# Patient Record
Sex: FEMALE | Race: WHITE | NOT HISPANIC OR LATINO | Employment: OTHER | URBAN - METROPOLITAN AREA
[De-identification: names, ages, dates, MRNs, and addresses within clinical notes are randomized per-mention and may not be internally consistent; named-entity substitution may affect disease eponyms.]

---

## 2017-11-20 ENCOUNTER — TRANSFERRED RECORDS (OUTPATIENT)
Dept: HEALTH INFORMATION MANAGEMENT | Facility: CLINIC | Age: 67
End: 2017-11-20

## 2017-11-21 ENCOUNTER — TRANSFERRED RECORDS (OUTPATIENT)
Dept: HEALTH INFORMATION MANAGEMENT | Facility: CLINIC | Age: 67
End: 2017-11-21

## 2017-11-22 ENCOUNTER — TRANSFERRED RECORDS (OUTPATIENT)
Dept: HEALTH INFORMATION MANAGEMENT | Facility: CLINIC | Age: 67
End: 2017-11-22

## 2017-11-25 ENCOUNTER — TRANSFERRED RECORDS (OUTPATIENT)
Dept: HEALTH INFORMATION MANAGEMENT | Facility: CLINIC | Age: 67
End: 2017-11-25

## 2017-11-27 ENCOUNTER — TRANSFERRED RECORDS (OUTPATIENT)
Dept: HEALTH INFORMATION MANAGEMENT | Facility: CLINIC | Age: 67
End: 2017-11-27

## 2017-11-28 ENCOUNTER — TRANSFERRED RECORDS (OUTPATIENT)
Dept: HEALTH INFORMATION MANAGEMENT | Facility: CLINIC | Age: 67
End: 2017-11-28

## 2017-12-02 ENCOUNTER — TRANSFERRED RECORDS (OUTPATIENT)
Dept: HEALTH INFORMATION MANAGEMENT | Facility: CLINIC | Age: 67
End: 2017-12-02

## 2017-12-03 ENCOUNTER — TRANSFERRED RECORDS (OUTPATIENT)
Dept: HEALTH INFORMATION MANAGEMENT | Facility: CLINIC | Age: 67
End: 2017-12-03

## 2017-12-04 ENCOUNTER — TRANSFERRED RECORDS (OUTPATIENT)
Dept: HEALTH INFORMATION MANAGEMENT | Facility: CLINIC | Age: 67
End: 2017-12-04

## 2017-12-05 ENCOUNTER — TRANSFERRED RECORDS (OUTPATIENT)
Dept: HEALTH INFORMATION MANAGEMENT | Facility: CLINIC | Age: 67
End: 2017-12-05

## 2017-12-06 ENCOUNTER — TRANSFERRED RECORDS (OUTPATIENT)
Dept: HEALTH INFORMATION MANAGEMENT | Facility: CLINIC | Age: 67
End: 2017-12-06

## 2017-12-07 ENCOUNTER — TRANSFERRED RECORDS (OUTPATIENT)
Dept: HEALTH INFORMATION MANAGEMENT | Facility: CLINIC | Age: 67
End: 2017-12-07

## 2017-12-12 ENCOUNTER — TRANSFERRED RECORDS (OUTPATIENT)
Dept: HEALTH INFORMATION MANAGEMENT | Facility: CLINIC | Age: 67
End: 2017-12-12

## 2017-12-13 ENCOUNTER — TRANSFERRED RECORDS (OUTPATIENT)
Dept: HEALTH INFORMATION MANAGEMENT | Facility: CLINIC | Age: 67
End: 2017-12-13

## 2017-12-15 ENCOUNTER — TRANSFERRED RECORDS (OUTPATIENT)
Dept: HEALTH INFORMATION MANAGEMENT | Facility: CLINIC | Age: 67
End: 2017-12-15

## 2017-12-18 ENCOUNTER — TRANSFERRED RECORDS (OUTPATIENT)
Dept: HEALTH INFORMATION MANAGEMENT | Facility: CLINIC | Age: 67
End: 2017-12-18

## 2017-12-31 ENCOUNTER — TRANSFERRED RECORDS (OUTPATIENT)
Dept: HEALTH INFORMATION MANAGEMENT | Facility: CLINIC | Age: 67
End: 2017-12-31

## 2018-09-14 ENCOUNTER — TRANSFERRED RECORDS (OUTPATIENT)
Dept: HEALTH INFORMATION MANAGEMENT | Facility: CLINIC | Age: 68
End: 2018-09-14

## 2018-09-15 ENCOUNTER — TRANSFERRED RECORDS (OUTPATIENT)
Dept: HEALTH INFORMATION MANAGEMENT | Facility: CLINIC | Age: 68
End: 2018-09-15

## 2018-09-20 ENCOUNTER — TRANSFERRED RECORDS (OUTPATIENT)
Dept: HEALTH INFORMATION MANAGEMENT | Facility: CLINIC | Age: 68
End: 2018-09-20

## 2018-10-04 ENCOUNTER — TRANSFERRED RECORDS (OUTPATIENT)
Dept: HEALTH INFORMATION MANAGEMENT | Facility: CLINIC | Age: 68
End: 2018-10-04

## 2018-10-05 ENCOUNTER — TRANSFERRED RECORDS (OUTPATIENT)
Dept: HEALTH INFORMATION MANAGEMENT | Facility: CLINIC | Age: 68
End: 2018-10-05

## 2018-10-08 ENCOUNTER — TRANSFERRED RECORDS (OUTPATIENT)
Dept: HEALTH INFORMATION MANAGEMENT | Facility: CLINIC | Age: 68
End: 2018-10-08

## 2018-10-09 ENCOUNTER — TRANSFERRED RECORDS (OUTPATIENT)
Dept: HEALTH INFORMATION MANAGEMENT | Facility: CLINIC | Age: 68
End: 2018-10-09

## 2018-10-10 ENCOUNTER — TRANSFERRED RECORDS (OUTPATIENT)
Dept: HEALTH INFORMATION MANAGEMENT | Facility: CLINIC | Age: 68
End: 2018-10-10

## 2018-10-11 ENCOUNTER — TRANSFERRED RECORDS (OUTPATIENT)
Dept: HEALTH INFORMATION MANAGEMENT | Facility: CLINIC | Age: 68
End: 2018-10-11

## 2018-10-23 ENCOUNTER — TRANSFERRED RECORDS (OUTPATIENT)
Dept: HEALTH INFORMATION MANAGEMENT | Facility: CLINIC | Age: 68
End: 2018-10-23

## 2018-10-25 ENCOUNTER — TRANSFERRED RECORDS (OUTPATIENT)
Dept: HEALTH INFORMATION MANAGEMENT | Facility: CLINIC | Age: 68
End: 2018-10-25

## 2018-10-27 ENCOUNTER — TRANSFERRED RECORDS (OUTPATIENT)
Dept: HEALTH INFORMATION MANAGEMENT | Facility: CLINIC | Age: 68
End: 2018-10-27

## 2018-11-21 ENCOUNTER — TRANSFERRED RECORDS (OUTPATIENT)
Dept: HEALTH INFORMATION MANAGEMENT | Facility: CLINIC | Age: 68
End: 2018-11-21

## 2019-02-19 ENCOUNTER — MEDICAL CORRESPONDENCE (OUTPATIENT)
Dept: HEALTH INFORMATION MANAGEMENT | Facility: CLINIC | Age: 69
End: 2019-02-19

## 2019-03-08 ENCOUNTER — DOCUMENTATION ONLY (OUTPATIENT)
Dept: CARE COORDINATION | Facility: CLINIC | Age: 69
End: 2019-03-08

## 2019-03-26 DIAGNOSIS — M54.6 THORACOLUMBAR BACK PAIN: Primary | ICD-10-CM

## 2019-03-26 DIAGNOSIS — M54.50 THORACOLUMBAR BACK PAIN: Primary | ICD-10-CM

## 2019-03-27 ASSESSMENT — ENCOUNTER SYMPTOMS
BLOOD IN STOOL: 0
DECREASED LIBIDO: 0
FEVER: 0
SPEECH CHANGE: 0
TINGLING: 1
HYPERTENSION: 0
ALTERED TEMPERATURE REGULATION: 1
DECREASED APPETITE: 1
JAUNDICE: 0
TREMORS: 1
SYNCOPE: 0
POOR WOUND HEALING: 1
SLEEP DISTURBANCES DUE TO BREATHING: 0
PARALYSIS: 1
ORTHOPNEA: 0
CHILLS: 1
HEMATURIA: 0
WEAKNESS: 1
DIZZINESS: 1
POLYDIPSIA: 0
LOSS OF CONSCIOUSNESS: 0
INSOMNIA: 1
DECREASED CONCENTRATION: 0
POLYPHAGIA: 0
BOWEL INCONTINENCE: 1
WEIGHT GAIN: 0
LIGHT-HEADEDNESS: 1
SKIN CHANGES: 0
NUMBNESS: 1
FATIGUE: 1
SEIZURES: 0
NAUSEA: 1
NERVOUS/ANXIOUS: 1
PANIC: 0
EXERCISE INTOLERANCE: 1
INCREASED ENERGY: 1
FLANK PAIN: 0
RECTAL PAIN: 1
DIFFICULTY URINATING: 1
HEADACHES: 0
ABDOMINAL PAIN: 0
NAIL CHANGES: 0
CONSTIPATION: 1
HEARTBURN: 0
VOMITING: 0
NIGHT SWEATS: 1
MEMORY LOSS: 0
DIARRHEA: 1
HOT FLASHES: 0
DYSURIA: 1
HYPOTENSION: 0
DEPRESSION: 1
BLOATING: 1
HALLUCINATIONS: 0
WEIGHT LOSS: 0
LEG PAIN: 1
PALPITATIONS: 0

## 2019-03-28 ENCOUNTER — PATIENT OUTREACH (OUTPATIENT)
Dept: NEUROSURGERY | Facility: CLINIC | Age: 69
End: 2019-03-28

## 2019-03-28 ENCOUNTER — OFFICE VISIT (OUTPATIENT)
Dept: NEUROSURGERY | Facility: CLINIC | Age: 69
End: 2019-03-28
Payer: MEDICARE

## 2019-03-28 ENCOUNTER — ANCILLARY PROCEDURE (OUTPATIENT)
Dept: GENERAL RADIOLOGY | Facility: CLINIC | Age: 69
End: 2019-03-28
Attending: NEUROLOGICAL SURGERY
Payer: MEDICARE

## 2019-03-28 VITALS
HEART RATE: 58 BPM | OXYGEN SATURATION: 100 % | SYSTOLIC BLOOD PRESSURE: 122 MMHG | DIASTOLIC BLOOD PRESSURE: 58 MMHG | RESPIRATION RATE: 18 BRPM | TEMPERATURE: 98 F

## 2019-03-28 DIAGNOSIS — G82.22: ICD-10-CM

## 2019-03-28 DIAGNOSIS — M54.6 THORACOLUMBAR BACK PAIN: ICD-10-CM

## 2019-03-28 DIAGNOSIS — M54.50 THORACOLUMBAR BACK PAIN: ICD-10-CM

## 2019-03-28 DIAGNOSIS — Q28.8 SPINAL VASCULAR MALFORMATION: Primary | ICD-10-CM

## 2019-03-28 PROBLEM — R29.898 LEG WEAKNESS, BILATERAL: Status: ACTIVE | Noted: 2018-09-25

## 2019-03-28 PROBLEM — R00.0 TACHYCARDIA: Status: ACTIVE | Noted: 2017-12-03

## 2019-03-28 PROBLEM — Z87.898 HISTORY OF URINARY RETENTION: Status: ACTIVE | Noted: 2018-03-15

## 2019-03-28 PROBLEM — S24.103A: Status: ACTIVE | Noted: 2018-10-11

## 2019-03-28 PROBLEM — Z87.440 HISTORY OF RECURRENT UTIS: Status: ACTIVE | Noted: 2017-12-03

## 2019-03-28 PROBLEM — N39.44 NOCTURNAL ENURESIS: Status: ACTIVE | Noted: 2018-06-22

## 2019-03-28 PROBLEM — L89.153 PRESSURE ULCER OF COCCYGEAL REGION, STAGE 3 (H): Status: ACTIVE | Noted: 2019-03-08

## 2019-03-28 PROBLEM — R00.2 PALPITATIONS: Status: ACTIVE | Noted: 2017-10-18

## 2019-03-28 PROBLEM — E78.00 PURE HYPERCHOLESTEROLEMIA: Status: ACTIVE | Noted: 2018-06-06

## 2019-03-28 PROBLEM — I10 HYPERTENSION: Status: ACTIVE | Noted: 2017-12-03

## 2019-03-28 PROBLEM — S24.109A: Status: ACTIVE | Noted: 2017-11-22

## 2019-03-28 PROBLEM — R31.9 HEMATURIA: Status: ACTIVE | Noted: 2017-12-03

## 2019-03-28 PROBLEM — Z98.890 HISTORY OF SPINAL SURGERY: Status: ACTIVE | Noted: 2017-12-03

## 2019-03-28 PROBLEM — L25.9 CONTACT DERMATITIS: Status: ACTIVE | Noted: 2017-12-03

## 2019-03-28 PROBLEM — R60.0 BILATERAL LOWER EXTREMITY EDEMA: Status: ACTIVE | Noted: 2019-03-20

## 2019-03-28 PROBLEM — I34.1 MVP (MITRAL VALVE PROLAPSE): Status: ACTIVE | Noted: 2017-10-18

## 2019-03-28 PROBLEM — G56.22 ULNAR NEUROPATHY AT ELBOW, LEFT: Status: ACTIVE | Noted: 2018-12-15

## 2019-03-28 PROBLEM — R33.9 INCOMPLETE EMPTYING OF BLADDER: Status: ACTIVE | Noted: 2018-03-15

## 2019-03-28 PROBLEM — M62.838 MUSCLE SPASTICITY: Status: ACTIVE | Noted: 2017-12-03

## 2019-03-28 PROBLEM — N39.41 URGE INCONTINENCE OF URINE: Status: ACTIVE | Noted: 2018-03-15

## 2019-03-28 ASSESSMENT — PAIN SCALES - GENERAL: PAINLEVEL: MILD PAIN (3)

## 2019-03-28 ASSESSMENT — PATIENT HEALTH QUESTIONNAIRE - PHQ9: SUM OF ALL RESPONSES TO PHQ QUESTIONS 1-9: 1

## 2019-03-28 NOTE — LETTER
Date:April 2, 2019      Patient was self referred, no letter generated. Do not send.        [facility] Health Information

## 2019-03-28 NOTE — NURSING NOTE
Chief Complaint   Patient presents with     New Patient     [facility] new patient visit for throracicolumbar issues      Unable to obtain height and weight during rooming - intake area was occupied. Patient went to first floor to have her scan done. Can obtain height and weight once patient comes back to clinic.     Helena Badillo MA

## 2019-03-28 NOTE — LETTER
3/28/2019       RE: Anayeli Gagnon  [address] [place] [address]     Dear Colleague,    Thank you for referring your patient, Anayeli Gagnon, to the [facility] NEUROSURGERY at [facility]. Please see a copy of my visit note below.        Neurosurgery Clinic Note    Chief Complaint: leg weakness and pain in ankles and feet    History of Present Illness:  Anayeli Gagnon is a self-referred 68 year old female presenting following a fairly lengthy Neurosurgical history with all prior neurosurgical treatment at [facility].      1,068 pages of outside hospital medical records were reviewed for this visit due to complex history and are summarized as follows. Patient is relatively poor historian and medical records were used to corroborate details.      Initially started in November 2017, she experienced acute onset pain in her midline between her shoulder blades. The pain did not radiate. She denies weakness or sensory changes at that time. She was evaluated at [place] for a possible cardiac etiology, which was negative. Thoracic spine imaging demonstrated spinal cord compression from epidural vs subdural mass. She underwent decompression with placement of a lumbar drain 11/21/17 with Dr. Murcia. Intraoperatively, findings were consistent with an intradural hematoma. She underwent a spinal angiograms 11/21 and 11/25/17, which was negative. Notes indicate worsening lower extremity symptoms postoperatively, imaging repeated which demonstrated a lumbar hematoma as well. She underwent an L2-5 decompression and hematoma evacuation 12/3/17.     There was no mention in operative report of abnormal intradural flow void in upper lumbar spine but this was visualized by us on MRI upon reviewing this.    She was discharged to rehab and after approximately 1 month of rehab, she was able to stand and walk with an assistance device. She went home. While at home, she  began to slowly deteriorate, so she represented to Dr. Murcia in 2018. Imaging demonstrated adhesions throughout the thoracic spine. She underwent a 2 stage T3-12 lysis of adhesions on 10/4/18 and 10/5/18. Postoperatively, the patient noted bowel incontinence, perianal pain, and pain in her feet bilaterally, all of these were new and worse symptoms. The pain involves her ankles circumferentially and feet in all distributions. Standing makes the pain worse. Again after a long rehab course, she was able to stand and walk with assistance and was discharged home. She had ongoing bowel incontinence and pain in her ankles/feet. While being at home, she has again slowly deteriorated and is presently confined to a wheelchair. The patient has not had updated imaging since the surgery and since she began to deteriorate this last time. She is presenting for a second opinion. She denies any personal or family history of bleeding/clotting disorders or soft tissue disorders.    She saw her prior neurosurgeon who indicated nothing further could be done for her. She self-referred for another opinion.      Review of Systems  Notably Positive for back pain, leg tingling/numbness/weakness, bowel and bladder incontinence,      Answers for HPI/ROS submitted by the patient on 3/27/2019   General Symptoms: Yes  Skin Symptoms: Yes  HENT Symptoms: No  EYE SYMPTOMS: No  HEART SYMPTOMS: Yes  LUNG SYMPTOMS: No  INTESTINAL SYMPTOMS: Yes  URINARY SYMPTOMS: Yes  GYNECOLOGIC SYMPTOMS: Yes  BREAST SYMPTOMS: No  SKELETAL SYMPTOMS: No  BLOOD SYMPTOMS: No  NERVOUS SYSTEM SYMPTOMS: Yes  MENTAL HEALTH SYMPTOMS: Yes  Fever: No  Loss of appetite: Yes  Weight loss: No  Weight gain: No  Fatigue: Yes  Night sweats: Yes  Chills: Yes  Increased stress: Yes  Excessive hunger: No  Excessive thirst: No  Feeling hot or cold when others believe the temperature is normal: Yes  Loss of height: No  Post-operative complications: Yes  Surgical site pain:  Yes  Hallucinations: No  Change in or Loss of Energy: Yes  Hyperactivity: No  Confusion: No  Changes in hair: No  Changes in moles/birth marks: No  Itching: No  Rashes: No  Changes in nails: No  Acne: No  Hair in places you don't want it: No  Change in facial hair: No  Warts: No  Non-healing sores: Yes  Scarring: No  Flaking of skin: No  Color changes of hands/feet in cold : Yes  Sun sensitivity: No  Skin thickening: No  Chest pain or pressure: No  Fast or irregular heartbeat: No  Pain in legs with walking: Yes  Trouble breathing while lying down: No  Fingers or toes appear blue: Yes  High blood pressure: No  Low blood pressure: No  Fainting: No  Murmurs: No  Pacemaker: No  Varicose veins: Yes  Edema or swelling: Yes  Wake up at night with shortness of breath: No  Light-headedness: Yes  Exercise intolerance: Yes  Heart burn or indigestion: No  Nausea: Yes  Vomiting: No  Abdominal pain: No  Bloating: Yes  Constipation: Yes  Diarrhea: Yes  Blood in stool: No  Black stools: No  Rectal or Anal pain: Yes  Fecal incontinence: Yes  Yellowing of skin or eyes: No  Vomit with blood: No  Change in stools: Yes  Trouble holding urine or incontinence: Yes  Pain or burning: Yes  Trouble starting or stopping: Yes  Increased frequency of urination: No  Blood in urine: No  Decreased frequency of urination: Yes  Frequent nighttime urination: No  Flank pain: No  Difficulty emptying bladder: Yes  Dizziness or trouble with balance: Yes  Fainting or black-out spells: No  Memory loss: No  Headache: No  Seizures: No  Speech problems: No  Tingling: Yes  Tremor: Yes  Weakness: Yes  Paralysis: Yes  Numbness: Yes  Bleeding or spotting between periods: No  Heavy or painful periods: No  Irregular periods: No  Vaginal discharge: No  Hot flashes: No  Vaginal dryness: No  Genital ulcers: No  Reduced libido: No  Painful intercourse: No  Difficulty with sexual arousal: No  Post-menopausal bleeding: No  Nervous or Anxious: Yes  Depression: Yes  Trouble  sleeping: Yes  Trouble thinking or concentrating: No  Mood changes: Yes  Panic attacks: No        Past Medical History:   Diagnosis Date     CARDIAC DYSRHYTHMIAS NEC 10/3/2007    Taking atenelol for years for this     History of skin cancer        Past Surgical History:   Procedure Laterality Date     LAPAROSCOPIC TUBAL LIGATION     multiple thoracic and lumbar spine intradural surgeries as detailed in HPI    Social History     Socioeconomic History     Marital status:      Spouse name: None     Number of children: None     Years of education: None     Highest education level: None   Occupational History     Occupation: NA     Employer: [employer]   Social Needs     Financial resource strain: None     Food insecurity:     Worry: None     Inability: None     Transportation needs:     Medical: None     Non-medical: None   Tobacco Use     Smoking status: Never Smoker     Smokeless tobacco: Never Used   Substance and Sexual Activity     Alcohol use: No     Drug use: No     Sexual activity: Yes     Partners: Male   Lifestyle     Physical activity:     Days per week: None     Minutes per session: None     Stress: None   Relationships     Social connections:     Talks on phone: None     Gets together: None     Attends Yazidism service: None     Active member of club or organization: None     Attends meetings of clubs or organizations: None     Relationship status: None     Intimate partner violence:     Fear of current or ex partner: None     Emotionally abused: None     Physically abused: None     Forced sexual activity: None   Other Topics Concern     Parent/sibling w/ CABG, MI or angioplasty before 65F 55M? Not Asked   Social History Narrative    Lives with spouse - works in [place].       family history includes C.A.D. in her father.    Resulted Imaging/Labs:  Result Date: 3/28/2019  Exam: XR SPINE COMPLETE SCOLIOSIS 2 VW, 3/28/2019 9:09 AM Indication: sagittal and coronal balance; Thoracolumbar back pain;  Thoracolumbar back pain Comparison: 9/20/2018 CT Techniques: AP and lateral EOS composite images of the full spine with the patient in the seated position were submitted for interpretation. Findings: 12 rib bearing vertebral bodies and 5 lumbar type vertebral bodies are identified. Mild degenerative endplate change in the cervical spine, greatest at C5-6. Mild degenerative endplate spurring and sclerosis in the thoracic and lumbar spine, greatest at T12-L1 and L5-S1 with associated intervertebral disc space narrowing. Decompressive laminectomy changes in the lumbar spine. Lower lumbar predominant facet arthropathy. No vertebral body height loss. Coronal Deformity: Mild degenerative convex right coronal curvature deformity in the lumbar spine with apex at L5. Negative global coronal imbalance. Sagittal Vertical Axis (A vertical line drawn from the center of C7 (juni line) to the posterosuperior aspect of the S1 on sagittal plane):  Negative. Additional Findings: Clear lungs. Nonobstructive bowel gas pattern.     Impression: 1. Mild degenerative convex right coronal curvature deformity in the lumbar spine with apex at L5. 2. With the patient in the seated position, negative global coronal and sagittal imbalance. 3. Mild degenerative change throughout the spine, as described. SHRUTI PINTO DO    MRI thoracic and lumbar spine and CT myelograms reviewed from Oct 2017-Oct 2018; no recent imaging since last surgery, prior to last surgery multiple intradural abnormalities with spinal cord deviation and compression potentially from arachnoid scarring    Angiograms were performed in November 2017 and January 2018 at [facility] and reviewed; records scanned into Epic      Vitamin D:  Vitamin D Deficiency Screening Results:  No results found for: VITDT  25 OH Vit D2   Date Value Ref Range Status   11/17/2011 <5 ug/L Final     25 OH Vit D3   Date Value Ref Range Status   11/17/2011 38 ug/L Final     25 OH Vit D total   Date  "Value Ref Range Status   11/17/2011  30 - 75 ug/L Final    <43  Season, race, dietary intake, and treatment affect the concentration of   25-hydroxy-Vitamin D. Values may decrease during winter months and increase   during summer months. Values less than 30 ug/L may indicate Vitamin D   deficiency.         Nutritional Status:  Estimated body mass index is 24.04 kg/m  as calculated from the following:    Height as of 11/17/11: 1.639 m (5' 4.53\").    Weight as of 12/6/11: 64.6 kg (142 lb 6.4 oz).    No results found for: ALBUMIN    Diabetes Screening:  Lab Results   Component Value Date    A1C 5.3 11/17/2011        Physical Exam   /58   Pulse 58   Temp 98  F (36.7  C) (Oral)   Resp 18   SpO2 100%   Breastfeeding? No   Constitutional: Oriented to person, place, and time. Appears well-developed and well-nourished. Cooperative. No distress.   HENT:   Head: Normocephalic and atraumatic.   Eyes: Conjunctivae are normal.  Neck: Normal range of motion. Neck supple. No spinous process tenderness and no muscular tenderness present. No tracheal deviation present.  Pulmonary/Chest: Effort normal and breath sounds normal.  Abdominal: Soft. Bowel sounds are normal. Exhibits no distension. There is no tenderness.   Musculoskeletal: seated in wheelchair, normal upper extremity tone and function    Neurological: alert and oriented to person, place, and time.   No cranial nerve deficit   sensory deficit: decreased sensation below the level of the umbilicus to pin prick, decreases distally  Gait: Unable to stand  Babinski: Upgoing bilaterally    Reflex Scores:       Tricep reflexes are 2+ on the right side and 2+ on the left side.       Bicep reflexes are 2+ on the right side and 2+ on the left side.       Brachioradialis reflexes are 2+ on the right side and 2+ on the left side.       Patellar reflexes are 3+ on the right side and 3+ on the left side.       Achilles reflexes are 3+ on the right side and 3+ on the left " side.    STRENGTH LEFT RIGHT   Deltoid 5 5   Bicep 5 5   Wrist Extensor 5 5   Tricep 5 5   Finger flexion 5 5   Finger abduction 5 5    5 5       Hip Flexion     0     0   Knee Extension 4- 4   Ankle Dorsiflexion 4- 4   Extensor Hallucis Longus 4- 4   Plantar Flexion 4- 4   Foot eversion 4- 4   Foot inversion 4- 4     No ankle clonus  Able to tandem walk      Skin: Skin is warm, dry and intact.   Psychiatric: Normal mood and affect. Speech is normal and behavior is normal.      ASSESSMENT:  Anayeli Gagnon is a 68 year old female presenting with a history of suspected spinal vascular abnormality following multiple intradural hematoma evacuations at outside hospital. Had developed intradural adhesions and underwent one lysis of adhesions with worsening of symptoms postoperatively. Spinal angiograms negative, however, were temporally close to the time of the hemorrhages.       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    - Thoracic/Lumbar MRI/MRA to evaluate for any visible vascular abnormality, Dr. Hermosillo will review with Dr. Rios and potentially may need to proceed to repeat spinal catheter-based angiogram. Dr. Hermosillo discussed that repeat spinal cord lysis of adhesions without treating underlying etiology is unlikely to result in long-term improvement.    - The patient will return to clinic to discuss next steps after the imaging is complete    Adam Hernández MD PGY-3    I saw, evaluated, and examined the patient with the resident and personally reviewed and interpreted all imaging and labs and formulated the plan.    Ivette Hermosillo MD    [facility] Department of Neurosurgery  Complex Spinal Deformity, Scoliosis, and Minimally Invasive Spine Surgery Specialist  Office: [phone]    3/28/2019    I performed independent visualization of radiographic imaging and  entered my own interpretation, reviewed and/or ordered clinical laboratory tests, reviewed and/or ordered tests in radiology, reviewed and/or ordered medical tests, made the decision to obtain old records and/or history from someone other than the patient and Reviewed and summarized old records and/or discussed this case with another health care provider          Again, thank you for allowing me to participate in the care of your patient.      Sincerely,    Ivette Hermosillo MD

## 2019-03-28 NOTE — PATIENT INSTRUCTIONS
A order has been put in for MRI and MRA.  Further recommendation for care is pending the results of these scans.

## 2019-03-28 NOTE — PROGRESS NOTES
Neurosurgery Clinic Note    Chief Complaint: leg weakness and pain in ankles and feet    History of Present Illness:  Anayeli Gagnon is a self-referred 68 year old female presenting following a fairly lengthy Neurosurgical history with all prior neurosurgical treatment at [facility].      1,068 pages of outside hospital medical records were reviewed for this visit due to complex history and are summarized as follows. Patient is relatively poor historian and medical records were used to corroborate details.      Initially started in November 2017, she experienced acute onset pain in her midline between her shoulder blades. The pain did not radiate. She denies weakness or sensory changes at that time. She was evaluated at [place] for a possible cardiac etiology, which was negative. Thoracic spine imaging demonstrated spinal cord compression from epidural vs subdural mass. She underwent decompression with placement of a lumbar drain 11/21/17 with Dr. Murcia. Intraoperatively, findings were consistent with an intradural hematoma. She underwent a spinal angiograms 11/21 and 11/25/17, which was negative. Notes indicate worsening lower extremity symptoms postoperatively, imaging repeated which demonstrated a lumbar hematoma as well. She underwent an L2-5 decompression and hematoma evacuation 12/3/17.     There was no mention in operative report of abnormal intradural flow void in upper lumbar spine but this was visualized by us on MRI upon reviewing this.    She was discharged to rehab and after approximately 1 month of rehab, she was able to stand and walk with an assistance device. She went home. While at home, she began to slowly deteriorate, so she represented to Dr. Murcia in 2018. Imaging demonstrated adhesions throughout the thoracic spine. She underwent a 2 stage T3-12 lysis of adhesions on 10/4/18 and 10/5/18. Postoperatively, the patient noted bowel incontinence, perianal pain, and pain in her  feet bilaterally, all of these were new and worse symptoms. The pain involves her ankles circumferentially and feet in all distributions. Standing makes the pain worse. Again after a long rehab course, she was able to stand and walk with assistance and was discharged home. She had ongoing bowel incontinence and pain in her ankles/feet. While being at home, she has again slowly deteriorated and is presently confined to a wheelchair. The patient has not had updated imaging since the surgery and since she began to deteriorate this last time. She is presenting for a second opinion. She denies any personal or family history of bleeding/clotting disorders or soft tissue disorders.    She saw her prior neurosurgeon who indicated nothing further could be done for her. She self-referred for another opinion.      Review of Systems  Notably Positive for back pain, leg tingling/numbness/weakness, bowel and bladder incontinence,      Answers for HPI/ROS submitted by the patient on 3/27/2019   General Symptoms: Yes  Skin Symptoms: Yes  HENT Symptoms: No  EYE SYMPTOMS: No  HEART SYMPTOMS: Yes  LUNG SYMPTOMS: No  INTESTINAL SYMPTOMS: Yes  URINARY SYMPTOMS: Yes  GYNECOLOGIC SYMPTOMS: Yes  BREAST SYMPTOMS: No  SKELETAL SYMPTOMS: No  BLOOD SYMPTOMS: No  NERVOUS SYSTEM SYMPTOMS: Yes  MENTAL HEALTH SYMPTOMS: Yes  Fever: No  Loss of appetite: Yes  Weight loss: No  Weight gain: No  Fatigue: Yes  Night sweats: Yes  Chills: Yes  Increased stress: Yes  Excessive hunger: No  Excessive thirst: No  Feeling hot or cold when others believe the temperature is normal: Yes  Loss of height: No  Post-operative complications: Yes  Surgical site pain: Yes  Hallucinations: No  Change in or Loss of Energy: Yes  Hyperactivity: No  Confusion: No  Changes in hair: No  Changes in moles/birth marks: No  Itching: No  Rashes: No  Changes in nails: No  Acne: No  Hair in places you don't want it: No  Change in facial hair: No  Warts: No  Non-healing sores:  Yes  Scarring: No  Flaking of skin: No  Color changes of hands/feet in cold : Yes  Sun sensitivity: No  Skin thickening: No  Chest pain or pressure: No  Fast or irregular heartbeat: No  Pain in legs with walking: Yes  Trouble breathing while lying down: No  Fingers or toes appear blue: Yes  High blood pressure: No  Low blood pressure: No  Fainting: No  Murmurs: No  Pacemaker: No  Varicose veins: Yes  Edema or swelling: Yes  Wake up at night with shortness of breath: No  Light-headedness: Yes  Exercise intolerance: Yes  Heart burn or indigestion: No  Nausea: Yes  Vomiting: No  Abdominal pain: No  Bloating: Yes  Constipation: Yes  Diarrhea: Yes  Blood in stool: No  Black stools: No  Rectal or Anal pain: Yes  Fecal incontinence: Yes  Yellowing of skin or eyes: No  Vomit with blood: No  Change in stools: Yes  Trouble holding urine or incontinence: Yes  Pain or burning: Yes  Trouble starting or stopping: Yes  Increased frequency of urination: No  Blood in urine: No  Decreased frequency of urination: Yes  Frequent nighttime urination: No  Flank pain: No  Difficulty emptying bladder: Yes  Dizziness or trouble with balance: Yes  Fainting or black-out spells: No  Memory loss: No  Headache: No  Seizures: No  Speech problems: No  Tingling: Yes  Tremor: Yes  Weakness: Yes  Paralysis: Yes  Numbness: Yes  Bleeding or spotting between periods: No  Heavy or painful periods: No  Irregular periods: No  Vaginal discharge: No  Hot flashes: No  Vaginal dryness: No  Genital ulcers: No  Reduced libido: No  Painful intercourse: No  Difficulty with sexual arousal: No  Post-menopausal bleeding: No  Nervous or Anxious: Yes  Depression: Yes  Trouble sleeping: Yes  Trouble thinking or concentrating: No  Mood changes: Yes  Panic attacks: No        Past Medical History:   Diagnosis Date     CARDIAC DYSRHYTHMIAS NEC 10/3/2007    Taking atenelol for years for this     History of skin cancer        Past Surgical History:   Procedure Laterality Date      LAPAROSCOPIC TUBAL LIGATION     multiple thoracic and lumbar spine intradural surgeries as detailed in HPI    Social History     Socioeconomic History     Marital status:      Spouse name: None     Number of children: None     Years of education: None     Highest education level: None   Occupational History     Occupation: NA     Employer: [employer]   Social Needs     Financial resource strain: None     Food insecurity:     Worry: None     Inability: None     Transportation needs:     Medical: None     Non-medical: None   Tobacco Use     Smoking status: Never Smoker     Smokeless tobacco: Never Used   Substance and Sexual Activity     Alcohol use: No     Drug use: No     Sexual activity: Yes     Partners: Male   Lifestyle     Physical activity:     Days per week: None     Minutes per session: None     Stress: None   Relationships     Social connections:     Talks on phone: None     Gets together: None     Attends Anglican service: None     Active member of club or organization: None     Attends meetings of clubs or organizations: None     Relationship status: None     Intimate partner violence:     Fear of current or ex partner: None     Emotionally abused: None     Physically abused: None     Forced sexual activity: None   Other Topics Concern     Parent/sibling w/ CABG, MI or angioplasty before 65F 55M? Not Asked   Social History Narrative    Lives with spouse - works in [employer].       family history includes C.A.D. in her father.    Resulted Imaging/Labs:  Result Date: 3/28/2019  Exam: XR SPINE COMPLETE SCOLIOSIS 2 VW, 3/28/2019 9:09 AM Indication: sagittal and coronal balance; Thoracolumbar back pain; Thoracolumbar back pain Comparison: 9/20/2018 CT Techniques: AP and lateral EOS composite images of the full spine with the patient in the seated position were submitted for interpretation. Findings: 12 rib bearing vertebral bodies and 5 lumbar type vertebral bodies are identified. Mild degenerative  endplate change in the cervical spine, greatest at C5-6. Mild degenerative endplate spurring and sclerosis in the thoracic and lumbar spine, greatest at T12-L1 and L5-S1 with associated intervertebral disc space narrowing. Decompressive laminectomy changes in the lumbar spine. Lower lumbar predominant facet arthropathy. No vertebral body height loss. Coronal Deformity: Mild degenerative convex right coronal curvature deformity in the lumbar spine with apex at L5. Negative global coronal imbalance. Sagittal Vertical Axis (A vertical line drawn from the center of C7 (juni line) to the posterosuperior aspect of the S1 on sagittal plane):  Negative. Additional Findings: Clear lungs. Nonobstructive bowel gas pattern.     Impression: 1. Mild degenerative convex right coronal curvature deformity in the lumbar spine with apex at L5. 2. With the patient in the seated position, negative global coronal and sagittal imbalance. 3. Mild degenerative change throughout the spine, as described. SHRUTI PINTO,     MRI thoracic and lumbar spine and CT myelograms reviewed from Oct 2017-Oct 2018; no recent imaging since last surgery, prior to last surgery multiple intradural abnormalities with spinal cord deviation and compression potentially from arachnoid scarring    Angiograms were performed in November 2017 and January 2018 at [facility] and reviewed; records scanned into Epic      Vitamin D:  Vitamin D Deficiency Screening Results:  No results found for: VITDT  25 OH Vit D2   Date Value Ref Range Status   11/17/2011 <5 ug/L Final     25 OH Vit D3   Date Value Ref Range Status   11/17/2011 38 ug/L Final     25 OH Vit D total   Date Value Ref Range Status   11/17/2011  30 - 75 ug/L Final    <43  Season, race, dietary intake, and treatment affect the concentration of   25-hydroxy-Vitamin D. Values may decrease during winter months and increase   during summer months. Values less than 30 ug/L may indicate Vitamin D   deficiency.  "        Nutritional Status:  Estimated body mass index is 24.04 kg/m  as calculated from the following:    Height as of 11/17/11: 1.639 m (5' 4.53\").    Weight as of 12/6/11: 64.6 kg (142 lb 6.4 oz).    No results found for: ALBUMIN    Diabetes Screening:  Lab Results   Component Value Date    A1C 5.3 11/17/2011        Physical Exam   /58   Pulse 58   Temp 98  F (36.7  C) (Oral)   Resp 18   SpO2 100%   Breastfeeding? No   Constitutional: Oriented to person, place, and time. Appears well-developed and well-nourished. Cooperative. No distress.   HENT:   Head: Normocephalic and atraumatic.   Eyes: Conjunctivae are normal.  Neck: Normal range of motion. Neck supple. No spinous process tenderness and no muscular tenderness present. No tracheal deviation present.  Pulmonary/Chest: Effort normal and breath sounds normal.  Abdominal: Soft. Bowel sounds are normal. Exhibits no distension. There is no tenderness.   Musculoskeletal: seated in wheelchair, normal upper extremity tone and function    Neurological: alert and oriented to person, place, and time.   No cranial nerve deficit   sensory deficit: decreased sensation below the level of the umbilicus to pin prick, decreases distally  Gait: Unable to stand  Babinski: Upgoing bilaterally    Reflex Scores:       Tricep reflexes are 2+ on the right side and 2+ on the left side.       Bicep reflexes are 2+ on the right side and 2+ on the left side.       Brachioradialis reflexes are 2+ on the right side and 2+ on the left side.       Patellar reflexes are 3+ on the right side and 3+ on the left side.       Achilles reflexes are 3+ on the right side and 3+ on the left side.    STRENGTH LEFT RIGHT   Deltoid 5 5   Bicep 5 5   Wrist Extensor 5 5   Tricep 5 5   Finger flexion 5 5   Finger abduction 5 5    5 5       Hip Flexion     0     0   Knee Extension 4- 4   Ankle Dorsiflexion 4- 4   Extensor Hallucis Longus 4- 4   Plantar Flexion 4- 4   Foot eversion 4- 4   Foot " inversion 4- 4     No ankle clonus  Able to tandem walk      Skin: Skin is warm, dry and intact.   Psychiatric: Normal mood and affect. Speech is normal and behavior is normal.      ASSESSMENT:  Anayeli Gagnon is a 68 year old female presenting with a history of suspected spinal vascular abnormality following multiple intradural hematoma evacuations at outside hospital. Had developed intradural adhesions and underwent one lysis of adhesions with worsening of symptoms postoperatively. Spinal angiograms negative, however, were temporally close to the time of the hemorrhages.       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    - Thoracic/Lumbar MRI/MRA to evaluate for any visible vascular abnormality, Dr. Hermosillo will review with Dr. Rios and potentially may need to proceed to repeat spinal catheter-based angiogram. Dr. Hermosillo discussed that repeat spinal cord lysis of adhesions without treating underlying etiology is unlikely to result in long-term improvement.    - The patient will return to clinic to discuss next steps after the imaging is complete    Adam Hernández MD PGY-3    I saw, evaluated, and examined the patient with the resident and personally reviewed and interpreted all imaging and labs and formulated the plan.    Ivette Hermosillo MD    [facility] Department of Neurosurgery  Complex Spinal Deformity, Scoliosis, and Minimally Invasive Spine Surgery Specialist  Office: [phone]    3/28/2019    I performed independent visualization of radiographic imaging and entered my own interpretation, reviewed and/or ordered clinical laboratory tests, reviewed and/or ordered tests in radiology, reviewed and/or ordered medical tests, made the decision to obtain old records and/or history from someone other than the patient and Reviewed and summarized old records and/or  discussed this case with another health care provider

## 2019-03-29 ENCOUNTER — TELEPHONE (OUTPATIENT)
Dept: NEUROSURGERY | Facility: CLINIC | Age: 69
End: 2019-03-29

## 2019-04-01 ENCOUNTER — HOSPITAL ENCOUNTER (OUTPATIENT)
Dept: MRI IMAGING | Facility: CLINIC | Age: 69
End: 2019-04-01
Attending: NEUROLOGICAL SURGERY
Payer: MEDICARE

## 2019-04-01 ENCOUNTER — HOSPITAL ENCOUNTER (OUTPATIENT)
Dept: MRI IMAGING | Facility: CLINIC | Age: 69
Discharge: HOME | End: 2019-04-01
Attending: NEUROLOGICAL SURGERY | Admitting: NEUROLOGICAL SURGERY
Payer: MEDICARE

## 2019-04-01 DIAGNOSIS — Q28.8 SPINAL VASCULAR MALFORMATION: ICD-10-CM

## 2019-04-01 LAB
CREAT BLD-MCNC: 0.6 MG/DL (ref 0.52–1.04)
GFR SERPL CREATININE-BSD FRML MDRD: >90 ML/MIN/{1.73_M2}

## 2019-04-01 PROCEDURE — 72157 MRI CHEST SPINE W/O & W/DYE: CPT

## 2019-04-01 PROCEDURE — 82565 ASSAY OF CREATININE: CPT

## 2019-04-01 PROCEDURE — 72159 MR ANGIO SPINE W/O&W/DYE: CPT

## 2019-04-01 PROCEDURE — A9577 INJ MULTIHANCE: HCPCS | Performed by: STUDENT IN AN ORGANIZED HEALTH CARE EDUCATION/TRAINING PROGRAM

## 2019-04-01 PROCEDURE — 25500064 ZZH RX 255 OP 636: Performed by: STUDENT IN AN ORGANIZED HEALTH CARE EDUCATION/TRAINING PROGRAM

## 2019-04-01 PROCEDURE — 72158 MRI LUMBAR SPINE W/O & W/DYE: CPT | Mod: XS

## 2019-04-01 RX ADMIN — GADOBENATE DIMEGLUMINE 15 ML: 529 INJECTION, SOLUTION INTRAVENOUS at 08:53

## 2019-04-04 ENCOUNTER — MEDICAL CORRESPONDENCE (OUTPATIENT)
Dept: HEALTH INFORMATION MANAGEMENT | Facility: CLINIC | Age: 69
End: 2019-04-04

## 2019-04-08 ENCOUNTER — PATIENT OUTREACH (OUTPATIENT)
Dept: NEUROSURGERY | Facility: CLINIC | Age: 69
End: 2019-04-08

## 2019-04-08 NOTE — PROGRESS NOTES
Returned call.    Pt completed the imaging the MD required.  Writer will send the  a note so the pt can get a clinical appt.  Pt expressed understanding of the information given.

## 2019-04-18 ENCOUNTER — OFFICE VISIT (OUTPATIENT)
Dept: NEUROSURGERY | Facility: CLINIC | Age: 69
End: 2019-04-18
Payer: MEDICARE

## 2019-04-18 VITALS
WEIGHT: 127.6 LBS | OXYGEN SATURATION: 94 % | BODY MASS INDEX: 21.55 KG/M2 | HEART RATE: 65 BPM | DIASTOLIC BLOOD PRESSURE: 67 MMHG | SYSTOLIC BLOOD PRESSURE: 127 MMHG

## 2019-04-18 DIAGNOSIS — G95.19 HEMORRHAGE INTO SUBARACHNOID SPACE OF SPINE (H): Primary | ICD-10-CM

## 2019-04-18 DIAGNOSIS — G45.8 OTHER TRANSIENT CEREBRAL ISCHEMIC ATTACKS AND RELATED SYNDROMES: ICD-10-CM

## 2019-04-18 ASSESSMENT — PAIN SCALES - GENERAL: PAINLEVEL: MILD PAIN (2)

## 2019-04-18 NOTE — LETTER
4/18/2019       RE: Anayeli Gagnon  [id number] [place] [address]     Dear Colleague,    Thank you for referring your patient, Anayeli Gagnon, to the [facility] NEUROSURGERY at [facility]. Please see a copy of my visit note below.    Neurosurgery Clinic Note     Chief Complaint: leg weakness and pain in ankles and feet     History of Present Illness:  Anayeli Gangon is a self-referred 68 year old female presenting following a fairly lengthy Neurosurgical history with all prior neurosurgical treatment at [facility].       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     1,068 pages of outside hospital medical records were previously reviewed for due to complex history and are summarized as follows. Patient is relatively poor historian and medical records were used to corroborate details.        Relevant historystarted in November 2017, she experienced acute onset pain in her midline between her shoulder blades. The pain did not radiate. She denies weakness or sensory changes at that time. She was evaluated at [facility] for a possible cardiac etiology, which was negative. Thoracic spine imaging demonstrated spinal cord compression from epidural vs subdural mass. She underwent decompression with placement of a lumbar drain 11/21/17 with Dr. Murcia. Intraoperatively, findings were consistent with an intradural hematoma. She underwent a spinal angiograms 11/21 and 11/25/17, which was negative.    Then, notes indicate worsening lower extremity symptoms postoperatively, imaging repeated which demonstrated a lumbar hematoma as well. She underwent an L2-5 decompression and hematoma evacuation 12/3/17.      There was no mention in operative report of abnormal  intradural flow void in upper lumbar spine but this was visualized by us on MRI upon reviewing this.     She was discharged to rehab and after approximately 1 month of rehab, she was able to stand and walk with an assistance device. She went home. While at home, she began to slowly deteriorate, so she represented to Dr. Murcia in 2018. Imaging demonstrated adhesions throughout the thoracic spine. She underwent a 2 stage T3-12 lysis of adhesions on 10/4/18 and 10/5/18. Postoperatively, the patient noted bowel incontinence, perianal pain, and pain in her feet bilaterally, all of these were new and worse symptoms. The pain involves her ankles circumferentially and feet in all distributions. Standing makes the pain worse. Again after a long rehab course, she was able to stand and walk with assistance and was discharged home. She had ongoing bowel incontinence and pain in her ankles/feet. While being at home, she has again slowly deteriorated and is presently confined to a wheelchair. The patient has not had updated imaging since the surgery and since she began to deteriorate this last time. She is presenting for a second opinion. She denies any personal or family history of bleeding/clotting disorders or soft tissue disorders.     She saw her prior neurosurgeon who indicated nothing further could be done for her. She self-referred for another opinion.        Review of Systems  Notably Positive for back pain, leg tingling/numbness/weakness, bowel and bladder incontinence,        Answers for HPI/ROS submitted by the patient   General Symptoms: Yes  Skin Symptoms: Yes  HENT Symptoms: No  EYE SYMPTOMS: No  HEART SYMPTOMS: Yes  LUNG SYMPTOMS: No  INTESTINAL SYMPTOMS: Yes  URINARY SYMPTOMS: Yes  GYNECOLOGIC SYMPTOMS: Yes  BREAST SYMPTOMS: No  SKELETAL SYMPTOMS: No  BLOOD SYMPTOMS: No  NERVOUS SYSTEM SYMPTOMS: Yes  MENTAL HEALTH SYMPTOMS: Yes  Fever: No  Loss of appetite: Yes  Weight loss: No  Weight gain: No  Fatigue:  Yes  Night sweats: Yes  Chills: Yes  Increased stress: Yes  Excessive hunger: No  Excessive thirst: No  Feeling hot or cold when others believe the temperature is normal: Yes  Loss of height: No  Post-operative complications: Yes  Surgical site pain: Yes  Hallucinations: No  Change in or Loss of Energy: Yes  Hyperactivity: No  Confusion: No  Changes in hair: No  Changes in moles/birth marks: No  Itching: No  Rashes: No  Changes in nails: No  Acne: No  Hair in places you don't want it: No  Change in facial hair: No  Warts: No  Non-healing sores: Yes  Scarring: No  Flaking of skin: No  Color changes of hands/feet in cold : Yes  Sun sensitivity: No  Skin thickening: No  Chest pain or pressure: No  Fast or irregular heartbeat: No  Pain in legs with walking: Yes  Trouble breathing while lying down: No  Fingers or toes appear blue: Yes  High blood pressure: No  Low blood pressure: No  Fainting: No  Murmurs: No  Pacemaker: No  Varicose veins: Yes  Edema or swelling: Yes  Wake up at night with shortness of breath: No  Light-headedness: Yes  Exercise intolerance: Yes  Heart burn or indigestion: No  Nausea: Yes  Vomiting: No  Abdominal pain: No  Bloating: Yes  Constipation: Yes  Diarrhea: Yes  Blood in stool: No  Black stools: No  Rectal or Anal pain: Yes  Fecal incontinence: Yes  Yellowing of skin or eyes: No  Vomit with blood: No  Change in stools: Yes  Trouble holding urine or incontinence: Yes  Pain or burning: Yes  Trouble starting or stopping: Yes  Increased frequency of urination: No  Blood in urine: No  Decreased frequency of urination: Yes  Frequent nighttime urination: No  Flank pain: No  Difficulty emptying bladder: Yes  Dizziness or trouble with balance: Yes  Fainting or black-out spells: No  Memory loss: No  Headache: No  Seizures: No  Speech problems: No  Tingling: Yes  Tremor: Yes  Weakness: Yes  Paralysis: Yes  Numbness: Yes  Bleeding or spotting between periods: No  Heavy or painful periods: No  Irregular  periods: No  Vaginal discharge: No  Hot flashes: No  Vaginal dryness: No  Genital ulcers: No  Reduced libido: No  Painful intercourse: No  Difficulty with sexual arousal: No  Post-menopausal bleeding: No  Nervous or Anxious: Yes  Depression: Yes  Trouble sleeping: Yes  Trouble thinking or concentrating: No  Mood changes: Yes  Panic attacks: No           Past Medical History        Past Medical History:   Diagnosis Date     CARDIAC DYSRHYTHMIAS NEC 10/3/2007     Taking atenelol for years for this     History of skin cancer              Past Surgical History         Past Surgical History:   Procedure Laterality Date     LAPAROSCOPIC TUBAL LIGATION          multiple thoracic and lumbar spine intradural surgeries as detailed in HPI     Social History            Socioeconomic History     Marital status:        Spouse name: None     Number of children: None     Years of education: None     Highest education level: None   Occupational History     Occupation: RICKY       Employer: [employer]   Social Needs     Financial resource strain: None     Food insecurity:       Worry: None       Inability: None     Transportation needs:       Medical: None       Non-medical: None   Tobacco Use     Smoking status: Never Smoker     Smokeless tobacco: Never Used   Substance and Sexual Activity     Alcohol use: No     Drug use: No     Sexual activity: Yes       Partners: Male   Lifestyle     Physical activity:       Days per week: None       Minutes per session: None     Stress: None   Relationships     Social connections:       Talks on phone: None       Gets together: None       Attends Bahai service: None       Active member of club or organization: None       Attends meetings of clubs or organizations: None       Relationship status: None     Intimate partner violence:       Fear of current or ex partner: None       Emotionally abused: None       Physically abused: None       Forced sexual activity: None   Other Topics  Concern     Parent/sibling w/ CABG, MI or angioplasty before 65F 55M? Not Asked   Social History Narrative     Lives with spouse - works in [place].         family history includes C.A.D. in her father.     Resulted Imaging/Labs:      Thoracic/lumbar spine MRI/MRA 4/1/19:  Findings:       Thoracic: The thoracic vertebrae are in normal alignment. Benign  incidental hemangioma in the T5 vertebral body. Postsurgical changes  of laminectomies from T3-T12. Thin postsurgical fluid collection  tracking along the laminectomy defects measuring up to 3 mm in AP  diameter and approximately 20 cm in length.      Evidence of multiple adhesions between the dura and the spinal cord,  not significantly changed compared to the prior CT myelography. The  cord is consequently severely deformed, particularly in the mid and  lower thoracic spine. Multifocal myelopathic signal within the cord,  most pronounced from T2-T4 and from lower T11 to the conus. No spinal  canal or neural foraminal stenosis.      Lumbar: Laminectomy defects from L2-L5. There are 5 lumbar-type  vertebrae assumed for the purposes of this dictation. The tip of the  conus medullaris is at L1-2. Straightening of lumbar lordosis. Normal  lumbar alignment. Mild clumping of the nerve roots of the cauda  equina, not significantly changed. Normal marrow signal. There is mild  disc height narrowing and desiccation throughout the lumbar spine.       On a level by level basis:     L1-2: Disc bulge resulting in mild spinal canal stenosis. No neural  foraminal stenosis.     L2-3: Disc bulge and bilateral facet hypertrophy. Mild spinal canal  narrowing. Mild right neural foraminal stenosis.     L3-4: Right subarticular disc protrusion and bilateral facet  hypertrophy. Mild spinal canal stenosis. Mild bilateral neural  foraminal narrowing.     L4-5: Posterior disc bulge. Bilateral facet hypertrophy. Moderate  spinal canal stenosis. Mild bilateral neural foraminal  stenosis.     L5-S1: Disc bulge, bilateral facet arthropathy, and ligamentum flavum  thickening. No significant spinal canal stenosis. Moderate bilateral  neural foraminal stenosis.     No abnormal enhancement within the thecal sac.     Spine MRA: No evidence of thoracic spinal vascular malformation.  Prominent hemiazygous vein.                                                                      Impression:   1. No evidence of thoracic vascular malformation.  2. Diffuse thoracic postsurgical changes with multiple adhesions  causing deformity and extensive myelopathy of the thoracic cord.  Findings are overall similar to prior CT myelography.  3. Arachnoiditis of the cauda equina.  4. Lumbar spondylosis with moderate spinal canal stenosis, most  pronounced at L4-5 and L5-S1.     I have personally reviewed the examination and initial interpretation  and I agree with the findings.     KIT REED MD    Result Date: 3/28/2019  Exam: XR SPINE COMPLETE SCOLIOSIS 2 VW, 3/28/2019 9:09 AM Indication: sagittal and coronal balance; Thoracolumbar back pain; Thoracolumbar back pain Comparison: 9/20/2018 CT Techniques: AP and lateral EOS composite images of the full spine with the patient in the seated position were submitted for interpretation. Findings: 12 rib bearing vertebral bodies and 5 lumbar type vertebral bodies are identified. Mild degenerative endplate change in the cervical spine, greatest at C5-6. Mild degenerative endplate spurring and sclerosis in the thoracic and lumbar spine, greatest at T12-L1 and L5-S1 with associated intervertebral disc space narrowing. Decompressive laminectomy changes in the lumbar spine. Lower lumbar predominant facet arthropathy. No vertebral body height loss. Coronal Deformity: Mild degenerative convex right coronal curvature deformity in the lumbar spine with apex at L5. Negative global coronal imbalance. Sagittal Vertical Axis (A vertical line drawn from the center of C7 (juni  "line) to the posterosuperior aspect of the S1 on sagittal plane):  Negative. Additional Findings: Clear lungs. Nonobstructive bowel gas pattern.      Impression: 1. Mild degenerative convex right coronal curvature deformity in the lumbar spine with apex at L5. 2. With the patient in the seated position, negative global coronal and sagittal imbalance. 3. Mild degenerative change throughout the spine, as described. SHRUTI PINTO,      MRI thoracic and lumbar spine and CT myelograms reviewed from Oct 2017-Oct 2018; no recent imaging since last surgery, prior to last surgery multiple intradural abnormalities with spinal cord deviation and compression potentially from arachnoid scarring     Angiograms were performed in November 2017 and January 2018 at [facility] and reviewed; records scanned into Epic        Vitamin D:  Vitamin D Deficiency Screening Results:  No results found for: VITDT        25 OH Vit D2   Date Value Ref Range Status   11/17/2011 <5 ug/L Final            25 OH Vit D3   Date Value Ref Range Status   11/17/2011 38 ug/L Final             25 OH Vit D total   Date Value Ref Range Status   11/17/2011   30 - 75 ug/L Final     <43  Season, race, dietary intake, and treatment affect the concentration of   25-hydroxy-Vitamin D. Values may decrease during winter months and increase   during summer months. Values less than 30 ug/L may indicate Vitamin D   deficiency.            Nutritional Status:  Estimated body mass index is 24.04 kg/m  as calculated from the following:    Height as of 11/17/11: 1.639 m (5' 4.53\").    Weight as of 12/6/11: 64.6 kg (142 lb 6.4 oz).     No results found for: ALBUMIN     Diabetes Screening:        Lab Results   Component Value Date     A1C 5.3 11/17/2011          Physical Exam   /58   Pulse 58   Temp 98  F (36.7  C) (Oral)   Resp 18   SpO2 100%   Breastfeeding? No   Constitutional: Oriented to person, place, and time. Appears well-developed and well-nourished. " Cooperative. No distress.   HENT:   Head: Normocephalic and atraumatic.   Eyes: Conjunctivae are normal.  Neck: Normal range of motion. Neck supple. No spinous process tenderness and no muscular tenderness present. No tracheal deviation present.  Pulmonary/Chest: Effort normal and breath sounds normal.  Abdominal: Soft. Bowel sounds are normal. Exhibits no distension. There is no tenderness.   Musculoskeletal: seated in wheelchair, normal upper extremity tone and function    Neurological: alert and oriented to person, place, and time.   No cranial nerve deficit   sensory deficit: decreased sensation below the level of the umbilicus to pin prick, decreases distally  Gait: Unable to stand  Babinski: Upgoing bilaterally     Reflex Scores:       Tricep reflexes are 2+ on the right side and 2+ on the left side.       Bicep reflexes are 2+ on the right side and 2+ on the left side.       Brachioradialis reflexes are 2+ on the right side and 2+ on the left side.       Patellar reflexes are 3+ on the right side and 3+ on the left side.       Achilles reflexes are 3+ on the right side and 3+ on the left side.     STRENGTH LEFT RIGHT   Deltoid 5 5   Bicep 5 5   Wrist Extensor 5 5   Tricep 5 5   Finger flexion 5 5   Finger abduction 5 5    5 5         Hip Flexion       0       0   Knee Extension 4- 4   Ankle Dorsiflexion 4- 4   Extensor Hallucis Longus 4- 4   Plantar Flexion 4- 4   Foot eversion 4- 4   Foot inversion 4- 4     No ankle clonus  Able to tandem walk       Skin: Skin is warm, dry and intact.   Psychiatric: Normal mood and affect. Speech is normal and behavior is normal.        ASSESSMENT:  Anayeli Gagnon is a 68 year old female presenting with a history of suspected spinal vascular abnormality following thoracic intradural hematoma and then months later a lumbar intradural hematoma evacuation at outside hospital. Had developed intradural adhesions and underwent one lysis of adhesions with worsening of  symptoms postoperatively, all at outside hospital. Spinal angiograms negative previously when performed within 6 weeks of prior intradural hemorrhage      PLAN:  The likely etiology for intradural hemorrhages is spinal vascular anomaly such as dural AV fistula or spinal AVM despite prior negative angiography.     Thoracic/Lumbar MRI/MRA to evaluate for any visible vascular abnormality, was negative, we will order a repeat spinal angiogram for patient   I discussed again that repeat spinal cord lysis of adhesions without treating underlying etiology is unlikely to result in long-term improvement, she had this attempted once and it has not been helpful.    We will make sure there is not a cord vascular steal phenomenon from vascular malformation with a spinal angiogram, asked her to call us when this is completed so that I can review results           Ivette Hermosillo MD    [facility] Department of Neurosurgery  Office: [phone]    4/18/2019              Again, thank you for allowing me to participate in the care of your patient.      Sincerely,    Ivette Hermosillo MD

## 2019-04-18 NOTE — NURSING NOTE
Chief Complaint   Patient presents with     RECHECK     [facility] RETURN DISCUSS SURGERY AND IMAGES       Paula Lucas, EMT

## 2019-04-18 NOTE — PATIENT INSTRUCTIONS
Obtain spinal angiogram, call [place] when completed so that Dr. Hermosillo can review results and recommend any further steps for treatment.

## 2019-04-18 NOTE — LETTER
Date:April 23, 2019      Patient was self referred, no letter generated. Do not send.        [facility] Health Information

## 2019-04-18 NOTE — PROGRESS NOTES
Neurosurgery Clinic Note     Chief Complaint: leg weakness and pain in ankles and feet     History of Present Illness:  Anayeli Gagnon is a self-referred 68 year old female presenting following a fairly lengthy Neurosurgical history with all prior neurosurgical treatment at [facility].       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     1,068 pages of outside hospital medical records were previously reviewed for due to complex history and are summarized as follows. Patient is relatively poor historian and medical records were used to corroborate details.        Relevant historystarted in November 2017, she experienced acute onset pain in her midline between her shoulder blades. The pain did not radiate. She denies weakness or sensory changes at that time. She was evaluated at [facility] for a possible cardiac etiology, which was negative. Thoracic spine imaging demonstrated spinal cord compression from epidural vs subdural mass. She underwent decompression with placement of a lumbar drain 11/21/17 with Dr. Murcia. Intraoperatively, findings were consistent with an intradural hematoma. She underwent a spinal angiograms 11/21 and 11/25/17, which was negative.    Then, notes indicate worsening lower extremity symptoms postoperatively, imaging repeated which demonstrated a lumbar hematoma as well. She underwent an L2-5 decompression and hematoma evacuation 12/3/17.      There was no mention in operative report of abnormal intradural flow void in upper lumbar spine but this was visualized by us on MRI upon reviewing this.     She was discharged to rehab and after approximately 1 month of rehab, she was able to stand and walk with an assistance device. She went home. While at home, she began to slowly deteriorate, so  she represented to Dr. Murcia in 2018. Imaging demonstrated adhesions throughout the thoracic spine. She underwent a 2 stage T3-12 lysis of adhesions on 10/4/18 and 10/5/18. Postoperatively, the patient noted bowel incontinence, perianal pain, and pain in her feet bilaterally, all of these were new and worse symptoms. The pain involves her ankles circumferentially and feet in all distributions. Standing makes the pain worse. Again after a long rehab course, she was able to stand and walk with assistance and was discharged home. She had ongoing bowel incontinence and pain in her ankles/feet. While being at home, she has again slowly deteriorated and is presently confined to a wheelchair. The patient has not had updated imaging since the surgery and since she began to deteriorate this last time. She is presenting for a second opinion. She denies any personal or family history of bleeding/clotting disorders or soft tissue disorders.     She saw her prior neurosurgeon who indicated nothing further could be done for her. She self-referred for another opinion.        Review of Systems  Notably Positive for back pain, leg tingling/numbness/weakness, bowel and bladder incontinence,        Answers for HPI/ROS submitted by the patient   General Symptoms: Yes  Skin Symptoms: Yes  HENT Symptoms: No  EYE SYMPTOMS: No  HEART SYMPTOMS: Yes  LUNG SYMPTOMS: No  INTESTINAL SYMPTOMS: Yes  URINARY SYMPTOMS: Yes  GYNECOLOGIC SYMPTOMS: Yes  BREAST SYMPTOMS: No  SKELETAL SYMPTOMS: No  BLOOD SYMPTOMS: No  NERVOUS SYSTEM SYMPTOMS: Yes  MENTAL HEALTH SYMPTOMS: Yes  Fever: No  Loss of appetite: Yes  Weight loss: No  Weight gain: No  Fatigue: Yes  Night sweats: Yes  Chills: Yes  Increased stress: Yes  Excessive hunger: No  Excessive thirst: No  Feeling hot or cold when others believe the temperature is normal: Yes  Loss of height: No  Post-operative complications: Yes  Surgical site pain: Yes  Hallucinations: No  Change in or Loss of  Energy: Yes  Hyperactivity: No  Confusion: No  Changes in hair: No  Changes in moles/birth marks: No  Itching: No  Rashes: No  Changes in nails: No  Acne: No  Hair in places you don't want it: No  Change in facial hair: No  Warts: No  Non-healing sores: Yes  Scarring: No  Flaking of skin: No  Color changes of hands/feet in cold : Yes  Sun sensitivity: No  Skin thickening: No  Chest pain or pressure: No  Fast or irregular heartbeat: No  Pain in legs with walking: Yes  Trouble breathing while lying down: No  Fingers or toes appear blue: Yes  High blood pressure: No  Low blood pressure: No  Fainting: No  Murmurs: No  Pacemaker: No  Varicose veins: Yes  Edema or swelling: Yes  Wake up at night with shortness of breath: No  Light-headedness: Yes  Exercise intolerance: Yes  Heart burn or indigestion: No  Nausea: Yes  Vomiting: No  Abdominal pain: No  Bloating: Yes  Constipation: Yes  Diarrhea: Yes  Blood in stool: No  Black stools: No  Rectal or Anal pain: Yes  Fecal incontinence: Yes  Yellowing of skin or eyes: No  Vomit with blood: No  Change in stools: Yes  Trouble holding urine or incontinence: Yes  Pain or burning: Yes  Trouble starting or stopping: Yes  Increased frequency of urination: No  Blood in urine: No  Decreased frequency of urination: Yes  Frequent nighttime urination: No  Flank pain: No  Difficulty emptying bladder: Yes  Dizziness or trouble with balance: Yes  Fainting or black-out spells: No  Memory loss: No  Headache: No  Seizures: No  Speech problems: No  Tingling: Yes  Tremor: Yes  Weakness: Yes  Paralysis: Yes  Numbness: Yes  Bleeding or spotting between periods: No  Heavy or painful periods: No  Irregular periods: No  Vaginal discharge: No  Hot flashes: No  Vaginal dryness: No  Genital ulcers: No  Reduced libido: No  Painful intercourse: No  Difficulty with sexual arousal: No  Post-menopausal bleeding: No  Nervous or Anxious: Yes  Depression: Yes  Trouble sleeping: Yes  Trouble thinking or  concentrating: No  Mood changes: Yes  Panic attacks: No           Past Medical History        Past Medical History:   Diagnosis Date     CARDIAC DYSRHYTHMIAS NEC 10/3/2007     Taking atenelol for years for this     History of skin cancer              Past Surgical History         Past Surgical History:   Procedure Laterality Date     LAPAROSCOPIC TUBAL LIGATION          multiple thoracic and lumbar spine intradural surgeries as detailed in HPI     Social History            Socioeconomic History     Marital status:        Spouse name: None     Number of children: None     Years of education: None     Highest education level: None   Occupational History     Occupation: NA       Employer: [employer]   Social Needs     Financial resource strain: None     Food insecurity:       Worry: None       Inability: None     Transportation needs:       Medical: None       Non-medical: None   Tobacco Use     Smoking status: Never Smoker     Smokeless tobacco: Never Used   Substance and Sexual Activity     Alcohol use: No     Drug use: No     Sexual activity: Yes       Partners: Male   Lifestyle     Physical activity:       Days per week: None       Minutes per session: None     Stress: None   Relationships     Social connections:       Talks on phone: None       Gets together: None       Attends Scientology service: None       Active member of club or organization: None       Attends meetings of clubs or organizations: None       Relationship status: None     Intimate partner violence:       Fear of current or ex partner: None       Emotionally abused: None       Physically abused: None       Forced sexual activity: None   Other Topics Concern     Parent/sibling w/ CABG, MI or angioplasty before 65F 55M? Not Asked   Social History Narrative     Lives with spouse - works in [employer].         family history includes C.A.D. in her father.     Resulted Imaging/Labs:      Thoracic/lumbar spine MRI/MRA 4/1/19:  Findings:        Thoracic: The thoracic vertebrae are in normal alignment. Benign  incidental hemangioma in the T5 vertebral body. Postsurgical changes  of laminectomies from T3-T12. Thin postsurgical fluid collection  tracking along the laminectomy defects measuring up to 3 mm in AP  diameter and approximately 20 cm in length.      Evidence of multiple adhesions between the dura and the spinal cord,  not significantly changed compared to the prior CT myelography. The  cord is consequently severely deformed, particularly in the mid and  lower thoracic spine. Multifocal myelopathic signal within the cord,  most pronounced from T2-T4 and from lower T11 to the conus. No spinal  canal or neural foraminal stenosis.      Lumbar: Laminectomy defects from L2-L5. There are 5 lumbar-type  vertebrae assumed for the purposes of this dictation. The tip of the  conus medullaris is at L1-2. Straightening of lumbar lordosis. Normal  lumbar alignment. Mild clumping of the nerve roots of the cauda  equina, not significantly changed. Normal marrow signal. There is mild  disc height narrowing and desiccation throughout the lumbar spine.       On a level by level basis:     L1-2: Disc bulge resulting in mild spinal canal stenosis. No neural  foraminal stenosis.     L2-3: Disc bulge and bilateral facet hypertrophy. Mild spinal canal  narrowing. Mild right neural foraminal stenosis.     L3-4: Right subarticular disc protrusion and bilateral facet  hypertrophy. Mild spinal canal stenosis. Mild bilateral neural  foraminal narrowing.     L4-5: Posterior disc bulge. Bilateral facet hypertrophy. Moderate  spinal canal stenosis. Mild bilateral neural foraminal stenosis.     L5-S1: Disc bulge, bilateral facet arthropathy, and ligamentum flavum  thickening. No significant spinal canal stenosis. Moderate bilateral  neural foraminal stenosis.     No abnormal enhancement within the thecal sac.     Spine MRA: No evidence of thoracic spinal vascular  malformation.  Prominent hemiazygous vein.                                                                      Impression:   1. No evidence of thoracic vascular malformation.  2. Diffuse thoracic postsurgical changes with multiple adhesions  causing deformity and extensive myelopathy of the thoracic cord.  Findings are overall similar to prior CT myelography.  3. Arachnoiditis of the cauda equina.  4. Lumbar spondylosis with moderate spinal canal stenosis, most  pronounced at L4-5 and L5-S1.     I have personally reviewed the examination and initial interpretation  and I agree with the findings.     KIT REED MD    Result Date: 3/28/2019  Exam: XR SPINE COMPLETE SCOLIOSIS 2 VW, 3/28/2019 9:09 AM Indication: sagittal and coronal balance; Thoracolumbar back pain; Thoracolumbar back pain Comparison: 9/20/2018 CT Techniques: AP and lateral EOS composite images of the full spine with the patient in the seated position were submitted for interpretation. Findings: 12 rib bearing vertebral bodies and 5 lumbar type vertebral bodies are identified. Mild degenerative endplate change in the cervical spine, greatest at C5-6. Mild degenerative endplate spurring and sclerosis in the thoracic and lumbar spine, greatest at T12-L1 and L5-S1 with associated intervertebral disc space narrowing. Decompressive laminectomy changes in the lumbar spine. Lower lumbar predominant facet arthropathy. No vertebral body height loss. Coronal Deformity: Mild degenerative convex right coronal curvature deformity in the lumbar spine with apex at L5. Negative global coronal imbalance. Sagittal Vertical Axis (A vertical line drawn from the center of C7 (juni line) to the posterosuperior aspect of the S1 on sagittal plane):  Negative. Additional Findings: Clear lungs. Nonobstructive bowel gas pattern.      Impression: 1. Mild degenerative convex right coronal curvature deformity in the lumbar spine with apex at L5. 2. With the patient in the  "seated position, negative global coronal and sagittal imbalance. 3. Mild degenerative change throughout the spine, as described. SHRUTI PINTO DO     MRI thoracic and lumbar spine and CT myelograms reviewed from Oct 2017-Oct 2018; no recent imaging since last surgery, prior to last surgery multiple intradural abnormalities with spinal cord deviation and compression potentially from arachnoid scarring     Angiograms were performed in November 2017 and January 2018 at [facility] and reviewed; records scanned into Epic        Vitamin D:  Vitamin D Deficiency Screening Results:  No results found for: VITDT        25 OH Vit D2   Date Value Ref Range Status   11/17/2011 <5 ug/L Final            25 OH Vit D3   Date Value Ref Range Status   11/17/2011 38 ug/L Final             25 OH Vit D total   Date Value Ref Range Status   11/17/2011   30 - 75 ug/L Final     <43  Season, race, dietary intake, and treatment affect the concentration of   25-hydroxy-Vitamin D. Values may decrease during winter months and increase   during summer months. Values less than 30 ug/L may indicate Vitamin D   deficiency.            Nutritional Status:  Estimated body mass index is 24.04 kg/m  as calculated from the following:    Height as of 11/17/11: 1.639 m (5' 4.53\").    Weight as of 12/6/11: 64.6 kg (142 lb 6.4 oz).     No results found for: ALBUMIN     Diabetes Screening:        Lab Results   Component Value Date     A1C 5.3 11/17/2011          Physical Exam   /58   Pulse 58   Temp 98  F (36.7  C) (Oral)   Resp 18   SpO2 100%   Breastfeeding? No   Constitutional: Oriented to person, place, and time. Appears well-developed and well-nourished. Cooperative. No distress.   HENT:   Head: Normocephalic and atraumatic.   Eyes: Conjunctivae are normal.  Neck: Normal range of motion. Neck supple. No spinous process tenderness and no muscular tenderness present. No tracheal deviation present.  Pulmonary/Chest: Effort normal and breath " sounds normal.  Abdominal: Soft. Bowel sounds are normal. Exhibits no distension. There is no tenderness.   Musculoskeletal: seated in wheelchair, normal upper extremity tone and function    Neurological: alert and oriented to person, place, and time.   No cranial nerve deficit   sensory deficit: decreased sensation below the level of the umbilicus to pin prick, decreases distally  Gait: Unable to stand  Babinski: Upgoing bilaterally     Reflex Scores:       Tricep reflexes are 2+ on the right side and 2+ on the left side.       Bicep reflexes are 2+ on the right side and 2+ on the left side.       Brachioradialis reflexes are 2+ on the right side and 2+ on the left side.       Patellar reflexes are 3+ on the right side and 3+ on the left side.       Achilles reflexes are 3+ on the right side and 3+ on the left side.     STRENGTH LEFT RIGHT   Deltoid 5 5   Bicep 5 5   Wrist Extensor 5 5   Tricep 5 5   Finger flexion 5 5   Finger abduction 5 5    5 5         Hip Flexion       0       0   Knee Extension 4- 4   Ankle Dorsiflexion 4- 4   Extensor Hallucis Longus 4- 4   Plantar Flexion 4- 4   Foot eversion 4- 4   Foot inversion 4- 4     No ankle clonus  Able to tandem walk       Skin: Skin is warm, dry and intact.   Psychiatric: Normal mood and affect. Speech is normal and behavior is normal.        ASSESSMENT:  Anayeli Gagnon is a 68 year old female presenting with a history of suspected spinal vascular abnormality following thoracic intradural hematoma and then months later a lumbar intradural hematoma evacuation at outside hospital. Had developed intradural adhesions and underwent one lysis of adhesions with worsening of symptoms postoperatively, all at outside hospital. Spinal angiograms negative previously when performed within 6 weeks of prior intradural hemorrhage      PLAN:  The likely etiology for intradural hemorrhages is spinal vascular anomaly such as dural AV fistula or spinal AVM despite prior  negative angiography.     Thoracic/Lumbar MRI/MRA to evaluate for any visible vascular abnormality, was negative, we will order a repeat spinal angiogram for patient   I discussed again that repeat spinal cord lysis of adhesions without treating underlying etiology is unlikely to result in long-term improvement, she had this attempted once and it has not been helpful.    We will make sure there is not a cord vascular steal phenomenon from vascular malformation with a spinal angiogram, asked her to call us when this is completed so that I can review results           Ivette Hermosillo MD    [facility] Department of Neurosurgery  Office: [phone]    4/18/2019

## 2019-04-19 ENCOUNTER — HEALTH MAINTENANCE LETTER (OUTPATIENT)
Age: 69
End: 2019-04-19

## 2019-04-20 ENCOUNTER — TELEPHONE (OUTPATIENT)
Dept: NEUROSURGERY | Facility: CLINIC | Age: 69
End: 2019-04-20

## 2019-04-20 NOTE — TELEPHONE ENCOUNTER
Writer faxed to [facility] Fax [phone] Therapy Status report signed by provider  phone  Label and scanned to pt chart

## 2019-04-22 ENCOUNTER — TELEPHONE (OUTPATIENT)
Dept: NEUROLOGY | Facility: CLINIC | Age: 69
End: 2019-04-22

## 2019-04-22 DIAGNOSIS — G95.19 HEMORRHAGE INTO SUBARACHNOID SPACE OF SPINE (H): Primary | ICD-10-CM

## 2019-04-22 NOTE — TELEPHONE ENCOUNTER
----- Message from Austin Klein RN sent at 4/18/2019  3:38 PM CDT -----  Regarding: Spinal Angiogram  José MiguelAnnamarie needs to have a spinal angiogram with Dr. Rios.  The order is in.    Austin Gamino

## 2019-04-24 ENCOUNTER — HOSPITAL ENCOUNTER (OUTPATIENT)
Facility: CLINIC | Age: 69
End: 2019-04-24
Payer: MEDICARE

## 2019-04-24 DIAGNOSIS — G95.19 HEMORRHAGE INTO SUBARACHNOID SPACE OF SPINE (H): Primary | ICD-10-CM

## 2019-04-24 NOTE — PROGRESS NOTES
Per Dr. Rios patient should have MRA Spine prior to angiogram on 5/16/19.     Patient informed. MRA scheduled for 5/16/19. Patient aware.

## 2019-05-07 ENCOUNTER — PATIENT OUTREACH (OUTPATIENT)
Dept: NEUROLOGY | Facility: CLINIC | Age: 69
End: 2019-05-07

## 2019-05-07 NOTE — PATIENT INSTRUCTIONS
You are scheduled for an MRA Spine on 6/7/19 at 9:00 AM. Check into the Gold waiting room at the [facility] at 9:00 AM. The address is [address] [place] [address]. The phone number is [phone]. A map is enclosed.    You are scheduled for a Diagnostic Spinal Angiogram, under general anesthesia, on 5/16/19 at 11:30 AM.     Please follow these instructions:    * You will need a pre-op physical within 30 days prior to your procedure. You may do this with your primary care provider or with the Pre-Operative Assessment Center (PAC), located at [facility] at [address] [place] [address]. The PAC phone number is [phone].    * You should arrive at the Surgery Admission Unit (3C), on the third floor, at the [facility] at 9:30 AM. The address is [address] [place] [address]. The phone number is [phone]. A map is enclosed.    * Do not eat after 3:30 AM; you may drink clear liquids (includes water, Jell-O, clear broth, apple juice or any non-carbonated beverage that you can see through) until 9:30 AM.     * You may take your medications with a sip of water the morning of the procedure.     * You will be discharged the same day. You must have a  home and someone that can stay with you through the night.     If you have questions regarding your procedure, please contact me at [phone], option 3.    If you need to cancel, reschedule or have procedure scheduling related questions, please call [phone].    Thank you,  José Miguel Taylor, RN, CNRN  Stroke & Endovascular Care Coordinator

## 2019-05-09 ENCOUNTER — PATIENT OUTREACH (OUTPATIENT)
Dept: NEUROSURGERY | Facility: CLINIC | Age: 69
End: 2019-05-09

## 2019-05-09 NOTE — PROGRESS NOTES
The pt's  called and said that the pt is in the hospital with an infected bed sore and will go to a nursing home after the hospital.  He will contact us when pt is healthy to reschedule.

## 2019-05-24 ENCOUNTER — HOSPITAL ENCOUNTER (OUTPATIENT)
Facility: CLINIC | Age: 69
End: 2019-05-24
Payer: MEDICARE

## 2019-05-28 NOTE — PROGRESS NOTES
Rescheduled spinal angio/MRA from 5/16/19 to 6/7/19. Patient informed. Note and patient instructions addended to reflect change. Updated patient instructions mailed to patient.      Patient scheduled for pre-op on 5/31/19.

## 2019-06-03 ENCOUNTER — PATIENT OUTREACH (OUTPATIENT)
Dept: NEUROSURGERY | Facility: CLINIC | Age: 69
End: 2019-06-03

## 2019-06-03 NOTE — PROGRESS NOTES
Pt called.  She has a wound vac on a pressure sore which is just below her tail bone.  Pt has a spinal angiogram scheduled on 6/7.  Pt wants to know if she still go through with the procedure.  Writer will check with MD and get back to the pt.

## 2019-06-05 ENCOUNTER — PATIENT OUTREACH (OUTPATIENT)
Dept: NEUROSURGERY | Facility: CLINIC | Age: 69
End: 2019-06-05

## 2019-06-05 NOTE — PROGRESS NOTES
Writer spoke with pt.  The wound vac will not create any problems for the MRI and spinal angiogram.  Message relayed to pt.  Pt expressed understanding of the information given.

## 2019-06-06 ENCOUNTER — TELEPHONE (OUTPATIENT)
Dept: NEUROLOGY | Facility: CLINIC | Age: 69
End: 2019-06-06

## 2019-06-06 ENCOUNTER — ANESTHESIA EVENT (OUTPATIENT)
Dept: SURGERY | Facility: CLINIC | Age: 69
End: 2019-06-06

## 2019-06-06 RX ORDER — OXYCODONE AND ACETAMINOPHEN 5; 325 MG/1; MG/1
1 TABLET ORAL EVERY 6 HOURS PRN
COMMUNITY
End: 2019-06-07 | Stop reason: HOSPADM

## 2019-06-06 RX ORDER — POLYETHYLENE GLYCOL 3350 17 G/17G
1 POWDER, FOR SOLUTION ORAL DAILY PRN
COMMUNITY
End: 2019-06-07 | Stop reason: HOSPADM

## 2019-06-06 RX ORDER — LORAZEPAM 0.5 MG/1
0.5 TABLET ORAL 3 TIMES DAILY PRN
COMMUNITY
End: 2019-06-07 | Stop reason: HOSPADM

## 2019-06-06 RX ORDER — BACLOFEN 10 MG/1
10 TABLET ORAL 3 TIMES DAILY PRN
COMMUNITY
End: 2019-06-07 | Stop reason: HOSPADM

## 2019-06-06 RX ORDER — CALCIUM POLYCARBOPHIL 625 MG 625 MG/1
2 TABLET ORAL 2 TIMES DAILY
COMMUNITY
End: 2019-06-07 | Stop reason: HOSPADM

## 2019-06-06 NOTE — TELEPHONE ENCOUNTER
----- Message from Flakita Giordano RN sent at 6/6/2019 10:34 AM CDT -----  Regarding: Pt had a fever last night.   Glenn Avila and Ena,    I did pt's pre-op call with Deanna, the nurse at [facility] [phone]. She said pt had a fever of 101.5 last night and was not feeling well. Temp was 99 this am. Deanna  said one of the doctors was in today, ordered blood work (which will be drawn at 1130) and said it was ok for pt to have the procedure tomorrow if her WBC is ok. Also, Deanna said the pt said she usually takes Valium before her MRIs. They have no order for Valium at the Rehab Facility. Pt does have an order for Lorazepam prn and they will give her a dose. I wonder if that will be adequate for pt to get through the MRI. Please follow up with Deanna at [facility].    Thanks.    Flakita Giordano, RN,BSN  [facility] Preadmission Nursing  [phone]

## 2019-06-06 NOTE — TELEPHONE ENCOUNTER
Per Dr. Rios prescribe diazepam 5 mg 40 minutes prior to scan and 5 mg 10 minutes prior, if needed.     LM for Deanna to return call. Will also try to call her back at [phone] around 1:30.     Spoke with Deanna. Informed of above. Patients  is driving patient to appointment. Prescription to be called to [facility] pharmacy in [place]. Ena Taylor RN 6/6/2019 1:42 PM     Per Preadmin RN patients WBC came back elevated at 13 and CRP elevated at 155. Procedure will be canceled as pre op clearance was dependant on labs. Ena Taylor RN 6/6/2019 2:03 PM

## 2019-06-06 NOTE — OR NURSING
Pt had a fever last night.   Received: Today   Message Contents   Flakita Giordano RN James, Morgan, RN; Ena Taylor RN Hi Morgan and Kristine,     I did pt's pre-op call with Deanna, the nurse at [facility] [phone]. She said pt had a fever of 101.5 last night and was not feeling well. Temp was 99 this am. Deanna  said one of the doctors was in today, ordered blood work (which will be drawn at 1130) and said it was ok for pt to have the procedure tomorrow if her WBC is ok. Also, Deanna said the pt said she usually takes Valium before her MRIs. They have no order for Valium at the Rehab Facility. Pt does have an order for Lorazepam prn and they will give her a dose. I wonder if that will be adequate for pt to get through the MRI. Please follow up with Deanna at [facility].     Thanks.     Flakita Giordano RN,BSN   [facility] Preadmission Nursing   [phone]

## 2019-06-06 NOTE — OR NURSING
RE: Elevated WBC and CRP   Received: Today   Message Contents   Ena Taylor, Flakita Hunter RN             I literally just hung up with her nurse, Deanna. I guess we will be rescheduling then.    Previous Messages      ----- Message -----   From: Flakita Giordano RN   Sent: 6/6/2019   1:23 PM   To: Ena Taylor RN   Subject: Elevated WBC and CRP                             Hi Jose Sutherland I forgot to include pt's ID on the previous email.     Pt's pre-op clearance depended on her WBC being ok. I just checked her labs. Her WBC is elevated at 13 and her CRP is elevated at 155. Her labs are available in Care Everywhere.     Thanks.     Flakita Giordano, RN, BSN   [facility] Preadmission Nursing   [phone]

## 2019-06-06 NOTE — OR NURSING
Sage, working with the nursing home provider who saw pt today,  called to let us know pt's WBC and CRP are elevated. I let him know that I had seen the labs, reported them to the Neurology Service and that the case would need to be rescheduled. He said he would let the nursing home know.

## 2019-06-07 ENCOUNTER — ANESTHESIA (OUTPATIENT)
Dept: SURGERY | Facility: CLINIC | Age: 69
End: 2019-06-07

## 2019-08-15 ENCOUNTER — TELEPHONE (OUTPATIENT)
Dept: NEUROLOGY | Facility: CLINIC | Age: 69
End: 2019-08-15

## 2019-08-15 DIAGNOSIS — G95.19 HEMORRHAGE INTO SUBARACHNOID SPACE OF SPINE (H): Primary | ICD-10-CM

## 2019-08-15 DIAGNOSIS — I67.89 OTHER CEREBROVASCULAR DISEASE: ICD-10-CM

## 2019-08-15 NOTE — TELEPHONE ENCOUNTER
"Informed patient: Scheduling will contact you to make arrangements for your MRA spine and spinal angiogram (under general anesthesia). You will need a pre-op physical within 30 days of your procedure. You will need a  home and someone that can stay with you through the night. Do not eat for 8 hours prior to your procedure. You may drink clear liquids (includes water, Jell-O, clear broth, apple juice or any non-carbonated beverage that you can see through) until 2 hours prior to your procedure. You may take your medications with a sip of water. You will check in at 3C, Surgery Check-In, on the third floor of the [facility] 2 hours prior to your procedure. You will receive written instructions and a map to the hospital after your procedure has been scheduled.     Will confirm with Dr. Rios that patient needs only MRA spine, then send to scheduling. Patient verbalized understanding.     Per Dr. Rios patient will need MRA head, neck and spine with contrast. \"The reason for the MRA spine is to try to narrow down where to target the spinal angiogram. This lady has had multiple spinal angios and we'd like to know where to concentrate on when we put her through yet another one.\"       "

## 2019-08-15 NOTE — TELEPHONE ENCOUNTER
----- Message from Austin Klein RN sent at 8/12/2019  3:01 PM CDT -----  Regarding: spinal angiogram  Anayeli Valdez called today and she is ready to set up her spinal angiogram.    ThanksAustin

## 2019-08-26 ENCOUNTER — PATIENT OUTREACH (OUTPATIENT)
Dept: NEUROSURGERY | Facility: CLINIC | Age: 69
End: 2019-08-26

## 2019-08-27 ENCOUNTER — HOSPITAL ENCOUNTER (OUTPATIENT)
Facility: CLINIC | Age: 69
End: 2019-08-27
Payer: MEDICARE

## 2019-08-27 NOTE — TELEPHONE ENCOUNTER
Patient called stating she hasn't received a call from scheduling to arrange angio. Scheduling phone number provided. Patient will call.

## 2019-08-28 ENCOUNTER — TELEPHONE (OUTPATIENT)
Dept: NEUROSURGERY | Facility: CLINIC | Age: 69
End: 2019-08-28

## 2019-08-28 ENCOUNTER — TELEPHONE (OUTPATIENT)
Dept: NEUROLOGY | Facility: CLINIC | Age: 69
End: 2019-08-28

## 2019-08-28 NOTE — TELEPHONE ENCOUNTER
Health Call Center    Phone Message    May a detailed message be left on voicemail: yes    Reason for Call: Order(s): MRA of the Head  Reason for requested: The order is with contrast, CDI's protocol is without contrast  Date needed: ASAP  Provider name: Dr. Paiz    Will require a new order to be faxed #[phone]      Action Taken: Message routed to:  [facility] ([facility] neurology

## 2019-08-28 NOTE — TELEPHONE ENCOUNTER
Informed Savage lyon, that provider ordered MRA with contrast. Savage will talk with radiologist and call back if there is an issue.

## 2019-08-28 NOTE — TELEPHONE ENCOUNTER
Unable to do MRA head, neck and spinal canal all in one day due to contrast. Patient will do MRA head/neck at [facility] in [place]. She will have MRA spinal canal on 10/10/19 at 9:00 am and spinal angio on 10/10/19 at 12:00. Discussed instructions. Patient verbalized understanding. Patient will schedule pre-op.     Patient instructions and map to hospital mailed to patient.

## 2019-08-28 NOTE — PATIENT INSTRUCTIONS
You are scheduled for an MRA Spine on 10/10/19 at 9:00 AM. Check into the Gold waiting room at the [facility] at 8:30 AM. The address is [address] [place] [address]. The phone number is [phone]. A map is enclosed.     You are scheduled for a Diagnostic Spinal Angiogram, under general anesthesia, on10/10/19 at 12:00 PM.      Please follow these instructions:     * You will need a pre-op physical within 30 days prior to your procedure. You may do this with your primary care provider or with the Pre-Operative Assessment Center (PAC), located at [facility] at [address] [place] [address]. The PAC phone number is [phone].     * You should arrive at the Surgery Admission Unit (3C), on the third floor, at the [facility] at 10:00 AM. The address is [address] [place] [address]. The phone number is [phone]. A map is enclosed.     * Do not eat after 2:00 AM; you may drink clear liquids (includes water, Jell-O, clear broth, apple juice or any non-carbonated beverage that you can see through) until 10:00 AM.      * You may take your medications with a sip of water the morning of the procedure.      * You will be discharged the same day. You must have a  home and someone that can stay with you through the night.      If you have questions regarding your procedure, please contact me at [phone], option 3.     If you need to cancel, reschedule or have procedure scheduling related questions, please call [phone].     Thank you,  José Miguel Taylor, RN, CNRN  Stroke & Endovascular Care Coordinator

## 2019-08-28 NOTE — TELEPHONE ENCOUNTER
----- Message from Ena Taylor RN sent at 8/28/2019 10:54 AM CDT -----  Please fax orders and face sheet for MRA head/neck to [facility] in [place], [place]. Please add to cover sheet to contact patient to schedule.    Thanks,  José Miguel

## 2019-09-03 ENCOUNTER — TRANSFERRED RECORDS (OUTPATIENT)
Dept: HEALTH INFORMATION MANAGEMENT | Facility: CLINIC | Age: 69
End: 2019-09-03

## 2019-09-04 ENCOUNTER — TELEPHONE (OUTPATIENT)
Dept: NEUROLOGY | Facility: CLINIC | Age: 69
End: 2019-09-04

## 2019-09-04 NOTE — TELEPHONE ENCOUNTER
Received imaging reports from [facility]  Records Date: 9/4/19  Copy has been sent to scanning and encounter routed to specialty nurse for review.

## 2019-09-12 ENCOUNTER — TELEPHONE (OUTPATIENT)
Dept: NEUROSURGERY | Facility: CLINIC | Age: 69
End: 2019-09-12

## 2019-09-12 NOTE — TELEPHONE ENCOUNTER
Previous MRA Imaging reports received for patient.  Results were Normal/ Abnormal   Records Date: 09/12/19  Copy has been sent to scanning and encounter routed to specialty nurse for review.

## 2019-09-28 ENCOUNTER — HEALTH MAINTENANCE LETTER (OUTPATIENT)
Age: 69
End: 2019-09-28

## 2019-10-08 RX ORDER — LORAZEPAM 0.5 MG/1
0.5 TABLET ORAL 2 TIMES DAILY PRN
COMMUNITY
End: 2019-10-10 | Stop reason: HOSPADM

## 2019-10-08 RX ORDER — BACLOFEN 10 MG/1
10 TABLET ORAL 3 TIMES DAILY
COMMUNITY
End: 2019-10-10 | Stop reason: HOSPADM

## 2019-10-08 RX ORDER — POLYETHYLENE GLYCOL 3350 17 G/17G
17 POWDER, FOR SOLUTION ORAL DAILY
COMMUNITY
End: 2019-10-10 | Stop reason: HOSPADM

## 2019-10-08 RX ORDER — OXYCODONE HCL 10 MG/1
10 TABLET, FILM COATED, EXTENDED RELEASE ORAL EVERY 12 HOURS
COMMUNITY
End: 2019-10-10 | Stop reason: HOSPADM

## 2019-10-08 RX ORDER — L. ACIDOPHILUS/L.BULGARICUS 100MM CELL
1 GRANULES IN PACKET (EA) ORAL DAILY
COMMUNITY
End: 2019-10-10 | Stop reason: HOSPADM

## 2019-10-08 RX ORDER — ESTRADIOL 0.1 MG/G
2 CREAM VAGINAL
COMMUNITY
End: 2019-10-10 | Stop reason: HOSPADM

## 2019-10-08 RX ORDER — PHENOL 1.4 %
1 AEROSOL, SPRAY (ML) MUCOUS MEMBRANE DAILY
COMMUNITY
End: 2019-10-10 | Stop reason: HOSPADM

## 2019-10-08 RX ORDER — OXYCODONE AND ACETAMINOPHEN 5; 325 MG/1; MG/1
1-2 TABLET ORAL PRN
COMMUNITY
End: 2019-10-10 | Stop reason: HOSPADM

## 2019-10-10 ENCOUNTER — ANESTHESIA EVENT (OUTPATIENT)
Dept: SURGERY | Facility: CLINIC | Age: 69
End: 2019-10-10

## 2019-10-10 ENCOUNTER — ANESTHESIA (OUTPATIENT)
Dept: SURGERY | Facility: CLINIC | Age: 69
End: 2019-10-10

## 2019-10-10 NOTE — ANESTHESIA PREPROCEDURE EVALUATION
Anesthesia Pre-Procedure Evaluation    Patient: Anayeli Gagnon   MRN:     [id number] Gender:   female   Age:    68 year old :      1950        Preoperative Diagnosis: Hemorrhage Into Subarchnoid Of Spine   Procedure(s):  Out Of O.R. Spinal Angiogram @1200     Past Medical History:   Diagnosis Date     CARDIAC DYSRHYTHMIAS NEC 10/3/2007    Taking atenelol for years for this     History of skin cancer      Mitral valve prolapse      Neurogenic bladder      Sacral decubitus ulcer, stage IV (H)      Thoracic spinal cord injury (H)       Past Surgical History:   Procedure Laterality Date     LAPAROSCOPIC TUBAL LIGATION            Anesthesia Evaluation     .             ROS/MED HX    ENT/Pulmonary:       Neurologic:       Cardiovascular:     (+) ----. : . . . :. Irregular Heartbeat/Palpitations, valvular problems/murmurs type: MVP . Previous cardiac testing Echodate:results:Echocardiogram 2017:  1. Technically difficult study with poor acoustic windows.   2. The left ventricle is normal size with hyperdynamic systolic function. Ejection fraction is 70-75%.   3. The right ventricle is poorly visualized but appears to be normal size with normal systolic function.   4. Mild tricuspid regurgitation.   5. No significant pericardial effusion.date: results:ECG reviewed date:19 results:Normal sinus rhythm 90  Nonspecific T wave abnormality date: results:         (-) syncope   METS/Exercise Tolerance:     Hematologic:         Musculoskeletal:         GI/Hepatic:         Renal/Genitourinary:         Endo:         Psychiatric:         Infectious Disease:         Malignancy:         Other:                     JZG FV AN PHYSICAL EXAM    LABS:  CBC:   Lab Results   Component Value Date    WBC 10.9 2011    WBC 8.5 2010    HGB 15.1 2011    HGB 14.8 2010    HCT 45.4 2011    HCT 42.8 2010     2011     2010     BMP:   Lab Results   Component Value Date    NA  "140 01/06/2011     08/07/2010    POTASSIUM 3.9 01/06/2011    POTASSIUM 3.8 08/07/2010    CHLORIDE 105 01/06/2011    CHLORIDE 102 08/07/2010    CO2 26 01/06/2011    CO2 29 08/07/2010    BUN 19 01/06/2011    BUN 11 08/07/2010    CR 0.67 01/06/2011    CR 0.69 08/07/2010    GLC neg 05/05/2013    GLC neg 02/25/2011     COAGS: No results found for: PTT, INR, FIBR  POC: No results found for: BGM, HCG, HCGS  OTHER:   Lab Results   Component Value Date    PH 6.0 05/05/2013    A1C 5.3 11/17/2011    ADAN 8.6 01/06/2011    TSH 2.33 08/07/2010    SED 4 11/17/2011        Preop Vitals    BP Readings from Last 3 Encounters:   04/18/19 127/67   03/28/19 122/58   05/05/13 103/61    Pulse Readings from Last 3 Encounters:   04/18/19 65   03/28/19 58   05/05/13 72      Resp Readings from Last 3 Encounters:   03/28/19 18   12/06/11 14   11/17/11 16    SpO2 Readings from Last 3 Encounters:   04/18/19 94%   03/28/19 100%   02/01/13 95%      Temp Readings from Last 1 Encounters:   03/28/19 36.7  C (98  F) (Oral)    Ht Readings from Last 1 Encounters:   11/17/11 1.639 m (5' 4.53\")      Wt Readings from Last 1 Encounters:   04/18/19 57.9 kg (127 lb 9.6 oz)    Estimated body mass index is 21.55 kg/m  as calculated from the following:    Height as of 11/17/11: 1.639 m (5' 4.53\").    Weight as of 4/18/19: 57.9 kg (127 lb 9.6 oz).     LDA:        Assessment:              PONV Management: Adult Risk Factors: Female       Comments for Plan/Consent:  06/18/19; 1329; ETT; 7.0; Cuffed; Oral, Grade 1, IV Induction, Preoxygenation, Stylet, BEBS, Glottis clear, ETCO2, Dentition Unchanged, Atraumatic; Easy; Disposable Videoscope; 3; 1; 06/18/19; 0549                  Jeanette Gavin MD  "

## 2019-10-11 ENCOUNTER — TELEPHONE (OUTPATIENT)
Dept: NEUROLOGY | Facility: CLINIC | Age: 69
End: 2019-10-11

## 2019-10-11 NOTE — PATIENT INSTRUCTIONS
You are scheduled for a Diagnostic Spinal Angiogram, under general anesthesia, on 10/16/19 at 12:00 PM.     Please follow these instructions:    * You will need a pre-op physical within 30 days prior to your procedure. You may do this with your primary care provider or with the Pre-Operative Assessment Center (PAC), located at [facility] at [address] [place] [address]. The PAC phone number is [phone].    * You should arrive at the Surgery Admission Unit (3C), on the third floor, at the [facility] at 10:00 AM. The address is [address] [place] [address]. The phone number is [phone]. A map is enclosed.    * Do not eat after 4:00 AM; you may drink clear liquids (includes water, Jell-O, clear broth, apple juice or any non-carbonated beverage that you can see through) until 10:00 AM.     * You may take your medications with a sip of water the morning of the procedure.     * You will be discharged the same day. You must have a  home and someone that can stay with you through the night.     If you have questions regarding your procedure, please contact me at [phone], option [phone].    If you need to cancel, reschedule or have procedure scheduling related questions, please call Candice Acosta at [phone].    Thank you,  José Miguel Taylor, RN, CNRN  Stroke & Endovascular Care Coordinator

## 2019-10-11 NOTE — TELEPHONE ENCOUNTER
Informed patient of procedure instructions. Confirmed date and time. Patient verbalized understanding. Patient has no questions at this time. Patient has my contact information and was encouraged to call with questions/concerns.

## 2019-10-15 NOTE — OR NURSING
Notified José Miguel Taylor in IR that pt's H&P done on 9/13 is outdated. Fellow will update H&P on DOS per José Miguel.

## 2019-10-16 ENCOUNTER — APPOINTMENT (OUTPATIENT)
Dept: INTERVENTIONAL RADIOLOGY/VASCULAR | Facility: CLINIC | Age: 69
End: 2019-10-16
Attending: NEUROLOGICAL SURGERY
Payer: MEDICARE

## 2019-10-16 ENCOUNTER — ANESTHESIA (OUTPATIENT)
Dept: SURGERY | Facility: CLINIC | Age: 69
End: 2019-10-16
Payer: MEDICARE

## 2019-10-16 ENCOUNTER — HOSPITAL ENCOUNTER (OUTPATIENT)
Facility: CLINIC | Age: 69
Discharge: HOME | End: 2019-10-16
Attending: ANESTHESIOLOGY | Admitting: NEUROLOGICAL SURGERY
Payer: MEDICARE

## 2019-10-16 ENCOUNTER — ANESTHESIA EVENT (OUTPATIENT)
Dept: SURGERY | Facility: CLINIC | Age: 69
End: 2019-10-16
Payer: MEDICARE

## 2019-10-16 VITALS
HEIGHT: 64 IN | DIASTOLIC BLOOD PRESSURE: 71 MMHG | BODY MASS INDEX: 21.08 KG/M2 | SYSTOLIC BLOOD PRESSURE: 131 MMHG | OXYGEN SATURATION: 94 % | RESPIRATION RATE: 16 BRPM | HEART RATE: 98 BPM | TEMPERATURE: 97.9 F | WEIGHT: 123.5 LBS

## 2019-10-16 DIAGNOSIS — G95.19 HEMORRHAGE INTO SUBARACHNOID SPACE OF SPINE (H): ICD-10-CM

## 2019-10-16 DIAGNOSIS — G45.8 OTHER TRANSIENT CEREBRAL ISCHEMIC ATTACKS AND RELATED SYNDROMES: ICD-10-CM

## 2019-10-16 LAB
ABO + RH BLD: NORMAL
ABO + RH BLD: NORMAL
APTT PPP: 30 SEC (ref 22–37)
BLD GP AB SCN SERPL QL: NORMAL
BLOOD BANK CMNT PATIENT-IMP: NORMAL
CREAT SERPL-MCNC: 0.67 MG/DL (ref 0.52–1.04)
ERYTHROCYTE [DISTWIDTH] IN BLOOD BY AUTOMATED COUNT: 13.9 % (ref 10–15)
GFR SERPL CREATININE-BSD FRML MDRD: 88 ML/MIN/{1.73_M2}
GLUCOSE BLDC GLUCOMTR-MCNC: 101 MG/DL (ref 70–99)
HCT VFR BLD AUTO: 40.5 % (ref 35–47)
HGB BLD-MCNC: 13 G/DL (ref 11.7–15.7)
INR PPP: 1.06 (ref 0.86–1.14)
MCH RBC QN AUTO: 29.5 PG (ref 26.5–33)
MCHC RBC AUTO-ENTMCNC: 32.1 G/DL (ref 31.5–36.5)
MCV RBC AUTO: 92 FL (ref 78–100)
PLATELET # BLD AUTO: 163 10E9/L (ref 150–450)
RBC # BLD AUTO: 4.4 10E12/L (ref 3.8–5.2)
SPECIMEN EXP DATE BLD: NORMAL
WBC # BLD AUTO: 6.4 10E9/L (ref 4–11)

## 2019-10-16 PROCEDURE — C1760 CLOSURE DEV, VASC: HCPCS

## 2019-10-16 PROCEDURE — 71000014 ZZH RECOVERY PHASE 1 LEVEL 2 FIRST HR

## 2019-10-16 PROCEDURE — 40000171 ZZH STATISTIC PRE-PROCEDURE ASSESSMENT III

## 2019-10-16 PROCEDURE — 25500064 ZZH RX 255 OP 636: Performed by: NEUROLOGICAL SURGERY

## 2019-10-16 PROCEDURE — 27210886 ZZH ACCESSORY CR5

## 2019-10-16 PROCEDURE — 93010 ELECTROCARDIOGRAM REPORT: CPT | Mod: 59 | Performed by: INTERNAL MEDICINE

## 2019-10-16 PROCEDURE — 82962 GLUCOSE BLOOD TEST: CPT

## 2019-10-16 PROCEDURE — 37000008 ZZH ANESTHESIA TECHNICAL FEE, 1ST 30 MIN

## 2019-10-16 PROCEDURE — 75705 ARTERY X-RAYS SPINE: CPT | Mod: XS

## 2019-10-16 PROCEDURE — 82565 ASSAY OF CREATININE: CPT | Performed by: STUDENT IN AN ORGANIZED HEALTH CARE EDUCATION/TRAINING PROGRAM

## 2019-10-16 PROCEDURE — C1769 GUIDE WIRE: HCPCS

## 2019-10-16 PROCEDURE — C1887 CATHETER, GUIDING: HCPCS

## 2019-10-16 PROCEDURE — 25800030 ZZH RX IP 258 OP 636: Performed by: NURSE ANESTHETIST, CERTIFIED REGISTERED

## 2019-10-16 PROCEDURE — 36223 PLACE CATH CAROTID/INOM ART: CPT | Mod: 50

## 2019-10-16 PROCEDURE — 25000566 ZZH SEVOFLURANE, EA 15 MIN

## 2019-10-16 PROCEDURE — 36226 PLACE CATH VERTEBRAL ART: CPT

## 2019-10-16 PROCEDURE — 40000065 ZZH STATISTIC EKG NON-CHARGEABLE

## 2019-10-16 PROCEDURE — 71000027 ZZH RECOVERY PHASE 2 EACH 15 MINS

## 2019-10-16 PROCEDURE — 27210908 ZZH NEEDLE CR4

## 2019-10-16 PROCEDURE — 25000128 H RX IP 250 OP 636: Performed by: NURSE ANESTHETIST, CERTIFIED REGISTERED

## 2019-10-16 PROCEDURE — 36225 PLACE CATH SUBCLAVIAN ART: CPT | Mod: XS

## 2019-10-16 PROCEDURE — 86900 BLOOD TYPING SEROLOGIC ABO: CPT | Performed by: ANESTHESIOLOGY

## 2019-10-16 PROCEDURE — 36245 INS CATH ABD/L-EXT ART 1ST: CPT

## 2019-10-16 PROCEDURE — 27210804 ZZH SHEATH CR3

## 2019-10-16 PROCEDURE — 86850 RBC ANTIBODY SCREEN: CPT | Performed by: ANESTHESIOLOGY

## 2019-10-16 PROCEDURE — 25000132 ZZH RX MED GY IP 250 OP 250 PS 637: Mod: GY | Performed by: ANESTHESIOLOGY

## 2019-10-16 PROCEDURE — 27210732 ZZH ACCESSORY CR1

## 2019-10-16 PROCEDURE — 85610 PROTHROMBIN TIME: CPT | Performed by: STUDENT IN AN ORGANIZED HEALTH CARE EDUCATION/TRAINING PROGRAM

## 2019-10-16 PROCEDURE — 36215 PLACE CATHETER IN ARTERY: CPT | Mod: XS,XU

## 2019-10-16 PROCEDURE — 85027 COMPLETE CBC AUTOMATED: CPT | Performed by: STUDENT IN AN ORGANIZED HEALTH CARE EDUCATION/TRAINING PROGRAM

## 2019-10-16 PROCEDURE — 37000009 ZZH ANESTHESIA TECHNICAL FEE, EACH ADDTL 15 MIN

## 2019-10-16 PROCEDURE — 93005 ELECTROCARDIOGRAM TRACING: CPT

## 2019-10-16 PROCEDURE — 27210889 ZZH ACCESSORY CR8

## 2019-10-16 PROCEDURE — 85730 THROMBOPLASTIN TIME PARTIAL: CPT | Performed by: STUDENT IN AN ORGANIZED HEALTH CARE EDUCATION/TRAINING PROGRAM

## 2019-10-16 PROCEDURE — 75726 ARTERY X-RAYS ABDOMEN: CPT

## 2019-10-16 PROCEDURE — 36415 COLL VENOUS BLD VENIPUNCTURE: CPT | Performed by: ANESTHESIOLOGY

## 2019-10-16 PROCEDURE — 27210888 ZZH ACCESSORY CR7

## 2019-10-16 PROCEDURE — 27210738 ZZH ACCESSORY CR2

## 2019-10-16 PROCEDURE — 86901 BLOOD TYPING SEROLOGIC RH(D): CPT | Performed by: ANESTHESIOLOGY

## 2019-10-16 PROCEDURE — 75705 ARTERY X-RAYS SPINE: CPT

## 2019-10-16 PROCEDURE — 25000125 ZZHC RX 250: Performed by: NURSE ANESTHETIST, CERTIFIED REGISTERED

## 2019-10-16 RX ORDER — ONDANSETRON 4 MG/1
4 TABLET, ORALLY DISINTEGRATING ORAL EVERY 6 HOURS PRN
Status: DISCONTINUED | OUTPATIENT
Start: 2019-10-16 | End: 2019-10-16 | Stop reason: HOSPADM

## 2019-10-16 RX ORDER — LIDOCAINE 40 MG/G
CREAM TOPICAL
Status: DISCONTINUED | OUTPATIENT
Start: 2019-10-16 | End: 2019-10-16 | Stop reason: HOSPADM

## 2019-10-16 RX ORDER — SODIUM CHLORIDE 9 MG/ML
INJECTION, SOLUTION INTRAVENOUS CONTINUOUS
Status: DISCONTINUED | OUTPATIENT
Start: 2019-10-16 | End: 2019-10-16 | Stop reason: HOSPADM

## 2019-10-16 RX ORDER — DEXTROSE MONOHYDRATE 25 G/50ML
25-50 INJECTION, SOLUTION INTRAVENOUS
Status: DISCONTINUED | OUTPATIENT
Start: 2019-10-16 | End: 2019-10-16 | Stop reason: HOSPADM

## 2019-10-16 RX ORDER — ONDANSETRON 2 MG/ML
4 INJECTION INTRAMUSCULAR; INTRAVENOUS EVERY 30 MIN PRN
Status: DISCONTINUED | OUTPATIENT
Start: 2019-10-16 | End: 2019-10-16 | Stop reason: HOSPADM

## 2019-10-16 RX ORDER — LIDOCAINE HYDROCHLORIDE 20 MG/ML
INJECTION, SOLUTION INFILTRATION; PERINEURAL PRN
Status: DISCONTINUED | OUTPATIENT
Start: 2019-10-16 | End: 2019-10-16

## 2019-10-16 RX ORDER — PROCHLORPERAZINE 25 MG
12.5 SUPPOSITORY, RECTAL RECTAL EVERY 12 HOURS PRN
Status: DISCONTINUED | OUTPATIENT
Start: 2019-10-16 | End: 2019-10-16 | Stop reason: HOSPADM

## 2019-10-16 RX ORDER — EPHEDRINE SULFATE 50 MG/ML
INJECTION, SOLUTION INTRAMUSCULAR; INTRAVENOUS; SUBCUTANEOUS PRN
Status: DISCONTINUED | OUTPATIENT
Start: 2019-10-16 | End: 2019-10-16

## 2019-10-16 RX ORDER — ONDANSETRON 4 MG/1
4 TABLET, ORALLY DISINTEGRATING ORAL EVERY 30 MIN PRN
Status: DISCONTINUED | OUTPATIENT
Start: 2019-10-16 | End: 2019-10-16 | Stop reason: HOSPADM

## 2019-10-16 RX ORDER — FENTANYL CITRATE 50 UG/ML
INJECTION, SOLUTION INTRAMUSCULAR; INTRAVENOUS PRN
Status: DISCONTINUED | OUTPATIENT
Start: 2019-10-16 | End: 2019-10-16

## 2019-10-16 RX ORDER — NICOTINE POLACRILEX 4 MG
15-30 LOZENGE BUCCAL
Status: DISCONTINUED | OUTPATIENT
Start: 2019-10-16 | End: 2019-10-16 | Stop reason: HOSPADM

## 2019-10-16 RX ORDER — ONDANSETRON 2 MG/ML
4 INJECTION INTRAMUSCULAR; INTRAVENOUS EVERY 6 HOURS PRN
Status: DISCONTINUED | OUTPATIENT
Start: 2019-10-16 | End: 2019-10-16 | Stop reason: HOSPADM

## 2019-10-16 RX ORDER — DEXAMETHASONE SODIUM PHOSPHATE 4 MG/ML
INJECTION, SOLUTION INTRA-ARTICULAR; INTRALESIONAL; INTRAMUSCULAR; INTRAVENOUS; SOFT TISSUE PRN
Status: DISCONTINUED | OUTPATIENT
Start: 2019-10-16 | End: 2019-10-16

## 2019-10-16 RX ORDER — PROPOFOL 10 MG/ML
INJECTION, EMULSION INTRAVENOUS PRN
Status: DISCONTINUED | OUTPATIENT
Start: 2019-10-16 | End: 2019-10-16

## 2019-10-16 RX ORDER — IODIXANOL 320 MG/ML
150 INJECTION, SOLUTION INTRAVASCULAR ONCE
Status: COMPLETED | OUTPATIENT
Start: 2019-10-16 | End: 2019-10-16

## 2019-10-16 RX ORDER — SODIUM CHLORIDE, SODIUM LACTATE, POTASSIUM CHLORIDE, CALCIUM CHLORIDE 600; 310; 30; 20 MG/100ML; MG/100ML; MG/100ML; MG/100ML
INJECTION, SOLUTION INTRAVENOUS CONTINUOUS PRN
Status: DISCONTINUED | OUTPATIENT
Start: 2019-10-16 | End: 2019-10-16

## 2019-10-16 RX ORDER — LIDOCAINE HYDROCHLORIDE 10 MG/ML
1-30 INJECTION, SOLUTION EPIDURAL; INFILTRATION; INTRACAUDAL; PERINEURAL
Status: DISCONTINUED | OUTPATIENT
Start: 2019-10-16 | End: 2019-10-16 | Stop reason: HOSPADM

## 2019-10-16 RX ORDER — ACETAMINOPHEN 325 MG/1
975 TABLET ORAL ONCE
Status: COMPLETED | OUTPATIENT
Start: 2019-10-16 | End: 2019-10-16

## 2019-10-16 RX ORDER — PROCHLORPERAZINE MALEATE 5 MG
5 TABLET ORAL EVERY 6 HOURS PRN
Status: DISCONTINUED | OUTPATIENT
Start: 2019-10-16 | End: 2019-10-16 | Stop reason: HOSPADM

## 2019-10-16 RX ORDER — SODIUM CHLORIDE, SODIUM LACTATE, POTASSIUM CHLORIDE, CALCIUM CHLORIDE 600; 310; 30; 20 MG/100ML; MG/100ML; MG/100ML; MG/100ML
INJECTION, SOLUTION INTRAVENOUS CONTINUOUS
Status: DISCONTINUED | OUTPATIENT
Start: 2019-10-16 | End: 2019-10-16 | Stop reason: HOSPADM

## 2019-10-16 RX ORDER — OXYCODONE HYDROCHLORIDE 5 MG/1
5 TABLET ORAL EVERY 4 HOURS PRN
Status: DISCONTINUED | OUTPATIENT
Start: 2019-10-16 | End: 2019-10-16 | Stop reason: HOSPADM

## 2019-10-16 RX ORDER — METOCLOPRAMIDE 5 MG/1
5 TABLET ORAL EVERY 6 HOURS PRN
Status: DISCONTINUED | OUTPATIENT
Start: 2019-10-16 | End: 2019-10-16 | Stop reason: HOSPADM

## 2019-10-16 RX ORDER — ONDANSETRON 2 MG/ML
INJECTION INTRAMUSCULAR; INTRAVENOUS PRN
Status: DISCONTINUED | OUTPATIENT
Start: 2019-10-16 | End: 2019-10-16

## 2019-10-16 RX ORDER — MEPERIDINE HYDROCHLORIDE 25 MG/ML
12.5 INJECTION INTRAMUSCULAR; INTRAVENOUS; SUBCUTANEOUS
Status: DISCONTINUED | OUTPATIENT
Start: 2019-10-16 | End: 2019-10-16 | Stop reason: HOSPADM

## 2019-10-16 RX ORDER — METOCLOPRAMIDE HYDROCHLORIDE 5 MG/ML
5 INJECTION INTRAMUSCULAR; INTRAVENOUS EVERY 6 HOURS PRN
Status: DISCONTINUED | OUTPATIENT
Start: 2019-10-16 | End: 2019-10-16 | Stop reason: HOSPADM

## 2019-10-16 RX ORDER — NALOXONE HYDROCHLORIDE 0.4 MG/ML
.1-.4 INJECTION, SOLUTION INTRAMUSCULAR; INTRAVENOUS; SUBCUTANEOUS
Status: DISCONTINUED | OUTPATIENT
Start: 2019-10-16 | End: 2019-10-16 | Stop reason: HOSPADM

## 2019-10-16 RX ORDER — HEPARIN SOD,PORCINE/0.9 % NACL 5K/1000 ML
1000-10000 INTRAVENOUS SOLUTION INTRAVENOUS
Status: DISCONTINUED | OUTPATIENT
Start: 2019-10-16 | End: 2019-10-16 | Stop reason: HOSPADM

## 2019-10-16 RX ORDER — HYDROMORPHONE HYDROCHLORIDE 1 MG/ML
.3-.5 INJECTION, SOLUTION INTRAMUSCULAR; INTRAVENOUS; SUBCUTANEOUS EVERY 10 MIN PRN
Status: DISCONTINUED | OUTPATIENT
Start: 2019-10-16 | End: 2019-10-16 | Stop reason: HOSPADM

## 2019-10-16 RX ADMIN — PHENYLEPHRINE HYDROCHLORIDE 200 MCG: 10 INJECTION INTRAVENOUS at 14:40

## 2019-10-16 RX ADMIN — MIDAZOLAM 2 MG: 1 INJECTION INTRAMUSCULAR; INTRAVENOUS at 14:10

## 2019-10-16 RX ADMIN — Medication 5 MG: at 14:24

## 2019-10-16 RX ADMIN — ACETAMINOPHEN 975 MG: 325 TABLET, FILM COATED ORAL at 18:43

## 2019-10-16 RX ADMIN — PROPOFOL 100 MG: 10 INJECTION, EMULSION INTRAVENOUS at 14:20

## 2019-10-16 RX ADMIN — PHENYLEPHRINE HYDROCHLORIDE 50 MCG: 10 INJECTION INTRAVENOUS at 15:40

## 2019-10-16 RX ADMIN — PHENYLEPHRINE HYDROCHLORIDE 100 MCG: 10 INJECTION INTRAVENOUS at 15:16

## 2019-10-16 RX ADMIN — SUGAMMADEX 100 MG: 100 INJECTION, SOLUTION INTRAVENOUS at 16:09

## 2019-10-16 RX ADMIN — ONDANSETRON 4 MG: 2 INJECTION INTRAMUSCULAR; INTRAVENOUS at 14:54

## 2019-10-16 RX ADMIN — Medication 5 MG: at 15:16

## 2019-10-16 RX ADMIN — PHENYLEPHRINE HYDROCHLORIDE 100 MCG: 10 INJECTION INTRAVENOUS at 14:55

## 2019-10-16 RX ADMIN — Medication 10 MG: at 15:04

## 2019-10-16 RX ADMIN — Medication 5 MG: at 14:55

## 2019-10-16 RX ADMIN — PHENYLEPHRINE HYDROCHLORIDE 100 MCG: 10 INJECTION INTRAVENOUS at 15:31

## 2019-10-16 RX ADMIN — SUGAMMADEX 100 MG: 100 INJECTION, SOLUTION INTRAVENOUS at 16:21

## 2019-10-16 RX ADMIN — PHENYLEPHRINE HYDROCHLORIDE 200 MCG: 10 INJECTION INTRAVENOUS at 14:47

## 2019-10-16 RX ADMIN — ROCURONIUM BROMIDE 50 MG: 10 INJECTION INTRAVENOUS at 14:21

## 2019-10-16 RX ADMIN — PHENYLEPHRINE HYDROCHLORIDE 200 MCG: 10 INJECTION INTRAVENOUS at 14:20

## 2019-10-16 RX ADMIN — DEXAMETHASONE SODIUM PHOSPHATE 4 MG: 4 INJECTION, SOLUTION INTRA-ARTICULAR; INTRALESIONAL; INTRAMUSCULAR; INTRAVENOUS; SOFT TISSUE at 14:54

## 2019-10-16 RX ADMIN — LIDOCAINE HYDROCHLORIDE 100 MG: 20 INJECTION, SOLUTION INFILTRATION; PERINEURAL at 14:20

## 2019-10-16 RX ADMIN — IODIXANOL 225 ML: 320 INJECTION, SOLUTION INTRAVASCULAR at 16:38

## 2019-10-16 RX ADMIN — PHENYLEPHRINE HYDROCHLORIDE 50 MCG: 10 INJECTION INTRAVENOUS at 15:52

## 2019-10-16 RX ADMIN — FENTANYL CITRATE 100 MCG: 50 INJECTION, SOLUTION INTRAMUSCULAR; INTRAVENOUS at 14:18

## 2019-10-16 RX ADMIN — SODIUM CHLORIDE, POTASSIUM CHLORIDE, SODIUM LACTATE AND CALCIUM CHLORIDE: 600; 310; 30; 20 INJECTION, SOLUTION INTRAVENOUS at 14:10

## 2019-10-16 RX ADMIN — PHENYLEPHRINE HYDROCHLORIDE 200 MCG: 10 INJECTION INTRAVENOUS at 14:33

## 2019-10-16 ASSESSMENT — MIFFLIN-ST. JEOR: SCORE: 1065.19

## 2019-10-16 NOTE — ANESTHESIA POSTPROCEDURE EVALUATION
Anesthesia POST Procedure Evaluation    Patient: Anayeli Gagnon   MRN:     [id number] Gender:   female   Age:    68 year old :      1950        Preoperative Diagnosis: Hemorrhage Into Subarchnoid Of Spine   Procedure(s):  Out Of O.R. Spinal Angiogram @1200   Postop Comments: No value filed.       Anesthesia Type:  Not documented  General    Reportable Event: NO     PAIN: Uncomplicated   Sign Out status: Comfortable, Well controlled pain     PONV: No PONV   Sign Out status:  No Nausea or Vomiting     Neuro/Psych: Uneventful perioperative course   Sign Out Status: Preoperative baseline; Age appropriate mentation     Airway/Resp.: Uneventful perioperative course   Sign Out Status: Non labored breathing, age appropriate RR; Resp. Status within EXPECTED Parameters     CV: Uneventful perioperative course   Sign Out status: Appropriate BP and perfusion indices; Appropriate HR/Rhythm     Disposition:   Sign Out in:  PACU  Disposition:  Phase II; Home  Recovery Course: Uneventful  Follow-Up: Not required           Last Anesthesia Record Vitals:  CRNA VITALS  10/16/2019 1558 - 10/16/2019 1658      10/16/2019             NIBP:  149/84    Pulse:  98    Ht Rate:  98    SpO2:  97 %          Last PACU Vitals:  Vitals Value Taken Time   /73 10/16/2019  5:10 PM   Temp     Pulse 99 10/16/2019  5:10 PM   Resp 15 10/16/2019  5:00 PM   SpO2 92 % 10/16/2019  5:19 PM   Temp src     NIBP 149/84 10/16/2019  4:33 PM   Pulse 98 10/16/2019  4:33 PM   SpO2 97 % 10/16/2019  4:33 PM   Resp     Temp     Ht Rate 98 10/16/2019  4:33 PM   Temp 2     Vitals shown include unvalidated device data.      Electronically Signed By: Amita Perez MD, 2019, 5:20 PM

## 2019-10-16 NOTE — H&P
[facility], [place]     Endovascular Surgical Neuroradiology Pre-Procedure Note      HPI:  Ms Gagnon experienced acute onset pain in her midline between her shoulder blades in November 2017 and was evaluated at [facility] for a possible cardiac etiology, which was negative. Thoracic spine imaging demonstrated spinal cord compression from epidural vs subdural mass. She underwent decompression with placement of a lumbar drain 11/21/17 with Dr. Murcia. Intraoperatively, findings were consistent with an intradural hematoma. She underwent a spinal angiograms 11/21 and 11/25/17, which was negative. She underwent an L2-5 decompression and hematoma evacuation 12/3/17 after further worsening of lower extremity motor function subsequently. She was discharged to rehab and after approximately 1 month of rehab, she was able to stand and walk with an assistance device. She went home. While at home, she began to slowly deteriorate, so she represented to Dr. Murcia in 2018. Imaging demonstrated adhesions throughout the thoracic spine. She underwent a 2 stage T3-12 lysis of adhesions on 10/4/18 and 10/5/18. Postoperatively, the patient noted bowel incontinence, perianal pain, and pain in her feet bilaterally, all of these were new and worse symptoms. The pain involves her ankles circumferentially and feet in all distributions. Standing makes the pain worse. Again after a long rehab course, she was able to stand and walk with assistance and was discharged home. She has had further neurologic decline and currently remains confined to wheel chair.     She presented to [facility] for second opinion. Today she is planned for a repeat MR/ MRA and conventional angiogram.    Medical History:  Past Medical History:   Diagnosis Date     CARDIAC DYSRHYTHMIAS NEC 10/3/2007    Taking atenelol for years for this     History of skin cancer      Mitral valve prolapse      Neurogenic bladder      Sacral decubitus  ulcer, stage IV (H)      Thoracic spinal cord injury (H)        Surgical History:  Past Surgical History:   Procedure Laterality Date     LAPAROSCOPIC TUBAL LIGATION         Family History:  Family History   Problem Relation Age of Onset     C.A.D. Father          41       Social History:  Social History     Socioeconomic History     Marital status:      Spouse name: Not on file     Number of children: Not on file     Years of education: Not on file     Highest education level: Not on file   Occupational History     Occupation: NA     Employer: [employer]   Social Needs     Financial resource strain: Not on file     Food insecurity:     Worry: Not on file     Inability: Not on file     Transportation needs:     Medical: Not on file     Non-medical: Not on file   Tobacco Use     Smoking status: Never Smoker     Smokeless tobacco: Never Used   Substance and Sexual Activity     Alcohol use: No     Drug use: No     Sexual activity: Yes     Partners: Male   Lifestyle     Physical activity:     Days per week: Not on file     Minutes per session: Not on file     Stress: Not on file   Relationships     Social connections:     Talks on phone: Not on file     Gets together: Not on file     Attends Gnosticism service: Not on file     Active member of club or organization: Not on file     Attends meetings of clubs or organizations: Not on file     Relationship status: Not on file     Intimate partner violence:     Fear of current or ex partner: Not on file     Emotionally abused: Not on file     Physically abused: Not on file     Forced sexual activity: Not on file   Other Topics Concern     Parent/sibling w/ CABG, MI or angioplasty before 65F 55M? Not Asked   Social History Narrative    Lives with spouse - works in [place].       Allergies:  Allergies   Allergen Reactions     No Known Allergies        Is there a contrast allergy?  No    Medications:  Medications Prior to Admission   Medication Sig Dispense Refill  Last Dose     Acetaminophen (TYLENOL PO) Take by mouth as needed for mild pain or fever Take 500-1000 mg by mouth every 6 hrs prn   Taking     GABAPENTIN PO Take 600 mg by mouth 4 times daily    Taking     METHENAMINE HIPPURATE PO Take 1 g by mouth 2 times daily   Taking     METOPROLOL TARTRATE PO Take 12.5 mg by mouth daily    Taking     MORPHINE SULFATE PO Take 15 mg by mouth 2 times daily   Taking   .    ROS:  The 5 point Review of Systems is negative other than noted in the HPI or here. \    PHYSICAL EXAMINATION  Vital Signs:  B/P: 129/65,  T: 99.2,  P: 73,  R: 16    Cardio:  RRR  Pulmonary:  no respiratory distress  Abdomen:  soft    Alert, oriented to time place and person.  Speech fluent with normal naming, repetition, comprehension. Pupils are equal, round, reactive to light.  Extraocular movements full.  Visual fields full.  Facial sensation, movement normal.  Palate moves symmetrically.  Tongue midline.  Sternocleidomastoid and trapezius strength intact.  Motor 5/5 in bilateral upper extremities, sensation intact to touch bilaterally without any neglect above umbilicus. The sensation decreases below umbilicus bilaterally. She can not perform hip flexion. Distal BL LE 4/5.      LABS  (most recent Cr, BUN, GFR, PLT, INR, PTT within the past 7 days):  No lab results found in last 7 days.     Platelet Function P2Y12 (PRU):  Not applicable      ASSESSMENT: Recurrent unexplained spinal intradural hemorrhages.     PLAN: Spinal angiogram under general anesthesia        PRE-PROCEDURE SEDATION ASSESSMENT     Pre-Procedure Sedation Assessment done at 1100.    Expected Level:  Moderate Sedation    Indication:  Sedation is required to allow for neurointerventional procedure.    Consent obtained from patient after discussing the risks, benefits and alternatives.     PO Intake:  Appropriately NPO for procedure    ASA Class:  Class 4 - SEVERE SYSTEMIC DISEASE, ACUTE UNSTABLE PROBLEMS.    Mallampati:  Grade 3:  Soft palate  visible, posterior pharyngeal wall not visible    History and physical reviewed and no updates needed. I have reviewed the lab findings, diagnostic data, medications, and the plan for sedation. I have determined this patient to be an appropriate candidate for the planned sedation and procedure and have reassessed the patient IMMEDIATELY PRIOR to sedation and procedure.    Patient was discussed with the Attending, Dr. Ritchie, who agrees with the plan.    Kurt Willard MD   Pager: [phone]

## 2019-10-16 NOTE — ANESTHESIA PREPROCEDURE EVALUATION
Anesthesia Pre-Procedure Evaluation    Patient: Anayeli Gagnon   MRN:     [id number] Gender:   female   Age:    68 year old :      1950        Preoperative Diagnosis: Hemorrhage Into Subarchnoid Of Spine   Procedure(s):  Out Of O.R. Spinal Angiogram @1200     Past Medical History:   Diagnosis Date     CARDIAC DYSRHYTHMIAS NEC 10/3/2007    Taking atenelol for years for this     History of skin cancer      Mitral valve prolapse      Neurogenic bladder      Sacral decubitus ulcer, stage IV (H)      Thoracic spinal cord injury (H)       Past Surgical History:   Procedure Laterality Date     LAPAROSCOPIC TUBAL LIGATION            Anesthesia Evaluation     .             ROS/MED HX    ENT/Pulmonary:  - neg pulmonary ROS     Neurologic:     (+)Spinal cord injury without autonomic hyperflexia symptoms,     Cardiovascular:     (+) hypertension----. : . . . :. Irregular Heartbeat/Palpitations, valvular problems/murmurs type: MVP . Previous cardiac testing Echodate:results:Echocardiogram 2017:  1. Technically difficult study with poor acoustic windows.   2. The left ventricle is normal size with hyperdynamic systolic function. Ejection fraction is 70-75%.   3. The right ventricle is poorly visualized but appears to be normal size with normal systolic function.   4. Mild tricuspid regurgitation.   5. No significant pericardial effusion.date: results:ECG reviewed date:19 results:Normal sinus rhythm 90  Nonspecific T wave abnormality date: results:         (-) syncope   METS/Exercise Tolerance:     Hematologic:  - neg hematologic  ROS       Musculoskeletal:         GI/Hepatic:  - neg GI/hepatic ROS       Renal/Genitourinary:  - ROS Renal section negative       Endo:  - neg endo ROS       Psychiatric:         Infectious Disease:         Malignancy:         Other:    (+) No chance of pregnancy C-spine cleared: Yes, H/O Chronic Pain,other significant disability Wheelchair bound                       PHYSICAL  "EXAM:   Mental Status/Neuro: A/A/O   Airway: Facies: Feasible  Mallampati: II  Mouth/Opening: Full  TM distance: > 6 cm  Neck ROM: Full   Respiratory: Auscultation: CTAB     Resp. Rate: Normal     Resp. Effort: Normal      CV: Rhythm: Regular  Heart: Normal Sounds  Edema: None   Comments:                      LABS:  CBC:   Lab Results   Component Value Date    WBC 10.9 01/06/2011    WBC 8.5 08/07/2010    HGB 15.1 01/06/2011    HGB 14.8 08/07/2010    HCT 45.4 01/06/2011    HCT 42.8 08/07/2010     01/06/2011     08/07/2010     BMP:   Lab Results   Component Value Date     01/06/2011     08/07/2010    POTASSIUM 3.9 01/06/2011    POTASSIUM 3.8 08/07/2010    CHLORIDE 105 01/06/2011    CHLORIDE 102 08/07/2010    CO2 26 01/06/2011    CO2 29 08/07/2010    BUN 19 01/06/2011    BUN 11 08/07/2010    CR 0.67 01/06/2011    CR 0.69 08/07/2010    GLC neg 05/05/2013    GLC neg 02/25/2011     COAGS: No results found for: PTT, INR, FIBR  POC:   Lab Results   Component Value Date     (H) 10/16/2019     OTHER:   Lab Results   Component Value Date    PH 6.0 05/05/2013    A1C 5.3 11/17/2011    ADAN 8.6 01/06/2011    TSH 2.33 08/07/2010    SED 4 11/17/2011        Preop Vitals    BP Readings from Last 3 Encounters:   10/16/19 129/65   04/18/19 127/67   03/28/19 122/58    Pulse Readings from Last 3 Encounters:   10/16/19 73   04/18/19 65   03/28/19 58      Resp Readings from Last 3 Encounters:   10/16/19 16   03/28/19 18   12/06/11 14    SpO2 Readings from Last 3 Encounters:   10/16/19 96%   04/18/19 94%   03/28/19 100%      Temp Readings from Last 1 Encounters:   10/16/19 37.3  C (99.2  F) (Oral)    Ht Readings from Last 1 Encounters:   10/16/19 1.626 m (5' 4\")      Wt Readings from Last 1 Encounters:   10/16/19 56 kg (123 lb 8 oz)    Estimated body mass index is 21.2 kg/m  as calculated from the following:    Height as of this encounter: 1.626 m (5' 4\").    Weight as of this encounter: 56 kg (123 lb 8 oz). "     LDA:  Peripheral IV 10/16/19 Left Upper forearm (Active)   Site Assessment WDL 10/16/2019 11:01 AM   Line Status Saline locked 10/16/2019 11:01 AM   Phlebitis Scale 0-->no symptoms 10/16/2019 11:01 AM   Infiltration Scale 0 10/16/2019 11:01 AM   Extravasation? No 10/16/2019 11:01 AM   Number of days: 0        Assessment:   ASA SCORE: 3    H&P: History and physical reviewed and following examination; no interval change.   Smoking Status:  Non-Smoker/Unknown   NPO Status: NPO Appropriate     Plan:   Anes. Type:  General   Pre-Medication: None   Induction:  IV (Standard)   Airway: ETT; Oral   Access/Monitoring: PIV   Maintenance: Balanced     Postop Plan:   Postop Pain: Opioids  Postop Sedation/Airway: Not planned     PONV Management:   Adult Risk Factors: Female, Non-Smoker, Postop Opioids   Prevention: Ondansetron, Dexamethasone     CONSENT: Direct conversation   Plan and risks discussed with: Patient   Blood Products: Consented (ALL Blood Products)                   Xavier Nolan MD

## 2019-10-16 NOTE — IR NOTE
Patient Name: Anayeli Gagnon  Medical Record Number: [id number]  Today's Date: 10/16/2019    Procedure: spinal angiogram  Proceduralist: Alf Menodza    Sedation Notes: General anesthesia by anesthesia staff      Procedure start time: 1444  Puncture time:   Procedure end time:     Report given to: PACU RN by CRNA  : n/a    Other Notes: Pt arrived to IR room 3 from . Consent reviewed. Pt denies any questions or concerns regarding procedure. Pt positioned supine and monitored per protocol. Pt tolerated procedure without any noted complications. Pt transferred back to PACU for recovery.

## 2019-10-16 NOTE — DISCHARGE INSTRUCTIONS
[facility]  Same-Day Surgery   Adult Discharge Orders & Instructions     For 24 hours after surgery    1. Get plenty of rest.  A responsible adult must stay with you for at least 24 hours after you leave the hospital.   2. Do not drive or use heavy equipment.  If you have weakness or tingling, don't drive or use heavy equipment until this feeling goes away.  3. Do not drink alcohol.  4. Avoid strenuous or risky activities.  Ask for help when climbing stairs.   5. You may feel lightheaded.  IF so, sit for a few minutes before standing.  Have someone help you get up.   6. If you have nausea (feel sick to your stomach): Drink only clear liquids such as apple juice, ginger ale, broth or 7-Up.  Rest may also help.  Be sure to drink enough fluids.  Move to a regular diet as you feel able.  7. You may have a slight fever. Call the doctor if your fever is over 100 F (37.7 C) (taken under the tongue) or lasts longer than 24 hours.  8. You may have a dry mouth, a sore throat, muscle aches or trouble sleeping.  These should go away after 24 hours.  9. Do not make important or legal decisions.   Call your doctor for any of the followin.  Signs of infection (fever, growing tenderness at the surgery site, a large amount of drainage or bleeding, severe pain, foul-smelling drainage, redness, swelling).    2. It has been over 8 to 10 hours since surgery and you are still not able to urinate (pass water).    3.  Headache for over 24 hours.    4.  Numbness, tingling or weakness the day after surgery (if you had spinal anesthesia).  To contact doctor bansal, call [phone] or:        [phone] and ask for the resident on call for   Neurology (answered 24 hours a day)      Emergency Department:    [facility]: [phone]

## 2019-10-16 NOTE — PROCEDURES
[facility], [place]     Endovascular Surgical Neuroradiology Post-Procedure Note    Pre-Procedure Diagnosis:  Spinal dural hemorrhage  Post-Procedure Diagnosis:  Spinal dural hemorrhage     Procedure(s):   Diagnostic cervicocerebral angiography  Diagnostic spinal angiography    Findings:  Left T6 injection shows a tuft of blood vessels - unclear if it is a vascular malformation. 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      Plan:  Discharge when ready    Primary Surgeon:  Dr. Alf Ritchie  Secondary Surgeon:  Not applicable  Secondary Surgeon Review:  None  Fellow:  Yogi Willard  Additional Assistants:  RICKY    Prior to the start of the procedure and with procedural staff participation, I verbally confirmed: the patient s identity using two indicators, relevant allergies, that the procedure was appropriate and matched the consent or emergent situation, and that the correct equipment/implants were available. Immediately prior to starting the procedure I conducted the Time Out with the procedural staff and re-confirmed the patient s name, procedure, and site/side. (The Joint Commission universal protocol was followed.)  Yes    PRU value: Not applicable    Anesthesia:  Performed by Anesthesia  Medications:  Per MAR  Puncture site:  Right femoral artery    Fluoroscopy time (minutes):  34.1  Radiation dose (mGy):  2821    Contrast amount (mL):  225     Estimated blood loss (mL):  Minimal     Closure:  Device Starclose    Disposition:  Will be followed in hospital by the Neuro Endovascular team.        Sedation Post-Procedure Summary    Per anesthesia     Kurt Willard MD  Pager:  [phone]

## 2019-10-16 NOTE — ANESTHESIA CARE TRANSFER NOTE
Patient: Anayeli Gagnon    Procedure(s):  Out Of O.R. Spinal Angiogram @1200    Diagnosis: Hemorrhage Into Subarchnoid Of Spine  Diagnosis Additional Information: No value filed.    Anesthesia Type:   General     Note:  Airway :Face Mask  Patient transferred to:PACU  Comments: Alert and exchanging well. VSS. No complaints of pain. Report given to RN. Handoff Report: Identifed the Patient, Identified the Reponsible Provider, Reviewed the pertinent medical history, Discussed the surgical course, Reviewed Intra-OP anesthesia mangement and issues during anesthesia, Set expectations for post-procedure period and Allowed opportunity for questions and acknowledgement of understanding      Vitals: (Last set prior to Anesthesia Care Transfer)    CRNA VITALS  10/16/2019 1558 - 10/16/2019 1636      10/16/2019             NIBP:  149/84    Pulse:  98    Ht Rate:  98    SpO2:  97 %                Electronically Signed By: EMELI Moffett CRNA  October 16, 2019  4:36 PM

## 2019-10-17 ENCOUNTER — PATIENT OUTREACH (OUTPATIENT)
Dept: NEUROLOGY | Facility: CLINIC | Age: 69
End: 2019-10-17

## 2019-10-17 LAB — INTERPRETATION ECG - MUSE: NORMAL

## 2019-10-17 NOTE — PROGRESS NOTES
[facility]: Post-Discharge Note  SITUATION                                                      Admission:    Admission Date: 10/16/19   Reason for Admission: Diagnostic cervicocerebral angiography; Diagnostic spinal angiography   Discharge:    Discharge Date: 10/16/19   Discharge Diagnosis: Spinal dural hemorrhage    Discharge Service: Endovascular Surgical Neuroradiology     BACKGROUND                                                      HPI:  Ms Gagnon experienced acute onset pain in her midline between her shoulder blades in November 2017 and was evaluated at [place] for a possible cardiac etiology, which was negative. Thoracic spine imaging demonstrated spinal cord compression from epidural vs subdural mass. She underwent decompression with placement of a lumbar drain 11/21/17 with Dr. Murcia. Intraoperatively, findings were consistent with an intradural hematoma. She underwent a spinal angiograms 11/21 and 11/25/17, which was negative. She underwent an L2-5 decompression and hematoma evacuation 12/3/17 after further worsening of lower extremity motor function subsequently. She was discharged to rehab and after approximately 1 month of rehab, she was able to stand and walk with an assistance device. She went home. While at home, she began to slowly deteriorate, so she represented to Dr. Murcia in 2018. Imaging demonstrated adhesions throughout the thoracic spine. She underwent a 2 stage T3-12 lysis of adhesions on 10/4/18 and 10/5/18. Postoperatively, the patient noted bowel incontinence, perianal pain, and pain in her feet bilaterally, all of these were new and worse symptoms. The pain involves her ankles circumferentially and feet in all distributions. Standing makes the pain worse. Again after a long rehab course, she was able to stand and walk with assistance and was discharged home. She has had further neurologic decline and currently remains confined to wheel  chair.     She presented to [facility] for [facility] opinion. Today she is planned for a repeat MR/ MRA and conventional angiogram.    Findings:    Left T6 injection shows a tuft of blood vessels - unclear if it is a vascular malformation. 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    ASSESSMENT      Discharge Assessment  Does the patient have all of their medications?: Not Applicable  Does patient know what their new medications are for?: Not applicable  Does patient have a follow-up appointment scheduled?: No    Post-op  Did the patient have surgery or a procedure: Yes  Incision: healing  Drainage: No  Bleeding: scant(last evening had scant amount of blood. )  Redness: No  Warmth: No  Swelling: No  Incision site pain: No  Closure: other(Starclose right femoral artery)  Eating & Drinking: eating and drinking without complaints/concerns     Patient overall doing well.     PLAN                                                      Outpatient Plan: Patient states she was told it would be discussed. Advised patient that will likely occur during conference on 10/23/19. Will get back to her late next week with plan. Patient verbalized understanding.    Patient has my contact information and was encouraged to call with questions/concerns.     Addendum 10/23 - message sent to Dr. Willard and Dr. Calix for plan.   Second message sent 10/30/19. Third message sent 11/4/19.     No future appointments.     Ena Taylor RN

## 2019-10-18 ENCOUNTER — NURSE TRIAGE (OUTPATIENT)
Dept: NURSING | Facility: CLINIC | Age: 69
End: 2019-10-18

## 2019-10-18 NOTE — TELEPHONE ENCOUNTER
She is in severe pain from groin to back with a new onset rash on awakening today . The rash is bright red, smooth, non-blistering , painful, and burning in nature.  She calls to both report and to review PCP advice( seen this AM 10/17/2019in [place]).  -She was discharged from [facility] 10/16/2019 after diagnostic angiogram.  -She is taking no new medications, she did have anesthesia on 10/16/2019 for spinal angiogram( see notes ).    -She saw Primary MD in [place] this morning and was prescribed Prednisone taper, took 4 pills today with no relief as yet.  -She was told she did not have shingles and that she could take benadryl.  -She has not taken the Benadryl as yet.  -She is wondering if this is appropriate treatment and writer defers to MD who assessed her in person, but also reviewed that steroids are often chosen for treatment of allergic skin reaction.    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  She is now home with her .  Reviewed AVS from 10/16/2019.  Regarding topical medications, she cannot immerse x 5 days after angiogram so no Aveeno bath.  Patient has beenseentoday and prescribed medication by PCP, would advise to GO TO ED/ locally with any advancing or rapidly escalating symptoms or failure to resolve, increasing pain, itching, dysphagia,facial or tongue swelling, blistering, chest pain, SOB.   Will notify vascular/ neuro team also.       Additional Information    Negative: Sudden onset of rash (within last 2 hours) and difficulty with breathing or swallowing    Negative: Difficult to awaken or acting confused (e.g., disoriented, slurred speech)    Negative: Fever and purple or blood-colored spots or dots    Negative: Too weak or sick to stand    Negative:  Life-threatening reaction (anaphylaxis) in the past to similar substance (e.g., food, insect bite/sting, chemical, etc.) and < 2 hours since exposure    Negative: Sounds like a life-threatening emergency to the triager    Drug rash suspected and started taking new medicine within last 2 weeks (EXCEPTION: antihistamine, eye drops, ear drops, decongestant or other OTC cough/cold medicines)    Negative: Joint pain or swelling    Negative: Purple or blood-colored spots or dots (no fever and sounds well to triager)    Negative: Face or lip swelling    Negative: Fever    Negative: Bloody crusts on lips or in mouth    Negative: Large or small blisters on skin (i.e., fluid filled bubbles or sacs)    Widespread hives and onset < 2 hours of exposure to 1st dose of drug    Protocols used: RASH OR REDNESS - WIDESPREAD-A-OH, RASH - WIDESPREAD ON DRUGS - DRUG [id number]

## 2019-11-04 ENCOUNTER — TELEPHONE (OUTPATIENT)
Dept: NEUROLOGY | Facility: CLINIC | Age: 69
End: 2019-11-04

## 2019-11-04 DIAGNOSIS — Z91.041 CONTRAST MEDIA ALLERGY: Primary | ICD-10-CM

## 2019-11-04 DIAGNOSIS — G95.19 HEMORRHAGE INTO SUBARACHNOID SPACE OF SPINE (H): ICD-10-CM

## 2019-11-04 NOTE — TELEPHONE ENCOUNTER
Received VMM from patient asking for results of spinal angio ordered by Dr. Hermosillo. It was done on 10/16/19 however the report has not yet been transcribed. Informed patient of this and that RN has alerted the team for the need to get report completed as well as will alert Dr. Hermosillo' nurse.

## 2019-11-07 RX ORDER — METHYLPREDNISOLONE 32 MG/1
TABLET ORAL
Qty: 2 TABLET | Refills: 0 | Status: SHIPPED | OUTPATIENT
Start: 2019-11-07 | End: 2021-03-04

## 2019-11-07 NOTE — TELEPHONE ENCOUNTER
Dr. Willard spoke with patient, on 11/8, regarding the plan to schedule spinal angio with intention to treat.     Informed patient: Scheduling will contact you to make arrangements for your spinal angio with intent to treat. You will need a pre-op physical within 30 days of your procedure. You will stay overnight at the hospital for observation following your procedure. Do not eat for 8 hours prior to your procedure. You may drink clear liquids (includes water, Jell-O, clear broth, apple juice or any non-carbonated beverage that you can see through) until 2 hours prior to your procedure. You may take your medications with a sip of water. You will check in at 3C, Surgery Check-In, on the third floor of the [facility] 2 hours prior to your procedure. You will receive written instructions and a map to the hospital after your procedure has been scheduled. Medrol has been prescribed for your contrast allergy. Please take 1 tab 12 hours prior to procedure and 1 tab 2 hours prior to procedure. Patient verbalized understanding and has no questions at this time. Patient has my contact information and was encouraged to call with questions/concerns.     Medrol prescription sent.

## 2019-11-13 ENCOUNTER — PATIENT OUTREACH (OUTPATIENT)
Dept: NEUROLOGY | Facility: CLINIC | Age: 69
End: 2019-11-13

## 2019-11-13 NOTE — PATIENT INSTRUCTIONS
You are scheduled for a Diagnostic Spinal Angiogram with intent to treat, under general anesthesia, on 12/20/19 at 8AM.     Please follow these instructions:    * You will need a pre-op physical within 30 days prior to your procedure. You may do this with your primary care provider or with the Pre-Operative Assessment Center (PAC), located at [facility] at [address] [place] [address]. The PAC phone number is [phone].    * You should arrive at the Surgery Admission Unit (3C), on the third floor, at the [facility] at 6AM. The address is [address] [place] [address]. The phone number is [phone]. A map is enclosed.    * Do not eat after Midnight; you may drink clear liquids (includes water, Jell-O, clear broth, apple juice or any non-carbonated beverage that you can see through) until 6AM.     *Medrol has been prescribed for your contrast allergy. Please take 1 tab 12 hours prior to procedure (8PM) and 1 tab 2 hours prior to procedure (6AM).    * You may take your medications with a sip of water the morning of the procedure.     * You will be discharged the same day. You must have a  home and someone that can stay with you through the night.     If you have questions regarding your procedure, please contact me at [phone], option [phone].    If you need to cancel, reschedule or have procedure scheduling related questions, please call Candice or Karla at [phone].    Thank you,  José Miguel Taylor RN, CNRN  Stroke & Endovascular Care Coordinator

## 2019-11-13 NOTE — PROGRESS NOTES
Procedure Instructions completed and printed with map to Hospital mailed to patients home address listed in demographics.

## 2019-12-19 ENCOUNTER — ANESTHESIA EVENT (OUTPATIENT)
Dept: SURGERY | Facility: CLINIC | Age: 69
End: 2019-12-19
Payer: MEDICARE

## 2019-12-19 RX ORDER — BACLOFEN 5 MG/1
TABLET ORAL
COMMUNITY
End: 2019-12-19

## 2019-12-19 RX ORDER — OXYCODONE AND ACETAMINOPHEN 5; 325 MG/1; MG/1
1 TABLET ORAL EVERY 4 HOURS PRN
COMMUNITY
End: 2021-04-15

## 2019-12-19 NOTE — ANESTHESIA PREPROCEDURE EVALUATION
Anesthesia Pre-Procedure Evaluation    Patient: Anayeli Gagnon   MRN:     [id number] Gender:   female   Age:    68 year old :      1950        Preoperative Diagnosis: Hemorrhage Into Subarchnoid Of Spine   Procedure(s):  Out Of O.R. Spinal Angiogram @1200     Past Medical History:   Diagnosis Date     CARDIAC DYSRHYTHMIAS NEC 10/3/2007    Taking atenelol for years for this     History of skin cancer      Mitral valve prolapse      Neurogenic bladder      Sacral decubitus ulcer, stage IV (H)      Thoracic spinal cord injury (H)       Past Surgical History:   Procedure Laterality Date     IR SPINAL ANGIOGRAM  10/16/2019     LAPAROSCOPIC TUBAL LIGATION            Anesthesia Evaluation     .             ROS/MED HX    ENT/Pulmonary:  - neg pulmonary ROS     Neurologic: Comment: Spontaneous hematoma 2017 resulting in spinal cord injury.  No known autonomic hyperreflexia but does have B lability    (+)Spinal cord injury year sustained:  level of injury: T1-6 without autonomic hyperflexia symptoms,     Cardiovascular:     (+) hypertension----. : . . . :. Irregular Heartbeat/Palpitations, valvular problems/murmurs type: MVP . Previous cardiac testing Echodate:results:Echocardiogram 2017:  1. Technically difficult study with poor acoustic windows.   2. The left ventricle is normal size with hyperdynamic systolic function. Ejection fraction is 70-75%.   3. The right ventricle is poorly visualized but appears to be normal size with normal systolic function.   4. Mild tricuspid regurgitation.   5. No significant pericardial effusion.date: results:ECG reviewed date:19 results:Normal sinus rhythm 90  Nonspecific T wave abnormality date: results:         (-) syncope   METS/Exercise Tolerance:     Hematologic:  - neg hematologic  ROS       Musculoskeletal:         GI/Hepatic:  - neg GI/hepatic ROS      (-) GERD   Renal/Genitourinary:  - ROS Renal section negative    (-) renal disease   Endo:  - neg endo ROS        Psychiatric:         Infectious Disease:         Malignancy:         Other:    (+) No chance of pregnancy C-spine cleared: Yes, H/O Chronic Pain,other significant disability Wheelchair bound                       PHYSICAL EXAM:   Mental Status/Neuro: A/A/O   Airway: Facies: Feasible  Mallampati: II  Mouth/Opening: Full  TM distance: > 6 cm  Neck ROM: Full   Respiratory: Auscultation: CTAB     Resp. Rate: Normal     Resp. Effort: Normal      CV: Rhythm: Regular  Heart: Normal Sounds  Edema: None   Comments: Partial denture     Dental: Details                LABS:  CBC:   Lab Results   Component Value Date    WBC 6.4 10/16/2019    WBC 10.9 01/06/2011    HGB 13.0 10/16/2019    HGB 15.1 01/06/2011    HCT 40.5 10/16/2019    HCT 45.4 01/06/2011     10/16/2019     01/06/2011     BMP:   Lab Results   Component Value Date     01/06/2011     08/07/2010    POTASSIUM 3.9 01/06/2011    POTASSIUM 3.8 08/07/2010    CHLORIDE 105 01/06/2011    CHLORIDE 102 08/07/2010    CO2 26 01/06/2011    CO2 29 08/07/2010    BUN 19 01/06/2011    BUN 11 08/07/2010    CR 0.67 10/16/2019    CR 0.67 01/06/2011    GLC neg 05/05/2013    GLC neg 02/25/2011     COAGS:   Lab Results   Component Value Date    PTT 30 10/16/2019    INR 1.06 10/16/2019     POC:   Lab Results   Component Value Date     (H) 10/16/2019     OTHER:   Lab Results   Component Value Date    PH 6.0 05/05/2013    A1C 5.3 11/17/2011    ADAN 8.6 01/06/2011    TSH 2.33 08/07/2010    SED 4 11/17/2011        Preop Vitals    BP Readings from Last 3 Encounters:   10/16/19 131/71   04/18/19 127/67   03/28/19 122/58    Pulse Readings from Last 3 Encounters:   10/16/19 98   04/18/19 65   03/28/19 58      Resp Readings from Last 3 Encounters:   10/16/19 16   03/28/19 18   12/06/11 14    SpO2 Readings from Last 3 Encounters:   10/16/19 94%   04/18/19 94%   03/28/19 100%      Temp Readings from Last 1 Encounters:   10/16/19 36.6  C (97.9  F) (Oral)    Ht  "Readings from Last 1 Encounters:   10/16/19 1.626 m (5' 4\")      Wt Readings from Last 1 Encounters:   10/16/19 56 kg (123 lb 8 oz)    Estimated body mass index is 21.2 kg/m  as calculated from the following:    Height as of 10/16/19: 1.626 m (5' 4\").    Weight as of 10/16/19: 56 kg (123 lb 8 oz).     LDA:  Right Groin Interventional Procedure Access (Active)   Number of days: 64        Assessment:   ASA SCORE: 3    H&P: History and physical reviewed and following examination; no interval change.   Smoking Status:  Non-Smoker/Unknown   NPO Status: NPO Appropriate     Plan:   Anes. Type:  General   Pre-Medication: None   Induction:  IV (Standard)   Airway: ETT; Oral   Access/Monitoring: PIV   Maintenance: Balanced     Postop Plan:   Postop Pain: Opioids  Postop Sedation/Airway: Not planned     PONV Management:   Adult Risk Factors: Female, Non-Smoker, Postop Opioids   Prevention: Ondansetron, Dexamethasone     CONSENT: Direct conversation   Plan and risks discussed with: Patient   Blood Products: Consented (ALL Blood Products)       Comments for Plan/Consent:  Plan:  GA with ETT, routine monitors, multimodal analgesia.  Cold pack available, phenylepherine infusion    MD Jonatan Aviles MD  "

## 2019-12-20 ENCOUNTER — HOSPITAL ENCOUNTER (OUTPATIENT)
Facility: CLINIC | Age: 69
Discharge: HOME | End: 2019-12-20
Attending: PEDIATRICS | Admitting: PEDIATRICS
Payer: MEDICARE

## 2019-12-20 ENCOUNTER — TELEPHONE (OUTPATIENT)
Dept: NEUROSURGERY | Facility: CLINIC | Age: 69
End: 2019-12-20

## 2019-12-20 ENCOUNTER — PREP FOR PROCEDURE (OUTPATIENT)
Dept: NEUROSURGERY | Facility: CLINIC | Age: 69
End: 2019-12-20

## 2019-12-20 ENCOUNTER — APPOINTMENT (OUTPATIENT)
Dept: INTERVENTIONAL RADIOLOGY/VASCULAR | Facility: CLINIC | Age: 69
End: 2019-12-20
Attending: NEUROLOGICAL SURGERY
Payer: MEDICARE

## 2019-12-20 ENCOUNTER — ANESTHESIA (OUTPATIENT)
Dept: SURGERY | Facility: CLINIC | Age: 69
End: 2019-12-20
Payer: MEDICARE

## 2019-12-20 VITALS
OXYGEN SATURATION: 99 % | BODY MASS INDEX: 20.22 KG/M2 | RESPIRATION RATE: 14 BRPM | DIASTOLIC BLOOD PRESSURE: 74 MMHG | HEIGHT: 64 IN | WEIGHT: 118.4 LBS | TEMPERATURE: 98 F | SYSTOLIC BLOOD PRESSURE: 128 MMHG | HEART RATE: 99 BPM

## 2019-12-20 DIAGNOSIS — Q28.8 SPINAL VASCULAR MALFORMATION: Primary | ICD-10-CM

## 2019-12-20 DIAGNOSIS — Z91.041 CONTRAST MEDIA ALLERGY: ICD-10-CM

## 2019-12-20 DIAGNOSIS — T78.40XA ALLERGIC STATE, INITIAL ENCOUNTER: Primary | ICD-10-CM

## 2019-12-20 DIAGNOSIS — Q28.8 ARTERIOVENOUS FISTULA OF SPINAL CORD VESSELS: Primary | ICD-10-CM

## 2019-12-20 DIAGNOSIS — G95.19 HEMORRHAGE INTO SUBARACHNOID SPACE OF SPINE (H): ICD-10-CM

## 2019-12-20 LAB
ANION GAP SERPL CALCULATED.3IONS-SCNC: 5 MMOL/L (ref 3–14)
APTT PPP: 23 SEC (ref 22–37)
BUN SERPL-MCNC: 16 MG/DL (ref 7–30)
CALCIUM SERPL-MCNC: 9.2 MG/DL (ref 8.5–10.1)
CHLORIDE SERPL-SCNC: 104 MMOL/L (ref 94–109)
CO2 SERPL-SCNC: 30 MMOL/L (ref 20–32)
CREAT SERPL-MCNC: 0.56 MG/DL (ref 0.52–1.04)
ERYTHROCYTE [DISTWIDTH] IN BLOOD BY AUTOMATED COUNT: 13.5 % (ref 10–15)
GFR SERPL CREATININE-BSD FRML MDRD: >90 ML/MIN/{1.73_M2}
GLUCOSE BLDC GLUCOMTR-MCNC: 147 MG/DL (ref 70–99)
GLUCOSE BLDC GLUCOMTR-MCNC: 167 MG/DL (ref 70–99)
GLUCOSE SERPL-MCNC: 149 MG/DL (ref 70–99)
HCT VFR BLD AUTO: 43.1 % (ref 35–47)
HGB BLD-MCNC: 14.2 G/DL (ref 11.7–15.7)
INR PPP: 1.04 (ref 0.86–1.14)
MCH RBC QN AUTO: 30.9 PG (ref 26.5–33)
MCHC RBC AUTO-ENTMCNC: 32.9 G/DL (ref 31.5–36.5)
MCV RBC AUTO: 94 FL (ref 78–100)
PLATELET # BLD AUTO: 160 10E9/L (ref 150–450)
POTASSIUM SERPL-SCNC: 4.3 MMOL/L (ref 3.4–5.3)
RBC # BLD AUTO: 4.6 10E12/L (ref 3.8–5.2)
SODIUM SERPL-SCNC: 139 MMOL/L (ref 133–144)
WBC # BLD AUTO: 7.4 10E9/L (ref 4–11)

## 2019-12-20 PROCEDURE — 40000171 ZZH STATISTIC PRE-PROCEDURE ASSESSMENT III

## 2019-12-20 PROCEDURE — 25000128 H RX IP 250 OP 636: Performed by: NURSE ANESTHETIST, CERTIFIED REGISTERED

## 2019-12-20 PROCEDURE — 82962 GLUCOSE BLOOD TEST: CPT | Mod: 91

## 2019-12-20 PROCEDURE — C1887 CATHETER, GUIDING: HCPCS

## 2019-12-20 PROCEDURE — C1769 GUIDE WIRE: HCPCS

## 2019-12-20 PROCEDURE — 27210889 ZZH ACCESSORY CR8

## 2019-12-20 PROCEDURE — 37000008 ZZH ANESTHESIA TECHNICAL FEE, 1ST 30 MIN

## 2019-12-20 PROCEDURE — 25000565 ZZH ISOFLURANE, EA 15 MIN

## 2019-12-20 PROCEDURE — 25800030 ZZH RX IP 258 OP 636: Performed by: NURSE ANESTHETIST, CERTIFIED REGISTERED

## 2019-12-20 PROCEDURE — 85610 PROTHROMBIN TIME: CPT | Performed by: STUDENT IN AN ORGANIZED HEALTH CARE EDUCATION/TRAINING PROGRAM

## 2019-12-20 PROCEDURE — 85730 THROMBOPLASTIN TIME PARTIAL: CPT | Performed by: STUDENT IN AN ORGANIZED HEALTH CARE EDUCATION/TRAINING PROGRAM

## 2019-12-20 PROCEDURE — 25000128 H RX IP 250 OP 636: Performed by: STUDENT IN AN ORGANIZED HEALTH CARE EDUCATION/TRAINING PROGRAM

## 2019-12-20 PROCEDURE — C1760 CLOSURE DEV, VASC: HCPCS

## 2019-12-20 PROCEDURE — C2628 CATHETER, OCCLUSION: HCPCS

## 2019-12-20 PROCEDURE — 25000566 ZZH SEVOFLURANE, EA 15 MIN

## 2019-12-20 PROCEDURE — 27210804 ZZH SHEATH CR3

## 2019-12-20 PROCEDURE — 27210732 ZZH ACCESSORY CR1

## 2019-12-20 PROCEDURE — 27210738 ZZH ACCESSORY CR2

## 2019-12-20 PROCEDURE — 71000014 ZZH RECOVERY PHASE 1 LEVEL 2 FIRST HR

## 2019-12-20 PROCEDURE — 85027 COMPLETE CBC AUTOMATED: CPT | Performed by: STUDENT IN AN ORGANIZED HEALTH CARE EDUCATION/TRAINING PROGRAM

## 2019-12-20 PROCEDURE — 71000027 ZZH RECOVERY PHASE 2 EACH 15 MINS

## 2019-12-20 PROCEDURE — 25000125 ZZHC RX 250: Performed by: NURSE ANESTHETIST, CERTIFIED REGISTERED

## 2019-12-20 PROCEDURE — 75705 ARTERY X-RAYS SPINE: CPT | Mod: XS

## 2019-12-20 PROCEDURE — 37000009 ZZH ANESTHESIA TECHNICAL FEE, EACH ADDTL 15 MIN

## 2019-12-20 PROCEDURE — 25800030 ZZH RX IP 258 OP 636: Performed by: ANESTHESIOLOGY

## 2019-12-20 PROCEDURE — 25000125 ZZHC RX 250: Performed by: STUDENT IN AN ORGANIZED HEALTH CARE EDUCATION/TRAINING PROGRAM

## 2019-12-20 PROCEDURE — 25000132 ZZH RX MED GY IP 250 OP 250 PS 637: Mod: GY | Performed by: ANESTHESIOLOGY

## 2019-12-20 PROCEDURE — 80048 BASIC METABOLIC PNL TOTAL CA: CPT | Performed by: STUDENT IN AN ORGANIZED HEALTH CARE EDUCATION/TRAINING PROGRAM

## 2019-12-20 PROCEDURE — 25500064 ZZH RX 255 OP 636: Performed by: NEUROLOGICAL SURGERY

## 2019-12-20 PROCEDURE — 36215 PLACE CATHETER IN ARTERY: CPT

## 2019-12-20 PROCEDURE — 27210908 ZZH NEEDLE CR4

## 2019-12-20 PROCEDURE — 75705 ARTERY X-RAYS SPINE: CPT

## 2019-12-20 RX ORDER — PREDNISONE 5 MG/1
5 TABLET ORAL SEE ADMIN INSTRUCTIONS
Qty: 10 TABLET | Refills: 0 | Status: SHIPPED | OUTPATIENT
Start: 2019-12-20 | End: 2019-12-26

## 2019-12-20 RX ORDER — SODIUM CHLORIDE 9 MG/ML
INJECTION, SOLUTION INTRAVENOUS CONTINUOUS
Status: CANCELLED | OUTPATIENT
Start: 2019-12-20

## 2019-12-20 RX ORDER — MEPERIDINE HYDROCHLORIDE 50 MG/ML
12.5 INJECTION INTRAMUSCULAR; INTRAVENOUS; SUBCUTANEOUS
Status: DISCONTINUED | OUTPATIENT
Start: 2019-12-20 | End: 2019-12-20 | Stop reason: HOSPADM

## 2019-12-20 RX ORDER — NICOTINE POLACRILEX 4 MG
15-30 LOZENGE BUCCAL
Status: CANCELLED | OUTPATIENT
Start: 2019-12-20

## 2019-12-20 RX ORDER — OXYCODONE HYDROCHLORIDE 5 MG/1
5 TABLET ORAL EVERY 4 HOURS PRN
Status: DISCONTINUED | OUTPATIENT
Start: 2019-12-20 | End: 2019-12-20 | Stop reason: HOSPADM

## 2019-12-20 RX ORDER — HEPARIN SOD,PORCINE/0.9 % NACL 5K/1000 ML
1000-10000 INTRAVENOUS SOLUTION INTRAVENOUS
Status: COMPLETED | OUTPATIENT
Start: 2019-12-20 | End: 2019-12-20

## 2019-12-20 RX ORDER — GLYCOPYRROLATE 0.2 MG/ML
INJECTION, SOLUTION INTRAMUSCULAR; INTRAVENOUS PRN
Status: DISCONTINUED | OUTPATIENT
Start: 2019-12-20 | End: 2019-12-20

## 2019-12-20 RX ORDER — LIDOCAINE HYDROCHLORIDE 20 MG/ML
INJECTION, SOLUTION INFILTRATION; PERINEURAL PRN
Status: DISCONTINUED | OUTPATIENT
Start: 2019-12-20 | End: 2019-12-20

## 2019-12-20 RX ORDER — EPHEDRINE SULFATE 50 MG/ML
INJECTION, SOLUTION INTRAMUSCULAR; INTRAVENOUS; SUBCUTANEOUS PRN
Status: DISCONTINUED | OUTPATIENT
Start: 2019-12-20 | End: 2019-12-20

## 2019-12-20 RX ORDER — SODIUM CHLORIDE, SODIUM LACTATE, POTASSIUM CHLORIDE, CALCIUM CHLORIDE 600; 310; 30; 20 MG/100ML; MG/100ML; MG/100ML; MG/100ML
INJECTION, SOLUTION INTRAVENOUS CONTINUOUS
Status: DISCONTINUED | OUTPATIENT
Start: 2019-12-20 | End: 2019-12-20 | Stop reason: HOSPADM

## 2019-12-20 RX ORDER — ACETAMINOPHEN 325 MG/1
975 TABLET ORAL ONCE
Status: COMPLETED | OUTPATIENT
Start: 2019-12-20 | End: 2019-12-20

## 2019-12-20 RX ORDER — HYDROMORPHONE HYDROCHLORIDE 1 MG/ML
.3-.5 INJECTION, SOLUTION INTRAMUSCULAR; INTRAVENOUS; SUBCUTANEOUS EVERY 10 MIN PRN
Status: DISCONTINUED | OUTPATIENT
Start: 2019-12-20 | End: 2019-12-20 | Stop reason: HOSPADM

## 2019-12-20 RX ORDER — LIDOCAINE HYDROCHLORIDE 10 MG/ML
1-30 INJECTION, SOLUTION EPIDURAL; INFILTRATION; INTRACAUDAL; PERINEURAL
Status: COMPLETED | OUTPATIENT
Start: 2019-12-20 | End: 2019-12-20

## 2019-12-20 RX ORDER — HYDRALAZINE HYDROCHLORIDE 20 MG/ML
2.5-5 INJECTION INTRAMUSCULAR; INTRAVENOUS EVERY 10 MIN PRN
Status: DISCONTINUED | OUTPATIENT
Start: 2019-12-20 | End: 2019-12-20 | Stop reason: HOSPADM

## 2019-12-20 RX ORDER — ONDANSETRON 2 MG/ML
4 INJECTION INTRAMUSCULAR; INTRAVENOUS EVERY 6 HOURS PRN
Status: CANCELLED | OUTPATIENT
Start: 2019-12-20

## 2019-12-20 RX ORDER — DEXTROSE MONOHYDRATE 25 G/50ML
25-50 INJECTION, SOLUTION INTRAVENOUS
Status: DISCONTINUED | OUTPATIENT
Start: 2019-12-20 | End: 2019-12-20 | Stop reason: HOSPADM

## 2019-12-20 RX ORDER — FENTANYL CITRATE 50 UG/ML
25-50 INJECTION, SOLUTION INTRAMUSCULAR; INTRAVENOUS
Status: DISCONTINUED | OUTPATIENT
Start: 2019-12-20 | End: 2019-12-20 | Stop reason: HOSPADM

## 2019-12-20 RX ORDER — ONDANSETRON 2 MG/ML
4 INJECTION INTRAMUSCULAR; INTRAVENOUS EVERY 30 MIN PRN
Status: DISCONTINUED | OUTPATIENT
Start: 2019-12-20 | End: 2019-12-20 | Stop reason: HOSPADM

## 2019-12-20 RX ORDER — ONDANSETRON 4 MG/1
4 TABLET, ORALLY DISINTEGRATING ORAL EVERY 30 MIN PRN
Status: DISCONTINUED | OUTPATIENT
Start: 2019-12-20 | End: 2019-12-20 | Stop reason: HOSPADM

## 2019-12-20 RX ORDER — DIPHENHYDRAMINE HCL 25 MG
25 CAPSULE ORAL ONCE
Status: COMPLETED | OUTPATIENT
Start: 2019-12-20 | End: 2019-12-20

## 2019-12-20 RX ORDER — PROPOFOL 10 MG/ML
INJECTION, EMULSION INTRAVENOUS PRN
Status: DISCONTINUED | OUTPATIENT
Start: 2019-12-20 | End: 2019-12-20

## 2019-12-20 RX ORDER — NALOXONE HYDROCHLORIDE 0.4 MG/ML
.1-.4 INJECTION, SOLUTION INTRAMUSCULAR; INTRAVENOUS; SUBCUTANEOUS
Status: DISCONTINUED | OUTPATIENT
Start: 2019-12-20 | End: 2019-12-20 | Stop reason: HOSPADM

## 2019-12-20 RX ORDER — METOCLOPRAMIDE 5 MG/1
5 TABLET ORAL EVERY 6 HOURS PRN
Status: CANCELLED | OUTPATIENT
Start: 2019-12-20

## 2019-12-20 RX ORDER — LIDOCAINE 40 MG/G
CREAM TOPICAL
Status: DISCONTINUED | OUTPATIENT
Start: 2019-12-20 | End: 2019-12-20 | Stop reason: HOSPADM

## 2019-12-20 RX ORDER — PROCHLORPERAZINE MALEATE 5 MG
5 TABLET ORAL EVERY 6 HOURS PRN
Status: CANCELLED | OUTPATIENT
Start: 2019-12-20

## 2019-12-20 RX ORDER — ONDANSETRON 4 MG/1
4 TABLET, ORALLY DISINTEGRATING ORAL EVERY 6 HOURS PRN
Status: CANCELLED | OUTPATIENT
Start: 2019-12-20

## 2019-12-20 RX ORDER — ALBUTEROL SULFATE 0.83 MG/ML
2.5 SOLUTION RESPIRATORY (INHALATION) EVERY 4 HOURS PRN
Status: DISCONTINUED | OUTPATIENT
Start: 2019-12-20 | End: 2019-12-20 | Stop reason: HOSPADM

## 2019-12-20 RX ORDER — NICOTINE POLACRILEX 4 MG
15-30 LOZENGE BUCCAL
Status: DISCONTINUED | OUTPATIENT
Start: 2019-12-20 | End: 2019-12-20 | Stop reason: HOSPADM

## 2019-12-20 RX ORDER — ACETAMINOPHEN 650 MG/1
650 SUPPOSITORY RECTAL ONCE
Status: DISCONTINUED | OUTPATIENT
Start: 2019-12-20 | End: 2019-12-20 | Stop reason: HOSPADM

## 2019-12-20 RX ORDER — FAMOTIDINE 10 MG
10 TABLET ORAL ONCE
Status: COMPLETED | OUTPATIENT
Start: 2019-12-20 | End: 2019-12-20

## 2019-12-20 RX ORDER — FENTANYL CITRATE 50 UG/ML
INJECTION, SOLUTION INTRAMUSCULAR; INTRAVENOUS PRN
Status: DISCONTINUED | OUTPATIENT
Start: 2019-12-20 | End: 2019-12-20

## 2019-12-20 RX ORDER — ONDANSETRON 2 MG/ML
INJECTION INTRAMUSCULAR; INTRAVENOUS PRN
Status: DISCONTINUED | OUTPATIENT
Start: 2019-12-20 | End: 2019-12-20

## 2019-12-20 RX ORDER — PROCHLORPERAZINE 25 MG
12.5 SUPPOSITORY, RECTAL RECTAL EVERY 12 HOURS PRN
Status: CANCELLED | OUTPATIENT
Start: 2019-12-20

## 2019-12-20 RX ORDER — IODIXANOL 320 MG/ML
150 INJECTION, SOLUTION INTRAVASCULAR ONCE
Status: COMPLETED | OUTPATIENT
Start: 2019-12-20 | End: 2019-12-20

## 2019-12-20 RX ORDER — DEXTROSE MONOHYDRATE 25 G/50ML
25-50 INJECTION, SOLUTION INTRAVENOUS
Status: CANCELLED | OUTPATIENT
Start: 2019-12-20

## 2019-12-20 RX ORDER — HEPARIN SODIUM 1000 [USP'U]/ML
INJECTION, SOLUTION INTRAVENOUS; SUBCUTANEOUS PRN
Status: DISCONTINUED | OUTPATIENT
Start: 2019-12-20 | End: 2019-12-20

## 2019-12-20 RX ORDER — LABETALOL HYDROCHLORIDE 5 MG/ML
10 INJECTION, SOLUTION INTRAVENOUS
Status: DISCONTINUED | OUTPATIENT
Start: 2019-12-20 | End: 2019-12-20 | Stop reason: HOSPADM

## 2019-12-20 RX ORDER — METOCLOPRAMIDE HYDROCHLORIDE 5 MG/ML
5 INJECTION INTRAMUSCULAR; INTRAVENOUS EVERY 6 HOURS PRN
Status: CANCELLED | OUTPATIENT
Start: 2019-12-20

## 2019-12-20 RX ADMIN — ACETAMINOPHEN 975 MG: 325 TABLET, FILM COATED ORAL at 06:35

## 2019-12-20 RX ADMIN — SODIUM CHLORIDE, POTASSIUM CHLORIDE, SODIUM LACTATE AND CALCIUM CHLORIDE: 600; 310; 30; 20 INJECTION, SOLUTION INTRAVENOUS at 07:59

## 2019-12-20 RX ADMIN — ROCURONIUM BROMIDE 20 MG: 10 INJECTION INTRAVENOUS at 09:36

## 2019-12-20 RX ADMIN — PHENYLEPHRINE HYDROCHLORIDE 100 MCG: 10 INJECTION INTRAVENOUS at 09:10

## 2019-12-20 RX ADMIN — IODIXANOL 90 ML: 320 INJECTION, SOLUTION INTRAVASCULAR at 11:23

## 2019-12-20 RX ADMIN — PHENYLEPHRINE HYDROCHLORIDE 0.7 MCG/KG/MIN: 10 INJECTION INTRAVENOUS at 09:33

## 2019-12-20 RX ADMIN — FAMOTIDINE 10 MG: 10 TABLET, FILM COATED ORAL at 07:48

## 2019-12-20 RX ADMIN — HEPARIN SODIUM 1000 UNITS: 1000 INJECTION, SOLUTION INTRAVENOUS; SUBCUTANEOUS at 10:00

## 2019-12-20 RX ADMIN — SUGAMMADEX 200 MG: 100 INJECTION, SOLUTION INTRAVENOUS at 11:06

## 2019-12-20 RX ADMIN — FENTANYL CITRATE 50 MCG: 50 INJECTION, SOLUTION INTRAMUSCULAR; INTRAVENOUS at 10:27

## 2019-12-20 RX ADMIN — HEPARIN SODIUM 3000 UNITS: 1000 INJECTION, SOLUTION INTRAVENOUS; SUBCUTANEOUS at 08:47

## 2019-12-20 RX ADMIN — Medication 5000 UNITS: at 11:12

## 2019-12-20 RX ADMIN — PROPOFOL 60 MG: 10 INJECTION, EMULSION INTRAVENOUS at 08:12

## 2019-12-20 RX ADMIN — HEPARIN SODIUM 500 UNITS: 1000 INJECTION, SOLUTION INTRAVENOUS; SUBCUTANEOUS at 10:24

## 2019-12-20 RX ADMIN — PHENYLEPHRINE HYDROCHLORIDE 150 MCG: 10 INJECTION INTRAVENOUS at 08:52

## 2019-12-20 RX ADMIN — GLYCOPYRROLATE 0.2 MG: 0.2 INJECTION, SOLUTION INTRAMUSCULAR; INTRAVENOUS at 09:45

## 2019-12-20 RX ADMIN — GLYCOPYRROLATE 0.2 MG: 0.2 INJECTION, SOLUTION INTRAMUSCULAR; INTRAVENOUS at 09:40

## 2019-12-20 RX ADMIN — LIDOCAINE HYDROCHLORIDE 5 ML: 10 INJECTION, SOLUTION EPIDURAL; INFILTRATION; INTRACAUDAL; PERINEURAL at 11:13

## 2019-12-20 RX ADMIN — PHENYLEPHRINE HYDROCHLORIDE 100 MCG: 10 INJECTION INTRAVENOUS at 08:26

## 2019-12-20 RX ADMIN — ROCURONIUM BROMIDE 50 MG: 10 INJECTION INTRAVENOUS at 08:13

## 2019-12-20 RX ADMIN — ROCURONIUM BROMIDE 10 MG: 10 INJECTION INTRAVENOUS at 10:42

## 2019-12-20 RX ADMIN — ONDANSETRON 4 MG: 2 INJECTION INTRAMUSCULAR; INTRAVENOUS at 11:01

## 2019-12-20 RX ADMIN — NOREPINEPHRINE BITARTRATE 6.4 MCG: 1 INJECTION INTRAVENOUS at 09:24

## 2019-12-20 RX ADMIN — NOREPINEPHRINE BITARTRATE 6.4 MCG: 1 INJECTION INTRAVENOUS at 08:34

## 2019-12-20 RX ADMIN — FENTANYL CITRATE 50 MCG: 50 INJECTION, SOLUTION INTRAMUSCULAR; INTRAVENOUS at 08:07

## 2019-12-20 RX ADMIN — Medication 10 MG: at 08:40

## 2019-12-20 RX ADMIN — NOREPINEPHRINE BITARTRATE 6.4 MCG: 1 INJECTION INTRAVENOUS at 08:43

## 2019-12-20 RX ADMIN — NOREPINEPHRINE BITARTRATE 6.4 MCG: 1 INJECTION INTRAVENOUS at 09:33

## 2019-12-20 RX ADMIN — ROCURONIUM BROMIDE 20 MG: 10 INJECTION INTRAVENOUS at 10:25

## 2019-12-20 RX ADMIN — DIPHENHYDRAMINE HYDROCHLORIDE 25 MG: 25 CAPSULE ORAL at 07:48

## 2019-12-20 RX ADMIN — NOREPINEPHRINE BITARTRATE 6.4 MCG: 1 INJECTION INTRAVENOUS at 09:01

## 2019-12-20 RX ADMIN — LIDOCAINE HYDROCHLORIDE 50 MG: 20 INJECTION, SOLUTION INFILTRATION; PERINEURAL at 08:12

## 2019-12-20 ASSESSMENT — MIFFLIN-ST. JEOR: SCORE: 1037.06

## 2019-12-20 NOTE — ANESTHESIA POSTPROCEDURE EVALUATION
Anesthesia POST Procedure Evaluation    Patient: Anayeli Gagnon   MRN:     [id number] Gender:   female   Age:    69 year old :      1950        Preoperative Diagnosis: Hemorrhage into subarachnoid space of spine (H) [G95.19]   Procedure(s):  ANESTHESIA OUT OF OR SPINAL ANGIOGRAM @0800   Postop Comments: No value filed.       Anesthesia Type:  Not documented  General    Reportable Event: NO     PAIN: Uncomplicated   Sign Out status: Comfortable, Well controlled pain     PONV: No PONV   Sign Out status:  No Nausea or Vomiting     Neuro/Psych: Uneventful perioperative course   Sign Out Status: Preoperative baseline; Age appropriate mentation     Airway/Resp.: Uneventful perioperative course   Sign Out Status: Non labored breathing, age appropriate RR; Resp. Status within EXPECTED Parameters     CV: Uneventful perioperative course   Sign Out status: Appropriate BP and perfusion indices; Appropriate HR/Rhythm     Disposition:   Sign Out in:  PACU  Disposition:  Phase II; Home  Recovery Course: Uneventful  Follow-Up: Not required           Last Anesthesia Record Vitals:  CRNA VITALS  2019 1056 - 2019 1156      2019             NIBP:  145/79    Ht Rate:  90    SpO2:  100 %    Resp Rate (observed):  16    EKG:  Sinus rhythm          Last PACU Vitals:  Vitals Value Taken Time   /59 2019 12:40 PM   Temp 36.4  C (97.5  F) 2019 12:15 PM   Pulse 78 2019 12:40 PM   Resp 11 2019 12:30 PM   SpO2 98 % 2019 12:44 PM   Temp src     NIBP 145/79 2019 11:30 AM   Pulse     SpO2 100 % 2019 11:30 AM   Resp     Temp     Ht Rate 90 2019 11:30 AM   Temp 2     Vitals shown include unvalidated device data.      Electronically Signed By: Jonatan Vargas MD, 2019, 12:45 PM

## 2019-12-20 NOTE — DISCHARGE INSTRUCTIONS
[facility]  Same-Day Surgery   Adult Discharge Orders & Instructions     For 24 hours after surgery    1. Get plenty of rest.  A responsible adult must stay with you for at least 24 hours after you leave the hospital.   2. Do not drive or use heavy equipment.  If you have weakness or tingling, don't drive or use heavy equipment until this feeling goes away.  3. Do not drink alcohol.  4. Avoid strenuous or risky activities.  Ask for help when climbing stairs.   5. You may feel lightheaded.  IF so, sit for a few minutes before standing.  Have someone help you get up.   6. If you have nausea (feel sick to your stomach): Drink only clear liquids such as apple juice, ginger ale, broth or 7-Up.  Rest may also help.  Be sure to drink enough fluids.  Move to a regular diet as you feel able.  7. You may have a slight fever. Call the doctor if your fever is over 100 F (37.7 C) (taken under the tongue) or lasts longer than 24 hours.  8. You may have a dry mouth, a sore throat, muscle aches or trouble sleeping.  These should go away after 24 hours.  9. Do not make important or legal decisions.   Call your doctor for any of the followin.  Signs of infection (fever, growing tenderness at the surgery site, a large amount of drainage or bleeding, severe pain, foul-smelling drainage, redness, swelling).    2. It has been over 8 to 10 hours since surgery and you are still not able to urinate (pass water).    3.  Headache for over 24 hours.    To contact a doctor during clinic hours, call  Dr. Ritchie's neurosurgery office at [phone]  or:        After hours:  [phone] and ask for the resident on call for Neurosurgery  (answered 24 hours a day)      Emergency Department:    [facility]: [phone]       (TTY for hearing impaired: [phone])

## 2019-12-20 NOTE — ANESTHESIA CARE TRANSFER NOTE
Patient: Anayeli Gagnon    Procedure(s):  ANESTHESIA OUT OF OR SPINAL ANGIOGRAM @0800    Diagnosis: Hemorrhage into subarachnoid space of spine (H) [G95.19]  Diagnosis Additional Information: No value filed.    Anesthesia Type:   General     Note:  Airway :Face Mask  Patient transferred to:PACU  Handoff Report: Identifed the Patient, Identified the Reponsible Provider, Reviewed the pertinent medical history, Discussed the surgical course, Reviewed Intra-OP anesthesia mangement and issues during anesthesia, Set expectations for post-procedure period and Allowed opportunity for questions and acknowledgement of understanding      Vitals: (Last set prior to Anesthesia Care Transfer)    CRNA VITALS  12/20/2019 1056 - 12/20/2019 1137      12/20/2019             NIBP:  145/79    Ht Rate:  90    SpO2:  100 %    Resp Rate (observed):  16    EKG:  Sinus rhythm                Electronically Signed By: EMELI Ho CRNA  December 20, 2019  11:37 AM

## 2019-12-20 NOTE — PROCEDURES
[facility], [place]     Endovascular Surgical Neuroradiology Post-Procedure Note    Pre-Procedure Diagnosis:  Spinal dural AV fistula  Post-Procedure Diagnosis:  Spinal dural AV fistula    Procedure(s):   Diagnostic cervicocerebral angiography    Findings:  There is a spinal dural AV fistula at the level of T6- T7 vertebra feeding from the adjacent  segmental artery.    Plan:  Surgical disconnection of the AV fistula.    Primary Surgeon:  Dr. Alf Ritchie  Secondary Surgeon:  Not applicable  Secondary Surgeon Review:  None  Fellow:  Nubia Willard Masood  Additional Assistants:  -    Prior to the start of the procedure and with procedural staff participation, I verbally confirmed: the patient s identity using two indicators, relevant allergies, that the procedure was appropriate and matched the consent or emergent situation, and that the correct equipment/implants were available. Immediately prior to starting the procedure I conducted the Time Out with the procedural staff and re-confirmed the patient s name, procedure, and site/side. (The Joint Commission universal protocol was followed.)  Yes    PRU value: Not applicable    Anesthesia:  Performed by Anesthesia  Medications:  Lidocaine  Puncture site:  Right Femoral Artery    Fluoroscopy time (minutes): 20.6  Radiation dose (mGy):  613    Contrast amount (mL):  80     Estimated blood loss (mL):  Minimal    Closure:  Device Angioseal    Disposition:  Home after recovery.        Sedation Post-Procedure Summary    Please see report by anesthesia.      Serge Calix MD  Pager:  [phone].

## 2019-12-20 NOTE — PROGRESS NOTES
Patient Name: Anayeli GagnonMedical Record Number: [id number]  Today's Date: 12/20/2019    Procedure: Spinal angiogram  Proceduralist: Dr. Ritchie    Sedation: Per anesthesia    Procedure start time: 0840  Puncture time: 0843  Procedure end time: 1100    Report given: Per anesthesia  Support staff: Dr. Willard arrived at 0835.                        RN Dileep NJ arrived at 0830.                          Josephine CERDA and Hernando BHATIA arrived at 0820.            Patient arrived at 0820.     Table ready at 0840.     Patient transferred to table, positioned and prepped per protocol.    Puncture to: right groin at  0843                                         Sheath removed at 1055 Manual pressure held for 5 minutes; Closure device deployed Angio Seal   Groin site appearance: WDL  Pedal pulse assessment:  WDL*      Post Procedure Education:  The following information has been reviewed with patient   1. Maintain bedrest with right lower extremity straight until 1300.   2. Notify nurse immediately if having any bleeding from site, any changes in sensation, or changes in strength.   3. Notify nurse if you have to go the bathroom, the staff will assist you.   4. Nurses will be doing frequent assessments post procedure, which will include                   checking the pulses in your feet, groin/access site, vital signs, and neuro exam.    5. Please press your call light if you, or your family, have any questions or concerns        regarding your care.    Learner's response to angiogram education: patient needs reinforcement due to sedation.

## 2019-12-21 NOTE — TELEPHONE ENCOUNTER
Patient had a spinal angiogram today. She states she is developing an itchy rash, which had happened the last time she had an angiogram. She tried benadryl and it is not helping. Last time she was prescribed a steroid taper, which helped her. I prescribed her a 4 day taper of steroids as well to help with her reaction from the contrast.    Ata Nunn MD  Neurosurgery Resident, PGY-1

## 2019-12-26 ENCOUNTER — ANESTHESIA EVENT (OUTPATIENT)
Dept: SURGERY | Facility: CLINIC | Age: 69
DRG: 029 | End: 2019-12-26
Payer: MEDICARE

## 2019-12-26 NOTE — ANESTHESIA PREPROCEDURE EVALUATION
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 Pt has had prior anesthetic. Type: General    No history of anesthetic complications          ROS/MED HX    ENT/Pulmonary:  - neg pulmonary ROS     Neurologic: Comment: Spontaneous hematoma 2017 resulting in spinal cord injury - paraplegic.  No known autonomic hyperreflexia but does have B lability    (+)neuropathy - left ulner neuropathy, Spinal cord injury year sustained:  level of injury: T1-6 without autonomic hyperflexia symptoms,     Cardiovascular:     (+) Dyslipidemia, hypertension----. : . . . :. Irregular Heartbeat/Palpitations, valvular problems/murmurs type: MVP . Previous cardiac testing Echodate:results:Echocardiogram 2017:  1. Technically difficult study with poor acoustic windows.   2. The left ventricle is normal size with hyperdynamic systolic function. Ejection fraction is 70-75%.   3. The right ventricle is poorly visualized but appears to be normal size with normal systolic function.   4. Mild tricuspid regurgitation.   5. No significant pericardial effusion.date: results:ECG reviewed date:10/16/19 results:Sinus rhythm  Nonspecific T wave abnormality date: results:         (-) syncope   METS/Exercise Tolerance:      Hematologic:  - neg hematologic  ROS       Musculoskeletal: Comment: Muscle spasticity        GI/Hepatic: Comment: History of GERD - no longer requiring medications    Colostomy - neg GI/hepatic ROS       Renal/Genitourinary: Comment: Neurogenic bladder - ROS Renal section negative    (-) renal disease   Endo:  - neg endo ROS       Psychiatric:     (+) psychiatric history anxiety and depression      Infectious Disease:   (+) MRSA,       Malignancy:   (+) Malignancy History of Skin          Other: Comment: Takes MS contin 15mg BID and Percocet 5-325 TID   (+) No chance of pregnancy C-spine cleared: Yes, H/O Chronic Pain,H/O chronic opiod use , other significant disability Wheelchair bound                   JZG F[facility] AN PHYSICAL EXAM    LABS:  CBC:   Lab Results   Component Value Date    WBC 7.4 12/20/2019    WBC 6.4 10/16/2019    HGB 14.2 12/20/2019    HGB 13.0 10/16/2019    HCT 43.1 12/20/2019    HCT 40.5 10/16/2019     12/20/2019     10/16/2019     BMP:   Lab Results   Component Value Date     12/20/2019     01/06/2011    POTASSIUM 4.3 12/20/2019    POTASSIUM 3.9 01/06/2011    CHLORIDE 104 12/20/2019    CHLORIDE 105 01/06/2011    CO2 30 12/20/2019    CO2 26 01/06/2011    BUN 16 12/20/2019    BUN 19 01/06/2011    CR 0.56 12/20/2019    CR 0.67 10/16/2019     (H) 12/20/2019    GLC neg 05/05/2013     COAGS:   Lab Results   Component Value Date    PTT 23 12/20/2019    INR 1.04 12/20/2019     POC:   Lab Results   Component Value Date     (H) 12/20/2019     OTHER:   Lab Results   Component Value Date    PH 6.0 05/05/2013    A1C 5.3 11/17/2011    ADAN 9.2 12/20/2019    TSH 2.33 08/07/2010    SED 4 11/17/2011        Preop Vitals    BP Readings from Last 3 Encounters:   12/20/19 128/74   10/16/19 131/71   04/18/19 127/67    Pulse Readings from Last 3 Encounters:   12/20/19 99   10/16/19 98   04/18/19 65      Resp Readings from Last 3 Encounters:   12/20/19 14   10/16/19 16   03/28/19 18  "   SpO2 Readings from Last 3 Encounters:   12/20/19 99%   10/16/19 94%   04/18/19 94%      Temp Readings from Last 1 Encounters:   12/20/19 36.7  C (98  F) (Oral)    Ht Readings from Last 1 Encounters:   12/20/19 1.626 m (5' 4\")      Wt Readings from Last 1 Encounters:   12/20/19 53.7 kg (118 lb 6.4 oz)    Estimated body mass index is 20.32 kg/m  as calculated from the following:    Height as of 12/20/19: 1.626 m (5' 4\").    Weight as of 12/20/19: 53.7 kg (118 lb 6.4 oz).     LDA:  Right Groin Interventional Procedure Access (Active)   Number of days: 71       Right Groin Interventional Procedure Access (Active)   Number of days: 6       Urethral Catheter (Active)   Number of days:         Assessment:   ASA SCORE: 3    H&P: History and physical reviewed and following examination; no interval change.   Smoking Status:  Non-Smoker/Unknown   NPO Status: NPO Appropriate     Plan:   Anes. Type:  General   Pre-Medication: Acetaminophen   Induction:  IV (Standard)   Airway: ETT; Oral   Access/Monitoring: PIV; 2nd PIV; A-Line   Maintenance: Balanced     Postop Plan:   Postop Pain: Opioids  Postop Sedation/Airway: Not planned  Disposition: Inpatient/Admit     PONV Management:   Adult Risk Factors: Female, Non-Smoker, Postop Opioids   Prevention: Ondansetron, Dexamethasone                   Samanta Barry MD  "

## 2019-12-27 ENCOUNTER — HOSPITAL ENCOUNTER (INPATIENT)
Facility: CLINIC | Age: 69
LOS: 4 days | Discharge: HOME HEALTH | DRG: 029 | End: 2019-12-31
Attending: NEUROLOGICAL SURGERY | Admitting: NEUROLOGICAL SURGERY
Payer: MEDICARE

## 2019-12-27 ENCOUNTER — ANESTHESIA (OUTPATIENT)
Dept: SURGERY | Facility: CLINIC | Age: 69
DRG: 029 | End: 2019-12-27
Payer: MEDICARE

## 2019-12-27 DIAGNOSIS — Q28.8 ARTERIOVENOUS FISTULA OF SPINAL CORD VESSELS: ICD-10-CM

## 2019-12-27 PROBLEM — I67.1 DURAL ARTERIOVENOUS FISTULA: Status: ACTIVE | Noted: 2019-12-27

## 2019-12-27 LAB
ABO + RH BLD: NORMAL
ABO + RH BLD: NORMAL
ANION GAP SERPL CALCULATED.3IONS-SCNC: 2 MMOL/L (ref 3–14)
APTT PPP: 27 SEC (ref 22–37)
BASE EXCESS BLDA CALC-SCNC: 0.1 MMOL/L
BASE EXCESS BLDA CALC-SCNC: 0.1 MMOL/L
BASE EXCESS BLDA CALC-SCNC: 1.8 MMOL/L
BLD GP AB SCN SERPL QL: NORMAL
BLOOD BANK CMNT PATIENT-IMP: NORMAL
BUN SERPL-MCNC: 14 MG/DL (ref 7–30)
CA-I BLD-MCNC: 4.6 MG/DL (ref 4.4–5.2)
CA-I BLD-MCNC: 4.6 MG/DL (ref 4.4–5.2)
CA-I BLD-MCNC: 4.7 MG/DL (ref 4.4–5.2)
CALCIUM SERPL-MCNC: 8.8 MG/DL (ref 8.5–10.1)
CHLORIDE SERPL-SCNC: 107 MMOL/L (ref 94–109)
CO2 SERPL-SCNC: 30 MMOL/L (ref 20–32)
CREAT SERPL-MCNC: 0.56 MG/DL (ref 0.52–1.04)
ERYTHROCYTE [DISTWIDTH] IN BLOOD BY AUTOMATED COUNT: 13.7 % (ref 10–15)
GFR SERPL CREATININE-BSD FRML MDRD: >90 ML/MIN/{1.73_M2}
GLUCOSE BLD-MCNC: 129 MG/DL (ref 70–99)
GLUCOSE BLD-MCNC: 133 MG/DL (ref 70–99)
GLUCOSE BLD-MCNC: 149 MG/DL (ref 70–99)
GLUCOSE BLDC GLUCOMTR-MCNC: 87 MG/DL (ref 70–99)
GLUCOSE SERPL-MCNC: 106 MG/DL (ref 70–99)
HCO3 BLD-SCNC: 24 MMOL/L (ref 21–28)
HCO3 BLD-SCNC: 25 MMOL/L (ref 21–28)
HCO3 BLD-SCNC: 26 MMOL/L (ref 21–28)
HCT VFR BLD AUTO: 38.7 % (ref 35–47)
HGB BLD-MCNC: 11.8 G/DL (ref 11.7–15.7)
HGB BLD-MCNC: 12 G/DL (ref 11.7–15.7)
HGB BLD-MCNC: 12.3 G/DL (ref 11.7–15.7)
HGB BLD-MCNC: 12.7 G/DL (ref 11.7–15.7)
INR PPP: 1.04 (ref 0.86–1.14)
LACTATE BLD-SCNC: 1.3 MMOL/L (ref 0.7–2)
LACTATE BLD-SCNC: 1.4 MMOL/L (ref 0.7–2)
LACTATE BLD-SCNC: 1.5 MMOL/L (ref 0.7–2)
MAGNESIUM SERPL-MCNC: 2 MG/DL (ref 1.6–2.3)
MCH RBC QN AUTO: 31.4 PG (ref 26.5–33)
MCHC RBC AUTO-ENTMCNC: 32.8 G/DL (ref 31.5–36.5)
MCV RBC AUTO: 96 FL (ref 78–100)
O2/TOTAL GAS SETTING VFR VENT: 40 %
O2/TOTAL GAS SETTING VFR VENT: 44 %
O2/TOTAL GAS SETTING VFR VENT: ABNORMAL %
PCO2 BLD: 37 MM HG (ref 35–45)
PCO2 BLD: 38 MM HG (ref 35–45)
PCO2 BLD: 40 MM HG (ref 35–45)
PH BLD: 7.41 PH (ref 7.35–7.45)
PH BLD: 7.43 PH (ref 7.35–7.45)
PH BLD: 7.44 PH (ref 7.35–7.45)
PHOSPHATE SERPL-MCNC: 3.5 MG/DL (ref 2.5–4.5)
PLATELET # BLD AUTO: 156 10E9/L (ref 150–450)
PO2 BLD: 219 MM HG (ref 80–105)
PO2 BLD: 223 MM HG (ref 80–105)
PO2 BLD: 242 MM HG (ref 80–105)
POTASSIUM BLD-SCNC: 3.6 MMOL/L (ref 3.4–5.3)
POTASSIUM BLD-SCNC: 3.7 MMOL/L (ref 3.4–5.3)
POTASSIUM BLD-SCNC: 3.8 MMOL/L (ref 3.4–5.3)
POTASSIUM SERPL-SCNC: 4 MMOL/L (ref 3.4–5.3)
RBC # BLD AUTO: 4.04 10E12/L (ref 3.8–5.2)
SODIUM BLD-SCNC: 139 MMOL/L (ref 133–144)
SODIUM BLD-SCNC: 140 MMOL/L (ref 133–144)
SODIUM BLD-SCNC: 140 MMOL/L (ref 133–144)
SODIUM SERPL-SCNC: 140 MMOL/L (ref 133–144)
SPECIMEN EXP DATE BLD: NORMAL
WBC # BLD AUTO: 6.5 10E9/L (ref 4–11)

## 2019-12-27 PROCEDURE — 25800030 ZZH RX IP 258 OP 636: Performed by: STUDENT IN AN ORGANIZED HEALTH CARE EDUCATION/TRAINING PROGRAM

## 2019-12-27 PROCEDURE — 84100 ASSAY OF PHOSPHORUS: CPT | Performed by: STUDENT IN AN ORGANIZED HEALTH CARE EDUCATION/TRAINING PROGRAM

## 2019-12-27 PROCEDURE — 86900 BLOOD TYPING SEROLOGIC ABO: CPT | Performed by: STUDENT IN AN ORGANIZED HEALTH CARE EDUCATION/TRAINING PROGRAM

## 2019-12-27 PROCEDURE — 84132 ASSAY OF SERUM POTASSIUM: CPT | Performed by: NEUROLOGICAL SURGERY

## 2019-12-27 PROCEDURE — 00NT0ZZ RELEASE SPINAL MENINGES, OPEN APPROACH: ICD-10-PCS | Performed by: NEUROLOGICAL SURGERY

## 2019-12-27 PROCEDURE — 83605 ASSAY OF LACTIC ACID: CPT | Performed by: STUDENT IN AN ORGANIZED HEALTH CARE EDUCATION/TRAINING PROGRAM

## 2019-12-27 PROCEDURE — 84132 ASSAY OF SERUM POTASSIUM: CPT | Performed by: STUDENT IN AN ORGANIZED HEALTH CARE EDUCATION/TRAINING PROGRAM

## 2019-12-27 PROCEDURE — 36415 COLL VENOUS BLD VENIPUNCTURE: CPT | Performed by: STUDENT IN AN ORGANIZED HEALTH CARE EDUCATION/TRAINING PROGRAM

## 2019-12-27 PROCEDURE — 86901 BLOOD TYPING SEROLOGIC RH(D): CPT | Performed by: STUDENT IN AN ORGANIZED HEALTH CARE EDUCATION/TRAINING PROGRAM

## 2019-12-27 PROCEDURE — 27210995 ZZH RX 272: Performed by: NEUROLOGICAL SURGERY

## 2019-12-27 PROCEDURE — 85610 PROTHROMBIN TIME: CPT | Performed by: STUDENT IN AN ORGANIZED HEALTH CARE EDUCATION/TRAINING PROGRAM

## 2019-12-27 PROCEDURE — 84295 ASSAY OF SERUM SODIUM: CPT | Performed by: STUDENT IN AN ORGANIZED HEALTH CARE EDUCATION/TRAINING PROGRAM

## 2019-12-27 PROCEDURE — 25000128 H RX IP 250 OP 636: Performed by: STUDENT IN AN ORGANIZED HEALTH CARE EDUCATION/TRAINING PROGRAM

## 2019-12-27 PROCEDURE — 84295 ASSAY OF SERUM SODIUM: CPT | Performed by: NEUROLOGICAL SURGERY

## 2019-12-27 PROCEDURE — 83735 ASSAY OF MAGNESIUM: CPT | Performed by: STUDENT IN AN ORGANIZED HEALTH CARE EDUCATION/TRAINING PROGRAM

## 2019-12-27 PROCEDURE — 86850 RBC ANTIBODY SCREEN: CPT | Performed by: STUDENT IN AN ORGANIZED HEALTH CARE EDUCATION/TRAINING PROGRAM

## 2019-12-27 PROCEDURE — 40000171 ZZH STATISTIC PRE-PROCEDURE ASSESSMENT III: Performed by: NEUROLOGICAL SURGERY

## 2019-12-27 PROCEDURE — 36000064 ZZH SURGERY LEVEL 4 EA 15 ADDTL MIN - UMMC: Performed by: NEUROLOGICAL SURGERY

## 2019-12-27 PROCEDURE — 25000125 ZZHC RX 250: Performed by: STUDENT IN AN ORGANIZED HEALTH CARE EDUCATION/TRAINING PROGRAM

## 2019-12-27 PROCEDURE — 37000008 ZZH ANESTHESIA TECHNICAL FEE, 1ST 30 MIN: Performed by: NEUROLOGICAL SURGERY

## 2019-12-27 PROCEDURE — 82330 ASSAY OF CALCIUM: CPT | Performed by: STUDENT IN AN ORGANIZED HEALTH CARE EDUCATION/TRAINING PROGRAM

## 2019-12-27 PROCEDURE — 36000066 ZZH SURGERY LEVEL 4 W FLUORO 1ST 30 MIN - UMMC: Performed by: NEUROLOGICAL SURGERY

## 2019-12-27 PROCEDURE — 27210794 ZZH OR GENERAL SUPPLY STERILE: Performed by: NEUROLOGICAL SURGERY

## 2019-12-27 PROCEDURE — 40000014 ZZH STATISTIC ARTERIAL MONITORING DAILY

## 2019-12-27 PROCEDURE — 25000128 H RX IP 250 OP 636: Performed by: NEUROLOGICAL SURGERY

## 2019-12-27 PROCEDURE — 25000566 ZZH SEVOFLURANE, EA 15 MIN: Performed by: NEUROLOGICAL SURGERY

## 2019-12-27 PROCEDURE — 00000146 ZZHCL STATISTIC GLUCOSE BY METER IP

## 2019-12-27 PROCEDURE — 85730 THROMBOPLASTIN TIME PARTIAL: CPT | Performed by: STUDENT IN AN ORGANIZED HEALTH CARE EDUCATION/TRAINING PROGRAM

## 2019-12-27 PROCEDURE — 83605 ASSAY OF LACTIC ACID: CPT | Performed by: NEUROLOGICAL SURGERY

## 2019-12-27 PROCEDURE — 82803 BLOOD GASES ANY COMBINATION: CPT | Performed by: NEUROLOGICAL SURGERY

## 2019-12-27 PROCEDURE — 82947 ASSAY GLUCOSE BLOOD QUANT: CPT | Performed by: STUDENT IN AN ORGANIZED HEALTH CARE EDUCATION/TRAINING PROGRAM

## 2019-12-27 PROCEDURE — 25000132 ZZH RX MED GY IP 250 OP 250 PS 637: Mod: GY | Performed by: STUDENT IN AN ORGANIZED HEALTH CARE EDUCATION/TRAINING PROGRAM

## 2019-12-27 PROCEDURE — 25800030 ZZH RX IP 258 OP 636: Performed by: ANESTHESIOLOGY

## 2019-12-27 PROCEDURE — 71000014 ZZH RECOVERY PHASE 1 LEVEL 2 FIRST HR: Performed by: NEUROLOGICAL SURGERY

## 2019-12-27 PROCEDURE — 40000275 ZZH STATISTIC RCP TIME EA 10 MIN

## 2019-12-27 PROCEDURE — 71000015 ZZH RECOVERY PHASE 1 LEVEL 2 EA ADDTL HR: Performed by: NEUROLOGICAL SURGERY

## 2019-12-27 PROCEDURE — 12000001 ZZH R&B MED SURG/OB UMMC

## 2019-12-27 PROCEDURE — 80048 BASIC METABOLIC PNL TOTAL CA: CPT | Performed by: STUDENT IN AN ORGANIZED HEALTH CARE EDUCATION/TRAINING PROGRAM

## 2019-12-27 PROCEDURE — 85027 COMPLETE CBC AUTOMATED: CPT | Performed by: STUDENT IN AN ORGANIZED HEALTH CARE EDUCATION/TRAINING PROGRAM

## 2019-12-27 PROCEDURE — 82330 ASSAY OF CALCIUM: CPT | Performed by: NEUROLOGICAL SURGERY

## 2019-12-27 PROCEDURE — 25000125 ZZHC RX 250: Performed by: NEUROLOGICAL SURGERY

## 2019-12-27 PROCEDURE — 82803 BLOOD GASES ANY COMBINATION: CPT | Performed by: STUDENT IN AN ORGANIZED HEALTH CARE EDUCATION/TRAINING PROGRAM

## 2019-12-27 PROCEDURE — 37000009 ZZH ANESTHESIA TECHNICAL FEE, EACH ADDTL 15 MIN: Performed by: NEUROLOGICAL SURGERY

## 2019-12-27 PROCEDURE — 82947 ASSAY GLUCOSE BLOOD QUANT: CPT | Performed by: NEUROLOGICAL SURGERY

## 2019-12-27 RX ORDER — PROPOFOL 10 MG/ML
INJECTION, EMULSION INTRAVENOUS PRN
Status: DISCONTINUED | OUTPATIENT
Start: 2019-12-27 | End: 2019-12-27

## 2019-12-27 RX ORDER — FENTANYL CITRATE 50 UG/ML
INJECTION, SOLUTION INTRAMUSCULAR; INTRAVENOUS PRN
Status: DISCONTINUED | OUTPATIENT
Start: 2019-12-27 | End: 2019-12-27

## 2019-12-27 RX ORDER — GABAPENTIN 300 MG/1
300 CAPSULE ORAL ONCE
Status: DISCONTINUED | OUTPATIENT
Start: 2019-12-27 | End: 2019-12-27 | Stop reason: HOSPADM

## 2019-12-27 RX ORDER — PROCHLORPERAZINE MALEATE 5 MG
5 TABLET ORAL EVERY 6 HOURS PRN
Status: DISCONTINUED | OUTPATIENT
Start: 2019-12-27 | End: 2019-12-31 | Stop reason: HOSPADM

## 2019-12-27 RX ORDER — PROCHLORPERAZINE 25 MG
12.5 SUPPOSITORY, RECTAL RECTAL EVERY 12 HOURS PRN
Status: DISCONTINUED | OUTPATIENT
Start: 2019-12-27 | End: 2019-12-31 | Stop reason: HOSPADM

## 2019-12-27 RX ORDER — HYDRALAZINE HYDROCHLORIDE 20 MG/ML
10-20 INJECTION INTRAMUSCULAR; INTRAVENOUS EVERY 30 MIN PRN
Status: DISCONTINUED | OUTPATIENT
Start: 2019-12-27 | End: 2019-12-31 | Stop reason: HOSPADM

## 2019-12-27 RX ORDER — METOCLOPRAMIDE HYDROCHLORIDE 5 MG/ML
5 INJECTION INTRAMUSCULAR; INTRAVENOUS EVERY 6 HOURS PRN
Status: DISCONTINUED | OUTPATIENT
Start: 2019-12-27 | End: 2019-12-31 | Stop reason: HOSPADM

## 2019-12-27 RX ORDER — SODIUM CHLORIDE, SODIUM LACTATE, POTASSIUM CHLORIDE, CALCIUM CHLORIDE 600; 310; 30; 20 MG/100ML; MG/100ML; MG/100ML; MG/100ML
INJECTION, SOLUTION INTRAVENOUS CONTINUOUS PRN
Status: DISCONTINUED | OUTPATIENT
Start: 2019-12-27 | End: 2019-12-27

## 2019-12-27 RX ORDER — PROCHLORPERAZINE MALEATE 5 MG
5 TABLET ORAL EVERY 6 HOURS PRN
Status: DISCONTINUED | OUTPATIENT
Start: 2019-12-27 | End: 2019-12-27

## 2019-12-27 RX ORDER — EPHEDRINE SULFATE 50 MG/ML
INJECTION, SOLUTION INTRAMUSCULAR; INTRAVENOUS; SUBCUTANEOUS PRN
Status: DISCONTINUED | OUTPATIENT
Start: 2019-12-27 | End: 2019-12-27

## 2019-12-27 RX ORDER — AMOXICILLIN 250 MG
3 CAPSULE ORAL 2 TIMES DAILY
Status: DISCONTINUED | OUTPATIENT
Start: 2019-12-27 | End: 2019-12-31 | Stop reason: HOSPADM

## 2019-12-27 RX ORDER — KETAMINE HYDROCHLORIDE 10 MG/ML
INJECTION, SOLUTION INTRAMUSCULAR; INTRAVENOUS PRN
Status: DISCONTINUED | OUTPATIENT
Start: 2019-12-27 | End: 2019-12-27

## 2019-12-27 RX ORDER — METOPROLOL SUCCINATE 25 MG/1
12.5 TABLET, EXTENDED RELEASE ORAL EVERY EVENING
COMMUNITY

## 2019-12-27 RX ORDER — ONDANSETRON 4 MG/1
4 TABLET, ORALLY DISINTEGRATING ORAL EVERY 30 MIN PRN
Status: DISCONTINUED | OUTPATIENT
Start: 2019-12-27 | End: 2019-12-27 | Stop reason: HOSPADM

## 2019-12-27 RX ORDER — POTASSIUM CL/LIDO/0.9 % NACL 10MEQ/0.1L
10 INTRAVENOUS SOLUTION, PIGGYBACK (ML) INTRAVENOUS
Status: DISCONTINUED | OUTPATIENT
Start: 2019-12-27 | End: 2019-12-31 | Stop reason: HOSPADM

## 2019-12-27 RX ORDER — LIDOCAINE 4 G/G
3 PATCH TOPICAL
Status: DISCONTINUED | OUTPATIENT
Start: 2019-12-28 | End: 2019-12-31 | Stop reason: HOSPADM

## 2019-12-27 RX ORDER — ACETAMINOPHEN 325 MG/1
650 TABLET ORAL EVERY 4 HOURS PRN
Status: DISCONTINUED | OUTPATIENT
Start: 2019-12-27 | End: 2019-12-31 | Stop reason: HOSPADM

## 2019-12-27 RX ORDER — AMOXICILLIN 250 MG
2 CAPSULE ORAL 2 TIMES DAILY
Status: DISCONTINUED | OUTPATIENT
Start: 2019-12-27 | End: 2019-12-31 | Stop reason: HOSPADM

## 2019-12-27 RX ORDER — SODIUM CHLORIDE 9 MG/ML
INJECTION, SOLUTION INTRAVENOUS CONTINUOUS
Status: ACTIVE | OUTPATIENT
Start: 2019-12-27 | End: 2019-12-28

## 2019-12-27 RX ORDER — METHOCARBAMOL 500 MG/1
500 TABLET, FILM COATED ORAL 4 TIMES DAILY PRN
Status: DISCONTINUED | OUTPATIENT
Start: 2019-12-27 | End: 2019-12-31 | Stop reason: HOSPADM

## 2019-12-27 RX ORDER — LIDOCAINE 40 MG/G
CREAM TOPICAL
Status: DISCONTINUED | OUTPATIENT
Start: 2019-12-27 | End: 2019-12-31 | Stop reason: HOSPADM

## 2019-12-27 RX ORDER — HYDRALAZINE HYDROCHLORIDE 20 MG/ML
2.5-5 INJECTION INTRAMUSCULAR; INTRAVENOUS EVERY 10 MIN PRN
Status: DISCONTINUED | OUTPATIENT
Start: 2019-12-27 | End: 2019-12-27 | Stop reason: HOSPADM

## 2019-12-27 RX ORDER — LABETALOL 20 MG/4 ML (5 MG/ML) INTRAVENOUS SYRINGE
10
Status: COMPLETED | OUTPATIENT
Start: 2019-12-27 | End: 2019-12-27

## 2019-12-27 RX ORDER — NALOXONE HYDROCHLORIDE 0.4 MG/ML
.1-.4 INJECTION, SOLUTION INTRAMUSCULAR; INTRAVENOUS; SUBCUTANEOUS
Status: DISCONTINUED | OUTPATIENT
Start: 2019-12-27 | End: 2019-12-27

## 2019-12-27 RX ORDER — ACETAMINOPHEN 325 MG/1
975 TABLET ORAL ONCE
Status: COMPLETED | OUTPATIENT
Start: 2019-12-27 | End: 2019-12-27

## 2019-12-27 RX ORDER — SODIUM CHLORIDE, SODIUM LACTATE, POTASSIUM CHLORIDE, CALCIUM CHLORIDE 600; 310; 30; 20 MG/100ML; MG/100ML; MG/100ML; MG/100ML
INJECTION, SOLUTION INTRAVENOUS CONTINUOUS
Status: DISCONTINUED | OUTPATIENT
Start: 2019-12-27 | End: 2019-12-27 | Stop reason: HOSPADM

## 2019-12-27 RX ORDER — MAGNESIUM SULFATE HEPTAHYDRATE 40 MG/ML
4 INJECTION, SOLUTION INTRAVENOUS EVERY 4 HOURS PRN
Status: DISCONTINUED | OUTPATIENT
Start: 2019-12-27 | End: 2019-12-31 | Stop reason: HOSPADM

## 2019-12-27 RX ORDER — ONDANSETRON 2 MG/ML
INJECTION INTRAMUSCULAR; INTRAVENOUS PRN
Status: DISCONTINUED | OUTPATIENT
Start: 2019-12-27 | End: 2019-12-27

## 2019-12-27 RX ORDER — POLYETHYLENE GLYCOL 3350 17 G/17G
17 POWDER, FOR SOLUTION ORAL DAILY
Status: DISCONTINUED | OUTPATIENT
Start: 2019-12-27 | End: 2019-12-31 | Stop reason: HOSPADM

## 2019-12-27 RX ORDER — CEFAZOLIN SODIUM 2 G/100ML
2 INJECTION, SOLUTION INTRAVENOUS
Status: DISCONTINUED | OUTPATIENT
Start: 2019-12-27 | End: 2019-12-27 | Stop reason: HOSPADM

## 2019-12-27 RX ORDER — ONDANSETRON 2 MG/ML
4 INJECTION INTRAMUSCULAR; INTRAVENOUS EVERY 6 HOURS PRN
Status: DISCONTINUED | OUTPATIENT
Start: 2019-12-27 | End: 2019-12-31 | Stop reason: HOSPADM

## 2019-12-27 RX ORDER — HYDROMORPHONE HYDROCHLORIDE 1 MG/ML
.3-.5 INJECTION, SOLUTION INTRAMUSCULAR; INTRAVENOUS; SUBCUTANEOUS EVERY 5 MIN PRN
Status: DISCONTINUED | OUTPATIENT
Start: 2019-12-27 | End: 2019-12-27 | Stop reason: HOSPADM

## 2019-12-27 RX ORDER — FENTANYL CITRATE 50 UG/ML
25-50 INJECTION, SOLUTION INTRAMUSCULAR; INTRAVENOUS EVERY 5 MIN PRN
Status: DISCONTINUED | OUTPATIENT
Start: 2019-12-27 | End: 2019-12-27 | Stop reason: HOSPADM

## 2019-12-27 RX ORDER — POTASSIUM CHLORIDE 1.5 G/1.58G
20-40 POWDER, FOR SOLUTION ORAL
Status: DISCONTINUED | OUTPATIENT
Start: 2019-12-27 | End: 2019-12-31 | Stop reason: HOSPADM

## 2019-12-27 RX ORDER — POTASSIUM CHLORIDE 750 MG/1
20-40 TABLET, EXTENDED RELEASE ORAL
Status: DISCONTINUED | OUTPATIENT
Start: 2019-12-27 | End: 2019-12-31 | Stop reason: HOSPADM

## 2019-12-27 RX ORDER — CEFAZOLIN SODIUM 2 G/100ML
INJECTION, SOLUTION INTRAVENOUS PRN
Status: DISCONTINUED | OUTPATIENT
Start: 2019-12-27 | End: 2019-12-27

## 2019-12-27 RX ORDER — LIDOCAINE HYDROCHLORIDE 20 MG/ML
INJECTION, SOLUTION INFILTRATION; PERINEURAL PRN
Status: DISCONTINUED | OUTPATIENT
Start: 2019-12-27 | End: 2019-12-27

## 2019-12-27 RX ORDER — DEXAMETHASONE SODIUM PHOSPHATE 4 MG/ML
INJECTION, SOLUTION INTRA-ARTICULAR; INTRALESIONAL; INTRAMUSCULAR; INTRAVENOUS; SOFT TISSUE PRN
Status: DISCONTINUED | OUTPATIENT
Start: 2019-12-27 | End: 2019-12-27

## 2019-12-27 RX ORDER — LIDOCAINE 40 MG/G
CREAM TOPICAL
Status: DISCONTINUED | OUTPATIENT
Start: 2019-12-27 | End: 2019-12-27 | Stop reason: HOSPADM

## 2019-12-27 RX ORDER — OXYCODONE HYDROCHLORIDE 5 MG/1
5-10 TABLET ORAL EVERY 4 HOURS PRN
Status: DISCONTINUED | OUTPATIENT
Start: 2019-12-27 | End: 2019-12-28

## 2019-12-27 RX ORDER — METOCLOPRAMIDE 5 MG/1
5 TABLET ORAL EVERY 6 HOURS PRN
Status: DISCONTINUED | OUTPATIENT
Start: 2019-12-27 | End: 2019-12-31 | Stop reason: HOSPADM

## 2019-12-27 RX ORDER — POTASSIUM CHLORIDE 29.8 MG/ML
20 INJECTION INTRAVENOUS
Status: DISCONTINUED | OUTPATIENT
Start: 2019-12-27 | End: 2019-12-31 | Stop reason: HOSPADM

## 2019-12-27 RX ORDER — ONDANSETRON 2 MG/ML
4 INJECTION INTRAMUSCULAR; INTRAVENOUS EVERY 30 MIN PRN
Status: DISCONTINUED | OUTPATIENT
Start: 2019-12-27 | End: 2019-12-27 | Stop reason: HOSPADM

## 2019-12-27 RX ORDER — ONDANSETRON 4 MG/1
4 TABLET, ORALLY DISINTEGRATING ORAL EVERY 6 HOURS PRN
Status: DISCONTINUED | OUTPATIENT
Start: 2019-12-27 | End: 2019-12-31 | Stop reason: HOSPADM

## 2019-12-27 RX ORDER — CEFAZOLIN SODIUM 1 G/3ML
1 INJECTION, POWDER, FOR SOLUTION INTRAMUSCULAR; INTRAVENOUS SEE ADMIN INSTRUCTIONS
Status: DISCONTINUED | OUTPATIENT
Start: 2019-12-27 | End: 2019-12-27 | Stop reason: HOSPADM

## 2019-12-27 RX ORDER — GABAPENTIN 600 MG/1
600 TABLET ORAL 4 TIMES DAILY
Status: DISCONTINUED | OUTPATIENT
Start: 2019-12-27 | End: 2019-12-29

## 2019-12-27 RX ORDER — ONDANSETRON 4 MG/1
4 TABLET, ORALLY DISINTEGRATING ORAL EVERY 6 HOURS PRN
Status: DISCONTINUED | OUTPATIENT
Start: 2019-12-27 | End: 2019-12-27

## 2019-12-27 RX ORDER — NALOXONE HYDROCHLORIDE 0.4 MG/ML
.1-.4 INJECTION, SOLUTION INTRAMUSCULAR; INTRAVENOUS; SUBCUTANEOUS
Status: DISCONTINUED | OUTPATIENT
Start: 2019-12-27 | End: 2019-12-31 | Stop reason: HOSPADM

## 2019-12-27 RX ORDER — LABETALOL 20 MG/4 ML (5 MG/ML) INTRAVENOUS SYRINGE
10-40 EVERY 10 MIN PRN
Status: DISCONTINUED | OUTPATIENT
Start: 2019-12-27 | End: 2019-12-31 | Stop reason: HOSPADM

## 2019-12-27 RX ORDER — HYDROMORPHONE HYDROCHLORIDE 1 MG/ML
.3-.5 INJECTION, SOLUTION INTRAMUSCULAR; INTRAVENOUS; SUBCUTANEOUS
Status: DISPENSED | OUTPATIENT
Start: 2019-12-27 | End: 2019-12-28

## 2019-12-27 RX ORDER — ONDANSETRON 2 MG/ML
4 INJECTION INTRAMUSCULAR; INTRAVENOUS EVERY 6 HOURS PRN
Status: DISCONTINUED | OUTPATIENT
Start: 2019-12-27 | End: 2019-12-27

## 2019-12-27 RX ORDER — HYDROXYZINE HYDROCHLORIDE 10 MG/1
10 TABLET, FILM COATED ORAL EVERY 6 HOURS PRN
Status: DISCONTINUED | OUTPATIENT
Start: 2019-12-27 | End: 2019-12-31 | Stop reason: HOSPADM

## 2019-12-27 RX ORDER — POTASSIUM CHLORIDE 7.45 MG/ML
10 INJECTION INTRAVENOUS
Status: DISCONTINUED | OUTPATIENT
Start: 2019-12-27 | End: 2019-12-31 | Stop reason: HOSPADM

## 2019-12-27 RX ORDER — CALCIUM CARBONATE 500 MG/1
1000 TABLET, CHEWABLE ORAL 4 TIMES DAILY PRN
Status: DISCONTINUED | OUTPATIENT
Start: 2019-12-27 | End: 2019-12-31 | Stop reason: HOSPADM

## 2019-12-27 RX ADMIN — FENTANYL CITRATE 50 MCG: 50 INJECTION, SOLUTION INTRAMUSCULAR; INTRAVENOUS at 13:31

## 2019-12-27 RX ADMIN — KETAMINE HYDROCHLORIDE 10 MG: 10 INJECTION, SOLUTION INTRAMUSCULAR; INTRAVENOUS at 07:55

## 2019-12-27 RX ADMIN — PHENYLEPHRINE HYDROCHLORIDE 200 MCG: 10 INJECTION INTRAVENOUS at 08:21

## 2019-12-27 RX ADMIN — FENTANYL CITRATE 50 MCG: 50 INJECTION, SOLUTION INTRAMUSCULAR; INTRAVENOUS at 07:55

## 2019-12-27 RX ADMIN — PROCHLORPERAZINE EDISYLATE 5 MG: 5 INJECTION INTRAMUSCULAR; INTRAVENOUS at 14:13

## 2019-12-27 RX ADMIN — DEXAMETHASONE SODIUM PHOSPHATE 4 MG: 4 INJECTION, SOLUTION INTRA-ARTICULAR; INTRALESIONAL; INTRAMUSCULAR; INTRAVENOUS; SOFT TISSUE at 07:55

## 2019-12-27 RX ADMIN — SUGAMMADEX 100 MG: 100 INJECTION, SOLUTION INTRAVENOUS at 13:11

## 2019-12-27 RX ADMIN — HYDROMORPHONE HYDROCHLORIDE 0.5 MG: 1 INJECTION, SOLUTION INTRAMUSCULAR; INTRAVENOUS; SUBCUTANEOUS at 15:20

## 2019-12-27 RX ADMIN — HYDROMORPHONE HYDROCHLORIDE 0.5 MG: 1 INJECTION, SOLUTION INTRAMUSCULAR; INTRAVENOUS; SUBCUTANEOUS at 17:00

## 2019-12-27 RX ADMIN — PHENYLEPHRINE HYDROCHLORIDE 200 MCG: 10 INJECTION INTRAVENOUS at 08:45

## 2019-12-27 RX ADMIN — FENTANYL CITRATE 50 MCG: 50 INJECTION, SOLUTION INTRAMUSCULAR; INTRAVENOUS at 16:09

## 2019-12-27 RX ADMIN — FENTANYL CITRATE 25 MCG: 50 INJECTION, SOLUTION INTRAMUSCULAR; INTRAVENOUS at 13:57

## 2019-12-27 RX ADMIN — PHENYLEPHRINE HYDROCHLORIDE 100 MCG: 10 INJECTION INTRAVENOUS at 08:04

## 2019-12-27 RX ADMIN — PHENYLEPHRINE HYDROCHLORIDE 50 MCG: 10 INJECTION INTRAVENOUS at 07:56

## 2019-12-27 RX ADMIN — CEFAZOLIN 1 G: 1 INJECTION, POWDER, FOR SOLUTION INTRAMUSCULAR; INTRAVENOUS at 10:26

## 2019-12-27 RX ADMIN — PHENYLEPHRINE HYDROCHLORIDE 100 MCG: 10 INJECTION INTRAVENOUS at 11:05

## 2019-12-27 RX ADMIN — HYDROMORPHONE HYDROCHLORIDE 0.5 MG: 1 INJECTION, SOLUTION INTRAMUSCULAR; INTRAVENOUS; SUBCUTANEOUS at 14:46

## 2019-12-27 RX ADMIN — FENTANYL CITRATE 50 MCG: 50 INJECTION, SOLUTION INTRAMUSCULAR; INTRAVENOUS at 14:05

## 2019-12-27 RX ADMIN — ROCURONIUM BROMIDE 20 MG: 10 INJECTION INTRAVENOUS at 09:10

## 2019-12-27 RX ADMIN — PHENYLEPHRINE HYDROCHLORIDE 0.5 MCG/KG/MIN: 10 INJECTION INTRAVENOUS at 08:17

## 2019-12-27 RX ADMIN — ACETAMINOPHEN 975 MG: 325 TABLET, FILM COATED ORAL at 06:24

## 2019-12-27 RX ADMIN — Medication 2.5 MG: at 10:05

## 2019-12-27 RX ADMIN — LABETALOL 20 MG/4 ML (5 MG/ML) INTRAVENOUS SYRINGE 5 MG: at 14:42

## 2019-12-27 RX ADMIN — PHENYLEPHRINE HYDROCHLORIDE 50 MCG: 10 INJECTION INTRAVENOUS at 07:55

## 2019-12-27 RX ADMIN — NICARDIPINE HYDROCHLORIDE 0.5 MG/HR: 0.2 INJECTION, SOLUTION INTRAVENOUS at 09:51

## 2019-12-27 RX ADMIN — HYDROMORPHONE HYDROCHLORIDE 0.5 MG: 1 INJECTION, SOLUTION INTRAMUSCULAR; INTRAVENOUS; SUBCUTANEOUS at 11:23

## 2019-12-27 RX ADMIN — SODIUM CHLORIDE: 9 INJECTION, SOLUTION INTRAVENOUS at 14:46

## 2019-12-27 RX ADMIN — FENTANYL CITRATE 50 MCG: 50 INJECTION, SOLUTION INTRAMUSCULAR; INTRAVENOUS at 16:25

## 2019-12-27 RX ADMIN — LIDOCAINE HYDROCHLORIDE 80 MG: 20 INJECTION, SOLUTION INFILTRATION; PERINEURAL at 07:55

## 2019-12-27 RX ADMIN — OXYCODONE HYDROCHLORIDE 10 MG: 5 TABLET ORAL at 22:33

## 2019-12-27 RX ADMIN — ONDANSETRON 4 MG: 2 INJECTION INTRAMUSCULAR; INTRAVENOUS at 13:44

## 2019-12-27 RX ADMIN — Medication 5 MG: at 09:32

## 2019-12-27 RX ADMIN — OXYCODONE HYDROCHLORIDE 10 MG: 5 TABLET ORAL at 17:05

## 2019-12-27 RX ADMIN — SODIUM CHLORIDE, POTASSIUM CHLORIDE, SODIUM LACTATE AND CALCIUM CHLORIDE: 600; 310; 30; 20 INJECTION, SOLUTION INTRAVENOUS at 13:26

## 2019-12-27 RX ADMIN — KETAMINE HYDROCHLORIDE 10 MG: 10 INJECTION, SOLUTION INTRAMUSCULAR; INTRAVENOUS at 09:12

## 2019-12-27 RX ADMIN — Medication 2.5 MG: at 10:33

## 2019-12-27 RX ADMIN — FENTANYL CITRATE 25 MCG: 50 INJECTION, SOLUTION INTRAMUSCULAR; INTRAVENOUS at 13:44

## 2019-12-27 RX ADMIN — GABAPENTIN 600 MG: 600 TABLET, FILM COATED ORAL at 19:56

## 2019-12-27 RX ADMIN — HYDROMORPHONE HYDROCHLORIDE 0.5 MG: 1 INJECTION, SOLUTION INTRAMUSCULAR; INTRAVENOUS; SUBCUTANEOUS at 19:14

## 2019-12-27 RX ADMIN — PHENYLEPHRINE HYDROCHLORIDE 100 MCG: 10 INJECTION INTRAVENOUS at 09:07

## 2019-12-27 RX ADMIN — HYDRALAZINE HYDROCHLORIDE 2.5 MG: 20 INJECTION INTRAMUSCULAR; INTRAVENOUS at 14:49

## 2019-12-27 RX ADMIN — PHENYLEPHRINE HYDROCHLORIDE 100 MCG: 10 INJECTION INTRAVENOUS at 11:53

## 2019-12-27 RX ADMIN — KETAMINE HYDROCHLORIDE 10 MG: 10 INJECTION, SOLUTION INTRAMUSCULAR; INTRAVENOUS at 10:06

## 2019-12-27 RX ADMIN — HYDROMORPHONE HYDROCHLORIDE 0.5 MG: 1 INJECTION, SOLUTION INTRAMUSCULAR; INTRAVENOUS; SUBCUTANEOUS at 15:40

## 2019-12-27 RX ADMIN — PHENYLEPHRINE HYDROCHLORIDE 100 MCG: 10 INJECTION INTRAVENOUS at 08:07

## 2019-12-27 RX ADMIN — PHENYLEPHRINE HYDROCHLORIDE 200 MCG: 10 INJECTION INTRAVENOUS at 08:24

## 2019-12-27 RX ADMIN — ROCURONIUM BROMIDE 10 MG: 10 INJECTION INTRAVENOUS at 11:17

## 2019-12-27 RX ADMIN — ONDANSETRON 4 MG: 2 INJECTION INTRAMUSCULAR; INTRAVENOUS at 21:28

## 2019-12-27 RX ADMIN — FENTANYL CITRATE 50 MCG: 50 INJECTION, SOLUTION INTRAMUSCULAR; INTRAVENOUS at 08:34

## 2019-12-27 RX ADMIN — FENTANYL CITRATE 50 MCG: 50 INJECTION, SOLUTION INTRAMUSCULAR; INTRAVENOUS at 16:47

## 2019-12-27 RX ADMIN — ONDANSETRON 4 MG: 2 INJECTION INTRAMUSCULAR; INTRAVENOUS at 12:39

## 2019-12-27 RX ADMIN — ACETAMINOPHEN 650 MG: 325 TABLET, FILM COATED ORAL at 20:28

## 2019-12-27 RX ADMIN — ROCURONIUM BROMIDE 50 MG: 10 INJECTION INTRAVENOUS at 07:55

## 2019-12-27 RX ADMIN — HYDROMORPHONE HYDROCHLORIDE 0.5 MG: 1 INJECTION, SOLUTION INTRAMUSCULAR; INTRAVENOUS; SUBCUTANEOUS at 21:28

## 2019-12-27 RX ADMIN — PROPOFOL 90 MG: 10 INJECTION, EMULSION INTRAVENOUS at 07:55

## 2019-12-27 RX ADMIN — LABETALOL 20 MG/4 ML (5 MG/ML) INTRAVENOUS SYRINGE 5 MG: at 14:12

## 2019-12-27 RX ADMIN — CEFAZOLIN 1 G: 1 INJECTION, POWDER, FOR SOLUTION INTRAMUSCULAR; INTRAVENOUS at 12:27

## 2019-12-27 RX ADMIN — HYDROMORPHONE HYDROCHLORIDE 0.2 MG: 1 INJECTION, SOLUTION INTRAMUSCULAR; INTRAVENOUS; SUBCUTANEOUS at 14:33

## 2019-12-27 RX ADMIN — PHENYLEPHRINE HYDROCHLORIDE 50 MCG: 10 INJECTION INTRAVENOUS at 10:59

## 2019-12-27 RX ADMIN — ROCURONIUM BROMIDE 20 MG: 10 INJECTION INTRAVENOUS at 10:39

## 2019-12-27 RX ADMIN — CEFAZOLIN SODIUM 2 G: 2 INJECTION, SOLUTION INTRAVENOUS at 08:27

## 2019-12-27 RX ADMIN — HYDROMORPHONE HYDROCHLORIDE 0.3 MG: 1 INJECTION, SOLUTION INTRAMUSCULAR; INTRAVENOUS; SUBCUTANEOUS at 14:28

## 2019-12-27 RX ADMIN — PHENYLEPHRINE HYDROCHLORIDE 100 MCG: 10 INJECTION INTRAVENOUS at 12:39

## 2019-12-27 RX ADMIN — METHOCARBAMOL 500 MG: 500 TABLET, FILM COATED ORAL at 20:28

## 2019-12-27 RX ADMIN — SODIUM CHLORIDE, POTASSIUM CHLORIDE, SODIUM LACTATE AND CALCIUM CHLORIDE: 600; 310; 30; 20 INJECTION, SOLUTION INTRAVENOUS at 08:16

## 2019-12-27 RX ADMIN — PHENYLEPHRINE HYDROCHLORIDE 50 MCG: 10 INJECTION INTRAVENOUS at 10:33

## 2019-12-27 RX ADMIN — SODIUM CHLORIDE, POTASSIUM CHLORIDE, SODIUM LACTATE AND CALCIUM CHLORIDE: 600; 310; 30; 20 INJECTION, SOLUTION INTRAVENOUS at 07:50

## 2019-12-27 RX ADMIN — HYDROMORPHONE HYDROCHLORIDE 0.5 MG: 1 INJECTION, SOLUTION INTRAMUSCULAR; INTRAVENOUS; SUBCUTANEOUS at 23:22

## 2019-12-27 ASSESSMENT — MIFFLIN-ST. JEOR: SCORE: 1052.93

## 2019-12-27 ASSESSMENT — ACTIVITIES OF DAILY LIVING (ADL): ADLS_ACUITY_SCORE: 19

## 2019-12-27 ASSESSMENT — PAIN DESCRIPTION - DESCRIPTORS: DESCRIPTORS: ACHING;CONSTANT

## 2019-12-27 NOTE — OP NOTE
Procedure Date: 12/27/2019      PREOPERATIVE DIAGNOSES:   1.  Thoracic dural arteriovenous fistula.   2.  Thoracic spinal cord injury.      POSTOPERATIVE DIAGNOSIS:  Thoracic spinal cord injury.      PROCEDURE PERFORMED:   1.  Exploration of intradural space for suspected dural arteriovenous fistula at T5.   2.  Intraoperative use of microscope.      STAFF SURGEON:  Alf Ritchie MD      RESIDENT SURGEON:  Logan Montanez MD, PhD      ESTIMATED BLOOD LOSS:  100 mL.      IMPLANTS:  None.      FINDINGS:  No vascular malformation was identified during the exploration.      INDICATIONS FOR THE PROCEDURE:  Ms. Gagnon is a 69-year-old female who had a history of acute onset of pain between her shoulder blades in 2017.  She was subsequently found to have a subdural mass compressing her spinal cord.  She underwent decompression in [place] where intraoperative findings were consistent with a spinal subdural hematoma.  She underwent spinal angiograms in 11/2017 which were negative.  She subsequently had recurrence of the spinal subdural hematoma and underwent a lumbar evacuation in 12/2017.  She had a slow recovery and regained some ability to ambulate with assistance.  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  Therefore, she underwent a 2-staged lysis of adhesions on 10/04 and 10/05/2018.  Postoperatively, she had bowel incontinence, as well as numbness in her legs.  She was discharged to rehab and again regained her ability to walk with assistance.  Her neurological status again subsequently deteriorated.  Therefore, she was referred to the [facility] where she underwent repeat spinal angiography as part of a second opinion.  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  Given these findings on the angiogram, we recommended the above procedure.  The risks, benefits  and alternatives were discussed with the patient, and she elected to proceed.      DESCRIPTION OF PROCEDURE:  The patient was brought back to the operating room where general anesthesia and endotracheal intubation were performed by our Anesthesia colleagues.  The patient was transferred prone onto a Jose Cruz table with a Renzo frame.  Her arms were extended forward.  All pressure points were appropriately padded to avoid neurovascular injury.  The previous posterior thoracic incision was marked.  Incision was prepped and draped in standard fashion.  A timeout was performed to verify site and side of surgery, as well as that the available imaging in the room corresponded to the correct patient.  The skin was then opened using a #10 blade.  Monopolar electrocautery was used to dissect further down to expose the spinous process of T2.  Cerebellar retractors were placed.  Further dissection was performed to fully expose the spinous process of T2, as well as the facets and lateral masses of the inferior thoracic vertebrae.  (Of note, the patient had previously undergone an extensive thoracic laminectomy).  Care was taken during dissection to avoid entry into the intrathecal space.  A large scar mass over the presumed thecal space was removed using a #1 Penfield.  The dura was then identified.  The microscope was brought into the field.  A #15 blade was used to open the dura.  The dura was tacked up using 4-0 Nurolon stitches.  The durotomy was extended inferiorly to approximately the T7 level.  Careful sharp dissection of the arachnoid adhesions was performed to expose the nerve roots on the left.  Despite careful inspection of the nerve roots, there was a failure to identify abnormal arterialized vein.  Given no definitive vascular malformation, it was decided to end the procedure.      The dura was then reapproximated using 4-0 silk sutures, as well as 6-0 Prolene.  At the superior aspect of the durotomy, CSF egress  was observed with Valsalva; and therefore, a muscle patch was sewn into the dura.  The dural defect was then covered using DuraSeal.  The fascia was reapproximated using 0 Vicryl sutures in simple interrupted fashion.  The subdermal layer was reapproximated using 2-0 Vicryl sutures in simple inverted interrupted fashion.  Then 3-0 Vicryl sutures in simple inverted interrupted fashion were used to close the dermal layer, and a 3-0 Monocryl in a running subcuticular fashion was used to close the skin.  The wound was then cleaned with wet and dry sponges followed by ChloraPrep.  Exofin was applied to the wound.  Primapore dressings were then applied, and the patient was transferred supine onto the hospital bed.  She was extubated and transferred to the PACU for further recovery.  At the end of the procedure, all counts including needle, instrument and sponge were correct x 2.  There were no immediate complications encountered during the procedure.      Dr. Fernandes, Neurosurgery staff, was present during the key and critical portions of the case and immediately available during the entire procedure.         RAMAKRISHNA FERNANDES MD       As dictated by JANEL PHAN MD, PHD            D: 2019   T: 2019   MT:       Name:     JOVAN MADRID   MRN:      [id number]        Account:        [id number]   :      1950           Procedure Date: 2019      Document: [id number]

## 2019-12-27 NOTE — ANESTHESIA PROCEDURE NOTES
Arterial Line Procedure Note  Staff:     Anesthesiologist:  Elaina Mcdonald MD    Resident/CRNA:  Samanta Barry MD    Arterial line performed by resident/CRNA in presence of a teaching physician    Location: In OR After Induction  Procedure Start/Stop Times:     patient identified, IV checked, site marked, risks and benefits discussed, informed consent, monitors and equipment checked, pre-op evaluation and at physician/surgeon's request      Correct Patient: Yes      Correct Position: Yes      Correct Site: Yes      Correct Procedure: Yes      Correct Laterality:  Yes    Site Marked:  Yes  Line Placement:     Procedure:  Arterial Line    Insertion Site:  Radial    Insertion laterality:  Left    Skin Prep: Chloraprep      Patient Prep: patient draped, mask, sterile gloves, hat and hand hygiene      Local skin infiltration:  None    Ultrasound Guided?: No      Catheter size:  20 gauge, Quick cath    Cath secured with: suture      Dressing:  Tegaderm    Complications:  None obvious    Arterial waveform: Yes      IBP within 10% of NIBP: Yes

## 2019-12-27 NOTE — OR NURSING
TRACI Mcdonald and Dr. Montanez aware that pt can not remove her wedding ring, okay to leave on at this time; talked with pt that they may need to remove in the O.R., pt agreed this is okay.

## 2019-12-27 NOTE — ANESTHESIA POSTPROCEDURE EVALUATION
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       Anesthesia Type:  Not documented  General    Reportable Event: NO     PAIN: Uncomplicated   Sign Out status: Comfortable, Well controlled pain     PONV: No PONV   Sign Out status:  No Nausea or Vomiting     Neuro/Psych: Uneventful perioperative course   Sign Out Status: Preoperative baseline; Age appropriate mentation     Airway/Resp.: Uneventful perioperative course   Sign Out Status: Non labored breathing, age appropriate RR; Resp. Status within EXPECTED Parameters     CV: Uneventful perioperative course   Sign Out status: Appropriate BP and perfusion indices; Appropriate HR/Rhythm     Disposition:   Sign Out in:  PACU  Disposition:  Floor  Recovery Course: Uneventful  Follow-Up: Not required           Last Anesthesia Record Vitals:  CRNA VITALS  2019 1254 - 2019 1354      2019             Pulse:  100    ART BP:  154/66    ART Mean:  105    Ht Rate:  102    SpO2:  99 %    Resp Rate (observed):  (!) 5          Last PACU Vitals:  Vitals Value Taken Time   /67 2019  2:30 PM   Temp 36.9  C (98.5  F) 2019  1:45 PM   Pulse 77 2019  2:30 PM   Resp 13 2019  2:30 PM   SpO2 97 % 2019  2:35 PM   Temp src     NIBP     Pulse     SpO2     Resp     Temp     Ht Rate     Temp 2     Vitals shown include unvalidated device data.      Electronically Signed By: Elaina Mcdonald MD, 2019, 2:36 PM

## 2019-12-27 NOTE — ANESTHESIA CARE TRANSFER NOTE
Patient: Anayeli Gagnon    Procedure(s):  Posterior spinal decompression  with surgical disconnection arterial venous fistula Thoracic 5    Diagnosis: 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    Anesthesia Type:   General     Note:  Airway :Nasal Cannula  Patient transferred to:PACU  Comments: VSS. Breathing spontaneously at a regular rate with adequate tidal volumes and maintaining O2 sats on 2L. Endorses both pain and nausea, pain 6/10 given 50 mcg fentanyl. No apparent complications from anesthesia.     MD DAFNE LopezBS pager [phone]  Anesthesia CA-1  Handoff Report: Identifed the Patient, Identified the Reponsible Provider, Reviewed the pertinent medical history, Discussed the surgical course, Reviewed Intra-OP anesthesia mangement and issues during anesthesia, Set expectations for post-procedure period and Allowed opportunity for questions and acknowledgement of understanding      Vitals: (Last set prior to Anesthesia Care Transfer)    CRNA VITALS  12/27/2019 1254 - 12/27/2019 1338      12/27/2019             Pulse:  100    ART BP:  154/66    ART Mean:  105    Ht Rate:  102    SpO2:  99 %    Resp Rate (observed):  (!) 5                Electronically Signed By: Samanta Barry MD  December 27, 2019  1:38 PM

## 2019-12-27 NOTE — BRIEF OP NOTE
[facility], [place]    Brief Operative Note    Pre-operative diagnosis: Arteriovenous fistula of spinal cord vessels [Q28.8]  Post-operative diagnosis arachnoid adhesions without evidence of abdnormal vasculature    Procedure: Procedure(s):  Posterior spinal decompression  with surgical disconnection arterial venous fistula Thoracic 5  Surgeon: Surgeon(s) and Role:     * Alf Ritchie MD - Primary     * Thad Willingham MD - Assisting     * Logan Montanez MD - Resident - Assisting  Anesthesia: General   Estimated blood loss: 200 mL  Drains: None  Specimens: * No specimens in log *  Findings:   No evidence of dural AV fistula.  Complications: None.  Implants: * No implants in log *

## 2019-12-28 LAB
ANION GAP SERPL CALCULATED.3IONS-SCNC: 4 MMOL/L (ref 3–14)
BASOPHILS # BLD AUTO: 0.1 10E9/L (ref 0–0.2)
BASOPHILS NFR BLD AUTO: 0.6 %
BUN SERPL-MCNC: 10 MG/DL (ref 7–30)
CALCIUM SERPL-MCNC: 7.6 MG/DL (ref 8.5–10.1)
CHLORIDE SERPL-SCNC: 109 MMOL/L (ref 94–109)
CO2 SERPL-SCNC: 27 MMOL/L (ref 20–32)
CREAT SERPL-MCNC: 0.56 MG/DL (ref 0.52–1.04)
DIFFERENTIAL METHOD BLD: ABNORMAL
EOSINOPHIL # BLD AUTO: 0.1 10E9/L (ref 0–0.7)
EOSINOPHIL NFR BLD AUTO: 0.8 %
ERYTHROCYTE [DISTWIDTH] IN BLOOD BY AUTOMATED COUNT: 13.9 % (ref 10–15)
GFR SERPL CREATININE-BSD FRML MDRD: >90 ML/MIN/{1.73_M2}
GLUCOSE SERPL-MCNC: 91 MG/DL (ref 70–99)
HCT VFR BLD AUTO: 34.3 % (ref 35–47)
HGB BLD-MCNC: 10.7 G/DL (ref 11.7–15.7)
IMM GRANULOCYTES # BLD: 0 10E9/L (ref 0–0.4)
IMM GRANULOCYTES NFR BLD: 0.3 %
LYMPHOCYTES # BLD AUTO: 1.4 10E9/L (ref 0.8–5.3)
LYMPHOCYTES NFR BLD AUTO: 14.5 %
MCH RBC QN AUTO: 31.3 PG (ref 26.5–33)
MCHC RBC AUTO-ENTMCNC: 31.2 G/DL (ref 31.5–36.5)
MCV RBC AUTO: 100 FL (ref 78–100)
MONOCYTES # BLD AUTO: 0.8 10E9/L (ref 0–1.3)
MONOCYTES NFR BLD AUTO: 8.7 %
NEUTROPHILS # BLD AUTO: 7 10E9/L (ref 1.6–8.3)
NEUTROPHILS NFR BLD AUTO: 75.1 %
NRBC # BLD AUTO: 0 10*3/UL
NRBC BLD AUTO-RTO: 0 /100
PLATELET # BLD AUTO: 154 10E9/L (ref 150–450)
POTASSIUM SERPL-SCNC: 3.7 MMOL/L (ref 3.4–5.3)
RBC # BLD AUTO: 3.42 10E12/L (ref 3.8–5.2)
SODIUM SERPL-SCNC: 141 MMOL/L (ref 133–144)
WBC # BLD AUTO: 9.3 10E9/L (ref 4–11)

## 2019-12-28 PROCEDURE — 12000001 ZZH R&B MED SURG/OB UMMC

## 2019-12-28 PROCEDURE — 36415 COLL VENOUS BLD VENIPUNCTURE: CPT | Performed by: STUDENT IN AN ORGANIZED HEALTH CARE EDUCATION/TRAINING PROGRAM

## 2019-12-28 PROCEDURE — 25800030 ZZH RX IP 258 OP 636: Performed by: STUDENT IN AN ORGANIZED HEALTH CARE EDUCATION/TRAINING PROGRAM

## 2019-12-28 PROCEDURE — 25000132 ZZH RX MED GY IP 250 OP 250 PS 637: Mod: GY | Performed by: NEUROLOGICAL SURGERY

## 2019-12-28 PROCEDURE — 80048 BASIC METABOLIC PNL TOTAL CA: CPT | Performed by: STUDENT IN AN ORGANIZED HEALTH CARE EDUCATION/TRAINING PROGRAM

## 2019-12-28 PROCEDURE — 25000128 H RX IP 250 OP 636: Performed by: NEUROLOGICAL SURGERY

## 2019-12-28 PROCEDURE — 25000132 ZZH RX MED GY IP 250 OP 250 PS 637: Mod: GY | Performed by: STUDENT IN AN ORGANIZED HEALTH CARE EDUCATION/TRAINING PROGRAM

## 2019-12-28 PROCEDURE — 85025 COMPLETE CBC W/AUTO DIFF WBC: CPT | Performed by: STUDENT IN AN ORGANIZED HEALTH CARE EDUCATION/TRAINING PROGRAM

## 2019-12-28 PROCEDURE — 25000128 H RX IP 250 OP 636: Performed by: STUDENT IN AN ORGANIZED HEALTH CARE EDUCATION/TRAINING PROGRAM

## 2019-12-28 RX ORDER — MORPHINE SULFATE 15 MG/1
15 TABLET, FILM COATED, EXTENDED RELEASE ORAL EVERY 12 HOURS
Status: DISCONTINUED | OUTPATIENT
Start: 2019-12-28 | End: 2019-12-31 | Stop reason: HOSPADM

## 2019-12-28 RX ORDER — GABAPENTIN 600 MG/1
600 TABLET ORAL 4 TIMES DAILY
Status: ON HOLD | COMMUNITY
End: 2019-12-30

## 2019-12-28 RX ORDER — NALOXONE HYDROCHLORIDE 0.4 MG/ML
.1-.4 INJECTION, SOLUTION INTRAMUSCULAR; INTRAVENOUS; SUBCUTANEOUS
Status: ACTIVE | OUTPATIENT
Start: 2019-12-28 | End: 2019-12-29

## 2019-12-28 RX ORDER — ACETAMINOPHEN 500 MG
500-1000 TABLET ORAL EVERY 6 HOURS PRN
COMMUNITY

## 2019-12-28 RX ORDER — DIAZEPAM 5 MG
5 TABLET ORAL EVERY 6 HOURS PRN
Status: DISCONTINUED | OUTPATIENT
Start: 2019-12-28 | End: 2019-12-31 | Stop reason: HOSPADM

## 2019-12-28 RX ORDER — BACLOFEN 10 MG/1
10 TABLET ORAL 3 TIMES DAILY PRN
COMMUNITY
End: 2023-03-06

## 2019-12-28 RX ORDER — MORPHINE SULFATE 15 MG/1
15 TABLET, FILM COATED, EXTENDED RELEASE ORAL EVERY 12 HOURS
Status: ON HOLD | COMMUNITY
End: 2022-01-21

## 2019-12-28 RX ORDER — OXYCODONE HYDROCHLORIDE 5 MG/1
5-10 TABLET ORAL
Status: DISCONTINUED | OUTPATIENT
Start: 2019-12-29 | End: 2019-12-31 | Stop reason: HOSPADM

## 2019-12-28 RX ORDER — KETOROLAC TROMETHAMINE 30 MG/ML
15 INJECTION, SOLUTION INTRAMUSCULAR; INTRAVENOUS EVERY 6 HOURS PRN
Status: DISCONTINUED | OUTPATIENT
Start: 2019-12-28 | End: 2019-12-31 | Stop reason: HOSPADM

## 2019-12-28 RX ADMIN — GABAPENTIN 600 MG: 600 TABLET, FILM COATED ORAL at 07:51

## 2019-12-28 RX ADMIN — METHOCARBAMOL 500 MG: 500 TABLET, FILM COATED ORAL at 20:33

## 2019-12-28 RX ADMIN — Medication 1 MG: at 22:27

## 2019-12-28 RX ADMIN — GABAPENTIN 600 MG: 600 TABLET, FILM COATED ORAL at 12:37

## 2019-12-28 RX ADMIN — KETOROLAC TROMETHAMINE 15 MG: 30 INJECTION, SOLUTION INTRAMUSCULAR at 08:13

## 2019-12-28 RX ADMIN — DIAZEPAM 5 MG: 5 TABLET ORAL at 21:14

## 2019-12-28 RX ADMIN — HYDROMORPHONE HYDROCHLORIDE 0.5 MG: 1 INJECTION, SOLUTION INTRAMUSCULAR; INTRAVENOUS; SUBCUTANEOUS at 01:21

## 2019-12-28 RX ADMIN — ACETAMINOPHEN 650 MG: 325 TABLET, FILM COATED ORAL at 06:15

## 2019-12-28 RX ADMIN — HYDROMORPHONE HYDROCHLORIDE 0.5 MG: 1 INJECTION, SOLUTION INTRAMUSCULAR; INTRAVENOUS; SUBCUTANEOUS at 07:15

## 2019-12-28 RX ADMIN — GABAPENTIN 600 MG: 600 TABLET, FILM COATED ORAL at 16:25

## 2019-12-28 RX ADMIN — ACETAMINOPHEN 650 MG: 325 TABLET, FILM COATED ORAL at 11:20

## 2019-12-28 RX ADMIN — SODIUM CHLORIDE: 9 INJECTION, SOLUTION INTRAVENOUS at 09:55

## 2019-12-28 RX ADMIN — OXYCODONE HYDROCHLORIDE 10 MG: 5 TABLET ORAL at 19:58

## 2019-12-28 RX ADMIN — GABAPENTIN 600 MG: 600 TABLET, FILM COATED ORAL at 19:58

## 2019-12-28 RX ADMIN — OXYCODONE HYDROCHLORIDE 10 MG: 5 TABLET ORAL at 02:23

## 2019-12-28 RX ADMIN — DIAZEPAM 5 MG: 5 TABLET ORAL at 07:51

## 2019-12-28 RX ADMIN — ACETAMINOPHEN 650 MG: 325 TABLET, FILM COATED ORAL at 19:58

## 2019-12-28 RX ADMIN — KETOROLAC TROMETHAMINE 15 MG: 30 INJECTION, SOLUTION INTRAMUSCULAR at 21:13

## 2019-12-28 RX ADMIN — ACETAMINOPHEN 650 MG: 325 TABLET, FILM COATED ORAL at 16:25

## 2019-12-28 RX ADMIN — LIDOCAINE 3 PATCH: 560 PATCH PERCUTANEOUS; TOPICAL; TRANSDERMAL at 07:53

## 2019-12-28 RX ADMIN — MORPHINE SULFATE 15 MG: 15 TABLET, EXTENDED RELEASE ORAL at 18:22

## 2019-12-28 RX ADMIN — ACETAMINOPHEN 650 MG: 325 TABLET, FILM COATED ORAL at 01:21

## 2019-12-28 RX ADMIN — OXYCODONE HYDROCHLORIDE 10 MG: 5 TABLET ORAL at 16:25

## 2019-12-28 RX ADMIN — OXYCODONE HYDROCHLORIDE 10 MG: 5 TABLET ORAL at 11:20

## 2019-12-28 RX ADMIN — DIAZEPAM 5 MG: 5 TABLET ORAL at 13:57

## 2019-12-28 RX ADMIN — OXYCODONE HYDROCHLORIDE 10 MG: 5 TABLET ORAL at 06:15

## 2019-12-28 RX ADMIN — HYDROMORPHONE HYDROCHLORIDE 0.5 MG: 1 INJECTION, SOLUTION INTRAMUSCULAR; INTRAVENOUS; SUBCUTANEOUS at 04:29

## 2019-12-28 RX ADMIN — METHOCARBAMOL 500 MG: 500 TABLET, FILM COATED ORAL at 18:22

## 2019-12-28 RX ADMIN — KETOROLAC TROMETHAMINE 15 MG: 30 INJECTION, SOLUTION INTRAMUSCULAR at 13:57

## 2019-12-28 RX ADMIN — SODIUM CHLORIDE: 9 INJECTION, SOLUTION INTRAVENOUS at 00:20

## 2019-12-28 RX ADMIN — MENTHOL 1 PATCH: 205.5 PATCH TOPICAL at 20:30

## 2019-12-28 RX ADMIN — Medication 12.5 MG: at 07:52

## 2019-12-28 ASSESSMENT — ACTIVITIES OF DAILY LIVING (ADL)
ADLS_ACUITY_SCORE: 16
ADLS_ACUITY_SCORE: 16
ADLS_ACUITY_SCORE: 19
ADLS_ACUITY_SCORE: 19
ADLS_ACUITY_SCORE: 16
ADLS_ACUITY_SCORE: 20

## 2019-12-28 NOTE — PLAN OF CARE
Status: POD#1 posterior spinal decompression of T 5  Vitals: VSS on RA  Neuros: A&O x4, BUE 4/5, BLE 1/5, paraplegic @ baseline, complains of N/T in her lower extremities, complained of anxiety through out the shift, valium given x2 w/ relief  IV: L PIV is infusing NS @ 100 mL/hr, R PIV is SL  Resp/trach: Lung sounds are clear bilaterally  Diet: Reg, tolerating full liquid  Bowel status: Bowel sounds are active, pt has a colostomy  : Chronic Cunningham in place, good output  Skin: Incision is covered w/ primapore and is CDI  Pain: Complained of back pain through out the shift, found some relief w/ PRN toradol, tylenol, and oxy  Activity: Assist x2 w/ lift  Social: No visitors this shift, pt had multiple phones calls throughout the shift  Plan: Will continue to monitor and follow POC

## 2019-12-28 NOTE — PLAN OF CARE
6A OT - Cancel    OT orders received and appreciated. Saw patient in PM. Pt reporting pain is somewhat improved, however still too strong for therapy today. Politely requesting to reschedule. Will reschedule evaluation for tomorrow to initiate as appropriate.     Emily Yancey, OTR on 12/28/2019 at 1:00 PM

## 2019-12-28 NOTE — PLAN OF CARE
S/p Posterior spinal decompression of thoracic 5   AVSS, N- baseline (para) w/ reported numbness in legs. C/O inc. Pain Dilaudid IV given w/ good relief, tolerating sips of water, chronic hudson in use, ostomy, inc CDI, PIV@ 100 mls/hr. Family @ bedside.

## 2019-12-28 NOTE — PROGRESS NOTES
"Neurosurgery Progress Note    Subjective:  S/p OR for T5 dural AV-fistula repair, no fistula found, repair of durotomy; admitted to 6A    Objective:  /50   Pulse 100   Temp 98.1  F (36.7  C) (Oral)   Resp 12   Ht 1.626 m (5' 4\")   Wt 55.3 kg (121 lb 14.4 oz)   SpO2 95%   BMI 20.92 kg/m    Alert, oriented x3  CN 2-12 intact  5/5 strength in BUE, no pronator drift; baseline 1/5 in BLE  Baseline numbness below T5      Assessment:  68 yo F with suspected dural AV fistula at T5, s/p OR on 12/27 for Posterior spinal decompression with surgical disconnection arterial venous fistula Thoracic 5, found to have arachnoid adhesions without evidence of abdnormal vasculature, no dural AV fistula; repair of durotomy. At neurologic baseline.    Plan:  HOB > 45  PT/OT  SBP > 160  Likely discharge when confirm no CSF leak    Jordi Núñez MD  Neurosurgery Resident PGY2    Please contact neurosurgery resident on call with questions.    Dial * * [phone], enter [phone] when prompted.     I have reviewed the history above and agree with the resident's assessment and plan.  RAMYA Rios MD    "

## 2019-12-28 NOTE — PROGRESS NOTES
"Neurosurgery Progress Note    Subjective:  S/p OR for T5 dural AV-fistula repair, no fistula found, repair of durotomy; admitted to 6A    Objective:  /47   Pulse 80   Temp 98.8  F (37.1  C) (Oral)   Resp 16   Ht 1.626 m (5' 4\")   Wt 55.3 kg (121 lb 14.4 oz)   SpO2 98%   BMI 20.92 kg/m    Alert, oriented x3  CN 2-12 intact  5/5 strength in BUE, no pronator drift; baseline 1/5 in BLE  Baseline numbness below T5      Assessment:  70 yo F with suspected dural AV fistula at T5, s/p OR on 12/27 for Posterior spinal decompression with surgical disconnection arterial venous fistula Thoracic 5, found to have arachnoid adhesions without evidence of abdnormal vasculature, no dural AV fistula; repair of durotomy. At neurologic baseline.    Plan:  HOB > 45  PT/OT  SBP > 160  Pain control  Likely discharge when confirm no CSF leak    Ata Nunn MD  Neurosurgery Resident, PGY-1    Please contact neurosurgery resident on call with questions.    Dial * * [phone], enter [phone] when prompted.       I have reviewed the history above and agree with the resident's assessment and plan.  RAMYA Rios MD    "

## 2019-12-28 NOTE — PLAN OF CARE
Status: POD1 posterior spinal decompression of thoracic 5. On contact precautions for MRSA.  Vitals: soft BP baseline, satting well on RA  Neuros: A&Ox4, BUE 4/5. BLE 1/5 with slight contraction. Paraplegic at baseline. Pt reports numbness below the waist today, but reports that it can also present with tingling or pain.  IV: 2 PIV, infusing NS at 100mL/hr  Resp/trach: WNL on RA. Capno in place per post op orders.  Diet: Clears, advance as tolerated.  Bowel status: Colostomy emptying at baseline.  : Chronic hudson in place, draining well.  Skin: Thoracic incision CDI  Pain: Pain to incisional site, requiring all PRNs with moderate control.  Activity: Up with 2 and a lift, q2h micro turns as allowed by pt.  Social: No visitors this shift.  Plan: Continue to monitor and follow POC.

## 2019-12-29 ENCOUNTER — APPOINTMENT (OUTPATIENT)
Dept: PHYSICAL THERAPY | Facility: CLINIC | Age: 69
DRG: 029 | End: 2019-12-29
Attending: STUDENT IN AN ORGANIZED HEALTH CARE EDUCATION/TRAINING PROGRAM
Payer: MEDICARE

## 2019-12-29 ENCOUNTER — APPOINTMENT (OUTPATIENT)
Dept: OCCUPATIONAL THERAPY | Facility: CLINIC | Age: 69
DRG: 029 | End: 2019-12-29
Attending: STUDENT IN AN ORGANIZED HEALTH CARE EDUCATION/TRAINING PROGRAM
Payer: MEDICARE

## 2019-12-29 PROCEDURE — 97161 PT EVAL LOW COMPLEX 20 MIN: CPT | Mod: GP

## 2019-12-29 PROCEDURE — 25000132 ZZH RX MED GY IP 250 OP 250 PS 637: Mod: GY | Performed by: NEUROLOGICAL SURGERY

## 2019-12-29 PROCEDURE — 25000128 H RX IP 250 OP 636: Performed by: NEUROLOGICAL SURGERY

## 2019-12-29 PROCEDURE — 97530 THERAPEUTIC ACTIVITIES: CPT | Mod: GP

## 2019-12-29 PROCEDURE — 12000001 ZZH R&B MED SURG/OB UMMC

## 2019-12-29 PROCEDURE — 97535 SELF CARE MNGMENT TRAINING: CPT | Mod: GO | Performed by: OCCUPATIONAL THERAPIST

## 2019-12-29 PROCEDURE — 25000132 ZZH RX MED GY IP 250 OP 250 PS 637: Mod: GY | Performed by: STUDENT IN AN ORGANIZED HEALTH CARE EDUCATION/TRAINING PROGRAM

## 2019-12-29 PROCEDURE — 97530 THERAPEUTIC ACTIVITIES: CPT | Mod: GO | Performed by: OCCUPATIONAL THERAPIST

## 2019-12-29 PROCEDURE — 97110 THERAPEUTIC EXERCISES: CPT | Mod: GP

## 2019-12-29 PROCEDURE — 97165 OT EVAL LOW COMPLEX 30 MIN: CPT | Mod: GO | Performed by: OCCUPATIONAL THERAPIST

## 2019-12-29 RX ORDER — GABAPENTIN 800 MG/1
800 TABLET ORAL 4 TIMES DAILY
Status: DISCONTINUED | OUTPATIENT
Start: 2019-12-29 | End: 2019-12-31 | Stop reason: HOSPADM

## 2019-12-29 RX ORDER — BACLOFEN 10 MG/1
10 TABLET ORAL 3 TIMES DAILY PRN
Status: DISCONTINUED | OUTPATIENT
Start: 2019-12-29 | End: 2019-12-31 | Stop reason: HOSPADM

## 2019-12-29 RX ADMIN — LIDOCAINE 3 PATCH: 560 PATCH PERCUTANEOUS; TOPICAL; TRANSDERMAL at 08:05

## 2019-12-29 RX ADMIN — METHOCARBAMOL 500 MG: 500 TABLET, FILM COATED ORAL at 18:46

## 2019-12-29 RX ADMIN — OXYCODONE HYDROCHLORIDE 10 MG: 5 TABLET ORAL at 11:32

## 2019-12-29 RX ADMIN — KETOROLAC TROMETHAMINE 15 MG: 30 INJECTION, SOLUTION INTRAMUSCULAR at 03:04

## 2019-12-29 RX ADMIN — Medication 12.5 MG: at 08:04

## 2019-12-29 RX ADMIN — GABAPENTIN 600 MG: 600 TABLET, FILM COATED ORAL at 12:39

## 2019-12-29 RX ADMIN — ACETAMINOPHEN 650 MG: 325 TABLET, FILM COATED ORAL at 11:32

## 2019-12-29 RX ADMIN — OXYCODONE HYDROCHLORIDE 10 MG: 5 TABLET ORAL at 21:54

## 2019-12-29 RX ADMIN — ACETAMINOPHEN 650 MG: 325 TABLET, FILM COATED ORAL at 06:27

## 2019-12-29 RX ADMIN — GABAPENTIN 600 MG: 600 TABLET, FILM COATED ORAL at 16:05

## 2019-12-29 RX ADMIN — ACETAMINOPHEN 650 MG: 325 TABLET, FILM COATED ORAL at 00:15

## 2019-12-29 RX ADMIN — GABAPENTIN 800 MG: 800 TABLET, FILM COATED ORAL at 20:10

## 2019-12-29 RX ADMIN — MORPHINE SULFATE 15 MG: 15 TABLET, EXTENDED RELEASE ORAL at 18:06

## 2019-12-29 RX ADMIN — BACLOFEN 10 MG: 10 TABLET ORAL at 22:59

## 2019-12-29 RX ADMIN — OXYCODONE HYDROCHLORIDE 5 MG: 5 TABLET ORAL at 06:27

## 2019-12-29 RX ADMIN — OXYCODONE HYDROCHLORIDE 10 MG: 5 TABLET ORAL at 00:15

## 2019-12-29 RX ADMIN — OXYCODONE HYDROCHLORIDE 10 MG: 5 TABLET ORAL at 03:04

## 2019-12-29 RX ADMIN — GABAPENTIN 600 MG: 600 TABLET, FILM COATED ORAL at 08:05

## 2019-12-29 RX ADMIN — MORPHINE SULFATE 15 MG: 15 TABLET, EXTENDED RELEASE ORAL at 06:38

## 2019-12-29 RX ADMIN — OXYCODONE HYDROCHLORIDE 10 MG: 5 TABLET ORAL at 14:18

## 2019-12-29 RX ADMIN — DIAZEPAM 5 MG: 5 TABLET ORAL at 20:08

## 2019-12-29 RX ADMIN — ACETAMINOPHEN 650 MG: 325 TABLET, FILM COATED ORAL at 18:47

## 2019-12-29 RX ADMIN — HYDROXYZINE HYDROCHLORIDE 10 MG: 10 TABLET ORAL at 00:15

## 2019-12-29 RX ADMIN — METHOCARBAMOL 500 MG: 500 TABLET, FILM COATED ORAL at 10:00

## 2019-12-29 ASSESSMENT — ACTIVITIES OF DAILY LIVING (ADL)
ADLS_ACUITY_SCORE: 20
ADLS_ACUITY_SCORE: 20
ADLS_ACUITY_SCORE: 21
ADLS_ACUITY_SCORE: 21
ADLS_ACUITY_SCORE: 20
ADLS_ACUITY_SCORE: 20

## 2019-12-29 ASSESSMENT — VISUAL ACUITY
OU: BLURRED VISION
OU: BLURRED VISION

## 2019-12-29 NOTE — PROGRESS NOTES
12/29/19 1341   Quick Adds   Type of Visit Initial PT Evaluation   Living Environment   Lives With spouse   Living Arrangements house   Home Accessibility wheelchair accessible   Transportation Anticipated family or friend will provide   Living Environment Comment Has ramp and w/c to access hoiuse, w/c van for transport   Self-Care   Usual Activity Tolerance moderate   Current Activity Tolerance poor   Regular Exercise Yes   Activity/Exercise Type strength training;other (see comments)  (UE weights; minimal LE ROM)   Exercise Amount/Frequency 3-5 times/wk   Equipment Currently Used at Home colostomy/ostomy supplies;grab bar, tub/shower;lift device;shower chair;wheelchair, manual   Activity/Exercise/Self-Care Comment Pt wants to regain sliding board transfer ability and progression to standing if possible.   Functional Level Prior   Ambulation 1-->assistive equipment   Transferring 3-->assistive equipment and person   Toileting 1-->assistive equipment   Bathing 3-->assistive equipment and person   Communication 0-->understands/communicates without difficulty   Swallowing 0-->swallows foods/liquids without difficulty   Cognition 0 - no cognition issues reported   Fall history within last six months no   Which of the above functional risks had a recent onset or change? none   Prior Functional Level Comment Pt propels w/c independently. Pt was doing slide board transfers and standing with FWW 7 mos ago. Developed sacral sore and was bed bound at least 1 mos. She subsequently lost ability to perform transfers.   General Information   Onset of Illness/Injury or Date of Surgery - Date 12/27/19   Referring Physician Logan Montanez MD    Patient/Family Goals Statement Undecided   Pertinent History of Current Problem (include personal factors and/or comorbidities that impact the POC) Ms. Gagnon is a 69-year-old female who had a history of acute onset of pain between her shoulder blades in 2017.  She was  subsequently found to have a subdural mass compressing her spinal cord.  She underwent decompression in [place] where intraoperative findings were consistent with a spinal subdural hematoma.  She underwent spinal angiograms in 11/2017 which were negative.  She subsequently had recurrence of the spinal subdural hematoma and underwent a lumbar evacuation in 12/2017.  She had a slow recovery and regained some ability to ambulate with assistance.  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  Therefore, she underwent a 2-staged lysis of adhesions on 10/04 and 10/05/2018.  Postoperatively, she had bowel incontinence, as well as numbness in her legs.  She was discharged to rehab and again regained her ability to walk with assistance.  Her neurological status again subsequently deteriorated.  Therefore, she was referred to the [facility] where she underwent repeat spinal angiography as part of a second opinion. Surgery was done 12/27/19; no fistula was found.   Precautions/Limitations spinal precautions   General Observations LE spasticity   General Info Comments Activity: up with assist   Cognitive Status Examination   Orientation orientation to person, place and time   Level of Consciousness alert   Follows Commands and Answers Questions 100% of the time   Personal Safety and Judgment intact   Memory intact   Pain Assessment   Patient Currently in Pain Yes, see Vital Sign flowsheet   Integumentary/Edema   Integumentary/Edema no deficits were identifed   Posture    Posture Kyphosis;Protracted shoulders;Forward head position   Range of Motion (ROM)   ROM Comment LE ROM is normal bilaterally but with increased extensor hypertonus   Strength   Strength Comments Toe extensors 1-/5, knee extensors 1-/5, hip extensors 1+/5, hip adductors 1+/5; remainder of LEs 0/5   Bed Mobility   Bed Mobility Comments Pt able  "to roll side to side with minimal assist of 1 to right, supervision to left. Assist of 2 needed to reposition in bed.   Transfer Skills   Transfer Comments Ceiling lift transfer with assist of 2 bed < > chair. Pt is unable to stand.   Gait   Gait Comments Non ambulatory   Balance   Balance Comments Sitting balance needs UE assist due to core muscle weakness   Sensory Examination   Sensory Perception Comments Throbbing foot pain bilaterally   Muscle Tone   Muscle Tone Comments LE extensor hypertonicity left > right   General Therapy Interventions   Planned Therapy Interventions balance training;ROM;transfer training;wheelchair management/propulsion training;progressive activity/exercise   Intervention Comments Pt has ability to retrain for sliding board transfers.   Clinical Impression   Criteria for Skilled Therapeutic Intervention yes, treatment indicated   PT Diagnosis Impaired functional mobility, inability to ambulate   Influenced by the following impairments Paraplegia, post-op spinal surgery   Functional limitations due to impairments Bed mobility, transfers, ambulation   Clinical Presentation Evolving/Changing   Clinical Presentation Rationale Pt is POD #2   Clinical Decision Making (Complexity) Low complexity   Therapy Frequency 5x/week   Predicted Duration of Therapy Intervention (days/wks) 5 days   Anticipated Equipment Needs at Discharge wheelchair;front wheeled walker;sliding board;shower chair;patient lift;other (see comments)  (Pt has equipment at home)   Anticipated Discharge Disposition Home with Outpatient Therapy   Risk & Benefits of therapy have been explained Yes   Patient, Family & other staff in agreement with plan of care Yes   [facility] [employer] TM \"6 Clicks\"   2016, Trustees of [facility], under license to [employer].  All rights reserved.   6 Clicks Short Forms Basic Mobility Inpatient Short Form   [facility] AM-PAC  \"6 Clicks\" V.2 Basic Mobility Inpatient Short Form "   1. Turning from your back to your side while in a flat bed without using bedrails? 3 - A Little   2. Moving from lying on your back to sitting on the side of a flat bed without using bedrails? 2 - A Lot   3. Moving to and from a bed to a chair (including a wheelchair)? 1 - Total   4. Standing up from a chair using your arms (e.g., wheelchair, or bedside chair)? 1 - Total   5. To walk in hospital room? 1 - Total   6. Climbing 3-5 steps with a railing? 1 - Total   Basic Mobility Raw Score (Score out of 24.Lower scores equate to lower levels of function) 9   Total Evaluation Time   Total Evaluation Time (Minutes) 10

## 2019-12-29 NOTE — PLAN OF CARE
Cared for from [id number]: AVSS.A&Ox4. Neuros unchanged, BLE 0/5, BUE 4/5, intermittent N/T/burning sensation to BLE, denied this shift. Posterior neck incision CDI. IV SL. Regular diet with good PO intake. Cunningham in place at baseline, good UOP. Colostomy at baseline, pt would like to change bag this evening, declined to do at this time d/t pain. PRN oxy, tylenol, and robaxin given. Scheduled ms contin given. Repo in bed frequently. Up with A2, lift. Uses call light appropriately. PCD's on. BA on. Continue with POC.

## 2019-12-29 NOTE — PLAN OF CARE
Status: POD#1 posterior spinal decompression of T 5  Vitals: VSS on RA  Neuros: A&O x4, BUE 4/5, BLE 1/5, paraplegic @ baseline, complains of N/T in her lower extremities intermittently, baseline anxiety  IV: PIV SL  Resp/trach: Lung sounds are clear bilaterally  Diet: Reg, tolerating full liquid  Bowel status: Bowel sounds are active, pt has a colostomy  : Chronic Cunningham in place, good output  Skin: Incision is covered w/ primapore and is CDI. Scant drainage on bed sheet.   Pain: Complained of back pain through out the shift, found some relief w/ PRN toradol, tylenol, and oxy. On list for oral Dilaudid   Activity: Assist x2 w/ lift. Repo/turn [id number] or per pt.   Social: No visitors.   Plan: Will continue to monitor and follow POC

## 2019-12-29 NOTE — PLAN OF CARE
Status: POD#2 posterior spinal decompression of T 5  Vitals: VSS on RA  Neuros: A&O x4, BUE 4/5, BLE 1/5, paraplegic @ baseline, complains intermittent N/T in her lower extremities  IV: PIV is SL  Resp/trach: Lung sounds are clear bilaterally  Diet: Reg, tolerating  Bowel status: Bowel sounds are active, pt has a colostomy which is WDL  : Chronic Cunningham in place, good output  Skin: Incision is partially covered w/ primapore due to scant amount of bleeding from the base of the incision  Pain: Complained of back pain through out the shift, found some relief w/ PRN tylenol, and oxy  Activity: Assist x2 w/ lift  Social: No visitors this shift, pt had multiple phones calls throughout the shift  Plan: Will continue to monitor and follow POC

## 2019-12-29 NOTE — PLAN OF CARE
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 Contact precautions maintained.   Vitals: VSS, RA.   Neuros: A&O x4, BUE 4/5, BLE 1/5. Intermittent N/T of BLE. Pt reports intermittent blurred vision when waking up.   IV: PIV SL.   Resp/trach: LS CTA.   Diet: Regular. Denies nausea.   Bowel status: Colostomy pouch. Small amount of stool. Pouch decompressed. Stoma pink.   : Chronic hudson in place w/ adequate UO.   Skin: Back incision covered w/ primapore CDI. Scant serosanguinous drainage noted on sheets. Generalized bruising & scabbing.   Pain: PRN oxy changed from q4 to q3 this shift, pt reports better pain control w/ this. Pt had tylenol, oxy, & scheduled morphine over shift. Pt rested comfortably between cares.   Activity: Up w/ 2, lift. T&R q2h.   Social: No visitors over shift.   Plan: Continue to monitor & follow POC.

## 2019-12-29 NOTE — PROGRESS NOTES
12/29/19 1300   Quick Adds   Type of Visit Initial Occupational Therapy Evaluation   Living Environment   Lives With spouse   Living Arrangements house   Home Accessibility no concerns;wheelchair accessible   Self-Care   Activity/Exercise/Self-Care Comment OT: Pt was perfroming slide board transfers 7 months ago, then got pressure sore, which is now gone, also 7 months ago was using walker, would like to work towards slide board transfers, pt lost these abiliaties to use walker and slide board after being bed bound for 1 month waiting for pressure sore to heal.    Functional Level   Ambulation 1-->assistive equipment   Transferring 1-->assistive equipment   Toileting 1-->assistive equipment   Bathing 3-->assistive equipment and person   Dressing 1-->assistive equipment   Eating 0-->independent   Communication 0-->understands/communicates without difficulty   Swallowing 0-->swallows foods/liquids without difficulty   Cognition 0 - no cognition issues reported   Fall history within last six months no   Which of the above functional risks had a recent onset or change? none   Prior Functional Level Comment OT: Pt was ind  A for lift at home, full hsowers ind after spouse lifts to shower chair.    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 At neurologic baseline.   Precautions/Limitations fall precautions;spinal precautions   Weight-Bearing Status - LUE   (10# lifting restriction)   Weight-Bearing Status - RUE   (10# lifting  "restriction)   Cognitive Status Examination   Cognitive Comment OT: No concerns at this time   Visual Perception   Visual Perception Wears glasses   Range of Motion (ROM)   ROM Comment OT: BUE WFL   Strength   Strength Comments OT: BUE grossly 4/5   Muscle Tone Assessment   Muscle Tone Comments OT: Overall deconditioned   Mobility   Bed Mobility Comments OT: min A rolling   Transfer Skills   Transfer Comments OT: total A x2 sling transfers   Instrumental Activities of Daily Living (IADL)   IADL Comments OT: Pt's spouse assists w/ all IADLS   Activities of Daily Living Analysis   Impairments Contributing to Impaired Activities of Daily Living balance impaired;pain;post surgical precautions;strength decreased   General Therapy Interventions   Planned Therapy Interventions ADL retraining;IADL retraining;balance training;bed mobility training;strengthening;transfer training;home program guidelines;progressive activity/exercise   Clinical Impression   Criteria for Skilled Therapeutic Interventions Met yes, treatment indicated   OT Diagnosis decreased ind in ADLS/IADLS   Influenced by the following impairments generalized weakness and precautions   Assessment of Occupational Performance 1-3 Performance Deficits   Identified Performance Deficits decreased ind in ADLS/IADLS   Clinical Decision Making (Complexity) Low complexity   Therapy Frequency Daily   Predicted Duration of Therapy Intervention (days/wks) 1/20/19   Anticipated Discharge Disposition Acute Rehabilitation Facility;Transitional Care Facility   Risks and Benefits of Treatment have been explained. Yes   Patient, Family & other staff in agreement with plan of care Yes   [facility] [employer] TM \"6 Clicks\"   2016, Trustees of [facility], under license to [email or web address].  All rights reserved.   6 Clicks Short Forms Daily Activity Inpatient Short Form   [facility] AM-PAC  \"6 Clicks\" Daily Activity Inpatient Short Form   1. Putting on and taking off " regular lower body clothing? 2 - A Lot   2. Bathing (including washing, rinsing, drying)? 2 - A Lot   3. Toileting, which includes using toilet, bedpan or urinal? 1 - Total   4. Putting on and taking off regular upper body clothing? 2 - A Lot   5. Taking care of personal grooming such as brushing teeth? 3 - A Little   6. Eating meals? 4 - None   Daily Activity Raw Score (Score out of 24.Lower scores equate to lower levels of function) 14   Total Evaluation Time   Total Evaluation Time (Minutes) 4

## 2019-12-29 NOTE — PLAN OF CARE
Discharge Planner OT   Patient plan for discharge: not stated  Current status:  Pt min A supine > sidelying for sling placement and total A x2 sling transfer to chair. Pt's SBP in chair 144, then rechecked and 126, OT informed RN, one MD note stating SBP>160 which was an error (MD confirmed with RN) parameter are SBP<160. OT educated pt on ADLS w/in precautions.   Barriers to return to prior living situation: medical status  Recommendations for discharge: rehab  Rationale for recommendations: to increase ind in ADLS/IADLS       Entered by: Lala Aden 12/29/2019 1:16 PM

## 2019-12-29 NOTE — PLAN OF CARE
Discharge Planner PT   PT 6A  Patient plan for discharge: Requests OP PT follow-up and return home.  Current status: PT evaluation completed and treatment initiated. Pt is total assist for transfer currently. LE ROM is normal bilaterally and with extensor hypertonus left  > right. LE strength: toe extensors and knee extensors 1-/5, hip extensors and adductors 1+/5. Remainder of LE strength is 0/5. Pt transfers bed to recliner with ceiling lift and assist of 2. Assist needed for bed mobility and repositioning.   Barriers to return to prior living situation: Medical condition  Recommendations for discharge: Home with assist of spouse, OP PT  Rationale for recommendations: Pt is set-up for care at home from . Pt desires to work on resuming sliding board transfers and potentially standing activity. Lift is present at home to facilitate transfers.       Entered by: Jon Currie 12/29/2019 3:14 PM

## 2019-12-30 ENCOUNTER — APPOINTMENT (OUTPATIENT)
Dept: OCCUPATIONAL THERAPY | Facility: CLINIC | Age: 69
DRG: 029 | End: 2019-12-30
Attending: NEUROLOGICAL SURGERY
Payer: MEDICARE

## 2019-12-30 PROCEDURE — 25000132 ZZH RX MED GY IP 250 OP 250 PS 637: Mod: GY | Performed by: STUDENT IN AN ORGANIZED HEALTH CARE EDUCATION/TRAINING PROGRAM

## 2019-12-30 PROCEDURE — G0463 HOSPITAL OUTPT CLINIC VISIT: HCPCS

## 2019-12-30 PROCEDURE — 12000001 ZZH R&B MED SURG/OB UMMC

## 2019-12-30 PROCEDURE — 97530 THERAPEUTIC ACTIVITIES: CPT | Mod: GO

## 2019-12-30 PROCEDURE — 97535 SELF CARE MNGMENT TRAINING: CPT | Mod: GO

## 2019-12-30 RX ORDER — DIAZEPAM 5 MG
5 TABLET ORAL EVERY 6 HOURS PRN
Qty: 30 TABLET | Refills: 0 | Status: SHIPPED | OUTPATIENT
Start: 2019-12-30 | End: 2020-01-02

## 2019-12-30 RX ORDER — AMOXICILLIN 250 MG
3 CAPSULE ORAL 2 TIMES DAILY
Qty: 30 TABLET | Refills: 0 | Status: SHIPPED | OUTPATIENT
Start: 2019-12-30 | End: 2021-03-04

## 2019-12-30 RX ORDER — POLYETHYLENE GLYCOL 3350 17 G/17G
17 POWDER, FOR SOLUTION ORAL DAILY
Qty: 14 PACKET | Refills: 0 | Status: SHIPPED | OUTPATIENT
Start: 2019-12-31 | End: 2021-03-04

## 2019-12-30 RX ORDER — GABAPENTIN 800 MG/1
800 TABLET ORAL 4 TIMES DAILY
Qty: 120 TABLET | Refills: 3 | Status: SHIPPED | OUTPATIENT
Start: 2019-12-30 | End: 2021-03-04

## 2019-12-30 RX ORDER — OXYCODONE HYDROCHLORIDE 5 MG/1
5-10 TABLET ORAL EVERY 4 HOURS PRN
Qty: 60 TABLET | Refills: 0 | Status: SHIPPED | OUTPATIENT
Start: 2019-12-30 | End: 2020-01-02

## 2019-12-30 RX ADMIN — ACETAMINOPHEN 650 MG: 325 TABLET, FILM COATED ORAL at 20:38

## 2019-12-30 RX ADMIN — MORPHINE SULFATE 15 MG: 15 TABLET, EXTENDED RELEASE ORAL at 06:22

## 2019-12-30 RX ADMIN — GABAPENTIN 800 MG: 800 TABLET, FILM COATED ORAL at 08:27

## 2019-12-30 RX ADMIN — GABAPENTIN 800 MG: 800 TABLET, FILM COATED ORAL at 15:46

## 2019-12-30 RX ADMIN — MENTHOL 1 PATCH: 205.5 PATCH TOPICAL at 22:03

## 2019-12-30 RX ADMIN — GABAPENTIN 800 MG: 800 TABLET, FILM COATED ORAL at 20:30

## 2019-12-30 RX ADMIN — OXYCODONE HYDROCHLORIDE 10 MG: 5 TABLET ORAL at 13:30

## 2019-12-30 RX ADMIN — OXYCODONE HYDROCHLORIDE 10 MG: 5 TABLET ORAL at 20:38

## 2019-12-30 RX ADMIN — ACETAMINOPHEN 650 MG: 325 TABLET, FILM COATED ORAL at 04:09

## 2019-12-30 RX ADMIN — METHOCARBAMOL 500 MG: 500 TABLET, FILM COATED ORAL at 04:09

## 2019-12-30 RX ADMIN — ACETAMINOPHEN 650 MG: 325 TABLET, FILM COATED ORAL at 13:30

## 2019-12-30 RX ADMIN — Medication 12.5 MG: at 08:27

## 2019-12-30 RX ADMIN — BACLOFEN 10 MG: 10 TABLET ORAL at 20:30

## 2019-12-30 RX ADMIN — MORPHINE SULFATE 15 MG: 15 TABLET, EXTENDED RELEASE ORAL at 17:53

## 2019-12-30 RX ADMIN — GABAPENTIN 800 MG: 800 TABLET, FILM COATED ORAL at 13:30

## 2019-12-30 RX ADMIN — OXYCODONE HYDROCHLORIDE 10 MG: 5 TABLET ORAL at 04:09

## 2019-12-30 ASSESSMENT — ACTIVITIES OF DAILY LIVING (ADL)
ADLS_ACUITY_SCORE: 21

## 2019-12-30 ASSESSMENT — PAIN DESCRIPTION - DESCRIPTORS: DESCRIPTORS: ACHING

## 2019-12-30 ASSESSMENT — VISUAL ACUITY
OU: BASELINE

## 2019-12-30 NOTE — PROGRESS NOTES
"WOC Focused Assessment    S: WOC to assess established stoma with report of bleeding around the stoma.     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 At neurologic baseline.    A: Pt has 1\" pink, moist, protuberant Colostomy that she is managing independently. Pt using Burnett NI Lock n roll. Pt indicates she was using skin prep to periwound but was having a slight rash. Pt reports this is much improved today as she just applied base as WOC walked in the room. WOC reviewed steps of application, discussed only cleansing with water and not using other products it obtaining a good seal and no other skin issues.     R: Additional Pouches left at bedside    Pt able to direct care of pouch changes or change independently.   Colostomy- to be Changed M/TH and PRN  Burnett Barrier Flat #[id number]  Shiva Pouch #[id number]  1. cut opening in barrier/pouch following pattern.   2. Remove plastic backing   3. Open ostomy barrier ring and stretch to approx size of opening in barrier. Apply around opening on sticky side of barrier. Press into place.   4.  Remove old pouch from around stoma. Discard.   5.  Cleanse skin around stoma with water. Pat dry.  6.  Center stoma in barrier opening and apply over stoma  7.  Snap barrier into place.   8.  Make sure pouch is closed at the bottom  9.  Date pouch    WOC nurse signing off, please re consult with further needs  "

## 2019-12-30 NOTE — CONSULTS
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     Contact precautions maintained.  Chronic hudson cathter in place   Back incision needs care     fucntionally   sirena ashokhermila chair   Needs assistance to get on the shower   Pain limits her   Some tone and takes     At basli, has ahusband who is avialble 24 hr/7  Lives in a sine level home, house is well set up    Social hisotru  Lives in [place]     Recommendsatuons   Recommend PMR follow up, can follow up in [facility]   Neuropathic pain on Neurontin 800 QID, lidocaine patches, MS contin at night   Recommend duloxetine following discharge to address depression as well as neuropathic pain   Discharge home with OP based PT/OT

## 2019-12-30 NOTE — PLAN OF CARE
Status: POD#3 posterior spinal decompression of T 5  Vitals: VSS on RA  Neuros: A&O x4, BUE 4/5, BLE 1/5, paraplegic @ baseline, complains intermittent N/T in her lower extremities  IV: PIV is SL  Resp/trach: Lung sounds are clear bilaterally  Diet: Reg, tolerating  Bowel status: Bowel sounds are active, pt has a colostomy which is WDL, bag changed today per pt  : Chronic Cunningham in place, good output  Skin: Incision is partially covered w/ primapore, dressing was CDI  Pain: Complained of back pain through out the shift, found some relief w/ PRN tylenol and oxy  Activity: Assist x2 w/ lift  Social: No visitors this shift, pt has been communicating w/ her  over the phone  Plan: Will continue to monitor and follow POC, potential discharge tomorrow

## 2019-12-30 NOTE — PLAN OF CARE
Status: POD#2 posterior spinal decompression of T 5  Vitals: VSS on RA  Neuros: A&Ox4. BUE 4/5 BLE 1/5, parapalegic at BL. Int. N/T and pain to BLE at BL.   IV: PIV SL  Resp/trach: WNL  Diet: Regular diet  Bowel status: Has colostomy w/ normal output. WOC consult ordered for tomorrow to assess redness at stoma.   : Chronic hudson in place. Patient complained of burning of griselda area after griselda cares, barrier cream applied.  Skin: Back incision RICHARD, Bottom of incision with small amount of drainage covered with primMD skye assessed and replaced dressing. Unable to collect beta 2 microglobulin, collect drainage if possible. Notify MD if drainage continues for reinforcement w/ skin glue.  Pain: Complains of pain in BLE and muscle spasms treated with PRN Robaxin , Oxycodone, Tylenol and baclofen. Valium given x1 for anxiety.  Activity: A2 + lift  Social: No visitors this shift  Plan: Continue to monitor and follow POC

## 2019-12-30 NOTE — PLAN OF CARE
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 Contact precautions maintained.   Vitals: VSS, RA.   Neuros: A&O x4, BUE 4/5, BLE 1/5. Intermittent N/T of BLE.   IV: PIV SL.   Resp/trach: LS CTA.   Diet: Regular. Denies nausea.   Bowel status: Colostomy pouch. Small amount of stool. Stoma reddened. Pt did not want writer to change pouch over shift. WOC consult in place for stoma.   : Chronic hudson in place w/ adequate UO.   Skin: Back incision covered w/ new primapore CDI. No new drainage noted. Generalized bruising & scabbing.   Pain: Pt had tylenol, Oxy, robaxin, & scheduled morphine over shift for back pain.   Activity: Up w/ 2, lift. T&R q2h.   Social: No visitors over shift.    Plan: Continue to monitor & follow POC.

## 2019-12-30 NOTE — PLAN OF CARE
OT 6A  Discharge Planner OT   Patient plan for discharge: Home with assist  Current status: SBA for bilateral rolling in bed for sling placement. Pt dependently transferred from supine to EOB to recliner with Ax2 and lift. Pt sat EOB for ~10 minutes with SBA/ to brief min A while completing dynamic reaching to work on core strengthening. Pt completed g/h while seated in chair with SBA and set up required.   Barriers to return to prior living situation: acute medical needs, post surgical precautions  Recommendations for discharge: Home with assist from spouse and OP PT   Rationale for recommendations: Pt is near baseline for ADL completion and functional mobility. Pt has been lift dependent for the last 7 months and has a lift at home. Pt would like to progress transfers to slide board transfers therefore would recommend OP PT       Entered by: Kathy North 12/30/2019 11:08 AM

## 2019-12-30 NOTE — CONSULTS
[facility]  Physical Medicine and Rehabilitation  Inpatient Consultation    Date of Service:  12/30/2019  Consulting Provider:  Tyrell Restrepo MD  Patient Unit:  [id number]    Assessment/Plan:  Ms. Gagnon is a 69 year old, right handed female with past medical history significant for depression, spinal cord injury T1-T6 with paraplegia, mitral valve prolapse, and MRSA secondary to decubitus ulcer.  Patient was seen for preoperative recommendations at outside hospital prior to presenting to the [facility].  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     Paraplegia likely secondary to spinal cord subdural hematoma, however at this point in time she is now status post lysis of adhesions.  Patient's pain at surgical site is well controlled at this time.  She does complain of bilateral lower extremity neuropathic pain.  Gabapentin has been increased to 800 mg 4 times daily for improved control.  She is currently taking APAP and MS Contin as well.  She states that the pain is 10 out of 10, intermittent, worse at night.  She also has a history of depression, not on any antidepressants at this time.  Given patient's musculoskeletal, neuropathic, and history of depression, patient would likely benefit from initiation of Duloxetine.  This can be done as an outpatient and monitored by her primary care physician.    Related to patient's tone and muscle spasms, patient is utilizing Baclofen 10 mg as needed.  She does not believe that it bothers her very much at this time, however she would benefit from outpatient Physical Therapy as she is demonstrating 2/4 modified Stephanie scale spasticity in her lower extremities.  Stretching exercises most likely improve some of her tone.  She is advised to work with a physiatrist for further recommendations.   "Additionally, patient may follow-up with Neurosurgery related to pain pump or spinal stimulator if these are options she would like to pursue.    1.  Neuropathic pain, may consider Duloxetine initiation as an outpatient.  2.  Spasticity, outpatient Physical Therapy and follow-up with Physiatry.    3.  Home with family support.  Patient spouse provides majority of ADLs and IADLs assistance.  Home is accessible.    Disposition: Home w/ family support  Rehab prognosis: Good    Trino Kang MD, MPH  Resident Physician, PGY-III  Physical Medicine & Rehabilitation  [facility]     Chief Complaint: Bilateral feet pain    History of Present Illness:  Ms. Gagnon is a 69 year old, right handed female with past medical history significant for depression, spinal cord injury T1-T6 with paraplegia, mitral valve prolapse, and MRSA secondary to decubitus ulcer.  Patient was seen for preoperative recommendations at outside hospital prior to presenting to the [facility].  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     Summation the patient's care as below:  \"Ms. Gagnon is a 69-year-old female who had a history of acute onset of pain between her shoulder blades in 2017.  She was subsequently found to have a subdural mass compressing her spinal cord.  She underwent decompression in [place] where intraoperative findings were consistent with a spinal subdural hematoma.  She underwent spinal angiograms in 11/2017 which were negative.  She subsequently had recurrence of the spinal subdural hematoma and underwent a lumbar evacuation in 12/2017.  She had a slow recovery and regained some ability to ambulate with assistance.  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  Therefore, she " "underwent a 2-staged lysis of adhesions on 10/04 and 10/05/2018.  Postoperatively, she had bowel incontinence, as well as numbness in her legs.\"    During surgery, patient was found to have arachnoid adhesions without evidence of abnormal vasculature, no dural AV fistula.    Physical Medicine and Rehabilitation was consulted for discharge recommendations.    As of 12/30/2019:  Physical Therapy: 12/29-total assistance for transfers currently.  Lower extremity range of motion is normal bilaterally.  Recommending discharge home with assistance of spouse in outpatient PT.  Occupational Therapy: 12/29-total assistance of 2 for sling transfers to chair.  Minimum assistance for supine to side-lying.    Patient was seen this morning at bedside with nursing staff.  Patient reports that she continues to have bilateral lower extremity weakness, has been using a manual wheelchair for the past 8 to 9 months.  She does have adhesions in her thoracic spine that was just released on this discharge.  She continues to have no motion in her lower extremities, unable to activate.  She states that right now she is most uncomfortable due to pain.  She reports having intermittent 10/10 pain in her bilateral feet, characterizes burning.  She had her gabapentin increased to 800 mg 4 times daily, and continues to utilize MS Contin and APAP.  Patient is unsure if there is anything else that could be helpful, did state that she would consider a pain pump or spinal stimulator.  Advised patient to continue discussions with Neurosurgery related to spinal stimulator.  We briefly discussed possible use of pain pump, advised patient that she likely would benefit from utilization of medication such as Duloxetine given her history of depression, muscle skeletal pain and neuropathic pain.  Patient would like to pursue this as an outpatient.  Patient also was advised consider seeing a physiatrist closer to home.  She reports that she will be seeing one " at [facility].      Otherwise, patient denies any headaches, fevers, chills, chest pain, shortness of breath abdominal pain, nausea, and vomiting.  She does endorse having a Cunningham, colostomy bag, neuropathic pain, muscle spasms, and impaired sensation in her lower extremities.    Review of Systems:  A 10 point review of systems was performed, negative unless specified in HPI.    Active Problem List:  Patient Active Problem List   Diagnosis     Other specified cardiac dysrhythmias(427.89)     CARDIOVASCULAR SCREENING; LDL GOAL LESS THAN 160     History of skin cancer     Headache     Menopause     Anxiety reaction     Osteoarthritis     Acute incomplete paraplegia (H)     Bilateral lower extremity edema     Chronic bilateral low back pain with bilateral sciatica     Contact dermatitis     Edema of spinal cord (H)     Hematuria     History of recurrent UTIs     History of spinal surgery     Hypertension     History of urinary retention     Incomplete emptying of bladder     Incomplete injury of thoracic spinal cord in T1 to T6 region without bony injury (H)     Leg weakness, bilateral     Muscle spasticity     MVP (mitral valve prolapse)     Palpitations     Pressure ulcer of coccygeal region, stage 3 (H)     Spinal cord injury at T7-T12 level (H)     Spinal cord injury of thoracic region without bone injury (H)     Tachycardia     Pure hypercholesterolemia     Ulnar neuropathy at elbow, left     Nocturnal enuresis     Urge incontinence of urine     Arteriovenous fistula of spinal cord vessels     Dural arteriovenous fistula       Past Medical History:   Past Medical History:   Diagnosis Date     CARDIAC DYSRHYTHMIAS NEC 10/3/2007    Taking atenelol for years for this     History of skin cancer      Mitral valve prolapse      Neurogenic bladder      Sacral decubitus ulcer, stage IV (H)      Thoracic spinal cord injury (H)        Past Surgical History:  Past Surgical History:   Procedure Laterality Date      IR SPINAL ANGIOGRAM  10/16/2019     LAPAROSCOPIC TUBAL LIGATION         Family Medical History:  Family History   Problem Relation Age of Onset     C.A.D. Father          41       Functional History:  Prior to hospitalization, patient patient utilizes a manual wheelchair with assistance of her  for the past 8 to 9 months.  She is currently retired, previously worked as a CNA.    Social History:  History   Smoking Status     Never Smoker   Smokeless Tobacco     Never Used     Social History    Substance and Sexual Activity      Alcohol use: No    History   Drug Use No       Living Situation:  [place]  There are 0 steps to enter, ramp in place.  The kitchen, bedroom, and bathroom are on main floor.  She has retired spouse who can provide support.  The home is accessible.    Current Medications:  Current Facility-Administered Medications   Medication     acetaminophen (TYLENOL) tablet 650 mg     baclofen (LIORESAL) tablet 10 mg     benzocaine-menthol (CEPACOL) 15-3.6 MG lozenge 1 lozenge     calcium carbonate (TUMS) chewable tablet 1,000 mg     diazepam (VALIUM) tablet 5 mg     gabapentin (NEURONTIN) tablet 800 mg     hydrALAZINE (APRESOLINE) injection 10-20 mg     hydrOXYzine (ATARAX) tablet 10 mg     ketorolac (TORADOL) injection 15 mg     labetalol (NORMODYNE/TRANDATE) syringe 10-40 mg     Lidocaine (LIDOCARE) 4 % Patch 3 patch     lidocaine (LMX4) cream     lidocaine 1 % 0.1-1 mL     lidocaine patch in PLACE     magnesium sulfate 4 g in 100 mL sterile water (premade)     melatonin tablet 1 mg     menthol (ICY HOT) 5 % patch 1 patch    And     menthol (ICY HOT) Patch in Place     methocarbamol (ROBAXIN) tablet 500 mg     metoclopramide (REGLAN) tablet 5 mg    Or     metoclopramide (REGLAN) injection 5 mg     metoprolol succinate (TOPROL-XL) half-tab 12.5 mg     morphine (MS CONTIN) 12 hr tablet 15 mg     naloxone (NARCAN) injection  "0.1-0.4 mg     ondansetron (ZOFRAN-ODT) ODT tab 4 mg    Or     ondansetron (ZOFRAN) injection 4 mg     oxyCODONE (ROXICODONE) tablet 5-10 mg     polyethylene glycol (MIRALAX/GLYCOLAX) Packet 17 g     potassium chloride (KLOR-CON) Packet 20-40 mEq     potassium chloride 10 mEq in 100 mL intermittent infusion with 10 mg lidocaine     potassium chloride 10 mEq in 100 mL sterile water intermittent infusion (premix)     potassium chloride 20 mEq in 50 mL intermittent infusion     potassium chloride ER (K-DUR/KLOR-CON M) CR tablet 20-40 mEq     potassium phosphate 15 mmol in D5W 250 mL intermittent infusion     prochlorperazine (COMPAZINE) injection 5 mg    Or     prochlorperazine (COMPAZINE) tablet 5 mg    Or     prochlorperazine (COMPAZINE) Suppository 12.5 mg     senna-docusate (SENOKOT-S/PERICOLACE) 8.6-50 MG per tablet 3 tablet    Or     senna-docusate (SENOKOT-S/PERICOLACE) 8.6-50 MG per tablet 2 tablet     sodium chloride (PF) 0.9% PF flush 3 mL     sodium chloride (PF) 0.9% PF flush 3 mL       Allergies:  Allergies   Allergen Reactions     Contrast Dye Rash     Painful rash        Physical Exam:  Vitals:    12/29/19 2004 12/29/19 2307 12/30/19 0406 12/30/19 0412   BP: (!) 148/73 (!) 140/63 (!) 171/77 (!) 155/77   BP Location: Right arm Right arm Right arm Left arm   Pulse: 83 67 86    Resp: 16 12 16    Temp: 98.8  F (37.1  C) 98.5  F (36.9  C) 97.3  F (36.3  C)    TempSrc: Oral Oral Oral    SpO2: 96% 98% 94%    Weight:       Height:         BP (!) 155/77 (BP Location: Left arm)   Pulse 86   Temp 97.3  F (36.3  C) (Oral)   Resp 16   Ht 1.626 m (5' 4\")   Wt 55.3 kg (121 lb 14.4 oz)   SpO2 94%   BMI 20.92 kg/m      General: Elderly,  female, supine in bed, no acute distress.  HEENT:  Atraumatic, normocephalic, oropharynx clear.    Cardiovascular:  S1, S2, no murmurs, clicks, or rubs.    Pulmonary:  Vesicular breath sounds bilaterally, no wheezing, rales, or rhonchi.   Gastroenterology:  Soft, " nontender, nondistended, active bowel sounds.  Colostomy bag noted.  Genitourinary: Cunningham intact, patent, with yellowish urine.  Extremities:  Warm and well perfused in bilateral upper and lower extremities.  MSK/Neurology:   Mental Status:  Alert and awake.  Follows 1-2 step commands.   Orientation:  To person, place, and time.  Cranial Nerves:   2nd CN: Pupils equal, round, reactive to light and accomodation.  3rd,4th,and 6th CN:  H-test intact, no nystagmus and no saccades.  5th CN: Facial sensation intact in V1, V2, and V3 to light touch.  7th CN: Face symmetric to cheek puff and smile.  8th CN: Intact to light finger rub bilaterally.  9th and 10th CN: Palate elevates symmetrically.  Non-hoarse voice.  11th CN: Sternocleidomastoids and trapezius symmetric and strong.  12th CN: Tongue midline and without fasciculations.  Sensory: Normal to light touch in the bilateral upper/lower extremities.  Strength:  Scored: _/5 Right Left   Shoulder Abd 4 4   Elbow Flexion 4 4   Elbow Extension 4 4   Wrist Extension 4 4   Hand Intrinsics 4 4    Strength 2+ 2+   Hip Flexion 0 0   Knee Extension 0 0   Ankle Dorsiflexion 0 0      Reflexes: Bilateral 6-10 beat ankle clonus.  Scored: _/4 Right Left   Biceps 2+ 2+     Babinski reflex: Downgoing bilaterally.     Tone: Modified Stephanie: 2/4 bilateral hamstrings, left greater than right.  Improves with stretching.  Abnormal movements: None.  Speech: Fluent, able to make needs known, no word finding difficulties noted.  Cognition: Not formally assessed.  Gait: Utilizes manual wheelchair.  Psychiatry: Fatigued about being in the hospital, wishes to return home soon as possible.  Denies any thoughts of suicide.  Reports history of depression.  Dermatology: Intact were exposed.    Labs:  Lab Results   Component Value Date    WBC 9.3 12/28/2019    HGB 10.7 (L) 12/28/2019    HCT 34.3 (L) 12/28/2019     12/28/2019     12/28/2019     Lab Results   Component Value Date      12/28/2019    POTASSIUM 3.7 12/28/2019    CHLORIDE 109 12/28/2019    CO2 27 12/28/2019    GLC 91 12/28/2019     Lab Results   Component Value Date    GFRESTIMATED >90 12/28/2019    GFRESTBLACK >90 12/28/2019     No results found for: AST, ALT, GGT, ALKPHOS, BILITOTAL, BILICONJ, BILIDIRECT, DAVID  Lab Results   Component Value Date    INR 1.04 12/27/2019     Lab Results   Component Value Date    BUN 10 12/28/2019    CR 0.56 12/28/2019     Lab Results   Component Value Date    TSH 2.33 08/07/2010     Hemoglobin A1C   Date Value Ref Range Status   11/17/2011 5.3 4.3 - 6.0 % Final       Imaging:  No results found.     Code Status:  No Order

## 2019-12-30 NOTE — PROGRESS NOTES
"  Care Coordinator Progress Note    Admission Date/Time:  12/27/2019  Attending MD:  Dr Alf Ritchie    Data  Chart reviewed, discussed with interdisciplinary team. In 6A Discharge Rounds Rocio Lieberman NP, reported pt can discharge after she has mobilized. Per report, pt only wants to discharge home. Hx of being w/c bound at baseline; up with a lift.   PT & OT recommending outpt PT.     Patient was admitted for:  Thoracic spinal cord injury.   Procedure:  Exploration of intradural space for suspected dural arteriovenous fistula at T5.     Past medical history includes:  Depression; spinal cord injury T1-T6 with paraplegia likely secondary to spinal subdural hematoma; mitral valve prolapse; MRSA secondary to decubitus ulcer. Has a colostomy. See pre-op H&P for complete history.  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 At neurologic baseline.\"    Concerns with insurance coverage for discharge needs: None. Pt's insurance is Medicare & [employer].   Current Living Situation: Patient lives with spouse.  Support System: Supportive and Involved  Services Involved: Home Care. Pt had [facility] prior to admission.   Transportation at Discharge: Family or friend will provide  Transportation to Medical Appointments:   - Name of caregiver:   Barriers to Discharge: None    Coordination of Care and Referrals   Introduced myself to pt and explained I help with discharge planning. Pt awake and alert, resting in bed. Pt said she plans to go home tomorrow. Pt lives with her  and he provides a lot of help and support. Pt said she has home care every 2 weeks thru [facility] in [place].   Pt plans to do outpt PT at [facility] in " [place], they are connected to the hospital. She asked when she could start therapy. Will ask the doctors. Informed her there may be some restrictions due to her incision. Pt goal is to use the slide board for transfers again. In the past when using a slide board she developed a wound and couldn't use the slide board. Her  transfers her with a lift.   Pt said a doctor mentioned trying Cymbalta for depression and to help with pain.   Called [facility] in [place] and left a message for marta Duncan's RN Case Manager. Phone: [phone].     Assessment  Pt recovering after spine surgery. Supportive . Anticipate discharge home with resumption of home care services.      Plan  Anticipated Discharge Date:  12-31-19  Anticipated Discharge Plan:   Discharge home with . Outpatient PT.   --Clarify when pt can start outpt PT and if there are any restrictions.   --Fax Discharge Orders to [facility] upon discharge.          Gris Charles RN Care Coordinator  Unit [address], [facility]  Phone: [phone]

## 2019-12-30 NOTE — PLAN OF CARE
6A PT: Cancel and defer     Discharge Planner PT   Patient plan for discharge: home w/ assist from spouse  Current status: Per notes pt to be discharging home with assist (likely tomorrow) from spouse with use of overhead lift for transfers. Pt being followed by OT while inpatient to progress basic ADLs as well as UE/core strength in sitting with ultimate goal of progression to slide board transfer. Plan in place to discharge with OP PT to progress this goal, will complete orders while inpatient given appropriateness to be followed by one discipline only.   Barriers to return to prior living situation: no PT barriers  Recommendations for discharge: home w/ assist, OP PT   Rationale for recommendations: See above, will complete orders.     Physical Therapy Discharge Summary    Reason for therapy discharge:    Discharged to home with outpatient therapy.    Progress towards therapy goal(s). See goals on Care Plan in [facility] electronic health record for goal details.  Goals partially met.  Barriers to achieving goals:   discharge from facility and changed based on inpatient need. .    Therapy recommendation(s):    Continued therapy is recommended.  Rationale/Recommendations:  OP PT to progress mobility goals post discharge. .           Entered by: Dulce Giordano 12/30/2019 5:09 PM

## 2019-12-30 NOTE — PROGRESS NOTES
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 At neurologic baseline.    Plan:  HOB > 45  PT/OT: Home with assist vs rehab; consider PM&R consult  SBP > 160  Pain control  Likely discharge when confirm no CSF leak    Jordi Núñez MD  Neurosurgery Resident PGY2    Please contact neurosurgery resident on call with questions.    Dial * * [phone], enter [phone] when prompted.       I have reviewed the history above and agree with the resident's assessment and plan.  RAMYA Rios MD\    "

## 2019-12-31 VITALS
HEIGHT: 64 IN | TEMPERATURE: 97.9 F | OXYGEN SATURATION: 96 % | BODY MASS INDEX: 20.81 KG/M2 | SYSTOLIC BLOOD PRESSURE: 139 MMHG | RESPIRATION RATE: 16 BRPM | HEART RATE: 79 BPM | WEIGHT: 121.9 LBS | DIASTOLIC BLOOD PRESSURE: 54 MMHG

## 2019-12-31 LAB
ANION GAP SERPL CALCULATED.3IONS-SCNC: 2 MMOL/L (ref 3–14)
B2 MICROGLOB SERPL-MCNC: 1.7 MG/L
BASOPHILS # BLD AUTO: 0.1 10E9/L (ref 0–0.2)
BASOPHILS NFR BLD AUTO: 0.8 %
BUN SERPL-MCNC: 10 MG/DL (ref 7–30)
CALCIUM SERPL-MCNC: 8 MG/DL (ref 8.5–10.1)
CHLORIDE SERPL-SCNC: 110 MMOL/L (ref 94–109)
CO2 SERPL-SCNC: 32 MMOL/L (ref 20–32)
CREAT SERPL-MCNC: 0.49 MG/DL (ref 0.52–1.04)
DIFFERENTIAL METHOD BLD: ABNORMAL
EOSINOPHIL # BLD AUTO: 0.5 10E9/L (ref 0–0.7)
EOSINOPHIL NFR BLD AUTO: 7.4 %
ERYTHROCYTE [DISTWIDTH] IN BLOOD BY AUTOMATED COUNT: 13.4 % (ref 10–15)
GFR SERPL CREATININE-BSD FRML MDRD: >90 ML/MIN/{1.73_M2}
GLUCOSE SERPL-MCNC: 86 MG/DL (ref 70–99)
HCT VFR BLD AUTO: 32.7 % (ref 35–47)
HGB BLD-MCNC: 10.6 G/DL (ref 11.7–15.7)
IMM GRANULOCYTES # BLD: 0 10E9/L (ref 0–0.4)
IMM GRANULOCYTES NFR BLD: 0.2 %
LYMPHOCYTES # BLD AUTO: 1.5 10E9/L (ref 0.8–5.3)
LYMPHOCYTES NFR BLD AUTO: 23.6 %
MCH RBC QN AUTO: 31.5 PG (ref 26.5–33)
MCHC RBC AUTO-ENTMCNC: 32.4 G/DL (ref 31.5–36.5)
MCV RBC AUTO: 97 FL (ref 78–100)
MONOCYTES # BLD AUTO: 0.5 10E9/L (ref 0–1.3)
MONOCYTES NFR BLD AUTO: 8.2 %
NEUTROPHILS # BLD AUTO: 3.7 10E9/L (ref 1.6–8.3)
NEUTROPHILS NFR BLD AUTO: 59.8 %
NRBC # BLD AUTO: 0 10*3/UL
NRBC BLD AUTO-RTO: 0 /100
PLATELET # BLD AUTO: 144 10E9/L (ref 150–450)
POTASSIUM SERPL-SCNC: 3.7 MMOL/L (ref 3.4–5.3)
RBC # BLD AUTO: 3.36 10E12/L (ref 3.8–5.2)
SODIUM SERPL-SCNC: 144 MMOL/L (ref 133–144)
WBC # BLD AUTO: 6.2 10E9/L (ref 4–11)

## 2019-12-31 PROCEDURE — 36415 COLL VENOUS BLD VENIPUNCTURE: CPT | Performed by: NEUROLOGICAL SURGERY

## 2019-12-31 PROCEDURE — 25000132 ZZH RX MED GY IP 250 OP 250 PS 637: Mod: GY | Performed by: STUDENT IN AN ORGANIZED HEALTH CARE EDUCATION/TRAINING PROGRAM

## 2019-12-31 PROCEDURE — 80048 BASIC METABOLIC PNL TOTAL CA: CPT | Performed by: NEUROLOGICAL SURGERY

## 2019-12-31 PROCEDURE — 82232 ASSAY OF BETA-2 PROTEIN: CPT | Performed by: NEUROLOGICAL SURGERY

## 2019-12-31 PROCEDURE — 85025 COMPLETE CBC W/AUTO DIFF WBC: CPT | Performed by: NEUROLOGICAL SURGERY

## 2019-12-31 RX ADMIN — Medication 12.5 MG: at 07:45

## 2019-12-31 RX ADMIN — BACLOFEN 10 MG: 10 TABLET ORAL at 10:59

## 2019-12-31 RX ADMIN — OXYCODONE HYDROCHLORIDE 10 MG: 5 TABLET ORAL at 07:40

## 2019-12-31 RX ADMIN — OXYCODONE HYDROCHLORIDE 10 MG: 5 TABLET ORAL at 10:59

## 2019-12-31 RX ADMIN — LIDOCAINE 3 PATCH: 560 PATCH PERCUTANEOUS; TOPICAL; TRANSDERMAL at 07:45

## 2019-12-31 RX ADMIN — GABAPENTIN 800 MG: 800 TABLET, FILM COATED ORAL at 07:44

## 2019-12-31 RX ADMIN — MORPHINE SULFATE 15 MG: 15 TABLET, EXTENDED RELEASE ORAL at 06:16

## 2019-12-31 ASSESSMENT — ACTIVITIES OF DAILY LIVING (ADL)
ADLS_ACUITY_SCORE: 21

## 2019-12-31 ASSESSMENT — VISUAL ACUITY
OU: BASELINE

## 2019-12-31 NOTE — PLAN OF CARE
OT: per discussion with pt, she is discharging home shortly when spouse arrives. Pt declined therapy today reporting too much pain and saving energy for discharge home.     Occupational Therapy Discharge Summary    Reason for therapy discharge:    Discharged to home with outpatient therapy.    Progress towards therapy goal(s). See goals on Care Plan in [facility] electronic health record for goal details.  Goals not met.  Barriers to achieving goals:   discharge from facility.    Therapy recommendation(s):    Continued therapy is recommended.  Rationale/Recommendations:  OP PT to progress slide board transfers.

## 2019-12-31 NOTE — PROGRESS NOTES
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 At neurologic baseline.    Plan:  HOB > 45  PT/OT/PM&R: home with assist  SBP > 160  Pain control  Likely discharge 12/31 if no CSF leak    Jordi Núñez MD  Neurosurgery Resident PGY2    Please contact neurosurgery resident on call with questions.    Dial * * [phone], enter [phone] when prompted.   "

## 2019-12-31 NOTE — PLAN OF CARE
Status: Pt is POD #4 posterior spinal decompression of T5.   Vitals: VSS on RA  Neuros: A&Ox4. BUE 4/5, BLE 1/5-paraplegic at baseline, intermittent N/T in BLE.  IV: L. PIV SL  Diet: Regular.  Bowel status: Colostomy in place, pt. cares for independently  : Chronic Cunningham in place with adequate UO  Skin: Back incision RICHARD with liquid bandage/approximated and also with primapore, CDI  Pain: Pain managed with scheduled MS contin   Activity: A2/lift. Pt. calls appropriately for assistance with repositioning  Plan:  Anticipated d/c home today with . Outpatient PT. Continue to monitor and follow POC.

## 2019-12-31 NOTE — PLAN OF CARE
Status: Pt is POD #3 posterior spinal decompression of T5.  Vitals: VSS on RA  Neuros: A&O x 4. BUE 4/5, BLE 1/5. Paraplegic at baseline. Intermittent N/T in BLE.  IV: PIV SL  Resp/trach: LS clear. Denies SOB.  Diet: Regular.  Bowel status: BS+, colostomy WDL.  : Chronic Cunningham in place with good output.  Skin: Incision along spine partially covered with primapore, dressing CDI.  Pain: Back pain throughout shift; Tylenol, oxy, and icy hot patch given. Repositioning also helped.  Activity: Assist of two with lift.  Social: No visitors this shift.   Plan: Continue to monitor and follow plan of care. Potential discharge tomorrow.

## 2019-12-31 NOTE — PROGRESS NOTES
Discharged to home in wheelchair with  at 11:00 am. Discharge paperwork reviewed, questions answered and follow up encouraged.

## 2019-12-31 NOTE — DISCHARGE SUMMARY
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  Exploration of intradural space for suspected dural arteriovenous fistula at T5.   2.  Intraoperative use of microscope.                 Brief History of Illness:   Ms. Gagnon is a 69-year-old female who had a history of acute onset of pain between her shoulder blades in 2017.  She was subsequently found to have a subdural mass compressing her spinal cord.  She underwent decompression in [place] where intraoperative findings were consistent with a spinal subdural hematoma.  She underwent spinal angiograms in 11/2017 which were negative.  She subsequently had recurrence of the spinal subdural hematoma and underwent a lumbar evacuation in 12/2017.  She had a slow recovery and regained some ability to ambulate with assistance.  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  Therefore, she underwent a 2-staged lysis of adhesions on 10/04 and 10/05/2018.  Postoperatively, she had bowel incontinence, as well as numbness in her legs.  She was discharged to rehab and again regained her ability to walk with assistance.  Her neurological status again subsequently deteriorated.  Therefore, she was referred to the [facility] where she underwent repeat spinal angiography as part of a second opinion.  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  Given these findings on the  angiogram, we recommended the above procedure.  The risks, benefits and alternatives were discussed with the patient, and she elected to proceed.          Hospital Course:   Following surgery the patient was transferred to the general neurosurgical floor.  Intraoperatively there was no evidence of arteriovenous malformation.  Initially postoperatively the patient noted significant incisional pain which delayed mobilization and evaluation by therapies.  The patient's wound was monitored closely for evidence of CSF leakage due to intraoperative dural repair.  Patient was also placed on an aggressive bowel regimen.  On postoperative day #2 the patient was evaluated by both PT and OT who felt the patient was safe to return home with family and outpatient physical therapy.  Patient remained at neurologic baseline and states that she has assistive devices in her home health transfer and mobilization.  It was confirmed that the patient wound had remained dry after mobilizing from bed she was cleared for discharge.  Prior to discharge the patient was tolerating diet, mobilizing with assistance, voiding, and passing bowel movements.  Patient was medically stable prior to discharge.   Patient will follow up in clinic in 2 weeks for wound check.  Will likely undergo repeat angiogram in approximately one year.          Discharge Medications:     Current Discharge Medication List      START taking these medications    Details   diazepam (VALIUM) 5 MG tablet Take 1 tablet (5 mg) by mouth every 6 hours as needed for anxiety or muscle spasms  Qty: 30 tablet, Refills: 0    Associated Diagnoses: Arteriovenous fistula of spinal cord vessels      oxyCODONE (ROXICODONE) 5 MG tablet Take 1-2 tablets (5-10 mg) by mouth every 4 hours as needed  Qty: 60 tablet, Refills: 0    Associated Diagnoses: Arteriovenous fistula of spinal cord vessels      polyethylene glycol (MIRALAX/GLYCOLAX) packet Take 17 g by mouth daily  Qty: 14 packet, Refills:  "0    Associated Diagnoses: Arteriovenous fistula of spinal cord vessels      senna-docusate (SENOKOT-S/PERICOLACE) 8.6-50 MG tablet Take 3 tablets by mouth 2 times daily  Qty: 30 tablet, Refills: 0    Associated Diagnoses: Arteriovenous fistula of spinal cord vessels         CONTINUE these medications which have CHANGED    Details   gabapentin (NEURONTIN) 800 MG tablet Take 1 tablet (800 mg) by mouth 4 times daily  Qty: 120 tablet, Refills: 3    Associated Diagnoses: Arteriovenous fistula of spinal cord vessels         CONTINUE these medications which have NOT CHANGED    Details   acetaminophen (TYLENOL) 500 MG tablet Take 500-1,000 mg by mouth every 6 hours as needed for mild pain      baclofen (LIORESAL) 10 MG tablet Take 10 mg by mouth 3 times daily as needed for muscle spasms      metoprolol succinate ER (TOPROL-XL) 25 MG 24 hr tablet Take 12.5 mg by mouth daily      morphine (MS CONTIN) 15 MG CR tablet Take 15 mg by mouth every 12 hours      oxyCODONE-acetaminophen (PERCOCET) 5-325 MG tablet Take 1-2 tablets by mouth every 6 hours as needed for severe pain       methylPREDNISolone (MEDROL) 32 MG tablet Take 1 tab 12 hours prior to procedure and 1 tab 2 hours prior to procedure.  Qty: 2 tablet, Refills: 0    Associated Diagnoses: Contrast media allergy         STOP taking these medications       METOPROLOL TARTRATE PO Comments:   Reason for Stopping:              Objective:  /61   Pulse 79   Temp 98.6  F (37  C) (Oral)   Resp 16   Ht 1.626 m (5' 4\")   Wt 55.3 kg (121 lb 14.4 oz)   SpO2 94%   BMI 20.92 kg/m    Alert, oriented x3  CN 2-12 intact  5/5 strength in BUE, no pronator drift; baseline 1/5 in BLE  Baseline numbness below T5  Incision: no visible leak, c/d/i            Discharge Instructions and Follow-Up:     Discharge diet: Regular   Discharge activity: You may advance activity as tolerated. No strenuous exercise or heay lifting greater than 10 lbs for 4 weeks or until seen and cleared in " clinic.   Discharge follow-up: Follow-up with Neurosurgery MIKKI in 2 weeks   Wound care: Ok to shower,however no scrubbing of the wound and no soaking of the wound, meaning no bathtubs or swimming pools. Pat dry only. Leave wound open to air.  Patient to have wound checked 2 weeks following surgery.       Please call if you have:  1. increased pain, redness, drainage, swelling at your incision  2. fevers > 101.5 F degrees  3. with any questions or concerns.  You may reach the Neurosurgery clinic at [phone] during regular work hours. ER at [phone].    and ask for the Neurosurgery Resident on call at [phone], during off hours or weekends.         Discharge Disposition:     Discharged to home        EMELI Tucker, CNP  Department of Neurosurgery  Pager: [phone]

## 2020-01-01 ENCOUNTER — PATIENT OUTREACH (OUTPATIENT)
Dept: CARE COORDINATION | Facility: CLINIC | Age: 70
End: 2020-01-01

## 2020-01-02 RX ORDER — OXYCODONE HYDROCHLORIDE 5 MG/1
5-10 TABLET ORAL EVERY 4 HOURS PRN
Qty: 60 TABLET | Refills: 0 | Status: SHIPPED | OUTPATIENT
Start: 2020-01-02 | End: 2021-03-04

## 2020-01-02 RX ORDER — DIAZEPAM 5 MG
5 TABLET ORAL EVERY 6 HOURS PRN
Qty: 30 TABLET | Refills: 0 | Status: SHIPPED | OUTPATIENT
Start: 2020-01-02 | End: 2021-03-04

## 2020-01-02 NOTE — TELEPHONE ENCOUNTER
"The patient was contacted for a post-hospital call and reports a \"splitting headache since yesterday\". She also does not have her medication and needs her discharge medications sent to the [facility] in [place].    Please contact patient to discuss further.   "

## 2020-01-02 NOTE — TELEPHONE ENCOUNTER
[facility]: Post-Discharge Note  SITUATION                                                      Admission:    Admission Date: 12/27/19   Reason for Admission: Arteriovenous fistula of spinal cord vessels  Discharge:   Discharge Date: 12/31/19  Discharge Diagnosis: Arteriovenous fistula of spinal cord vessels  Discharge Service: Neurosurgery    BACKGROUND                                                      Ms Gagnon experienced acute onset pain in her midline between her shoulder blades in November 2017 and was evaluated at [place] for a possible cardiac etiology, which was negative. Thoracic spine imaging demonstrated spinal cord compression from epidural vs subdural mass. She underwent decompression with placement of a lumbar drain 11/21/17 with Dr. Murcia. Intraoperatively, findings were consistent with an intradural hematoma. She underwent a spinal angiograms 11/21 and 11/25/17, which was negative. She underwent an L2-5 decompression and hematoma evacuation 12/3/17 after further worsening of lower extremity motor function subsequently. She was discharged to rehab and after approximately 1 month of rehab, she was able to stand and walk with an assistance device. She went home. While at home, she began to slowly deteriorate, so she represented to Dr. Murcia in 2018. Imaging demonstrated adhesions throughout the thoracic spine. She underwent a 2 stage T3-12 lysis of adhesions on 10/4/18 and 10/5/18. Postoperatively, the patient noted bowel incontinence, perianal pain, and pain in her feet bilaterally, all of these were new and worse symptoms. The pain involves her ankles circumferentially and feet in all distributions. Standing makes the pain worse. Again after a long rehab course, she was able to stand and walk with assistance and was discharged home. She has had further neurologic decline and currently remains confined to wheel chair.    ASSESSMENT      Discharge  Assessment  Patient reports symptoms are: (Patient reports a bad headache since yesterday. )  Does the patient have all of their medications?: No(Patient needs her discharge medications sent to the [facility] in [place])  Does patient know what their new medications are for?: Yes  Does patient have a follow-up appointment scheduled?: Yes  Does patient have any other questions or concerns?: No    Post-op  Did the patient have surgery or a procedure: Yes  Incision: healing([facility] nurse is coming to her house tomorrow. )  Drainage: No  Bleeding: none  Fever: No  Chills: No  Redness: No  Warmth: No  Swelling: No  Incision site pain: No  Eating & Drinking: eating and drinking without complaints/concerns  PO Intake: regular diet  Bowel Function: normal(Patient has colostomy which is functioning normally)  Urinary Status: voiding without complaint/concerns    PLAN                                                      Outpatient Plan:      Follow-up with Dr. Alf Ritchie MD in 2 weeks    Future Appointments   Date Time Provider Department Center   1/10/2020  2:30 PM Charlene Dickson APRN [facility]           Vandana Sanches, CMA

## 2020-01-02 NOTE — PROGRESS NOTES
"Spoke with patient, states headache now gone after taking \"Morphine\" from PCP that she uses daily.  Patient states going to [facility] now to  discharge medication.  Patient states doing better now with Headache gone.    Pt has no new bowel issues, urinating without pain and eating a soft diet.      Pt now has my name and number if needed.    Voices understanding.  "

## 2020-01-10 ENCOUNTER — OFFICE VISIT (OUTPATIENT)
Dept: NEUROSURGERY | Facility: CLINIC | Age: 70
End: 2020-01-10
Payer: MEDICARE

## 2020-01-10 VITALS
BODY MASS INDEX: 20.77 KG/M2 | OXYGEN SATURATION: 95 % | SYSTOLIC BLOOD PRESSURE: 107 MMHG | DIASTOLIC BLOOD PRESSURE: 64 MMHG | WEIGHT: 121 LBS | HEART RATE: 82 BPM

## 2020-01-10 DIAGNOSIS — Q28.8 SPINAL VASCULAR MALFORMATION: ICD-10-CM

## 2020-01-10 DIAGNOSIS — Q28.8 ARTERIOVENOUS FISTULA OF SPINAL CORD VESSELS: ICD-10-CM

## 2020-01-10 DIAGNOSIS — S24.109D SPINAL CORD INJURY OF THORACIC REGION WITHOUT BONE INJURY, SUBSEQUENT ENCOUNTER (H): ICD-10-CM

## 2020-01-10 DIAGNOSIS — I65.8 OCCLUSION AND STENOSIS OF OTHER PRECEREBRAL ARTERIES: ICD-10-CM

## 2020-01-10 DIAGNOSIS — G95.19 SPINAL CORD HEMORRHAGE (H): Primary | ICD-10-CM

## 2020-01-10 ASSESSMENT — PAIN SCALES - GENERAL: PAINLEVEL: MILD PAIN (2)

## 2020-01-10 NOTE — NURSING NOTE
Chief Complaint   Patient presents with     RECHECK     [facility] RETURN - 2 WK POST OP 12/27/19     Ivonne Yepez, EMT

## 2020-01-10 NOTE — PROGRESS NOTES
[facility]   Neurosurgery Clinic Progress Note      Reason for Visit:       Post-Operative Evaluation and Wound Assessment      12/27/2019  1. Spinal subdural hemorrage   1.  Exploration of intradural space for suspected dural arteriovenous fistula at T5.                Brief History of Illness:   Ms. Gagnon is a 69-year-old female with hx of  acute onset of pain between her shoulder blades in 2017.  She was subsequently found to have a subdural mass compressing her spinal cord.  She underwent decompression in [place] where intraoperative findings were consistent with a spinal subdural hematoma.  She underwent spinal angiograms in 11/2017 which were negative.  She subsequently had recurrence of the spinal subdural hematoma and underwent a lumbar evacuation in 12/2017.  She had a slow recovery and regained some ability to ambulate with assistance.  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  Therefore, she underwent a 2-staged lysis of adhesions on 10/04/2018 and 10/05/2018. Postoperatively, she had bowel incontinence, as well as numbness in her legs.  She was discharged to rehab and again regained her ability to walk with assistance.  Her neurological status again subsequently deteriorated.  Therefore, she was referred to the [facility] where she underwent repeat spinal angiography as part of a second opinion.  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  Given these findings on the angiogram, we recommended the above procedure.        Following surgery the patient was transferred to the general neurosurgical floor.  Intraoperatively there was no evidence of arteriovenous malformation.  Initially postoperatively the patient noted significant incisional pain which delayed mobilization and evaluation by therapies.  The patient's wound was  monitored closely for evidence of CSF leakage due to intraoperative dural repair.  Patient was also placed on an aggressive bowel regimen.  On postoperative day #2 the patient was evaluated by both PT and OT who felt the patient was safe to return home with family and outpatient physical therapy.  Patient remained at neurologic baseline and states that she has assistive devices in her home health transfer and mobilization.  It was confirmed that the patient wound had remained dry after mobilizing from bed she was cleared for discharge.  Prior to discharge the patient was tolerating diet, mobilizing with assistance, voiding, and passing bowel movements.  Patient was medically stable prior to discharge.     Today, the patient continues to c/o back pain. By about 8 pm she has considerable pain.  Rates pain ~8-9/10 in the back.  Medication is reviewed below.   She has pain, numbness, tingling in BLE (from the waist down) this is unchanged since surgery.  Also - c/o pain, numbness, tingling in bilat feet.  These bother her the most.   These symptoms have all been present since the first spinal hematoma.    She does not feel that she is better at all since her recent surgery.    She still remains wheelchair bound.  Approx 8-9 months ago, she was ambulating with a walker, but now she is unable to ambulate even with a walker.    She has an upcoming appointment [facility] for physical therapy.       Current Outpatient Medications:      acetaminophen (TYLENOL) 500 MG tablet,             2 tabs every 6 hrs     baclofen (LIORESAL) 10 MG tablet,                        Taking prn    -   3-4 x week  (not daily)      diazepam (VALIUM) 5 MG tablet,             Does not take every day only prn      gabapentin (NEURONTIN)           Take 600 4 times daily     metoprolol succinate ER (TOPROL-XL) 25 MG 24 hr tablet,    Take 12.5 mg by mouth daily      morphine (MS CONTIN) 15 MG CR tablet,       Take 15 mg by mouth every 12  hours     oxyCODONE-acetaminophen (PERCOCET) 5-325 MG tablet        2 tablets at night       Tramadol 50 mg          1 tablet BID      Lorazepam 0.5 mg          Prn anxiety       Examination:   /64 (BP Location: Left arm, Patient Position: Sitting, Cuff Size: Adult Regular)   Pulse 82   Wt 54.9 kg (121 lb)   SpO2 95%   BMI 20.77 kg/m       Neurological Assessment:     General Appearance: Awake; Well-Nourished.  Tearful at times.   Mental Status: Alert and Oriented to Person, Place, Time and Situation  Speech: Fluent; No aphasia or dysarthria    Cranial Nerves:  Pupils Equal and Reactive to Light and Accommodation  Extra-Ocular Movements Intact  Visual Fields Intact  Facial Movements Symmetric  Facial Sensation Intact to Light Touch in the V1-V3 distributions bilaterally  Tongue Protrusion Midline  Shoulder Shrug 5/5    Motor:       Wheelchair.   baseline 1/5 in BLE  Unable to stand, or transfer without assist   Baseline numbness below T5  Incision:    Upper thoracic spine incision is clean, dry, intact.   No drainage, erythema, or swelling.       Assessment:     s/p Exploration of intradural space for suspected dural arteriovenous fistula at T5.     Plan:   ~PCP is Dr. Yajaira Lassiter at [facility]   ~ Return to Neurosurgery Clinic with repeat angiogram in approximately one year.   ~ She is taking too many medications and too many opioid pain medications.    ~Outlined pain medication regimen for better pain coverage throughout the day    -Morphine - cont at BID   -Gabapentin 600 mg QID,     -Tylenol ES TID, and can have    -1-2 tablets Percocet for breakthrough.    -She will discontinue Tramadol   -Discontinue Valium   -Can use Baclofen sparingly as she has been doing (may actually discontinue if not needed)   -Lorazepam for anxiety prn   ~She is agreeable to this plan and states she didn't really know what to take, and when.    Charlene Dickson, KIRAN  Neurosurgery Nurse Practitioner  [facility] [place]  [phone]      CC:    Dr. Yajaira Lassiter [facility]

## 2020-01-10 NOTE — LETTER
1/10/2020       RE: Anayeli Gagnon  [address] [place] [address]     Dear Colleague,    Thank you for referring your patient, Anayeli Gagnon, to the [facility] NEUROSURGERY at [facility]. Please see a copy of my visit note below.    [facility]   Neurosurgery Clinic Progress Note      Reason for Visit:       Post-Operative Evaluation and Wound Assessment    12/27/2019  1. Spinal subdural hemorrage   1.  Exploration of intradural space for suspected dural arteriovenous fistula at T5.                Brief History of Illness:   Ms. Gagnon is a 69-year-old female with hx of  acute onset of pain between her shoulder blades in 2017.  She was subsequently found to have a subdural mass compressing her spinal cord.  She underwent decompression in [place] where intraoperative findings were consistent with a spinal subdural hematoma.  She underwent spinal angiograms in 11/2017 which were negative.  She subsequently had recurrence of the spinal subdural hematoma and underwent a lumbar evacuation in 12/2017.  She had a slow recovery and regained some ability to ambulate with assistance.  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  Therefore, she underwent a 2-staged lysis of adhesions on 10/04/2018 and 10/05/2018. Postoperatively, she had bowel incontinence, as well as numbness in her legs.  She was discharged to rehab and again regained her ability to walk with assistance.  Her neurological status again subsequently deteriorated.  Therefore, she was referred to the [facility] where she underwent repeat spinal angiography as part of a second opinion.  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  Given these findings on the angiogram, we recommended the above procedure.        Following  surgery the patient was transferred to the general neurosurgical floor.  Intraoperatively there was no evidence of arteriovenous malformation.  Initially postoperatively the patient noted significant incisional pain which delayed mobilization and evaluation by therapies.  The patient's wound was monitored closely for evidence of CSF leakage due to intraoperative dural repair.  Patient was also placed on an aggressive bowel regimen.  On postoperative day #2 the patient was evaluated by both PT and OT who felt the patient was safe to return home with family and outpatient physical therapy.  Patient remained at neurologic baseline and states that she has assistive devices in her home health transfer and mobilization.  It was confirmed that the patient wound had remained dry after mobilizing from bed she was cleared for discharge.  Prior to discharge the patient was tolerating diet, mobilizing with assistance, voiding, and passing bowel movements.  Patient was medically stable prior to discharge.     Today, the patient continues to c/o back pain. By about 8 pm she has considerable pain.  Rates pain ~8-9/10 in the back.  Medication is reviewed below.   She has pain, numbness, tingling in BLE (from the waist down) this is unchanged since surgery.  Also - c/o pain, numbness, tingling in bilat feet.  These bother her the most.   These symptoms have all been present since the first spinal hematoma.    She does not feel that she is better at all since her recent surgery.    She still remains wheelchair bound.  Approx 8-9 months ago, she was ambulating with a walker, but now she is unable to ambulate even with a walker.    She has an upcoming appointment [facility] for physical therapy.       Current Outpatient Medications:      acetaminophen (TYLENOL) 500 MG tablet,             2 tabs every 6 hrs     baclofen (LIORESAL) 10 MG tablet,                        Taking prn    -   3-4 x week  (not daily)      diazepam  (VALIUM) 5 MG tablet,             Does not take every day only prn      gabapentin (NEURONTIN)           Take 600 4 times daily     metoprolol succinate ER (TOPROL-XL) 25 MG 24 hr tablet,    Take 12.5 mg by mouth daily      morphine (MS CONTIN) 15 MG CR tablet,       Take 15 mg by mouth every 12 hours     oxyCODONE-acetaminophen (PERCOCET) 5-325 MG tablet        2 tablets at night       Tramadol 50 mg          1 tablet BID      Lorazepam 0.5 mg          Prn anxiety       Examination:   /64 (BP Location: Left arm, Patient Position: Sitting, Cuff Size: Adult Regular)   Pulse 82   Wt 54.9 kg (121 lb)   SpO2 95%   BMI 20.77 kg/m        Neurological Assessment:     General Appearance: Awake; Well-Nourished.  Tearful at times.   Mental Status: Alert and Oriented to Person, Place, Time and Situation  Speech: Fluent; No aphasia or dysarthria    Cranial Nerves:  Pupils Equal and Reactive to Light and Accommodation  Extra-Ocular Movements Intact  Visual Fields Intact  Facial Movements Symmetric  Facial Sensation Intact to Light Touch in the V1-V3 distributions bilaterally  Tongue Protrusion Midline  Shoulder Shrug 5/5    Motor:       Wheelchair.   baseline 1/5 in BLE  Unable to stand, or transfer without assist   Baseline numbness below T5  Incision:    Upper thoracic spine incision is clean, dry, intact.   No drainage, erythema, or swelling.       Assessment:     s/p Exploration of intradural space for suspected dural arteriovenous fistula at T5.     Plan:   ~PCP is Dr. Y[facility]aira Lassiter at [facility]   ~ Return to Neurosurgery Clinic with repeat angiogram in approximately one year.   ~ She is taking too many medications and too many opioid pain medications.    ~Outlined pain medication regimen for better pain coverage throughout the day    -Morphine - cont at BID   -Gabapentin 600 mg QID,     -Tylenol ES TID, and can have    -1-2 tablets Percocet for breakthrough.    -She will discontinue  Tramadol   -Discontinue Valium   -Can use Baclofen sparingly as she has been doing (may actually discontinue if not needed)   -Lorazepam for anxiety prn   ~She is agreeable to this plan and states she didn't really know what to take, and when.    Charlene Dickson, KIRAN  Neurosurgery Nurse Practitioner  [facility]  [phone]    CC:    Dr. Yajaira Lassiter [facility]

## 2020-02-18 ENCOUNTER — TELEPHONE (OUTPATIENT)
Dept: NEUROSURGERY | Facility: CLINIC | Age: 70
End: 2020-02-18

## 2020-02-18 NOTE — TELEPHONE ENCOUNTER
"Writer called patient to follow up on Orthopedic PT referral   Per patient   \"[facility] informed patient via demonstrating with a model of a spine a before and after of a spine.   One model was showing the spine as paper thin and the other what a normal spine looks like.   Patient stated [facility] told patient that she cannot have PT due to her spine being paper thin.\"     Writer asked if imaging was done at [facility].   Patient stated No   Writer confirmed with patient she does not take Vitamin D and patient stated has had a bone density test.   Writer confirmed via Care Everywhere  Patient Dexa Scan 12/11/2011 Findings in PACS and Epic from [facility].     Pain: Rated 5 out of a 1-10 Pain Scale   Patient stated she is have the continued intermittent pain as described around the middle of there back and stomach.   Patient defined area of pain as around her rib cage and back.   Writer informed patient the above information about pain will be documented for Charlene Dickson.     Outstanding:   Writer asked for patient to contact [facility] and have progress, appointment notes sent via fax [phone] attention to [place] for Charlene Dickson to review.                          "

## 2020-03-15 ENCOUNTER — HEALTH MAINTENANCE LETTER (OUTPATIENT)
Age: 70
End: 2020-03-15

## 2020-11-03 DIAGNOSIS — Q27.9 VASCULAR MALFORMATION: Primary | ICD-10-CM

## 2020-11-10 ENCOUNTER — TELEPHONE (OUTPATIENT)
Dept: NEUROSURGERY | Facility: CLINIC | Age: 70
End: 2020-11-10

## 2020-12-22 ENCOUNTER — HOSPITAL ENCOUNTER (OUTPATIENT)
Facility: CLINIC | Age: 70
End: 2020-12-22
Payer: MEDICARE

## 2020-12-22 DIAGNOSIS — Z11.59 ENCOUNTER FOR SCREENING FOR OTHER VIRAL DISEASES: Primary | ICD-10-CM

## 2020-12-28 ENCOUNTER — VIRTUAL VISIT (OUTPATIENT)
Dept: NEUROSURGERY | Facility: CLINIC | Age: 70
End: 2020-12-28
Payer: MEDICARE

## 2020-12-28 DIAGNOSIS — Q28.8 ARTERIOVENOUS FISTULA OF SPINAL CORD VESSELS: Primary | ICD-10-CM

## 2020-12-28 PROCEDURE — 99207 PR NO CHARGE LOS: CPT | Mod: TEL | Performed by: NEUROLOGICAL SURGERY

## 2020-12-28 RX ORDER — LIDOCAINE 5 G/100G
1 CREAM RECTAL; TOPICAL 3 TIMES DAILY PRN
COMMUNITY
Start: 2020-12-16 | End: 2021-12-16

## 2020-12-28 RX ORDER — NYSTATIN 100000 U/G
1 CREAM TOPICAL 2 TIMES DAILY
COMMUNITY
Start: 2020-07-22 | End: 2022-01-06

## 2020-12-28 RX ORDER — SIMETHICONE 80 MG
1 TABLET,CHEWABLE ORAL EVERY 6 HOURS PRN
COMMUNITY
End: 2022-01-06

## 2020-12-28 RX ORDER — NORTRIPTYLINE HCL 10 MG
1 CAPSULE ORAL DAILY
COMMUNITY
Start: 2020-12-16 | End: 2021-03-31

## 2020-12-28 RX ORDER — BUSPIRONE HYDROCHLORIDE 5 MG/1
1 TABLET ORAL DAILY
COMMUNITY
Start: 2020-07-28 | End: 2021-03-31

## 2020-12-28 RX ORDER — FUROSEMIDE 20 MG
1 TABLET ORAL DAILY
COMMUNITY
Start: 2020-06-18 | End: 2021-03-31

## 2020-12-28 RX ORDER — VILAZODONE HYDROCHLORIDE 10 MG/1
1 TABLET ORAL DAILY
COMMUNITY
Start: 2020-07-22 | End: 2021-03-31

## 2020-12-28 RX ORDER — TRAMADOL HYDROCHLORIDE 50 MG/1
1 TABLET ORAL EVERY 6 HOURS PRN
COMMUNITY
Start: 2020-01-02 | End: 2021-03-31

## 2020-12-28 RX ORDER — LORAZEPAM 0.5 MG/1
1 TABLET ORAL DAILY PRN
COMMUNITY
End: 2022-09-27

## 2020-12-28 NOTE — PROGRESS NOTES
"Anayeli Gagnon is a 70 year old female who is being evaluated via a billable telephone visit.      The patient has been notified of following:     \"This telephone visit will be conducted via a call between you and your physician/provider. We have found that certain health care needs can be provided without the need for a physical exam.  This service lets us provide the care you need with a short phone conversation.  If a prescription is necessary we can send it directly to your pharmacy.  If lab work is needed we can place an order for that and you can then stop by our lab to have the test done at a later time.    Telephone visits are billed at different rates depending on your insurance coverage. During this emergency period, for some insurers they may be billed the same as an in-person visit.  Please reach out to your insurance provider with any questions.    If during the course of the call the physician/provider feels a telephone visit is not appropriate, you will not be charged for this service.\"    Patient has given verbal consent for Telephone visit?YES    What phone number would you like to be contacted at? [phone]    How would you like to obtain your AVS? Louis Yepez, EMT        Unable to connect with the patient.        "

## 2020-12-28 NOTE — LETTER
"12/28/2020      RE: Anayeli Gagnon  [address] [address] [place] [id number]       Anayeli Gagnon is a 70 year old female who is being evaluated via a billable telephone visit.      The patient has been notified of following:     \"This telephone visit will be conducted via a call between you and your physician/provider. We have found that certain health care needs can be provided without the need for a physical exam.  This service lets us provide the care you need with a short phone conversation.  If a prescription is necessary we can send it directly to your pharmacy.  If lab work is needed we can place an order for that and you can then stop by our lab to have the test done at a later time.    Telephone visits are billed at different rates depending on your insurance coverage. During this emergency period, for some insurers they may be billed the same as an in-person visit.  Please reach out to your insurance provider with any questions.    If during the course of the call the physician/provider feels a telephone visit is not appropriate, you will not be charged for this service.\"    Patient has given verbal consent for Telephone visit?YES    What phone number would you like to be contacted at? [phone]    How would you like to obtain your AVS? Louis Yepez, EMT            Alf Ritchie MD    "

## 2020-12-30 ENCOUNTER — TELEPHONE (OUTPATIENT)
Dept: NEUROLOGY | Facility: CLINIC | Age: 70
End: 2020-12-30

## 2020-12-30 NOTE — TELEPHONE ENCOUNTER
FUTURE VISIT INFORMATION      SURGERY INFORMATION:    Date: 2/8/21    Location: [facility] OR    Surgeon:  Alf Ritchie MD    Anesthesia Type:  General    Procedure: ANESTHESIA OUT OF OR Spinal angiogram @0800    Consult: virtual visit 12/28/20    RECORDS REQUESTED FROM:       Primary Care Provider: Luma Vogel APRN,CNP - [facility]    Pertinent Medical History: Hypertension, MVP, Palpitations,tachycardia    Most recent EKG+ Tracing: 10/16/19    Most recent ECHO: 11/21/17    Most recent Cardiac Stress Test: 10/3/2007

## 2020-12-30 NOTE — TELEPHONE ENCOUNTER
"Patient was contacted to schedule spinal angio. Patient was uncertain why she needed angio. She is currently scheduled for 2/8/21 at 0800. Patient was initially referred for angio by Dr. Hermosillo for recurrent spinal hemorrhages.     Patient was scheduled for a virtual visit with Dr. Ritchie on 12/28/20 however she states she never received a call.     Patient reports pain has worsened over the past year, more so over the past few months. Using percocet 4-5 tabs daily. Pain is managed by pain clinic in [place].     States that at this point her problem is pain in low back and off to right side (8/10 pain) and always has pain in bilateral ankles and feet, 9/10 pain, (left foot is hot, right foot is cold). Pain is worse at night. Also, has severe pain in rectum 8-9/10 pain by evening. Percocet takes the edge off most of all pain for an hour or two - reduces pain to 4/10.     Wheelchair bound for over a year.     Message sent to Dr. Ritchie regarding need for angio. Ena Taylor RN 12/30/2020 12:38 PM     Addendum:  Per Dr. Ritchie - \"I would have the patient follow up with [facility] to determine if a repeat is necessary.  I would think a spinal MRI would be sufficient.\"     Patient informed of above and verbalized understanding. Message sent to scheduling to contact patient to make appointment. Also provided patient with scheduling phone number. Patient has my contact information and was encouraged to call with questions/concerns. Ena Taylor RN 1/4/2021 11:42 AM       "

## 2021-01-05 ENCOUNTER — TELEPHONE (OUTPATIENT)
Dept: NEUROSURGERY | Facility: CLINIC | Age: 71
End: 2021-01-05

## 2021-01-06 ENCOUNTER — TELEPHONE (OUTPATIENT)
Dept: NEUROSURGERY | Facility: CLINIC | Age: 71
End: 2021-01-06

## 2021-01-12 ENCOUNTER — TELEPHONE (OUTPATIENT)
Dept: NEUROSURGERY | Facility: CLINIC | Age: 71
End: 2021-01-12

## 2021-01-12 NOTE — TELEPHONE ENCOUNTER
[facility] Call Center    Phone Message    May a detailed message be left on voicemail: yes     Reason for Call: Other: pt reporting that Dr. Ritchie wanted pt to f/u in 1 year and it is a little beyond 1 yr now. pt requesting MRI order please for mid-back to the rectal area. Please review and call pt back asap to let her know the MRI is ordered so that pt can schedule MRI and f/u with Dr. Ritchie. Thank you.    Action Taken: Message routed to:  [facility] ([facility] [place] [facility]    Travel Screening: Not Applicable

## 2021-01-13 NOTE — TELEPHONE ENCOUNTER
Luis,   Any thoughts if Dr Hermosillo wants an MRI? Appointment with [facility] 1/28... I am not sure.  Thanks Dagmar

## 2021-01-13 NOTE — TELEPHONE ENCOUNTER
[facility] Call Center    Phone Message    May a detailed message be left on voicemail: yes     Reason for Call: Other: Annamarie calling to request a call back. She would like to know if MRI is going to be ordered. Please call her back to discuss.      Action Taken: Message routed to:  [facility]):  neurosurg    Travel Screening: Not Applicable

## 2021-01-13 NOTE — TELEPHONE ENCOUNTER
[facility] Call Center    Phone Message    May a detailed message be left on voicemail: yes     Reason for Call: Other: Patient calling to get orders for an MRI from Dr. Ritchie states that she called imaging to get an MRI but there are no orders placed.    Patient is scheduled for an appointment with Dr. Hermosillo on 1/28/21 but patient states she wants to get MRI as well.     Please advise and call patient back once orders have been placed     Action Taken: Other: [facility] [place] [facility]     Travel Screening: Not Applicable

## 2021-01-14 NOTE — TELEPHONE ENCOUNTER
Spoke with patient. Will check with Dr. Ritchie, as there seems to be some confusion on who patient is supposed to follow-up with (Dr. Ritchie or Dr. Hermosillo), and get back to patient. Patient verbalized understanding. Ena Taylor RN 1/14/2021 9:49 AM     Addendum:  Per Dr. Ritchie schedule a virtual clinic visit. Scheduled phone visit on 1/26/21 at 10:40 AM. Patient verbalized understanding. Patient has my contact information and was encouraged to call with questions/concerns. Ena Taylor RN 1/15/2021 10:19 AM

## 2021-01-17 ENCOUNTER — DOCUMENTATION ONLY (OUTPATIENT)
Dept: CARE COORDINATION | Facility: CLINIC | Age: 71
End: 2021-01-17

## 2021-01-26 ENCOUNTER — VIRTUAL VISIT (OUTPATIENT)
Dept: NEUROSURGERY | Facility: CLINIC | Age: 71
End: 2021-01-26
Payer: MEDICARE

## 2021-01-26 DIAGNOSIS — G95.19 SPINAL CORD HEMORRHAGE (H): Primary | ICD-10-CM

## 2021-01-26 PROCEDURE — 99442 PR PHYSICIAN TELEPHONE EVALUATION 11-20 MIN: CPT | Mod: 95 | Performed by: NEUROLOGICAL SURGERY

## 2021-01-26 RX ORDER — GABAPENTIN 600 MG/1
2 TABLET ORAL DAILY
COMMUNITY
Start: 2020-11-08 | End: 2021-03-31

## 2021-01-26 RX ORDER — BUPRENORPHINE 10 UG/H
1 PATCH TRANSDERMAL WEEKLY
COMMUNITY
Start: 2021-01-08 | End: 2021-03-31

## 2021-01-26 NOTE — PROGRESS NOTES
Annamarie is a 70 year old who is being evaluated via a billable telephone visit.      What phone number would you like to be contacted at? [phone]  How would you like to obtain your AVS? [facility]  Phone call duration: 15 minutes    Ivonne Yepez, EMT

## 2021-01-26 NOTE — PROGRESS NOTES
Service Date: 01/26/2021      I had the pleasure to talk to Annamarie today by telephone during a neurosurgical phone clinic visit.  Annamarie gave consent for the visit.      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  I had performed surgery on her and looked to disconnect the fistula but found no evidence of spinal fistula.      Over the last year, she continues to have no function of her legs.  She has developed very severe back and buttocks pain that I would attribute to the multiple hemorrhages around the spinal cord.  I think that this is also likely the cause of her paralysis.      Regarding a possible fistula, we had done an extensive surgery and found no evidence of spinal fistula.  With this in mind, I do not think that a repeat spinal angiogram is necessary at this point in time.  Based on my conversation with her today, her quality of life is dramatically impacted by the pain that she is experiencing.  With this in mind, I would like to refer her to my partner, Dr. Luis Orourke, who is an expert in neurointerventions for pain control.  I would like to refer her to him for consideration of spinal cord stimulator.  This may have 2 benefits.  One, I would hope that this would relieve some of the pain that she is experiencing, and two, in some patients, he has found some improvement in motor function following the spinal cord simulator.  She is very interested in seeing him and I will make this referral.      Overall, I spent approximately 15 minutes on the telephone with Annamarie today with the majority of this time spent in consultation and developing a treatment plan.         RAMAKRISHNA FERNANDES MD             D: 01/26/2021   T: 01/26/2021   MT: michelle      Name:     JOVAN MADRID   MRN:      [id number]        Account:       [id number]   :      1950           Service Date: 2021      Document: [id number]

## 2021-01-26 NOTE — LETTER
1/26/2021       RE: Anayeli Gagnon  [address] [address] [place] [address]     Dear Colleague,    Thank you for referring your patient, Anayeli Gagnon, to the [facility] NEUROSURGERY [facility]INIC [place] at [facility]. Please see a copy of my visit note below.    Annamarie is a 70 year old who is being evaluated via a billable telephone visit.      What phone number would you like to be contacted at? [phone]  How would you like to obtain your AVS? Alchi[employer]on  Phone call duration: 15 minutes    KEVIN Snow      Service Date: 01/26/2021      I had the pleasure to talk to Annamarie today by telephone during a neurosurgical phone clinic visit.  Annamarie gave consent for the visit.      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  I had performed surgery on her and looked to disconnect the fistula but found no evidence of spinal fistula.      Over the last year, she continues to have no function of her legs.  She has developed very severe back and buttocks pain that I would attribute to the multiple hemorrhages around the spinal cord.  I think that this is also likely the cause of her paralysis.      Regarding a possible fistula, we had done an extensive surgery and found no evidence of spinal fistula.  With this in mind, I do not think that a repeat spinal angiogram is necessary at this point in time.  Based on my conversation with her today, her quality of life is dramatically impacted by the pain that she is experiencing.  With this in mind, I would like to refer her to my partner, Dr. Luis Orourke, who is an expert in neurointerventions for pain control.  I would like to refer her to him for consideration of spinal cord stimulator.  This may have 2 benefits.  One, I would hope that  this would relieve some of the pain that she is experiencing, and two, in some patients, he has found some improvement in motor function following the spinal cord simulator.  She is very interested in seeing him and I will make this referral.      Overall, I spent approximately 15 minutes on the telephone with Annamarie today with the majority of this time spent in consultation and developing a treatment plan.         RAMAKRISHNA FERNANDES MD             D: 2021   T: 2021   MT: michelle      Name:     JOVAN MADRID   MRN:      [id number]        Account:      [id number]   :      1950           Service Date: 2021      Document: [id number]

## 2021-01-26 NOTE — PATIENT INSTRUCTIONS
Referral to Dr Luis Orourke for neurointervention for pain control.  I would like to refer her to him for consideration of spinal cord stimulator.    Call Dagmar RN for questions/concerns     Thank you for using [facility]

## 2021-01-29 ENCOUNTER — PRE VISIT (OUTPATIENT)
Dept: NEUROSURGERY | Facility: CLINIC | Age: 71
End: 2021-01-29

## 2021-01-29 NOTE — TELEPHONE ENCOUNTER
FUTURE VISIT INFORMATION      FUTURE VISIT INFORMATION:    Date: 2/2/2021    Time: 430pm    [place] [facility]  [place]ERRAL INFORMATION:    Referring provider:  Dr. Ritchie     Referring providers clinic:  [facility] Neurosurgery     Reason for visit/diagnosis  Spinal Cord Hemorrage     RECORDS REQUESTED FROM:       Clinic name Comments Records Status Imaging Status   Internal Dr. Ritchie-1/26/2021    MR Lumbar Spine 4/1/2019    MR Thoracic SPine-4/1/2019    Spinal Angio-10/16/2019, 12/20/2019 Epic PACS          [facility] MRA Head-9/3/2019    MRA Carotids-9/3/2019 Care Everywhere PACS

## 2021-02-01 ENCOUNTER — PRE VISIT (OUTPATIENT)
Dept: SURGERY | Facility: CLINIC | Age: 71
End: 2021-02-01

## 2021-02-02 ENCOUNTER — VIRTUAL VISIT (OUTPATIENT)
Dept: NEUROSURGERY | Facility: CLINIC | Age: 71
End: 2021-02-02
Attending: NEUROLOGICAL SURGERY
Payer: MEDICARE

## 2021-02-02 DIAGNOSIS — M62.838 MUSCLE SPASTICITY: ICD-10-CM

## 2021-02-02 DIAGNOSIS — Z91.041 CONTRAST MEDIA ALLERGY: ICD-10-CM

## 2021-02-02 DIAGNOSIS — Z98.890 HISTORY OF SPINAL SURGERY: ICD-10-CM

## 2021-02-02 DIAGNOSIS — L89.153 PRESSURE ULCER OF COCCYGEAL REGION, STAGE 3 (H): ICD-10-CM

## 2021-02-02 DIAGNOSIS — G82.22: ICD-10-CM

## 2021-02-02 DIAGNOSIS — G89.29 OTHER CHRONIC PAIN: ICD-10-CM

## 2021-02-02 DIAGNOSIS — G95.19 SPINAL CORD HEMORRHAGE (H): ICD-10-CM

## 2021-02-02 DIAGNOSIS — S24.109D SPINAL CORD INJURY OF THORACIC REGION WITHOUT BONE INJURY, SUBSEQUENT ENCOUNTER (H): Primary | ICD-10-CM

## 2021-02-02 PROCEDURE — 99443 PR PHYSICIAN TELEPHONE EVALUATION 21-30 MIN: CPT | Mod: 95 | Performed by: NEUROLOGICAL SURGERY

## 2021-02-02 NOTE — LETTER
2/2/2021       RE: Anayeli Gagnon  [address] [address] [place] [id number]     Dear Colleague,    Thank you for referring your patient, Anayeli Gagnon, to the [facility] [place] at [facility]. Please see a copy of my visit note below.    Annamarie is a 70 year old who is being evaluated via a billable telephone visit.      What phone number would you like to be contacted at? [phone]  How would you like to obtain your AVS? MYCHART  Phone call duration: 40 minutes      Neurosurgery Clinic Note    Reason for Visit: chronic pain    History of Present Illness  Anayeli Gagnon is a 70 year old woman with a complex history of spinal cord hemorrhages and suspicion for fistula or vascular malformation who has had progressive weakness and loss of sensation and finally paralysis. Despite extensive angiographic and surgical exploration, nothing was ever found, but as her symptoms progressed she started to experience significant pain that has become chronic.     She began to have significant wrap-around pain at her sensory level that is more radiating in nature. Her second type of pain feels like it is in her lower back, feet, and ankles (R>L) and sometimes associated with muscle spasms. She characterizes this pain as fairly constant and burning in character.     Her last type of pain really came about after a decubitus ulcer and involves her groin and perineum. She had debridement and a flap with normal healing but still has this pain that feels like significant pressure.     When asked to rank her pains, she is torn between her groin pain and her burning feet and ankle pain.    She has tried multiple medications including gabapentin, percocet, baclofen, multiple antidepressants (which she states she is allergic), and pain patches.     The pain is unrelenting and has resulted in significant depression, which was not treated by  antidepressants. As a result, she is not doing well and feels depressed. She denies suicidal thoughts but feels like she has no other options.    She has no real sensation below the umbilicus and a transition to normal sensation through the abdomen and lower ribs. She most perceives throbbing jolts going through her legs and feet.    Since her progressive decline, she has used a permanent hudson and colostomy for ease of care.      Past Medical History:   Diagnosis Date     CARDIAC DYSRHYTHMIAS NEC 10/3/2007    Taking atenelol for years for this     History of skin cancer      Mitral valve prolapse      Neurogenic bladder      Sacral decubitus ulcer, stage IV (H)      Thoracic spinal cord injury (H)        Past Surgical History:   Procedure Laterality Date     DECOMPRESSION LUMBAR ONE LEVEL N/A 2019    Procedure: Posterior spinal decompression;  Surgeon: Alf Ritchie MD;  Location: [facility] OR     IR SPINAL ANGIOGRAM  10/16/2019     IR SPINAL ANGIOGRAM  2019     LAPAROSCOPIC TUBAL LIGATION       REPAIR SPINAL ARERIOVENOUS MALFORMATION N/A 2019    Procedure: with surgical disconnection arterial venous fistula Thoracic 5;  Surgeon: Alf Ritchie MD;  Location: [facility] OR       Family History   Problem Relation Age of Onset     C.A.D. Father          41       Social History     Socioeconomic History     Marital status:      Spouse name: Not on file     Number of children: Not on file     Years of education: Not on file     Highest education level: Not on file   Occupational History     Occupation: NA     Employer: [employer]   Tobacco Use     Smoking status: Never Smoker     Smokeless tobacco: Never Used   Substance and Sexual Activity     Alcohol use: No     Drug use: No     Sexual activity: Yes     Partners: Male   Lifestyle   Relationships   Social History Narrative    Lives with spouse - works in [place].          Allergies   Allergen Reactions     Contrast Dye Rash      Painful rash        Current Outpatient Medications   Medication     acetaminophen (TYLENOL) 500 MG tablet     baclofen (LIORESAL) 10 MG tablet     buprenorphine (BUTRANS) 10 MCG/HR WK patch     busPIRone (BUSPAR) 5 MG tablet     furosemide (LASIX) 20 MG tablet     gabapentin (NEURONTIN) 600 MG tablet     gabapentin (NEURONTIN) 800 MG tablet     lidocaine, Anorectal, 5 % CREA     LORazepam (ATIVAN) 0.5 MG tablet     metoprolol succinate ER (TOPROL-XL) 25 MG 24 hr tablet     morphine (MS CONTIN) 15 MG CR tablet     nortriptyline (PAMELOR) 10 MG capsule     nystatin (MYCOSTATIN) 912588 UNIT/GM external cream     oxyCODONE-acetaminophen (PERCOCET) 5-325 MG tablet     simethicone (MYLICON) 80 MG chewable tablet     traMADol (ULTRAM) 50 MG tablet     VIIBRYD 10 MG TABS tablet     diazepam (VALIUM) 5 MG tablet     methylPREDNISolone (MEDROL) 32 MG tablet     oxyCODONE (ROXICODONE) 5 MG tablet     polyethylene glycol (MIRALAX/GLYCOLAX) packet     senna-docusate (SENOKOT-S/PERICOLACE) 8.6-50 MG tablet     No current facility-administered medications for this visit.       ROS: 10 point ROS neg other than the symptoms noted above in the HPI.    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 Her most recent MRI is from early 2019. She has failed medical therapies along many fronts of receptors and may be a candidate for spinal cord stimulation as the next step in management of her pain. While the next step is going to be re-imaging of her spinal cord, we discussed the possibility of moving towards spinal cord stimulation with a discussion of the risks including infection, spinal cord injury, hematomas, worsening of her  symptoms. We discussed her overall higher risk and she is willing to continue consideration. I will likely have her meet with our neuropsychologist to discuss the interaction between her pain and depression before surgery with a more extensive discussion of expectations. We discussed alternatives as well which we may revisit in the future.    1. MRI lumbar and thoracic with and without contrast  2. Follow-up for a visit in a few weeks once MRI is complete  3. Will likely need neuropsych and PCP preop    Luis Orourke MD  Department of Neurosurgery  [facility]    __________________  Annamarie is a 70 year old who is being evaluated via a billable telephone visit.       What phone number would you like to be contacted at? [phone]  How would you like to obtain your AVS? CELESTINE  Phone call duration: 40 minutes

## 2021-02-02 NOTE — PROGRESS NOTES
Neurosurgery Clinic Note    Reason for Visit: chronic pain    History of Present Illness  Anayeli Gagnon is a 70 year old woman with a complex history of spinal cord hemorrhages and suspicion for fistula or vascular malformation who has had progressive weakness and loss of sensation and finally paralysis. Despite extensive angiographic and surgical exploration, nothing was ever found, but as her symptoms progressed she started to experience significant pain that has become chronic.     She began to have significant wrap-around pain at her sensory level that is more radiating in nature. Her second type of pain feels like it is in her lower back, feet, and ankles (R>L) and sometimes associated with muscle spasms. She characterizes this pain as fairly constant and burning in character.     Her last type of pain really came about after a decubitus ulcer and involves her groin and perineum. She had debridement and a flap with normal healing but still has this pain that feels like significant pressure.     When asked to rank her pains, she is torn between her groin pain and her burning feet and ankle pain.    She has tried multiple medications including gabapentin, percocet, baclofen, multiple antidepressants (which she states she is allergic), and pain patches.     The pain is unrelenting and has resulted in significant depression, which was not treated by antidepressants. As a result, she is not doing well and feels depressed. She denies suicidal thoughts but feels like she has no other options.    She has no real sensation below the umbilicus and a transition to normal sensation through the abdomen and lower ribs. She most perceives throbbing jolts going through her legs and feet.    Since her progressive decline, she has used a permanent hudson and colostomy for ease of care.      Past Medical History:   Diagnosis Date     CARDIAC DYSRHYTHMIAS NEC 10/3/2007    Taking atenelol for years for this     History of  skin cancer      Mitral valve prolapse      Neurogenic bladder      Sacral decubitus ulcer, stage IV (H)      Thoracic spinal cord injury (H)        Past Surgical History:   Procedure Laterality Date     DECOMPRESSION LUMBAR ONE LEVEL N/A 2019    Procedure: Posterior spinal decompression;  Surgeon: Alf Ritchie MD;  Location: [facility] OR     IR SPINAL ANGIOGRAM  10/16/2019     IR SPINAL ANGIOGRAM  2019     LAPAROSCOPIC TUBAL LIGATION       REPAIR SPINAL ARERIOVENOUS MALFORMATION N/A 2019    Procedure: with surgical disconnection arterial venous fistula Thoracic 5;  Surgeon: Alf Ritchie MD;  Location: [facility] OR       Family History   Problem Relation Age of Onset     C.A.D. Father          41       Social History     Socioeconomic History     Marital status:      Spouse name: Not on file     Number of children: Not on file     Years of education: Not on file     Highest education level: Not on file   Occupational History     Occupation: NA     Employer: [employer]   Tobacco Use     Smoking status: Never Smoker     Smokeless tobacco: Never Used   Substance and Sexual Activity     Alcohol use: No     Drug use: No     Sexual activity: Yes     Partners: Male   Lifestyle   Relationships   Social History Narrative    Lives with spouse - works in [place].          Allergies   Allergen Reactions     Contrast Dye Rash     Painful rash        Current Outpatient Medications   Medication     acetaminophen (TYLENOL) 500 MG tablet     baclofen (LIORESAL) 10 MG tablet     buprenorphine (BUTRANS) 10 MCG/HR WK patch     busPIRone (BUSPAR) 5 MG tablet     furosemide (LASIX) 20 MG tablet     gabapentin (NEURONTIN) 600 MG tablet     gabapentin (NEURONTIN) 800 MG tablet     lidocaine, Anorectal, 5 % CREA     LORazepam (ATIVAN) 0.5 MG tablet     metoprolol succinate ER (TOPROL-XL) 25 MG 24 hr tablet     morphine (MS CONTIN) 15 MG CR tablet     nortriptyline (PAMELOR) 10 MG capsule     nystatin  (MYCOSTATIN) 678196 UNIT/GM external cream     oxyCODONE-acetaminophen (PERCOCET) 5-325 MG tablet     simethicone (MYLICON) 80 MG chewable tablet     traMADol (ULTRAM) 50 MG tablet     VIIBRYD 10 MG TABS tablet     diazepam (VALIUM) 5 MG tablet     methylPREDNISolone (MEDROL) 32 MG tablet     oxyCODONE (ROXICODONE) 5 MG tablet     polyethylene glycol (MIRALAX/GLYCOLAX) packet     senna-docusate (SENOKOT-S/PERICOLACE) 8.6-50 MG tablet     No current facility-administered medications for this visit.       ROS: 10 point ROS neg other than the symptoms noted above in the HPI.          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 Her most recent MRI is from early 2019. She has failed medical therapies along many fronts of receptors and may be a candidate for spinal cord stimulation as the next step in management of her pain. While the next step is going to be re-imaging of her spinal cord, we discussed the possibility of moving towards spinal cord stimulation with a discussion of the risks including infection, spinal cord injury, hematomas, worsening of her symptoms. We discussed her overall higher risk and she is willing to continue consideration. I will likely have her meet with our neuropsychologist to discuss the interaction between her pain and depression before surgery with a more extensive discussion of expectations. We discussed alternatives as well which we may revisit in the future.    1. MRI lumbar and thoracic with and without contrast  2. Follow-up for a visit in a few weeks once MRI is complete  3. Will likely need neuropsych and PCP preop      Luis Orourke MD  Department of  Neurosurgery  [facility]        __________________  Annamarie is a 70 year old who is being evaluated via a billable telephone visit.       What phone number would you like to be contacted at? [phone]  How would you like to obtain your AVS? CELESTINE  Phone call duration: 40 minutes

## 2021-02-02 NOTE — PROGRESS NOTES
Annamarie is a 70 year old who is being evaluated via a billable telephone visit.      What phone number would you like to be contacted at? [phone]  How would you like to obtain your AVS? CELESTINE  Phone call duration: 40 minutes

## 2021-02-04 ENCOUNTER — TELEPHONE (OUTPATIENT)
Dept: NEUROSURGERY | Facility: CLINIC | Age: 71
End: 2021-02-04

## 2021-02-04 RX ORDER — METHYLPREDNISOLONE 16 MG/1
16 TABLET ORAL DAILY
Qty: 1 TABLET | Refills: 0 | Status: SHIPPED | OUTPATIENT
Start: 2021-02-04 | End: 2021-03-31

## 2021-02-04 NOTE — PATIENT INSTRUCTIONS
1. MRIs of thoracic and lumbar spine with and without contrast  2. Premedication with steroid before MRI

## 2021-02-19 ENCOUNTER — TELEPHONE (OUTPATIENT)
Dept: NEUROSURGERY | Facility: CLINIC | Age: 71
End: 2021-02-19

## 2021-02-19 DIAGNOSIS — Z91.041 CONTRAST MEDIA ALLERGY: Primary | ICD-10-CM

## 2021-02-19 PROCEDURE — 99207 PR NO BILLABLE SERVICE THIS VISIT: CPT | Performed by: NEUROLOGICAL SURGERY

## 2021-02-19 RX ORDER — METHYLPREDNISOLONE 32 MG/1
TABLET ORAL
Qty: 2 TABLET | Refills: 0 | Status: SHIPPED | OUTPATIENT
Start: 2021-02-19 | End: 2021-03-31

## 2021-02-19 NOTE — TELEPHONE ENCOUNTER
Health Call Center    Phone Message    May a detailed message be left on voicemail: yes     Reason for Call: Other: Pt was advised to call to see if she needs a medicaiton for her MRI scheduled on 2/24/21. She states that she broke out from last MRI, but she does not necessarily think it was the dye, but some other source that was at the same time.     Please call Pt back to discuss and advise.    Action Taken: Message routed to:  [facility] ([facility] Neurosurgery    Travel Screening: Not Applicable

## 2021-02-19 NOTE — TELEPHONE ENCOUNTER
Callback to patient, patient states had allergy to dye at last Angiogram with rash and burning.    Neurosurgery Smartset Medication for Contrast Allergy protocol completed  Methylprednisolone 32mg one tablet 12 hours prior and one tablet 2 hours prior no refills filled at pharmacy for patient.    Allergies in Epic does have Contrast Allergy for patient.

## 2021-02-24 ENCOUNTER — ANCILLARY PROCEDURE (OUTPATIENT)
Dept: MRI IMAGING | Facility: CLINIC | Age: 71
End: 2021-02-24
Attending: NEUROLOGICAL SURGERY
Payer: MEDICARE

## 2021-02-24 DIAGNOSIS — G95.19 SPINAL CORD HEMORRHAGE (H): ICD-10-CM

## 2021-02-24 DIAGNOSIS — S24.109D SPINAL CORD INJURY OF THORACIC REGION WITHOUT BONE INJURY, SUBSEQUENT ENCOUNTER (H): ICD-10-CM

## 2021-02-24 LAB
CREAT BLD-MCNC: 0.5 MG/DL (ref 0.52–1.04)
GFR SERPL CREATININE-BSD FRML MDRD: >90 ML/MIN/{1.73_M2}

## 2021-02-24 PROCEDURE — 72158 MRI LUMBAR SPINE W/O & W/DYE: CPT | Mod: ME | Performed by: RADIOLOGY

## 2021-02-24 PROCEDURE — A9585 GADOBUTROL INJECTION: HCPCS | Mod: JW | Performed by: RADIOLOGY

## 2021-02-24 PROCEDURE — G1004 CDSM NDSC: HCPCS | Mod: GC | Performed by: RADIOLOGY

## 2021-02-24 PROCEDURE — 72157 MRI CHEST SPINE W/O & W/DYE: CPT | Mod: MG | Performed by: RADIOLOGY

## 2021-02-24 RX ORDER — GADOBUTROL 604.72 MG/ML
7.5 INJECTION INTRAVENOUS ONCE
Status: COMPLETED | OUTPATIENT
Start: 2021-02-24 | End: 2021-02-24

## 2021-02-24 RX ADMIN — GADOBUTROL 5.5 ML: 604.72 INJECTION INTRAVENOUS at 10:55

## 2021-03-02 ENCOUNTER — VIRTUAL VISIT (OUTPATIENT)
Dept: NEUROSURGERY | Facility: CLINIC | Age: 71
End: 2021-03-02
Payer: MEDICARE

## 2021-03-02 DIAGNOSIS — M54.6 THORACOLUMBAR BACK PAIN: ICD-10-CM

## 2021-03-02 DIAGNOSIS — G89.29 CHRONIC BILATERAL LOW BACK PAIN WITH BILATERAL SCIATICA: ICD-10-CM

## 2021-03-02 DIAGNOSIS — M79.2 INTRACTABLE NEUROPATHIC PAIN OF LUMBOSACRAL ORIGIN: ICD-10-CM

## 2021-03-02 DIAGNOSIS — G95.19 EDEMA OF SPINAL CORD (H): ICD-10-CM

## 2021-03-02 DIAGNOSIS — M54.41 CHRONIC BILATERAL LOW BACK PAIN WITH BILATERAL SCIATICA: ICD-10-CM

## 2021-03-02 DIAGNOSIS — M54.42 CHRONIC BILATERAL LOW BACK PAIN WITH BILATERAL SCIATICA: ICD-10-CM

## 2021-03-02 DIAGNOSIS — S24.151A: ICD-10-CM

## 2021-03-02 DIAGNOSIS — M79.2 NEUROPATHIC PAIN: ICD-10-CM

## 2021-03-02 DIAGNOSIS — M54.50 THORACOLUMBAR BACK PAIN: ICD-10-CM

## 2021-03-02 DIAGNOSIS — Q28.8 ARTERIOVENOUS FISTULA OF SPINAL CORD VESSELS: ICD-10-CM

## 2021-03-02 DIAGNOSIS — S24.109D SPINAL CORD INJURY OF THORACIC REGION WITHOUT BONE INJURY, SUBSEQUENT ENCOUNTER (H): Primary | ICD-10-CM

## 2021-03-02 PROCEDURE — 99443 PR PHYSICIAN TELEPHONE EVALUATION 21-30 MIN: CPT | Mod: 95 | Performed by: NEUROLOGICAL SURGERY

## 2021-03-02 NOTE — PROGRESS NOTES
Annamarie is a 70 year old who is being evaluated via a billable telephone visit.      What phone number would you like to be contacted at? [phone]  How would you like to obtain your AVS? eden  Phone call duration: 30 minutes        Neurosurgery Clinic Note    Reason for Visit: follow-up after imaging    History of Present Illness  Anayeli Gagnon is a 70-year-old woman who was referred to me by Dr. Alf Ritchie who has suffered progressive paralysis and spinal cord injury due to an unclear etiology but involving spinal cord tethering and development of cord injury involving the mid to lower thoracic spinal cord.  We have been discussing ways to treat her significant pain and today she tells me that the pain is ongoing and still disabling and distressing.     She did obtain repeat MRIs of her spine which generally show no significant change from her last MRIs.         Allergies   Allergen Reactions     Contrast Dye Rash     Painful rash after x-ray contrast       Current Outpatient Medications   Medication     acetaminophen (TYLENOL) 500 MG tablet     baclofen (LIORESAL) 10 MG tablet     furosemide (LASIX) 20 MG tablet     gabapentin (NEURONTIN) 600 MG tablet     gabapentin (NEURONTIN) 800 MG tablet     lidocaine, Anorectal, 5 % CREA     LORazepam (ATIVAN) 0.5 MG tablet     methylPREDNISolone (MEDROL) 16 MG tablet     methylPREDNISolone (MEDROL) 32 MG tablet     metoprolol succinate ER (TOPROL-XL) 25 MG 24 hr tablet     morphine (MS CONTIN) 15 MG CR tablet     nortriptyline (PAMELOR) 10 MG capsule     nystatin (MYCOSTATIN) 175608 UNIT/GM external cream     oxyCODONE-acetaminophen (PERCOCET) 5-325 MG tablet     simethicone (MYLICON) 80 MG chewable tablet     traMADol (ULTRAM) 50 MG tablet     VIIBRYD 10 MG TABS tablet     buprenorphine (BUTRANS) 10 MCG/HR WK patch     busPIRone (BUSPAR) 5 MG tablet     diazepam (VALIUM) 5 MG tablet     methylPREDNISolone (MEDROL) 32 MG tablet     oxyCODONE (ROXICODONE) 5 MG  tablet     polyethylene glycol (MIRALAX/GLYCOLAX) packet     senna-docusate (SENOKOT-S/PERICOLACE) 8.6-50 MG tablet     No current facility-administered medications for this visit.       ROS: 10 point ROS neg other than the symptoms noted above in the HPI.    Imaging: My interpretations only  MRI of the lumbar and thoracic spine demonstrate no significant change in the spinal cord compared to the last MRI except for resolution of the likely postoperative fluid collection.        Assessment and Plan   Anayeli Gagnon is a 70-year-old woman with significant and unrelenting types of neuropathic pain associated with spinal cord injury that has been refractory to pharmacological management.  Her case is quite complex and she is not eligible for percutaneous placement of electrodes for spinal cord stimulation.  Therefore she would not be a good patient for trialing and will require an open laminectomy for placement of electrodes.  We will plan on placing a paddle electrode at approximately the T12 location to effectively cover her legs and groin which are the most significant sources of her pain.  If effective a second stimulator could be placed higher to address more of the abdominal pain in the future if desired.      Luis Orourke MD  Department of Neurosurgery  [facility]

## 2021-03-02 NOTE — LETTER
3/2/2021       RE: Anayeli Gagnon  [address] [address] [place] [id number]     Dear Colleague,    Thank you for referring your patient, Anayeli Gagnon, to the [facility] [place] at [facility]. Please see a copy of my visit note below.    Annamarie is a 70 year old who is being evaluated via a billable telephone visit.      What phone number would you like to be contacted at? [phone]  How would you like to obtain your AVS? eden  Phone call duration: 30 minutes        Neurosurgery Clinic Note    Reason for Visit: follow-up after imaging    History of Present Illness  Anayeli Gagnon is a 70-year-old woman who was referred to me by Dr. Alf Ritchie who has suffered progressive paralysis and spinal cord injury due to an unclear etiology but involving spinal cord tethering and development of cord injury involving the mid to lower thoracic spinal cord.  We have been discussing ways to treat her significant pain and today she tells me that the pain is ongoing and still disabling and distressing.     She did obtain repeat MRIs of her spine which generally show no significant change from her last MRIs.         Allergies   Allergen Reactions     Contrast Dye Rash     Painful rash after x-ray contrast       Current Outpatient Medications   Medication     acetaminophen (TYLENOL) 500 MG tablet     baclofen (LIORESAL) 10 MG tablet     furosemide (LASIX) 20 MG tablet     gabapentin (NEURONTIN) 600 MG tablet     gabapentin (NEURONTIN) 800 MG tablet     lidocaine, Anorectal, 5 % CREA     LORazepam (ATIVAN) 0.5 MG tablet     methylPREDNISolone (MEDROL) 16 MG tablet     methylPREDNISolone (MEDROL) 32 MG tablet     metoprolol succinate ER (TOPROL-XL) 25 MG 24 hr tablet     morphine (MS CONTIN) 15 MG CR tablet     nortriptyline (PAMELOR) 10 MG capsule     nystatin (MYCOSTATIN) 220155 UNIT/GM external cream     oxyCODONE-acetaminophen  (PERCOCET) 5-325 MG tablet     simethicone (MYLICON) 80 MG chewable tablet     traMADol (ULTRAM) 50 MG tablet     VIIBRYD 10 MG TABS tablet     buprenorphine (BUTRANS) 10 MCG/HR WK patch     busPIRone (BUSPAR) 5 MG tablet     diazepam (VALIUM) 5 MG tablet     methylPREDNISolone (MEDROL) 32 MG tablet     oxyCODONE (ROXICODONE) 5 MG tablet     polyethylene glycol (MIRALAX/GLYCOLAX) packet     senna-docusate (SENOKOT-S/PERICOLACE) 8.6-50 MG tablet     No current facility-administered medications for this visit.       ROS: 10 point ROS neg other than the symptoms noted above in the HPI.    Imaging: My interpretations only  MRI of the lumbar and thoracic spine demonstrate no significant change in the spinal cord compared to the last MRI except for resolution of the likely postoperative fluid collection.        Assessment and Plan   Anayeli Gagnon is a 70-year-old woman with significant and unrelenting types of neuropathic pain associated with spinal cord injury that has been refractory to pharmacological management.  Her case is quite complex and she is not eligible for percutaneous placement of electrodes for spinal cord stimulation.  Therefore she would not be a good patient for trialing and will require an open laminectomy for placement of electrodes.  We will plan on placing a paddle electrode at approximately the T12 location to effectively cover her legs and groin which are the most significant sources of her pain.  If effective a second stimulator could be placed higher to address more of the abdominal pain in the future if desired.      Luis Orourke MD  Department of Neurosurgery  [facility]

## 2021-03-04 ENCOUNTER — TELEPHONE (OUTPATIENT)
Dept: NEUROSURGERY | Facility: CLINIC | Age: 71
End: 2021-03-04

## 2021-03-04 PROBLEM — M54.42 CHRONIC BILATERAL LOW BACK PAIN WITH BILATERAL SCIATICA: Status: ACTIVE | Noted: 2018-11-12

## 2021-03-04 PROBLEM — M54.6 THORACOLUMBAR BACK PAIN: Status: ACTIVE | Noted: 2021-03-04

## 2021-03-04 PROBLEM — Q28.8 ARTERIOVENOUS FISTULA OF SPINAL CORD VESSELS: Status: ACTIVE | Noted: 2019-12-23

## 2021-03-04 PROBLEM — G89.29 CHRONIC BILATERAL LOW BACK PAIN WITH BILATERAL SCIATICA: Status: ACTIVE | Noted: 2018-11-12

## 2021-03-04 PROBLEM — M54.50 THORACOLUMBAR BACK PAIN: Status: ACTIVE | Noted: 2021-03-04

## 2021-03-04 PROBLEM — M79.2 INTRACTABLE NEUROPATHIC PAIN OF LUMBOSACRAL ORIGIN: Status: ACTIVE | Noted: 2021-03-04

## 2021-03-04 PROBLEM — M54.41 CHRONIC BILATERAL LOW BACK PAIN WITH BILATERAL SCIATICA: Status: ACTIVE | Noted: 2018-11-12

## 2021-03-04 PROBLEM — S24.151A: Status: ACTIVE | Noted: 2017-12-06

## 2021-03-04 PROBLEM — G95.19 EDEMA OF SPINAL CORD (H): Status: ACTIVE | Noted: 2018-09-14

## 2021-03-04 PROBLEM — M79.2 NEUROPATHIC PAIN: Status: ACTIVE | Noted: 2021-03-04

## 2021-03-04 PROBLEM — S24.109D: Status: ACTIVE | Noted: 2021-03-04

## 2021-03-04 RX ORDER — GABAPENTIN 600 MG/1
1200 TABLET ORAL 3 TIMES DAILY
Qty: 1 TABLET | Refills: 0 | COMMUNITY
Start: 2021-03-04

## 2021-03-04 NOTE — TELEPHONE ENCOUNTER
Patient is scheduled for surgery with Dr. saez      Spoke or left message with: patient    Date of Surgery: 04/07/21    Location [facility] OR    H&P: Scheduled with 03/31/21    Post op: 04/20    Additional imaging/appointments: COVID 04/05/21    Surgery packet sent: The nurse will mail out      Additional comments: The patient is aware they need a PRE-OP/PAC appt at least a couple of weeks before surgery date. We went over the patient needing a  and someone to stay with them for 24 hours after the surgery. The patient was given my direct number for any questions [phone]

## 2021-03-05 NOTE — TELEPHONE ENCOUNTER
FUTURE VISIT INFORMATION      SURGERY INFORMATION:    Date:4.7.21    Location: [facility] OR    Surgeon:  Shraad    Anesthesia Type:  general    Procedure: Posterior thoracic 12 to Lumbar 1 or additional level for placement of spinal cord stimulator paddle and placement of implantable pulse generator/battery over     Consult: 3.2.21    RECORDS REQUESTED FROM:         Most recent EKG+ Tracing: 10.16.19    Most recent ECHO: 11.21.17    Most recent Cardiac Stress Test: 10.8.07

## 2021-03-12 NOTE — TELEPHONE ENCOUNTER
Updated patient that per Dr. Orourke he would like to see her for the 2 week post-op. Post-op switched to 04/27/21 at 1 pm. Patient confirmed.

## 2021-03-15 ENCOUNTER — DOCUMENTATION ONLY (OUTPATIENT)
Dept: NEUROSURGERY | Facility: CLINIC | Age: 71
End: 2021-03-15

## 2021-03-23 DIAGNOSIS — Z11.59 ENCOUNTER FOR SCREENING FOR OTHER VIRAL DISEASES: ICD-10-CM

## 2021-03-31 ENCOUNTER — ANESTHESIA EVENT (OUTPATIENT)
Dept: SURGERY | Facility: CLINIC | Age: 71
End: 2021-03-31
Payer: MEDICARE

## 2021-03-31 ENCOUNTER — APPOINTMENT (OUTPATIENT)
Dept: SURGERY | Facility: CLINIC | Age: 71
End: 2021-03-31
Payer: MEDICARE

## 2021-03-31 ENCOUNTER — PRE VISIT (OUTPATIENT)
Dept: SURGERY | Facility: CLINIC | Age: 71
End: 2021-03-31

## 2021-03-31 ENCOUNTER — OFFICE VISIT (OUTPATIENT)
Dept: SURGERY | Facility: CLINIC | Age: 71
End: 2021-03-31
Payer: MEDICARE

## 2021-03-31 VITALS
OXYGEN SATURATION: 94 % | HEART RATE: 75 BPM | WEIGHT: 125 LBS | HEIGHT: 64 IN | SYSTOLIC BLOOD PRESSURE: 107 MMHG | DIASTOLIC BLOOD PRESSURE: 68 MMHG | RESPIRATION RATE: 12 BRPM | TEMPERATURE: 98.4 F | BODY MASS INDEX: 21.34 KG/M2

## 2021-03-31 DIAGNOSIS — Z01.818 PREOP EXAMINATION: Primary | ICD-10-CM

## 2021-03-31 DIAGNOSIS — S24.109D SPINAL CORD INJURY OF THORACIC REGION WITHOUT BONE INJURY, SUBSEQUENT ENCOUNTER (H): ICD-10-CM

## 2021-03-31 DIAGNOSIS — Z01.818 PREOP EXAMINATION: ICD-10-CM

## 2021-03-31 LAB
ANION GAP SERPL CALCULATED.3IONS-SCNC: 2 MMOL/L (ref 3–14)
BUN SERPL-MCNC: 13 MG/DL (ref 7–30)
CALCIUM SERPL-MCNC: 8.8 MG/DL (ref 8.5–10.1)
CHLORIDE SERPL-SCNC: 108 MMOL/L (ref 94–109)
CO2 SERPL-SCNC: 31 MMOL/L (ref 20–32)
CREAT SERPL-MCNC: 0.61 MG/DL (ref 0.52–1.04)
ERYTHROCYTE [DISTWIDTH] IN BLOOD BY AUTOMATED COUNT: 13 % (ref 10–15)
GFR SERPL CREATININE-BSD FRML MDRD: >90 ML/MIN/{1.73_M2}
GLUCOSE SERPL-MCNC: 77 MG/DL (ref 70–99)
HCT VFR BLD AUTO: 39.6 % (ref 35–47)
HGB BLD-MCNC: 13 G/DL (ref 11.7–15.7)
MCH RBC QN AUTO: 31.6 PG (ref 26.5–33)
MCHC RBC AUTO-ENTMCNC: 32.8 G/DL (ref 31.5–36.5)
MCV RBC AUTO: 96 FL (ref 78–100)
PLATELET # BLD AUTO: 169 10E9/L (ref 150–450)
POTASSIUM SERPL-SCNC: 4.4 MMOL/L (ref 3.4–5.3)
RBC # BLD AUTO: 4.11 10E12/L (ref 3.8–5.2)
SODIUM SERPL-SCNC: 141 MMOL/L (ref 133–144)
WBC # BLD AUTO: 7.7 10E9/L (ref 4–11)

## 2021-03-31 PROCEDURE — 80048 BASIC METABOLIC PNL TOTAL CA: CPT | Performed by: PATHOLOGY

## 2021-03-31 PROCEDURE — 85027 COMPLETE CBC AUTOMATED: CPT | Performed by: PATHOLOGY

## 2021-03-31 PROCEDURE — 36415 COLL VENOUS BLD VENIPUNCTURE: CPT | Performed by: PATHOLOGY

## 2021-03-31 PROCEDURE — 99204 OFFICE O/P NEW MOD 45 MIN: CPT | Performed by: PHYSICIAN ASSISTANT

## 2021-03-31 ASSESSMENT — PAIN SCALES - GENERAL: PAINLEVEL: MODERATE PAIN (4)

## 2021-03-31 ASSESSMENT — LIFESTYLE VARIABLES: TOBACCO_USE: 0

## 2021-03-31 ASSESSMENT — ENCOUNTER SYMPTOMS: SEIZURES: 0

## 2021-03-31 ASSESSMENT — MIFFLIN-ST. JEOR: SCORE: 1062

## 2021-03-31 NOTE — PHARMACY - PREOPERATIVE ASSESSMENT CENTER
"Preoperative Assessment Center Medication History Note    Medication history completed on March 31, 2021 by this writer. See Epic admission navigator for prior to admission medications. Operating room staff will still need to confirm medications and last dose information on day of surgery.     Medication history interview sources:  patient    Changes made to PTA medication list (reason)  Added: none  Deleted:   -buprenorphine batch - poor reaction, dc'd in ED, started by [facility]  -buspirone  -furosemide  -methylprednisolone - prior to contrast dye  -nortriptyline  -tramadol  -vilazodone  Changed: none    Additional medication history information (including reliability of information, actions taken by pharmacist):  -pt manages her own meds, and was a reliable historian of her regimen  -Allergies reviewed, including contrast dye (recently received methylprednisolone for this). Pt reported intolerance to \"any and all antidepressants,\" and reported she has experienced tachycardia, breathing issues, approximately 4 days after initiation. Several antidepressants were removed from her list as above, but I elected not to add anything to Allergies list due to incomplete information related to what she has tried and reacted to in the past.    -- No recent (within 30 days) course of antibiotics  -- No recent (within 30 days) course of systemic steroids  -- Patient declines being on any other prescription or over-the-counter medications    Prior to Admission medications    Medication Sig Last Dose Taking? Auth Provider   acetaminophen (TYLENOL) 500 MG tablet Take 500-1,000 mg by mouth every 6 hours as needed for mild pain Taking Yes Unknown, Entered By History   baclofen (LIORESAL) 10 MG tablet Take 10 mg by mouth 3 times daily as needed for muscle spasms Taking Yes Unknown, Entered By History   gabapentin (NEURONTIN) 600 MG tablet Take 1 tablet (600 mg) by mouth 3 times daily Taking Yes Luis Orourke MD "   lidocaine, Anorectal, 5 % CREA Apply 1 Application topically 3 times daily as needed Taking Yes Reported, Patient   LORazepam (ATIVAN) 0.5 MG tablet Take 1 tablet by mouth daily as needed for anxiety  Taking Yes Reported, Patient   metoprolol succinate ER (TOPROL-XL) 25 MG 24 hr tablet Take 12.5 mg by mouth 2 times daily  Taking Yes Unknown, Entered By History   morphine (MS CONTIN) 15 MG CR tablet Take 15 mg by mouth every 12 hours Taking Yes Unknown, Entered By History   nystatin (MYCOSTATIN) 830267 UNIT/GM external cream Apply 1 Application topically 2 times daily Taking Yes Reported, Patient   oxyCODONE-acetaminophen (PERCOCET) 5-325 MG tablet Take 1 tablet by mouth every 4 hours as needed for severe pain  Taking Yes Reported, Patient   simethicone (MYLICON) 80 MG chewable tablet Take 1 tablet by mouth every 6 hours as needed for flatulence or cramping  Taking Yes Reported, Patient          Medication history completed by: Marcos Treviño RP

## 2021-03-31 NOTE — PHARMACY - PREOPERATIVE ASSESSMENT CENTER
PREOPERATIVE PAIN CONSULT FOR POSTOPERATIVE PAIN MANAGEMENT  Anayeli Gagnon was interviewed via phone on March 31, 2021 prior to PAC Clinic appointment. Patient is preparing for the planned procedure with Dr. Orourke on 4/7/21 at [facility] for Posterior thoracic 12 to Lumbar 1 or additional level for placement of spinal cord stimulator paddle and placement of implantable pulse generator/battery over right buttock.  These recommendations are intended for patients admitted to the hospital after a procedure and are only valid for 30 days from the date of service. If there are significant changes in opioid dosing between today and day of procedure the below recommendations may have to be adjusted.      RECOMMENDATIONS:   The following pain management recommendations are made based on information from today's visit and should not replace medical decision-making based on patient condition at the time of procedure or postoperatively.      - PREOPERATIVE:  + Long acting opioid - MS Contin 15 mg PO BID. Take usual dose on the morning of procedure.  +  Before surgery recommend gabapentin *- pt to take usual dose of 600 mg the morning of procedure  + Before procedure recommend acetaminophen 975 mg PO in pre-op (Written in pre-op by PAC MIKKI)    - INTRAOPERATIVE (Anesthesiologist/CRNA to consider):   + Regional anesthesia - Defer to RAPS team   + Ketamine IV intraoperatively  + Avoid remifentanil - to reduce risk of developing hyperalgesia    - POSTOPERATIVE INPATIENT MANAGEMENT:  Opioid analgesic:  + Note: if neuraxial opioid or other long acting opioid (eg. Methadone) is given during procedure contact on-call pain provider for adjustment in opioid plan  + Note if regional approach includes an opioid via the epidural then defer opioid management to RAPS team.   + If able to take oral medications (preferred):           -- restart MS Contin 15 mg PO BID  (hold or reduce dose as needed if  patient has s/sx sedation or respiratory depression)          -- do not restart PTA oxycodone-APAP; instead start oxycodone 5-10 mg PO q4h PRN, low threshold to increase frequency to q3h PRN    + If unable to take oral medications:          --HYDROmorphone (Dilaudid) PCA recommended dose range 0.2-0.3 mg Q 10 min lockout interval with NO continuous rate. Start with 0.2 mg PCA dose Q 10 min lockout interval. If necessary for pain control and improvement in physical function, notify provider for PCA dose increase orders per recommended dose range. Hold or reduce dose for sedation.          -- Hold other PO and IV opioids while on PCA    Nonopioid analgesics:   + Defer use of NSAID to surgeon  + Start acetaminophen 975 mg PO every 6 hr scheduled if no concern about masking fevers  + Continue gabapentin 600 mg PO every 8 hours scheduled  + Resume prior to admission medications related to pain: baclofen 10 mg PO TID spasms    Muscle Relaxant:   + baclofen as above  + If unable to tolerate PO meds, use Diazepam (Valium) 5 mg IV Q 6 hr PRN muscle spasm    Stool softeners/Laxatives:   + When appropriate start senna-docusate 1-2 tabs PO BID and Miralax 17 g daily to prevent opioid induced constipation.     Other:  + Recommend close monitoring of respiratory status postoperatively with capnography and continuous SpO2 monitoring. Would recommend continuing capnography beyond the usual 24 hr due to patient's opioid tolerance.       + Ketamine IV infusion.  If needed for acute postop uncontrolled pain, the primary service may decide to start ketamine infusion at 2.5 mg/hr (0.05 mg/kg/hr).  Ketamine for acute pain management will be used as an analgesic medication in addition to opioids when usual analgesics are suboptimal.Only start ketamine IV if patient is stable from a cardiovascular standpoint.  Low dose ketamine may be administered on a regular nursing unit according to [facility] ketamine-low dose policy.  Please see policy for  details on contraindications, adverse effects and monitoring.  Of note, sialorrhea is another potential side effect and must weight benefit/risk if patient having trouble protecting airway.  [email or web address]     -------------------------------------------------------------------------------------------------------------------  - OUTPATIENT MEDICATIONS (related to pain management):  -- Long-acting opioid: MS Contin 15 mg PO BID  -- Short-acting opioid: oxycodone-APAP 5-325 mg 1 tab PO q4h PRN (4-6 tabs/day)  -- Oral adjuvant(s): gabapentin 600 mg PO TID, baclofen 10 mg PO TID PRN spasms  -- Topicals: lidocaine patch, heat/ice packs, TENS unit    Verbal consent was given by patient to access pharmacy records and [place] Prescription Monitoring Profile: Yes  Outpatient opioids prescribed by L[facility]  Outpatient opioid oral morphine equivalent (OME): 75    ASSESSMENT:    Anayeli Gagnon has a history of progressive paralysis and spinal cord injury of unclear etiology involving mid-lower thoracic region, resulting in refractory neuropathic pain, and is preparing for above mentioned surgery.      Today she described her pain as located in lower right back region, as well as bilateral legs and feet. She endorsed descriptors like dull, burning, tingling when asked about the quality of her pain, and indicated it is constant. She rated it as an 8 out of 10 on an average day, and occasionally it reaches a 10 out of 10 on her worst days. She noted that activity in general aggravates the pain. Her pain often affects her sleep. She finds some relief from a TENS unit, as well as heat and ice packs, and also lidocaine patches.     She denied major adverse effects related to her pain regimen with the exception of some nausea. She described feeling quite drowsy when she had tried two baclofen tablets (20 mg total  "dose) in addition to her Percocet and MS Contin regimen in the past, and now she prefers 10 mg.     She denied use of alcohol, illicit/recreation substances (although she is considering medical marijuana certification), abuse of other opioids, and tobacco use.    She did not recall that she has utilized a PCA in the past. She was screened for and appears to be a good candidate for low-dose ketamine infusion for analgesia, and she is open to this adjuvant post-operatively.    Please refer to the [facility] pharmacist note dated 1/25/2021 in Care Everywhere (recommend to search Epic for \"buprenorphine\") for details related to a trial of buprenorphine patch that did not go well. Annamarie did not provide further details today, just noting that she is no longer on that medication.      If pain control remains an issue please consult the inpatient pain management service for further recommendations for pain management.  If immediate assistance is needed please contact the pain service at the number below.     Marcos Treviño, PharmD  PAC Pharmacist  [phone]   March 31, 2021  1:49 PM    If questions or concerns, please contact the Inpatient Pain Management Service:  Call [phone] after hours, weekends and holidays.   Page [phone] from 8 AM - 3 PM Mon - Fri.    "

## 2021-03-31 NOTE — PATIENT INSTRUCTIONS
Preparing for Your Surgery      Name:  Anayeli Gagnon   MRN:  [id number]   :  1950   Today's Date:  3/31/2021       Arriving for surgery:  Surgery date:  21  Arrival time:  5:45 am    Restrictions due to COVID 19:  One consistent visitor per patient is allowed.  The visitor will be allowed in the pre-op area.  Visitors are asked to leave the building during the surgery.  No ill visitors.  All visitors must wear face mask.     parking is available for anyone with mobility limitations or disabilities.  ([place]  24 hours/ 7 days a week; [facility]  7 am- 3:30 pm, Mon- Fri)    Please come to:     [facility] [address]  [address] [place] [address]    - ? parking is available in front of the hospital      -    Please proceed to [address] on the 3rd floor. [phone]?     - ?If you are in need of directions, wheelchair or escort please stop at the Information Desk in the lobby.  Inform the information person that you are here for surgery; a wheelchair and escort to [address] will be provided.?     What can I eat or drink?  -  You may eat and drink normally for up to 8 hours before your surgery. (Until 11:45 pm)  -  You may have clear liquids until 2 hours before surgery. (Until 5:45 am)    Examples of clear liquids:  Water  Clear broth  Juices (apple, white grape, white cranberry  and cider) without pulp  Noncarbonated, powder based beverages  (lemonade and Tushar-Aid)  Sodas (Sprite, 7-Up, ginger ale and seltzer)  Coffee or tea (without milk or cream)  Gatorade    -  No Alcohol for at least 24 hours before surgery     Which medicines can I take?    Hold Aspirin for 7 days before surgery.   Hold Multivitamins for 7 days before surgery.  Hold Supplements for 7 days before surgery.  Hold Ibuprofen (Advil, Motrin) for 1 day before surgery--unless otherwise directed by surgeon.  Hold Naproxen (Aleve) for 4 days before surgery.    -   DO NOT take these medications the day of surgery:  Simethicone (Mylicon)    -  PLEASE TAKE these medications the day of surgery:  Acetaminophen (Tylenol)  Baclofen (Lioresal)  Gabapentin (Neurontin)  Lorazepam (Ativan)  Metoprolol (Toprol)  Morphine (MS Contin)  Oxycodone-Acetaminophen (Percocet)    How do I prepare myself?  - Please take 2 showers before surgery using Scrubcare or Hibiclens soap.    Use this soap only from the neck to your toes.     Leave the soap on your skin for one minute--then rinse thoroughly.      You may use your own shampoo and conditioner; no other hair products.   - Please remove all jewelry and body piercings.  - No lotions, deodorants or fragrance.  - No makeup or fingernail polish.   - Bring your ID and insurance card.    - All patients are required to have a Covid-19 test within 4 days of surgery/procedure.      -Patients will be contacted by the [facility] scheduling team within 1 week of surgery to make an appointment.      - Patients may call the Scheduling team at [phone] if they have not been scheduled within 4 days of  surgery.      ALL PATIENTS GOING HOME THE SAME DAY OF SURGERY ARE REQUIRED TO HAVE A RESPONSIBLE ADULT TO DRIVE AND BE IN ATTENDANCE WITH THEM FOR 24 HOURS FOLLOWING SURGERY.    IF THE RESPONSIBLE ADULT IS REQUIRED FOR POST OP TEACHING THE POST OP RN WILL ASK THEM TO COME BACK TO THE RECOVERY AREA.    Questions or Concerns:    - For any questions regarding the day of surgery or your hospital stay, please contact the Pre Admission Nursing Office at [phone].       - If you have health changes between today and your surgery please call your surgeon.       For questions after surgery please call your surgeons office.

## 2021-03-31 NOTE — H&P
Pre-Operative H & P     CC:  Preoperative exam to assess for increased cardiopulmonary risk while undergoing surgery and anesthesia.    Date of Encounter: 3/31/2021  Primary Care Physician:  Wendy Jones  Reason for Visit: Spinal cord injury of thoracic region without bone injury, subsequent encounter (H); Thoracolumbar back pain; Neuropathic pain; Intractable neuropathic pain of lumbosacral origin; Incomplete injury of thoracic spinal cord in T1 to T6 region without bony injury (H); Edema of spinal cord (H); Chronic bilateral low back pain with bilateral sciatica; Arteriovenous fistula of spinal cord vessels    HPI     Anayeli Gagnon is a 69 y/o female who presents for pre-operative H&P in preparation for Posterior thoracic 12 to Lumbar 1 or additional level for placement of spinal cord stimulator paddle and placement of implantable pulse generator/battery over right buttock with Luis Orourke MD on 4/7/21 at [facility] for treatment of Spinal cord injury of thoracic region without bone injury, subsequent encounter (H); Thoracolumbar back pain; Neuropathic pain; Intractable neuropathic pain of lumbosacral origin; Incomplete injury of thoracic spinal cord in T1 to T6 region without bony injury (H); Edema of spinal cord (H); Chronic bilateral low back pain with bilateral sciatica; Arteriovenous fistula of spinal cord vessels.     Ms. Gagnon has a history of progressive paralysis and spinal cord injury due to an unclear etiology but involving spinal cord tethering and development of cord injury involving the mid to lower thoracic spinal cord.  She suffers from significant pain and states that the pain is ongoing and still disabling and distressing. Her case is quite complex and she is not eligible for percutaneous placement of electrodes for spinal cord stimulation.  Therefore, she would not be a good patient for trialing and will require an open laminectomy  for placement of electrodes.  The plan is to place a paddle electrode at approximately the T12 location to effectively cover her legs and groin, which are the most significant sources of her pain.  If effective, a second stimulator could be placed higher to address more of the abdominal pain in the future if desired. She now presents for the above procedure.    PMH is also significant for depression.    History was obtained from patient & chart review.     Past Medical History  Past Medical History:   Diagnosis Date     CARDIAC DYSRHYTHMIAS NEC 10/3/2007    Taking atenelol for years for this     History of skin cancer      Mitral valve prolapse      Neurogenic bladder      Sacral decubitus ulcer, stage IV (H)      Thoracic spinal cord injury (H)        Past Surgical History  Past Surgical History:   Procedure Laterality Date     DECOMPRESSION LUMBAR ONE LEVEL N/A 12/27/2019    Procedure: Posterior spinal decompression;  Surgeon: Alf Ritchie MD;  Location: [facility] OR     IR SPINAL ANGIOGRAM  10/16/2019     IR SPINAL ANGIOGRAM  12/20/2019     LAPAROSCOPIC TUBAL LIGATION       REPAIR SPINAL ARERIOVENOUS MALFORMATION N/A 12/27/2019    Procedure: with surgical disconnection arterial venous fistula Thoracic 5;  Surgeon: Alf Ritchie MD;  Location: [facility] OR       Hx of Blood transfusions/reactions: denies     Hx of abnormal bleeding or anti-platelet use: denies    Menstrual history: No LMP recorded. Patient is postmenopausal.:      Steroid use in the last year: Had methylprednisolone for recent imaging due to reaction w/ dye      Personal or FH with difficulty with Anesthesia:  denies    Prior to Admission Medications  Current Outpatient Medications   Medication Sig Dispense Refill     acetaminophen (TYLENOL) 500 MG tablet Take 500-1,000 mg by mouth every 6 hours as needed for mild pain       baclofen (LIORESAL) 10 MG tablet Take 10 mg by mouth 3 times daily as needed for muscle spasms       gabapentin  (NEURONTIN) 600 MG tablet Take 1 tablet (600 mg) by mouth 3 times daily 1 tablet 0     lidocaine, Anorectal, 5 % CREA Apply 1 Application topically 3 times daily as needed       LORazepam (ATIVAN) 0.5 MG tablet Take 1 tablet by mouth daily as needed for anxiety        metoprolol succinate ER (TOPROL-XL) 25 MG 24 hr tablet Take 12.5 mg by mouth 2 times daily        morphine (MS CONTIN) 15 MG CR tablet Take 15 mg by mouth every 12 hours       nystatin (MYCOSTATIN) 329386 UNIT/GM external cream Apply 1 Application topically 2 times daily       oxyCODONE-acetaminophen (PERCOCET) 5-325 MG tablet Take 1 tablet by mouth every 4 hours as needed for severe pain        simethicone (MYLICON) 80 MG chewable tablet Take 1 tablet by mouth every 6 hours as needed for flatulence or cramping          Allergies  Allergies   Allergen Reactions     Contrast Dye Rash     Painful rash after x-ray contrast       Social History  Social History     Socioeconomic History     Marital status:      Spouse name: Not on file     Number of children: Not on file     Years of education: Not on file     Highest education level: Not on file   Occupational History     Occupation: B-[employer]     Employer: [employer]   Social Needs     Financial resource strain: Not on file     Food insecurity     Worry: Not on file     Inability: Not on file     Transportation needs     Medical: Not on file     Non-medical: Not on file   Tobacco Use     Smoking status: Never Smoker     Smokeless tobacco: Never Used   Substance and Sexual Activity     Alcohol use: No     Drug use: No     Sexual activity: Yes     Partners: Male   Lifestyle     Physical activity     Days per week: Not on file     Minutes per session: Not on file     Stress: Not on file   Relationships     Social connections     Talks on phone: Not on file     Gets together: Not on file     Attends Jew service: Not on file     Active member of club or organization: Not on file     Attends meetings of  clubs or organizations: Not on file     Relationship status: Not on file     Intimate partner violence     Fear of current or ex partner: Not on file     Emotionally abused: Not on file     Physically abused: Not on file     Forced sexual activity: Not on file   Other Topics Concern     Parent/sibling w/ CABG, MI or angioplasty before 65F 55M? Not Asked   Social History Narrative    Lives with spouse - works in [place].       Family History  Family History   Problem Relation Age of Onset     C.A.D. Father          41     Deep Vein Thrombosis (DVT) No family hx of      Anesthesia Reaction No family hx of            ROS/MED HX  The complete review of systems is negative other than noted in the HPI or here.  Patient denies recent illness, fever and respiratory infection during past month.  Pt denies steroid use during past year.    ENT/Pulmonary:  - neg pulmonary ROS  (-) tobacco use, asthma and sleep apnea   Neurologic: Comment: Thoracic spinal cord injury    (+) peripheral neuropathy, - LEs, consistent w/ spinal issues.  (-) no seizures and no CVA   Cardiovascular: Comment: Has been taking metoprolol ~ 40 yrs for mitral prolapse (no documentation of MVP on current echo)    (+) -----Previous cardiac testing   Echo: Date:  Results:  CONCLUSION:  1. Technically difficult study with poor acoustic windows.     2. The left ventricle is normal size with hyperdynamic systolic function. Ejection fraction is 70-75%.     3. The right ventricle is poorly visualized but appears to be normal size with normal systolic function.     4. Mild tricuspid regurgitation.     5. No significant pericardial effusion.     6. No prior echos available for comparison.      Stress Test:  Date: Results:    ECG Reviewed:  Date:  Results:  Sinus rhythm  Nonspecific T wave abnormality  Abnormal ECG  No previous ECGs available  Ventricular rate 75 bpm    Cath:  Date:  Results:  CONCLUSION  1) Normal LV systolic function, ejection  "fraction of 50%.     2) Angiographically normal coronary arteries.       METS/Exercise Tolerance: 1 - Eating, dressing Comment: Wheelchair bound   Hematologic:  - neg hematologic  ROS  (-) history of blood clots and history of blood transfusion   Musculoskeletal: Comment: Hx progressive paralysis and spinal cord injury due to an unclear etiology but involving spinal cord tethering and development of cord injury involving the mid to lower thoracic spinal cord.  She suffers from significant pain.     Arteriovenous fistula of spinal cord vessels      GI/Hepatic: Comment: Occasional GERD    Has colostomy   (-) liver disease   Renal/Genitourinary: Comment: Has hudson catheter   (-) renal disease   Endo: Comment: Had methylprednisolone for recent imaging due to reaction w/ dye   (-) Type II DM   Psychiatric/Substance Use: Comment: Taking MS Contin bid & percocet 4-6 tabs daily      (+) psychiatric history depression H/O chronic opiod use .     Infectious Disease:  - neg infectious disease ROS     Malignancy:  - neg malignancy ROS     Other: Comment: Hx decubitus ulcer requiring debridement w/ skin flap               Temp: 98.4  F (36.9  C) Temp src: Oral BP: 107/68 Pulse: 75   Resp: 12 SpO2: 94 %         125 lbs 0 oz  5' 4\"[pt reported[   Body mass index is 21.46 kg/m .       Physical Exam  Constitutional: Awake, alert, cooperative, no apparent distress, and appears stated age. Seated in wheelchair.  Eyes: Pupils equal, round and reactive to light, extra ocular muscles intact, sclera clear, conjunctiva normal.  HENT: Normocephalic, oral pharynx with moist mucus membranes, good dentition. No goiter appreciated. No removable dental hardware.  Respiratory: Clear to auscultation bilaterally, no crackles or wheezing. No SOB when supine.  Cardiovascular: Regular rate and rhythm, normal S1 and S2, and no murmur noted.  Carotids +2, no bruits. No edema. Palpable pulses to radial, DP and PT arteries.   GI: Normal bowel sounds, " soft, non-distended, non-tender, no masses palpated.    Lymph/Hematologic: No cervical lymphadenopathy and no supraclavicular lymphadenopathy.  Genitourinary:  deferred  Skin: Warm and dry.  No rashes.   Musculoskeletal: Mildly limited extension ROM of neck. There is no redness, warmth, or swelling of the joints. Gross motor strength is normal.    Neurologic: Awake, alert, oriented to name, place and time. Cranial nerves II-XII are grossly intact. Gait not assessed.   Neuropsychiatric: Calm, cooperative. Normal affect. Pleasant. Answers questions appropriately, follows commands w/o difficulty.        PRIOR LABS/DIAGNOSTIC STUDIES:    All labs and imaging personally reviewed      EKG 2019  Sinus rhythm  Nonspecific T wave abnormality  Abnormal ECG  No previous ECGs available  Ventricular rate 75 bpm      ECHOCARDIOGRAM 2017  CONCLUSION:  1. Technically difficult study with poor acoustic windows.     2. The left ventricle is normal size with hyperdynamic systolic function.  Ejection fraction is 70-75%.     3. The right ventricle is poorly visualized but appears to be normal size with normal systolic function.     4. Mild tricuspid regurgitation.      5. No significant pericardial effusion.     6. No prior echos available for comparison.        HEART CATH 2017  ANGIOGRAPHY  LEFT VENTRICULOGRAPHY:  Left ventriculography reveals overall normal LV systolic function with ejection fraction of 50%. Left ventricular end diastolic pressure is 17.     CORONARY ANGIOGRAPHY  LEFT MAIN:    The left main trunk is angiographically normal.    LAD:    The LAD is extremely tortuous but is angiographically normal.     CIRCUMFLEX:    The left circumflex is a moderate caliber nondominant vessel and is tortuous but angiographically normal.     RCA:    The RCA is a large caliber dominant vessel.  It is tortuous but appears angiographically normal.     CONCLUSION  1) Normal LV systolic function, ejection fraction of 50%.     2)  Angiographically normal coronary arteries.       CBC:   Lab Results   Component Value Date    WBC 6.2 12/31/2019    WBC 9.3 12/28/2019    HGB 10.6 (L) 12/31/2019    HGB 10.7 (L) 12/28/2019    HCT 32.7 (L) 12/31/2019    HCT 34.3 (L) 12/28/2019     (L) 12/31/2019     12/28/2019     BMP:   Lab Results   Component Value Date     12/31/2019     12/28/2019    POTASSIUM 3.7 12/31/2019    POTASSIUM 3.7 12/28/2019    CHLORIDE 110 (H) 12/31/2019    CHLORIDE 109 12/28/2019    CO2 32 12/31/2019    CO2 27 12/28/2019    BUN 10 12/31/2019    BUN 10 12/28/2019    CR 0.49 (L) 12/31/2019    CR 0.56 12/28/2019    GLC 86 12/31/2019    GLC 91 12/28/2019     COAGS:   Lab Results   Component Value Date    PTT 27 12/27/2019    INR 1.04 12/27/2019     POC:   Lab Results   Component Value Date    BGM 87 12/27/2019     HEPATIC: No results found for: ALBUMIN, PROTTOTAL, ALT, AST, GGT, ALKPHOS, BILITOTAL, BILIDIRECT, DAVID  OTHER:   Lab Results   Component Value Date    PH 7.43 12/27/2019    LACT 1.4 12/27/2019    A1C 5.3 11/17/2011    ADAN 8.0 (L) 12/31/2019    PHOS 3.5 12/27/2019    MAG 2.0 12/27/2019    TSH 2.33 08/07/2010    SED 4 11/17/2011     Labs today: (personally reviewed)  BMP, CBC    Sodium 133 - 144 mmol/L 141      Potassium 3.4 - 5.3 mmol/L 4.4     Chloride 94 - 109 mmol/L 108     Carbon Dioxide 20 - 32 mmol/L 31     Anion Gap 3 - 14 mmol/L 2Low      Glucose 70 - 99 mg/dL 77     Urea Nitrogen 7 - 30 mg/dL 13     Creatinine 0.52 - 1.04 mg/dL 0.61     GFR Estimate >60 mL/min/ >90    Comment: Non  GFR Calc   Starting 12/18/2018, serum creatinine based estimated GFR (eGFR) will be   calculated using the Chronic Kidney Disease Epidemiology Collaboration   (CKD-EPI) equation.     GFR Estimate If Black >60 mL/min/ >90    Comment:  GFR Calc   Starting 12/18/2018, serum creatinine based estimated GFR (eGFR) will be   calculated using the Chronic Kidney Disease Epidemiology  Collaboration   (CKD-EPI) equation.     Calcium 8.5 - 10.1 mg/dL 8.8        WBC 4.0 - 11.0 10e9/L 7.7      RBC Count 3.8 - 5.2 10e12/L 4.11     Hemoglobin 11.7 - 15.7 g/dL 13.0     Hematocrit 35.0 - 47.0 % 39.6     MCV 78 - 100 fl 96     MCH 26.5 - 33.0 pg 31.6     MCHC 31.5 - 36.5 g/dL 32.8     RDW 10.0 - 15.0 % 13.0     Platelet Count 150 - 450 10e9/L 169        Outside records reviewed from: Care Everywhere (Echocardiogram & [facility] cath @ [facility])    ASSESSMENT and PLAN  Anayeli Gagnon is a 70 year old female scheduled to undergo Posterior thoracic 12 to Lumbar 1 or additional level for placement of spinal cord stimulator paddle and placement of implantable pulse generator/battery over right buttock with Luis Orourke MD on 4/7/21 at [facility] for treatment of Spinal cord injury of thoracic region without bone injury, subsequent encounter (H); Thoracolumbar back pain; Neuropathic pain; Intractable neuropathic pain of lumbosacral origin; Incomplete injury of thoracic spinal cord in T1 to T6 region without bony injury (H); Edema of spinal cord (H); Chronic bilateral low back pain with bilateral sciatica; Arteriovenous fistula of spinal cord vessels.       Pt has had prior anesthetic.     No history of anesthetic complications     She has the following specific operative considerations:   # BRADY 1/8 = low risk  # VTE risk: 0.5%  # Risk of PONV score = 3.  If > 2, anti-emetic intervention recommended.  # Anesthesia considerations:  Refer to PAC assessment in anesthesia records    # Increased risk of postoperative nausea/vomiting: Recommend use of antiemetic agents in the perioperative period.      # On chronic opioids. Taking MS Contin bid & Percocet 4-6 tabs daily. Refer to pharmacy note dated 3/31/21 for pain management recommendations.    CARDIAC: METS 1, wheelchair bound      # RCRI : High risk surgery.  0.9% risk of major adverse cardiac event.     #  Echo  2017:  EF 70-75%, normal function     #  Cath 2017: no CAD       PULMONARY:     # Never smoked    # Denies asthma or inhaler use    GI:     # Denies GERD    # Has colostomy    /RENAL:     # Has hudson catheter    ENDO: BMI 24    # No DM    ORTHO:     # Mildly limited ROM of neck    # Hx progressive paralysis and spinal cord injury due to an unclear etiology but involving spinal cord tethering and development of cord injury involving the mid to lower thoracic spinal cord.  She suffers from significant pain.     # Arteriovenous fistula of spinal cord vessels       #  Non-ambulatory, Consideration for safe lifting techniques.           Patient is optimized and is acceptable candidate for the proposed procedure. No further diagnostic evaluation is needed.    Final plan per anesthesiologist on day of surgery.     Arrival time, NPO, shower and medication instructions provided by nursing staff today.  Preparing For Your Surgery handout given.      Rosetta Briscoe PA-C  Preoperative Assessment Center  [facility]  Phone: [phone]  Fax: [phone]

## 2021-04-05 DIAGNOSIS — Z11.59 ENCOUNTER FOR SCREENING FOR OTHER VIRAL DISEASES: ICD-10-CM

## 2021-04-05 LAB
LABORATORY COMMENT REPORT: NORMAL
SARS-COV-2 RNA RESP QL NAA+PROBE: NEGATIVE
SARS-COV-2 RNA RESP QL NAA+PROBE: NORMAL
SPECIMEN SOURCE: NORMAL
SPECIMEN SOURCE: NORMAL

## 2021-04-05 PROCEDURE — 87635 SARS-COV-2 COVID-19 AMP PRB: CPT | Performed by: NEUROLOGICAL SURGERY

## 2021-04-07 ENCOUNTER — ANESTHESIA (OUTPATIENT)
Dept: SURGERY | Facility: CLINIC | Age: 71
End: 2021-04-07
Payer: MEDICARE

## 2021-04-07 ENCOUNTER — TELEPHONE (OUTPATIENT)
Dept: NEUROLOGY | Facility: CLINIC | Age: 71
End: 2021-04-07

## 2021-04-07 ENCOUNTER — HOSPITAL ENCOUNTER (OUTPATIENT)
Facility: CLINIC | Age: 71
Discharge: HOME | End: 2021-04-07
Attending: NEUROLOGICAL SURGERY | Admitting: NEUROLOGICAL SURGERY
Payer: MEDICARE

## 2021-04-07 VITALS
RESPIRATION RATE: 16 BRPM | HEIGHT: 64 IN | SYSTOLIC BLOOD PRESSURE: 119 MMHG | DIASTOLIC BLOOD PRESSURE: 59 MMHG | HEART RATE: 85 BPM | BODY MASS INDEX: 21.51 KG/M2 | WEIGHT: 126 LBS | OXYGEN SATURATION: 97 % | TEMPERATURE: 98.1 F

## 2021-04-07 DIAGNOSIS — M54.6 THORACOLUMBAR BACK PAIN: ICD-10-CM

## 2021-04-07 DIAGNOSIS — S24.109D SPINAL CORD INJURY OF THORACIC REGION WITHOUT BONE INJURY, SUBSEQUENT ENCOUNTER (H): ICD-10-CM

## 2021-04-07 DIAGNOSIS — G95.19 EDEMA OF SPINAL CORD (H): ICD-10-CM

## 2021-04-07 DIAGNOSIS — S24.151A: ICD-10-CM

## 2021-04-07 DIAGNOSIS — Q28.8 ARTERIOVENOUS FISTULA OF SPINAL CORD VESSELS: ICD-10-CM

## 2021-04-07 DIAGNOSIS — M79.2 NEUROPATHIC PAIN: ICD-10-CM

## 2021-04-07 DIAGNOSIS — M79.2 NEUROPATHIC PAIN: Primary | ICD-10-CM

## 2021-04-07 DIAGNOSIS — M79.2 INTRACTABLE NEUROPATHIC PAIN OF LUMBOSACRAL ORIGIN: ICD-10-CM

## 2021-04-07 DIAGNOSIS — M54.41 CHRONIC BILATERAL LOW BACK PAIN WITH BILATERAL SCIATICA: ICD-10-CM

## 2021-04-07 DIAGNOSIS — M54.50 THORACOLUMBAR BACK PAIN: ICD-10-CM

## 2021-04-07 DIAGNOSIS — G89.29 CHRONIC BILATERAL LOW BACK PAIN WITH BILATERAL SCIATICA: ICD-10-CM

## 2021-04-07 DIAGNOSIS — M54.42 CHRONIC BILATERAL LOW BACK PAIN WITH BILATERAL SCIATICA: ICD-10-CM

## 2021-04-07 LAB
ABO + RH BLD: NORMAL
ABO + RH BLD: NORMAL
ANION GAP SERPL CALCULATED.3IONS-SCNC: 6 MMOL/L (ref 3–14)
APTT PPP: 28 SEC (ref 22–37)
BLD GP AB SCN SERPL QL: NORMAL
BLOOD BANK CMNT PATIENT-IMP: NORMAL
BUN SERPL-MCNC: 10 MG/DL (ref 7–30)
CALCIUM SERPL-MCNC: 9.1 MG/DL (ref 8.5–10.1)
CHLORIDE SERPL-SCNC: 105 MMOL/L (ref 94–109)
CO2 SERPL-SCNC: 29 MMOL/L (ref 20–32)
CREAT SERPL-MCNC: 0.58 MG/DL (ref 0.52–1.04)
ERYTHROCYTE [DISTWIDTH] IN BLOOD BY AUTOMATED COUNT: 13.3 % (ref 10–15)
GFR SERPL CREATININE-BSD FRML MDRD: >90 ML/MIN/{1.73_M2}
GLUCOSE BLDC GLUCOMTR-MCNC: 91 MG/DL (ref 70–99)
GLUCOSE SERPL-MCNC: 95 MG/DL (ref 70–99)
HCT VFR BLD AUTO: 38.8 % (ref 35–47)
HGB BLD-MCNC: 12.7 G/DL (ref 11.7–15.7)
INR PPP: 0.98 (ref 0.86–1.14)
INTERPRETATION ECG - MUSE: NORMAL
MCH RBC QN AUTO: 31 PG (ref 26.5–33)
MCHC RBC AUTO-ENTMCNC: 32.7 G/DL (ref 31.5–36.5)
MCV RBC AUTO: 95 FL (ref 78–100)
PLATELET # BLD AUTO: 161 10E9/L (ref 150–450)
POTASSIUM SERPL-SCNC: 4.1 MMOL/L (ref 3.4–5.3)
RBC # BLD AUTO: 4.1 10E12/L (ref 3.8–5.2)
SODIUM SERPL-SCNC: 141 MMOL/L (ref 133–144)
SPECIMEN EXP DATE BLD: NORMAL
WBC # BLD AUTO: 6.5 10E9/L (ref 4–11)

## 2021-04-07 PROCEDURE — 250N000011 HC RX IP 250 OP 636: Performed by: ANESTHESIOLOGY

## 2021-04-07 PROCEDURE — 250N000006 HC OR RX SURGIFLO W/THROMBIN KIT 2ML 1991 OPNP: Performed by: NEUROLOGICAL SURGERY

## 2021-04-07 PROCEDURE — 82962 GLUCOSE BLOOD TEST: CPT

## 2021-04-07 PROCEDURE — 250N000011 HC RX IP 250 OP 636

## 2021-04-07 PROCEDURE — 258N000003 HC RX IP 258 OP 636: Performed by: NURSE ANESTHETIST, CERTIFIED REGISTERED

## 2021-04-07 PROCEDURE — 250N000025 HC SEVOFLURANE, PER MIN: Performed by: NEUROLOGICAL SURGERY

## 2021-04-07 PROCEDURE — 86901 BLOOD TYPING SEROLOGIC RH(D): CPT | Performed by: ANESTHESIOLOGY

## 2021-04-07 PROCEDURE — 85730 THROMBOPLASTIN TIME PARTIAL: CPT | Mod: GZ | Performed by: STUDENT IN AN ORGANIZED HEALTH CARE EDUCATION/TRAINING PROGRAM

## 2021-04-07 PROCEDURE — 999N000015 HC STATISTIC ARTERIAL MONITORING DAILY

## 2021-04-07 PROCEDURE — 250N000013 HC RX MED GY IP 250 OP 250 PS 637: Performed by: ANESTHESIOLOGY

## 2021-04-07 PROCEDURE — 999N000141 HC STATISTIC PRE-PROCEDURE NURSING ASSESSMENT: Performed by: NEUROLOGICAL SURGERY

## 2021-04-07 PROCEDURE — 370N000017 HC ANESTHESIA TECHNICAL FEE, PER MIN: Performed by: NEUROLOGICAL SURGERY

## 2021-04-07 PROCEDURE — 80048 BASIC METABOLIC PNL TOTAL CA: CPT | Performed by: STUDENT IN AN ORGANIZED HEALTH CARE EDUCATION/TRAINING PROGRAM

## 2021-04-07 PROCEDURE — C1767 GENERATOR, NEURO NON-RECHARG: HCPCS | Performed by: NEUROLOGICAL SURGERY

## 2021-04-07 PROCEDURE — 258N000003 HC RX IP 258 OP 636

## 2021-04-07 PROCEDURE — 93010 ELECTROCARDIOGRAM REPORT: CPT | Mod: 59 | Performed by: INTERNAL MEDICINE

## 2021-04-07 PROCEDURE — 36415 COLL VENOUS BLD VENIPUNCTURE: CPT | Performed by: STUDENT IN AN ORGANIZED HEALTH CARE EDUCATION/TRAINING PROGRAM

## 2021-04-07 PROCEDURE — 86850 RBC ANTIBODY SCREEN: CPT | Performed by: ANESTHESIOLOGY

## 2021-04-07 PROCEDURE — 999N000157 HC STATISTIC RCP TIME EA 10 MIN

## 2021-04-07 PROCEDURE — 85610 PROTHROMBIN TIME: CPT | Performed by: STUDENT IN AN ORGANIZED HEALTH CARE EDUCATION/TRAINING PROGRAM

## 2021-04-07 PROCEDURE — 999N000054 HC STATISTIC EKG NON-CHARGEABLE

## 2021-04-07 PROCEDURE — 250N000009 HC RX 250: Performed by: NURSE ANESTHETIST, CERTIFIED REGISTERED

## 2021-04-07 PROCEDURE — 250N000009 HC RX 250: Performed by: NEUROLOGICAL SURGERY

## 2021-04-07 PROCEDURE — 710N000012 HC RECOVERY PHASE 2, PER MINUTE: Performed by: NEUROLOGICAL SURGERY

## 2021-04-07 PROCEDURE — C1778 LEAD, NEUROSTIMULATOR: HCPCS | Performed by: NEUROLOGICAL SURGERY

## 2021-04-07 PROCEDURE — 250N000011 HC RX IP 250 OP 636: Performed by: STUDENT IN AN ORGANIZED HEALTH CARE EDUCATION/TRAINING PROGRAM

## 2021-04-07 PROCEDURE — 86900 BLOOD TYPING SEROLOGIC ABO: CPT | Performed by: ANESTHESIOLOGY

## 2021-04-07 PROCEDURE — 710N000010 HC RECOVERY PHASE 1, LEVEL 2, PER MIN: Performed by: NEUROLOGICAL SURGERY

## 2021-04-07 PROCEDURE — 85027 COMPLETE CBC AUTOMATED: CPT | Performed by: STUDENT IN AN ORGANIZED HEALTH CARE EDUCATION/TRAINING PROGRAM

## 2021-04-07 PROCEDURE — 360N000084 HC SURGERY LEVEL 4 W/ FLUORO, PER MIN: Performed by: NEUROLOGICAL SURGERY

## 2021-04-07 PROCEDURE — 250N000011 HC RX IP 250 OP 636: Performed by: NURSE ANESTHETIST, CERTIFIED REGISTERED

## 2021-04-07 PROCEDURE — 272N000001 HC OR GENERAL SUPPLY STERILE: Performed by: NEUROLOGICAL SURGERY

## 2021-04-07 PROCEDURE — 250N000009 HC RX 250

## 2021-04-07 DEVICE — IMPLANTABLE DEVICE: Type: IMPLANTABLE DEVICE | Site: THORACIC | Status: FUNCTIONAL

## 2021-04-07 RX ORDER — NALOXONE HYDROCHLORIDE 0.4 MG/ML
0.4 INJECTION, SOLUTION INTRAMUSCULAR; INTRAVENOUS; SUBCUTANEOUS
Status: DISCONTINUED | OUTPATIENT
Start: 2021-04-07 | End: 2021-04-07 | Stop reason: HOSPADM

## 2021-04-07 RX ORDER — FENTANYL CITRATE 50 UG/ML
INJECTION, SOLUTION INTRAMUSCULAR; INTRAVENOUS PRN
Status: DISCONTINUED | OUTPATIENT
Start: 2021-04-07 | End: 2021-04-07

## 2021-04-07 RX ORDER — NALOXONE HYDROCHLORIDE 0.4 MG/ML
0.2 INJECTION, SOLUTION INTRAMUSCULAR; INTRAVENOUS; SUBCUTANEOUS
Status: DISCONTINUED | OUTPATIENT
Start: 2021-04-07 | End: 2021-04-07 | Stop reason: HOSPADM

## 2021-04-07 RX ORDER — EPHEDRINE SULFATE 50 MG/ML
INJECTION, SOLUTION INTRAMUSCULAR; INTRAVENOUS; SUBCUTANEOUS PRN
Status: DISCONTINUED | OUTPATIENT
Start: 2021-04-07 | End: 2021-04-07

## 2021-04-07 RX ORDER — PROPOFOL 10 MG/ML
INJECTION, EMULSION INTRAVENOUS PRN
Status: DISCONTINUED | OUTPATIENT
Start: 2021-04-07 | End: 2021-04-07

## 2021-04-07 RX ORDER — CEFAZOLIN SODIUM 2 G/100ML
2 INJECTION, SOLUTION INTRAVENOUS
Status: DISCONTINUED | OUTPATIENT
Start: 2021-04-07 | End: 2021-04-07 | Stop reason: HOSPADM

## 2021-04-07 RX ORDER — ONDANSETRON 2 MG/ML
INJECTION INTRAMUSCULAR; INTRAVENOUS PRN
Status: DISCONTINUED | OUTPATIENT
Start: 2021-04-07 | End: 2021-04-07

## 2021-04-07 RX ORDER — LIDOCAINE HYDROCHLORIDE 20 MG/ML
INJECTION, SOLUTION INFILTRATION; PERINEURAL PRN
Status: DISCONTINUED | OUTPATIENT
Start: 2021-04-07 | End: 2021-04-07

## 2021-04-07 RX ORDER — HYDROMORPHONE HYDROCHLORIDE 1 MG/ML
.3-.5 INJECTION, SOLUTION INTRAMUSCULAR; INTRAVENOUS; SUBCUTANEOUS EVERY 5 MIN PRN
Status: DISCONTINUED | OUTPATIENT
Start: 2021-04-07 | End: 2021-04-07 | Stop reason: HOSPADM

## 2021-04-07 RX ORDER — ACETAMINOPHEN 325 MG/1
975 TABLET ORAL ONCE
Status: DISCONTINUED | OUTPATIENT
Start: 2021-04-07 | End: 2021-04-07 | Stop reason: HOSPADM

## 2021-04-07 RX ORDER — DEXMEDETOMIDINE HYDROCHLORIDE 4 UG/ML
0.2-0.7 INJECTION, SOLUTION INTRAVENOUS CONTINUOUS
Status: DISCONTINUED | OUTPATIENT
Start: 2021-04-07 | End: 2021-04-07 | Stop reason: HOSPADM

## 2021-04-07 RX ORDER — LIDOCAINE HYDROCHLORIDE AND EPINEPHRINE 10; 10 MG/ML; UG/ML
INJECTION, SOLUTION INFILTRATION; PERINEURAL PRN
Status: DISCONTINUED | OUTPATIENT
Start: 2021-04-07 | End: 2021-04-07 | Stop reason: HOSPADM

## 2021-04-07 RX ORDER — ACETAMINOPHEN 325 MG/1
975 TABLET ORAL ONCE
Status: COMPLETED | OUTPATIENT
Start: 2021-04-07 | End: 2021-04-07

## 2021-04-07 RX ORDER — OXYCODONE HYDROCHLORIDE 5 MG/1
5 TABLET ORAL EVERY 4 HOURS PRN
Status: DISCONTINUED | OUTPATIENT
Start: 2021-04-07 | End: 2021-04-07 | Stop reason: HOSPADM

## 2021-04-07 RX ORDER — CEFAZOLIN SODIUM 2 G/100ML
2 INJECTION, SOLUTION INTRAVENOUS SEE ADMIN INSTRUCTIONS
Status: DISCONTINUED | OUTPATIENT
Start: 2021-04-07 | End: 2021-04-07 | Stop reason: HOSPADM

## 2021-04-07 RX ORDER — METOPROLOL TARTRATE 1 MG/ML
1-2 INJECTION, SOLUTION INTRAVENOUS EVERY 5 MIN PRN
Status: DISCONTINUED | OUTPATIENT
Start: 2021-04-07 | End: 2021-04-07 | Stop reason: HOSPADM

## 2021-04-07 RX ORDER — SODIUM CHLORIDE, SODIUM LACTATE, POTASSIUM CHLORIDE, CALCIUM CHLORIDE 600; 310; 30; 20 MG/100ML; MG/100ML; MG/100ML; MG/100ML
INJECTION, SOLUTION INTRAVENOUS CONTINUOUS PRN
Status: DISCONTINUED | OUTPATIENT
Start: 2021-04-07 | End: 2021-04-07

## 2021-04-07 RX ORDER — ONDANSETRON 2 MG/ML
4 INJECTION INTRAMUSCULAR; INTRAVENOUS EVERY 30 MIN PRN
Status: DISCONTINUED | OUTPATIENT
Start: 2021-04-07 | End: 2021-04-07 | Stop reason: HOSPADM

## 2021-04-07 RX ORDER — GABAPENTIN 300 MG/1
300 CAPSULE ORAL ONCE
Status: COMPLETED | OUTPATIENT
Start: 2021-04-07 | End: 2021-04-07

## 2021-04-07 RX ORDER — CEPHALEXIN 500 MG/1
500 CAPSULE ORAL 3 TIMES DAILY
Qty: 21 CAPSULE | Refills: 0 | Status: SHIPPED | OUTPATIENT
Start: 2021-04-07 | End: 2021-04-14

## 2021-04-07 RX ORDER — KETAMINE HYDROCHLORIDE 10 MG/ML
INJECTION INTRAMUSCULAR; INTRAVENOUS PRN
Status: DISCONTINUED | OUTPATIENT
Start: 2021-04-07 | End: 2021-04-07

## 2021-04-07 RX ORDER — ONDANSETRON 4 MG/1
4 TABLET, ORALLY DISINTEGRATING ORAL EVERY 30 MIN PRN
Status: DISCONTINUED | OUTPATIENT
Start: 2021-04-07 | End: 2021-04-07 | Stop reason: HOSPADM

## 2021-04-07 RX ORDER — DEXMEDETOMIDINE HYDROCHLORIDE 4 UG/ML
0.2-0.7 INJECTION, SOLUTION INTRAVENOUS CONTINUOUS
Status: DISCONTINUED | OUTPATIENT
Start: 2021-04-07 | End: 2021-04-07

## 2021-04-07 RX ORDER — FENTANYL CITRATE 50 UG/ML
25-50 INJECTION, SOLUTION INTRAMUSCULAR; INTRAVENOUS
Status: DISCONTINUED | OUTPATIENT
Start: 2021-04-07 | End: 2021-04-07 | Stop reason: HOSPADM

## 2021-04-07 RX ORDER — SODIUM CHLORIDE, SODIUM LACTATE, POTASSIUM CHLORIDE, CALCIUM CHLORIDE 600; 310; 30; 20 MG/100ML; MG/100ML; MG/100ML; MG/100ML
INJECTION, SOLUTION INTRAVENOUS CONTINUOUS
Status: DISCONTINUED | OUTPATIENT
Start: 2021-04-07 | End: 2021-04-07 | Stop reason: HOSPADM

## 2021-04-07 RX ADMIN — ONDANSETRON 4 MG: 2 INJECTION INTRAMUSCULAR; INTRAVENOUS at 11:57

## 2021-04-07 RX ADMIN — CEFAZOLIN 2 G: 10 INJECTION, POWDER, FOR SOLUTION INTRAVENOUS at 08:19

## 2021-04-07 RX ADMIN — Medication 30 MG: at 08:20

## 2021-04-07 RX ADMIN — PHENYLEPHRINE HYDROCHLORIDE 50 MCG: 10 INJECTION INTRAVENOUS at 09:58

## 2021-04-07 RX ADMIN — FENTANYL CITRATE 25 MCG: 50 INJECTION, SOLUTION INTRAMUSCULAR; INTRAVENOUS at 07:50

## 2021-04-07 RX ADMIN — PHENYLEPHRINE HYDROCHLORIDE 100 MCG: 10 INJECTION INTRAVENOUS at 09:13

## 2021-04-07 RX ADMIN — Medication 10 MG: at 09:25

## 2021-04-07 RX ADMIN — Medication 5 MG: at 08:26

## 2021-04-07 RX ADMIN — DEXMEDETOMIDINE 0.4 MCG/KG/HR: 100 INJECTION, SOLUTION, CONCENTRATE INTRAVENOUS at 08:20

## 2021-04-07 RX ADMIN — Medication 80 MG: at 07:54

## 2021-04-07 RX ADMIN — GABAPENTIN 300 MG: 300 CAPSULE ORAL at 07:04

## 2021-04-07 RX ADMIN — PHENYLEPHRINE HYDROCHLORIDE 100 MCG: 10 INJECTION INTRAVENOUS at 08:11

## 2021-04-07 RX ADMIN — MIDAZOLAM 0.5 MG: 1 INJECTION INTRAMUSCULAR; INTRAVENOUS at 07:50

## 2021-04-07 RX ADMIN — PHENYLEPHRINE HYDROCHLORIDE 50 MCG: 10 INJECTION INTRAVENOUS at 10:05

## 2021-04-07 RX ADMIN — FENTANYL CITRATE 75 MCG: 50 INJECTION, SOLUTION INTRAMUSCULAR; INTRAVENOUS at 07:54

## 2021-04-07 RX ADMIN — PHENYLEPHRINE HYDROCHLORIDE 100 MCG: 10 INJECTION INTRAVENOUS at 08:26

## 2021-04-07 RX ADMIN — PHENYLEPHRINE HYDROCHLORIDE 100 MCG: 10 INJECTION INTRAVENOUS at 09:48

## 2021-04-07 RX ADMIN — PHENYLEPHRINE HYDROCHLORIDE 100 MCG: 10 INJECTION INTRAVENOUS at 08:05

## 2021-04-07 RX ADMIN — Medication 5 MG: at 08:11

## 2021-04-07 RX ADMIN — ONDANSETRON 4 MG: 2 INJECTION INTRAMUSCULAR; INTRAVENOUS at 10:46

## 2021-04-07 RX ADMIN — SODIUM CHLORIDE, POTASSIUM CHLORIDE, SODIUM LACTATE AND CALCIUM CHLORIDE: 600; 310; 30; 20 INJECTION, SOLUTION INTRAVENOUS at 07:45

## 2021-04-07 RX ADMIN — ACETAMINOPHEN 975 MG: 325 TABLET, FILM COATED ORAL at 07:03

## 2021-04-07 RX ADMIN — FENTANYL CITRATE 25 MCG: 50 INJECTION, SOLUTION INTRAMUSCULAR; INTRAVENOUS at 11:57

## 2021-04-07 RX ADMIN — PHENYLEPHRINE HYDROCHLORIDE 0.1 MCG/KG/MIN: 10 INJECTION INTRAVENOUS at 09:54

## 2021-04-07 RX ADMIN — PHENYLEPHRINE HYDROCHLORIDE 100 MCG: 10 INJECTION INTRAVENOUS at 09:35

## 2021-04-07 RX ADMIN — LIDOCAINE HYDROCHLORIDE 60 MG: 20 INJECTION, SOLUTION INFILTRATION; PERINEURAL at 07:54

## 2021-04-07 RX ADMIN — PROPOFOL 120 MG: 10 INJECTION, EMULSION INTRAVENOUS at 07:54

## 2021-04-07 ASSESSMENT — MIFFLIN-ST. JEOR: SCORE: 1066.53

## 2021-04-07 NOTE — PROGRESS NOTES
1200 AJ Device Representative from [employer] is at bedside to turn implanted stimulator on; TANA reported that he will update Jay Patient's spouse that device is on; TANA also reported that teaching for use of the device would occur on 4/27/21 at follow up appointment and that he would send a pamphlet with information home with Patient - pamphlet contains Device Representative's phone number if Patient has any concerns with the device after returning home he will be available for calls at any time; will continue to monitor.

## 2021-04-07 NOTE — BRIEF OP NOTE
[facility]    Brief Operative Note    Pre-operative diagnosis: Spinal cord injury of thoracic region without bone injury, subsequent encounter (H) [S24.109D]  Thoracolumbar back pain [M54.5, M54.6]  Neuropathic pain [M79.2]  Intractable neuropathic pain of lumbosacral origin [M79.2]  Incomplete injury of thoracic spinal cord in T1 to T6 region without bony injury (H) [S24.151A]  Edema of spinal cord (H) [G95.19]  Chronic bilateral low back pain with bilateral sciatica [M54.42, M54.41, G89.29]  Arteriovenous fistula of spinal cord vessels [Q28.8]  Post-operative diagnosis Same as pre-operative diagnosis    Procedure: Procedure(s):  Posterior thoracic 12 to Lumbar 1 level for placement of spinal cord stimulator paddle and placement of implantable pulse generator/battery over right buttock  Surgeon: Surgeon(s) and Role:     * Luis Orourke MD - Primary     * Antwan Cali MD - Resident - Assisting  Anesthesia: General   Estimated blood loss: 15 ml  Drains: None  Specimens: * No specimens in log *  Findings:   None.  Complications: None.  Implants:   Implant Name Type Inv. Item Serial No.  Lot No. LRB No. Used Action   3mm lead, 60cm   [id number] [employer]  N/A 1 Implanted   [employer] XR7  implantable pulse generator   [id number] DAVISON  Right 1 Implanted

## 2021-04-07 NOTE — ANESTHESIA POSTPROCEDURE EVALUATION
Patient: Anayeli Gagnon    Procedure(s):  Posterior thoracic 12 to Lumbar 1 level for placement of spinal cord stimulator paddle and placement of implantable pulse generator/battery over right buttock    Diagnosis:Spinal cord injury of thoracic region without bone injury, subsequent encounter (H) [S24.109D]  Thoracolumbar back pain [M54.5, M54.6]  Neuropathic pain [M79.2]  Intractable neuropathic pain of lumbosacral origin [M79.2]  Incomplete injury of thoracic spinal cord in T1 to T6 region without bony injury (H) [S24.151A]  Edema of spinal cord (H) [G95.19]  Chronic bilateral low back pain with bilateral sciatica [M54.42, M54.41, G89.29]  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    Anesthesia Type:  General    Note:  Disposition: Outpatient   Postop Pain Control: Uneventful            Sign Out: Well controlled pain   PONV: No   Neuro/Psych: Uneventful            Sign Out: Acceptable/Baseline neuro status   Airway/Respiratory: Uneventful            Sign Out: Acceptable/Baseline resp. status   CV/Hemodynamics: Uneventful            Sign Out: Acceptable CV status   Other NRE:    DID A NON-ROUTINE EVENT OCCUR? No         Last vitals:  Vitals:    04/07/21 1145 04/07/21 1200 04/07/21 1215   BP: 128/62 123/65    Pulse: 92 81    Resp: 12 12 14   Temp:      SpO2: 98% 99% 97%       Last vitals prior to Anesthesia Care Transfer:  CRNA VITALS  4/7/2021 1055 - 4/7/2021 1155      4/7/2021             NIBP:  160/82    Pulse:  101    NIBP Mean:  111    SpO2:  99 %          Electronically Signed By: Kehinde Valdes MD  April 7, 2021  12:22 PM

## 2021-04-07 NOTE — DISCHARGE INSTRUCTIONS
[facility]  Same-Day Surgery   Adult Discharge Orders & Instructions     For 24 hours after surgery    1. Get plenty of rest.  A responsible adult must stay with you for at least 24 hours after you leave the hospital.   2. Do not drive or use heavy equipment.  If you have weakness or tingling, don't drive or use heavy equipment until this feeling goes away.  3. Do not drink alcohol.  4. Avoid strenuous or risky activities.  Ask for help when climbing stairs.   5. You may feel lightheaded.  IF so, sit for a few minutes before standing.  Have someone help you get up.   6. If you have nausea (feel sick to your stomach): Drink only clear liquids such as apple juice, ginger ale, broth or 7-Up.  Rest may also help.  Be sure to drink enough fluids.  Move to a regular diet as you feel able.  7. You may have a slight fever. Call the doctor if your fever is over 100 F (37.7 C) (taken under the tongue) or lasts longer than 24 hours.  8. You may have a dry mouth, a sore throat, muscle aches or trouble sleeping.  These should go away after 24 hours.  9. Do not make important or legal decisions.   Call your doctor for any of the followin.  Signs of infection (fever, growing tenderness at the surgery site, a large amount of drainage or bleeding, severe pain, foul-smelling drainage, redness, swelling).    2. It has been over 8 to 10 hours since surgery and you are still not able to urinate (pass water).    3.  Headache for over 24 hours.  To contact a doctor during clinic hours, call Dr. Orourke at [phone] or:    *   [phone] and ask for the resident on call for Neurosurgery (answered 24 hours a day)  *   Emergency Department:    [facility]: [phone]       (TTY for hearing impaired: [phone])    [facility]: [phone]       (TTY for hearing impaired: [phone])

## 2021-04-07 NOTE — OR NURSING
Call placed to yuri Thompson instructions explained to the patient no questions or concerns voiced.

## 2021-04-07 NOTE — TELEPHONE ENCOUNTER
Brief Telephone Encounter     Patient is POD 0 from a spinal cord stimulator placement for pain. Her  requested a call from the Neurosurgery team as patient is endorsing postoperative pain. She describes pain at her incision. Her home pain regimen includes 6 tablets of gabapentin/day, 2 tablets of morphine/day, and 2 tablets of percocet/day. I recommended that she continue her home pain regiment and in addition, to take up to 975 mg of tylenol TID as needed and to apply ice packs close to the incision area. I also recommended placing warm compresses in both sides of her lower back if having muscle spasm type pain. Unfortunately, I am not able to prescribe any opioid medications and NSAIDs would not be recommended in the immediate postoperative period. I recommended that she consult the ED if her pain becomes intolerable or if she developed any neurological deficits.     Flori Alvarez MD  Neurosurgery PGY1

## 2021-04-07 NOTE — ANESTHESIA CARE TRANSFER NOTE
Patient: Anayeli Gagnon    Procedure(s):  Posterior thoracic 12 to Lumbar 1 level for placement of spinal cord stimulator paddle and placement of implantable pulse generator/battery over right buttock    Diagnosis: Spinal cord injury of thoracic region without bone injury, subsequent encounter (H) [S24.109D]  Thoracolumbar back pain [M54.5, M54.6]  Neuropathic pain [M79.2]  Intractable neuropathic pain of lumbosacral origin [M79.2]  Incomplete injury of thoracic spinal cord in T1 to T6 region without bony injury (H) [S24.151A]  Edema of spinal cord (H) [G95.19]  Chronic bilateral low back pain with bilateral sciatica [M54.42, M54.41, G89.29]  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    Anesthesia Type:   General     Note:    Oropharynx: spontaneously breathing and oropharynx clear of all foreign objects  Level of Consciousness: awake  Oxygen Supplementation: nasal cannula  Level of Supplemental Oxygen (L/min / FiO2): 3  Independent Airway: airway patency satisfactory and stable  Dentition: dentition unchanged  Vital Signs Stable: post-procedure vital signs reviewed and stable  Report to RN Given: handoff report given  Patient transferred to: PACU  Comments: Pt extubated in the OR without incident or complications. Pt VSS upon arrival to the PACU. Pt has no c/o pain/N/V. Pt care report given to receiving RN> All questions answered.   Handoff Report: Identifed the Patient, Identified the Reponsible Provider, Reviewed the pertinent medical history, Discussed the surgical course, Reviewed Intra-OP anesthesia mangement and issues during anesthesia, Set expectations for post-procedure period and Allowed opportunity for questions and acknowledgement of understanding      Vitals: (Last set prior to Anesthesia Care Transfer)  CRNA VITALS  4/7/2021 1055 - 4/7/2021 1127      4/7/2021             NIBP:  160/82    Pulse:  101    NIBP Mean:  111    SpO2:  99 %         Electronically Signed By: EMELI Angelo CRNA  April 7, 2021  11:27 AM

## 2021-04-07 NOTE — ANESTHESIA PROCEDURE NOTES
Airway       Patient location during procedure: OR  Staff -        Other Anesthesia Staff: Mohamud Zacarias       Performed By: SRNAIndications and Patient Condition       Indications for airway management: griselda-procedural       Induction type:intravenous       Mask difficulty assessment: 1 - vent by mask    Final Airway Details       Final airway type: endotracheal airway       Successful airway: ETT - single  Endotracheal Airway Details        ETT size (mm): 7.0       Cuffed: yes       Cuff volume (mL): 8       Successful intubation technique: direct laryngoscopy       DL Blade Type: Ivan 2       Grade View of Cords: 1 (cords open and clear)       Adjucts: stylet       Position: Right       Measured from: lips       Secured at (cm): 21       Bite block used: Soft    Post intubation assessment        Placement verified by: capnometry, equal breath sounds and chest rise        Number of attempts at approach: 1       Number of other approaches attempted: 0       Secured with: pink tape       Ease of procedure: easy       Dentition: Intact and Unchanged    Medication(s) Administered   Medication Administration Time: 4/7/2021 7:57 AM

## 2021-04-09 ENCOUNTER — PATIENT OUTREACH (OUTPATIENT)
Dept: NEUROSURGERY | Facility: CLINIC | Age: 71
End: 2021-04-09

## 2021-04-09 NOTE — PROGRESS NOTES
[facility]: Post-Discharge Note  SITUATION                                                      Admission:    Admission Date: 04/07/21   Reason for Admission: Posterior thoracic 12 to Lumbar 1 or additional level for placement of spinal cord stimulator paddle and placement of implantable pulse generator/battery over right buttock  Discharge:   Discharge Date: 04/07/21  Discharge Diagnosis: Spinal cord injury of thoracic region without bone injury  Discharge Service: Neurosurgery  Discharge Plan: Routine postop follow up    BACKGROUND                                                      Neurosurgery Discharge Coordination Note     Attending physician: Dr. Orourke  Discharge to: Home     Current status: Patient states she has incision pain and back stiffness, unchanged since day of surgery. Taking home regimen of 6 tablets of gabapentin/day, 2 tablets of morphine/day, and 2 tablets of percocet/day. Supplementing with up to 975 mg of tylenol TID as needed, per Dr. Alvarez and applying ice packs. Pt said heat does not help. Denies redness, swelling, increased tenderness, drainage, incision opening or elevated temp. Reports Incision CDI without signs of infection.      Discharge instructions and medications reviewed with patient.  Follow up appointments/imaging/tests needed: 2 week post op with Dr. Orourke 4/27 at 1:00 pm.     RN triage/on call number given: [phone]/ [phone]        ASSESSMENT      Discharge Assessment  Patient reports symptoms are: Unchanged  Does the patient have all of their medications?: Yes  Does patient know what their new medications are for?: Yes  Does patient have a follow-up appointment scheduled?: Yes  Does patient have any other questions or concerns?: No    Post-op  Did the patient have surgery or a procedure: Yes  Incision: closed;healing  Drainage: No  Bleeding: none  Fever: No  Chills: No  Redness: No  Warmth: No  Swelling: No  Incision site pain: Yes  Closure: suture  Eating &  Drinking: eating and drinking without complaints/concerns  PO Intake: regular diet  Bowel Function: normal  Urinary Status: indwelling urinary catheter        PLAN                                                      Outpatient Plan:  Routine postop follow up    Future Appointments   Date Time Provider Department Center   4/27/2021  1:00 PM Luis Orourke MD [facility]           ANJEL ABDUL RN

## 2021-04-10 ENCOUNTER — NURSE TRIAGE (OUTPATIENT)
Dept: NURSING | Facility: CLINIC | Age: 71
End: 2021-04-10

## 2021-04-10 ENCOUNTER — TELEPHONE (OUTPATIENT)
Dept: NEUROSURGERY | Facility: CLINIC | Age: 71
End: 2021-04-10

## 2021-04-10 NOTE — TELEPHONE ENCOUNTER
Anayeli calls and says that she had a pain stimulator placed in her back on Wednesday and now has terrible pain in both legs-from the middle of her calves down to her feet. Pain = 10/10. Pt. Says that her Percocet does not help her pain. Pt. Says that she has been in a wheelchair for a year and is not going to see a Dr. Tonight. Pt. Says that she wants to speak to the surgeon. [facility] Neurosurgeon-was paged, to call pt., at: [phone], via page , at: [phone]. COVID 19 Nurse Triage Plan/Patient Instructions    Please be aware that novel coronavirus (COVID-19) may be circulating in the community. If you develop symptoms such as fever, cough, or SOB or if you have concerns about the presence of another infection including coronavirus (COVID-19), please contact your health care provider or visit [email or web address].     Disposition/Instructions    In-Person Visit with provider recommended. Reference Visit Selection Guide.    Thank you for taking steps to prevent the spread of this virus.  o Limit your contact with others.  o Wear a simple mask to cover your cough.  o Wash your hands well and often.    Resources    [facility]: About COVID-19: [email or web address]    CDC: What to Do If You're Sick: www.cdc.gov/coronavirus/2019-ncov/about/steps-when-sick.html    CDC: Ending Home Isolation: www.cdc.gov/coronavirus/2019-ncov/hcp/disposition-in-home-patients.html     CDC: Caring for Someone: www.cdc.gov/coronavirus/2019-ncov/if-you-are-sick/care-for-someone.html     [facility]: Interim Guidance for Hospital Discharge to Home: www.health.[facility]/diseases/coronavirus/hcp/hospdischarge.pdf    [facility] clinical trials (COVID-19 research studies): [email or web address]-clinical-trials     Below are the COVID-19 hotlines at the [place] Department of Health ([facility]). Interpreters are available.   o For health questions: Call 760-782-0125 or 1-743.630.3115 (7  a.m. to 7 p.m.)  o For questions about schools and childcare: Call 272-812-4683 or 1-815.810.2337 (7 a.m. to 7 p.m.)                   Additional Information    Negative: Looks like a broken bone or dislocated joint (e.g., crooked or deformed)    Negative: Sounds like a life-threatening emergency to the triager    Negative: Followed a leg injury    Negative: Leg swelling is main symptom    Negative: Back pain radiating (shooting) into leg(s)    Negative: Knee pain is main symptom    Negative: Ankle pain is main symptom    Negative: Pregnant    Negative: Postpartum (from 0 to 6 weeks after delivery)    Negative: Chest pain    Negative: Difficulty breathing    Negative: Entire foot is cool or blue in comparison to other side    Negative: Unable to walk    Negative: [1] Red area or streak AND [2] fever    Negative: [1] Swollen joint AND [2] fever    Negative: [1] Cast on leg or ankle AND [2] now increased pain    Negative: Patient sounds very sick or weak to the triager    [1] SEVERE pain (e.g., excruciating, unable to do any normal activities) AND [2] not improved after 2 hours of pain medicine    Protocols used: LEG PAIN-A-AH

## 2021-04-10 NOTE — TELEPHONE ENCOUNTER
Annamarie called with worsening neuropathy in her bilateral feet and ankles. She recently had a spinal cord stimulator placed and is wondering if it is related to the surgery. She states her stimulator is on at a low setting. She denies any other new symptoms at this time. I recommended contacting the rep of her SCS to see if they have any experience with worsened neuropathy with stimulation and whether they have any options to test whether the stimulation is affecting her neuropathy. In the meantime I recommended using her topical ointments such as capsaicin cream for her neuropathy to help with the pain. She states she's on the max dose of gabapentin, and percocet doesn't help with her nerve pain enough. She was satisfied with trying topical therapy and calling the rep.    Ata Nunn MD  Neurosurgery Resident, PGY-2    Please contact neurosurgery resident on call with questions.    Dial * * *[phone], enter [phone] when prompted.

## 2021-04-10 NOTE — OP NOTE
Procedure Date: 04/07/2021      PREOPERATIVE DIAGNOSIS:    thoracolumbar back pain  neuropathic pain  intractable neuropathic pain of lumbosacral region  Spinal cord injury    POSTOPERATIVE DIAGNOSIS:    thoracolumbar back pain  neuropathic pain  intractable neuropathic pain of lumbosacral region  Spinal cord injury     PROCEDURE PERFORMED:    Posterior thoracic L1 laminectomy and placement of spinal cord stimulator paddle and placement of implantable pulse generator over the right buttock.   neuromonitoring     SURGEON:  Luis Orourke MD      RESIDENT SURGEON:  Antwan Cali MD, PhD      ANESTHESIA:  General.      ESTIMATED BLOOD LOSS:  15 mL      IMPLANTS:     Implant Name Type Inv. Item Serial No.  Lot No. LRB No. Used Action   3mm lead, 60cm     [id number] [employer]   N/A 1 Implanted   Proclaim XR7  implantable pulse generator     [id number]               INDICATIONS:  Ms. Gagnon is a 70-year-old female with progressive bilateral leg weakness, which resulted in paraplegia.  The actual course of her condition is not fully understood and she underwent several spine surgeries previously and is decompressed by laminectomies all the way from T1-T12 and L2-L5.  She did continue to develop neuropathic pain primarily from her umbilicus downward all the way in her legs.  She failed medical management and it was recommended to potentially consider a spinal cord stimulator for pain management.  The risks and benefits of the procedure were explained to the patient, decided to undergo the surgery as listed above.  Consent was obtained.      DESCRIPTION OF PROCEDURE:  The patient was brought to the operating room and placed on a Renzo frame prone to expose her thoracolumbar part of the back.  She was in general anesthesia and she was prepped and draped in a common surgical fashion. A timeout was performed.    Local anesthetic with epinephrine was injected and after waiting an appropriate amount of time  for maximal effect, a #10 blade was used to reopen her old incision after localizing with xray. We focused on dissecting down to the L1 lamina. Hemostasis and further dissection was performed with monopolar cautery.  We carefully dissected below and above L1 until we were able to identify epidural tissue.  Of note, there was substantial scar tissue which required removing the fibrous connective tissue in a piecemeal fashion and using careful mobilization. Using the nerve hook, we were able to further expose dura above the L1; however, we were unable to place the spinal cord stimulator paddle due to the scarred down epidural space, so we decided to take off part of the superior aspect of the L1 lamina.      Again, we encountered substantial amounts of scarring tissue and we were still unable to place the paddle.  We further extended the laminectomy bilaterally, but eventually had to perform a full laminectomy to fit the full paddle electrode.  After the laminectomy was completed and we cleaned up the dura substantially. We placed the spinal cord stimulator paddle and testing revealed that it was appropriately placed with evoked responses in EMG. We tunneled the wires laterally to above her right buttock, where the spinal cord stimulator impulse generator was planned to be placed.    The spinal cord stimulator paddle was sutured in place using 4-0 Nurolon and a plastic stopper was also sutured in place to further immobilize the electrode. We turned our attention to creating the pocket that was going to hold the pulse generator.  Using a #10 blade, we made an incision about 8 cm long.  We used the monopolar to dissect in the subcutaneous tissue, staying above the gluteal muscle and the fascia, but allowing for enough padding above the generator.  Once the pocket was substantially widened, we used the dummy to confirm that the battery was fitting.  We completed the tunneling of the electrodes and used an additional  stopper to secure the electrode and subcutaneous tissue.    We then connected the battery and again confirmed that the stimulator was working appropriately.  We sutured the generator in place using 3-0 Ethibond and ensured that there was adequate hemostasis in both surgical sites.  We closed the spinal surgical field using 1-0 Vicryls followed by 3-0 Vicryls, and the skin was eventually reapproximated using nylon. Both wounds were copiously irrigated and inspected for hemostasis.      The pocket holding the pulse generator was closed with 3-0 Vicryls to allow the formation of a capsular followed by subcutaneous and dermal layer closure with 3-0 Vicryls and intradermal 4-0 Monocryl. Both wound sites were cleaned with wet and dry and ChloraPrep, followed by exofin.  At the end of the procedure, all instrument, sponge and needles were correct x2.  The patient tolerated the procedure well.         STACI ROBERT MD       As dictated by EUSEBIO ALMANZAR MD            D: 2021   T: 2021   MT:       Name:     JOVAN MADRID   MRN:      [id number]        Account:        [id number]   :      1950           Procedure Date: 2021      Document: [id number]

## 2021-04-14 ENCOUNTER — TELEPHONE (OUTPATIENT)
Dept: NEUROSURGERY | Facility: CLINIC | Age: 71
End: 2021-04-14

## 2021-04-14 NOTE — TELEPHONE ENCOUNTER
Pt s/p posterior thoracic T12-L1 laminectomy and placement of spinal cord stimulator paddle and placement of implantable pulse generator over the right buttock by Dr. Orourke on 4/7/21.     Pt taking regimen of 6 tablets of gabapentin/day, 2 tablets of morphine/day, and 4-6 tablets of percocet/day. Postop pain is improving but feet and leg pain still severe. Day time pain without percocet is 5/10 and night time off percocet is 8-9/10. Percocet helps knock the pain down to a tolerable level.     Pt requesting refill of percocet 5/325. She will be out by Friday. Will f/u with Dr. Orourke and request that prescription be sent to [facility] in [place], [place].     No further questions at this time.

## 2021-04-14 NOTE — TELEPHONE ENCOUNTER
[facility] Call Center    Phone Message    May a detailed message be left on voicemail: yes     Reason for Call: Medication Refill Request    Has the patient contacted the pharmacy for the refill? Yes   Name of medication being requested: PERCOSET 325MG  Provider who prescribed the medication: Dr. Orourke   Pharmacy: [place] [address]  Date medication is needed: as soon as possible         Action Taken: Message routed to:  [facility] ([facility]  neurosurg    Travel Screening: Not Applicable

## 2021-04-15 ENCOUNTER — TELEPHONE (OUTPATIENT)
Dept: NEUROSURGERY | Facility: CLINIC | Age: 71
End: 2021-04-15

## 2021-04-15 DIAGNOSIS — Z96.89 S/P INSERTION OF SPINAL CORD STIMULATOR: Primary | ICD-10-CM

## 2021-04-15 RX ORDER — OXYCODONE AND ACETAMINOPHEN 5; 325 MG/1; MG/1
1 TABLET ORAL EVERY 4 HOURS PRN
Qty: 30 TABLET | Refills: 0 | Status: SHIPPED | OUTPATIENT
Start: 2021-04-15 | End: 2021-04-20

## 2021-04-15 NOTE — TELEPHONE ENCOUNTER
Health Call Center     Phone Message     May a detailed message be left on voicemail: yes      Reason for Call: Medication Refill Request    Has the patient contacted the pharmacy for the refill? Yes   Name of medication being requested: PERCOSET 325MG  Provider who prescribed the medication: Dr. Orourke   Pharmacy: [place] [address]  Date medication is needed: as soon as possible    Pt calling back to check on the status of this request as she states that the pharmacy has not yet received the RX.    Please call Pt back to advise.                      Action Taken: Message routed to:  [facility] ([facility]  neurosurg     Travel Screening: Not Applicable

## 2021-04-15 NOTE — CONFIDENTIAL NOTE
Left VM for pt letting her know that Dr. Orourke refilled percocet prescription at [facility] in [place]. Pt welcome to call with any further questions.

## 2021-04-16 ENCOUNTER — NURSE TRIAGE (OUTPATIENT)
Dept: NURSING | Facility: CLINIC | Age: 71
End: 2021-04-16

## 2021-04-16 NOTE — TELEPHONE ENCOUNTER
Patient was in prone position for neurosurgery procedure 4/7/21. Today noticed that old ostomy site (has had for more than a year) is reddened around the stoma and irritated.     Additional Information    Negative: Shock suspected (e.g., cold/pale/clammy skin, too weak to stand, low BP, rapid pulse)    Negative: Sounds like a life-threatening emergency to the triager    Negative: [1] Post-op AND [2] and general post-operative symptoms or questions, unrelated to ostomy    Negative: [1] SEVERE pain (e.g., excruciating) AND [2] present > 1 hour    Negative: Bloody, tarry, or jet black-colored stool    Negative: [1] Bleeding from stoma tissue (stoma is moist, pink to red-colored tissue) AND [2] won't stop after 10 minutes of direct pressure    Negative: Stoma separates from the surrounding skin at the suture line (e.g. gaping wound next to stoma)    Negative: Stoma turns purple or black    Negative: [1] No stool or gas from ILEOSTOMY > 6 hours AND [2] abdominal pain or vomiting    Negative: [1] No stool or gas from ILEOSTOMY > 6 hours AND [2] no improvement after using CARE ADVICE    Negative: [1] No stool or gas from COLOSTOMY > 48 hours (2 days) AND [2] abdominal pain or vomiting    Negative: [1] Drinking very little AND [2] dehydration suspected (e.g., no urine > 12 hours, very dry mouth, very lightheaded)    Negative: Patient sounds very sick or weak to the triager    Negative: [1] MILD-MODERATE pain AND [2] constant AND [3] present > 2 hours    Negative: [1] Vomiting AND [2] abdomen looks much more swollen than usual    Negative: [1] Vomiting AND [2] contains bile (green color)    Negative: [1] Abdomen skin around stoma looks infected (spreading redness, pain) AND [2] fever    Negative: No stool or gas from ILEOSTOMY > 6 hours (and has not tried CARE ADVICE)    Negative: SEVERE diarrhea  (e.g., 1,500 ml or more a day; or twice as much as usual)    Negative: [1] Caller has URGENT question AND [2] triager unable to  "answer question    [1] Abdomen skin around stoma looks infected (spreading redness, pain) AND [2] large red area (> 2 in. or 5 cm)    Answer Assessment - Initial Assessment Questions  1.  TYPE: \"What type of ostomy do you have?\" (e.g., ileostomy, colostomy; temporary, long-term [permanent]). Colostomy, long term  2.  LOCATION: \"Where is the ostomy located?\"abdomen  3.  DURATION: \"How long have you had the ostomy?\" (e.g., days, weeks, months, years).more than a year  4.  MAIN CONCERN: \"What is your main concern or symptom today?\" (e.g., skin redness or irritation, bleeding, leaking, change in output or appearance of ostomy). Redness and skin irritation around it        5. DAILY OSTOMY OUTPUT DIARY: \"Do you keep a daily diary of the output from your ostomy\"? Taking miralax for constipation issues  6.  OSTOMY OUTPUT: \"How much stool output over a 24 hour period are you having?\" (e.g., ? ml; consistency of water, milkshake, pudding)ok  7.  OTHER SYMPTOMS: \"Are you having any other symptoms?\" (e.g., fever, vomiting, blood in stool, abdominal pain, dizziness)      no    Protocols used: OSTOMY SYMPTOMS AND [id number]      "

## 2021-04-27 ENCOUNTER — OFFICE VISIT (OUTPATIENT)
Dept: NEUROSURGERY | Facility: CLINIC | Age: 71
End: 2021-04-27
Payer: MEDICARE

## 2021-04-27 VITALS
OXYGEN SATURATION: 97 % | SYSTOLIC BLOOD PRESSURE: 105 MMHG | RESPIRATION RATE: 16 BRPM | HEART RATE: 75 BPM | DIASTOLIC BLOOD PRESSURE: 63 MMHG

## 2021-04-27 DIAGNOSIS — Z96.89 S/P INSERTION OF SPINAL CORD STIMULATOR: Primary | ICD-10-CM

## 2021-04-27 DIAGNOSIS — S24.109D SPINAL CORD INJURY OF THORACIC REGION WITHOUT BONE INJURY, SUBSEQUENT ENCOUNTER (H): ICD-10-CM

## 2021-04-27 PROCEDURE — 99024 POSTOP FOLLOW-UP VISIT: CPT | Performed by: NEUROLOGICAL SURGERY

## 2021-04-27 RX ORDER — OXYCODONE AND ACETAMINOPHEN 5; 325 MG/1; MG/1
1-2 TABLET ORAL EVERY 6 HOURS PRN
Status: ON HOLD | COMMUNITY
Start: 2021-04-22 | End: 2022-01-21

## 2021-04-27 ASSESSMENT — PAIN SCALES - GENERAL: PAINLEVEL: MILD PAIN (3)

## 2021-04-27 NOTE — PROGRESS NOTES
Neurosurgery Clinic Note    Reason for Visit: postoperative follow-up    History of Present Illness  Anayeli Gagnon is a 70 year old woman with complex history most pertinent for chronic ameena with spinal cord injury who is now a few weeks postop from implantation of a spinal cord stimulator system. She had a single lamina left in the thoracic and lumbar spine after her extensive previous surgeries, which we used to place a spinal cord stimulator paddle at the T12/L1 level. It has been on very low settings since surgery while she has healed and has not provided significant relief yet. Today is our first real day of programming the device    She had significant postoperative pain that faded after the first week. Now she is doing fairly well with her baseline chronic pain. She denies any fevers, chills, or night sweats.      Allergies   Allergen Reactions     Contrast Dye Rash     Painful rash after x-ray contrast       Current Outpatient Medications   Medication     acetaminophen (TYLENOL) 500 MG tablet     baclofen (LIORESAL) 10 MG tablet     gabapentin (NEURONTIN) 600 MG tablet     lidocaine, Anorectal, 5 % CREA     LORazepam (ATIVAN) 0.5 MG tablet     metoprolol succinate ER (TOPROL-XL) 25 MG 24 hr tablet     morphine (MS CONTIN) 15 MG CR tablet     nystatin (MYCOSTATIN) 393548 UNIT/GM external cream     oxyCODONE-acetaminophen (PERCOCET) 5-325 MG tablet     simethicone (MYLICON) 80 MG chewable tablet     No current facility-administered medications for this visit.              Physical Exam  /63   Pulse 75   Resp 16   SpO2 97%       General: Awake, alert, oriented. Well nourished, well developed, no acute distress.  HEENT: Head normocephalic, atraumatic.   Abdomen: Soft, non-tender, non-distended. No hepatosplenomegaly.  Extremity: Warm with no clubbing or cyanosis. No lower extremity edema.    Incisions: clean, dry, intact, and nearly completely healed    Neurological  Awake, alert and oriented to  date, time, place and person. Speech fluent.   Pupils equal, round, reactive to light.  Extraocular movement intact.    Motor: baseline paraplegia, otherwise full strength  Sensation: baseline sensory complete    ROS: 10 point ROS neg other than the symptoms noted above in the HPI.    Device interrogation: impedances were found to all be in range. We gave her several settings to begin the programming process. (CPT 24451)    Assessment and Plan   Anyaeli Gagnon is a 70 year old female and stable after SCS implant for her severe disabling pain. We are hoping that despite her mixed injury (both cns and pns) that progressive programming will allow her to achieve some pain relief. We are going to start slowly but give her some range freedom to explore to find the best amount of stimulation.    F/u 1-2 months for programming    Luis Oroukre MD  Department of Neurosurgery  [facility]

## 2021-04-27 NOTE — LETTER
4/27/2021       RE: Anayeli Gagnon  [address] [address] [place] [id number]     Dear Colleague,    Thank you for referring your patient, Anayeli Gagnon, to the [facility] NEUROSURGERY CLINIC [place] at [facility]. Please see a copy of my visit note below.    Neurosurgery Clinic Note    Reason for Visit: postoperative follow-up    History of Present Illness  Anayeli Gagnon is a 70 year old woman with complex history most pertinent for chronic ameena with spinal cord injury who is now a few weeks postop from implantation of a spinal cord stimulator system. She had a single lamina left in the thoracic and lumbar spine after her extensive previous surgeries, which we used to place a spinal cord stimulator paddle at the T12/L1 level. It has been on very low settings since surgery while she has healed and has not provided significant relief yet. Today is our first real day of programming the device    She had significant postoperative pain that faded after the first week. Now she is doing fairly well with her baseline chronic pain. She denies any fevers, chills, or night sweats.      Allergies   Allergen Reactions     Contrast Dye Rash     Painful rash after x-ray contrast       Current Outpatient Medications   Medication     acetaminophen (TYLENOL) 500 MG tablet     baclofen (LIORESAL) 10 MG tablet     gabapentin (NEURONTIN) 600 MG tablet     lidocaine, Anorectal, 5 % CREA     LORazepam (ATIVAN) 0.5 MG tablet     metoprolol succinate ER (TOPROL-XL) 25 MG 24 hr tablet     morphine (MS CONTIN) 15 MG CR tablet     nystatin (MYCOSTATIN) 827606 UNIT/GM external cream     oxyCODONE-acetaminophen (PERCOCET) 5-325 MG tablet     simethicone (MYLICON) 80 MG chewable tablet     No current facility-administered medications for this visit.              Physical Exam  /63   Pulse 75   Resp 16   SpO2 97%       General: Awake, alert, oriented. Well  nourished, well developed, no acute distress.  HEENT: Head normocephalic, atraumatic.   Abdomen: Soft, non-tender, non-distended. No hepatosplenomegaly.  Extremity: Warm with no clubbing or cyanosis. No lower extremity edema.    Incisions: clean, dry, intact, and nearly completely healed    Neurological  Awake, alert and oriented to date, time, place and person. Speech fluent.   Pupils equal, round, reactive to light.  Extraocular movement intact.    Motor: baseline paraplegia, otherwise full strength  Sensation: baseline sensory complete    ROS: 10 point ROS neg other than the symptoms noted above in the HPI.    Device interrogation: impedances were found to all be in range. We gave her several settings to begin the programming process. (CPT 55185)    Assessment and Plan   Anayeli Gagnon is a 70 year old female and stable after SCS implant for her severe disabling pain. We are hoping that despite her mixed injury (both cns and pns) that progressive programming will allow her to achieve some pain relief. We are going to start slowly but give her some range freedom to explore to find the best amount of stimulation.    F/u 1-2 months for programming    Luis Orourke MD  Department of Neurosurgery  [facility]

## 2021-05-08 ENCOUNTER — HEALTH MAINTENANCE LETTER (OUTPATIENT)
Age: 71
End: 2021-05-08

## 2021-06-01 ENCOUNTER — OFFICE VISIT (OUTPATIENT)
Dept: NEUROSURGERY | Facility: CLINIC | Age: 71
End: 2021-06-01
Payer: MEDICARE

## 2021-06-01 VITALS
RESPIRATION RATE: 16 BRPM | DIASTOLIC BLOOD PRESSURE: 50 MMHG | SYSTOLIC BLOOD PRESSURE: 100 MMHG | HEART RATE: 88 BPM | OXYGEN SATURATION: 95 %

## 2021-06-01 DIAGNOSIS — M79.2 INTRACTABLE NEUROPATHIC PAIN OF LUMBOSACRAL ORIGIN: Primary | ICD-10-CM

## 2021-06-01 DIAGNOSIS — Z96.89 S/P INSERTION OF SPINAL CORD STIMULATOR: ICD-10-CM

## 2021-06-01 DIAGNOSIS — S24.109D SPINAL CORD INJURY OF THORACIC REGION WITHOUT BONE INJURY, SUBSEQUENT ENCOUNTER (H): ICD-10-CM

## 2021-06-01 PROCEDURE — 99024 POSTOP FOLLOW-UP VISIT: CPT | Performed by: NEUROLOGICAL SURGERY

## 2021-06-01 RX ORDER — NALOXONE HYDROCHLORIDE 4 MG/.1ML
SPRAY NASAL
COMMUNITY
Start: 2021-05-10 | End: 2022-09-27

## 2021-06-01 ASSESSMENT — PAIN SCALES - GENERAL: PAINLEVEL: MODERATE PAIN (4)

## 2021-06-01 NOTE — LETTER
6/1/2021       RE: Anayeli Gagnon  [address] [address] [place] [address]     Dear Colleague,    Thank you for referring your patient, Anayeli Gagnon, to the [facility] NEUROSURGERY CLINIC [place] at [facility]. Please see a copy of my visit note below.    Neurosurgery Clinic Note    Reason for Visit: SCS f/u    History of Present Illness  year old woman with complex history most pertinent for chronic pain with spinal cord injury and perhaps some peripheral involvement from decubitas ulcer flap who is now more than 6 weeks out from SCS implantation beneath the sole remaining posterior spinal level (L1). She and her  are worried that it seemed to bring about more subjective tightness. Otherwise, she has not noticed and significant side effects or benefits.     Allergies   Allergen Reactions     Contrast Dye Rash     Painful rash after x-ray contrast       Current Outpatient Medications   Medication     acetaminophen (TYLENOL) 500 MG tablet     baclofen (LIORESAL) 10 MG tablet     gabapentin (NEURONTIN) 600 MG tablet     lidocaine, Anorectal, 5 % CREA     LORazepam (ATIVAN) 0.5 MG tablet     metoprolol succinate ER (TOPROL-XL) 25 MG 24 hr tablet     morphine (MS CONTIN) 15 MG CR tablet     NARCAN 4 MG/0.1ML nasal spray     nystatin (MYCOSTATIN) 129568 UNIT/GM external cream     oxyCODONE-acetaminophen (PERCOCET) 5-325 MG tablet     simethicone (MYLICON) 80 MG chewable tablet     No current facility-administered medications for this visit.          Physical Exam  /50   Pulse 88   Resp 16   SpO2 95%       General: Awake, alert, oriented. Well nourished, well developed, no acute distress.  HEENT: Head normocephalic, atraumatic.   Extremity: Purple mottling of feet. Pulses preserved.   Neurological  Awake, alert and oriented to date, time, place and person. Speech fluent.   Pupils equal, round, reactive to light.    Motor: bilateral  Subjective:      Marlo Sharma is a 77 y.o. male is here for EP consult. He had an episode of symptomatic AF on 11/21. He is here for follow up. Was recently hospitalized for chf exacerbation. The patient denies chest pain/ shortness of breath, orthopnea, PND, LE edema, palpitations, syncope, presyncope or fatigue.        Patient Active Problem List    Diagnosis Date Noted    Status post placement of implantable loop recorder 07/14/2020    Hypertrophic cardiomyopathy ([address] 12/03/2014    Atrial fibrillation ([address] 11/12/2014    HTN (hypertension) 11/12/2014    Mixed hyperlipidemia 11/12/2014      Rg Joyner MD  Past Medical History:   Diagnosis Date    Arrhythmia     a fib    Cancer ([address]     skin, throat cancer    Congestive heart failure ([address] 11/2020    hosp at [address] tube feedings ([address]     PEG    High cholesterol     Hypertension     Hypertrophic cardiomyopathy ([address]     per cardiology notes 6/2013    Status post chemoradiation     completed 12/2013    Throat cancer [facility])       Past Surgical History:   Procedure Laterality Date    COLONOSCOPY N/A 1/17/2017    COLONOSCOPY performed by [facility]agran MD at [facility] ENDOSCOPY    HX HEART CATHETERIZATION  2007    HX ORTHOPAEDIC      Rt. hand surgery    HX OTHER SURGICAL      exc. skin cancer    HX OTHER SURGICAL      peg tube inserted    HX TONSILLECTOMY      ME EGD BALLOON DILATION ESOPHAGUS <30 MM DIAM  11/8/2013          No Known Allergies   Family History   Problem Relation Age of Onset    Heart Attack Mother     Cancer Father     negative for cardiac disease  Social History     Socioeconomic History    Marital status:      Spouse name: Not on file    Number of children: Not on file    Years of education: Not on file    Highest education level: Not on file   Tobacco Use    Smoking status: Never Smoker    Smokeless tobacco: Former User   Substance and Sexual Activity    Alcohol use: Not Currently Alcohol/week: 0.0 standard drinks     Comment: 2 drinks a week    Drug use: No    Sexual activity: Not Currently     Current Outpatient Medications   Medication Sig    carvediloL (COREG) 25 mg tablet     furosemide (LASIX) 40 mg tablet     spironolactone (ALDACTONE) 25 mg tablet     hydroCHLOROthiazide (HYDRODIURIL) 12.5 mg tablet Take 1 Tab by mouth daily.  minoxidiL (LONITEN) 10 mg tablet Take 0.5 Tabs by mouth daily.  losartan (COZAAR) 50 mg tablet Take 0.5 Tabs by mouth daily.  amLODIPine (NORVASC) 10 mg tablet Take one tablet by mouth once daily.  lovastatin (MEVACOR) 40 mg tablet TAKE 1 TABLET DAILY    aspirin (ASPIRIN) 325 mg tablet Take 325 mg by mouth daily.  potassium chloride (K-DUR, KLOR-CON) 20 mEq tablet TAKE ONE TABLET BY MOUTH TWICE DAILY     levothyroxine (SYNTHROID) 50 mcg tablet Take  by mouth Daily (before breakfast).  cholecalciferol, vitamin D3, (VITAMIN D3) 2,000 unit tab Take 2,000 Units by mouth daily. Indications: PREVENTION OF VITAMIN D DEFICIENCY     No current facility-administered medications for this visit. Vitals:    12/08/20 1529   BP: 130/80   Pulse: (!) 42   Resp: 18   SpO2: 97%   Weight: 202 lb 11.2 oz (91.9 kg)   Height: 5' 8\" (1.727 m)       I have reviewed the nurses notes, vitals, problem list, allergy list, medical history, family, social history and medications. Review of Symptoms:    General: Pt denies excessive weight gain or loss. Pt is able to conduct ADL's  HEENT: Denies blurred vision, headaches, hearing loss, epistaxis and difficulty swallowing. Respiratory: Denies cough, congestion, shortness of breath, DUMONT, wheezing or stridor.   Cardiovascular: Denies precordial pain, palpitations, edema or PND  Gastrointestinal: Denies poor appetite, indigestion, abdominal pain or blood in stool  Genitourinary: Denies hematuria, dysuria, increased urinary frequency  Musculoskeletal: Denies joint pain or swelling from muscles or paraplegia  Sensation: thoracic sensory complete  Deep tendon reflexes: babinski bilateral without stim    ROS: 10 point ROS neg other than the symptoms noted above in the HPI.        Assessment and Plan   Anayeli Gagnon is a 70 year old female with new SCS implanted at L1 due to a lack of epidural space elsewhere. She had previous decompressive and exploratory laminectomies after spinal cord hemorrhages with complex course, which resulted in SCI with complex pain distribution. We spent a considerable amount of time programming today and found that thresholds are considerably higher to achieve any effect in her. She was using currents that were extremely subthreshold last visit, so we discussed slowly increasing currents over the next month or two to achieve sufficient intensities (10-15 mA) sitting, which has been described in patients with SCI.    We also discussed the possibility of more comprehensive pain management since she is quite complex and may be responsive to other modalities. She has had long-standing pain, and while a pump would be quite complicated in her, there may be other modalities that could add up to something that could improve her quality of life. I will refer her to Dr. Espinoza to see if there is anything else she might be able to offer.     I will follow up with Annamarie to modify her programming in 4-6 weeks given the unusual settings required.    Luis Orourke MD  Department of Neurosurgery  [facility]         joints  Neurologic: Denies tremor, paresthesias, headache, or sensory motor disturbance  Psychiatric: Denies confusion, insomnia, depression  Integumentray: Denies rash, itching or ulcers. Hematologic: Denies easy bruising, bleeding    Physical Exam:      General: Well developed, in no acute distress. HEENT: Eyes - PERRL, no jvd  Heart:  Normal S1/S2 negative S3 or S4. Regular, no murmur, gallop or rub. Respiratory: Clear bilaterally x 4, no wheezing or rales  Abdomen:   Soft, non-tender, bowel sounds are active. Extremities:  No edema, normal cap refill, no cyanosis. Musculoskeletal: No clubbing  Neuro: A&Ox3, speech clear, gait stable. Skin: Skin color is normal. No rashes or lesions. Non diaphoretic, no ulcers or subcutaneous nodule  Vascular: 2+ pulses symmetric in all extremities  Psych - judgement intact and orientation is wnl     Cardiographics    Ekg: marked s jonathon with lvh    Results for orders placed or performed in visit on 09/04/18   CARDIAC HOLTER MONITOR, 24 HOURS    Narrative    ECG Monitor/24 hours, Complete    Reason for Holter Monitor  BRADYCARDIA    Heartbeat    Slowest 37  Average 58  Fastest  118      Results:   Underlying Rhythm: Sinus bradycardia      Atrial Arrhythmias: premature atrial contractions; occasional, atrial couplets, paroxysmal supraventricular tachycardia and severe bradycardia            AV Conduction: normal    Ventricular Arrhythmias: premature ventricular contractions; occasional and ventricular couplets     ST Segment Analysis:normal     Symptom Correlation:  none    Comment:   Sinus bradycardia with profound bradycardia to 36 bpm with short burst of PAT and ventricular ectopy.  Clinical correlation advised     Neto Cardoso MD, Kacey Kaplan      Results for orders placed or performed during the hospital encounter of 10/25/17   EKG, 12 LEAD, INITIAL   Result Value Ref Range    Ventricular Rate 69 BPM    Atrial Rate 69 BPM    P-R Interval 170 ms    QRS Duration 88 ms    Q-T Interval 436 ms    QTC Calculation (Bezet) 467 ms    Calculated P Axis 75 degrees    Calculated R Axis 53 degrees    Calculated T Axis -148 degrees    Diagnosis       Normal sinus rhythm  Left ventricular hypertrophy with repolarization abnormality  When compared with ECG of 05-OCT-2017 10:23,  ST more depressed in Inferior leads  ST now depressed in Anterior leads  Confirmed by Ami Stage ([id number]) on 10/26/2017 3:07:29 PM           Lab Results   Component Value Date/Time    WBC 4.6 06/14/2018 01:05 PM    HGB 14.6 06/14/2018 01:05 PM    HCT 41.5 06/14/2018 01:05 PM    PLATELET 760 71/36/7216 01:05 PM    MCV 92.6 06/14/2018 01:05 PM      Lab Results   Component Value Date/Time    Sodium 141 12/07/2020 10:32 AM    Potassium 5.0 12/07/2020 10:32 AM    Chloride 102 12/07/2020 10:32 AM    CO2 27 12/07/2020 10:32 AM    Anion gap 6 06/14/2018 01:05 PM    Glucose 83 12/07/2020 10:32 AM    BUN 29 (H) 12/07/2020 10:32 AM    Creatinine 1.50 (H) 12/07/2020 10:32 AM    BUN/Creatinine ratio 19 12/07/2020 10:32 AM    GFR est AA 54 (L) 12/07/2020 10:32 AM    GFR est non-AA 46 (L) 12/07/2020 10:32 AM    Calcium 9.6 12/07/2020 10:32 AM    Bilirubin, total 0.6 01/09/2020 10:34 AM    Alk. phosphatase 60 01/09/2020 10:34 AM    Protein, total 6.6 01/09/2020 10:34 AM    Albumin 4.1 01/09/2020 10:34 AM    Globulin 3.1 06/14/2018 01:05 PM    A-G Ratio 1.6 01/09/2020 10:34 AM    ALT (SGPT) 15 01/09/2020 10:34 AM         Assessment:     Assessment:        ICD-10-CM ICD-9-CM    1. Paroxysmal atrial fibrillation (HCC)  I48.0 427.31 AMB POC EKG ROUTINE W/ 12 LEADS, INTER & REP   2. Essential hypertension  I10 401.9    3. Mixed hyperlipidemia  E78.2 272.2    4.  SSS (sick sinus syndrome) (HCC)  I49.5 427.81      Orders Placed This Encounter    AMB POC EKG ROUTINE W/ 12 LEADS, INTER & REP     Order Specific Question:   Reason for Exam:     Answer:   routine    carvediloL (COREG) 25 mg tablet    furosemide (LASIX) 40 mg tablet    spironolactone (ALDACTONE) 25 mg tablet        Plan:   Mounika Zazueta is in marked sinus bradycardia. He had AF in the setting of an chf exacerbation. He is unsure of which meds he is on after hospitalization at [facility]. He will get a list and get back to us. He admits to fatigue and we discussed a pacemaker for his sick sinus but he is hesitant to proceed down that road - feels his bradycardia is medicine related. He has not had any syncope. Cont med rx for htn and hyperlipidemia. F/u in 3 months. Continue medical management for AF, htn and hyperlipidemia. Thank you for allowing me to participate in Mounika Zazueta 's care.     Kristopher Lopez MD, Fuentes Hernandez

## 2021-06-07 NOTE — PROGRESS NOTES
Neurosurgery Clinic Note    Reason for Visit: SCS f/u    History of Present Illness  year old woman with complex history most pertinent for chronic pain with spinal cord injury and perhaps some peripheral involvement from decubitas ulcer flap who is now more than 6 weeks out from SCS implantation beneath the sole remaining posterior spinal level (L1). She and her  are worried that it seemed to bring about more subjective tightness. Otherwise, she has not noticed and significant side effects or benefits.     Allergies   Allergen Reactions     Contrast Dye Rash     Painful rash after x-ray contrast       Current Outpatient Medications   Medication     acetaminophen (TYLENOL) 500 MG tablet     baclofen (LIORESAL) 10 MG tablet     gabapentin (NEURONTIN) 600 MG tablet     lidocaine, Anorectal, 5 % CREA     LORazepam (ATIVAN) 0.5 MG tablet     metoprolol succinate ER (TOPROL-XL) 25 MG 24 hr tablet     morphine (MS CONTIN) 15 MG CR tablet     NARCAN 4 MG/0.1ML nasal spray     nystatin (MYCOSTATIN) 268753 UNIT/GM external cream     oxyCODONE-acetaminophen (PERCOCET) 5-325 MG tablet     simethicone (MYLICON) 80 MG chewable tablet     No current facility-administered medications for this visit.              Physical Exam  /50   Pulse 88   Resp 16   SpO2 95%       General: Awake, alert, oriented. Well nourished, well developed, no acute distress.  HEENT: Head normocephalic, atraumatic.   Extremity: Purple mottling of feet. Pulses preserved.   Neurological  Awake, alert and oriented to date, time, place and person. Speech fluent.   Pupils equal, round, reactive to light.    Motor: bilateral paraplegia  Sensation: thoracic sensory complete  Deep tendon reflexes: babinski bilateral without stim    ROS: 10 point ROS neg other than the symptoms noted above in the HPI.        Assessment and Plan   Anayeli Gagnon is a 70 year old female with new SCS implanted at L1 due to a lack of epidural space elsewhere. She  had previous decompressive and exploratory laminectomies after spinal cord hemorrhages with complex course, which resulted in SCI with complex pain distribution. We spent a considerable amount of time programming today and found that thresholds are considerably higher to achieve any effect in her. She was using currents that were extremely subthreshold last visit, so we discussed slowly increasing currents over the next month or two to achieve sufficient intensities (10-15 mA) sitting, which has been described in patients with SCI.    We also discussed the possibility of more comprehensive pain management since she is quite complex and may be responsive to other modalities. She has had long-standing pain, and while a pump would be quite complicated in her, there may be other modalities that could add up to something that could improve her quality of life. I will refer her to Dr. Espinoza to see if there is anything else she might be able to offer.     I will follow up with Annamarie to modify her programming in 4-6 weeks given the unusual settings required.          Luis Orourke MD  Department of Neurosurgery  [facility]

## 2021-06-28 ASSESSMENT — ENCOUNTER SYMPTOMS
WEAKNESS: 1
POLYPHAGIA: 0
ARTHRALGIAS: 1
RECTAL PAIN: 1
BLOATING: 0
ALTERED TEMPERATURE REGULATION: 1
BLOOD IN STOOL: 0
NUMBNESS: 1
FLANK PAIN: 0
SKIN CHANGES: 0
EXERCISE INTOLERANCE: 0
DISTURBANCES IN COORDINATION: 1
LIGHT-HEADEDNESS: 0
SLEEP DISTURBANCES DUE TO BREATHING: 0
HEADACHES: 1
HEMATURIA: 1
MEMORY LOSS: 0
PARALYSIS: 1
MUSCLE CRAMPS: 0
MUSCLE WEAKNESS: 1
TREMORS: 0
WEIGHT GAIN: 0
DEPRESSION: 1
HYPERTENSION: 1
HEARTBURN: 1
NERVOUS/ANXIOUS: 1
PANIC: 1
JAUNDICE: 0
NAIL CHANGES: 0
ORTHOPNEA: 0
TINGLING: 0
STIFFNESS: 1
PALPITATIONS: 1
DIZZINESS: 1
DECREASED CONCENTRATION: 1
JOINT SWELLING: 0
DIARRHEA: 1
BOWEL INCONTINENCE: 0
NAUSEA: 1
VOMITING: 0
LEG PAIN: 1
WEIGHT LOSS: 0
BACK PAIN: 1
CHILLS: 1
HALLUCINATIONS: 0
MYALGIAS: 1
LOSS OF CONSCIOUSNESS: 0
INSOMNIA: 1
NIGHT SWEATS: 1
SYNCOPE: 0
POLYDIPSIA: 0
SEIZURES: 0
ABDOMINAL PAIN: 1
DECREASED APPETITE: 1
INCREASED ENERGY: 1
FEVER: 0
DIFFICULTY URINATING: 0
CONSTIPATION: 0
DYSURIA: 1
NECK PAIN: 0
POOR WOUND HEALING: 0
SPEECH CHANGE: 0
FATIGUE: 1
HYPOTENSION: 0

## 2021-06-29 ENCOUNTER — OFFICE VISIT (OUTPATIENT)
Dept: ANESTHESIOLOGY | Facility: CLINIC | Age: 71
End: 2021-06-29
Attending: NEUROLOGICAL SURGERY
Payer: MEDICARE

## 2021-06-29 VITALS — RESPIRATION RATE: 16 BRPM | HEART RATE: 73 BPM | DIASTOLIC BLOOD PRESSURE: 86 MMHG | SYSTOLIC BLOOD PRESSURE: 156 MMHG

## 2021-06-29 DIAGNOSIS — M79.2 INTRACTABLE NEUROPATHIC PAIN OF LUMBOSACRAL ORIGIN: ICD-10-CM

## 2021-06-29 PROCEDURE — 99204 OFFICE O/P NEW MOD 45 MIN: CPT | Performed by: ANESTHESIOLOGY

## 2021-06-29 ASSESSMENT — PAIN SCALES - GENERAL: PAINLEVEL: MODERATE PAIN (5)

## 2021-06-29 NOTE — NURSING NOTE
RN reviewed AVS with patient. Patient to contact clinic if any questions/concerns. Patient verbalized understanding.    Camille Talavera RN

## 2021-06-29 NOTE — PROGRESS NOTES
Pain Clinic New Patient Consult Note:    Referring Provider: Sharad   Primary care provider: Luma Vogel.    Anayeli Gagnon is a 70 year old y.o. old female who presents to the pain clinic with her  for refractory chronic pain and is seated in a wheel chair.     HPI:  Patient Supplied Answers To the [facility] Pain Questionnaire  [facility] -  Patient Entered Questionnaire/Answers 6/28/2021   What number best describes your pain right now:  0 = No pain  to  10 = Worst pain imaginable 4   How would you describe the pain? burning, sharp, numbness, cutting, dull, aching, throbbing, pressure   Which of the following worsen your pain? exercise   Which of the following improve or reduce your pain?  lying down   What number best describes your average pain for the past week:  0 = No pain  to  10 = Worst pain imaginable 7   What number best describes your LOWEST pain in past 24 hours:  0 = No pain  to  10 = Worst pain imaginable 4   What number best describes your WORST pain in past 24 hours:  0 = No pain  to  10 = Worst pain imaginable 8   When is your pain worst? -   What non-medicine treatments have you already had for your pain? TENS (electrical stimulator), spine injections (shots), spinal cord stimulator, surgery   Have you tried treating your pain with medication?  Yes   Are you currently taking medications for your pain? Yes       Mrs. Gagnon is a 70 year old very pleasant female presenting to the clinic in her wheelchair with her  with severe pain in the back, lower abdomen, perineum, and lower extremities. She shares her experience with multiple surgeries and that the pain has been unrelenting and unresponsive. She is unable to move her lower extremities and does not have sensation yet feels severe pain. She recalls that after 1 of the surgeries she was able to walk briefly but had a set back and has been fully wheel chair bound since. She is coming to discuss pain control options. She had a paddle  stimulator placed around L1 in April 2021 and states that thus far she has not experienced any pain relief in her back or feet.   Her  states that there are a few more programs that are available to them to try. They are looking forward to trial these programs.   The patient states that there is 1 position lying down where she is able to slight move her feet at home. The patient reports severe burning pain in the LE. The patient currently has an indwelling hudson and has observed increase UTI since the placement of the hudson. Her last UTI was over 4 weeks ago. She denies any decubiti currently.    Interval History:   Ms. 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  She underwent decompression in [place]. She subsequently had recurrence of the spinal subdural hematoma and underwent a lumbar evacuation in 12/2017.  She had a slow recovery and regained some ability to ambulate with assistance.  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  Therefore, she underwent a 2-staged lysis of adhesions on 10/04/2018 and 10/05/2018. Postoperatively, she had bowel incontinence, as well as numbness in her legs.  She was discharged to rehab and again regained her ability to walk with assistance.  Her neurological status again subsequently deteriorated.  Therefore, she was referred to the [facility] where she underwent repeat spinal angiography as part of a second opinion.  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  She underwent an exploration of intradural space for suspected dural AV fistula. After her surgery she had persistent pain, numbness, tingling in BLE (from the waist down) this is unchanged since surgery.  Also - c/o pain, numbness, tingling in  bilat feet.       Tests/Imaging reviewed with the patient:  MRI Lumbar and thoracic spine 2/24/2021    The thoracic vertebrae are in normal alignment. T5 vertebral  hemangioma. No abnormal bone marrow signal changes. No significant  disc height loss. Postsurgical changes of laminectomies from T3-T12.  Decreased size of the thin postsurgical fluid collection tracking  along the laminectomy defects measuring up to 3 mm in AP diameter and  extending from T2-T8. Previously, this fluid collection tracked into  the lumbar spine.      Redemonstration of multiple adhesions between the dura and the spinal  cord with associated severe deformity of the spinal cord in the mid to  lower thoracic spine, not significantly changed from comparison MRI on  4/1/2019. Multifocal myelopathic signal within the cord, most  pronounced from T2-T4 and from T11 to the conus. No spinal canal or  neural foraminal stenosis.     Lumbar:   There are 5 lumbar-type vertebrae assumed for the purposes of this  dictation. Alignment of the lumbar spine is intact with loss of the  normal lordotic curvature. The tip of the conus medullaris is at L1-2.  Normal marrow signal. Postsurgical changes of laminectomies from L2 to  L5.      Findings on a level by level basis, which is somewhat limited due to  lack of T2 axial sequence:     L1-2: Posterior disc bulge with mild spinal canal stenosis. No neural  foraminal stenosis.     L2-3: Posterior disc bulge and bilateral facet hypertrophy. Mild  spinal canal narrowing. Mild right neural foraminal stenosis. No  significant left neural foraminal narrowing.     L3-4: Posterior disc bulge and bilateral facet hypertrophy. Mild  spinal canal stenosis. Mild bilateral neural foraminal narrowing.     L4-5: Posterior disc bulge and bilateral facet hypertrophy. Mild  spinal canal stenosis. Mild bilateral neural foraminal stenosis.     L5-S1: Posterior disc bulge and bilateral facet hypertrophy. No  significant spinal canal  stenosis. Mild right and mild-to-moderate  left neural foraminal stenosis.       Significant Medical History:   Past Medical History:   Diagnosis Date     CARDIAC DYSRHYTHMIAS NEC 10/3/2007    Taking atenelol for years for this     History of skin cancer      Mitral valve prolapse      Neurogenic bladder      Sacral decubitus ulcer, stage IV (H)      Thoracic spinal cord injury (H)           Past Surgical History:  Past Surgical History:   Procedure Laterality Date     DECOMPRESSION LUMBAR ONE LEVEL N/A 2019    Procedure: Posterior spinal decompression;  Surgeon: Alf Ritchie MD;  Location: [facility] OR     INSERT STIMULATOR AND LEADS INTERNAL DORSAL COLUMN N/A 2021    Procedure: Posterior thoracic 12 to Lumbar 1 level for placement of spinal cord stimulator paddle and placement of implantable pulse generator/battery over right buttock;  Surgeon: Luis Orourke MD;  Location: [facility] OR     IR SPINAL ANGIOGRAM  10/16/2019     IR SPINAL ANGIOGRAM  2019     LAPAROSCOPIC TUBAL LIGATION       REPAIR SPINAL ARERIOVENOUS MALFORMATION N/A 2019    Procedure: with surgical disconnection arterial venous fistula Thoracic 5;  Surgeon: Alf Ritchie MD;  Location: [facility] OR          Family History:  Family History   Problem Relation Age of Onset     C.A.D. Father          41     Deep Vein Thrombosis (DVT) No family hx of      Anesthesia Reaction No family hx of           Social History:  Social History     Socioeconomic History     Marital status:      Spouse name: Not on file     Number of children: Not on file     Years of education: Not on file     Highest education level: Not on file   Occupational History     Occupation: NA     Employer: [employer]   Social Needs     Financial resource strain: Not on file     Food insecurity     Worry: Not on file     Inability: Not on file     Transportation needs     Medical: Not on file     Non-medical: Not on file   Tobacco Use     Smoking status:  Never Smoker     Smokeless tobacco: Never Used   Substance and Sexual Activity     Alcohol use: No     Drug use: No     Sexual activity: Yes     Partners: Male   Lifestyle     Physical activity     Days per week: Not on file     Minutes per session: Not on file     Stress: Not on file   Relationships     Social connections     Talks on phone: Not on file     Gets together: Not on file     Attends [employer] service: Not on file     Active member of club or organization: Not on file     Attends meetings of clubs or organizations: Not on file     Relationship status: Not on file     Intimate partner violence     Fear of current or ex partner: Not on file     Emotionally abused: Not on file     Physically abused: Not on file     Forced sexual activity: Not on file   Other Topics Concern     Parent/sibling w/ CABG, MI or angioplasty before 65F 55M? Not Asked   Social History Narrative    Lives with spouse - works in [place].     Social History     Social History Narrative    Lives with spouse - works in [place].          Allergies:  Allergies   Allergen Reactions     Contrast Dye Rash     Painful rash after x-ray contrast       Current Medications:   Current Outpatient Medications   Medication Sig Dispense Refill     acetaminophen (TYLENOL) 500 MG tablet Take 500-1,000 mg by mouth every 6 hours as needed for mild pain       baclofen (LIORESAL) 10 MG tablet Take 10 mg by mouth 3 times daily as needed for muscle spasms       gabapentin (NEURONTIN) 600 MG tablet Take 1 tablet (600 mg) by mouth 3 times daily 1 tablet 0     lidocaine, Anorectal, 5 % CREA Apply 1 Application topically 3 times daily as needed       LORazepam (ATIVAN) 0.5 MG tablet Take 1 tablet by mouth daily as needed for anxiety        metoprolol succinate ER (TOPROL-XL) 25 MG 24 hr tablet Take 12.5 mg by mouth 2 times daily        morphine (MS CONTIN) 15 MG CR tablet Take 15 mg by mouth every 12 hours       NARCAN 4 MG/0.1ML nasal spray INHALE ONE SPRAY  IN ONE NOSTRIL AS NEEDED FOR OPIOID. ANTIDOTE, MAY REPEAT IN OTHER NOSTRIL IF NEEDED       nystatin (MYCOSTATIN) 487911 UNIT/GM external cream Apply 1 Application topically 2 times daily       oxyCODONE-acetaminophen (PERCOCET) 5-325 MG tablet TAKE ONE TO TWO TABLETS BY MOUTH EVERY 6 HOURS AS NEEDED FOR SEVERE PAIN. MAX 8 TABLETS PER DAY *CAUTION: OPIOID. RISK OF OVERDOSE AND ADDIC       simethicone (MYLICON) 80 MG chewable tablet Take 1 tablet by mouth every 6 hours as needed for flatulence or cramping        Current Pain Medications:  MS contin 15 mg BID  Percocet 5-325 2 tablets upto 4 times a day  Baclofen 10 mg tid  Gabapentin 600 mg TID    Work History:    Current work status: not working.     Review of Systems:  Review of Systems   Constitutional: Positive for chills. Negative for fever and weight loss.   Cardiovascular: Positive for chest pain and palpitations. Negative for orthopnea.   Gastrointestinal: Positive for abdominal pain, diarrhea, heartburn and nausea. Negative for blood in stool, constipation, melena and vomiting.   Genitourinary: Positive for dysuria, hematuria and urgency. Negative for flank pain.   Musculoskeletal: Positive for back pain and myalgias. Negative for neck pain.   Skin: Positive for rash. Negative for itching.   Neurological: Positive for dizziness, weakness and headaches. Negative for tingling, tremors, speech change, seizures and loss of consciousness.   Endo/Heme/Allergies: Negative for polydipsia.   Psychiatric/Behavioral: Positive for depression. Negative for hallucinations and memory loss. The patient is nervous/anxious and has insomnia.    All other systems reviewed and are negative.    Answers for HPI/ROS submitted by the patient on 6/28/2021   General Symptoms: Yes  Skin Symptoms: Yes  HENT Symptoms: No  EYE SYMPTOMS: No  HEART SYMPTOMS: Yes  LUNG SYMPTOMS: No  INTESTINAL SYMPTOMS: Yes  URINARY SYMPTOMS: Yes  GYNECOLOGIC SYMPTOMS: No  BREAST SYMPTOMS: No  SKELETAL  SYMPTOMS: Yes  BLOOD SYMPTOMS: No  NERVOUS SYSTEM SYMPTOMS: Yes  MENTAL HEALTH SYMPTOMS: Yes  Loss of appetite: Yes  Weight gain: No  Fatigue: Yes  Night sweats: Yes  Increased stress: Yes  Excessive hunger: No  Feeling hot or cold when others believe the temperature is normal: Yes  Loss of height: No  Post-operative complications: No  Surgical site pain: No  Change in or Loss of Energy: Yes  Hyperactivity: Yes  Confusion: No  Changes in hair: No  Changes in moles/birth marks: No  Changes in nails: No  Acne: No  Hair in places you don't want it: No  Change in facial hair: No  Warts: No  Non-healing sores: No  Scarring: No  Flaking of skin: No  Color changes of hands/feet in cold : No  Sun sensitivity: No  Skin thickening: No  Pain in legs with walking: Yes  Fingers or toes appear blue: No  High blood pressure: Yes  Low blood pressure: No  Fainting: No  Murmurs: No  Pacemaker: No  Varicose veins: No  Edema or swelling: Yes  Wake up at night with shortness of breath: No  Light-headedness: No  Exercise intolerance: No  Bloating: No  Rectal or Anal pain: Yes  Fecal incontinence: No  Yellowing of skin or eyes: No  Vomit with blood: No  Change in stools: No  Trouble holding urine or incontinence: Yes  Increased frequency of urination: Yes  Decreased frequency of urination: No  Frequent nighttime urination: No  Difficulty emptying bladder: No  Swollen joints: No  Joint pain: Yes  Bone pain: No  Muscle cramps: No  Muscle weakness: Yes  Joint stiffness: Yes  Bone fracture: No  Trouble with coordination: Yes  Difficulty walking: Yes  Paralysis: Yes  Numbness: Yes  Trouble thinking or concentrating: Yes  Mood changes: Yes  Panic attacks: Yes    Physical Exam:     Vitals:    06/29/21 0808   BP: (!) 156/86   Pulse: 73   Resp: 16       General Appearance: No distress, seated comfortably in her wheel chair, thin built  Mood: Euthymic, tearful  HE ENT: Non constricted pupils  Respiratory: Non labored breathing  CVS: Regular rate  and rhythm  GI: Soft, non distended, no TTP  Skin: No rashes over exposed skin, left lower extremity skin graft surgical wounds noted, color changes in the ankle area  MS: wheelchair bound, moves upper extremities against gravity, unable to move lower extremity, no sensation to touch in the lower extremity.   Gait: wheel chair bound, uses a hovermat to transfer      Laboratory results:  Recent Labs   Lab Test 04/07/21  0645 03/31/21  1425    141   POTASSIUM 4.1 4.4   CHLORIDE 105 108   CO2 29 31   ANIONGAP 6 2*   GLC 95 77   BUN 10 13   CR 0.58 0.61   ADAN 9.1 8.8       CBC RESULTS:   Recent Labs   Lab Test 04/07/21  0645   WBC 6.5   RBC 4.10   HGB 12.7   HCT 38.8   MCV 95   MCH 31.0   MCHC 32.7   RDW 13.3            Imaging:       ASSESSMENT AND PLAN:     Encounter Diagnosis:    Laminectomies from T3-T12  Spinal subdural hematoma  Paraplegia  Neuropathic pain   Paddle SCS insitu  Opioid dependence.    Anayeli Gagnon is a 70 year old y.o. old female who presents to the pain clinic with severe pain from her waist down    I have summarized the patient s past medical history, discussed their clinical findings and the potential differential diagnosis with the patient. Significant past medical history pertinent to the patient s current condition includes multiple spinal hematomas and extensive thoracic laminectomy, and recent paddle SCS lead around L1.  The clinical findings reveal severe neuropathic pain and opioid dependence. The differential diagnosis discussed with the patient are listed above. I have discussed anatomy and possible sources of the pain using models and/or pictures (diagrams). I have discussed multi- disciplinary pain management options withthe patient as pertaining to their case as detailed above. The pain management options we discussed included, but were not limited to the recommendations below.  I also discussed with patient the risks, benefits and alternatives to each pain  management option.  All of the patient s questions and concerns were answered to the best of my ability.    RECOMMENDATIONS:     1. Medications: The patient has been using extended release morphine and oxycodone with acetaminophen (up to 5 per day) with limited improvement in pain for some time. Worsening of neuropathic pain versus tolerance verses opioid induced hyperalgesia area hard to differentiate at this time. Her labs were reviewed. An option would be to rotate her to methadone for NMDA receptor effect after checking Qt intervals and appropriately following up with these. Her medication are not prescribed through our office.   Baclofen may also be increase to up to 20 mg tid if tolerated.     2. Procedure: The patient has had multiple surgical interventions to avoid worsening of her condition. Unfortunately, with all the interventions and followup she is unable to walk and has persisting severe neuropathic pain. She is continuing to trial a variety of programs with the paddle lead placed recently, so far with limited success.     My colleague Dr. Orourke had offered her intrathecal drug delivery system therapy. I agree with Dr. Orourke's recommendation. Given her challenging medical situation and limitation of systemic opioid therapy IDDS is probably the next reasonable step. I did briefly discuss placing a temporary catheter to trial intrathecal medications for her prior to implantation. Given her multiple subdural hematomas and recent paddle lead placement it may be challenging to achieve the desired catheter tip location. Regardless, a trial will help answer these questions about catheter placement, pain relief and possible doses and medications required by the patient. I started to discuss the implantation portion of the therapy and the patient stated that she was not interested in any further interventions at this time.   Given her condition she may benefit from ziconitide for neuropathic pain or possibly  a combination strategy for pain after spinal cord injury.     3. I do recommend that the patient should consider pain psychology for support and coping strategies in the near future to best assist her with sudden change in medical condition.    We did not have any non cancer IDDS materials in the clinic. I will request Lul from [facility] to directly mail it to her.     Follow up: as needed.

## 2021-06-29 NOTE — LETTER
6/29/2021       RE: Anayeli Gagnon  [address] [address] [place] [id number]     Dear Colleague,    Thank you for referring your patient, Anayeli Gagnon, to the [facility] CLINIC FOR COMPREHENSIVE PAIN MANAGEMENT [place] at [facility]. Please see a copy of my visit note below.      Pain Clinic New Patient Consult Note:    Referring Provider: Sharad   Primary care provider: Luma Vogel.    Anayeli Gagnon is a 70 year old y.o. old female who presents to the pain clinic with her  for refractory chronic pain and is seated in a wheel chair.     HPI:  Patient Supplied Answers To the [facility] Pain Questionnaire  [facility] Pain -  Patient Entered Questionnaire/Answers 6/28/2021   What number best describes your pain right now:  0 = No pain  to  10 = Worst pain imaginable 4   How would you describe the pain? burning, sharp, numbness, cutting, dull, aching, throbbing, pressure   Which of the following worsen your pain? exercise   Which of the following improve or reduce your pain?  lying down   What number best describes your average pain for the past week:  0 = No pain  to  10 = Worst pain imaginable 7   What number best describes your LOWEST pain in past 24 hours:  0 = No pain  to  10 = Worst pain imaginable 4   What number best describes your WORST pain in past 24 hours:  0 = No pain  to  10 = Worst pain imaginable 8   When is your pain worst? -   What non-medicine treatments have you already had for your pain? TENS (electrical stimulator), spine injections (shots), spinal cord stimulator, surgery   Have you tried treating your pain with medication?  Yes   Are you currently taking medications for your pain? Yes       Mrs. Gagnon is a 70 year old very pleasant female presenting to the clinic in her wheelchair with her  with severe pain in the back, lower abdomen, perineum, and lower extremities. She shares her experience with multiple surgeries  and that the pain has been unrelenting and unresponsive. She is unable to move her lower extremities and does not have sensation yet feels severe pain. She recalls that after 1 of the surgeries she was able to walk briefly but had a set back and has been fully wheel chair bound since. She is coming to discuss pain control options. She had a paddle stimulator placed around L1 in April 2021 and states that thus far she has not experienced any pain relief in her back or feet.   Her  states that there are a few more programs that are available to them to try. They are looking forward to trial these programs.   The patient states that there is 1 position lying down where she is able to slight move her feet at home. The patient reports severe burning pain in the LE. The patient currently has an indwelling hudson and has observed increase UTI since the placement of the hudson. Her last UTI was over 4 weeks ago. She denies any decubiti currently.    Interval History:   Ms. 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  She underwent decompression in [place]. She subsequently had recurrence of the spinal subdural hematoma and underwent a lumbar evacuation in 12/2017.  She had a slow recovery and regained some ability to ambulate with assistance.  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  Therefore, she underwent a 2-staged lysis of adhesions on 10/04/2018 and 10/05/2018. Postoperatively, she had bowel incontinence, as well as numbness in her legs.  She was discharged to rehab and again regained her ability to walk with assistance.  Her neurological status again subsequently deteriorated.  Therefore, she was referred to the [facility] where she underwent repeat spinal angiography  as part of a second opinion.  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  She underwent an exploration of intradural space for suspected dural AV fistula. After her surgery she had persistent pain, numbness, tingling in BLE (from the waist down) this is unchanged since surgery.  Also - c/o pain, numbness, tingling in bilat feet.       Tests/Imaging reviewed with the patient:  MRI Lumbar and thoracic spine 2/24/2021    The thoracic vertebrae are in normal alignment. T5 vertebral  hemangioma. No abnormal bone marrow signal changes. No significant  disc height loss. Postsurgical changes of laminectomies from T3-T12.  Decreased size of the thin postsurgical fluid collection tracking  along the laminectomy defects measuring up to 3 mm in AP diameter and  extending from T2-T8. Previously, this fluid collection tracked into  the lumbar spine.      Redemonstration of multiple adhesions between the dura and the spinal  cord with associated severe deformity of the spinal cord in the mid to  lower thoracic spine, not significantly changed from comparison MRI on  4/1/2019. Multifocal myelopathic signal within the cord, most  pronounced from T2-T4 and from T11 to the conus. No spinal canal or  neural foraminal stenosis.     Lumbar:   There are 5 lumbar-type vertebrae assumed for the purposes of this  dictation. Alignment of the lumbar spine is intact with loss of the  normal lordotic curvature. The tip of the conus medullaris is at L1-2.  Normal marrow signal. Postsurgical changes of laminectomies from L2 to  L5.      Findings on a level by level basis, which is somewhat limited due to  lack of T2 axial sequence:     L1-2: Posterior disc bulge with mild spinal canal stenosis. No neural  foraminal stenosis.     L2-3: Posterior disc bulge and bilateral facet hypertrophy. Mild  spinal canal narrowing. Mild right neural foraminal stenosis. No  significant left neural foraminal narrowing.     L3-4:  Posterior disc bulge and bilateral facet hypertrophy. Mild  spinal canal stenosis. Mild bilateral neural foraminal narrowing.     L4-5: Posterior disc bulge and bilateral facet hypertrophy. Mild  spinal canal stenosis. Mild bilateral neural foraminal stenosis.     L5-S1: Posterior disc bulge and bilateral facet hypertrophy. No  significant spinal canal stenosis. Mild right and mild-to-moderate  left neural foraminal stenosis.       Significant Medical History:   Past Medical History:   Diagnosis Date     CARDIAC DYSRHYTHMIAS NEC 10/3/2007    Taking atenelol for years for this     History of skin cancer      Mitral valve prolapse      Neurogenic bladder      Sacral decubitus ulcer, stage IV (H)      Thoracic spinal cord injury (H)           Past Surgical History:  Past Surgical History:   Procedure Laterality Date     DECOMPRESSION LUMBAR ONE LEVEL N/A 2019    Procedure: Posterior spinal decompression;  Surgeon: Alf Ritchie MD;  Location: [facility] OR     INSERT STIMULATOR AND LEADS INTERNAL DORSAL COLUMN N/A 2021    Procedure: Posterior thoracic 12 to Lumbar 1 level for placement of spinal cord stimulator paddle and placement of implantable pulse generator/battery over right buttock;  Surgeon: Luis Orourke MD;  Location: [facility] OR     IR SPINAL ANGIOGRAM  10/16/2019     IR SPINAL ANGIOGRAM  2019     LAPAROSCOPIC TUBAL LIGATION       REPAIR SPINAL ARERIOVENOUS MALFORMATION N/A 2019    Procedure: with surgical disconnection arterial venous fistula Thoracic 5;  Surgeon: Alf Ritchie MD;  Location: [facility] OR          Family History:  Family History   Problem Relation Age of Onset     C.A.D. Father          41     Deep Vein Thrombosis (DVT) No family hx of      Anesthesia Reaction No family hx of           Social History:  Social History     Socioeconomic History     Marital status:      Spouse name: Not on file     Number of children: Not on file     Years of education: Not on  file     Highest education level: Not on file   Occupational History     Occupation: NA     Employer: [employer]   Social Needs     Financial resource strain: Not on file     Food insecurity     Worry: Not on file     Inability: Not on file     Transportation needs     Medical: Not on file     Non-medical: Not on file   Tobacco Use     Smoking status: Never Smoker     Smokeless tobacco: Never Used   Substance and Sexual Activity     Alcohol use: No     Drug use: No     Sexual activity: Yes     Partners: Male   Lifestyle     Physical activity     Days per week: Not on file     Minutes per session: Not on file     Stress: Not on file   Relationships     Social connections     Talks on phone: Not on file     Gets together: Not on file     Attends Judaism service: Not on file     Active member of club or organization: Not on file     Attends meetings of clubs or organizations: Not on file     Relationship status: Not on file     Intimate partner violence     Fear of current or ex partner: Not on file     Emotionally abused: Not on file     Physically abused: Not on file     Forced sexual activity: Not on file   Other Topics Concern     Parent/sibling w/ CABG, MI or angioplasty before 65F 55M? Not Asked   Social History Narrative    Lives with spouse - works in [place].     Social History     Social History Narrative    Lives with spouse - works in [place].          Allergies:  Allergies   Allergen Reactions     Contrast Dye Rash     Painful rash after x-ray contrast       Current Medications:   Current Outpatient Medications   Medication Sig Dispense Refill     acetaminophen (TYLENOL) 500 MG tablet Take 500-1,000 mg by mouth every 6 hours as needed for mild pain       baclofen (LIORESAL) 10 MG tablet Take 10 mg by mouth 3 times daily as needed for muscle spasms       gabapentin (NEURONTIN) 600 MG tablet Take 1 tablet (600 mg) by mouth 3 times daily 1 tablet 0     lidocaine, Anorectal, 5 % CREA Apply 1 Application  topically 3 times daily as needed       LORazepam (ATIVAN) 0.5 MG tablet Take 1 tablet by mouth daily as needed for anxiety        metoprolol succinate ER (TOPROL-XL) 25 MG 24 hr tablet Take 12.5 mg by mouth 2 times daily        morphine (MS CONTIN) 15 MG CR tablet Take 15 mg by mouth every 12 hours       NARCAN 4 MG/0.1ML nasal spray INHALE ONE SPRAY IN ONE NOSTRIL AS NEEDED FOR OPIOID. ANTIDOTE, MAY REPEAT IN OTHER NOSTRIL IF NEEDED       nystatin (MYCOSTATIN) 461177 UNIT/GM external cream Apply 1 Application topically 2 times daily       oxyCODONE-acetaminophen (PERCOCET) 5-325 MG tablet TAKE ONE TO TWO TABLETS BY MOUTH EVERY 6 HOURS AS NEEDED FOR SEVERE PAIN. MAX 8 TABLETS PER DAY *CAUTION: OPIOID. RISK OF OVERDOSE AND ADDIC       simethicone (MYLICON) 80 MG chewable tablet Take 1 tablet by mouth every 6 hours as needed for flatulence or cramping        Current Pain Medications:  MS contin 15 mg BID  Percocet 5-325 2 tablets upto 4 times a day  Baclofen 10 mg tid  Gabapentin 600 mg TID    Work History:    Current work status: not working.     Review of Systems:  Review of Systems   Constitutional: Positive for chills. Negative for fever and weight loss.   Cardiovascular: Positive for chest pain and palpitations. Negative for orthopnea.   Gastrointestinal: Positive for abdominal pain, diarrhea, heartburn and nausea. Negative for blood in stool, constipation, melena and vomiting.   Genitourinary: Positive for dysuria, hematuria and urgency. Negative for flank pain.   Musculoskeletal: Positive for back pain and myalgias. Negative for neck pain.   Skin: Positive for rash. Negative for itching.   Neurological: Positive for dizziness, weakness and headaches. Negative for tingling, tremors, speech change, seizures and loss of consciousness.   Endo/Heme/Allergies: Negative for polydipsia.   Psychiatric/Behavioral: Positive for depression. Negative for hallucinations and memory loss. The patient is nervous/anxious and  has insomnia.    All other systems reviewed and are negative.    Answers for HPI/ROS submitted by the patient on 6/28/2021   General Symptoms: Yes  Skin Symptoms: Yes  HENT Symptoms: No  EYE SYMPTOMS: No  HEART SYMPTOMS: Yes  LUNG SYMPTOMS: No  INTESTINAL SYMPTOMS: Yes  URINARY SYMPTOMS: Yes  GYNECOLOGIC SYMPTOMS: No  BREAST SYMPTOMS: No  SKELETAL SYMPTOMS: Yes  BLOOD SYMPTOMS: No  NERVOUS SYSTEM SYMPTOMS: Yes  MENTAL HEALTH SYMPTOMS: Yes  Loss of appetite: Yes  Weight gain: No  Fatigue: Yes  Night sweats: Yes  Increased stress: Yes  Excessive hunger: No  Feeling hot or cold when others believe the temperature is normal: Yes  Loss of height: No  Post-operative complications: No  Surgical site pain: No  Change in or Loss of Energy: Yes  Hyperactivity: Yes  Confusion: No  Changes in hair: No  Changes in moles/birth marks: No  Changes in nails: No  Acne: No  Hair in places you don't want it: No  Change in facial hair: No  Warts: No  Non-healing sores: No  Scarring: No  Flaking of skin: No  Color changes of hands/feet in cold : No  Sun sensitivity: No  Skin thickening: No  Pain in legs with walking: Yes  Fingers or toes appear blue: No  High blood pressure: Yes  Low blood pressure: No  Fainting: No  Murmurs: No  Pacemaker: No  Varicose veins: No  Edema or swelling: Yes  Wake up at night with shortness of breath: No  Light-headedness: No  Exercise intolerance: No  Bloating: No  Rectal or Anal pain: Yes  Fecal incontinence: No  Yellowing of skin or eyes: No  Vomit with blood: No  Change in stools: No  Trouble holding urine or incontinence: Yes  Increased frequency of urination: Yes  Decreased frequency of urination: No  Frequent nighttime urination: No  Difficulty emptying bladder: No  Swollen joints: No  Joint pain: Yes  Bone pain: No  Muscle cramps: No  Muscle weakness: Yes  Joint stiffness: Yes  Bone fracture: No  Trouble with coordination: Yes  Difficulty walking: Yes  Paralysis: Yes  Numbness: Yes  Trouble thinking  or concentrating: Yes  Mood changes: Yes  Panic attacks: Yes    Physical Exam:     Vitals:    06/29/21 0808   BP: (!) 156/86   Pulse: 73   Resp: 16       General Appearance: No distress, seated comfortably in her wheel chair, thin built  Mood: Euthymic, tearful  HE ENT: Non constricted pupils  Respiratory: Non labored breathing  CVS: Regular rate and rhythm  GI: Soft, non distended, no TTP  Skin: No rashes over exposed skin, left lower extremity skin graft surgical wounds noted, color changes in the ankle area  MS: wheelchair bound, moves upper extremities against gravity, unable to move lower extremity, no sensation to touch in the lower extremity.   Gait: wheel chair bound, uses a hovermat to transfer      Laboratory results:  Recent Labs   Lab Test 04/07/21  0645 03/31/21  1425    141   POTASSIUM 4.1 4.4   CHLORIDE 105 108   CO2 29 31   ANIONGAP 6 2*   GLC 95 77   BUN 10 13   CR 0.58 0.61   ADAN 9.1 8.8       CBC RESULTS:   Recent Labs   Lab Test 04/07/21  0645   WBC 6.5   RBC 4.10   HGB 12.7   HCT 38.8   MCV 95   MCH 31.0   MCHC 32.7   RDW 13.3            Imaging:       ASSESSMENT AND PLAN:     Encounter Diagnosis:    Laminectomies from T3-T12  Spinal subdural hematoma  Paraplegia  Neuropathic pain   Paddle SCS insitu  Opioid dependence.    Anayeli Gagnon is a 70 year old y.o. old female who presents to the pain clinic with severe pain from her waist down    I have summarized the patient s past medical history, discussed their clinical findings and the potential differential diagnosis with the patient. Significant past medical history pertinent to the patient s current condition includes multiple spinal hematomas and extensive thoracic laminectomy, and recent paddle SCS lead around L1.  The clinical findings reveal severe neuropathic pain and opioid dependence. The differential diagnosis discussed with the patient are listed above. I have discussed anatomy and possible sources of the pain using  models and/or pictures (diagrams). I have discussed multi- disciplinary pain management options withthe patient as pertaining to their case as detailed above. The pain management options we discussed included, but were not limited to the recommendations below.  I also discussed with patient the risks, benefits and alternatives to each pain management option.  All of the patient s questions and concerns were answered to the best of my ability.    RECOMMENDATIONS:     1. Medications: The patient has been using extended release morphine and oxycodone with acetaminophen (up to 5 per day) with limited improvement in pain for some time. Worsening of neuropathic pain versus tolerance verses opioid induced hyperalgesia area hard to differentiate at this time. Her labs were reviewed. An option would be to rotate her to methadone for NMDA receptor effect after checking Qt intervals and appropriately following up with these. Her medication are not prescribed through our office.   Baclofen may also be increase to up to 20 mg tid if tolerated.     2. Procedure: The patient has had multiple surgical interventions to avoid worsening of her condition. Unfortunately, with all the interventions and followup she is unable to walk and has persisting severe neuropathic pain. She is continuing to trial a variety of programs with the paddle lead placed recently, so far with limited success.     My colleague Dr. Orourke had offered her intrathecal drug delivery system therapy. I agree with Dr. Orourke's recommendation. Given her challenging medical situation and limitation of systemic opioid therapy IDDS is probably the next reasonable step. I did briefly discuss placing a temporary catheter to trial intrathecal medications for her prior to implantation. Given her multiple subdural hematomas and recent paddle lead placement it may be challenging to achieve the desired catheter tip location. Regardless, a trial will help answer these  questions about catheter placement, pain relief and possible doses and medications required by the patient. I started to discuss the implantation portion of the therapy and the patient stated that she was not interested in any further interventions at this time.   Given her condition she may benefit from ziconitide for neuropathic pain or possibly a combination strategy for pain after spinal cord injury.     3. I do recommend that the patient should consider pain psychology for support and coping strategies in the near future to best assist her with sudden change in medical condition.    We did not have any non cancer IDDS materials in the clinic. I will request Lul from [employer] to directly mail it to her.     Follow up: as needed.                                                                                                  Again, thank you for allowing me to participate in the care of your patient.      Sincerely,    Jasmine Espinoza MD

## 2021-06-29 NOTE — PATIENT INSTRUCTIONS
Treatment Planning:    Medication recommendations will be written in your chart. [employer] will send you a pamphlet in the mail regarding targeted drug delivery.       Recommended Follow up:      Follow up as needed.          Please call [phone], option #1 to schedule your clinic appointment if you don't already have an appointment scheduled.        To speak with a nurse, schedule/reschedule/cancel a clinic appointment, or request a medication refill call: [phone], option #1.    You can also reach us by [employer] [email or web address]

## 2021-07-05 ASSESSMENT — ENCOUNTER SYMPTOMS
TREMORS: 0
PALPITATIONS: 1
FLANK PAIN: 0
HEMATURIA: 1
SPEECH CHANGE: 0
HEADACHES: 1
INSOMNIA: 1
FEVER: 0
BACK PAIN: 1
ORTHOPNEA: 0
HEARTBURN: 1
DEPRESSION: 1
TINGLING: 0
ABDOMINAL PAIN: 1
MYALGIAS: 1
DYSURIA: 1
DIZZINESS: 1
BLOOD IN STOOL: 0
HALLUCINATIONS: 0
DIARRHEA: 1
MEMORY LOSS: 0
CHILLS: 1
NAUSEA: 1
POLYDIPSIA: 0
NECK PAIN: 0
CONSTIPATION: 0
WEAKNESS: 1
LOSS OF CONSCIOUSNESS: 0
VOMITING: 0
SEIZURES: 0
WEIGHT LOSS: 0
NERVOUS/ANXIOUS: 1

## 2021-07-13 ENCOUNTER — OFFICE VISIT (OUTPATIENT)
Dept: NEUROSURGERY | Facility: CLINIC | Age: 71
End: 2021-07-13
Payer: MEDICARE

## 2021-07-13 VITALS
DIASTOLIC BLOOD PRESSURE: 50 MMHG | OXYGEN SATURATION: 95 % | HEART RATE: 63 BPM | RESPIRATION RATE: 16 BRPM | SYSTOLIC BLOOD PRESSURE: 110 MMHG

## 2021-07-13 DIAGNOSIS — Z96.89 S/P INSERTION OF SPINAL CORD STIMULATOR: ICD-10-CM

## 2021-07-13 DIAGNOSIS — M79.2 INTRACTABLE NEUROPATHIC PAIN OF LUMBOSACRAL ORIGIN: Primary | ICD-10-CM

## 2021-07-13 DIAGNOSIS — G95.19 SPINAL CORD HEMORRHAGE (H): ICD-10-CM

## 2021-07-13 DIAGNOSIS — S24.109D SPINAL CORD INJURY OF THORACIC REGION WITHOUT BONE INJURY, SUBSEQUENT ENCOUNTER (H): ICD-10-CM

## 2021-07-13 PROCEDURE — 99213 OFFICE O/P EST LOW 20 MIN: CPT | Performed by: NEUROLOGICAL SURGERY

## 2021-07-13 RX ORDER — LANOLIN ALCOHOL/MO/W.PET/CERES
3 CREAM (GRAM) TOPICAL
COMMUNITY

## 2021-07-13 ASSESSMENT — PAIN SCALES - GENERAL: PAINLEVEL: MODERATE PAIN (5)

## 2021-07-13 NOTE — LETTER
7/13/2021       RE: Anayeli Gagnon  [address] [address] [place] [id number]     Dear Colleague,    Thank you for referring your patient, Anayeli Gagnon, to the [facility] NEUROSURGERY CLINIC [place] at [facility]. Please see a copy of my visit note below.    Neurosurgery Clinic Note    Reason for Visit: Follow-up spinal cord stimulator    History of Present Illness  Anayeli Gagnon is a 70-year-old woman with a complex history including most recently chronic pain with spinal cord injury with both central and peripheral components who is now about 10 weeks out from spinal cord stimulation beneath the still remaining posterior spinal level L1.  She and her  present for further spinal cord stim programming.  Since the last visit she believes but is not certain that she has had increased spasticity from the spinal cord stimulation but has increased the current to a higher level.  She has not seen a significant amount of benefit yet.    She has not suffered any postoperative issues and denies any infections       Allergies   Allergen Reactions     Contrast Dye Rash     Painful rash after x-ray contrast       Current Outpatient Medications   Medication     acetaminophen (TYLENOL) 500 MG tablet     baclofen (LIORESAL) 10 MG tablet     gabapentin (NEURONTIN) 600 MG tablet     lidocaine, Anorectal, 5 % CREA     LORazepam (ATIVAN) 0.5 MG tablet     melatonin 3 MG tablet     metoprolol succinate ER (TOPROL-XL) 25 MG 24 hr tablet     morphine (MS CONTIN) 15 MG CR tablet     NARCAN 4 MG/0.1ML nasal spray     nystatin (MYCOSTATIN) 475939 UNIT/GM external cream     omeprazole (PRILOSEC) 20 MG DR capsule     oxyCODONE-acetaminophen (PERCOCET) 5-325 MG tablet     simethicone (MYLICON) 80 MG chewable tablet     No current facility-administered medications for this visit.             Physical Exam  /50   Pulse 63   Resp 16   SpO2 95%      Incisions: Well-healed, no erythema  General: Awake, alert, oriented. Well nourished, well developed, no acute distress.  HEENT: Head normocephalic, atraumatic.     Extremity: Warm with no clubbing or cyanosis. No lower extremity edema.    Neurological  Awake, alert and oriented to date, time, place and person. Speech fluent.   Pupils equal, round, reactive to light.  Extraocular movement intact.  Hearing is grossly normal to finger rub.   Facial sensation intact.  Face symmetric.  Tongue midline.  Uvula elevates equally.    Motor: Baseline paraplegia   sensation: Baseline insensate      ROS: 10 point ROS neg other than the symptoms noted above in the HPI.      Assessment and Plan   Anayeli Gagnon is a 70 year old female with complex chronic pain consistent with both central and peripheral components now status post spinal cord stimulator placement.  She has started increasing the current and we have seen a very small amount of target engagement potentially with some increased spasticity.  We will continue to guide her towards increasing currents so that we can see an effect.  We will also have her begin some optimization by systematically approaching grid spacing in the parameter space.  We will have her return to clinic after filling out some pain logs.  We will have her follow-up in a couple months.      Luis Orourke MD  Department of Neurosurgery  [facility]        Again, thank you for allowing me to participate in the care of your patient.      Sincerely,    Luis Orourke MD

## 2021-07-13 NOTE — PROGRESS NOTES
Neurosurgery Clinic Note    Reason for Visit: Follow-up spinal cord stimulator    History of Present Illness  Anayeli Gagnon is a 70-year-old woman with a complex history including most recently chronic pain with spinal cord injury with both central and peripheral components who is now about 10 weeks out from spinal cord stimulation beneath the still remaining posterior spinal level L1.  She and her  present for further spinal cord stim programming.  Since the last visit she believes but is not certain that she has had increased spasticity from the spinal cord stimulation but has increased the current to a higher level.  She has not seen a significant amount of benefit yet.    She has not suffered any postoperative issues and denies any infections       Allergies   Allergen Reactions     Contrast Dye Rash     Painful rash after x-ray contrast       Current Outpatient Medications   Medication     acetaminophen (TYLENOL) 500 MG tablet     baclofen (LIORESAL) 10 MG tablet     gabapentin (NEURONTIN) 600 MG tablet     lidocaine, Anorectal, 5 % CREA     LORazepam (ATIVAN) 0.5 MG tablet     melatonin 3 MG tablet     metoprolol succinate ER (TOPROL-XL) 25 MG 24 hr tablet     morphine (MS CONTIN) 15 MG CR tablet     NARCAN 4 MG/0.1ML nasal spray     nystatin (MYCOSTATIN) 051269 UNIT/GM external cream     omeprazole (PRILOSEC) 20 MG DR capsule     oxyCODONE-acetaminophen (PERCOCET) 5-325 MG tablet     simethicone (MYLICON) 80 MG chewable tablet     No current facility-administered medications for this visit.             Physical Exam  /50   Pulse 63   Resp 16   SpO2 95%     Incisions: Well-healed, no erythema  General: Awake, alert, oriented. Well nourished, well developed, no acute distress.  HEENT: Head normocephalic, atraumatic.     Extremity: Warm with no clubbing or cyanosis. No lower extremity edema.    Neurological  Awake, alert and oriented to date, time, place and person. Speech fluent.   Pupils  equal, round, reactive to light.  Extraocular movement intact.  Hearing is grossly normal to finger rub.   Facial sensation intact.  Face symmetric.  Tongue midline.  Uvula elevates equally.    Motor: Baseline paraplegia   sensation: Baseline insensate      ROS: 10 point ROS neg other than the symptoms noted above in the HPI.      Assessment and Plan   Anayeli Gagnon is a 70 year old female with complex chronic pain consistent with both central and peripheral components now status post spinal cord stimulator placement.  She has started increasing the current and we have seen a very small amount of target engagement potentially with some increased spasticity.  We will continue to guide her towards increasing currents so that we can see an effect.  We will also have her begin some optimization by systematically approaching grid spacing in the parameter space.  We will have her return to clinic after filling out some pain logs.  We will have her follow-up in a couple months.      Luis Orourke MD  Department of Neurosurgery  [facility]

## 2021-07-13 NOTE — NURSING NOTE
Chief Complaint   Patient presents with     RECHECK     [facility] RETURN - spinal cord stimulator programming     Jl Truong

## 2021-07-13 NOTE — PATIENT INSTRUCTIONS
You will be emailed with a list of programs and instructions  Please fill out the spreadsheet and return in 2 months

## 2021-08-26 ENCOUNTER — TELEPHONE (OUTPATIENT)
Dept: NEUROSURGERY | Facility: CLINIC | Age: 71
End: 2021-08-26

## 2021-08-26 NOTE — TELEPHONE ENCOUNTER
[facility] Call Center    Phone Message    May a detailed message be left on voicemail: yes     Reason for Call: Other: Annamarie calling stating that she has the pain stimulator and is on program 8, and it is still not helping. Wondering if Dr. Orourke has any suggestions.     Please advise and call Annamarie back at your earliest convenience     Action Taken: Other: [facility] NEUROSURGERY     Travel Screening: Not Applicable

## 2021-08-31 NOTE — TELEPHONE ENCOUNTER
Spoke with pt about spinal cord stim programming. Pt said she is on program 8 but has gone up to 50 with no improvement. I let her know that I spoke with Dr. Orourke and he needs her to fill out the pain logs he gave her at the last appointment, as that will guide him in programming. Pt says she has not been keeping the logs consistently because she is frustrated and the stimulator does not seem to be helping. However, she said that she would try to keep them for the next few weeks before her 9/28 appointment. If she can, she will upload to [employer] prior to the appointment.     No further questions at this time.

## 2021-09-28 ENCOUNTER — OFFICE VISIT (OUTPATIENT)
Dept: NEUROSURGERY | Facility: CLINIC | Age: 71
End: 2021-09-28
Payer: MEDICARE

## 2021-09-28 VITALS
HEART RATE: 75 BPM | DIASTOLIC BLOOD PRESSURE: 64 MMHG | SYSTOLIC BLOOD PRESSURE: 103 MMHG | OXYGEN SATURATION: 94 % | RESPIRATION RATE: 16 BRPM

## 2021-09-28 DIAGNOSIS — M79.2 INTRACTABLE NEUROPATHIC PAIN OF LUMBOSACRAL ORIGIN: Primary | ICD-10-CM

## 2021-09-28 DIAGNOSIS — S24.109D SPINAL CORD INJURY OF THORACIC REGION WITHOUT BONE INJURY, SUBSEQUENT ENCOUNTER (H): ICD-10-CM

## 2021-09-28 PROCEDURE — 99214 OFFICE O/P EST MOD 30 MIN: CPT | Performed by: NEUROLOGICAL SURGERY

## 2021-09-28 ASSESSMENT — PAIN SCALES - GENERAL: PAINLEVEL: MODERATE PAIN (5)

## 2021-09-28 NOTE — LETTER
9/28/2021       RE: Anayeli Gagnon  [address] [address] [place] [id number]     Dear Colleague,    Thank you for referring your patient, Anayeli Gagnon, to the [facility] NEUROSURGERY CLINIC [place] at [facility]. Please see a copy of my visit note below.    Neurosurgery Clinic Note    Reason for Visit: chronic pain    History of Present Illness  Anayeli Gagnon is a 70-year-old woman with a complex history including most recently chronic pain with spinal cord injury with both central and peripheral components who is s/p spinal cord stimulator placement. Despite increasing the current and changing the settings, she has not noticed much of a difference. She believes that all of the settings make her worse. On testing in clinic, she is unable to tell if the stimulator is on or off at any intensity. There are no impedance issues.       Allergies   Allergen Reactions     Contrast Dye Rash     Painful rash after x-ray contrast       Current Outpatient Medications   Medication     acetaminophen (TYLENOL) 500 MG tablet     baclofen (LIORESAL) 10 MG tablet     gabapentin (NEURONTIN) 600 MG tablet     lidocaine, Anorectal, 5 % CREA     LORazepam (ATIVAN) 0.5 MG tablet     melatonin 3 MG tablet     metoprolol succinate ER (TOPROL-XL) 25 MG 24 hr tablet     morphine (MS CONTIN) 15 MG CR tablet     NARCAN 4 MG/0.1ML nasal spray     nystatin (MYCOSTATIN) 691594 UNIT/GM external cream     oxyCODONE-acetaminophen (PERCOCET) 5-325 MG tablet     omeprazole (PRILOSEC) 20 MG DR capsule     simethicone (MYLICON) 80 MG chewable tablet     No current facility-administered medications for this visit.     Physical Exam  /64   Pulse 75   Resp 16   SpO2 94%       General: Awake, alert, oriented. Well nourished, well developed, no acute distress.  HEENT: Head normocephalic, atraumatic.    Neurological  Awake, alert and oriented to date, time, place and person.  Speech fluent.   Pupils equal, round, reactive to light.  Extraocular movement intact.  Visual fields are full on confrontation.  Hearing is grossly normal to finger rub.   Facial sensation intact.  Face symmetric.  Tongue midline.  Uvula elevates equally.    Motor: full strength throughout BUE. Paraplegia    ROS: 10 point ROS neg other than the symptoms noted above in the HPI.      Assessment and Plan   Anayeli Gagnon is a 70 year old female with spinal cord injury and severe intractable pain for which SCS has had zero effect. Despite seeing some motor activation at around 20 mA, she is unable to tell me if it is on or off at any current level. As a result, I believe that her pain is more centralized that the segmental level of the spinal cord. We discussed the various strategies moving forward, and I think the next best step would be to perform an intrathecal catheter trial for pain medication administration. In her case, this is more complicated than a simple injection since we will have to see if we slide the catheter up past the infarcted cord. We will need to do this as an inpatient stay. [facility].          Luis Orourke MD  Department of Neurosurgery  [facility]

## 2021-09-28 NOTE — PROGRESS NOTES
Neurosurgery Clinic Note    Reason for Visit: chronic pain    History of Present Illness  Anayeli Gagnon is a 70-year-old woman with a complex history including most recently chronic pain with spinal cord injury with both central and peripheral components who is s/p spinal cord stimulator placement. Despite increasing the current and changing the settings, she has not noticed much of a difference. She believes that all of the settings make her worse. On testing in clinic, she is unable to tell if the stimulator is on or off at any intensity. There are no impedance issues.       Allergies   Allergen Reactions     Contrast Dye Rash     Painful rash after x-ray contrast       Current Outpatient Medications   Medication     acetaminophen (TYLENOL) 500 MG tablet     baclofen (LIORESAL) 10 MG tablet     gabapentin (NEURONTIN) 600 MG tablet     lidocaine, Anorectal, 5 % CREA     LORazepam (ATIVAN) 0.5 MG tablet     melatonin 3 MG tablet     metoprolol succinate ER (TOPROL-XL) 25 MG 24 hr tablet     morphine (MS CONTIN) 15 MG CR tablet     NARCAN 4 MG/0.1ML nasal spray     nystatin (MYCOSTATIN) 992048 UNIT/GM external cream     oxyCODONE-acetaminophen (PERCOCET) 5-325 MG tablet     omeprazole (PRILOSEC) 20 MG DR capsule     simethicone (MYLICON) 80 MG chewable tablet     No current facility-administered medications for this visit.             Physical Exam  /64   Pulse 75   Resp 16   SpO2 94%       General: Awake, alert, oriented. Well nourished, well developed, no acute distress.  HEENT: Head normocephalic, atraumatic.    Neurological  Awake, alert and oriented to date, time, place and person. Speech fluent.   Pupils equal, round, reactive to light.  Extraocular movement intact.  Visual fields are full on confrontation.  Hearing is grossly normal to finger rub.   Facial sensation intact.  Face symmetric.  Tongue midline.  Uvula elevates equally.    Motor: full strength throughout BUE. Paraplegia    ROS: 10  point ROS neg other than the symptoms noted above in the HPI.      Assessment and Plan   Anayeli Gagnon is a 70 year old female with spinal cord injury and severe intractable pain for which SCS has had zero effect. Despite seeing some motor activation at around 20 mA, she is unable to tell me if it is on or off at any current level. As a result, I believe that her pain is more centralized that the segmental level of the spinal cord. We discussed the various strategies moving forward, and I think the next best step would be to perform an intrathecal catheter trial for pain medication administration. In her case, this is more complicated than a simple injection since we will have to see if we slide the catheter up past the infarcted cord. We will need to do this as an inpatient stay. We will perform this with Dr. Espinoza in the [facility] OR.          Luis Orourke MD  Department of Neurosurgery  [facility]

## 2021-09-29 ENCOUNTER — TELEPHONE (OUTPATIENT)
Dept: NEUROSURGERY | Facility: CLINIC | Age: 71
End: 2021-09-29

## 2021-09-29 DIAGNOSIS — F32.A DEPRESSION: Primary | ICD-10-CM

## 2021-09-29 NOTE — CONFIDENTIAL NOTE
Called pt to discuss behavioral health referral that she and Dr. Orourke talked about yesterday. [facility], pt's preferred location, is only doing virtual appointments right now. Pt said that she would prefer in-person but she is open to virtual. Asked her if she would like to be scheduled virtually at [facility] so that if they do open to in-person appointments she can continue with that provider in person, or if she is open to any location. Pt said she would prefer to start with virtual appointments at [facility]. We will enter the referral and then pt will get a call from the intake team. Pt expressed understanding. No further questions.

## 2021-10-05 ENCOUNTER — TELEPHONE (OUTPATIENT)
Dept: NEUROSURGERY | Facility: CLINIC | Age: 71
End: 2021-10-05

## 2021-10-05 NOTE — TELEPHONE ENCOUNTER
"Pt with hx of chronic pain with spinal cord injury with both central and peripheral components, with spinal cord stimulator placement by Dr. Orourke on 4/7/21.     Pt says the ongoing pain in her feet, legs, groin and back feels worse. She rates the pain as 7-8/10 off Percocet and Tylenol and 4-5/10 off medication. The pain is constant and is enough for her to \"not want to do anything.\"     Behavioral health intake called pt to make an appt, but she declined b/c the appts are virtual. I reminded pt that she and I discussed this on 9/29 and she said she was open to a virtual visit even though she prefers in-person. I offered to follow-up with intake for an in-person visit, but she then said she is open to trying virtual, and if she doesn't like it she will let me know.     I spoke with Dr. Orourke and let pt know he is still working with the hospital on doing an intrathecal pain pump trial. Pt understands that may move slowly. Dr. Orourke suggested medical cannabis program if pt is interested. Pt said she is very open to this and would like to enroll. I will follow-up with Dr. Orourke about this.     Pt has my direct number and is welcome to call. No further questions at this time.       "

## 2021-10-05 NOTE — TELEPHONE ENCOUNTER
Health Call Center    Phone Message    May a detailed message be left on voicemail: yes     Reason for Call: Symptoms or Concerns     If patient has red-flag symptoms, warm transfer to triage line    Current symptom or concern: Pain in Pt's back, feet, groin, and legs    Symptoms have been present for:  Unknown month(s)    Has patient previously been seen for this? Yes    By Dr. Orourke    Are there any new or worsening symptoms? Yes: Pt reports pain is getting worse and is unbearable.    Please call Pt back to discuss.      Action Taken: Message routed to:  [facility] Neurosurgery    Travel Screening: Not Applicable

## 2021-10-05 NOTE — TELEPHONE ENCOUNTER
Notified pt that Dr. Orourke registered her for the medical cannabis program. The program will only communicate via email, so pt/spouse will have to check email (and check spam/junk folder) for messages and next steps. Pt expressed understanding. Nothing further at this time.

## 2021-10-21 ENCOUNTER — TELEPHONE (OUTPATIENT)
Dept: NEUROSURGERY | Facility: CLINIC | Age: 71
End: 2021-10-21

## 2021-10-21 NOTE — TELEPHONE ENCOUNTER
[facility] Call Center    Phone Message    May a detailed message be left on voicemail: yes     Reason for Call: Other: Patient is rquesting a call back to discuss patient pain have gotten worse, please call patient at [phone] to advise.     Action Taken: Message routed to:  [facility] Neurosurgery    Travel Screening: Not Applicable

## 2021-10-21 NOTE — TELEPHONE ENCOUNTER
Return call to Annamarie, Annamarie had several questions regarding the pain pump.  Annamarie wants specific pain pump information and how long the hospital stay is for the pain pump.    Scheduled for 12/1 and states 4 day hospital stay.  Will have Cris RN return call on Monday with update.    Patient states unable to access email until later this week to check on Medical Cannabis.  Explained she has been certified with [facility] Department of Health and she is notified through email.  The program will only communicate via email, so pt/spouse will have to check email (and check spam/junk folder) for messages and next steps.  Recommended checking email asap for more information.    Note sent to Cris for a callback on Monday with Pain Pump specific information.      Voices understanding, patient has my name and number if needed prior to Monday.

## 2021-10-23 ENCOUNTER — HEALTH MAINTENANCE LETTER (OUTPATIENT)
Age: 71
End: 2021-10-23

## 2021-10-25 NOTE — CONFIDENTIAL NOTE
Pt is requesting a referral to the [facility] before she commits to the pain pump trial. In reviewing Dr. Orourke's 9/28/21 progress note, I do not see a reference to the referral. I am not sure if he had neurosurgery or pain management in mind. I will follow-up with Dr. Orourke and fax the appropriate referral to [facility]. Pt in agreement with plan. No further questions at this time.

## 2021-10-27 NOTE — TELEPHONE ENCOUNTER
FUTURE VISIT INFORMATION      SURGERY INFORMATION:    Date: 21    [facility]    Surgeon:  Luis Orourke MD Goel, Vasudha, MD    Anesthesia Type:  mac    Procedure: INSERTION, INTRATHECAL ANALGESIC PUMP, externalized trial INSERTION, INTRATHECAL ANALGESIC PUMP, externalized trial    Consult: ov     RECORDS REQUESTED FROM:       Primary Care Provider: Luma Vogel APRN, CNP  - [facility]    Most recent EKG+ Tracin21- [facility]    Most recent ECHO: 17    Most recent Cardiac Stress Test: 10/3/2007

## 2021-10-28 ENCOUNTER — TELEPHONE (OUTPATIENT)
Dept: NEUROSURGERY | Facility: CLINIC | Age: 71
End: 2021-10-28

## 2021-10-28 NOTE — TELEPHONE ENCOUNTER
Patient is wanting a second opinion at [facility], and is addiment on receiving a referral from [facility]. I did tell the patient we do not send referrals for second opinions and the only time a referral to a outside sources is for additional healthcare. I also stated that if that is where she wanted her second opinion that her PCP would be able to help with referral. Patients surgery will be postponed until further notice.      Vel Franklin on 10/28/2021 at 11:33 AM

## 2021-11-01 ENCOUNTER — DOCUMENTATION ONLY (OUTPATIENT)
Dept: NEUROSURGERY | Facility: CLINIC | Age: 71
End: 2021-11-01

## 2021-11-01 NOTE — PROGRESS NOTES
Pt requested a 2nd opinion referral to [facility] re pain pump trial/implant. Referral and clinical documentation faxed to [facility] at [phone]

## 2021-11-09 ENCOUNTER — TELEPHONE (OUTPATIENT)
Dept: NEUROSURGERY | Facility: CLINIC | Age: 71
End: 2021-11-09
Payer: MEDICARE

## 2021-11-09 NOTE — TELEPHONE ENCOUNTER
[facility] Call Center    Phone Message    May a detailed message be left on voicemail: yes     Reason for Call: Other: Patient is requesting a call back to discuss [facility] need recent MRI imaging results, please send to [phone],  Please include Patient ID# [id number]. Please call patient at  [phone] for any questions.    Action Taken: Message routed to:  [facility] ([facility]): [facility] Neurosurgery    Travel Screening: Not Applicable

## 2021-11-15 ENCOUNTER — PRE VISIT (OUTPATIENT)
Dept: SURGERY | Facility: CLINIC | Age: 71
End: 2021-11-15

## 2021-12-14 ENCOUNTER — OFFICE VISIT (OUTPATIENT)
Dept: NEUROSURGERY | Facility: CLINIC | Age: 71
End: 2021-12-14
Payer: MEDICARE

## 2021-12-14 VITALS
SYSTOLIC BLOOD PRESSURE: 127 MMHG | DIASTOLIC BLOOD PRESSURE: 77 MMHG | HEART RATE: 72 BPM | RESPIRATION RATE: 16 BRPM | OXYGEN SATURATION: 93 %

## 2021-12-14 DIAGNOSIS — M79.2 INTRACTABLE NEUROPATHIC PAIN OF LUMBOSACRAL ORIGIN: ICD-10-CM

## 2021-12-14 DIAGNOSIS — M79.2 NEUROPATHIC PAIN: Primary | ICD-10-CM

## 2021-12-14 DIAGNOSIS — G89.29 OTHER CHRONIC PAIN: ICD-10-CM

## 2021-12-14 PROCEDURE — 99214 OFFICE O/P EST MOD 30 MIN: CPT | Performed by: NEUROLOGICAL SURGERY

## 2021-12-14 RX ORDER — LIDOCAINE 50 MG/G
1 PATCH TOPICAL DAILY PRN
COMMUNITY
Start: 2021-09-29

## 2021-12-14 RX ORDER — ONDANSETRON 4 MG/1
4 TABLET, FILM COATED ORAL EVERY 8 HOURS PRN
COMMUNITY
Start: 2021-10-27 | End: 2022-09-27

## 2021-12-14 RX ORDER — POLYETHYLENE GLYCOL 3350 17 G/17G
17 POWDER, FOR SOLUTION ORAL DAILY PRN
COMMUNITY

## 2021-12-14 ASSESSMENT — PAIN SCALES - GENERAL: PAINLEVEL: MODERATE PAIN (4)

## 2021-12-14 NOTE — LETTER
12/14/2021       RE: Anayeli Gagnon  [address] [address] [place] [id number]     Dear Colleague,    Thank you for referring your patient, Anayeli Gagnon, to the [facility] NEUROSURGERY CLINIC [place] at [facility]. Please see a copy of my visit note below.    Neurosurgery Clinic Note    Reason for Visit: Consideration for intrathecal therapy    History of Present Illness  Anayeli Gagnon is a 71-year-old woman with a complex history of spinal cord injury due to hematoma resulting in AIS a paraplegia and the development of progressively worsening groin and lower extremity pain.  She is a very complicated surgical history including what amounts to essentially complete laminectomies of the entire spine.  She underwent implantation of a spinal cord stimulator while we saw some effect on her lower extremities she found no benefit with it on or off or really much of any effect even not significant currents leading to the hypothesis that her pain was more centralized.  In a stepwise fashion we offered a trial of intrathecal therapy which is complicated by spinal canal stenosis from her myelomalacia but she wanted a second opinion first.  She returns with her  today to discuss the procedure further.  She has had no changes in her pain           Allergies   Allergen Reactions     Contrast Dye Rash     Painful rash after x-ray contrast       Current Outpatient Medications   Medication     acetaminophen (TYLENOL) 500 MG tablet     baclofen (LIORESAL) 10 MG tablet     gabapentin (NEURONTIN) 600 MG tablet     lidocaine (LIDODERM) 5 % patch     LORazepam (ATIVAN) 0.5 MG tablet     medical cannabis (Patient's own supply)     melatonin 3 MG tablet     metoprolol succinate ER (TOPROL-XL) 25 MG 24 hr tablet     morphine (MS CONTIN) 15 MG CR tablet     NARCAN 4 MG/0.1ML nasal spray     nystatin (MYCOSTATIN) 563333 UNIT/GM external cream     ondansetron  (ZOFRAN) 4 MG tablet     oxyCODONE-acetaminophen (PERCOCET) 5-325 MG tablet     omeprazole (PRILOSEC) 20 MG DR capsule     polyethylene glycol (MIRALAX) 17 GM/Dose powder     simethicone (MYLICON) 80 MG chewable tablet     No current facility-administered medications for this visit.             Physical Exam  /77   Pulse 72   Resp 16   SpO2 93%       General: Awake, alert, oriented. Well nourished, well developed, no acute distress.  HEENT: Head normocephalic, atraumatic.   Neurological  Awake, alert and oriented to date, time, place and person. Speech fluent.   Pupils equal, round, reactive to light.  Extraocular movement intact.  Visual fields are full on confrontation.  Hearing is grossly normal to finger rub.   Facial sensation intact.  Face symmetric.  Tongue midline.  Uvula elevates equally.    Motor: thoracic paraplegia  Sensation: sensory line present, insensate in ble    ROS: 10 point ROS neg other than the symptoms noted above in the HPI.      Assessment and Plan   Anayeli Gagnon is a 71 year old female with spinal cord injury and intractable pain who failed spinal cord stimulation and is interested in moving forward with an inpatient trial of intrathecal therapy.  Dr. Espinoza and I have discussed her case at length and despite the significant difficulty percutaneous placement of an intrathecal catheter with her existing spinal canal stenosis due to myelomalacia we believe it to be the appropriate next step in the treatment of her chronic pain.  We will do this case together in the hospital in the OR or IR suite.  We will not require anesthesia and can do this with just local.  We will use a [employer] pump externalized for 3 to 5 days to see if we see significant improvement and will target therapy towards the cervical cord where projections onto the midbrain may prove amenable to intrathecal therapy.  We extensively discussed the risks and potential benefits as well as alternatives for the  surgery.  We specifically talked about the possibility that the catheter would not pass from the lumbar approach, hematoma, hemorrhage, infection, poor efficacy.  She and her  understand the difficulty but would like to move forward..  We will try to get her scheduled as soon as possible.      Luis Orourke MD  Department of Neurosurgery  [facility]        Again, thank you for allowing me to participate in the care of your patient.      Sincerely,    Luis Orourke MD

## 2021-12-14 NOTE — NURSING NOTE
Chief Complaint   Patient presents with     RECHECK     Pain pump trial discussion     Carlos Concepcion

## 2021-12-15 ENCOUNTER — TELEPHONE (OUTPATIENT)
Dept: NEUROSURGERY | Facility: CLINIC | Age: 71
End: 2021-12-15

## 2021-12-15 NOTE — TELEPHONE ENCOUNTER
Called patient to schedule surgery with Dr. Orourke and Dr. Espinoza. Patients covid test has been scheduled at [facility] and pre op will be taken care of with PAC via video in the 30 day time frame requirement. Patient is aware that pre op nurse will be calling 2-3 days prior to confirm arrival time and instructions. Packet to be sent out within next few days.    Surgeon: Dr. Orourke   Date of Surgery: 1/19/2022  Location of surgery: [facility] OR  Pre-Op H&P: 1/7/2022  Post-Op Appt Date: 2/1/2022   Imaging needed:  No  Discussed COVID-19 testing:  Yes  Pre-cert/Authorization completed:  Yes  Patient aware that pre-op RN will call 2-3 days prior to surgery with arrival time and instructions Yes  Packet sent out: No 12/15/21  Patient was instructed to review packet and call back with any questions or concerns.       Vel Franklin on 12/15/2021 at 2:08 PM

## 2021-12-16 NOTE — TELEPHONE ENCOUNTER
FUTURE VISIT INFORMATION      SURGERY INFORMATION:    Date: 22    [facility]    Surgeon:  Luis Orourke MD Goel, Vasudha, MD    Anesthesia Type:  MAC    Procedure: INSERTION, INTRATHECAL ANALGESIC PUMP, externalized trial    RECORDS REQUESTED FROM:       Primary Care Provider: Luma Vogel APRN,CNP- [facility]    Pertinent Medical History: hypertension, MVP, Palpitations, Tachycardia    Most recent EKG+ Tracin21- [facility]    Most recent ECHO:17    Most recent Cardiac Stress Test: 16- [facility]

## 2021-12-22 DIAGNOSIS — Z11.59 ENCOUNTER FOR SCREENING FOR OTHER VIRAL DISEASES: ICD-10-CM

## 2022-01-02 NOTE — PROGRESS NOTES
Neurosurgery Clinic Note    Reason for Visit: Consideration for intrathecal therapy    History of Present Illness  Anayeli Gagnon is a 71-year-old woman with a complex history of spinal cord injury due to hematoma resulting in AIS a paraplegia and the development of progressively worsening groin and lower extremity pain.  She is a very complicated surgical history including what amounts to essentially complete laminectomies of the entire spine.  She underwent implantation of a spinal cord stimulator while we saw some effect on her lower extremities she found no benefit with it on or off or really much of any effect even not significant currents leading to the hypothesis that her pain was more centralized.  In a stepwise fashion we offered a trial of intrathecal therapy which is complicated by spinal canal stenosis from her myelomalacia but she wanted a second opinion first.  She returns with her  today to discuss the procedure further.  She has had no changes in her pain           Allergies   Allergen Reactions     Contrast Dye Rash     Painful rash after x-ray contrast       Current Outpatient Medications   Medication     acetaminophen (TYLENOL) 500 MG tablet     baclofen (LIORESAL) 10 MG tablet     gabapentin (NEURONTIN) 600 MG tablet     lidocaine (LIDODERM) 5 % patch     LORazepam (ATIVAN) 0.5 MG tablet     medical cannabis (Patient's own supply)     melatonin 3 MG tablet     metoprolol succinate ER (TOPROL-XL) 25 MG 24 hr tablet     morphine (MS CONTIN) 15 MG CR tablet     NARCAN 4 MG/0.1ML nasal spray     nystatin (MYCOSTATIN) 101870 UNIT/GM external cream     ondansetron (ZOFRAN) 4 MG tablet     oxyCODONE-acetaminophen (PERCOCET) 5-325 MG tablet     omeprazole (PRILOSEC) 20 MG DR capsule     polyethylene glycol (MIRALAX) 17 GM/Dose powder     simethicone (MYLICON) 80 MG chewable tablet     No current facility-administered medications for this visit.             Physical Exam  /77   Pulse  72   Resp 16   SpO2 93%       General: Awake, alert, oriented. Well nourished, well developed, no acute distress.  HEENT: Head normocephalic, atraumatic.   Neurological  Awake, alert and oriented to date, time, place and person. Speech fluent.   Pupils equal, round, reactive to light.  Extraocular movement intact.  Visual fields are full on confrontation.  Hearing is grossly normal to finger rub.   Facial sensation intact.  Face symmetric.  Tongue midline.  Uvula elevates equally.    Motor: thoracic paraplegia  Sensation: sensory line present, insensate in ble    ROS: 10 point ROS neg other than the symptoms noted above in the HPI.      Assessment and Plan   Anayeli Gagnon is a 71 year old female with spinal cord injury and intractable pain who failed spinal cord stimulation and is interested in moving forward with an inpatient trial of intrathecal therapy.  Dr. Espinoza and I have discussed her case at length and despite the significant difficulty percutaneous placement of an intrathecal catheter with her existing spinal canal stenosis due to myelomalacia we believe it to be the appropriate next step in the treatment of her chronic pain.  We will do this case together in the hospital in the OR or IR suite.  We will not require anesthesia and can do this with just local.  We will use a [employer] pump externalized for 3 to 5 days to see if we see significant improvement and will target therapy towards the cervical cord where projections onto the midbrain may prove amenable to intrathecal therapy.  We extensively discussed the risks and potential benefits as well as alternatives for the surgery.  We specifically talked about the possibility that the catheter would not pass from the lumbar approach, hematoma, hemorrhage, infection, poor efficacy.  She and her  understand the difficulty but would like to move forward..  We will try to get her scheduled as soon as possible.      Luis Orourke MD  Department of  Neurosurgery  [facility]

## 2022-01-06 ENCOUNTER — ANESTHESIA EVENT (OUTPATIENT)
Dept: SURGERY | Facility: CLINIC | Age: 72
DRG: 041 | End: 2022-01-06
Payer: MEDICARE

## 2022-01-06 RX ORDER — NYSTATIN 100000 [USP'U]/G
POWDER TOPICAL 3 TIMES DAILY PRN
COMMUNITY
End: 2022-09-27

## 2022-01-06 NOTE — PATIENT INSTRUCTIONS
Preparing for Your Surgery      Name:  Anayeli Gagnon   MRN:  [id number]   :  1950   Today's Date:  2022       Arriving for surgery:  Surgery date:  22  Arrival time:  5:30 am    Restrictions due to COVID 19:       One visitor is allowed in the Pre Op area.       When you go into surgery, one visitor is allowed to wait in the Surgery Waiting Room       (provided there is enough space to social distance).         In hospital patients are allowed 1 visitor per day       The visitor may change daily     Visiting Hours: 8 am - 8:30 pm   No ill visitors.   All visitors must wear face mask.    [employer] parking is available for anyone with mobility limitations or disabilities.  ([place]  24 hours/ 7 days a week; [facility]  7 am- 3:30 pm, Mon- Fri)    Please come to:     [facility] [address]  [address] [place] [address]    - ? parking is available in front of the hospital      -    Please proceed to [address] on the 3rd floor. [phone]?     - ?If you are in need of directions, wheelchair or escort please stop at the Information Desk in the lobby.  Inform the information person that you are here for surgery; a wheelchair and escort to [address] will be provided.?     What can I eat or drink?  -  You may eat and drink normally for up to 8 hours before your surgery. (Until 11:30 pm)  -  You may have clear liquids until 2 hours before surgery. (Until 5:30 am)    Examples of clear liquids:  Water  Clear broth  Juices (apple, white grape, white cranberry  and cider) without pulp  Noncarbonated, powder based beverages  (lemonade and Tushar-Aid)  Sodas (Sprite, 7-Up, ginger ale and seltzer)  Coffee or tea (without milk or cream)  Gatorade    -  No Alcohol for at least 24 hours before surgery     Which medicines can I take?    Hold Aspirin for 7 days before surgery.   Hold Multivitamins for 7 days before surgery.  Hold Supplements for 7  days before surgery.  Hold Ibuprofen (Advil, Motrin) for 1 day before surgery--unless otherwise directed by surgeon.  Hold Naproxen (Aleve) for 4 days before surgery.    **Hold medical cannabis 24 hours before surgery.**    -  DO NOT take these medications the day of surgery:  Miralax    -  PLEASE TAKE these medications the day of surgery:  Acetaminophen (Tylenol)  Baclofen (Lioresal)  Gabapentin (Neurontin)  Lidocaine (Lidoderm) patch  Lorazepam (Ativan)  Metoprolol (Toprol)  Morphine (MS Contin)  Ondansetron (Zofran)  Oxycodone-Acetaminophen (Percocet)    How do I prepare myself?  - Please take 2 showers before surgery using Scrubcare or Hibiclens soap.    Use this soap only from the neck to your toes.     Leave the soap on your skin for one minute--then rinse thoroughly.      You may use your own shampoo and conditioner; no other hair products.   - Please remove all jewelry and body piercings.  - No lotions, deodorants or fragrance.  - No makeup or fingernail polish.   - Bring your ID and insurance card.    -If you have a Deep Brain Stimulator, Spinal Cord Stimulator or any neuro stimulator device---you must bring the remote control to the hospital     - All patients are required to have a Covid-19 test within 4 days of surgery/procedure.      -Patients will be contacted by the [facility] scheduling team within 1 week of surgery to make an appointment.      - Patients may call the Scheduling team at [phone] if they have not been scheduled within 4 days of  surgery.      Questions or Concerns:    - For any questions regarding the day of surgery or your hospital stay, please contact the Pre Admission Nursing Office at [phone].       - If you have health changes between today and your surgery please call your surgeon.       For questions after surgery please call your surgeons office.

## 2022-01-06 NOTE — PHARMACY - PREOPERATIVE ASSESSMENT CENTER
Preoperative Assessment Center Medication History Note    Medication history completed on January 6, 2022 by this writer. See Epic admission navigator for prior to admission medications. Operating room staff will still need to confirm medications and last dose information on day of surgery.     Medication history interview sources  Patient interview: Yes  Care Everywhere records: Yes  Surescripts pharmacy refill records: Yes  Other (if applicable):     Changes made to PTA medication list  Added: mvi, biofreeze, theanine,   Deleted: omeprazole, gasX,   Changed: gabapentin dose, miralax sig, zofran, nystatin dosage form, lidocaine patch, melatonin.     Additional medication history information (including reliability of information, actions taken by pharmacist):    -- Note patient is on chronic opioid therapy. Daily Oral morphine equivalent ~ 60 mg OME/day.  Dr. Espinoza pain speciailist is co-surgeon and will be manipulating opioid dosing in IT pump. Did not develop any formal plan for postop pain management and will defer to Dr. Espinoza and inpatient team.   -- Patient declines being on any other prescription or over-the-counter medications    Prior to Admission medications    Medication Sig Last Dose Taking? Auth Provider   acetaminophen (TYLENOL) 500 MG tablet Take 500-1,000 mg by mouth every 6 hours as needed for mild pain Taking Yes Unknown, Entered By History   baclofen (LIORESAL) 10 MG tablet Take 10 mg by mouth 3 times daily as needed for muscle spasms Taking Yes Unknown, Entered By History   gabapentin (NEURONTIN) 600 MG tablet Take 1,200 mg by mouth 3 times daily  Taking Yes Luis Orourke MD   lidocaine (LIDODERM) 5 % patch Apply 1 patch topically daily as needed  Taking Yes Reported, Patient   LORazepam (ATIVAN) 0.5 MG tablet Take 1 tablet by mouth daily as needed for anxiety  Taking Yes Reported, Patient   medical cannabis (Patient's own supply) See Admin Instructions (The purpose of this order is to document  that the patient reports taking medical cannabis.  This is not a prescription, and is not used to certify that the patient has a qualifying medical condition.)     Liquid oral formulation. Taking Yes Reported, Patient   melatonin 3 MG tablet Take 3 mg by mouth nightly as needed  Taking Yes Reported, Patient   Menthol, Topical Analgesic, (BIOFREEZE) 4 % GEL Externally apply topically daily as needed Taking Yes Unknown, Entered By History   metoprolol succinate ER (TOPROL-XL) 25 MG 24 hr tablet Take 12.5 mg by mouth every evening  Taking Yes Unknown, Entered By History   morphine (MS CONTIN) 15 MG CR tablet Take 15 mg by mouth every 12 hours Taking Yes Unknown, Entered By History   Multiple Vitamins-Minerals (WOMENS MULTI PO) Take 1 tablet by mouth daily Taking Yes Unknown, Entered By History   NARCAN 4 MG/0.1ML nasal spray INHALE ONE SPRAY IN ONE NOSTRIL AS NEEDED FOR OPIOID. ANTIDOTE, MAY REPEAT IN OTHER NOSTRIL IF NEEDED Taking Yes Reported, Patient   nystatin (NYSTOP) 659843 UNIT/GM external powder Apply topically 3 times daily as needed Taking Yes Unknown, Entered By History   ondansetron (ZOFRAN) 4 MG tablet Take 4 mg by mouth every 8 hours as needed for nausea  Taking Yes Reported, Patient   oxyCODONE-acetaminophen (PERCOCET) 5-325 MG tablet Take 1-2 tablets by mouth every 6 hours as needed for severe pain Max 4/day Taking Yes Reported, Patient   polyethylene glycol (MIRALAX) 17 GM/Dose powder Take 17 g by mouth daily as needed for constipation  Taking Yes Reported, Patient   THEANINE PO Take 1 tablet by mouth daily as needed (takes when she remembers) Taking Yes Unknown, Entered By History          Medication history completed by: Gilberto Wilkinson [facility]

## 2022-01-07 ENCOUNTER — VIRTUAL VISIT (OUTPATIENT)
Dept: SURGERY | Facility: CLINIC | Age: 72
End: 2022-01-07
Payer: MEDICARE

## 2022-01-07 ENCOUNTER — ANESTHESIA EVENT (OUTPATIENT)
Dept: SURGERY | Facility: CLINIC | Age: 72
End: 2022-01-07

## 2022-01-07 ENCOUNTER — PRE VISIT (OUTPATIENT)
Dept: SURGERY | Facility: CLINIC | Age: 72
End: 2022-01-07
Payer: MEDICARE

## 2022-01-07 DIAGNOSIS — Z01.818 PREOP EXAMINATION: Primary | ICD-10-CM

## 2022-01-07 DIAGNOSIS — G89.4 CHRONIC PAIN SYNDROME: ICD-10-CM

## 2022-01-07 PROCEDURE — 99214 OFFICE O/P EST MOD 30 MIN: CPT | Mod: 95 | Performed by: PHYSICIAN ASSISTANT

## 2022-01-07 ASSESSMENT — LIFESTYLE VARIABLES: TOBACCO_USE: 0

## 2022-01-07 ASSESSMENT — PAIN SCALES - GENERAL: PAINLEVEL: MODERATE PAIN (4)

## 2022-01-07 ASSESSMENT — COPD QUESTIONNAIRES: COPD: 0

## 2022-01-07 ASSESSMENT — ENCOUNTER SYMPTOMS: SEIZURES: 0

## 2022-01-07 NOTE — H&P
Pre-Operative H & P     CC:  Preoperative exam to assess for increased cardiopulmonary risk while undergoing surgery and anesthesia.    Date of Encounter: 1/7/2022  Primary Care Physician:  Luma Vogel     Reason for visit:   Encounter Diagnoses   Name Primary?     Preop examination Yes     Chronic pain syndrome          HPI  Anayeli Gagnon is a 71 year old female who presents for pre-operative H & P in preparation for INSERTION, INTRATHECAL ANALGESIC PUMP, externalized trial  with Dr. Orourke and Dr. Espinoza on 1/19/22 at [facility].     Annamarie Gagnon is a 71 year old female with PMH significant for MVP, MRSA, spinal cord injury due to hematoma resulting in paraplegia and the development of progressively worsening groin and lower extremity pain, depression, anxiety, neurogenic bladder, colostomy status, SCS status (not in use).  Her significant pain is disabling and distressing.  She is on chronic narcotics and unfortunately failed a spinal cord stimulator.  She is interested in moving forward with an inpatient trial of intrathecal therapy.  The above procedure is planned.    History is obtained from the patient and chart review      Hx of abnormal bleeding or anti-platelet use: denies    Menstrual history: No LMP recorded. Patient is postmenopausal.:     Prior to Admission Medications  Current Outpatient Medications   Medication Sig Dispense Refill     acetaminophen (TYLENOL) 500 MG tablet Take 500-1,000 mg by mouth every 6 hours as needed for mild pain       baclofen (LIORESAL) 10 MG tablet Take 10 mg by mouth 3 times daily as needed for muscle spasms       gabapentin (NEURONTIN) 600 MG tablet Take 1,200 mg by mouth 3 times daily  1 tablet 0     lidocaine (LIDODERM) 5 % patch Apply 1 patch topically daily as needed        LORazepam (ATIVAN) 0.5 MG tablet Take 1 tablet by mouth daily as needed for anxiety        medical cannabis (Patient's own supply) See Admin  Instructions (The purpose of this order is to document that the patient reports taking medical cannabis.  This is not a prescription, and is not used to certify that the patient has a qualifying medical condition.)     Liquid oral formulation.       melatonin 3 MG tablet Take 3 mg by mouth nightly as needed        Menthol, Topical Analgesic, (BIOFREEZE) 4 % GEL Externally apply topically daily as needed       metoprolol succinate ER (TOPROL-XL) 25 MG 24 hr tablet Take 12.5 mg by mouth every evening        morphine (MS CONTIN) 15 MG CR tablet Take 15 mg by mouth every 12 hours       Multiple Vitamins-Minerals (WOMENS MULTI PO) Take 1 tablet by mouth daily       NARCAN 4 MG/0.1ML nasal spray INHALE ONE SPRAY IN ONE NOSTRIL AS NEEDED FOR OPIOID. ANTIDOTE, MAY REPEAT IN OTHER NOSTRIL IF NEEDED       nystatin (NYSTOP) 202922 UNIT/GM external powder Apply topically 3 times daily as needed       ondansetron (ZOFRAN) 4 MG tablet Take 4 mg by mouth every 8 hours as needed for nausea        oxyCODONE-acetaminophen (PERCOCET) 5-325 MG tablet Take 1-2 tablets by mouth every 6 hours as needed for severe pain Max 4/day       polyethylene glycol (MIRALAX) 17 GM/Dose powder Take 17 g by mouth daily as needed for constipation        THEANINE PO Take 1 tablet by mouth daily as needed (takes when she remembers)         Family History  Family History   Problem Relation Age of Onset     C.A.D. Father          41     Deep Vein Thrombosis (DVT) No family hx of      Anesthesia Reaction No family hx of          Anesthesia Pre-Procedure Evaluation    Patient: Anayeli Gagnon   MRN: [id number] : 1950        Preoperative Diagnosis: * No surgery found *    Procedure :   Video Visit       Past Medical History:   Diagnosis Date     CARDIAC DYSRHYTHMIAS [facility] 10/3/2007    Taking atenelol for years for this     History of skin cancer      Mitral valve prolapse      Neurogenic bladder      Sacral decubitus ulcer, stage IV (H)       Thoracic spinal cord injury (H)       Past Surgical History:   Procedure Laterality Date     DECOMPRESSION LUMBAR ONE LEVEL N/A 12/27/2019    Procedure: Posterior spinal decompression;  Surgeon: Alf Ritchie MD;  Location: [facility] OR     INSERT STIMULATOR AND LEADS INTERNAL DORSAL COLUMN N/A 4/7/2021    Procedure: Posterior thoracic 12 to Lumbar 1 level for placement of spinal cord stimulator paddle and placement of implantable pulse generator/battery over right buttock;  Surgeon: Luis Orourke MD;  Location: [facility] OR     IR SPINAL ANGIOGRAM  10/16/2019     IR SPINAL ANGIOGRAM  12/20/2019     LAPAROSCOPIC TUBAL LIGATION       REPAIR SPINAL ARERIOVENOUS MALFORMATION N/A 12/27/2019    Procedure: with surgical disconnection arterial venous fistula Thoracic 5;  Surgeon: Alf Ritchie MD;  Location: [facility] OR      Allergies   Allergen Reactions     Contrast Dye Rash     Painful rash after x-ray contrast      Social History     Tobacco Use     Smoking status: Never Smoker     Smokeless tobacco: Never Used   Substance Use Topics     Alcohol use: No      Wt Readings from Last 1 Encounters:   04/07/21 57.2 kg (126 lb)        Anesthesia Evaluation   Pt has had prior anesthetic.     No history of anesthetic complications     The complete review of systems is negative other than noted in the HPI or here.     ROS/MED HX  ENT/Pulmonary:  - neg pulmonary ROS  (-) tobacco use, asthma, COPD, sleep apnea and recent URI   Neurologic: Comment: Thoracic spinal cord injury, progressive paralysis, wheelchair bound    (+) peripheral neuropathy, - LEs, consistent w/ spinal issues.  (-) no seizures and no CVA   Cardiovascular: Comment: Has been taking metoprolol ~ 40 yrs for mitral prolapse (no documentation of MVP on current echo)    (+) -----Previous cardiac testing   Echo: Date: 2017 Results:  CONCLUSION:  1. Technically difficult study with poor acoustic windows.     2. The left ventricle is normal size with hyperdynamic  systolic function.  Ejection fraction is 70-75%.     3. The right ventricle is poorly visualized but appears to be normal size with normal systolic function.     4. Mild tricuspid regurgitation.      5. No significant pericardial effusion.     6. No prior echos available for comparison.      Stress Test:  Date: 6/19/21 Results:  IMPRESSIONS   Myocardial perfusion imaging is normal without evidence of infarct or   ischemia.     Stress test findings suggests that patient is low risk (<1% annual cardiac   mortality rate).       RISK ASSESSMENT:     Stress test findings suggests that patient is low risk    (<1% annual cardiac mortality rate).       ECG Reviewed:  Date: 12/25/21 Results:  Sinus rhythm    Cath:  Date: 2017 Results:  CONCLUSION  1) Normal LV systolic function, ejection fraction of 50%.     2) Angiographically normal coronary arteries.       METS/Exercise Tolerance: 1 - Eating, dressing Comment: Wheelchair bound   Hematologic:  - neg hematologic  ROS  (-) history of blood clots and history of blood transfusion   Musculoskeletal: Comment: Hx progressive paralysis and spinal cord injury due to an unclear etiology but involving spinal cord tethering and development of cord injury involving the mid to lower thoracic spinal cord.  She suffers from significant pain.     Arteriovenous fistula of spinal cord vessels      GI/Hepatic: Comment: Occasional GERD    Has colostomy   (-) liver disease   Renal/Genitourinary: Comment: Has hudson catheter 2/2 neurogenic bladder   (-) renal disease   Endo:  - neg endo ROS     Psychiatric/Substance Use: Comment: Taking MS Contin bid & percocet 4-6 tabs daily      (+) psychiatric history depression H/O chronic opiod use  (Morphine 15 mg PO q12, Percocet 5/325 q4.). : medical cannabis.    Infectious Disease:  - neg infectious disease ROS  (-) Recent Fever   Malignancy: Comment: BCC on lower extremity, neck and shoulder  (+) Malignancy, History of Skin.    Other: Comment: Hx  decubitus ulcer requiring debridement w/ skin flap     (+) , H/O Chronic Pain,other significant disability Wheelchair bound,             OUTSIDE LABS:  CBC:   Lab Results   Component Value Date    WBC 6.5 04/07/2021    WBC 7.7 03/31/2021    HGB 12.7 04/07/2021    HGB 13.0 03/31/2021    HCT 38.8 04/07/2021    HCT 39.6 03/31/2021     04/07/2021     03/31/2021     BMP:   Lab Results   Component Value Date     04/07/2021     03/31/2021    POTASSIUM 4.1 04/07/2021    POTASSIUM 4.4 03/31/2021    CHLORIDE 105 04/07/2021    CHLORIDE 108 03/31/2021    CO2 29 04/07/2021    CO2 31 03/31/2021    BUN 10 04/07/2021    BUN 13 03/31/2021    CR 0.58 04/07/2021    CR 0.61 03/31/2021    GLC 95 04/07/2021    GLC 77 03/31/2021     COAGS:   Lab Results   Component Value Date    PTT 28 04/07/2021    INR 0.98 04/07/2021     POC:   Lab Results   Component Value Date    BGM 91 04/07/2021     HEPATIC: No results found for: ALBUMIN, PROTTOTAL, ALT, AST, GGT, ALKPHOS, BILITOTAL, BILIDIRECT, DAVID  OTHER:   Lab Results   Component Value Date    PH 7.43 12/27/2019    LACT 1.4 12/27/2019    A1C 5.3 11/17/2011    ADAN 9.1 04/07/2021    PHOS 3.5 12/27/2019    MAG 2.0 12/27/2019    TSH 2.33 08/07/2010    SED 4 11/17/2011       Virtual visit -  No vitals were obtained       Physical Exam  Constitutional: Awake, alert, cooperative, no apparent distress, and appears stated age.  HENT: Normocephalic  Respiratory: non labored breathing   Neurologic: Awake, alert, oriented to name, place and time.   Neuropsychiatric: Calm, cooperative. Normal affect.         PRIOR LABS/DIAGNOSTIC STUDIES:   All labs and imaging personally reviewed         EKG/ stress test - if available please see in ROS above     12/25/21  Sodium 136 - 146 mmol/L 139    Potassium 3.5 - 5.1 mmol/L 4.4    Chloride 98 - 107 mmol/L 102    CO2 22 - 29 mmol/L 27    Anion Gap 10.0 - 20.0 mmol/L 14.4    Creatinine 0.57 - 1.11 mg/dL 0.71    Blood Urea Nitrogen 10.0 - 20.0  mg/dL 10.0    Calcium 8.6 - 10.5 mg/dL 9.0    Glucose 70 - 105 mg/dL 108 High     eGFR >=60 mL/min/1.73m(2) >60    Total Protein 6.0 - 8.0 g/dL 6.2    Albumin 3.4 - 4.8 g/dL 3.7    Globulin 2.0 - 3.5 g/dL 2.5    Albumin/Globulin Ratio 1.0 - 2.0 1.5    Aspartate Aminotransferase (AST) 5 - 41 U/L 17    Alanine Aminotransferase (ALT) 8 - 45 U/L 12    Alkaline Phosphatase 50 - 136 U/L 63    Bilirubin, Total 0.2 - 1.2 mg/dL 0.3    eCrCl (Rx) - Adults mL/min 63.2      WBC Count 4.5 - 11.0 10(3)/uL 5.7    RBC Count 4.00 - 5.20 10(6)/uL 4.19    Hemoglobin 12.0 - 16.0 g/dL 13.2    Hematocrit 33.0 - 51.0 % 40.7    MCV 80.0 - 100.0 fL 97.1    MCH 26.0 - 34.0 pg 31.5    MCHC 32.0 - 36.0 g/dL 32.4    RDW 11.5 - 15.5 % 13.0    Platelets 140 - 440 10(3)/uL 151    MPV 6.5 - 11.0 fL 10.1    % Neutrophils 42.0 - 72.0 % 71.1    % Lymphocytes 20.0 - 44.0 % 17.0 Low     % Monocytes 0.0 - 11.0 % 8.4    % Eosinophils <=7.0 % 2.8    % Basophils 0.0 - 3.0 % 0.7    % Immature Granulocytes % 0.4    Abs Neutrophils 1.7 - 7.0 10(3)/uL 4.0    Abs Lymphocytes 0.9 - 2.9 10(3)/uL 1.0    Abs Monocytes 0.0 - 0.9 10(3)/uL 0.5    Abs Eosinophils 0.0 - 0.5 10(3)/uL 0.2    Abs Basophils 0.0 - 0.2 10(3)/uL 0.0    Abs Immature Granulocytes 10(3)/uL 0.02              Outside records reviewed from: care everywhere      ASSESSMENT and PLAN  Anayeli Gagnon is a 71 year old female scheduled to undergo INSERTION, INTRATHECAL ANALGESIC PUMP, externalized trial with Dr. Orourke and Dr. Espinoza on 1/19/22 at [facility] for treatment of chronic pain, Spinal cord injury of thoracic region without bone injury, subsequent encounter (H); Thoracolumbar back pain; Neuropathic pain; Intractable neuropathic pain of lumbosacral origin; Incomplete injury of thoracic spinal cord in T1 to T6 region without bony injury (H); Edema of spinal cord (H); Chronic bilateral low back pain with bilateral sciatica; Arteriovenous fistula of  spinal cord vessels.      Pt has had prior anesthetic.     No history of anesthetic complications     She has the following specific operative considerations:   # BRADY 1/8 = low risk  # VTE risk: 0.5%  # Risk of PONV score = 3.  If > 2, anti-emetic intervention recommended.  # Note: pt has contrast dye allergy.  She broke out into painful red rash.        # Increased risk of postoperative nausea/vomiting: Recommend use of antiemetic agents in the perioperative period.      # On chronic opioids. Taking MS Contin bid & Percocet 4-6 tabs daily.    CARDIAC: METS 1, wheelchair bound      #  RCRI : Intermediate risk surgery.  0.4% risk of major adverse cardiac event.     #  Recent Lexiscan 6/19/21 without evidence of ischemia or infarct     #  Echo 2017:  EF 70-75%, normal function     #  Cath 2017: no CAD       PULMONARY:     # Never smoked    # Denies asthma or inhaler use    GI:     # Denies GERD    # Has colostomy    /RENAL:     # Has hudson catheter    ENDO: BMI ~24    # No DM    ORTHO:     # Mildly limited ROM of neck    # Hx progressive paralysis and spinal cord injury due to an unclear etiology but involving spinal cord tethering and development of cord injury involving the mid to lower thoracic spinal cord.  She suffers from significant pain. Failed SCS.  SCS located right lower back area.  She will bring remote DOS.    # Arteriovenous fistula of spinal cord vessels       # Non-ambulatory, Consideration for safe lifting techniques.           Patient is optimized and is acceptable candidate for the proposed procedure. No further diagnostic evaluation is needed.        ** Patient's visit was done virtually today.  A full physical exam was not completed.  Please refer to the physical examination documented by the anesthesiologist in the anesthesia record on the day of surgery. **        Video-Visit Details    Type of service:  Video Visit    Patient verbally consented to video service today: YES  After several  attempts and minutes trying to connect via [employer] as well as [employer], we decided to conduct our visit by telephone call    Telephone call length: 13 minutes    Originating Location (pt. Location): Home    Distant Location (provider location):      Mode of Communication:  telephone Conference via [employer]      On the day of service:     Prep time: 12 minutes  Visit time: 13 minutes  Documentation time: 15 minutes  ------------------------------------------  Total time: 40 minutes      Ashley Ritter PA-C  Preoperative Assessment Center  [facility]  [facility]  Phone: [phone]  Fax: [phone]

## 2022-01-07 NOTE — ANESTHESIA PREPROCEDURE EVALUATION
Anesthesia Pre-Procedure Evaluation    Patient: Anayeli Gagnon   MRN: [id number] : 1950        Preoperative Diagnosis: * No surgery found *    Procedure :   Video Visit       Past Medical History:   Diagnosis Date     CARDIAC DYSRHYTHMIAS [facility] 10/3/2007    Taking atenelol for years for this     History of skin cancer      Mitral valve prolapse      Neurogenic bladder      Sacral decubitus ulcer, stage IV (H)      Thoracic spinal cord injury (H)       Past Surgical History:   Procedure Laterality Date     DECOMPRESSION LUMBAR ONE LEVEL N/A 2019    Procedure: Posterior spinal decompression;  Surgeon: Alf Ritchie MD;  Location: [facility] OR     INSERT STIMULATOR AND LEADS INTERNAL DORSAL COLUMN N/A 2021    Procedure: Posterior thoracic 12 to Lumbar 1 level for placement of spinal cord stimulator paddle and placement of implantable pulse generator/battery over right buttock;  Surgeon: Luis Orourke MD;  Location: [facility] OR     IR SPINAL ANGIOGRAM  10/16/2019     IR SPINAL ANGIOGRAM  2019     LAPAROSCOPIC TUBAL LIGATION       REPAIR SPINAL ARERIOVENOUS MALFORMATION N/A 2019    Procedure: with surgical disconnection arterial venous fistula Thoracic 5;  Surgeon: Alf Ritchie MD;  Location: [facility] OR      Allergies   Allergen Reactions     Contrast Dye Rash     Painful rash after x-ray contrast      Social History     Tobacco Use     Smoking status: Never Smoker     Smokeless tobacco: Never Used   Substance Use Topics     Alcohol use: No      Wt Readings from Last 1 Encounters:   21 57.2 kg (126 lb)        Anesthesia Evaluation   Pt has had prior anesthetic.     No history of anesthetic complications       ROS/MED HX  ENT/Pulmonary:  - neg pulmonary ROS  (-) tobacco use, asthma, COPD, sleep apnea and recent URI   Neurologic: Comment: Thoracic spinal cord injury, progressive paralysis, wheelchair bound    (+) peripheral neuropathy, - LEs, consistent w/ spinal issues.  (-) no  seizures and no CVA   Cardiovascular: Comment: Has been taking metoprolol ~ 40 yrs for mitral prolapse (no documentation of MVP on current echo)    (+) -----Previous cardiac testing   Echo: Date: 2017 Results:  CONCLUSION:  1. Technically difficult study with poor acoustic windows.     2. The left ventricle is normal size with hyperdynamic systolic function.  Ejection fraction is 70-75%.     3. The right ventricle is poorly visualized but appears to be normal size with normal systolic function.     4. Mild tricuspid regurgitation.      5. No significant pericardial effusion.     6. No prior echos available for comparison.      Stress Test:  Date: 6/19/21 Results:  IMPRESSIONS   Myocardial perfusion imaging is normal without evidence of infarct or   ischemia.     Stress test findings suggests that patient is low risk (<1% annual cardiac   mortality rate).       RISK ASSESSMENT:     Stress test findings suggests that patient is low risk    (<1% annual cardiac mortality rate).       ECG Reviewed:  Date: 12/25/21 Results:  Sinus rhythm    Cath:  Date: 2017 Results:  CONCLUSION  1) Normal LV systolic function, ejection fraction of 50%.     2) Angiographically normal coronary arteries.       METS/Exercise Tolerance: 1 - Eating, dressing Comment: Wheelchair bound   Hematologic:  - neg hematologic  ROS  (-) history of blood clots and history of blood transfusion   Musculoskeletal: Comment: Hx progressive paralysis and spinal cord injury due to an unclear etiology but involving spinal cord tethering and development of cord injury involving the mid to lower thoracic spinal cord.  She suffers from significant pain.     Arteriovenous fistula of spinal cord vessels      GI/Hepatic: Comment: Occasional GERD    Has colostomy   (-) liver disease   Renal/Genitourinary: Comment: Has hudson catheter 2/2 neurogenic bladder   (-) renal disease   Endo:  - neg endo ROS     Psychiatric/Substance Use: Comment: Taking MS Contin bid & percocet  4-6 tabs daily      (+) psychiatric history depression H/O chronic opiod use  (Morphine 15 mg PO q12, Percocet 5/325 q4.). : medical cannabis.    Infectious Disease:  - neg infectious disease ROS  (-) Recent Fever   Malignancy: Comment: BCC on lower extremity, neck and shoulder  (+) Malignancy, History of Skin.    Other: Comment: Hx decubitus ulcer requiring debridement w/ skin flap     (+) , H/O Chronic Pain,other significant disability Wheelchair bound,             OUTSIDE LABS:  CBC:   Lab Results   Component Value Date    WBC 6.5 04/07/2021    WBC 7.7 03/31/2021    HGB 12.7 04/07/2021    HGB 13.0 03/31/2021    HCT 38.8 04/07/2021    HCT 39.6 03/31/2021     04/07/2021     03/31/2021     BMP:   Lab Results   Component Value Date     04/07/2021     03/31/2021    POTASSIUM 4.1 04/07/2021    POTASSIUM 4.4 03/31/2021    CHLORIDE 105 04/07/2021    CHLORIDE 108 03/31/2021    CO2 29 04/07/2021    CO2 31 03/31/2021    BUN 10 04/07/2021    BUN 13 03/31/2021    CR 0.58 04/07/2021    CR 0.61 03/31/2021    GLC 95 04/07/2021    GLC 77 03/31/2021     COAGS:   Lab Results   Component Value Date    PTT 28 04/07/2021    INR 0.98 04/07/2021     POC:   Lab Results   Component Value Date    BGM 91 04/07/2021     HEPATIC: No results found for: ALBUMIN, PROTTOTAL, ALT, AST, GGT, ALKPHOS, BILITOTAL, BILIDIRECT, DAVID  OTHER:   Lab Results   Component Value Date    PH 7.43 12/27/2019    LACT 1.4 12/27/2019    A1C 5.3 11/17/2011    ADAN 9.1 04/07/2021    PHOS 3.5 12/27/2019    MAG 2.0 12/27/2019    TSH 2.33 08/07/2010    SED 4 11/17/2011             PAC Discussion and Assessment    ASA Classification: 3  Case is suitable for: [place]  Anesthetic techniques and relevant risks discussed: MAC with GA as backup  Invasive monitoring and risk discussed: No    Possibility and Risk of blood transfusion discussed: No            PAC Resident/NP Anesthesia Assessment: Anayeli KARYN Gagnon is a 71 year old female scheduled  to undergo INSERTION, INTRATHECAL ANALGESIC PUMP, externalized trial with Dr. Orourke and Dr. Espinoza on 1/19/22 at [facility] for treatment of chronic pain, Spinal cord injury of thoracic region without bone injury, subsequent encounter (H); Thoracolumbar back pain; Neuropathic pain; Intractable neuropathic pain of lumbosacral origin; Incomplete injury of thoracic spinal cord in T1 to T6 region without bony injury (H); Edema of spinal cord (H); Chronic bilateral low back pain with bilateral sciatica; Arteriovenous fistula of spinal cord vessels.       Pt has had prior anesthetic.     No history of anesthetic complications     She has the following specific operative considerations:   # BRADY 1/8 = low risk  # VTE risk: 0.5%  # Risk of PONV score = 3.  If > 2, anti-emetic intervention recommended.  # Note: pt has contrast dye allergy.  She broke out into painful red rash.        # Increased risk of postoperative nausea/vomiting: Recommend use of antiemetic agents in the perioperative period.      # On chronic opioids. Taking MS Contin bid & Percocet 4-6 tabs daily.    CARDIAC: METS 1, wheelchair bound      #  RCRI : Intermediate risk surgery.  0.4% risk of major adverse cardiac event.     #  Recent Lexiscan 6/19/21 without evidence of ischemia or infarct     #  Echo 2017:  EF 70-75%, normal function     #  Cath 2017: no CAD       PULMONARY:     # Never smoked    # Denies asthma or inhaler use    GI:     # Denies GERD    # Has colostomy    /RENAL:     # Has hudson catheter    ENDO: BMI ~24    # No DM    ORTHO:     # Mildly limited ROM of neck    # Hx progressive paralysis and spinal cord injury due to an unclear etiology but involving spinal cord tethering and development of cord injury involving the mid to lower thoracic spinal cord.  She suffers from significant pain. Failed SCS.  SCS located right lower back area.  She will bring remote DOS.    # Arteriovenous fistula of  spinal cord vessels       # Non-ambulatory, Consideration for safe lifting techniques.           Patient is optimized and is acceptable candidate for the proposed procedure. No further diagnostic evaluation is needed.    Final plan per anesthesiologist on day of surgery.     Reviewed and Signed by PAC Mid-Level Provider/Resident  Mid-Level Provider/Resident: Ashley Ritter  Date: 1/7/22  Time: 6092                               Ashley Ritter PA-C

## 2022-01-13 ENCOUNTER — MEDICAL CORRESPONDENCE (OUTPATIENT)
Dept: HEALTH INFORMATION MANAGEMENT | Facility: CLINIC | Age: 72
End: 2022-01-13
Payer: MEDICARE

## 2022-01-17 ENCOUNTER — LAB (OUTPATIENT)
Dept: LAB | Facility: OTHER | Age: 72
End: 2022-01-17
Payer: MEDICARE

## 2022-01-17 DIAGNOSIS — Z11.59 ENCOUNTER FOR SCREENING FOR OTHER VIRAL DISEASES: ICD-10-CM

## 2022-01-17 PROCEDURE — U0003 INFECTIOUS AGENT DETECTION BY NUCLEIC ACID (DNA OR RNA); SEVERE ACUTE RESPIRATORY SYNDROME CORONAVIRUS 2 (SARS-COV-2) (CORONAVIRUS DISEASE [COVID-19]), AMPLIFIED PROBE TECHNIQUE, MAKING USE OF HIGH THROUGHPUT TECHNOLOGIES AS DESCRIBED BY CMS-2020-01-R: HCPCS

## 2022-01-17 PROCEDURE — U0005 INFEC AGEN DETEC AMPLI PROBE: HCPCS

## 2022-01-18 LAB — SARS-COV-2 RNA RESP QL NAA+PROBE: NEGATIVE

## 2022-01-19 ENCOUNTER — APPOINTMENT (OUTPATIENT)
Dept: GENERAL RADIOLOGY | Facility: CLINIC | Age: 72
DRG: 041 | End: 2022-01-19
Attending: NEUROLOGICAL SURGERY
Payer: MEDICARE

## 2022-01-19 ENCOUNTER — HOSPITAL ENCOUNTER (INPATIENT)
Facility: CLINIC | Age: 72
LOS: 2 days | Discharge: HOME | DRG: 041 | End: 2022-01-21
Attending: NEUROLOGICAL SURGERY | Admitting: NEUROLOGICAL SURGERY
Payer: MEDICARE

## 2022-01-19 ENCOUNTER — ANESTHESIA (OUTPATIENT)
Dept: SURGERY | Facility: CLINIC | Age: 72
DRG: 041 | End: 2022-01-19
Payer: MEDICARE

## 2022-01-19 DIAGNOSIS — M79.2 INTRACTABLE NEUROPATHIC PAIN OF LUMBOSACRAL ORIGIN: ICD-10-CM

## 2022-01-19 DIAGNOSIS — Z41.9 SURGERY, ELECTIVE: Primary | ICD-10-CM

## 2022-01-19 LAB
ANION GAP SERPL CALCULATED.3IONS-SCNC: 5 MMOL/L (ref 3–14)
APTT PPP: 30 SECONDS (ref 22–38)
BUN SERPL-MCNC: 13 MG/DL (ref 7–30)
CALCIUM SERPL-MCNC: 8.7 MG/DL (ref 8.5–10.1)
CHLORIDE BLD-SCNC: 109 MMOL/L (ref 94–109)
CO2 SERPL-SCNC: 28 MMOL/L (ref 20–32)
CREAT SERPL-MCNC: 0.58 MG/DL (ref 0.52–1.04)
ERYTHROCYTE [DISTWIDTH] IN BLOOD BY AUTOMATED COUNT: 13.2 % (ref 10–15)
GFR SERPL CREATININE-BSD FRML MDRD: >90 ML/MIN/1.73M2
GLUCOSE BLD-MCNC: 100 MG/DL (ref 70–99)
GLUCOSE BLDC GLUCOMTR-MCNC: 98 MG/DL (ref 70–99)
HCT VFR BLD AUTO: 39.2 % (ref 35–47)
HGB BLD-MCNC: 12.9 G/DL (ref 11.7–15.7)
INR PPP: 1.02 (ref 0.85–1.15)
MAGNESIUM SERPL-MCNC: 2 MG/DL (ref 1.6–2.3)
MCH RBC QN AUTO: 31.9 PG (ref 26.5–33)
MCHC RBC AUTO-ENTMCNC: 32.9 G/DL (ref 31.5–36.5)
MCV RBC AUTO: 97 FL (ref 78–100)
PHOSPHATE SERPL-MCNC: 3.4 MG/DL (ref 2.5–4.5)
PLATELET # BLD AUTO: 146 10E3/UL (ref 150–450)
POTASSIUM BLD-SCNC: 3.9 MMOL/L (ref 3.4–5.3)
POTASSIUM BLD-SCNC: 4 MMOL/L (ref 3.4–5.3)
RBC # BLD AUTO: 4.04 10E6/UL (ref 3.8–5.2)
SODIUM SERPL-SCNC: 142 MMOL/L (ref 133–144)
WBC # BLD AUTO: 5.8 10E3/UL (ref 4–11)

## 2022-01-19 PROCEDURE — 83735 ASSAY OF MAGNESIUM: CPT

## 2022-01-19 PROCEDURE — 85610 PROTHROMBIN TIME: CPT | Performed by: NEUROLOGICAL SURGERY

## 2022-01-19 PROCEDURE — 120N000002 HC R&B MED SURG/OB UMMC

## 2022-01-19 PROCEDURE — 82310 ASSAY OF CALCIUM: CPT | Performed by: NEUROLOGICAL SURGERY

## 2022-01-19 PROCEDURE — 00HU33Z INSERTION OF INFUSION DEVICE INTO SPINAL CANAL, PERCUTANEOUS APPROACH: ICD-10-PCS | Performed by: NEUROLOGICAL SURGERY

## 2022-01-19 PROCEDURE — 250N000009 HC RX 250: Performed by: NEUROLOGICAL SURGERY

## 2022-01-19 PROCEDURE — 36415 COLL VENOUS BLD VENIPUNCTURE: CPT

## 2022-01-19 PROCEDURE — 62362 IMPLANT SPINE INFUSION PUMP: CPT | Mod: 51 | Performed by: NEUROLOGICAL SURGERY

## 2022-01-19 PROCEDURE — 36415 COLL VENOUS BLD VENIPUNCTURE: CPT | Performed by: NEUROLOGICAL SURGERY

## 2022-01-19 PROCEDURE — 85730 THROMBOPLASTIN TIME PARTIAL: CPT | Performed by: NEUROLOGICAL SURGERY

## 2022-01-19 PROCEDURE — 250N000011 HC RX IP 250 OP 636

## 2022-01-19 PROCEDURE — 999N000141 HC STATISTIC PRE-PROCEDURE NURSING ASSESSMENT: Performed by: NEUROLOGICAL SURGERY

## 2022-01-19 PROCEDURE — 84100 ASSAY OF PHOSPHORUS: CPT

## 2022-01-19 PROCEDURE — 84132 ASSAY OF SERUM POTASSIUM: CPT

## 2022-01-19 PROCEDURE — 250N000013 HC RX MED GY IP 250 OP 250 PS 637

## 2022-01-19 PROCEDURE — C1772 INFUSION PUMP, PROGRAMMABLE: HCPCS | Performed by: NEUROLOGICAL SURGERY

## 2022-01-19 PROCEDURE — 250N000011 HC RX IP 250 OP 636: Performed by: ANESTHESIOLOGY

## 2022-01-19 PROCEDURE — C1897 LEAD, NEUROSTIM TEST KIT: HCPCS | Performed by: NEUROLOGICAL SURGERY

## 2022-01-19 PROCEDURE — 250N000011 HC RX IP 250 OP 636: Performed by: NEUROLOGICAL SURGERY

## 2022-01-19 PROCEDURE — 250N000013 HC RX MED GY IP 250 OP 250 PS 637: Performed by: PHYSICIAN ASSISTANT

## 2022-01-19 PROCEDURE — 360N000084 HC SURGERY LEVEL 4 W/ FLUORO, PER MIN: Performed by: NEUROLOGICAL SURGERY

## 2022-01-19 PROCEDURE — 255N000002 HC RX 255 OP 636: Performed by: NEUROLOGICAL SURGERY

## 2022-01-19 PROCEDURE — 370N000017 HC ANESTHESIA TECHNICAL FEE, PER MIN: Performed by: NEUROLOGICAL SURGERY

## 2022-01-19 PROCEDURE — 258N000003 HC RX IP 258 OP 636

## 2022-01-19 PROCEDURE — 85027 COMPLETE CBC AUTOMATED: CPT | Performed by: NEUROLOGICAL SURGERY

## 2022-01-19 PROCEDURE — 82962 GLUCOSE BLOOD TEST: CPT

## 2022-01-19 PROCEDURE — 62350 IMPLANT SPINAL CANAL CATH: CPT | Mod: GC | Performed by: NEUROLOGICAL SURGERY

## 2022-01-19 PROCEDURE — 999N000179 XR SURGERY CARM FLUORO LESS THAN 5 MIN W STILLS: Mod: TC

## 2022-01-19 PROCEDURE — 250N000009 HC RX 250

## 2022-01-19 PROCEDURE — 710N000010 HC RECOVERY PHASE 1, LEVEL 2, PER MIN: Performed by: NEUROLOGICAL SURGERY

## 2022-01-19 PROCEDURE — 272N000001 HC OR GENERAL SUPPLY STERILE: Performed by: NEUROLOGICAL SURGERY

## 2022-01-19 DEVICE — IMPLANTABLE DEVICE: Type: IMPLANTABLE DEVICE | Site: BACK | Status: FUNCTIONAL

## 2022-01-19 DEVICE — PUMP SYNCHROMED II 20ML 8637-20
Type: IMPLANTABLE DEVICE | Site: BACK | Status: NON-FUNCTIONAL
Removed: 2022-01-21

## 2022-01-19 RX ORDER — ACETAMINOPHEN 325 MG/1
650 TABLET ORAL EVERY 4 HOURS PRN
Status: DISCONTINUED | OUTPATIENT
Start: 2022-01-19 | End: 2022-01-21 | Stop reason: HOSPADM

## 2022-01-19 RX ORDER — ONDANSETRON 4 MG/1
4 TABLET, ORALLY DISINTEGRATING ORAL EVERY 30 MIN PRN
Status: DISCONTINUED | OUTPATIENT
Start: 2022-01-19 | End: 2022-01-19 | Stop reason: HOSPADM

## 2022-01-19 RX ORDER — HYDROMORPHONE HYDROCHLORIDE 1 MG/ML
0.4 INJECTION, SOLUTION INTRAMUSCULAR; INTRAVENOUS; SUBCUTANEOUS EVERY 5 MIN PRN
Status: DISCONTINUED | OUTPATIENT
Start: 2022-01-19 | End: 2022-01-19 | Stop reason: HOSPADM

## 2022-01-19 RX ORDER — ONDANSETRON 2 MG/ML
4 INJECTION INTRAMUSCULAR; INTRAVENOUS EVERY 30 MIN PRN
Status: DISCONTINUED | OUTPATIENT
Start: 2022-01-19 | End: 2022-01-19 | Stop reason: HOSPADM

## 2022-01-19 RX ORDER — OXYCODONE AND ACETAMINOPHEN 5; 325 MG/1; MG/1
1-2 TABLET ORAL EVERY 6 HOURS PRN
Status: DISCONTINUED | OUTPATIENT
Start: 2022-01-19 | End: 2022-01-19

## 2022-01-19 RX ORDER — LORAZEPAM 0.5 MG/1
0.5 TABLET ORAL DAILY PRN
Status: DISCONTINUED | OUTPATIENT
Start: 2022-01-19 | End: 2022-01-19

## 2022-01-19 RX ORDER — POLYETHYLENE GLYCOL 3350 17 G/17G
17 POWDER, FOR SOLUTION ORAL 2 TIMES DAILY
Status: DISCONTINUED | OUTPATIENT
Start: 2022-01-20 | End: 2022-01-21 | Stop reason: HOSPADM

## 2022-01-19 RX ORDER — SODIUM CHLORIDE, SODIUM LACTATE, POTASSIUM CHLORIDE, CALCIUM CHLORIDE 600; 310; 30; 20 MG/100ML; MG/100ML; MG/100ML; MG/100ML
INJECTION, SOLUTION INTRAVENOUS CONTINUOUS PRN
Status: DISCONTINUED | OUTPATIENT
Start: 2022-01-19 | End: 2022-01-19

## 2022-01-19 RX ORDER — OXYCODONE HYDROCHLORIDE 5 MG/1
5 TABLET ORAL EVERY 4 HOURS PRN
Status: DISCONTINUED | OUTPATIENT
Start: 2022-01-19 | End: 2022-01-19 | Stop reason: HOSPADM

## 2022-01-19 RX ORDER — CIPROFLOXACIN 500 MG/1
500 TABLET, FILM COATED ORAL 2 TIMES DAILY
Status: ON HOLD | COMMUNITY
End: 2022-07-22

## 2022-01-19 RX ORDER — ONDANSETRON 2 MG/ML
4 INJECTION INTRAMUSCULAR; INTRAVENOUS EVERY 6 HOURS PRN
Status: DISCONTINUED | OUTPATIENT
Start: 2022-01-19 | End: 2022-01-21 | Stop reason: HOSPADM

## 2022-01-19 RX ORDER — ONDANSETRON 2 MG/ML
INJECTION INTRAMUSCULAR; INTRAVENOUS PRN
Status: DISCONTINUED | OUTPATIENT
Start: 2022-01-19 | End: 2022-01-19

## 2022-01-19 RX ORDER — LIDOCAINE 40 MG/G
CREAM TOPICAL
Status: DISCONTINUED | OUTPATIENT
Start: 2022-01-19 | End: 2022-01-21 | Stop reason: HOSPADM

## 2022-01-19 RX ORDER — CEFAZOLIN SODIUM 2 G/100ML
2 INJECTION, SOLUTION INTRAVENOUS EVERY 8 HOURS
Status: DISCONTINUED | OUTPATIENT
Start: 2022-01-19 | End: 2022-01-21 | Stop reason: HOSPADM

## 2022-01-19 RX ORDER — ONDANSETRON 4 MG/1
4 TABLET, ORALLY DISINTEGRATING ORAL EVERY 6 HOURS PRN
Status: DISCONTINUED | OUTPATIENT
Start: 2022-01-19 | End: 2022-01-21 | Stop reason: HOSPADM

## 2022-01-19 RX ORDER — ONDANSETRON 4 MG/1
4 TABLET, FILM COATED ORAL EVERY 8 HOURS PRN
Status: DISCONTINUED | OUTPATIENT
Start: 2022-01-19 | End: 2022-01-19

## 2022-01-19 RX ORDER — SODIUM CHLORIDE, SODIUM LACTATE, POTASSIUM CHLORIDE, CALCIUM CHLORIDE 600; 310; 30; 20 MG/100ML; MG/100ML; MG/100ML; MG/100ML
INJECTION, SOLUTION INTRAVENOUS CONTINUOUS
Status: DISCONTINUED | OUTPATIENT
Start: 2022-01-19 | End: 2022-01-19 | Stop reason: HOSPADM

## 2022-01-19 RX ORDER — DEXMEDETOMIDINE HYDROCHLORIDE 4 UG/ML
INJECTION, SOLUTION INTRAVENOUS PRN
Status: DISCONTINUED | OUTPATIENT
Start: 2022-01-19 | End: 2022-01-19

## 2022-01-19 RX ORDER — BACLOFEN 10 MG/1
10 TABLET ORAL 3 TIMES DAILY PRN
Status: DISCONTINUED | OUTPATIENT
Start: 2022-01-19 | End: 2022-01-21 | Stop reason: HOSPADM

## 2022-01-19 RX ORDER — LIDOCAINE 40 MG/G
CREAM TOPICAL
Status: DISCONTINUED | OUTPATIENT
Start: 2022-01-19 | End: 2022-01-19 | Stop reason: HOSPADM

## 2022-01-19 RX ORDER — HYDRALAZINE HYDROCHLORIDE 20 MG/ML
10-20 INJECTION INTRAMUSCULAR; INTRAVENOUS EVERY 30 MIN PRN
Status: DISCONTINUED | OUTPATIENT
Start: 2022-01-19 | End: 2022-01-21 | Stop reason: HOSPADM

## 2022-01-19 RX ORDER — CEFAZOLIN SODIUM 2 G/100ML
2 INJECTION, SOLUTION INTRAVENOUS
Status: DISCONTINUED | OUTPATIENT
Start: 2022-01-19 | End: 2022-01-19 | Stop reason: HOSPADM

## 2022-01-19 RX ORDER — BISACODYL 10 MG
10 SUPPOSITORY, RECTAL RECTAL DAILY PRN
Status: DISCONTINUED | OUTPATIENT
Start: 2022-01-19 | End: 2022-01-21 | Stop reason: HOSPADM

## 2022-01-19 RX ORDER — ACETAMINOPHEN 325 MG/1
650 TABLET ORAL EVERY 4 HOURS PRN
Status: DISCONTINUED | OUTPATIENT
Start: 2022-01-22 | End: 2022-01-19

## 2022-01-19 RX ORDER — GABAPENTIN 600 MG/1
1200 TABLET ORAL 3 TIMES DAILY
Status: DISCONTINUED | OUTPATIENT
Start: 2022-01-19 | End: 2022-01-21 | Stop reason: HOSPADM

## 2022-01-19 RX ORDER — MORPHINE SULFATE 15 MG/1
15 TABLET, FILM COATED, EXTENDED RELEASE ORAL 2 TIMES DAILY
Status: DISCONTINUED | OUTPATIENT
Start: 2022-01-19 | End: 2022-01-21 | Stop reason: HOSPADM

## 2022-01-19 RX ORDER — AMOXICILLIN 250 MG
1 CAPSULE ORAL 2 TIMES DAILY
Status: DISCONTINUED | OUTPATIENT
Start: 2022-01-19 | End: 2022-01-21 | Stop reason: HOSPADM

## 2022-01-19 RX ORDER — ACETAMINOPHEN 325 MG/1
975 TABLET ORAL EVERY 8 HOURS
Status: DISCONTINUED | OUTPATIENT
Start: 2022-01-19 | End: 2022-01-19

## 2022-01-19 RX ORDER — MEPERIDINE HYDROCHLORIDE 25 MG/ML
12.5 INJECTION INTRAMUSCULAR; INTRAVENOUS; SUBCUTANEOUS
Status: DISCONTINUED | OUTPATIENT
Start: 2022-01-19 | End: 2022-01-19 | Stop reason: HOSPADM

## 2022-01-19 RX ORDER — PROCHLORPERAZINE MALEATE 5 MG
5 TABLET ORAL EVERY 6 HOURS PRN
Status: DISCONTINUED | OUTPATIENT
Start: 2022-01-19 | End: 2022-01-21 | Stop reason: HOSPADM

## 2022-01-19 RX ORDER — NYSTATIN 100000 U/G
CREAM TOPICAL DAILY PRN
Status: ON HOLD | COMMUNITY
End: 2022-01-19

## 2022-01-19 RX ORDER — PROPOFOL 10 MG/ML
INJECTION, EMULSION INTRAVENOUS PRN
Status: DISCONTINUED | OUTPATIENT
Start: 2022-01-19 | End: 2022-01-19

## 2022-01-19 RX ORDER — IOPAMIDOL 408 MG/ML
INJECTION, SOLUTION INTRATHECAL PRN
Status: DISCONTINUED | OUTPATIENT
Start: 2022-01-19 | End: 2022-01-19 | Stop reason: HOSPADM

## 2022-01-19 RX ORDER — FENTANYL CITRATE 50 UG/ML
INJECTION, SOLUTION INTRAMUSCULAR; INTRAVENOUS PRN
Status: DISCONTINUED | OUTPATIENT
Start: 2022-01-19 | End: 2022-01-19

## 2022-01-19 RX ORDER — FENTANYL CITRATE 50 UG/ML
25 INJECTION, SOLUTION INTRAMUSCULAR; INTRAVENOUS
Status: DISCONTINUED | OUTPATIENT
Start: 2022-01-19 | End: 2022-01-19 | Stop reason: HOSPADM

## 2022-01-19 RX ORDER — LABETALOL HYDROCHLORIDE 5 MG/ML
10-40 INJECTION, SOLUTION INTRAVENOUS EVERY 10 MIN PRN
Status: DISCONTINUED | OUTPATIENT
Start: 2022-01-19 | End: 2022-01-21 | Stop reason: HOSPADM

## 2022-01-19 RX ORDER — CEFAZOLIN SODIUM 2 G/100ML
2 INJECTION, SOLUTION INTRAVENOUS SEE ADMIN INSTRUCTIONS
Status: DISCONTINUED | OUTPATIENT
Start: 2022-01-19 | End: 2022-01-19 | Stop reason: HOSPADM

## 2022-01-19 RX ORDER — FENTANYL CITRATE 50 UG/ML
50 INJECTION, SOLUTION INTRAMUSCULAR; INTRAVENOUS EVERY 5 MIN PRN
Status: DISCONTINUED | OUTPATIENT
Start: 2022-01-19 | End: 2022-01-19 | Stop reason: HOSPADM

## 2022-01-19 RX ORDER — SODIUM CHLORIDE 9 MG/ML
INJECTION, SOLUTION INTRAVENOUS CONTINUOUS
Status: ACTIVE | OUTPATIENT
Start: 2022-01-19 | End: 2022-01-19

## 2022-01-19 RX ADMIN — FENTANYL CITRATE 25 MCG: 50 INJECTION, SOLUTION INTRAMUSCULAR; INTRAVENOUS at 09:05

## 2022-01-19 RX ADMIN — ONDANSETRON 4 MG: 2 INJECTION INTRAMUSCULAR; INTRAVENOUS at 07:47

## 2022-01-19 RX ADMIN — GABAPENTIN 1200 MG: 600 TABLET, FILM COATED ORAL at 19:31

## 2022-01-19 RX ADMIN — SODIUM CHLORIDE, POTASSIUM CHLORIDE, SODIUM LACTATE AND CALCIUM CHLORIDE: 600; 310; 30; 20 INJECTION, SOLUTION INTRAVENOUS at 07:25

## 2022-01-19 RX ADMIN — Medication 8 MCG: at 07:50

## 2022-01-19 RX ADMIN — Medication 8 MCG: at 08:10

## 2022-01-19 RX ADMIN — Medication 4 MCG: at 08:26

## 2022-01-19 RX ADMIN — MORPHINE SULFATE 15 MG: 15 TABLET, EXTENDED RELEASE ORAL at 19:31

## 2022-01-19 RX ADMIN — LORAZEPAM 0.5 MG: 0.5 TABLET ORAL at 15:14

## 2022-01-19 RX ADMIN — FENTANYL CITRATE 25 MCG: 50 INJECTION, SOLUTION INTRAMUSCULAR; INTRAVENOUS at 08:25

## 2022-01-19 RX ADMIN — CEFAZOLIN SODIUM 2 G: 2 INJECTION, SOLUTION INTRAVENOUS at 17:55

## 2022-01-19 RX ADMIN — ACETAMINOPHEN 650 MG: 325 TABLET, FILM COATED ORAL at 14:46

## 2022-01-19 RX ADMIN — ACETAMINOPHEN 650 MG: 325 TABLET, FILM COATED ORAL at 21:19

## 2022-01-19 RX ADMIN — FENTANYL CITRATE 25 MCG: 50 INJECTION, SOLUTION INTRAMUSCULAR; INTRAVENOUS at 07:42

## 2022-01-19 RX ADMIN — Medication 8 MCG: at 07:40

## 2022-01-19 RX ADMIN — MIDAZOLAM 1 MG: 1 INJECTION INTRAMUSCULAR; INTRAVENOUS at 07:25

## 2022-01-19 RX ADMIN — PROPOFOL 20 MG: 10 INJECTION, EMULSION INTRAVENOUS at 08:15

## 2022-01-19 RX ADMIN — BACLOFEN 10 MG: 10 TABLET ORAL at 14:20

## 2022-01-19 RX ADMIN — PROPOFOL 10 MG: 10 INJECTION, EMULSION INTRAVENOUS at 08:17

## 2022-01-19 RX ADMIN — CEFAZOLIN 2 G: 10 INJECTION, POWDER, FOR SOLUTION INTRAVENOUS at 07:40

## 2022-01-19 RX ADMIN — MIDAZOLAM 1 MG: 1 INJECTION INTRAMUSCULAR; INTRAVENOUS at 07:42

## 2022-01-19 RX ADMIN — ONDANSETRON 4 MG: 2 INJECTION INTRAMUSCULAR; INTRAVENOUS at 10:05

## 2022-01-19 RX ADMIN — GABAPENTIN 1200 MG: 600 TABLET, FILM COATED ORAL at 14:20

## 2022-01-19 ASSESSMENT — ACTIVITIES OF DAILY LIVING (ADL)
ADLS_ACUITY_SCORE: 10

## 2022-01-19 ASSESSMENT — ENCOUNTER SYMPTOMS: SEIZURES: 0

## 2022-01-19 ASSESSMENT — COPD QUESTIONNAIRES: COPD: 0

## 2022-01-19 ASSESSMENT — LIFESTYLE VARIABLES: TOBACCO_USE: 0

## 2022-01-19 ASSESSMENT — MIFFLIN-ST. JEOR: SCORE: 1057

## 2022-01-19 NOTE — PLAN OF CARE
Status: came to 6A at 1300 following externalized thecal pump trial  Vitals: VSS  Neuros: Neurologically at baseline: paraplegia with minimal sensation in BLE. BUE 5/5, A&O X 4 with mild anxiety.   IV: PIV x 2, SL'd or TKO between abx  Labs/Electrolytes: WNL  Resp/trach: on 2 lpm NC when sleeping  Diet: tolerating regular diet without nausea  Bowel status: ileostomy in place  : hudson in place for neurogenic bladder  Skin: surgical site to posterior lumbar region is covered, c/d/i and needs to be changed by NSG only if & when are gets soiled. RN to reinforce dressing if needed.  Pain: managed effectively with PO regimen and pt controlled pump with control at bedside. Instructions provided at bedside by vendor  Activity: not OOB this shift. W/c bound at baseline. W/c is at bedside  Social:  at bedside for 1 hour this afternoon, went home for the day  Plan: continue to monitor pain. Continue with scheduled morphine, no other systemic PO narcotics to be given unless Pain and/or NSG approve.    Arrived from: PACU   Belongings/meds: no meds, belongings with pt at bedside  2 RN Skin Assessment Completed by:  and Amita KNOTT RN   Non-intact findings documented (yes/no/NA): rash on chest that is scabbed since summer. Back incision covered by dressing that is c/d/i.

## 2022-01-19 NOTE — ANESTHESIA PREPROCEDURE EVALUATION
Anesthesia Pre-Procedure Evaluation    Patient: Anayeli Gagnon   MRN: [id number] : 1950        Preoperative Diagnosis: Intractable neuropathic pain of lumbosacral origin [M79.2]    Procedure : Procedure(s):  INSERTION, INTRATHECAL ANALGESIC PUMP, externalized trial          Past Medical History:   Diagnosis Date     CARDIAC DYSRHYTHMIAS NEC 10/3/2007    Taking atenelol for years for this     History of skin cancer      Mitral valve prolapse      Neurogenic bladder      Sacral decubitus ulcer, stage IV (H)      Thoracic spinal cord injury (H)       Past Surgical History:   Procedure Laterality Date     DECOMPRESSION LUMBAR ONE LEVEL N/A 2019    Procedure: Posterior spinal decompression;  Surgeon: Alf Ritchie MD;  Location: [facility] OR     INSERT STIMULATOR AND LEADS INTERNAL DORSAL COLUMN N/A 2021    Procedure: Posterior thoracic 12 to Lumbar 1 level for placement of spinal cord stimulator paddle and placement of implantable pulse generator/battery over right buttock;  Surgeon: Luis Orourke MD;  Location: [facility] OR     IR SPINAL ANGIOGRAM  10/16/2019     IR SPINAL ANGIOGRAM  2019     LAPAROSCOPIC TUBAL LIGATION       REPAIR SPINAL ARERIOVENOUS MALFORMATION N/A 2019    Procedure: with surgical disconnection arterial venous fistula Thoracic 5;  Surgeon: Alf Ritchie MD;  Location: [facility] OR      Allergies   Allergen Reactions     Contrast Dye Rash     Painful rash after x-ray contrast      Social History     Tobacco Use     Smoking status: Never Smoker     Smokeless tobacco: Never Used   Substance Use Topics     Alcohol use: No      Wt Readings from Last 1 Encounters:   22 56.7 kg (125 lb)        Anesthesia Evaluation   Pt has had prior anesthetic.     No history of anesthetic complications       ROS/MED HX  ENT/Pulmonary:  - neg pulmonary ROS  (-) tobacco use, asthma, COPD, sleep apnea and recent URI   Neurologic: Comment: Thoracic spinal cord injury, progressive  paralysis, wheelchair bound    (+) peripheral neuropathy, - LEs, consistent w/ spinal issues.  (-) no seizures and no CVA   Cardiovascular: Comment: Has been taking metoprolol ~ 40 yrs for mitral prolapse (no documentation of MVP on current echo)    (+) -----Previous cardiac testing   Echo: Date: 2017 Results:  CONCLUSION:  1. Technically difficult study with poor acoustic windows.     2. The left ventricle is normal size with hyperdynamic systolic function.  Ejection fraction is 70-75%.     3. The right ventricle is poorly visualized but appears to be normal size with normal systolic function.     4. Mild tricuspid regurgitation.      5. No significant pericardial effusion.     6. No prior echos available for comparison.      Stress Test:  Date: 6/19/21 Results:  IMPRESSIONS   Myocardial perfusion imaging is normal without evidence of infarct or   ischemia.     Stress test findings suggests that patient is low risk (<1% annual cardiac   mortality rate).       RISK ASSESSMENT:     Stress test findings suggests that patient is low risk    (<1% annual cardiac mortality rate).       ECG Reviewed:  Date: 12/25/21 Results:  Sinus rhythm    Cath:  Date: 2017 Results:  CONCLUSION  1) Normal LV systolic function, ejection fraction of 50%.     2) Angiographically normal coronary arteries.       METS/Exercise Tolerance: 1 - Eating, dressing Comment: Wheelchair bound   Hematologic:  - neg hematologic  ROS  (-) history of blood clots and history of blood transfusion   Musculoskeletal: Comment: Hx progressive paralysis and spinal cord injury due to an unclear etiology but involving spinal cord tethering and development of cord injury involving the mid to lower thoracic spinal cord.  She suffers from significant pain.     Arteriovenous fistula of spinal cord vessels      GI/Hepatic: Comment: Occasional GERD    Has colostomy   (-) liver disease   Renal/Genitourinary: Comment: Has hudson catheter 2/2 neurogenic bladder   (-) renal  disease   Endo:  - neg endo ROS     Psychiatric/Substance Use: Comment: Taking MS Contin bid & percocet 4-6 tabs daily      (+) psychiatric history depression H/O chronic opiod use  (Morphine 15 mg PO q12, Percocet 5/325 q4.). : medical cannabis.    Infectious Disease:  - neg infectious disease ROS  (-) Recent Fever   Malignancy: Comment: BCC on lower extremity, neck and shoulder  (+) Malignancy, History of Skin.    Other: Comment: Hx decubitus ulcer requiring debridement w/ skin flap     (+) , H/O Chronic Pain,other significant disability Wheelchair bound,          Physical Exam    Airway        Mallampati: II   TM distance: > 3 FB   Neck ROM: full   Mouth opening: > 3 cm    Respiratory Devices and Support         Dental  no notable dental history         Cardiovascular   cardiovascular exam normal       Rhythm and rate: regular and normal     Pulmonary   pulmonary exam normal        breath sounds clear to auscultation           OUTSIDE LABS:  CBC:   Lab Results   Component Value Date    WBC 6.5 04/07/2021    WBC 7.7 03/31/2021    HGB 12.7 04/07/2021    HGB 13.0 03/31/2021    HCT 38.8 04/07/2021    HCT 39.6 03/31/2021     04/07/2021     03/31/2021     BMP:   Lab Results   Component Value Date     04/07/2021     03/31/2021    POTASSIUM 4.1 04/07/2021    POTASSIUM 4.4 03/31/2021    CHLORIDE 105 04/07/2021    CHLORIDE 108 03/31/2021    CO2 29 04/07/2021    CO2 31 03/31/2021    BUN 10 04/07/2021    BUN 13 03/31/2021    CR 0.58 04/07/2021    CR 0.61 03/31/2021    GLC 98 01/19/2022    GLC 95 04/07/2021     COAGS:   Lab Results   Component Value Date    PTT 28 04/07/2021    INR 0.98 04/07/2021     POC:   Lab Results   Component Value Date    BGM 91 04/07/2021     HEPATIC: No results found for: ALBUMIN, PROTTOTAL, ALT, AST, GGT, ALKPHOS, BILITOTAL, BILIDIRECT, DAVID  OTHER:   Lab Results   Component Value Date    PH 7.43 12/27/2019    LACT 1.4 12/27/2019    A1C 5.3 11/17/2011    ADAN 9.1 04/07/2021     PHOS 3.5 12/27/2019    MAG 2.0 12/27/2019    TSH 2.33 08/07/2010    SED 4 11/17/2011       Anesthesia Plan    ASA Status:  3   NPO Status:  NPO Appropriate    Anesthesia Type: MAC.     - Reason for MAC: straight local not clinically adequate, immobility needed   Induction: Intravenous.   Maintenance: Balanced.        Consents    Anesthesia Plan(s) and associated risks, benefits, and realistic alternatives discussed. Questions answered and patient/representative(s) expressed understanding.    - Discussed:     - Discussed with:  Patient      - Extended Intubation/Ventilatory Support Discussed: No.      - Patient is DNR/DNI Status: No    Use of blood products discussed: No .     Postoperative Care       PONV prophylaxis: Ondansetron (or other 5HT-3), Dexamethasone or Solumedrol     Comments:                Tremaine Kang DO

## 2022-01-19 NOTE — BRIEF OP NOTE
[facility]    Brief Operative Note    Pre-operative diagnosis: Intractable neuropathic pain of lumbosacral origin [M79.2]  Post-operative diagnosis Intractable neuropathic pain of lumbosacral origin    Procedure: Procedure(s):  INSERTION, INTRATHECAL ANALGESIC PUMP, externalized trial  Surgeon: Surgeon(s) and Role:     * Luis Orourke MD - Primary     * Jasmine Espinoza MD - Assisting     * Katie Castillo MD - Resident - Assisting  Anesthesia: Monitor Anesthesia Care   Estimated Blood Loss: Minimal    Drains: None  Specimens: * No specimens in log *  Findings: Intrathecal catheter tip at T8-T9  Complications: None.  Implants:   Implant Name Type Inv. Item Serial No.  Lot No. LRB No. Used Action   [employer] [id number] N/A 1 Implanted   intrathecal catheter spinal segment revision kit    [employer] [id number] N/A 1 Wasted   [employer]enda    [employer] [id number] N/A 1 Used as a Supply   PUMP SYNCHROMED II 20ML 8637-20 - [id number] Pump PUMP SYNCHROMED II 20ML 8637-20 [id number] [employer]  N/A 1 Used as a Supply

## 2022-01-19 NOTE — ANESTHESIA CARE TRANSFER NOTE
Patient: Anayeli Gagnon    Procedure: Procedure(s):  INSERTION, INTRATHECAL ANALGESIC PUMP, externalized trial       Diagnosis: Intractable neuropathic pain of lumbosacral origin [M79.2]  Diagnosis Additional Information: No value filed.    Anesthesia Type:   General     Note:    Oropharynx: oropharynx clear of all foreign objects and spontaneously breathing  Level of Consciousness: awake  Oxygen Supplementation: room air    Independent Airway: airway patency satisfactory and stable  Dentition: dentition unchanged  Vital Signs Stable: post-procedure vital signs reviewed and stable  Report to RN Given: handoff report given  Patient transferred to: PACU    Handoff Report: Identifed the Patient, Identified the Reponsible Provider, Reviewed the pertinent medical history, Discussed the surgical course, Reviewed Intra-OP anesthesia mangement and issues during anesthesia, Set expectations for post-procedure period and Allowed opportunity for questions and acknowledgement of understanding      Vitals:  Vitals Value Taken Time   /85 01/19/22 0914   Temp     Pulse 68 01/19/22 0918   Resp     SpO2 94 % 01/19/22 0918   Vitals shown include unvalidated device data.    Electronically Signed By: EMELI Husain CRNA  January 19, 2022  9:19 AM

## 2022-01-19 NOTE — PROGRESS NOTES
The patient externalized pump was refilled this morning. The delivery of intrathecal medications was started. She received her first bolus of medication at 1130 AM. The patient reported 35% pain relief with the bolus medication.     She reports that she needs to be up and about to be able to test the device and pain relief.     The patient will receive education and PTM later this afternoon.     She will be using the device for breakthrough pain. Her home dose of MS contin is being continued. All short acting opioids and benzodiazepines were stopped for the trial.     We will consider making a dose adjustment tomorrow AM.

## 2022-01-19 NOTE — ANESTHESIA POSTPROCEDURE EVALUATION
Patient: Anayeli Gagnon    Procedure: Procedure(s):  INSERTION, INTRATHECAL ANALGESIC PUMP, externalized trial       Diagnosis:Intractable neuropathic pain of lumbosacral origin [M79.2]  Diagnosis Additional Information: No value filed.    Anesthesia Type:  General    Note:  Disposition: Admission   Postop Pain Control: Uneventful            Sign Out: Well controlled pain   PONV: No   Neuro/Psych: Uneventful            Sign Out: Acceptable/Baseline neuro status   Airway/Respiratory: Uneventful            Sign Out: Acceptable/Baseline resp. status   CV/Hemodynamics: Uneventful            Sign Out: Acceptable CV status; No obvious hypovolemia; No obvious fluid overload   Other NRE: NONE   DID A NON-ROUTINE EVENT OCCUR? No           Last vitals:  Vitals Value Taken Time   /78 01/19/22 0920   Temp 35.6  C (96.1  F) 01/19/22 0914   Pulse 74 01/19/22 0922   Resp 18 01/19/22 0914   SpO2 95 % 01/19/22 0922   Vitals shown include unvalidated device data.    Electronically Signed By: Samanta Barry MD  January 19, 2022  9:23 AM

## 2022-01-20 ENCOUNTER — ANESTHESIA EVENT (OUTPATIENT)
Dept: SURGERY | Facility: CLINIC | Age: 72
DRG: 041 | End: 2022-01-20
Payer: MEDICARE

## 2022-01-20 LAB
ANION GAP SERPL CALCULATED.3IONS-SCNC: 6 MMOL/L (ref 3–14)
BUN SERPL-MCNC: 9 MG/DL (ref 7–30)
CALCIUM SERPL-MCNC: 8.6 MG/DL (ref 8.5–10.1)
CHLORIDE BLD-SCNC: 110 MMOL/L (ref 94–109)
CO2 SERPL-SCNC: 28 MMOL/L (ref 20–32)
CREAT SERPL-MCNC: 0.57 MG/DL (ref 0.52–1.04)
ERYTHROCYTE [DISTWIDTH] IN BLOOD BY AUTOMATED COUNT: 13.2 % (ref 10–15)
GFR SERPL CREATININE-BSD FRML MDRD: >90 ML/MIN/1.73M2
GLUCOSE BLD-MCNC: 96 MG/DL (ref 70–99)
GLUCOSE BLDC GLUCOMTR-MCNC: 90 MG/DL (ref 70–99)
HCT VFR BLD AUTO: 42.3 % (ref 35–47)
HGB BLD-MCNC: 13.9 G/DL (ref 11.7–15.7)
MAGNESIUM SERPL-MCNC: 2.1 MG/DL (ref 1.6–2.3)
MCH RBC QN AUTO: 32.3 PG (ref 26.5–33)
MCHC RBC AUTO-ENTMCNC: 32.9 G/DL (ref 31.5–36.5)
MCV RBC AUTO: 98 FL (ref 78–100)
PHOSPHATE SERPL-MCNC: 3.5 MG/DL (ref 2.5–4.5)
PLATELET # BLD AUTO: 148 10E3/UL (ref 150–450)
POTASSIUM BLD-SCNC: 3.8 MMOL/L (ref 3.4–5.3)
RBC # BLD AUTO: 4.31 10E6/UL (ref 3.8–5.2)
SARS-COV-2 RNA RESP QL NAA+PROBE: NEGATIVE
SODIUM SERPL-SCNC: 144 MMOL/L (ref 133–144)
WBC # BLD AUTO: 7.6 10E3/UL (ref 4–11)

## 2022-01-20 PROCEDURE — 83735 ASSAY OF MAGNESIUM: CPT

## 2022-01-20 PROCEDURE — 250N000013 HC RX MED GY IP 250 OP 250 PS 637

## 2022-01-20 PROCEDURE — 120N000002 HC R&B MED SURG/OB UMMC

## 2022-01-20 PROCEDURE — 36415 COLL VENOUS BLD VENIPUNCTURE: CPT

## 2022-01-20 PROCEDURE — 80048 BASIC METABOLIC PNL TOTAL CA: CPT

## 2022-01-20 PROCEDURE — 250N000013 HC RX MED GY IP 250 OP 250 PS 637: Performed by: STUDENT IN AN ORGANIZED HEALTH CARE EDUCATION/TRAINING PROGRAM

## 2022-01-20 PROCEDURE — 85027 COMPLETE CBC AUTOMATED: CPT

## 2022-01-20 PROCEDURE — 250N000013 HC RX MED GY IP 250 OP 250 PS 637: Performed by: PHYSICIAN ASSISTANT

## 2022-01-20 PROCEDURE — 250N000011 HC RX IP 250 OP 636

## 2022-01-20 PROCEDURE — U0003 INFECTIOUS AGENT DETECTION BY NUCLEIC ACID (DNA OR RNA); SEVERE ACUTE RESPIRATORY SYNDROME CORONAVIRUS 2 (SARS-COV-2) (CORONAVIRUS DISEASE [COVID-19]), AMPLIFIED PROBE TECHNIQUE, MAKING USE OF HIGH THROUGHPUT TECHNOLOGIES AS DESCRIBED BY CMS-2020-01-R: HCPCS | Performed by: NURSE PRACTITIONER

## 2022-01-20 PROCEDURE — 250N000013 HC RX MED GY IP 250 OP 250 PS 637: Performed by: NEUROLOGICAL SURGERY

## 2022-01-20 PROCEDURE — 999N000128 HC STATISTIC PERIPHERAL IV START W/O US GUIDANCE

## 2022-01-20 PROCEDURE — 84100 ASSAY OF PHOSPHORUS: CPT

## 2022-01-20 RX ORDER — CEFAZOLIN SODIUM 1 G/50ML
2 SOLUTION INTRAVENOUS SEE ADMIN INSTRUCTIONS
Status: DISCONTINUED | OUTPATIENT
Start: 2022-01-20 | End: 2022-01-20

## 2022-01-20 RX ORDER — CEFAZOLIN SODIUM 1 G/50ML
2 SOLUTION INTRAVENOUS SEE ADMIN INSTRUCTIONS
Status: DISCONTINUED | OUTPATIENT
Start: 2022-01-20 | End: 2022-01-21 | Stop reason: HOSPADM

## 2022-01-20 RX ORDER — CEFAZOLIN SODIUM 1 G/50ML
2 SOLUTION INTRAVENOUS
Status: COMPLETED | OUTPATIENT
Start: 2022-01-20 | End: 2022-01-21

## 2022-01-20 RX ORDER — POTASSIUM CHLORIDE 750 MG/1
10 TABLET, EXTENDED RELEASE ORAL ONCE
Status: COMPLETED | OUTPATIENT
Start: 2022-01-20 | End: 2022-01-20

## 2022-01-20 RX ORDER — CEFAZOLIN SODIUM 1 G/50ML
2 SOLUTION INTRAVENOUS
Status: DISCONTINUED | OUTPATIENT
Start: 2022-01-20 | End: 2022-01-20

## 2022-01-20 RX ADMIN — GABAPENTIN 1200 MG: 600 TABLET, FILM COATED ORAL at 13:33

## 2022-01-20 RX ADMIN — ACETAMINOPHEN 650 MG: 325 TABLET, FILM COATED ORAL at 11:39

## 2022-01-20 RX ADMIN — ACETAMINOPHEN 650 MG: 325 TABLET, FILM COATED ORAL at 07:35

## 2022-01-20 RX ADMIN — CEFAZOLIN SODIUM 2 G: 2 INJECTION, SOLUTION INTRAVENOUS at 07:26

## 2022-01-20 RX ADMIN — BACLOFEN 10 MG: 10 TABLET ORAL at 07:35

## 2022-01-20 RX ADMIN — ACETAMINOPHEN 650 MG: 325 TABLET, FILM COATED ORAL at 21:07

## 2022-01-20 RX ADMIN — DOCUSATE SODIUM 50 MG AND SENNOSIDES 8.6 MG 1 TABLET: 8.6; 5 TABLET, FILM COATED ORAL at 07:26

## 2022-01-20 RX ADMIN — ACETAMINOPHEN 650 MG: 325 TABLET, FILM COATED ORAL at 17:14

## 2022-01-20 RX ADMIN — Medication 5 MG: at 22:19

## 2022-01-20 RX ADMIN — GABAPENTIN 1200 MG: 600 TABLET, FILM COATED ORAL at 19:18

## 2022-01-20 RX ADMIN — POTASSIUM CHLORIDE 10 MEQ: 750 TABLET, EXTENDED RELEASE ORAL at 14:54

## 2022-01-20 RX ADMIN — CEFAZOLIN SODIUM 2 G: 2 INJECTION, SOLUTION INTRAVENOUS at 00:17

## 2022-01-20 RX ADMIN — CEFAZOLIN SODIUM 2 G: 2 INJECTION, SOLUTION INTRAVENOUS at 17:56

## 2022-01-20 RX ADMIN — GABAPENTIN 1200 MG: 600 TABLET, FILM COATED ORAL at 07:26

## 2022-01-20 ASSESSMENT — ACTIVITIES OF DAILY LIVING (ADL)
ADLS_ACUITY_SCORE: 14
ADLS_ACUITY_SCORE: 10
ADLS_ACUITY_SCORE: 12
ADLS_ACUITY_SCORE: 12
ADLS_ACUITY_SCORE: 10
ADLS_ACUITY_SCORE: 10
ADLS_ACUITY_SCORE: 12
ADLS_ACUITY_SCORE: 10
ADLS_ACUITY_SCORE: 10
ADLS_ACUITY_SCORE: 12
ADLS_ACUITY_SCORE: 10
ADLS_ACUITY_SCORE: 12
ADLS_ACUITY_SCORE: 10
ADLS_ACUITY_SCORE: 14

## 2022-01-20 NOTE — PLAN OF CARE
Status: Pt admitted to 6A today at 1300 following externalized thecal pump trial  Vitals: VSS on RA  Neuros: Paraplegia with minimal sensation in BLE. BUE 5/5, A&O X 4. Endorses tingling in BLE. Pupils intact.   IV: PIV x 1, Sl'd and TKO between Abx.   Labs/Electrolytes: WNL  Resp/trach: LS clear, equal bilaterally. Desats when sleeping, requiring 2L NC.   Diet: Tolerating regular diet, denies Nausea.   Bowel status: Colostomy in place. BS+.   : Cunningham in place for neurogenic bladder.   Skin: Surgical site to posterior lumbar region is covered, c/d/i and needs to be changed by NSG only if & when are gets soiled. RN to reinforce dressing if needed.   Pain: Pt endorses minimal pain at this time, endorses bilateral foot pain 2-3/10, managed effectively with PO regimen and pt controlled pump with control at bedside.  Activity: not OOB this shift. W/c bound at baseline. W/c is at bedside.   Social:  Jay, wife updates .  Plan: Continue to monitor pain. Continue with scheduled morphine-no other systemic PO narcotics to be given unless Pain and/or NSG approve. Requesting to take PO morphine at HS instead of 1800.

## 2022-01-20 NOTE — PLAN OF CARE
Status: underwent externalized thecal pump placement and trial on 1/20  Vitals: VSS  Neuros: Neurologically at baseline: paraplegia with minimal sensation in BLE. BUE 5/5, A&O X 4 with mild anxiety.   IV: PIV x 2, SL'd or TKO between abx  Labs/Electrolytes: replaced K, redraw for AM  Resp/trach: on 2 lpm NC when sleeping  Diet: tolerating regular diet without nausea  Bowel status: colostomy in place. Pt may need supplies intermittently but self-manages.  : hudson in place for neurogenic bladder  Skin: surgical site to posterior lumbar region is covered, c/d/i and needs to be changed by NSG only if & when are gets soiled. RN to reinforce dressing if needed.  Pain: surgical and chronic pain managed effectively with pump with control at bedside. Pt c/o HA intermittently throughout day despite prn tylenol given.  Activity: up with a lift, turn/repo q2h. Pt does help with repo and will repo independently often. W/c bound at baseline. W/c is at bedside  Social:  coming this afternoon  Plan: continue to monitor pain. Pt going to OR tomorrow for internalization of pump. NPO at midnight, consent obtained.

## 2022-01-20 NOTE — PHARMACY-ADMISSION MEDICATION HISTORY
Admission Medication History Completed by Pharmacy    See [facility] Admission Navigator for allergy information, preferred outpatient pharmacy, prior to admission medications and immunization status.     Medication History Sources:     Patient, Dispense history    Changes made to PTA medication list (reason):    Added: Ciprofloxacin  o Per dispense report verified by patient    Deleted: Ondansetron  o One-time fill in October per dispense report and verified by patient no longer taking    Changed: Nystatin topical powder- patient reports it as a cream    Additional Information:    The patient had a recent UTI that she began to take antibiotics for at the beginning of December 12/4/21 with Nitrofurantoin mono/mac, did not go away took cephalexin 12/26/21 for 7 days and still had bladder infection, on 1/12/21 she got ciprofloxacin for the bladder infection and was taking this before coming to the hospital. She said she only had 4 tablets left when she came to the hospital    Prior to Admission medications    Medication Sig Last Dose Taking? Auth Provider   acetaminophen (TYLENOL) 500 MG tablet Take 500-1,000 mg by mouth every 6 hours as needed for mild pain 1/18/2022 at Unknown time Yes Unknown, Entered By History   baclofen (LIORESAL) 10 MG tablet Take 10 mg by mouth 3 times daily as needed for muscle spasms 1/18/2022 at Unknown time Yes Unknown, Entered By History   ciprofloxacin (CIPRO) 500 MG tablet Take 500 mg by mouth 2 times daily For 7 days 1/18/2022 at Unknown time Yes Unknown, Entered By History   gabapentin (NEURONTIN) 600 MG tablet Take 1,200 mg by mouth 3 times daily  1/19/2022 at Unknown time Yes Luis Orourke MD   lidocaine (LIDODERM) 5 % patch Apply 1 patch topically daily as needed  Past Week at Unknown time Yes Reported, Patient   LORazepam (ATIVAN) 0.5 MG tablet Take 1 tablet by mouth daily as needed for anxiety  1/19/2022 at Unknown time Yes Reported, Patient   medical cannabis (Patient's own  supply) See Admin Instructions (The purpose of this order is to document that the patient reports taking medical cannabis.  This is not a prescription, and is not used to certify that the patient has a qualifying medical condition.)     Liquid oral formulation. Past Week at Unknown time Yes Reported, Patient   melatonin 3 MG tablet Take 3 mg by mouth nightly as needed  1/18/2022 at Unknown time Yes Reported, Patient   Menthol, Topical Analgesic, (BIOFREEZE) 4 % GEL Externally apply topically daily as needed Past Month at Unknown time Yes Unknown, Entered By History   metoprolol succinate ER (TOPROL-XL) 25 MG 24 hr tablet Take 12.5 mg by mouth every evening  1/19/2022 at Unknown time Yes Unknown, Entered By History   morphine (MS CONTIN) 15 MG CR tablet Take 15 mg by mouth every 12 hours 1/19/2022 at Unknown time Yes Unknown, Entered By History   Multiple Vitamins-Minerals (WOMENS MULTI PO) Take 1 tablet by mouth daily Past Week at Unknown time Yes Unknown, Entered By History   NARCAN 4 MG/0.1ML nasal spray INHALE ONE SPRAY IN ONE NOSTRIL AS NEEDED FOR OPIOID. ANTIDOTE, MAY REPEAT IN OTHER NOSTRIL IF NEEDED Unknown at never  Yes Reported, Patient   nystatin (NYSTOP) 111649 UNIT/GM external powder Apply topically 3 times daily as needed More than a month at Unknown time Yes Unknown, Entered By History   ondansetron (ZOFRAN) 4 MG tablet Take 4 mg by mouth every 8 hours as needed for nausea  More than a month at Unknown time Yes Reported, Patient   oxyCODONE-acetaminophen (PERCOCET) 5-325 MG tablet Take 1-2 tablets by mouth every 6 hours as needed for severe pain Max 4/day 1/18/2022 at Unknown time Yes Reported, Patient   polyethylene glycol (MIRALAX) 17 GM/Dose powder Take 17 g by mouth daily as needed for constipation  Past Month at Unknown time Yes Reported, Patient   THEANINE PO Take 1 tablet by mouth daily as needed (takes when she remembers) More than a month at Unknown time Yes Unknown, Entered By History          Date completed: 01/19/22    Medication history completed by: Pham Azar

## 2022-01-20 NOTE — PROGRESS NOTES
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 The patient has chronic neuropathic pain below T 10 especially in the flank areas, lower legs and feet.   Yesterday with the help of my neurosurgery colleagues an externalized intrathecal catheter was placed in the operating room. We started a mixture of hydromorphone and bupivacaine delivery in the intrathecal space late yesterday morning.     I rounded on her around 6 AM and she was awake and resting on her right side down. The patient complained of severe headache every time she tried to sit up in the frontal area.     I asked her about the chronic pain. She woke up in severe feet pain this morning. The bedside nurse had informed me that the patient slept for most of the night and did not need to use PTM.   The patient reports about 50% improvement of chronic pain yesterday before bedtime. She understands that her pain control fell behind as she was sleeping.     On telemetry it is noted that the patient used about 5 boluses 0.04 mg x 5 yesterday (total 0.2 mg) over basal.     Woke up with spinal headache.     - Dose changed.   Basal increased to 0.5 mg hydromorphone and bupivacaine. (0.2+0.2 + 25% increase)    PTM dose increased to 0.05 mg every 2 hours on demand.   (25% increase in PTM dose)    - Stop MS contin today. I communicated this with the nurse that the patient will not be receiving any oral opioids going forward.    - d/w patient successful trial thus far. I informed her that the team will discuss options of possible permanent implantation and planning.     - We will need to fine tune dose per her needs going forward.     - will d/w neurosx team about options of treatment of spinal headache. Will plan fluids and caffeine tablets in the interrim to maximize conservative therapy. Many patients improve in the first few (5-7) days with conservative treatment .Fiorcet can be considered for spinal headache  if resistant to conservative care.     - will d/w team formal sleep study for possible sleep apnea if the patient symptoms persist.     - dressing - no concerns.     Plan discussed with Dr. Orourke and Yamil senior neurosurgery resident.

## 2022-01-20 NOTE — PLAN OF CARE
Status: S/p externalized thecal pump trial placed 1/19  Vitals: VSS on 2L NC  Neuros: Paraplegia with minimal sensation in BLE. BUE 5/5, AOx4. Endorses tingling and pain in BLE. PERRL.   IV: PIV SL   Labs/Electrolytes: WNL  Resp/trach: LS clear, equal bilaterally. Desats when sleeping, requiring 2L NC. Periods of apnea while sleeping up to 60sec. noted. SpO2 remains in mid 90s during apneic periods. On charge list to fyi team.    Diet: Regular   Bowel status: Colostomy in place. BS+. Pt plans to change colostomy bag this morning.   : Cunningham in place for neurogenic bladder. Good output.   Skin: Surgical site to posterior lumbar region is covered and abdominal binder in place,CDI-- RN to reinforce dressing prn, only provider can change dressing.   Pain: Severe pain reported last evening, gave prn tylenol x1. Pt self administers intrathecal boluses, available q2h only.   Activity: not OOB this shift. W/c bound at baseline. W/c is at bedside. Repositions independently.   Social:  Jay, pt updates .  Plan: Continue to monitor pain. Continue with scheduled morphine no other systemic PO narcotics to be given unless Pain and/or NSG approve.

## 2022-01-20 NOTE — PROGRESS NOTES
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 Repeat spinal angiograms at that time was negative for any acute findings. In 2018 after regaining ability to ambulate with assistance, began to deteriorate found to have thoracic intradural adhesions and underwent a two staged lysis of adhesions in October 2018. 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 She underwent spinal cord stimulation trial by Dr. Orourke in April 2021- but patient found no benefit. She was subsequently offered a trial of intrathecal therapy. Therefore she underwent placement of externalized pain pump on 01/19/2022.    Plan:  - Serial neuro exams-every 4 hour neurochecks  - Plan to remain inpatient to assess for benefit from intrathecal therapy  - Continue Ancef while externalized  - Continue PTA MS Contin and gabapentin  - Avoid benzodiazepines, no pain medications other than MS Contin, gabapentin  - Okay for patient initiated boluses (pump titration per anesthesiology team)  -Will assess patient's pain as timed with her pain pump boluses to assess efficacy of therapy  - PT OT  - Advance diet as tolerated  - SCDs for DVT proph    -----------------------------------  Manjit Del Cid MD  PGY-2, Neurosurgery  -----------------------------------    Interval History: Admitted to the six floor neurological unit postoperatively. Catheter tip at T8-9. With pain overnight however found significant relief with adding pillows to support her legs above the level of her heart. However patient also received a pain pump  bolus at that time and does not know which one brought her relief.    Objective:   Temp:  [96.1  F (35.6  C)-98.3  F (36.8  C)] 97.1  F (36.2  C)  Pulse:  [58-96] 82  Resp:  [16-18] 16  BP: ()/(48-85) 129/62  SpO2:  [91 %-98 %] 98 %  I/O last 3 completed shifts:  In: 600 [P.O.:100; I.V.:500]  Out: 1900 [Urine:1900]    Gen: Appears comfortable in bed, not appearing in acute distress, awakened from sleep  Wound: Tegaderm in place with gauze, catheter in place underneath the Tegaderm visible no obvious areas of kinking or breakage. Pump felt underneath the tape.    Neurologic:  -See under the follow commands briskly, Alert & Oriented to person, place, time, and situation  - Speech fluent, spontaneous. No aphasia or dysarthria.  - No gaze preference. No apparent hemineglect.  - PERRL, EOMI  - Face symmetric with sensation intact to light touch  - Palate elevates symmetrically, uvula midline, tongue protrudes midline  - Trapezii muscles 5/5 bilaterally  - No pronator drift     Del Tr Bi WE WF Gr   R 5 5 5 5 5 5   L 5 5 5 5 5 5     Bilateral lower extremities 0 out of 5. Nodularity cannot all    Thoracic sensory level present    LABS:  Recent Labs   Lab 01/20/22  0649 01/20/22  0626 01/19/22  1401 01/19/22  0627     --   --  142   POTASSIUM 3.8  --  3.9 4.0   CHLORIDE 110*  --   --  109   CO2 28  --   --  28   ANIONGAP 6  --   --  5   GLC 96 90  --  100*   BUN 9  --   --  13   CR 0.57  --   --  0.58   ADAN 8.6  --   --  8.7       Recent Labs   Lab 01/20/22  0649   WBC 7.6   RBC 4.31   HGB 13.9   HCT 42.3   MCV 98   MCH 32.3   MCHC 32.9   RDW 13.2   *       IMAGING:  Recent Results (from the past 24 hour(s))   XR Surgery XAVI L/T 5 Min Fluoro w Stills    Narrative    This exam was marked as non-reportable because it will not be read by a   radiologist or a [facility] non-radiologist provider.

## 2022-01-21 ENCOUNTER — ANESTHESIA (OUTPATIENT)
Dept: SURGERY | Facility: CLINIC | Age: 72
DRG: 041 | End: 2022-01-21
Payer: MEDICARE

## 2022-01-21 VITALS
HEART RATE: 80 BPM | OXYGEN SATURATION: 97 % | DIASTOLIC BLOOD PRESSURE: 63 MMHG | TEMPERATURE: 97.5 F | HEIGHT: 64 IN | BODY MASS INDEX: 21.34 KG/M2 | SYSTOLIC BLOOD PRESSURE: 138 MMHG | RESPIRATION RATE: 14 BRPM | WEIGHT: 125 LBS

## 2022-01-21 LAB
ANION GAP SERPL CALCULATED.3IONS-SCNC: 5 MMOL/L (ref 3–14)
APTT PPP: 33 SECONDS (ref 22–38)
ATRIAL RATE - MUSE: 86 BPM
BUN SERPL-MCNC: 15 MG/DL (ref 7–30)
CALCIUM SERPL-MCNC: 8.7 MG/DL (ref 8.5–10.1)
CHLORIDE BLD-SCNC: 109 MMOL/L (ref 94–109)
CO2 SERPL-SCNC: 26 MMOL/L (ref 20–32)
CREAT SERPL-MCNC: 0.45 MG/DL (ref 0.52–1.04)
DIASTOLIC BLOOD PRESSURE - MUSE: NORMAL MMHG
ERYTHROCYTE [DISTWIDTH] IN BLOOD BY AUTOMATED COUNT: 13.2 % (ref 10–15)
GFR SERPL CREATININE-BSD FRML MDRD: >90 ML/MIN/1.73M2
GLUCOSE BLD-MCNC: 117 MG/DL (ref 70–99)
GLUCOSE BLDC GLUCOMTR-MCNC: 128 MG/DL (ref 70–99)
HCT VFR BLD AUTO: 40.2 % (ref 35–47)
HGB BLD-MCNC: 13.3 G/DL (ref 11.7–15.7)
INR PPP: 0.99 (ref 0.85–1.15)
INTERPRETATION ECG - MUSE: NORMAL
MAGNESIUM SERPL-MCNC: 2.1 MG/DL (ref 1.6–2.3)
MCH RBC QN AUTO: 31.6 PG (ref 26.5–33)
MCHC RBC AUTO-ENTMCNC: 33.1 G/DL (ref 31.5–36.5)
MCV RBC AUTO: 96 FL (ref 78–100)
P AXIS - MUSE: 69 DEGREES
PHOSPHATE SERPL-MCNC: 3.1 MG/DL (ref 2.5–4.5)
PLATELET # BLD AUTO: 144 10E3/UL (ref 150–450)
POTASSIUM BLD-SCNC: 3.8 MMOL/L (ref 3.4–5.3)
PR INTERVAL - MUSE: 144 MS
QRS DURATION - MUSE: 84 MS
QT - MUSE: 350 MS
QTC - MUSE: 418 MS
R AXIS - MUSE: 24 DEGREES
RBC # BLD AUTO: 4.21 10E6/UL (ref 3.8–5.2)
SODIUM SERPL-SCNC: 140 MMOL/L (ref 133–144)
SYSTOLIC BLOOD PRESSURE - MUSE: NORMAL MMHG
T AXIS - MUSE: 13 DEGREES
VENTRICULAR RATE- MUSE: 86 BPM
WBC # BLD AUTO: 8.4 10E3/UL (ref 4–11)

## 2022-01-21 PROCEDURE — 0JPT0VZ REMOVAL OF INFUSION PUMP FROM TRUNK SUBCUTANEOUS TISSUE AND FASCIA, OPEN APPROACH: ICD-10-PCS | Performed by: NEUROLOGICAL SURGERY

## 2022-01-21 PROCEDURE — 250N000011 HC RX IP 250 OP 636: Performed by: STUDENT IN AN ORGANIZED HEALTH CARE EDUCATION/TRAINING PROGRAM

## 2022-01-21 PROCEDURE — 250N000011 HC RX IP 250 OP 636: Performed by: ANESTHESIOLOGY

## 2022-01-21 PROCEDURE — 84100 ASSAY OF PHOSPHORUS: CPT

## 2022-01-21 PROCEDURE — 82310 ASSAY OF CALCIUM: CPT | Performed by: NURSE PRACTITIONER

## 2022-01-21 PROCEDURE — 250N000025 HC SEVOFLURANE, PER MIN: Performed by: NEUROLOGICAL SURGERY

## 2022-01-21 PROCEDURE — 370N000017 HC ANESTHESIA TECHNICAL FEE, PER MIN: Performed by: NEUROLOGICAL SURGERY

## 2022-01-21 PROCEDURE — 272N000002 HC OR SUPPLY OTHER OPNP: Performed by: NEUROLOGICAL SURGERY

## 2022-01-21 PROCEDURE — 710N000011 HC RECOVERY PHASE 1, LEVEL 3, PER MIN: Performed by: NEUROLOGICAL SURGERY

## 2022-01-21 PROCEDURE — 250N000013 HC RX MED GY IP 250 OP 250 PS 637: Performed by: STUDENT IN AN ORGANIZED HEALTH CARE EDUCATION/TRAINING PROGRAM

## 2022-01-21 PROCEDURE — 250N000013 HC RX MED GY IP 250 OP 250 PS 637: Performed by: PHYSICIAN ASSISTANT

## 2022-01-21 PROCEDURE — 83735 ASSAY OF MAGNESIUM: CPT

## 2022-01-21 PROCEDURE — 250N000009 HC RX 250: Performed by: ANESTHESIOLOGY

## 2022-01-21 PROCEDURE — 85027 COMPLETE CBC AUTOMATED: CPT | Performed by: NURSE PRACTITIONER

## 2022-01-21 PROCEDURE — 250N000013 HC RX MED GY IP 250 OP 250 PS 637: Performed by: ANESTHESIOLOGY

## 2022-01-21 PROCEDURE — C1772 INFUSION PUMP, PROGRAMMABLE: HCPCS | Performed by: NEUROLOGICAL SURGERY

## 2022-01-21 PROCEDURE — 85610 PROTHROMBIN TIME: CPT | Performed by: NURSE PRACTITIONER

## 2022-01-21 PROCEDURE — 999N000141 HC STATISTIC PRE-PROCEDURE NURSING ASSESSMENT: Performed by: NEUROLOGICAL SURGERY

## 2022-01-21 PROCEDURE — 36415 COLL VENOUS BLD VENIPUNCTURE: CPT | Performed by: NURSE PRACTITIONER

## 2022-01-21 PROCEDURE — 258N000003 HC RX IP 258 OP 636: Performed by: ANESTHESIOLOGY

## 2022-01-21 PROCEDURE — 62362 IMPLANT SPINE INFUSION PUMP: CPT | Mod: 58 | Performed by: NEUROLOGICAL SURGERY

## 2022-01-21 PROCEDURE — 250N000013 HC RX MED GY IP 250 OP 250 PS 637

## 2022-01-21 PROCEDURE — 250N000011 HC RX IP 250 OP 636

## 2022-01-21 PROCEDURE — 85730 THROMBOPLASTIN TIME PARTIAL: CPT | Performed by: NURSE PRACTITIONER

## 2022-01-21 PROCEDURE — 250N000009 HC RX 250: Performed by: NEUROLOGICAL SURGERY

## 2022-01-21 PROCEDURE — 93010 ELECTROCARDIOGRAM REPORT: CPT | Performed by: INTERNAL MEDICINE

## 2022-01-21 PROCEDURE — 272N000001 HC OR GENERAL SUPPLY STERILE: Performed by: NEUROLOGICAL SURGERY

## 2022-01-21 PROCEDURE — 62350 IMPLANT SPINAL CANAL CATH: CPT | Mod: 58 | Performed by: NEUROLOGICAL SURGERY

## 2022-01-21 PROCEDURE — 360N000084 HC SURGERY LEVEL 4 W/ FLUORO, PER MIN: Performed by: NEUROLOGICAL SURGERY

## 2022-01-21 PROCEDURE — 93005 ELECTROCARDIOGRAM TRACING: CPT

## 2022-01-21 PROCEDURE — 0JH80VZ INSERTION OF INFUSION PUMP INTO ABDOMEN SUBCUTANEOUS TISSUE AND FASCIA, OPEN APPROACH: ICD-10-PCS | Performed by: NEUROLOGICAL SURGERY

## 2022-01-21 RX ORDER — ACETAMINOPHEN 325 MG/1
650 TABLET ORAL EVERY 4 HOURS PRN
Qty: 30 TABLET | Refills: 0 | OUTPATIENT
Start: 2022-01-21 | End: 2024-08-08

## 2022-01-21 RX ORDER — LIDOCAINE HYDROCHLORIDE 20 MG/ML
INJECTION, SOLUTION INFILTRATION; PERINEURAL PRN
Status: DISCONTINUED | OUTPATIENT
Start: 2022-01-21 | End: 2022-01-21

## 2022-01-21 RX ORDER — POLYETHYLENE GLYCOL 3350 17 G/17G
17 POWDER, FOR SOLUTION ORAL DAILY
Qty: 510 G | Refills: 1 | OUTPATIENT
Start: 2022-01-21 | End: 2024-08-08

## 2022-01-21 RX ORDER — CEPHALEXIN 500 MG/1
500 CAPSULE ORAL 2 TIMES DAILY
Qty: 14 CAPSULE | Refills: 0 | Status: SHIPPED | OUTPATIENT
Start: 2022-01-21 | End: 2022-01-28

## 2022-01-21 RX ORDER — DEXMEDETOMIDINE HYDROCHLORIDE 4 UG/ML
INJECTION, SOLUTION INTRAVENOUS PRN
Status: DISCONTINUED | OUTPATIENT
Start: 2022-01-21 | End: 2022-01-21

## 2022-01-21 RX ORDER — SODIUM CHLORIDE, SODIUM LACTATE, POTASSIUM CHLORIDE, CALCIUM CHLORIDE 600; 310; 30; 20 MG/100ML; MG/100ML; MG/100ML; MG/100ML
INJECTION, SOLUTION INTRAVENOUS CONTINUOUS PRN
Status: DISCONTINUED | OUTPATIENT
Start: 2022-01-21 | End: 2022-01-21

## 2022-01-21 RX ORDER — DEXMEDETOMIDINE HYDROCHLORIDE 4 UG/ML
.1-1.2 INJECTION, SOLUTION INTRAVENOUS CONTINUOUS
Status: DISCONTINUED | OUTPATIENT
Start: 2022-01-21 | End: 2022-01-21 | Stop reason: HOSPADM

## 2022-01-21 RX ORDER — SODIUM CHLORIDE, SODIUM LACTATE, POTASSIUM CHLORIDE, CALCIUM CHLORIDE 600; 310; 30; 20 MG/100ML; MG/100ML; MG/100ML; MG/100ML
INJECTION, SOLUTION INTRAVENOUS CONTINUOUS
Status: DISCONTINUED | OUTPATIENT
Start: 2022-01-21 | End: 2022-01-21 | Stop reason: HOSPADM

## 2022-01-21 RX ORDER — FENTANYL CITRATE 50 UG/ML
INJECTION, SOLUTION INTRAMUSCULAR; INTRAVENOUS PRN
Status: DISCONTINUED | OUTPATIENT
Start: 2022-01-21 | End: 2022-01-21

## 2022-01-21 RX ORDER — HYDROMORPHONE HYDROCHLORIDE 1 MG/ML
0.5 INJECTION, SOLUTION INTRAMUSCULAR; INTRAVENOUS; SUBCUTANEOUS ONCE
Status: COMPLETED | OUTPATIENT
Start: 2022-01-21 | End: 2022-01-21

## 2022-01-21 RX ORDER — PROPOFOL 10 MG/ML
INJECTION, EMULSION INTRAVENOUS PRN
Status: DISCONTINUED | OUTPATIENT
Start: 2022-01-21 | End: 2022-01-21

## 2022-01-21 RX ORDER — ONDANSETRON 2 MG/ML
4 INJECTION INTRAMUSCULAR; INTRAVENOUS EVERY 30 MIN PRN
Status: DISCONTINUED | OUTPATIENT
Start: 2022-01-21 | End: 2022-01-21 | Stop reason: HOSPADM

## 2022-01-21 RX ORDER — AMOXICILLIN 250 MG
1 CAPSULE ORAL 2 TIMES DAILY
Qty: 14 TABLET | Refills: 0 | Status: SHIPPED | OUTPATIENT
Start: 2022-01-21 | End: 2022-01-28

## 2022-01-21 RX ORDER — ACETAMINOPHEN 325 MG/1
975 TABLET ORAL ONCE
Status: COMPLETED | OUTPATIENT
Start: 2022-01-21 | End: 2022-01-21

## 2022-01-21 RX ORDER — PROCHLORPERAZINE MALEATE 5 MG
5 TABLET ORAL EVERY 6 HOURS PRN
Qty: 5 TABLET | Refills: 0 | OUTPATIENT
Start: 2022-01-21 | End: 2024-08-08

## 2022-01-21 RX ORDER — ONDANSETRON 4 MG/1
4 TABLET, ORALLY DISINTEGRATING ORAL EVERY 6 HOURS PRN
Qty: 12 TABLET | Refills: 0 | OUTPATIENT
Start: 2022-01-21 | End: 2024-08-08

## 2022-01-21 RX ORDER — LIDOCAINE HYDROCHLORIDE 10 MG/ML
INJECTION, SOLUTION EPIDURAL; INFILTRATION; INTRACAUDAL; PERINEURAL PRN
Status: DISCONTINUED | OUTPATIENT
Start: 2022-01-21 | End: 2022-01-21 | Stop reason: HOSPADM

## 2022-01-21 RX ORDER — OXYCODONE HYDROCHLORIDE 10 MG/1
10 TABLET ORAL EVERY 4 HOURS PRN
Status: DISCONTINUED | OUTPATIENT
Start: 2022-01-21 | End: 2022-01-21 | Stop reason: HOSPADM

## 2022-01-21 RX ORDER — AMOXICILLIN 250 MG
1 CAPSULE ORAL 2 TIMES DAILY
Qty: 10 TABLET | Refills: 1 | OUTPATIENT
Start: 2022-01-21 | End: 2024-08-08

## 2022-01-21 RX ORDER — BUPIVACAINE HYDROCHLORIDE AND EPINEPHRINE 5; 5 MG/ML; UG/ML
INJECTION, SOLUTION EPIDURAL; INTRACAUDAL; PERINEURAL PRN
Status: DISCONTINUED | OUTPATIENT
Start: 2022-01-21 | End: 2022-01-21 | Stop reason: HOSPADM

## 2022-01-21 RX ORDER — EPHEDRINE SULFATE 50 MG/ML
INJECTION, SOLUTION INTRAMUSCULAR; INTRAVENOUS; SUBCUTANEOUS PRN
Status: DISCONTINUED | OUTPATIENT
Start: 2022-01-21 | End: 2022-01-21

## 2022-01-21 RX ORDER — METOPROLOL TARTRATE 1 MG/ML
1-2 INJECTION, SOLUTION INTRAVENOUS EVERY 5 MIN PRN
Status: DISCONTINUED | OUTPATIENT
Start: 2022-01-21 | End: 2022-01-21 | Stop reason: HOSPADM

## 2022-01-21 RX ORDER — METOPROLOL SUCCINATE 25 MG/1
25 TABLET, EXTENDED RELEASE ORAL ONCE
Status: DISCONTINUED | OUTPATIENT
Start: 2022-01-21 | End: 2022-01-21

## 2022-01-21 RX ORDER — ACETAMINOPHEN 325 MG/1
975 TABLET ORAL ONCE
Status: DISCONTINUED | OUTPATIENT
Start: 2022-01-21 | End: 2022-01-21 | Stop reason: HOSPADM

## 2022-01-21 RX ORDER — FENTANYL CITRATE 50 UG/ML
50 INJECTION, SOLUTION INTRAMUSCULAR; INTRAVENOUS EVERY 5 MIN PRN
Status: DISCONTINUED | OUTPATIENT
Start: 2022-01-21 | End: 2022-01-21 | Stop reason: HOSPADM

## 2022-01-21 RX ORDER — KETAMINE HYDROCHLORIDE 10 MG/ML
INJECTION INTRAMUSCULAR; INTRAVENOUS PRN
Status: DISCONTINUED | OUTPATIENT
Start: 2022-01-21 | End: 2022-01-21

## 2022-01-21 RX ORDER — ONDANSETRON 2 MG/ML
INJECTION INTRAMUSCULAR; INTRAVENOUS PRN
Status: DISCONTINUED | OUTPATIENT
Start: 2022-01-21 | End: 2022-01-21

## 2022-01-21 RX ORDER — HYDROMORPHONE HYDROCHLORIDE 1 MG/ML
0.5 INJECTION, SOLUTION INTRAMUSCULAR; INTRAVENOUS; SUBCUTANEOUS EVERY 5 MIN PRN
Status: DISCONTINUED | OUTPATIENT
Start: 2022-01-21 | End: 2022-01-21 | Stop reason: HOSPADM

## 2022-01-21 RX ORDER — ONDANSETRON 4 MG/1
4 TABLET, ORALLY DISINTEGRATING ORAL EVERY 30 MIN PRN
Status: DISCONTINUED | OUTPATIENT
Start: 2022-01-21 | End: 2022-01-21 | Stop reason: HOSPADM

## 2022-01-21 RX ORDER — DEXAMETHASONE SODIUM PHOSPHATE 4 MG/ML
INJECTION, SOLUTION INTRA-ARTICULAR; INTRALESIONAL; INTRAMUSCULAR; INTRAVENOUS; SOFT TISSUE PRN
Status: DISCONTINUED | OUTPATIENT
Start: 2022-01-21 | End: 2022-01-21

## 2022-01-21 RX ADMIN — Medication 5 MG: at 09:03

## 2022-01-21 RX ADMIN — CEFAZOLIN SODIUM 2 G: 2 INJECTION, SOLUTION INTRAVENOUS at 02:08

## 2022-01-21 RX ADMIN — ROCURONIUM BROMIDE 40 MG: 50 INJECTION, SOLUTION INTRAVENOUS at 07:56

## 2022-01-21 RX ADMIN — LIDOCAINE HYDROCHLORIDE 60 MG: 20 INJECTION, SOLUTION INFILTRATION; PERINEURAL at 07:55

## 2022-01-21 RX ADMIN — SODIUM CHLORIDE, POTASSIUM CHLORIDE, SODIUM LACTATE AND CALCIUM CHLORIDE: 600; 310; 30; 20 INJECTION, SOLUTION INTRAVENOUS at 07:46

## 2022-01-21 RX ADMIN — ONDANSETRON 4 MG: 2 INJECTION INTRAMUSCULAR; INTRAVENOUS at 09:46

## 2022-01-21 RX ADMIN — Medication 4 MCG: at 09:46

## 2022-01-21 RX ADMIN — GABAPENTIN 1200 MG: 600 TABLET, FILM COATED ORAL at 16:04

## 2022-01-21 RX ADMIN — FENTANYL CITRATE 100 MCG: 50 INJECTION, SOLUTION INTRAMUSCULAR; INTRAVENOUS at 07:55

## 2022-01-21 RX ADMIN — HYDROMORPHONE HYDROCHLORIDE 0.5 MG: 1 INJECTION, SOLUTION INTRAMUSCULAR; INTRAVENOUS; SUBCUTANEOUS at 07:16

## 2022-01-21 RX ADMIN — PHENYLEPHRINE HYDROCHLORIDE 100 MCG: 10 INJECTION INTRAVENOUS at 08:15

## 2022-01-21 RX ADMIN — HYDROMORPHONE HYDROCHLORIDE 0.5 MG: 1 INJECTION, SOLUTION INTRAMUSCULAR; INTRAVENOUS; SUBCUTANEOUS at 10:28

## 2022-01-21 RX ADMIN — SUGAMMADEX 150 MG: 100 INJECTION, SOLUTION INTRAVENOUS at 09:49

## 2022-01-21 RX ADMIN — Medication 8 MCG: at 07:55

## 2022-01-21 RX ADMIN — ONDANSETRON 4 MG: 2 INJECTION INTRAMUSCULAR; INTRAVENOUS at 06:06

## 2022-01-21 RX ADMIN — ACETAMINOPHEN 650 MG: 325 TABLET, FILM COATED ORAL at 02:12

## 2022-01-21 RX ADMIN — Medication 5 MG: at 08:52

## 2022-01-21 RX ADMIN — Medication 10 MG: at 07:55

## 2022-01-21 RX ADMIN — Medication 5 MG: at 08:48

## 2022-01-21 RX ADMIN — Medication 2 G: at 07:59

## 2022-01-21 RX ADMIN — GABAPENTIN 1200 MG: 600 TABLET, FILM COATED ORAL at 07:26

## 2022-01-21 RX ADMIN — Medication 4 MCG: at 08:52

## 2022-01-21 RX ADMIN — PHENYLEPHRINE HYDROCHLORIDE 100 MCG: 10 INJECTION INTRAVENOUS at 08:29

## 2022-01-21 RX ADMIN — Medication 12.5 MG: at 04:35

## 2022-01-21 RX ADMIN — ACETAMINOPHEN 975 MG: 325 TABLET, FILM COATED ORAL at 07:21

## 2022-01-21 RX ADMIN — HYDROMORPHONE HYDROCHLORIDE 0.5 MG: 1 INJECTION, SOLUTION INTRAMUSCULAR; INTRAVENOUS; SUBCUTANEOUS at 11:20

## 2022-01-21 RX ADMIN — Medication 5 MG: at 08:38

## 2022-01-21 RX ADMIN — Medication 4 MCG: at 09:05

## 2022-01-21 RX ADMIN — DEXAMETHASONE SODIUM PHOSPHATE 4 MG: 4 INJECTION, SOLUTION INTRA-ARTICULAR; INTRALESIONAL; INTRAMUSCULAR; INTRAVENOUS; SOFT TISSUE at 08:30

## 2022-01-21 RX ADMIN — PROPOFOL 120 MG: 10 INJECTION, EMULSION INTRAVENOUS at 07:55

## 2022-01-21 ASSESSMENT — ACTIVITIES OF DAILY LIVING (ADL)
ADLS_ACUITY_SCORE: 14

## 2022-01-21 ASSESSMENT — COPD QUESTIONNAIRES: COPD: 0

## 2022-01-21 ASSESSMENT — LIFESTYLE VARIABLES: TOBACCO_USE: 0

## 2022-01-21 ASSESSMENT — VISUAL ACUITY
OU: NORMAL ACUITY

## 2022-01-21 ASSESSMENT — ENCOUNTER SYMPTOMS: SEIZURES: 0

## 2022-01-21 NOTE — DISCHARGE SUMMARY
[facility] Discharge Summary and Instructions    Anayeli Gagnon MRN# [id number]   Age: 71 year old YOB: 1950     Date of Admission:  1/19/2022  Date of Discharge::  1/21/2022  Admitting Physician:  Luis Orourke MD  Discharge Physician:  Luis Orourke MD          Admission Diagnoses:   Intractable neuropathic pain of lumbosacral origin [M79.2]  Surgery, elective [Z41.9]  Status post spinal cord stimulator, T12-L1.  Status post intrathecal pain pump placement with externalization of the pump for testing.          Discharge Diagnosis:     IIntractable neuropathic pain of lumbosacral origin [M79.2]  Surgery, elective [Z41.9]  Status post spinal cord stimulator, T12-L1.  Status post intrathecal pain pump placement with externalization of the pump for testing.   In addition to the assessment of diagnoses detailed above, this 71 year old female  patient admitted and has the following conditions contributing to the complexity of their medical care:    Spinal AV fistula    Clinically Significant Risk Factors Present on Admission                         Procedures:   Internalization of the intrathecal catheter with placement of pain pump on 1/21/2022           Brief History of Illness:     Anayeli Gagnon is a 71 year old female with a complex past medical history including spinal subdural hematoma status post evacuation complicated by recurrence requiring lumbar evacuation in 2017. Repeat spinal angiograms at that time was negative for any acute findings. In 2018 after regaining ability to ambulate with assistance, began to deteriorate found to have thoracic intradural adhesions and underwent a two staged lysis of adhesions in October 2018. 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 She underwent  spinal cord stimulation trial by Dr. Orourke in April 2021- but patient found no benefit. She was subsequently offered a trial of intrathecal therapy. Therefore she underwent placement of externalized pain pump on 01/19/2022 which was internalized on 1/21/2022.           Hospital Course:   Patient underwent above-mentioned procedure on 1/21/2022. The operation was uncomplicated and she was admitted to the floor for routine post operative cares. Her immediate postoperative period was uneventful. Her back pain improved during the trial although she had minimal complaints of headaches. On post operative day 2 she was doing well and transferred to the OR for internalization of her pain pump.  She did well in her postoperative period and was transferred to floor. She was discharged on 1/21/2022. At the time of discharge she was ambulating, voiding without a hudson, eating a regular diet, pain was well controlled and therefore she was discharged on 1/21/2022.    Exam at discharge:  - Speech fluent, spontaneous. No aphasia or dysarthria.  - No gaze preference. No apparent hemineglect.  - PERRL, EOMI  - Face symmetric with sensation intact to light touch  - Palate elevates symmetrically, uvula midline, tongue protrudes midline  - Trapezii muscles 5/5 bilaterally  - No pronator drift       Del Tr Bi WE WF Gr   R 5 5 5 5 5 5   L 5 5 5 5 5 5      Bilateral lower extremities 0 out of 5.     Thoracic sensory level present         Discharge Medications:     Current Discharge Medication List      CONTINUE these medications which have NOT CHANGED    Details   acetaminophen (TYLENOL) 500 MG tablet Take 500-1,000 mg by mouth every 6 hours as needed for mild pain      baclofen (LIORESAL) 10 MG tablet Take 10 mg by mouth 3 times daily as needed for muscle spasms      ciprofloxacin (CIPRO) 500 MG tablet Take 500 mg by mouth 2 times daily For 7 days      gabapentin (NEURONTIN) 600 MG tablet Take 1,200 mg by mouth 3 times daily   Qty: 1  tablet, Refills: 0    Associated Diagnoses: Arteriovenous fistula of spinal cord vessels      lidocaine (LIDODERM) 5 % patch Apply 1 patch topically daily as needed       LORazepam (ATIVAN) 0.5 MG tablet Take 1 tablet by mouth daily as needed for anxiety       medical cannabis (Patient's own supply) See Admin Instructions (The purpose of this order is to document that the patient reports taking medical cannabis.  This is not a prescription, and is not used to certify that the patient has a qualifying medical condition.)     Liquid oral formulation.      melatonin 3 MG tablet Take 3 mg by mouth nightly as needed       Menthol, Topical Analgesic, (BIOFREEZE) 4 % GEL Externally apply topically daily as needed      metoprolol succinate ER (TOPROL-XL) 25 MG 24 hr tablet Take 12.5 mg by mouth every evening       morphine (MS CONTIN) 15 MG CR tablet Take 15 mg by mouth every 12 hours      Multiple Vitamins-Minerals (WOMENS MULTI PO) Take 1 tablet by mouth daily      NARCAN 4 MG/0.1ML nasal spray INHALE ONE SPRAY IN ONE NOSTRIL AS NEEDED FOR OPIOID. ANTIDOTE, MAY REPEAT IN OTHER NOSTRIL IF NEEDED      nystatin (NYSTOP) 690035 UNIT/GM external powder Apply topically 3 times daily as needed Cream      ondansetron (ZOFRAN) 4 MG tablet Take 4 mg by mouth every 8 hours as needed for nausea       oxyCODONE-acetaminophen (PERCOCET) 5-325 MG tablet Take 1-2 tablets by mouth every 6 hours as needed for severe pain Max 4/day      polyethylene glycol (MIRALAX) 17 GM/Dose powder Take 17 g by mouth daily as needed for constipation       THEANINE PO Take 1 tablet by mouth daily as needed (takes when she remembers)                     Discharge Instructions and Follow-Up:     Discharge diet: Regular   Discharge activity: You may advance activity as tolerated. No strenuous exercise or heay lifting greater than 10 lbs for 4 weeks or until seen and cleared in clinic.   Discharge follow-up: Follow-up with Dr. Luis Orourke MD on  2/1/2022    Wound care: Ok to shower,however no scrubbing of the wound and no soaking of the wound, meaning no bathtubs or swimming pools. Pat dry only. Leave wound open to air.  Sutures are not absorbable and need to be removed in 2 weeks. If patient still at rehab by this time, the sutures may be removed by the rehab physician if he or she considers that the wound has healed completely.     Please call if you have:  1. increased pain, redness, drainage, swelling at your incision  2. fevers > 101.5 F degrees  3. with any questions or concerns.  You may reach the Neurosurgery clinic at [phone] during regular work hours. ER at [phone].    and ask for the Neurosurgery Resident on call at [phone], during off hours or weekends.    .         Discharge Disposition:     Discharged to home          Allyn Santiago  Neurosurgery

## 2022-01-21 NOTE — PLAN OF CARE
Status: s/p externalized thecal pump placement and trial on 1/20  Vitals: VSS on RA.  Neuros: AO x4. BUE 5/5, baseline paraplegia with minimal sensation. Anxious.   IV: PIV SL  Labs/Electrolytes: WNL  Resp/trach: LS clear, wearing 2L NC when sleeping with continuous pulse ox.  Diet: Regular diet  Bowel status: colostomy in place, bag replaced this AM. Smear BM this afternoon.  : hudson in place for neurogenic bladder  Skin: posterior back incision covered, CDI. Per notes, to be changed by NSG only-RN to only reinforce if needed.   Pain: c/o back, neck, and leg pain- pain pump and control at bedside with bolus doses available q2hrs alongside basal rate. Prn tylenol given x2.  Activity: A2/Lift. Turn and reposition as needed, pt weight shifting in bed. W/C bound at baseline, WC at bedside.  Plan: OR tomorrow for internalization of pump at 0730. NPO at midnight, consent already done. Pre-procedure CHG bath and shampoo completed at 2100.

## 2022-01-21 NOTE — PLAN OF CARE
S/p externalized thecal pump placement and trial on 1/20, internalization on 1/21. Oxygen in mid-90s on RA, OVSS. Abd incisions approximated with glue closures. Back incision covered in primapore. Patient dressed and in w/c, PIV removed. Discharged home with , paperwork reviewed, all questions answered.

## 2022-01-21 NOTE — OR NURSING
18ML of Bupivacaine 1mL/1mg and Hydromorphone 1/ml1mg mixture 1:1 removed from previously existing pump and filled into newly implanted pump

## 2022-01-21 NOTE — ANESTHESIA POSTPROCEDURE EVALUATION
Patient states she took the vitamin as prescribed, appointment for abd pain scheduled for 1/20/17   Patient: Anayeli Gagnon    Procedure: Procedure(s):  INSERTION, INTRATHECAL ANALGESIC PUMP       Diagnosis:Intractable neuropathic pain of lumbosacral origin [M79.2]  Diagnosis Additional Information: No value filed.    Anesthesia Type:  General    Note:  Disposition: Inpatient   Postop Pain Control: Uneventful            Sign Out: Well controlled pain   PONV: No   Neuro/Psych: Uneventful            Sign Out: Acceptable/Baseline neuro status   Airway/Respiratory: Uneventful            Sign Out: Acceptable/Baseline resp. status   CV/Hemodynamics: Uneventful            Sign Out: Acceptable CV status; No obvious hypovolemia; No obvious fluid overload   Other NRE: NONE   DID A NON-ROUTINE EVENT OCCUR? No           Last vitals:  Vitals Value Taken Time   /72 01/21/22 1130   Temp 36.1  C (97  F) 01/21/22 1100   Pulse 81 01/21/22 1134   Resp 16 01/21/22 1120   SpO2 95 % 01/21/22 1141   Vitals shown include unvalidated device data.    Electronically Signed By: Samanta Barry MD  January 21, 2022  11:43 AM

## 2022-01-21 NOTE — OR NURSING
"@ 0630 - Patient received from 6A with transport into room 1 on 3c. Patient extremely upset and frustrated, stating 'I woke up this morning with my heart racing. I have not been given by metoprolol, MS Contin, or Percocet since I have been in the hospital. I am not happy or pleased with this experience, I want to know why I have not been given my medications.\" Patient was reassured that her concerns would be addressed and encouraged patient to remain positive as we continued with the pre-op process.   @ 0650 - Dr. Orourke arrived to bedside, patients concerns were explained and redirected to him. Dr. Orourke spent several minutes with patient listening. Dr. Orourke offered various options in proceeding with pain management and today's surgical procedure. MD left room to discuss plan with anesthesia.   @ 0705 - Dr. Orourke returned to discuss plan with patient. Plan to continue with surgical procedure today and give patient her scheduled medications as orders.     "

## 2022-01-21 NOTE — ANESTHESIA PREPROCEDURE EVALUATION
Anesthesia Pre-Procedure Evaluation    Patient: Anayeli Gagnon   MRN: [id number] : 1950        Preoperative Diagnosis: Intractable neuropathic pain of lumbosacral origin [M79.2]    Procedure : Procedure(s):  INSERTION, INTRATHECAL ANALGESIC PUMP          Past Medical History:   Diagnosis Date     CARDIAC DYSRHYTHMIAS NEC 10/3/2007    Taking atenelol for years for this     History of skin cancer      Mitral valve prolapse      Neurogenic bladder      Sacral decubitus ulcer, stage IV (H)      Thoracic spinal cord injury (H)       Past Surgical History:   Procedure Laterality Date     DECOMPRESSION LUMBAR ONE LEVEL N/A 2019    Procedure: Posterior spinal decompression;  Surgeon: Alf Ritchie MD;  Location: [facility] OR     INSERT INTRATHECAL PAIN PUMP N/A 2022    Procedure: INSERTION, INTRATHECAL ANALGESIC PUMP, externalized trial;  Surgeon: Luis Orourke MD;  Location: [facility] OR     INSERT STIMULATOR AND LEADS INTERNAL DORSAL COLUMN N/A 2021    Procedure: Posterior thoracic 12 to Lumbar 1 level for placement of spinal cord stimulator paddle and placement of implantable pulse generator/battery over right buttock;  Surgeon: Luis Orourke MD;  Location: [facility] OR     IR SPINAL ANGIOGRAM  10/16/2019     IR SPINAL ANGIOGRAM  2019     LAPAROSCOPIC TUBAL LIGATION       REPAIR SPINAL ARERIOVENOUS MALFORMATION N/A 2019    Procedure: with surgical disconnection arterial venous fistula Thoracic 5;  Surgeon: Alf Ritchie MD;  Location: [facility] OR      Allergies   Allergen Reactions     Contrast Dye Rash     Painful rash after x-ray contrast      Social History     Tobacco Use     Smoking status: Never Smoker     Smokeless tobacco: Never Used   Substance Use Topics     Alcohol use: No      Wt Readings from Last 1 Encounters:   22 56.7 kg (125 lb)        Anesthesia Evaluation   Pt has had prior anesthetic.     No history of anesthetic complications       ROS/MED  HX  ENT/Pulmonary:  - neg pulmonary ROS  (-) tobacco use, asthma, COPD, sleep apnea and recent URI   Neurologic: Comment: Thoracic spinal cord injury, progressive paralysis, wheelchair bound    (+) peripheral neuropathy, - LEs, consistent w/ spinal issues.  (-) no seizures and no CVA   Cardiovascular: Comment: Has been taking metoprolol ~ 40 yrs for mitral prolapse (no documentation of MVP on current echo)    (+) hypertension-----Previous cardiac testing   Echo: Date: 2017 Results:  CONCLUSION:  1. Technically difficult study with poor acoustic windows.     2. The left ventricle is normal size with hyperdynamic systolic function.  Ejection fraction is 70-75%.     3. The right ventricle is poorly visualized but appears to be normal size with normal systolic function.     4. Mild tricuspid regurgitation.      5. No significant pericardial effusion.     6. No prior echos available for comparison.      Stress Test:  Date: 6/19/21 Results:  IMPRESSIONS   Myocardial perfusion imaging is normal without evidence of infarct or   ischemia.     Stress test findings suggests that patient is low risk (<1% annual cardiac   mortality rate).       RISK ASSESSMENT:     Stress test findings suggests that patient is low risk    (<1% annual cardiac mortality rate).       ECG Reviewed:  Date: 12/25/21 Results:  Sinus rhythm    Cath:  Date: 2017 Results:  CONCLUSION  1) Normal LV systolic function, ejection fraction of 50%.     2) Angiographically normal coronary arteries.       METS/Exercise Tolerance: 1 - Eating, dressing Comment: Wheelchair bound   Hematologic:  - neg hematologic  ROS  (-) history of blood clots and history of blood transfusion   Musculoskeletal: Comment: Hx progressive paralysis and spinal cord injury due to an unclear etiology but involving spinal cord tethering and development of cord injury involving the mid to lower thoracic spinal cord.  She suffers from significant pain.     Arteriovenous fistula of spinal cord  vessels      GI/Hepatic: Comment: Occasional GERD    Has colostomy   (-) liver disease   Renal/Genitourinary: Comment: Has hudson catheter 2/2 neurogenic bladder   (-) renal disease   Endo:  - neg endo ROS     Psychiatric/Substance Use: Comment: Taking MS Contin bid & percocet 4-6 tabs daily      (+) psychiatric history depression H/O chronic opiod use  (Morphine 15 mg PO q12, Percocet 5/325 q4.). : medical cannabis.    Infectious Disease:  - neg infectious disease ROS  (-) Recent Fever   Malignancy: Comment: BCC on lower extremity, neck and shoulder  (+) Malignancy, History of Skin.    Other: Comment: Hx decubitus ulcer requiring debridement w/ skin flap     (+) , H/O Chronic Pain,other significant disability Wheelchair bound,          Physical Exam    Airway        Mallampati: II   TM distance: > 3 FB   Neck ROM: full   Mouth opening: > 3 cm    Respiratory Devices and Support         Dental       (+) partials      Cardiovascular             Pulmonary                   OUTSIDE LABS:  CBC:   Lab Results   Component Value Date    WBC 8.4 01/21/2022    WBC 7.6 01/20/2022    HGB 13.3 01/21/2022    HGB 13.9 01/20/2022    HCT 40.2 01/21/2022    HCT 42.3 01/20/2022     (L) 01/21/2022     (L) 01/20/2022     BMP:   Lab Results   Component Value Date     01/21/2022     01/20/2022    POTASSIUM 3.8 01/21/2022    POTASSIUM 3.8 01/20/2022    CHLORIDE 109 01/21/2022    CHLORIDE 110 (H) 01/20/2022    CO2 26 01/21/2022    CO2 28 01/20/2022    BUN 15 01/21/2022    BUN 9 01/20/2022    CR 0.45 (L) 01/21/2022    CR 0.57 01/20/2022     (H) 01/21/2022    GLC 96 01/20/2022     COAGS:   Lab Results   Component Value Date    PTT 33 01/21/2022    INR 0.99 01/21/2022     POC:   Lab Results   Component Value Date    BGM 91 04/07/2021     HEPATIC: No results found for: ALBUMIN, PROTTOTAL, ALT, AST, GGT, ALKPHOS, BILITOTAL, BILIDIRECT, DAVID  OTHER:   Lab Results   Component Value Date    PH 7.43 12/27/2019     LACT 1.4 12/27/2019    A1C 5.3 11/17/2011    ADAN 8.7 01/21/2022    PHOS 3.1 01/21/2022    MAG 2.1 01/21/2022    TSH 2.33 08/07/2010    SED 4 11/17/2011       Anesthesia Plan    ASA Status:  3   NPO Status:  NPO Appropriate    Anesthesia Type: General.     - Airway: ETT   Induction: Intravenous, Propofol.   Maintenance: Balanced.        Consents    Anesthesia Plan(s) and associated risks, benefits, and realistic alternatives discussed. Questions answered and patient/representative(s) expressed understanding.    - Discussed:     - Discussed with:  Patient      - Extended Intubation/Ventilatory Support Discussed: No.      - Patient is DNR/DNI Status: No         Postoperative Care    Pain management: IV analgesics, Oral pain medications, Multi-modal analgesia.   PONV prophylaxis: Ondansetron (or other 5HT-3), Background Propofol Infusion, Dexamethasone or Solumedrol     Comments:    Other Comments: Multimodal plan: pre-op home medication regimen, opiates IV, precedex, low dose ketamine prn.            Andre Clemons MD

## 2022-01-21 NOTE — ANESTHESIA PROCEDURE NOTES
Airway       Patient location during procedure: OR       Procedure Start/Stop Times: 1/21/2022 8:00 AM  Staff -        CRNA: Mohamud Zacarias APRN CRNA       Performed By: CRNA  Consent for Airway        Urgency: elective  Indications and Patient Condition       Indications for airway management: griselda-procedural       Induction type:intravenous       Mask difficulty assessment: 1 - vent by mask    Final Airway Details       Final airway type: endotracheal airway       Successful airway: ETT - single  Endotracheal Airway Details        ETT size (mm): 7.0       Cuffed: yes       Successful intubation technique: video laryngoscopy       VL Blade Size: MAC 3       Grade View of Cords: 1       Adjucts: stylet       Position: Right       Measured from: lips       Secured at (cm): 20       Bite block used: None    Post intubation assessment        Placement verified by: capnometry, equal breath sounds and chest rise        Number of attempts at approach: 1       Secured with: pink tape       Ease of procedure: easy       Dentition: Intact and Unchanged

## 2022-01-21 NOTE — ANESTHESIA CARE TRANSFER NOTE
Patient: Anayeli Gagnon    Procedure: Procedure(s):  INSERTION, INTRATHECAL ANALGESIC PUMP       Diagnosis: Intractable neuropathic pain of lumbosacral origin [M79.2]  Diagnosis Additional Information: No value filed.    Anesthesia Type:   General     Note:    Oropharynx: oropharynx clear of all foreign objects  Level of Consciousness: awake  Oxygen Supplementation: face mask  Level of Supplemental Oxygen (L/min / FiO2): 6  Independent Airway: airway patency satisfactory and stable  Dentition: dentition unchanged  Vital Signs Stable: post-procedure vital signs reviewed and stable  Report to RN Given: handoff report given  Patient transferred to: PACU    Handoff Report: Identifed the Patient, Identified the Reponsible Provider, Reviewed the pertinent medical history, Discussed the surgical course, Reviewed Intra-OP anesthesia mangement and issues during anesthesia, Set expectations for post-procedure period and Allowed opportunity for questions and acknowledgement of understanding      Vitals:  Vitals Value Taken Time   /77 01/21/22 1008   Temp     Pulse 92 01/21/22 1013   Resp 16 01/21/22 1013   SpO2 100 % 01/21/22 1013   Vitals shown include unvalidated device data.    Electronically Signed By: EMELI Miller CRNA  January 21, 2022  10:15 AM

## 2022-01-21 NOTE — PROGRESS NOTES
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 Repeat spinal angiograms at that time was negative for any acute findings. In 2018 after regaining ability to ambulate with assistance, began to deteriorate found to have thoracic intradural adhesions and underwent a two staged lysis of adhesions in October 2018. 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 She underwent spinal cord stimulation trial by Dr. Orourke in April 2021- but patient found no benefit. She was subsequently offered a trial of intrathecal therapy. Therefore she underwent placement of externalized pain pump on 01/19/2022.    Plan:  - Serial neuro exams-every 4 hour neurochecks  - OR today for internalization  - Continue Ancef for now  - PTA MS Contin held      -----------------------------------  Manjit Del Cid MD  PGY-2, Neurosurgery  -----------------------------------    Interval History: Overnight patient unclear why her MS Contin and prior to admission metoprolol were held. Became frustrated. After discussion with Dr. Orourke, and her , patient was amenable to surgery.    Objective:   Temp:  [96.5  F (35.8  C)-97.5  F (36.4  C)] 97.5  F (36.4  C)  Pulse:  [69-92] 85  Resp:  [16-20] 20  BP: (113-176)/() 176/89  SpO2:  [94 %-98 %] 97 %  I/O last 3 completed shifts:  In: 1010 [P.O.:910; I.V.:100]  Out: 825 [Urine:825]    Gen: In bed, laying comfortably  Wound: Tegaderm in place with gauze, catheter in place underneath the Tegaderm visible no obvious areas of kinking or breakage. Pump felt  underneath the tape.    Neurologic:  - Speech fluent, spontaneous. No aphasia or dysarthria.  - No gaze preference. No apparent hemineglect.  - PERRL, EOMI  - Face symmetric with sensation intact to light touch  - Palate elevates symmetrically, uvula midline, tongue protrudes midline  - Trapezii muscles 5/5 bilaterally  - No pronator drift     Del Tr Bi WE WF Gr   R 5 5 5 5 5 5   L 5 5 5 5 5 5     Bilateral lower extremities 0 out of 5.    Thoracic sensory level present    LABS:  Recent Labs   Lab 01/21/22  0729 01/21/22  0420 01/20/22  0649 01/20/22  0626 01/19/22  1401 01/19/22  0627   NA  --  140 144  --   --  142   POTASSIUM  --  3.8 3.8  --  3.9 4.0   CHLORIDE  --  109 110*  --   --  109   CO2  --  26 28  --   --  28   ANIONGAP  --  5 6  --   --  5   * 117* 96   < >  --  100*   BUN  --  15 9  --   --  13   CR  --  0.45* 0.57  --   --  0.58   ADAN  --  8.7 8.6  --   --  8.7    < > = values in this interval not displayed.       Recent Labs   Lab 01/21/22 0420   WBC 8.4   RBC 4.21   HGB 13.3   HCT 40.2   MCV 96   MCH 31.6   MCHC 33.1   RDW 13.2   *       IMAGING:  No results found for this or any previous visit (from the past 24 hour(s)).

## 2022-01-21 NOTE — PROGRESS NOTES
Consent Discussion     I met with Anayeli Gagnon to discuss the procedure again and go through the alternatives, potential benefits, and risks of the procedure in detail.     In particular, we first recapitulated her response to the intrathecal boluses yesterday, and she was sure that she experienced significant benefit. We also discussed the general risks of a medical device implant including but not limited to infection (including serious infection), breakage of the catheter or pump, bleeding in the skin where surgery is performed, MI, stroke, death, hardware failure and possible need for more surgeries. Surgical procedure was discussed in detail. All questions were answered, and she expressed understanding.     I also spoke to her  Jay and discussed our rationale for moving forward. He asked excellent questions and agreed with his wife that implanting the pump would be the best next step.     Luis Orourke MD

## 2022-01-21 NOTE — BRIEF OP NOTE
[facility]    Brief Operative Note    Pre-operative diagnosis: Intractable neuropathic pain of lumbosacral origin [M79.2]  Post-operative diagnosis Same as pre-operative diagnosis    Procedure: Procedure(s):  INSERTION, INTRATHECAL ANALGESIC PUMP  Surgeon: Surgeon(s) and Role:     * Luis Orourke MD - Primary     * Ata Nunn MD - Resident - Assisting  Anesthesia: General   Estimated Blood Loss: 5 mL from 1/21/2022  7:47 AM to 1/21/2022 10:05 AM      Drains: None  Specimens: * No specimens in log *  Findings:   Pain pump internalized.  Complications: None.  Implants:   Implant Name Type Inv. Item Serial No.  Lot No. LRB No. Used Action   Ascenda    MEDTRONIC [id number] N/A 1 Explanted   PUMP SYNCHROMED II 20ML 8637-20 - [id number] Pump PUMP SYNCHROMED II 20ML 8637-20 [id number] MEDTRONIC INC  N/A 1 Explanted   PUMP SYNCHROMED II 20ML 8637-20 - [id number] Pump PUMP SYNCHROMED II 20ML 8637-20 [id number] MEDTRONIC INC  Right 1 Implanted   Ascenda Targetd Drug Delivery     MEDTRONIC [id number] Right 1 Implanted       Ata Nunn MD  Neurosurgery Resident, PGY-3    Please contact neurosurgery resident on call with questions.    Dial * * *[phone], enter [phone] when prompted.

## 2022-01-21 NOTE — PLAN OF CARE
"Status: s/p externalized thecal pump placement and trial on 1/20  Vitals: VSS on RA while awake, 2L NC while sleeping. HTN within 160 parameters.  Pt. C/o heart pounding d/t not taking metoprolol, one time dose given this shift and dose scheduled for this evening   Neuros: AOx4. BUE 5/5. BLE 0/5, baseline paraplegia with minimal sensation. Crying and upset this shift stating \"I wish I never would have done this. I should have stuck to what I was doing before. I just want to go.\" Writer asked pt. To elaborate on what \"go\" means and asked if pt. Is having thoughts of self harm and pt. Denied any suicidal ideation.  IV: PIV SL  Labs/Electrolytes: Platelets 144. Creatinine 0.45  Resp/trach: LS clear, wearing 2L NC when sleeping with continuous pulse ox.  Diet: NPO ex/ meds since midnight. PRN zofran given for c/o nausea  Bowel status: 1 piece colostomy in place. Smear BM this morning   : hudson in place for neurogenic bladder, 250mL out this shift  Skin: posterior back incision covered, CDI. Per notes, to be changed by NSG only-RN to only reinforce if needed.   Pain: c/o head, back, neck, and leg pain- pain pump and control at bedside with bolus doses available q2hrs alongside basal rate-3 bolus doses given this shift. PRN tylenol given x1. Per pt. Pain is 8/10 no matter what. Hot and cold packs, warm blankets, and essential oils offered but pt. Declined.   Activity: A2/Lift. Turn and reposition as needed, pt weight shifting in bed. W/C bound at baseline, WC at bedside.  Plan: Pre-procedure CHG bath and shampoo completed at 0530. Transport came to get pt. Around 0620. OR today for internalization of pump at 0730.  Continue to monitor and follow POC   "

## 2022-01-21 NOTE — OP NOTE
Procedure Date: 01/21/2022    PREOPERATIVE DIAGNOSES:  1.  Intractable neuropathic pain of lumbosacral origin.  2.  Status post spinal cord stimulator, T12-L1.  3.  Status post intrathecal pain pump placement with externalization of the pump for testing.    POSTOPERATIVE DIAGNOSES:  1.  Intractable neuropathic pain of lumbosacral origin.  2.  Status post spinal cord stimulator, T12-L1.  3.  Status post intrathecal pain pump placement with externalization of the pump for testing.    PROCEDURES PERFORMED:  Internalization of the intrathecal catheter with placement of pain pump.    STAFF SURGEON:  Luis Orourke MD    RESIDENT SURGEON: Ata Nunn MD    ANESTHESIA:  General endotracheal anesthesia.    IMPLANTS:    1.  Medtronic intrathecal pump catheter, lot number [id number].    2.  Medtronic pump, serial number [id number], placed in the right abdomen.    Final implanted catheter length: Pump segment 45.3 cm, spinal segment 22.5 cm, totaling 67.8 cm of catheter, meaning volume is 0.149 mL within the catheter.    PUMP SETTINGS:  Hydromorphone 1 mg per mL, bupivacaine 1 mg per mL, 18 mL of drug total placed in the pump with a setting of 0.4995 mg per day infused, simple continuous setting, plus PTM dosing    FULL SYSTEM PRIME VOLUME:  0.289 mL, duration 18 minutes.    INDICATIONS FOR PROCEDURE:  Anayeli Gagnon is a 71-year-old female with a history of spinal cord injury and intractable pain, who previously underwent a spinal cord stimulator placement, but it was unsuccessful in controlling her symptoms. She was offered a trial of intrathecal pain pump 2 days ago and she elected to proceed with surgery.  She felt the pain pump was improving her symptoms and, therefore, she was indicated for operation today for internalization of the pump and catheter system.  Risks, benefits and alternatives were discussed with the patient and she elected to proceed with surgery.    DESCRIPTION OF PROCEDURE:  The patient was  brought into the operating room.  Identity was verified and general endotracheal anesthesia was obtained.  She was then transferred to the operative table in the lateral position with her left side down. The externalized catheter was then ligated using silk ties in 2 separate locations and the catheter was cut in between those 2 ligated segments, removing the pain pump off of the field.  All skin securing sutures were then removed and the catheter was placed in an accessible area to be pulled later in the operation. The entire area of her back, right flank and right abdomen were cleaned and prepped in routine sterile fashion.  Timeout was performed after antibiotics were given.    Beginning on the back, the old incision where the catheter was externalized and anchored was opened using Metzenbaum scissors until the catheter and the fascial anchors were encountered.  At this point, the catheter was cut, maintaining the anchoring system in place. 2-0 Ethibond was used to secure the anchors. The OR staff then pulled the externalized catheter leaving no externalized portion of the catheter.  Focusing on the abdominal portion, a 7 cm incision was made using a #10 blade scalpel after 8 mL of local anesthetic was placed within this planned incision, as well as 2 mL within the right flank incision which was a planned skip incision for tunneling.  Once the abdominal incision was opened using #10 blade scalpel,  monopolar cautery was used to dissect down to the preabdominal fascial layer, making sure to be within the subcutaneous tissue about 1 cm deep to the skin. Once a pocket was adequately opened at this layer, mosquito forceps were used for blunt dissection, opening and creating the pocket.  This pocket was opened until the size of the pocket was adequate for the size of the pump and hemostasis was obtained.  The pocket was irrigated copiously and sponges were placed to maintain the pocket.  The #10 blade scalpel was then  used to make a 1 cm incision along her right flank.  Monopolar cautery was then used to deepen the incision slightly.  A tunneler was then used to tunnel from the back incision to the flank skip incision, leaving a plastic sheath.  The tunneler was then used from the flank incision to the abdominal incision, leaving a second plastic sheath.  The new pump catheter was then tunneled from the abdominal incision to the skip incision, removing the plastic sheath, leaving only the tunneled catheter.  The end of this catheter was then tunneled through the plastic sheath from the skip incision over to the back incision.  The plastic sheath was removed, leaving the catheter tunneled subcutaneously between the abdominal incision and the back incision.  A [employer] connector was then used to connect the pump catheter with the spinal catheter that was in place from before. The proximal portion of the pump catheter was then connected to the pump, which had been previously prepped with 18 mL of drug.  Once the pump was connected to the catheter, a needle connected to a syringe was placed through the side port and slow aspiration of fluid was completed until it was confirmed that CSF was flowing through the catheter.  At this point, the needle was removed and the pump was placed within the previously made pocket after the Ray-Tecs were removed and the pocket was assessed for any bleeding.  Meticulous hemostasis was obtained prior to placement of the pump.  Once the pump was within the pocket, it was ensured that any excess catheter was looped behind the pump and that the access port of the pump was facing superficially.  At this point, Ethibond suture was placed in the superior aspect of the incision to secure the pump in its location.     We then focused on closure.  We closed the incisions in the following manner:  The back incision was closed using 2-0 Vicryl suture in an inverted interrupted fashion for the dermal layer and  subcuticular 3-0 Monocryl was used for the skin layer.  The skip incision was closed with 3-0 undyed Vicryl sutures in inverted interrupted fashion for the dermal layer and 3-0 subcutaneous subcuticular Monocryl was used for the skin.  The abdominal incision was closed using 3-0 Vicryl suture in interrupted fashion to close the capsule layer, 3-0 Vicryl suture was then used in an inverted interrupted fashion for the dermal layer and the skin was closed using 3-0 Monocryl in a subcuticular fashion.  All 3 incisions were cleaned.  The skip incision as well as the abdominal incision were dressed using Exofin skin glue.  The back incision had a sterile Primapore dressing placed over it due to it being in a reopened incision.  This marked the end of the operation.  All instrument and needle counts were correct x2.     Dr. Orourke was present for the entirety of the procedure.    Luis Orourke MD    As Dictated by LIZBETH LANDAVERDE MD        D: 2022   T: 2022   MT: [id number]    Name:     JOVAN MADRID  MRN:      [id number]        Account:        [id number]   :      1950           Procedure Date: 2022     Document: [id number]

## 2022-01-24 ENCOUNTER — TELEPHONE (OUTPATIENT)
Dept: NEUROSURGERY | Facility: CLINIC | Age: 72
End: 2022-01-24
Payer: MEDICARE

## 2022-01-24 ENCOUNTER — DOCUMENTATION ONLY (OUTPATIENT)
Dept: NEUROSURGERY | Facility: CLINIC | Age: 72
End: 2022-01-24
Payer: MEDICARE

## 2022-01-24 NOTE — TELEPHONE ENCOUNTER
[facility] Call Center    Phone Message    May a detailed message be left on voicemail: yes     Reason for Call: Other: Patient is requesting a call back to discuss pain she is experiencing, please call patient at [phone] to advise.     Action Taken: Message routed to:  [facility] Neurosurgery    Travel Screening: Not Applicable

## 2022-01-24 NOTE — TELEPHONE ENCOUNTER
Neurosurgery Discharge Coordination Note     Attending physician: Dr. Orourke  Discharge to: Home     Current status: Pt s/p intrathecal pain pump trial/externalized on 1/19/22 and internalization of the intrathecal catheter and pump placement on 1/21/22. She states that she is not getting any pain relief in her feet and ankles from the pump boluses and she would to know next steps for pump management. She also has back pain that is not improving with the pump.     Pt taking Tylenol for postoperative pain with some relief.     Pt has bruising around abdominal incision. Denies redness, swelling, increased tenderness, drainage, incisions opening or elevated temp.    I will follow-up with Dr. Espinoza and Dr. Orourke about the boluses and call pt back. She is in agreement with plan.     Discharge instructions and medications reviewed with patient.    Follow up appointments/imaging/tests needed: 2 week post op with Dr. Orourke 2/1/22 at 12:45 p.m.     RN triage/on call number given: [phone]/ [phone]

## 2022-01-25 DIAGNOSIS — M79.2 INTRACTABLE NEUROPATHIC PAIN OF LUMBOSACRAL ORIGIN: Primary | ICD-10-CM

## 2022-01-25 NOTE — PROGRESS NOTES
Spoke with patient and :    Anayeli is a 71-year-old female history of spinal cord injury with intractable lumbosacral pain, she is POD 3 from internalization of Dilaudid-bupivacaine intrathecal pump with Dr. Orourke.  She reports since internalization her pain has now included her legs where previously it was her feet and groin.  Describes the pain as burning similar in sensation to her preoperative pain.  Her pain escalated to the point last night where she took a Percocet because she could not handle the pain (she reports she was instructed not to take her MS Contin nor her Percocet).  She also endorses a constant headache that improves with laying down and worsens with sitting up.  She reports she has not yet met with Dr. Espinoza for management of her pump, and is unaware of how to reach her or her clinic.  She reports her bolus schedule has not changed since modification (see operative note from implantation 01/21/2022 of most recent pump settings).  She continues to bolus herself every 2 hours-however reports only 30 minutes of pain relief at most after bolusing.  With the help of her  she was able to describe her anterior posterior incisions: Mild erythema, no fluid pockets no drainage, fibrin glue still in place.  After discussion with Dr. Orourke, due to the degree of her pain we decided it was okay to continue intermittent Percocet dosing (2.5mg-325mg every 6 hours PRN) until follow-up with Dr. Espinoza for planning of how to titrate her pump.  Annamarie reports she has never had symptoms of opioid overdosing, but is aware of the symptoms and has had them discussed with her before (patient takes PTA MS Contin and Percocet 2.5-325 mg).  I told her to be aware of these symptoms to only take the Percocet as needed.  I gave her the clinic number for Dr. Espinoza and instructed her to call and set up an appointment as soon as possible.  Of note, she does also have follow-up with Dr. Orourke on February 1,  2022.    Manjit Del Cid MD  Neurosurgery Resident, PGY-2    Discussed with Dr. Luis Orourke.

## 2022-01-26 ENCOUNTER — TELEPHONE (OUTPATIENT)
Dept: ANESTHESIOLOGY | Facility: CLINIC | Age: 72
End: 2022-01-26
Payer: MEDICARE

## 2022-01-26 ENCOUNTER — MEDICAL CORRESPONDENCE (OUTPATIENT)
Dept: HEALTH INFORMATION MANAGEMENT | Facility: CLINIC | Age: 72
End: 2022-01-26
Payer: MEDICARE

## 2022-01-26 NOTE — TELEPHONE ENCOUNTER
Pt was called back by RNCC and Dr. Espinoza. Documentation in separate telephone encounter.     Santa Wallace DNP, RN

## 2022-01-26 NOTE — TELEPHONE ENCOUNTER
"Dr. Espinoza contacted pt and her . Per provider, summary of conversation is as follows:       -Pt reports excellent pain relief yesterday, was able to sleep through the night  -Today she has been experiencing severe pain and was tearful during the conversation   -She had questions about what will happen at pump refill appointment on 2/3/22 and was advised that pump medication refill and dose adjustments will be performed  -She expressed concerns for dosages being decreased; however, she was reassured that medications will be titrated up accordingly.  -She had questions about PO meds morphine and percocet being stopped \"cold turkey;\" however, she was advised that PO meds were replaced with intrathecal meds.   -Her questions about the trial process she underwent were addressed, advised that typically trials are 2 hours whereas Jaron Orourke and Olga made an exception, her trial was 2 days, and the only inpatient trial we have done for patients thus far  -Pt was advised that the hospital dose was utilized as baseline and titrations will go up from there  -She did state that at the moment she is in a lot of pain, though she verbalized understanding of the treatment plan as per their discussion      Santa Wallace, KIRAN, RN      "

## 2022-01-26 NOTE — TELEPHONE ENCOUNTER
Health Call Center    Phone Message    May a detailed message be left on voicemail: yes     Reason for Call: Symptoms or Concerns     If patient has red-flag symptoms, warm transfer to triage line    Current symptom or concern: pain in groin, legs and feet    Symptoms have been present for:  1 day(s)    Has patient previously been seen for this? Yes    By : Dr. Espinoza    Date: 6/29/2021, had a pain pump placed on 1/31/2022 by Dr. Orourke.     Are there any new or worsening symptoms? Yes: Patient calling stating she had a pain pump placed on 1/31/2022 and was getting relief until today and now is starting to get pain back again and wants to know what to do. Please call to discuss thank you.       Action Taken: Message routed to:  [facility] pain     Travel Screening: Not Applicable

## 2022-01-26 NOTE — TELEPHONE ENCOUNTER
RNCC called pt. She was set up for pump refill appointment with Dr. Espinoza 2/3/22 at 0920. She was advised to check in on the 2nd floor of the [facility] for the appointment. She verbalized understanding and is agreeable with this plan.     Pt also reports that she is in severe pain. She and her  are frustrated that they were told they would be contacted about pt's pain following hospital discharge but no one has called. Pt requests pain medication to bridge until her upcoming refill appointment when dosage adjustments will also be made. RNCC advised pt and  that provider will be updated and request provider call back either from neurosurgery or pain clinic. Pt and  are agreeable with this plan.     Santa Wallace DNP, RN

## 2022-02-03 ENCOUNTER — MEDICAL CORRESPONDENCE (OUTPATIENT)
Dept: HEALTH INFORMATION MANAGEMENT | Facility: CLINIC | Age: 72
End: 2022-02-03

## 2022-02-03 ENCOUNTER — OFFICE VISIT (OUTPATIENT)
Dept: ANESTHESIOLOGY | Facility: CLINIC | Age: 72
End: 2022-02-03
Attending: NEUROLOGICAL SURGERY
Payer: MEDICARE

## 2022-02-03 ENCOUNTER — OFFICE VISIT (OUTPATIENT)
Dept: NEUROSURGERY | Facility: CLINIC | Age: 72
End: 2022-02-03
Payer: MEDICARE

## 2022-02-03 VITALS
SYSTOLIC BLOOD PRESSURE: 113 MMHG | HEIGHT: 64 IN | WEIGHT: 125 LBS | OXYGEN SATURATION: 95 % | DIASTOLIC BLOOD PRESSURE: 78 MMHG | BODY MASS INDEX: 21.34 KG/M2 | HEART RATE: 80 BPM

## 2022-02-03 VITALS
HEART RATE: 89 BPM | TEMPERATURE: 98.7 F | SYSTOLIC BLOOD PRESSURE: 139 MMHG | RESPIRATION RATE: 18 BRPM | OXYGEN SATURATION: 96 % | DIASTOLIC BLOOD PRESSURE: 75 MMHG

## 2022-02-03 DIAGNOSIS — Z97.8 PRESENCE OF INTRATHECAL PUMP: Primary | ICD-10-CM

## 2022-02-03 DIAGNOSIS — G89.29 OTHER CHRONIC PAIN: ICD-10-CM

## 2022-02-03 DIAGNOSIS — M79.2 NEUROPATHIC PAIN: Primary | ICD-10-CM

## 2022-02-03 PROCEDURE — 250N000011 HC RX IP 250 OP 636

## 2022-02-03 PROCEDURE — G0463 HOSPITAL OUTPT CLINIC VISIT: HCPCS

## 2022-02-03 PROCEDURE — 250N000009 HC RX 250

## 2022-02-03 PROCEDURE — 258N000003 HC RX IP 258 OP 636

## 2022-02-03 PROCEDURE — 99024 POSTOP FOLLOW-UP VISIT: CPT | Performed by: NURSE PRACTITIONER

## 2022-02-03 PROCEDURE — 96522 REFILL/MAINT PUMP/RESVR SYST: CPT

## 2022-02-03 PROCEDURE — 95991 SPIN/BRAIN PUMP REFIL & MAIN: CPT | Performed by: ANESTHESIOLOGY

## 2022-02-03 RX ORDER — NALOXONE HYDROCHLORIDE 0.4 MG/ML
.1-.4 INJECTION, SOLUTION INTRAMUSCULAR; INTRAVENOUS; SUBCUTANEOUS ONCE
Status: DISCONTINUED | OUTPATIENT
Start: 2022-02-03 | End: 2022-09-27

## 2022-02-03 ASSESSMENT — PATIENT HEALTH QUESTIONNAIRE - PHQ9
SUM OF ALL RESPONSES TO PHQ QUESTIONS 1-9: 9
10. IF YOU CHECKED OFF ANY PROBLEMS, HOW DIFFICULT HAVE THESE PROBLEMS MADE IT FOR YOU TO DO YOUR WORK, TAKE CARE OF THINGS AT HOME, OR GET ALONG WITH OTHER PEOPLE: SOMEWHAT DIFFICULT
SUM OF ALL RESPONSES TO PHQ QUESTIONS 1-9: 9

## 2022-02-03 ASSESSMENT — PAIN SCALES - GENERAL
PAINLEVEL: MILD PAIN (3)
PAINLEVEL: NO PAIN (0)

## 2022-02-03 ASSESSMENT — ENCOUNTER SYMPTOMS
DECREASED CONCENTRATION: 0
NERVOUS/ANXIOUS: 1
DEPRESSION: 1
INSOMNIA: 1
PANIC: 0

## 2022-02-03 ASSESSMENT — MIFFLIN-ST. JEOR: SCORE: 1057

## 2022-02-03 NOTE — PROGRESS NOTES
NEUROSURGERY CLINIC NOTE       Reason for Visit:              Post Operative Evaluation and Wound Assessment     POSTOPERATIVE DIAGNOSES:  1.  Intractable neuropathic pain of lumbosacral origin.  2.  Status post spinal cord stimulator, T12-L1.  3.  Status post intrathecal pain pump placement with externalization of the pump for testing.     PROCEDURES PERFORMED:  Internalization of the intrathecal catheter with placement of pain pump.     STAFF SURGEON:  Luis Orourke MD     IMPLANTS:    1.  Medtronic intrathecal pump catheter, lot number [id number].    2.  Medtronic pump, serial number [id number], placed in the right abdomen.     Final implanted catheter length: Pump segment 45.3 cm, spinal segment 22.5 cm, totaling 67.8 cm of catheter, meaning volume is 0.149 mL within the catheter.     PUMP SETTINGS:  Hydromorphone 1 mg per mL, bupivacaine 1 mg per mL, 18 mL of drug total placed in the pump with a setting of 0.4995 mg per day infused, simple continuous setting, plus PTM dosing     FULL SYSTEM PRIME VOLUME:  0.289 mL, duration 18 minutes.            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 Repeat spinal angiograms at that time was negative for any acute findings. In 2018 after regaining ability to ambulate with assistance, began to deteriorate found to have thoracic intradural adhesions and underwent a two staged lysis of adhesions in October 2018. 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 She underwent spinal cord stimulation trial by Dr. Orourke in April 2021- but patient found no benefit. She was subsequently offered a trial of intrathecal  therapy. Therefore she underwent placement of externalized pain pump on 01/19/2022 which was internalized on 1/21/2022.             Hospital Course:   Patient underwent above-mentioned procedure on 1/21/2022. The operation was uncomplicated and she was admitted to the floor for routine post operative cares. Her immediate postoperative period was uneventful. Her back pain improved during the trial although she had minimal complaints of headaches. On post operative day 2 she was doing well and transferred to the OR for internalization of her pain pump.  She did well in her postoperative period and was transferred to floor. She was discharged on 1/21/2022. At the time of discharge she was ambulating, voiding without a hudson, eating a regular diet, pain was well controlled and therefore she was discharged on 1/21/2022.    Today,   She was seen today by Dr Espinoza in pain management  She feels that she may be starting to see some slow improvement of pain  See Dr Espinoza's progress note dated today for   Medication regimen/plan.   She is seen for incision check and no concerns with incision(s)       Examination:   There were no vitals taken for this visit.      Neurological Assessment:      General    Alert, cooperative.  No acute distress      Motorized wheelchair   Pulmonary:   Breathing comfortably on room air. No cough, or shortness of breath  Skin:    Visible skin without lesions or obvious rash  Speech is fluent  Maintains eye contact  Musculoskeletal:    Moving upper extremities freely with good strength      Incision:  Posterior spine, right flank/lower abdomen, and right lower quadrant   Incisions are all clean, dry, intact. No redness, drainage, or swelling.   Closed w/Exofin surgical skin glue.        Assessment:  Status-post:   1.  Intractable neuropathic pain of lumbosacral origin.  2.  Status post spinal cord stimulator, T12-L1.  3.  Status post intrathecal pain pump placement with externalization of the pump for  testing.    Plan:   ~Continue activity and lifting restrictions   ~Avoid significant twisting, or bending of the spine  ~Continue per the recommendations of Dr. Espinoza   ~Return to the Neurosurgery Clinic on an as-needed basis, or if she has any additional questions   or new concerns.        At the end of the visit, all the patient's questions and concerns had been addressed and the patient was agreeable with the plan of care as outlined above. The patient has our office contact information at [phone], and knows to call with any questions, concerns, or changes in condition.       Charlene Dickson DNP  Neurosurgery Nurse Practitioner  [facility]  [phone]

## 2022-02-03 NOTE — NURSING NOTE
AVS given and reviewed with pt.  Pt verbalized understanding and declined any questions.     Santa Wallace DNP, RN

## 2022-02-03 NOTE — PATIENT INSTRUCTIONS
Treatment planning:    Pain pump refill and dosage adjustment performed today in clinic.     Santa Wallace RN Implant : [phone]      Recommended Follow up:      1 week via video        To speak with a nurse, schedule/reschedule/cancel a clinic appointment, or request a medication refill call: [phone], option #1.    You can also reach us by [employer] [email or web address]

## 2022-02-03 NOTE — NURSING NOTE
"Oncology Rooming Note    February 3, 2022 9:30 AM   Anayeli Gagnon is a 71 year old female who presents for:    Chief Complaint   Patient presents with     Oncology Clinic Visit     THORACOLUMBAR BACK PAIN; PRESENCE OF INTRATHECAL PUMP; PUMP REFILL     Initial Vitals: /75 (BP Location: Right arm, Patient Position: Sitting, Cuff Size: Adult Regular)   Pulse 89   Temp 98.7  F (37.1  C) (Oral)   Resp 18   SpO2 96%  Estimated body mass index is 21.46 kg/m  as calculated from the following:    Height as of 1/19/22: 1.626 m (5' 4\").    Weight as of 1/19/22: 56.7 kg (125 lb). There is no height or weight on file to calculate BSA.  No Pain (0) Comment: Data Unavailable   No LMP recorded. Patient is postmenopausal.  Allergies reviewed: Yes  Medications reviewed: Yes    Medications: Medication refills not needed today.  Pharmacy name entered into EPIC:    [id number] - [place] - [address] [facility] - [place], [place] - [address] [id number] - [place], [place] - [address]    Clinical concerns: None.        Radha Fay [facility]            "

## 2022-02-03 NOTE — LETTER
2/3/2022       RE: Anayeli Gagnon  [address] [facility] [address]     Dear Colleague,    Thank you for referring your patient, Anayeli Gagonn, to the [facility] NEUROSURGERY CLINIC [place] at [facility]. Please see a copy of my visit note below.    NEUROSURGERY CLINIC NOTE       Reason for Visit:              Post Operative Evaluation and Wound Assessment     POSTOPERATIVE DIAGNOSES:  1.  Intractable neuropathic pain of lumbosacral origin.  2.  Status post spinal cord stimulator, T12-L1.  3.  Status post intrathecal pain pump placement with externalization of the pump for testing.     PROCEDURES PERFORMED:  Internalization of the intrathecal catheter with placement of pain pump.     STAFF SURGEON:  Luis Orourke MD     IMPLANTS:    1.  Medtronic intrathecal pump catheter, lot number [id number].    2.  Medtronic pump, serial number [id number], placed in the right abdomen.     Final implanted catheter length: Pump segment 45.3 cm, spinal segment 22.5 cm, totaling 67.8 cm of catheter, meaning volume is 0.149 mL within the catheter.     PUMP SETTINGS:  Hydromorphone 1 mg per mL, bupivacaine 1 mg per mL, 18 mL of drug total placed in the pump with a setting of 0.4995 mg per day infused, simple continuous setting, plus PTM dosing     FULL SYSTEM PRIME VOLUME:  0.289 mL, duration 18 minutes.            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 Repeat spinal angiograms at that time was negative for any acute findings. In 2018 after regaining ability to ambulate with assistance, began to deteriorate found to have thoracic intradural adhesions and underwent a two staged lysis of adhesions in October 2018. 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 She underwent spinal cord stimulation trial by Dr. Orourke in April 2021- but patient found no benefit. She was subsequently offered a trial of intrathecal therapy. Therefore she underwent placement of externalized pain pump on 01/19/2022 which was internalized on 1/21/2022.             Hospital Course:   Patient underwent above-mentioned procedure on 1/21/2022. The operation was uncomplicated and she was admitted to the floor for routine post operative cares. Her immediate postoperative period was uneventful. Her back pain improved during the trial although she had minimal complaints of headaches. On post operative day 2 she was doing well and transferred to the OR for internalization of her pain pump.  She did well in her postoperative period and was transferred to floor. She was discharged on 1/21/2022. At the time of discharge she was ambulating, voiding without a hudson, eating a regular diet, pain was well controlled and therefore she was discharged on 1/21/2022.    Today,   She was seen today by Dr Espinoza in pain management  She feels that she may be starting to see some slow improvement of pain  See Dr Espinoza's progress note dated today for   Medication regimen/plan.   She is seen for incision check and no concerns with incision(s)       Examination:   There were no vitals taken for this visit.      Neurological Assessment:      General    Alert, cooperative.  No acute distress      Motorized wheelchair   Pulmonary:   Breathing comfortably on room air. No cough, or shortness of breath  Skin:    Visible skin without lesions or obvious rash  Speech is fluent  Maintains eye contact  Musculoskeletal:    Moving upper extremities freely with good strength      Incision:  Posterior spine, right flank/lower abdomen, and right lower quadrant    Incisions are all clean, dry, intact. No redness, drainage, or swelling.   Closed w/Exofin surgical skin glue.        Assessment:  Status-post:   1.  Intractable neuropathic pain of lumbosacral origin.  2.  Status post spinal cord stimulator, T12-L1.  3.  Status post intrathecal pain pump placement with externalization of the pump for testing.    Plan:   ~Continue activity and lifting restrictions   ~Avoid significant twisting, or bending of the spine  ~Continue per the recommendations of Dr. Espinoza   ~Return to the Neurosurgery Clinic on an as-needed basis, or if she has any additional questions   or new concerns.        At the end of the visit, all the patient's questions and concerns had been addressed and the patient was agreeable with the plan of care as outlined above. The patient has our office contact information at [phone], and knows to call with any questions, concerns, or changes in condition.       Charlene Dickson DNP  Neurosurgery Nurse Practitioner  [facility]  [phone]        Again, thank you for allowing me to participate in the care of your patient.      Sincerely,    EMELI Diana CNP

## 2022-02-03 NOTE — LETTER
2/3/2022       RE: Anayeli Gagnon  [address] [facility] [address]     Dear Colleague,    Thank you for referring your patient, Anayeli Gagnon, to the [facility] CANCER CLINIC at [facility]. Please see a copy of my visit note below.    Pain clinic follow up note    HPI:     Anayeli Gagnon is a 71 year old year old female  who presents to the pain clinic with severe pain in the lower extremities, s/p recent pump implantation by Dr. Orourke.     Patient Supplied Answers To the [facility] Pain Questionnaire  [facility] Pain -  Patient Entered Questionnaire/Answers 2/3/2022   What number best describes your pain right now:  0 = No pain  to  10 = Worst pain imaginable 4   How would you describe the pain? burning, throbbing, pressure   Which of the following worsen your pain? sitting, nothing worsens the pain   Which of the following improve or reduce your pain?  lying down, thinking about something else   What number best describes your average pain for the past week:  0 = No pain  to  10 = Worst pain imaginable 6   What number best describes your LOWEST pain in past 24 hours:  0 = No pain  to  10 = Worst pain imaginable 4   What number best describes your WORST pain in past 24 hours:  0 = No pain  to  10 = Worst pain imaginable 8   When is your pain worst? PM, Night   What non-medicine treatments have you already had for your pain? spinal medicine pump, surgery   Have you tried treating your pain with medication?  Yes   Are you currently taking medications for your pain? Yes             The patient presents to the clinic for follow up after recent pump implantation by Dr. Orourke. The patient is not sure what to expect at this visit. She describes intermittent improvement in pain in feet. She is reporting worsening pain in the upper legs and groin. She is not sure today if there is overall improvement in pain after the pump implant. Her  Jay  reports that the patient has been busy sewing. Most of her severe pain episodes are during the time she is sewing and busy with activity.   The patient has been using most of the bolus doses available to her.     ROS:  Review of Systems   Psychiatric/Behavioral: Positive for depression. The patient is nervous/anxious and has insomnia.    All other systems reviewed and are negative.    Answers for HPI/ROS submitted by the patient on 2/3/2022  If you checked off any problems, how difficult have these problems made it for you to do your work, take care of things at home, or get along with other people?: Somewhat difficult  PHQ9 TOTAL SCORE: 9  General Symptoms: No  Skin Symptoms: No  HENT Symptoms: No  EYE SYMPTOMS: No  HEART SYMPTOMS: No  LUNG SYMPTOMS: No  INTESTINAL SYMPTOMS: No  URINARY SYMPTOMS: No  GYNECOLOGIC SYMPTOMS: No  BREAST SYMPTOMS: No  SKELETAL SYMPTOMS: No  BLOOD SYMPTOMS: No  NERVOUS SYSTEM SYMPTOMS: No  MENTAL HEALTH SYMPTOMS: Yes  Trouble thinking or concentrating: No  Mood changes: Yes  Panic attacks: No        Significant Medical History:   Past Medical History:   Diagnosis Date     CARDIAC DYSRHYTHMIAS NEC 10/3/2007    Taking atenelol for years for this     History of skin cancer      Mitral valve prolapse      Neurogenic bladder      Sacral decubitus ulcer, stage IV (H)      Thoracic spinal cord injury (H)           Past Surgical History:  Past Surgical History:   Procedure Laterality Date     DECOMPRESSION LUMBAR ONE LEVEL N/A 12/27/2019    Procedure: Posterior spinal decompression;  Surgeon: Alf Ritchie MD;  Location: [facility] OR     INSERT INTRATHECAL PAIN PUMP N/A 1/19/2022    Procedure: INSERTION, INTRATHECAL ANALGESIC PUMP, externalized trial;  Surgeon: Luis Orourke MD;  Location: [facility] OR     INSERT INTRATHECAL PAIN PUMP Right 1/21/2022    Procedure: INSERTION, INTRATHECAL ANALGESIC PUMP;  Surgeon: Luis Orourke MD;  Location: [facility] OR     INSERT STIMULATOR AND LEADS INTERNAL DORSAL  COLUMN N/A 2021    Procedure: Posterior thoracic 12 to Lumbar 1 level for placement of spinal cord stimulator paddle and placement of implantable pulse generator/battery over right buttock;  Surgeon: Luis Orourke MD;  Location: [facility] OR     IR SPINAL ANGIOGRAM  10/16/2019     IR SPINAL ANGIOGRAM  2019     LAPAROSCOPIC TUBAL LIGATION       REPAIR SPINAL ARERIOVENOUS MALFORMATION N/A 2019    Procedure: with surgical disconnection arterial venous fistula Thoracic 5;  Surgeon: Alf Ritchie MD;  Location: [facility] OR          Family History:  Family History   Problem Relation Age of Onset     C.A.D. Father          41     Deep Vein Thrombosis (DVT) No family hx of      Anesthesia Reaction No family hx of           Social History:  Social History     Socioeconomic History     Marital status:      Spouse name: Not on file     Number of children: Not on file     Years of education: Not on file     Highest education level: Not on file   Occupational History     Occupation: NA     Employer: [employer]   Tobacco Use     Smoking status: Never Smoker     Smokeless tobacco: Never Used   Substance and Sexual Activity     Alcohol use: No     Drug use: No     Sexual activity: Yes     Partners: Male   Other Topics Concern     Parent/sibling w/ CABG, MI or angioplasty before 65F 55M? Not Asked   Social History Narrative    Lives with spouse - works in [place].     Social Determinants of Health     Financial Resource Strain: Not on file   Food Insecurity: Not on file   Transportation Needs: Not on file   Physical Activity: Not on file   Stress: Not on file   Social Connections: Not on file   Intimate Partner Violence: Not on file   Housing Stability: Not on file     Social History     Social History Narrative    Lives with spouse - works in [place].          Allergies:  Allergies   Allergen Reactions     Contrast Dye Rash     Painful rash after x-ray contrast       Current Medications:   Current  Outpatient Medications   Medication Sig Dispense Refill     acetaminophen (TYLENOL) 500 MG tablet Take 500-1,000 mg by mouth every 6 hours as needed for mild pain       baclofen (LIORESAL) 10 MG tablet Take 10 mg by mouth 3 times daily as needed for muscle spasms       ciprofloxacin (CIPRO) 500 MG tablet Take 500 mg by mouth 2 times daily For 7 days       COMPOUND CONTAINING CONTROLLED SUBSTANCE (CMPD RX) - PHARMACY TO MIX COMPOUNDED MEDICATION HYDROmorphone 5 mg/ml     Bupivacaine 5 mg/ml       Total volume : 20 ml 20 mL 0     gabapentin (NEURONTIN) 600 MG tablet Take 1,200 mg by mouth 3 times daily  1 tablet 0     lidocaine (LIDODERM) 5 % patch Apply 1 patch topically daily as needed        LORazepam (ATIVAN) 0.5 MG tablet Take 1 tablet by mouth daily as needed for anxiety        medical cannabis (Patient's own supply) See Admin Instructions (The purpose of this order is to document that the patient reports taking medical cannabis.  This is not a prescription, and is not used to certify that the patient has a qualifying medical condition.)     Liquid oral formulation.       melatonin 3 MG tablet Take 3 mg by mouth nightly as needed        Menthol, Topical Analgesic, (BIOFREEZE) 4 % GEL Externally apply topically daily as needed       metoprolol succinate ER (TOPROL-XL) 25 MG 24 hr tablet Take 12.5 mg by mouth every evening        Multiple Vitamins-Minerals (WOMENS MULTI PO) Take 1 tablet by mouth daily       NARCAN 4 MG/0.1ML nasal spray INHALE ONE SPRAY IN ONE NOSTRIL AS NEEDED FOR OPIOID. ANTIDOTE, MAY REPEAT IN OTHER NOSTRIL IF NEEDED       nystatin (NYSTOP) 543948 UNIT/GM external powder Apply topically 3 times daily as needed Cream       ondansetron (ZOFRAN) 4 MG tablet Take 4 mg by mouth every 8 hours as needed for nausea        polyethylene glycol (MIRALAX) 17 GM/Dose powder Take 17 g by mouth daily as needed for constipation        THEANINE PO Take 1 tablet by mouth daily as needed (takes when she  remembers)               Current Pain Medications:  Intrathecal pump only.   No oral medications.     Physical Exam:     There were no vitals taken for this visit.    General Appearance: No distress, seated comfortably in her wheel chair.   Mood: Euthymic  HE ENT: Non constricted pupils  Respiratory: Non labored breathing  GI: Soft, non distended, no TTP, well healing surgical wound  Skin: No rashes over exposed skin  Neuro: Cranial nerves 2-12 intact  Gait: wheelchair bound    ASSESSMENT AND PLAN:     Encounter Diagnosis:  Laminectomies from T3-T12  Spinal subdural hematoma  Paraplegia  Neuropathic pain   Paddle SCS insitu  Opioid dependence  IDDS insitu       Anayeli Gagnon is a 71 year old year old female who presents to the pain clinic with severe lower extremity pain, s/p IDDS implantation. The patient has a change in the pattern of the pain since the medication infusions were started. Since she was having severe breakthrough neuropathic pain we added prialt today to her medication mixture. This was discussed with the patient.   On review of her pain diagram she has a tremendous change from before. We will continue to monitor. Since the patient was using all the ptm doses available to her we increased the basal infusion of dilaudid to 1 mg/day. We increased the opioid to LA ratio to see if that makes a difference in her pain experience as she is not mobile and fall risk is not there.         Pump electronic analysis, refill and reprogram  The patient was placed in a reclining position on her wheel chair.  The borders of the pump were identified and the area was cleansed in the usual sterile fashion using separate chlorhexidine swabs cleansing from the center outward.  After the chlorhexidine dried the site was prepped again with chlorhexidine gluconate 4% prior to needle insertion a [employer] refill kit was used for this fill. The needle was placed in the port without difficulty.  Approximately 5.5 mL of  solution was aspirated and discarded per the clinic protocol.  Upon interrogation reservoir volume was 6.0 mL as well.  The patient's pump was filled with 20 mL of solution of preservative-free medications and 1 mL was utilized for the filter and tubing preparation.  The total solution of the fill in preparation contained hydromorphone, bupivacaine and prialt.  There was negative drop from the pump at the start of the fill.  The pump was filled over approximately 1 minute.  Upon completion of the fill the needle was removed and direct pressure was applied to the puncture site.  The area was then cleansed and there was no bleeding noted from the puncture site.    The patient's pump was electronically analyzed and reprogrammed during the procedure.  The patient does have patient therapy manager. The patient has been using almost all the PTM doses available to her.     The pump program and all calculations were verified by Santa sifuentes RN.  Intrathecal Pump Refill      Procedure Note: The skin was prepped and draped in a sterile fashion.  The skin was anesthetized with 1% lidocaine.  The intrathecal pump was accessed with a 22 gauge non-coring needle included in the [employer] refill kit.   The pump reservoir was drained of the remaining medication then refilled with solution.  The pump was then reprogrammed to reflect the new reservoir volume and any changes in medication that were made.  The patient tolerated the procedure without complications.    New Intrathecal Medications (mg or mcg/day):   Dilaudid    5mg/ml                                                          Bupivacaine 10 mg/ml  Ziconitide 2.5 mcg/ml     Solution withdrawn:         5.5 ml  Expected amount:            6 ml    Total Volume in Refill:20 mL     Basal:   Dilaudid    1mg/day                                                       Bupivacaine 2 mgday  Ziconitide 0.5 mcg/day    PTM was started at 0.1 mg every 2 hours maximum of 5 per day      The  concentration was changed.  The rate was increased.  Ultrasound Guidance used to confirm position No    - D/w patient that [facility] will be reaching out to her to establish home pump refill program for her.   - [facility] I demonstrated to the patient on the process     Counseling: Greater than 50% of this patient visit was spent in counseling the patient regarding the treatment of their pain, coordinating their overall treatment plan and assessing their progress.      Again, thank you for allowing me to participate in the care of your patient.      Sincerely,    Jasmine Espinoza MD

## 2022-02-03 NOTE — PROGRESS NOTES
Pain clinic follow up note    HPI:     Anayeli Gagnon is a 71 year old year old female  who presents to the pain clinic with severe pain in the lower extremities, s/p recent pump implantation by Dr. Orourke.     Patient Supplied Answers To the [facility] Pain Questionnaire  [facility] Pain -  Patient Entered Questionnaire/Answers 2/3/2022   What number best describes your pain right now:  0 = No pain  to  10 = Worst pain imaginable 4   How would you describe the pain? burning, throbbing, pressure   Which of the following worsen your pain? sitting, nothing worsens the pain   Which of the following improve or reduce your pain?  lying down, thinking about something else   What number best describes your average pain for the past week:  0 = No pain  to  10 = Worst pain imaginable 6   What number best describes your LOWEST pain in past 24 hours:  0 = No pain  to  10 = Worst pain imaginable 4   What number best describes your WORST pain in past 24 hours:  0 = No pain  to  10 = Worst pain imaginable 8   When is your pain worst? PM, Night   What non-medicine treatments have you already had for your pain? spinal medicine pump, surgery   Have you tried treating your pain with medication?  Yes   Are you currently taking medications for your pain? Yes             The patient presents to the clinic for follow up after recent pump implantation by Dr. Orourke. The patient is not sure what to expect at this visit. She describes intermittent improvement in pain in feet. She is reporting worsening pain in the upper legs and groin. She is not sure today if there is overall improvement in pain after the pump implant. Her  Jay reports that the patient has been busy sewing. Most of her severe pain episodes are during the time she is sewing and busy with activity.   The patient has been using most of the bolus doses available to her.     ROS:  Review of Systems   Psychiatric/Behavioral: Positive for depression. The patient is  nervous/anxious and has insomnia.    All other systems reviewed and are negative.    Answers for HPI/ROS submitted by the patient on 2/3/2022  If you checked off any problems, how difficult have these problems made it for you to do your work, take care of things at home, or get along with other people?: Somewhat difficult  PHQ9 TOTAL SCORE: 9  General Symptoms: No  Skin Symptoms: No  HENT Symptoms: No  EYE SYMPTOMS: No  HEART SYMPTOMS: No  LUNG SYMPTOMS: No  INTESTINAL SYMPTOMS: No  URINARY SYMPTOMS: No  GYNECOLOGIC SYMPTOMS: No  BREAST SYMPTOMS: No  SKELETAL SYMPTOMS: No  BLOOD SYMPTOMS: No  NERVOUS SYSTEM SYMPTOMS: No  MENTAL HEALTH SYMPTOMS: Yes  Trouble thinking or concentrating: No  Mood changes: Yes  Panic attacks: No        Significant Medical History:   Past Medical History:   Diagnosis Date     CARDIAC DYSRHYTHMIAS NEC 10/3/2007    Taking atenelol for years for this     History of skin cancer      Mitral valve prolapse      Neurogenic bladder      Sacral decubitus ulcer, stage IV (H)      Thoracic spinal cord injury (H)           Past Surgical History:  Past Surgical History:   Procedure Laterality Date     DECOMPRESSION LUMBAR ONE LEVEL N/A 12/27/2019    Procedure: Posterior spinal decompression;  Surgeon: Alf Ritchie MD;  Location: [facility] OR     INSERT INTRATHECAL PAIN PUMP N/A 1/19/2022    Procedure: INSERTION, INTRATHECAL ANALGESIC PUMP, externalized trial;  Surgeon: Luis Orourke MD;  Location: [facility] OR     INSERT INTRATHECAL PAIN PUMP Right 1/21/2022    Procedure: INSERTION, INTRATHECAL ANALGESIC PUMP;  Surgeon: Luis Orourke MD;  Location: [facility] OR     INSERT STIMULATOR AND LEADS INTERNAL DORSAL COLUMN N/A 4/7/2021    Procedure: Posterior thoracic 12 to Lumbar 1 level for placement of spinal cord stimulator paddle and placement of implantable pulse generator/battery over right buttock;  Surgeon: Luis Orourke MD;  Location: [facility] OR     IR SPINAL ANGIOGRAM  10/16/2019     IR SPINAL ANGIOGRAM   2019     LAPAROSCOPIC TUBAL LIGATION       REPAIR SPINAL ARERIOVENOUS MALFORMATION N/A 2019    Procedure: with surgical disconnection arterial venous fistula Thoracic 5;  Surgeon: Alf Ritchie MD;  Location:  OR          Family History:  Family History   Problem Relation Age of Onset     C.A.D. Father          41     Deep Vein Thrombosis (DVT) No family hx of      Anesthesia Reaction No family hx of           Social History:  Social History     Socioeconomic History     Marital status:      Spouse name: Not on file     Number of children: Not on file     Years of education: Not on file     Highest education level: Not on file   Occupational History     Occupation: NA     Employer: [employer]   Tobacco Use     Smoking status: Never Smoker     Smokeless tobacco: Never Used   Substance and Sexual Activity     Alcohol use: No     Drug use: No     Sexual activity: Yes     Partners: Male   Other Topics Concern     Parent/sibling w/ CABG, MI or angioplasty before 65F 55M? Not Asked   Social History Narrative    Lives with spouse - works in [place].     Social Determinants of Health     Financial Resource Strain: Not on file   Food Insecurity: Not on file   Transportation Needs: Not on file   Physical Activity: Not on file   Stress: Not on file   Social Connections: Not on file   Intimate Partner Violence: Not on file   Housing Stability: Not on file     Social History     Social History Narrative    Lives with spouse - works in [place].          Allergies:  Allergies   Allergen Reactions     Contrast Dye Rash     Painful rash after x-ray contrast       Current Medications:   Current Outpatient Medications   Medication Sig Dispense Refill     acetaminophen (TYLENOL) 500 MG tablet Take 500-1,000 mg by mouth every 6 hours as needed for mild pain       baclofen (LIORESAL) 10 MG tablet Take 10 mg by mouth 3 times daily as needed for muscle spasms       ciprofloxacin (CIPRO) 500 MG tablet  Take 500 mg by mouth 2 times daily For 7 days       COMPOUND CONTAINING CONTROLLED SUBSTANCE (CMPD RX) - PHARMACY TO MIX COMPOUNDED MEDICATION HYDROmorphone 5 mg/ml     Bupivacaine 5 mg/ml       Total volume : 20 ml 20 mL 0     gabapentin (NEURONTIN) 600 MG tablet Take 1,200 mg by mouth 3 times daily  1 tablet 0     lidocaine (LIDODERM) 5 % patch Apply 1 patch topically daily as needed        LORazepam (ATIVAN) 0.5 MG tablet Take 1 tablet by mouth daily as needed for anxiety        medical cannabis (Patient's own supply) See Admin Instructions (The purpose of this order is to document that the patient reports taking medical cannabis.  This is not a prescription, and is not used to certify that the patient has a qualifying medical condition.)     Liquid oral formulation.       melatonin 3 MG tablet Take 3 mg by mouth nightly as needed        Menthol, Topical Analgesic, (BIOFREEZE) 4 % GEL Externally apply topically daily as needed       metoprolol succinate ER (TOPROL-XL) 25 MG 24 hr tablet Take 12.5 mg by mouth every evening        Multiple Vitamins-Minerals (WOMENS MULTI PO) Take 1 tablet by mouth daily       NARCAN 4 MG/0.1ML nasal spray INHALE ONE SPRAY IN ONE NOSTRIL AS NEEDED FOR OPIOID. ANTIDOTE, MAY REPEAT IN OTHER NOSTRIL IF NEEDED       nystatin (NYSTOP) 588567 UNIT/GM external powder Apply topically 3 times daily as needed Cream       ondansetron (ZOFRAN) 4 MG tablet Take 4 mg by mouth every 8 hours as needed for nausea        polyethylene glycol (MIRALAX) 17 GM/Dose powder Take 17 g by mouth daily as needed for constipation        THEANINE PO Take 1 tablet by mouth daily as needed (takes when she remembers)               Current Pain Medications:  Intrathecal pump only.   No oral medications.     Physical Exam:     There were no vitals taken for this visit.    General Appearance: No distress, seated comfortably in her wheel chair.   Mood: Euthymic  HE ENT: Non constricted pupils  Respiratory: Non labored  breathing  GI: Soft, non distended, no TTP, well healing surgical wound  Skin: No rashes over exposed skin  Neuro: Cranial nerves 2-12 intact  Gait: wheelchair bound    ASSESSMENT AND PLAN:     Encounter Diagnosis:  Laminectomies from T3-T12  Spinal subdural hematoma  Paraplegia  Neuropathic pain   Paddle SCS insitu  Opioid dependence  IDDS insitu       Anayeli Gagnon is a 71 year old year old female who presents to the pain clinic with severe lower extremity pain, s/p IDDS implantation. The patient has a change in the pattern of the pain since the medication infusions were started. Since she was having severe breakthrough neuropathic pain we added prialt today to her medication mixture. This was discussed with the patient.   On review of her pain diagram she has a tremendous change from before. We will continue to monitor. Since the patient was using all the ptm doses available to her we increased the basal infusion of dilaudid to 1 mg/day. We increased the opioid to LA ratio to see if that makes a difference in her pain experience as she is not mobile and fall risk is not there.         Pump electronic analysis, refill and reprogram  The patient was placed in a reclining position on her wheel chair.  The borders of the pump were identified and the area was cleansed in the usual sterile fashion using separate chlorhexidine swabs cleansing from the center outward.  After the chlorhexidine dried the site was prepped again with chlorhexidine gluconate 4% prior to needle insertion a [employer] refill kit was used for this fill. The needle was placed in the port without difficulty.  Approximately 5.5 mL of solution was aspirated and discarded per the clinic protocol.  Upon interrogation reservoir volume was 6.0 mL as well.  The patient's pump was filled with 20 mL of solution of preservative-free medications and 1 mL was utilized for the filter and tubing preparation.  The total solution of the fill in preparation  contained hydromorphone, bupivacaine and prialt.  There was negative drop from the pump at the start of the fill.  The pump was filled over approximately 1 minute.  Upon completion of the fill the needle was removed and direct pressure was applied to the puncture site.  The area was then cleansed and there was no bleeding noted from the puncture site.    The patient's pump was electronically analyzed and reprogrammed during the procedure.  The patient does have patient therapy manager. The patient has been using almost all the PTM doses available to her.     The pump program and all calculations were verified by Santa sifuentes RN.  Intrathecal Pump Refill      Procedure Note: The skin was prepped and draped in a sterile fashion.  The skin was anesthetized with 1% lidocaine.  The intrathecal pump was accessed with a 22 gauge non-coring needle included in the [employer] refill kit.   The pump reservoir was drained of the remaining medication then refilled with solution.  The pump was then reprogrammed to reflect the new reservoir volume and any changes in medication that were made.  The patient tolerated the procedure without complications.    New Intrathecal Medications (mg or mcg/day):   Dilaudid    5mg/ml                                                          Bupivacaine 10 mg/ml  Ziconitide 2.5 mcg/ml     Solution withdrawn:         5.5 ml  Expected amount:            6 ml    Total Volume in Refill:20 mL     Basal:   Dilaudid    1mg/day                                                       Bupivacaine 2 mgday  Ziconitide 0.5 mcg/day    PTM was started at 0.1 mg every 2 hours maximum of 5 per day      The concentration was changed.  The rate was increased.  Ultrasound Guidance used to confirm position No    - D/w patient that [facility] will be reaching out to her to establish home pump refill program for her.   - Future visits can be conducted via [facility]. I demonstrated to the patient on the process     Counseling:  Greater than 50% of this patient visit was spent in counseling the patient regarding the treatment of their pain, coordinating their overall treatment plan and assessing their progress.

## 2022-02-04 ENCOUNTER — TELEPHONE (OUTPATIENT)
Dept: ANESTHESIOLOGY | Facility: CLINIC | Age: 72
End: 2022-02-04
Payer: MEDICARE

## 2022-02-04 ASSESSMENT — PATIENT HEALTH QUESTIONNAIRE - PHQ9: SUM OF ALL RESPONSES TO PHQ QUESTIONS 1-9: 9

## 2022-02-04 NOTE — CONFIDENTIAL NOTE
RN spoke with Dr. Espinoza and she was able to connect with patient this am in regards to new pump meds and dose adjustment, and patient is doing well at this time. No concerns or issues at this time.       Camille Talavera RN

## 2022-02-07 NOTE — PATIENT INSTRUCTIONS
~Continue activity and lifting restrictions   ~Avoid significant twisting, or bending of the spine  ~Continue per the recommendations of Dr. Espinoza   ~Return to the Neurosurgery Clinic on an as-needed basis, or if she has any additional questions   or new concerns.        At the end of the visit, all the patient's questions and concerns had been addressed and the patient was agreeable with the plan of care as outlined above. The patient has our office contact information at [phone], and knows to call with any questions, concerns, or changes in condition.

## 2022-02-09 ENCOUNTER — TELEPHONE (OUTPATIENT)
Dept: ANESTHESIOLOGY | Facility: CLINIC | Age: 72
End: 2022-02-09
Payer: MEDICARE

## 2022-02-10 ENCOUNTER — VIRTUAL VISIT (OUTPATIENT)
Dept: ANESTHESIOLOGY | Facility: CLINIC | Age: 72
End: 2022-02-10
Payer: MEDICARE

## 2022-02-10 DIAGNOSIS — M79.2 INTRACTABLE NEUROPATHIC PAIN OF LUMBOSACRAL ORIGIN: ICD-10-CM

## 2022-02-10 PROCEDURE — 99214 OFFICE O/P EST MOD 30 MIN: CPT | Mod: 95 | Performed by: ANESTHESIOLOGY

## 2022-02-10 NOTE — LETTER
2/10/2022       RE: Anayeli Gagnon  [address] [facility] [address]     Dear Colleague,    Thank you for referring your patient, Anayeli Gagnon, to the [facility] [place] [facility]. Please see a copy of my visit note below.    Type of service:  Video Visit    Video Start Time: 3:20 PM    Video End Time:3:40 PM    Originating Location (pt. Location): Home    Distant Location (provider location):  [facility] [place]    Platform used for Video Visit: [employer]    Pain clinic follow up note    HPI:   Anayeli Gagnon is a 71 year old year old female with PMH of subdural hematoma and chronic neuropathic pain who presents for follow up of her severe pain in the bilateral lower extremities. She has an intrathecal pump implanted by Dr. Orourke, and last saw Dr. Espinoza for pump medication adjustment on 2/3/22.    Interval History:  - Since IT pump adjustment 1 week ago, patient reports she still in a great deal of pain, with about 20% improvement overall.  - Her locations of pain have not changed, and include the low back, groin, bilateral legs, and feet.  - PTM doses are helpful for low back and groin pain, but do not last a full 4 hours; doses take the edge off pain for about 2 hours.  - She has been using PTM boluses up to the maximum of 5 times per day.  - She denies any new areas of pain and has not noticed any new side effects since pump medication adjustment on 2/3.    Patient Supplied Answers To the [facility] Pain Questionnaire  [facility] Pain -  Patient Entered Questionnaire/Answers 2/10/2022   What number best describes your pain right now:  0 = No pain  to  10 = Worst pain imaginable 6   How would you describe the pain? dull, aching   Which of the following worsen your pain? -   Which of the following improve or reduce your pain?  medication   What number best  describes your average pain for the past week:  0 = No pain  to  10 = Worst pain imaginable 4   What number best describes your LOWEST pain in past 24 hours:  0 = No pain  to  10 = Worst pain imaginable 3   What number best describes your WORST pain in past 24 hours:  0 = No pain  to  10 = Worst pain imaginable 6   When is your pain worst? Constant   What non-medicine treatments have you already had for your pain? pain clinic, spinal medicine pump   Have you tried treating your pain with medication?  Yes   Are you currently taking medications for your pain? Yes               New imaging reviewed with the patient:    No new imaging    ROS:  Review of Systems   All other systems reviewed and are negative.      Significant Medical History:   Past Medical History:   Diagnosis Date     CARDIAC DYSRHYTHMIAS NEC 10/3/2007    Taking atenelol for years for this     History of skin cancer      Mitral valve prolapse      Neurogenic bladder      Sacral decubitus ulcer, stage IV (H)      Thoracic spinal cord injury (H)           Past Surgical History:  Past Surgical History:   Procedure Laterality Date     DECOMPRESSION LUMBAR ONE LEVEL N/A 12/27/2019    Procedure: Posterior spinal decompression;  Surgeon: Alf Ritchie MD;  Location: [facility] OR     INSERT INTRATHECAL PAIN PUMP N/A 1/19/2022    Procedure: INSERTION, INTRATHECAL ANALGESIC PUMP, externalized trial;  Surgeon: Luis Orourke MD;  [facility]: [facility] OR     INSERT INTRATHECAL PAIN PUMP Right 1/21/2022    Procedure: INSERTION, INTRATHECAL ANALGESIC PUMP;  Surgeon: Luis Orourke MD;  Location: [facility] OR     INSERT STIMULATOR AND LEADS INTERNAL DORSAL COLUMN N/A 4/7/2021    Procedure: Posterior thoracic 12 to Lumbar 1 level for placement of spinal cord stimulator paddle and placement of implantable pulse generator/battery over right buttock;  Surgeon: Luis Orourke MD;  Location: [facility] OR     IR SPINAL ANGIOGRAM  10/16/2019     IR SPINAL ANGIOGRAM  12/20/2019      LAPAROSCOPIC TUBAL LIGATION       REPAIR SPINAL ARERIOVENOUS MALFORMATION N/A 2019    Procedure: with surgical disconnection arterial venous fistula Thoracic 5;  Surgeon: Alf Ritchie MD;  Location:  OR         Family History:  Family History   Problem Relation Age of Onset     C.A.D. Father          41     Deep Vein Thrombosis (DVT) No family hx of      Anesthesia Reaction No family hx of           Social History:  Social History     Socioeconomic History     Marital status:      Spouse name: Not on file     Number of children: Not on file     Years of education: Not on file     Highest education level: Not on file   Occupational History     Occupation: NA     Employer: [employer]   Tobacco Use     Smoking status: Never Smoker     Smokeless tobacco: Never Used   Substance and Sexual Activity     Alcohol use: No     Drug use: No     Sexual activity: Yes     Partners: Male   Other Topics Concern     Parent/sibling w/ CABG, MI or angioplasty before 65F 55M? Not Asked   Social History Narrative    Lives with spouse - works in [place].     Social Determinants of Health     Financial Resource Strain: Not on file   Food Insecurity: Not on file   Transportation Needs: Not on file   Physical Activity: Not on file   Stress: Not on file   Social Connections: Not on file   Intimate Partner Violence: Not on file   Housing Stability: Not on file     Social History     Social History Narrative    Lives with spouse - works in [place].          Allergies:  Allergies   Allergen Reactions     Contrast Dye Rash     Painful rash after x-ray contrast       Current Medications:   Current Outpatient Medications   Medication Sig Dispense Refill     acetaminophen (TYLENOL) 500 MG tablet Take 500-1,000 mg by mouth every 6 hours as needed for mild pain       baclofen (LIORESAL) 10 MG tablet Take 10 mg by mouth 3 times daily as needed for muscle spasms       ciprofloxacin (CIPRO) 500 MG tablet Take 500 mg by  mouth 2 times daily For 7 days       gabapentin (NEURONTIN) 600 MG tablet Take 1,200 mg by mouth 3 times daily  1 tablet 0     lidocaine (LIDODERM) 5 % patch Apply 1 patch topically daily as needed        LORazepam (ATIVAN) 0.5 MG tablet Take 1 tablet by mouth daily as needed for anxiety        medical cannabis (Patient's own supply) See Admin Instructions (The purpose of this order is to document that the patient reports taking medical cannabis.  This is not a prescription, and is not used to certify that the patient has a qualifying medical condition.)     Liquid oral formulation.       melatonin 3 MG tablet Take 3 mg by mouth nightly as needed        Menthol, Topical Analgesic, (BIOFREEZE) 4 % GEL Externally apply topically daily as needed       metoprolol succinate ER (TOPROL-XL) 25 MG 24 hr tablet Take 12.5 mg by mouth every evening        Multiple Vitamins-Minerals (WOMENS MULTI PO) Take 1 tablet by mouth daily       NARCAN 4 MG/0.1ML nasal spray INHALE ONE SPRAY IN ONE NOSTRIL AS NEEDED FOR OPIOID. ANTIDOTE, MAY REPEAT IN OTHER NOSTRIL IF NEEDED       nystatin (NYSTOP) 124219 UNIT/GM external powder Apply topically 3 times daily as needed Cream       THEANINE PO Take 1 tablet by mouth daily as needed (takes when she remembers)       COMPOUND CONTAINING CONTROLLED SUBSTANCE (CMPD RX) - PHARMACY TO MIX COMPOUNDED MEDICATION HYDROmorphone 5 mg/ml     Bupivacaine 5 mg/ml       Total volume : 20 ml 20 mL 0     ondansetron (ZOFRAN) 4 MG tablet Take 4 mg by mouth every 8 hours as needed for nausea        polyethylene glycol (MIRALAX) 17 GM/Dose powder Take 17 g by mouth daily as needed for constipation            Current Pain Medications:    Basal:   Dilaudid    1mg/day                                                       Bupivacaine 2 mgday  Ziconitide 0.5 mcg/day     PTM was started at 0.1 mg every 2 hours maximum of 5 per day      Physical Exam: Limited by video format    General Appearance: No distress, seated  comfortably  Mood: Euthymic  HEENT: Non constricted pupils  Respiratory: Non labored breathing  Neuro: Alert and oriented, no facial droop, moves all extremities actively at least antigravity      ASSESSMENT AND PLAN:     Encounter Diagnosis:  Laminectomies from T3-T12  Spinal subdural hematoma  Paraplegia  Neuropathic pain   Paddle SCS insitu  Opioid dependence  IDDS insitu       Anayeli Gagnon is a 71 year old year old female who presents with severe bilateral lower extremity pain, s/p IDDS implantation.  Per patient's report, pain is not well-controlled despite recent intrathecal medication adjustment.  She is not experiencing any significant side effects, and her functional status is limited at baseline (wheelchair bound).  There is room to increase the daily dose of intrathecal medications, including ziconitide which was recently added for neuropathic pain.    RECOMMENDATIONS:     The following adjustments will be made to patient's IDDS medications by [facility] home services:    Basal:   Dilaudid    1.75mg/day                                                       Bupivacaine 3.5 mgday  Ziconitide 0.87 mcg/day     PTM was started at 0.15 mg every 2 hours maximum of 5 per day      Follow up: after dosing adjustment as above.      Patient was seen by and plan was discussed with attending physician, Dr. Espinoza.    Efraín Rider, DO  Pain Medicine Fellow    Answers for HPI/ROS submitted by the patient on 2/10/2022  General Symptoms: No  Skin Symptoms: No  HENT Symptoms: No  EYE SYMPTOMS: No  HEART SYMPTOMS: No  LUNG SYMPTOMS: No  INTESTINAL SYMPTOMS: No  URINARY SYMPTOMS: No  GYNECOLOGIC SYMPTOMS: No  BREAST SYMPTOMS: No  SKELETAL SYMPTOMS: No  BLOOD SYMPTOMS: No  NERVOUS SYSTEM SYMPTOMS: No  MENTAL HEALTH SYMPTOMS: No    ATTENDING ATTESTATION  I saw the patient along with the pain medicine fellow Dr. Efraín Rider. Please see his note above for full details. I was involved in gathering history, physical  examination and development of the plan of care.   New dosage change orders attached in the separate follow up note.    New Intrathecal Medications :     Dilaudid    5mg/ml                                                          Bupivacaine 10 mg/ml  Ziconitide 2.5 mcg/ml       Total Volume in Refill:20 mL      Basal:   Dilaudid    1.75mg/day                                                       Bupivacaine 3.5 mgday  Ziconitide 0.87 mcg/day     PTM was started at 0.15 mg every 2 hours maximum of 5 per day         Again, thank you for allowing me to participate in the care of your patient.      Sincerely,    Jasmine Espinoza MD

## 2022-02-10 NOTE — PROGRESS NOTES
New Intrathecal Medications (mg or mcg/day):     Dilaudid    5mg/ml                                                          Bupivacaine 10 mg/ml  Ziconitide 2.5 mcg/ml        Total Volume in Refill:20 mL      Basal:   Dilaudid    1.75mg/day                                                       Bupivacaine 3.50 mgday  Ziconitide 1.25 mcg/day     PTM was started at 0.15 mg every 2 hours maximum of 5 per day

## 2022-02-10 NOTE — PROGRESS NOTES
New Intrathecal Medications :     Dilaudid    5mg/ml                                                          Bupivacaine 10 mg/ml  Ziconitide 2.5 mcg/ml       Total Volume in Refill:20 mL      Basal:   Dilaudid    1.75mg/day                                                       Bupivacaine 3.5 mgday  Ziconitide 0.87 mcg/day     PTM was started at 0.15 mg every 2 hours maximum of 5 per day

## 2022-02-10 NOTE — PROGRESS NOTES
Type of service:  Video Visit    Video Start Time: 3:20 PM    Video End Time:3:40 PM    Originating Location (pt. Location): Home    Distant Location (provider location):  [facility] [place]    Platform used for Video Visit: [facility]    Pain clinic follow up note    HPI:   Anayeli Gagnon is a 71 year old year old female with PMH of subdural hematoma and chronic neuropathic pain who presents for follow up of her severe pain in the bilateral lower extremities. She has an intrathecal pump implanted by Dr. Orourke, and last saw Dr. Espinoza for pump medication adjustment on 2/3/22.    Interval History:  - Since IT pump adjustment 1 week ago, patient reports she still in a great deal of pain, with about 20% improvement overall.  - Her locations of pain have not changed, and include the low back, groin, bilateral legs, and feet.  - PTM doses are helpful for low back and groin pain, but do not last a full 4 hours; doses take the edge off pain for about 2 hours.  - She has been using PTM boluses up to the maximum of 5 times per day.  - She denies any new areas of pain and has not noticed any new side effects since pump medication adjustment on 2/3.    Patient Supplied Answers To the [facility] Pain Questionnaire  [facility] Pain -  Patient Entered Questionnaire/Answers 2/10/2022   What number best describes your pain right now:  0 = No pain  to  10 = Worst pain imaginable 6   How would you describe the pain? dull, aching   Which of the following worsen your pain? -   Which of the following improve or reduce your pain?  medication   What number best describes your average pain for the past week:  0 = No pain  to  10 = Worst pain imaginable 4   What number best describes your LOWEST pain in past 24 hours:  0 = No pain  to  10 = Worst pain imaginable 3   What number best describes your WORST pain in past 24 hours:  0 = No pain  to  10 = Worst pain imaginable 6   When is your pain worst?  Constant   What non-medicine treatments have you already had for your pain? pain clinic, spinal medicine pump   Have you tried treating your pain with medication?  Yes   Are you currently taking medications for your pain? Yes               New imaging reviewed with the patient:    No new imaging    ROS:  Review of Systems   All other systems reviewed and are negative.      Significant Medical History:   Past Medical History:   Diagnosis Date     CARDIAC DYSRHYTHMIAS NEC 10/3/2007    Taking atenelol for years for this     History of skin cancer      Mitral valve prolapse      Neurogenic bladder      Sacral decubitus ulcer, stage IV (H)      Thoracic spinal cord injury (H)           Past Surgical History:  Past Surgical History:   Procedure Laterality Date     DECOMPRESSION LUMBAR ONE LEVEL N/A 2019    Procedure: Posterior spinal decompression;  Surgeon: Alf Ritchie MD;  Location: [facility] OR     INSERT INTRATHECAL PAIN PUMP N/A 2022    Procedure: INSERTION, INTRATHECAL ANALGESIC PUMP, externalized trial;  Surgeon: Luis Orourke MD;  Location: [facility]     INSERT INTRATHECAL PAIN PUMP Right 2022    Procedure: INSERTION, INTRATHECAL ANALGESIC PUMP;  Surgeon: Luis Orourke MD;  Location: [facility] OR     INSERT STIMULATOR AND LEADS INTERNAL DORSAL COLUMN N/A 2021    Procedure: Posterior thoracic 12 to Lumbar 1 level for placement of spinal cord stimulator paddle and placement of implantable pulse generator/battery over right buttock;  Surgeon: Luis Orourke MD;  Location: [facility] OR     IR SPINAL ANGIOGRAM  10/16/2019     IR SPINAL ANGIOGRAM  2019     LAPAROSCOPIC TUBAL LIGATION       REPAIR SPINAL ARERIOVENOUS MALFORMATION N/A 2019    Procedure: with surgical disconnection arterial venous fistula Thoracic 5;  Surgeon: Alf Ritchie MD;  Location: [facility] OR         Family History:  Family History   Problem Relation Age of Onset     C.A.D. Father          41     Deep Vein  Thrombosis (DVT) No family hx of      Anesthesia Reaction No family hx of           Social History:  Social History     Socioeconomic History     Marital status:      Spouse name: Not on file     Number of children: Not on file     Years of education: Not on file     Highest education level: Not on file   Occupational History     Occupation: NA     Employer: [employer]   Tobacco Use     Smoking status: Never Smoker     Smokeless tobacco: Never Used   Substance and Sexual Activity     Alcohol use: No     Drug use: No     Sexual activity: Yes     Partners: Male   Other Topics Concern     Parent/sibling w/ CABG, MI or angioplasty before 65F 55M? Not Asked   Social History Narrative    Lives with spouse - works in [place].     Social Determinants of Health     Financial Resource Strain: Not on file   Food Insecurity: Not on file   Transportation Needs: Not on file   Physical Activity: Not on file   Stress: Not on file   Social Connections: Not on file   Intimate Partner Violence: Not on file   Housing Stability: Not on file     Social History     Social History Narrative    Lives with spouse - works in [place].          Allergies:  Allergies   Allergen Reactions     Contrast Dye Rash     Painful rash after x-ray contrast       Current Medications:   Current Outpatient Medications   Medication Sig Dispense Refill     acetaminophen (TYLENOL) 500 MG tablet Take 500-1,000 mg by mouth every 6 hours as needed for mild pain       baclofen (LIORESAL) 10 MG tablet Take 10 mg by mouth 3 times daily as needed for muscle spasms       ciprofloxacin (CIPRO) 500 MG tablet Take 500 mg by mouth 2 times daily For 7 days       gabapentin (NEURONTIN) 600 MG tablet Take 1,200 mg by mouth 3 times daily  1 tablet 0     lidocaine (LIDODERM) 5 % patch Apply 1 patch topically daily as needed        LORazepam (ATIVAN) 0.5 MG tablet Take 1 tablet by mouth daily as needed for anxiety        medical cannabis (Patient's own supply) See  Admin Instructions (The purpose of this order is to document that the patient reports taking medical cannabis.  This is not a prescription, and is not used to certify that the patient has a qualifying medical condition.)     Liquid oral formulation.       melatonin 3 MG tablet Take 3 mg by mouth nightly as needed        Menthol, Topical Analgesic, (BIOFREEZE) 4 % GEL Externally apply topically daily as needed       metoprolol succinate ER (TOPROL-XL) 25 MG 24 hr tablet Take 12.5 mg by mouth every evening        Multiple Vitamins-Minerals (WOMENS MULTI PO) Take 1 tablet by mouth daily       NARCAN 4 MG/0.1ML nasal spray INHALE ONE SPRAY IN ONE NOSTRIL AS NEEDED FOR OPIOID. ANTIDOTE, MAY REPEAT IN OTHER NOSTRIL IF NEEDED       nystatin (NYSTOP) 469275 UNIT/GM external powder Apply topically 3 times daily as needed Cream       THEANINE PO Take 1 tablet by mouth daily as needed (takes when she remembers)       COMPOUND CONTAINING CONTROLLED SUBSTANCE (CMPD RX) - PHARMACY TO MIX COMPOUNDED MEDICATION HYDROmorphone 5 mg/ml     Bupivacaine 5 mg/ml       Total volume : 20 ml 20 mL 0     ondansetron (ZOFRAN) 4 MG tablet Take 4 mg by mouth every 8 hours as needed for nausea        polyethylene glycol (MIRALAX) 17 GM/Dose powder Take 17 g by mouth daily as needed for constipation            Current Pain Medications:    Basal:   Dilaudid    1mg/day                                                       Bupivacaine 2 mgday  Ziconitide 0.5 mcg/day     PTM was started at 0.1 mg every 2 hours maximum of 5 per day      Physical Exam: Limited by video format    General Appearance: No distress, seated comfortably  Mood: Euthymic  HEENT: Non constricted pupils  Respiratory: Non labored breathing  Neuro: Alert and oriented, no facial droop, moves all extremities actively at least antigravity      ASSESSMENT AND PLAN:     Encounter Diagnosis:  Laminectomies from T3-T12  Spinal subdural hematoma  Paraplegia  Neuropathic pain   Paddle SCS  insitu  Opioid dependence  IDDS insitu       Anayeli Gagnon is a 71 year old year old female who presents with severe bilateral lower extremity pain, s/p IDDS implantation.  Per patient's report, pain is not well-controlled despite recent intrathecal medication adjustment.  She is not experiencing any significant side effects, and her functional status is limited at baseline (wheelchair bound).  There is room to increase the daily dose of intrathecal medications, including ziconitide which was recently added for neuropathic pain.    RECOMMENDATIONS:     The following adjustments will be made to patient's IDDS medications by [facility] home services:    Basal:   Dilaudid    1.75mg/day                                                       Bupivacaine 3.5 mgday  Ziconitide 0.87 mcg/day     PTM was started at 0.15 mg every 2 hours maximum of 5 per day      Follow up: after dosing adjustment as above.      Patient was seen by and plan was discussed with attending physician, Dr. Espinoza.    Efraín Rider, DO  Pain Medicine Fellow    Answers for HPI/ROS submitted by the patient on 2/10/2022  General Symptoms: No  Skin Symptoms: No  HENT Symptoms: No  EYE SYMPTOMS: No  HEART SYMPTOMS: No  LUNG SYMPTOMS: No  INTESTINAL SYMPTOMS: No  URINARY SYMPTOMS: No  GYNECOLOGIC SYMPTOMS: No  BREAST SYMPTOMS: No  SKELETAL SYMPTOMS: No  BLOOD SYMPTOMS: No  NERVOUS SYSTEM SYMPTOMS: No  MENTAL HEALTH SYMPTOMS: No    ATTENDING ATTESTATION  I saw the patient along with the pain medicine fellow Dr. Efraín Rider. Please see his note above for full details. I was involved in gathering history, physical examination and development of the plan of care.   New dosage change orders attached in the separate follow up note.

## 2022-02-10 NOTE — NURSING NOTE
Patient presents with:  RECHECK: UMP RETURN,Patient reports, 6/10 back grion, legs, feet      Data Unavailable     Pain Medications     Analgesics Other Refills Start End     acetaminophen (TYLENOL) 500 MG tablet          Sig - Route: Take 500-1,000 mg by mouth every 6 hours as needed for mild pain - Oral    Class: Historical          What medications are you using for pain?   Pain pump        (Return Patients only) What refills are you needing today?         How will you be connecting to the visit?   shaista  How would you like to obtain your AVS? MyChart  If the video visit is dropped, the invitation should be resent by: Text to cell phone: [phone]   Will anyone else be joining your video visit? No        [phone] shaista Adams, EMT

## 2022-02-10 NOTE — TELEPHONE ENCOUNTER
RNCC received call from pt. She reports no change in pain since recent pump dosage adjustment. RNCC updated Dr. Espinoza. Provider will evaluate pt during follow up on 2/10/22. Pt updated and also advised to not take PO opioids, per provider. She verbalized understanding and declined questions.     Santa Wallace DNP, RN

## 2022-02-17 ENCOUNTER — MEDICAL CORRESPONDENCE (OUTPATIENT)
Dept: HEALTH INFORMATION MANAGEMENT | Facility: CLINIC | Age: 72
End: 2022-02-17

## 2022-02-17 ENCOUNTER — MEDICAL CORRESPONDENCE (OUTPATIENT)
Dept: HEALTH INFORMATION MANAGEMENT | Facility: CLINIC | Age: 72
End: 2022-02-17
Payer: MEDICARE

## 2022-02-18 ENCOUNTER — TELEPHONE (OUTPATIENT)
Dept: ANESTHESIOLOGY | Facility: CLINIC | Age: 72
End: 2022-02-18
Payer: MEDICARE

## 2022-02-18 NOTE — TELEPHONE ENCOUNTER
"2/18/22:  RNCC called pt at the request of Dr. Espinoza. Pt reported to [facility] home nurse yesterday that she was \"not feeling well.\" Pt reports that today she was doing \"much better\" until she started having issues with PTM . Pt was awaiting a call back from [facility] to address issues with  and declined needs from clinic end at this time.     RNCC received a call from [facility] nurse, Emily, stating that patient's pump was set up according to provider orders. She also reports pt and  were verbally aggressive towards her on the phone.     Dr. Espinoza updated. RNCC called pt back and advised that the settings were correct and no further adjustments were to be made at this time. Pt verbalized understanding and ended the call.     2/21/22:   RNCC received two voice messages from pt stating that she needed help with pain control. Dr. Espinoza updated and requested pt schedule follow up with her via video. RNCC returned call to pt with update and offered assistance with scheduling. Pt declined appointment and ended the call.     Santa Wallace, KIRAN, RN    "

## 2022-03-01 ENCOUNTER — MEDICAL CORRESPONDENCE (OUTPATIENT)
Dept: HEALTH INFORMATION MANAGEMENT | Facility: CLINIC | Age: 72
End: 2022-03-01
Payer: MEDICARE

## 2022-03-04 NOTE — TELEPHONE ENCOUNTER
Per Dr. Espinoza, pt must have follow up for continuation of pump management. Pt may schedule a video visit, provider requests that pt is seen this week.     Encounter routed to clinic EMT to reach out to pt to assist with scheduling.     Santa Wallace DNP, RN

## 2022-03-07 NOTE — TELEPHONE ENCOUNTER
EMT called patient and will scheduoe her for Thursday 3/10 @11:30am. With Dr saldaña.      Edgar Adams EMT

## 2022-03-10 ENCOUNTER — TELEPHONE (OUTPATIENT)
Dept: ANESTHESIOLOGY | Facility: CLINIC | Age: 72
End: 2022-03-10
Payer: MEDICARE

## 2022-03-10 ENCOUNTER — VIRTUAL VISIT (OUTPATIENT)
Dept: ANESTHESIOLOGY | Facility: CLINIC | Age: 72
End: 2022-03-10
Payer: MEDICARE

## 2022-03-10 DIAGNOSIS — M79.2 INTRACTABLE NEUROPATHIC PAIN OF LUMBOSACRAL ORIGIN: ICD-10-CM

## 2022-03-10 DIAGNOSIS — M79.2 NEUROPATHIC PAIN: Primary | ICD-10-CM

## 2022-03-10 PROCEDURE — 99214 OFFICE O/P EST MOD 30 MIN: CPT | Mod: 95 | Performed by: ANESTHESIOLOGY

## 2022-03-10 NOTE — CONFIDENTIAL NOTE
RN returned call to Reema at [facility]. [facility] is requesting prescription for the patient, sent via right fax this morning. Patient has a [facility] nurse visit on 3/15. RN discussed that the patient has a video visit today (3/10) with Dr. Espinoza and a prescription will be sent over the visit is complete. Reema verbalized understanding.       Camille Talavera RN

## 2022-03-10 NOTE — NURSING NOTE
.  Patient presents with:  RECHECK: UMP RETURN, Patient reports, 3/10 feet, ankles      Data Unavailable     Pain Medications     Analgesics Other Refills Start End     acetaminophen (TYLENOL) 500 MG tablet          Sig - Route: Take 500-1,000 mg by mouth every 6 hours as needed for mild pain - Oral    Class: Historical          What medications are you using for pain?   Acetaminophen, pain pump,             How will you be connecting to the visit? doximity  How would you like to obtain your AVS? Mail a copy  If the video visit is dropped, the invitation should be resent by: Text to cell phone: [phone]   Will anyone else be joining your video visit? No       Edgar Adams, EMT

## 2022-03-10 NOTE — PROGRESS NOTES
Type of service:  Video Visit    Video Start Time: 11:41 AM    Video End Time:11:54 AM    Originating Location (pt. Location): Home    Distant Location (provider location):  [facility] [place]     Platform used for Video Visit: [facility]      Pain clinic follow up note    HPI:   Anayeli Gagnon is a 71 year old year old female  who presents to the pain clinic with chronic neuropathic pain, s/p recent pump dose adjustment    Patient Supplied Answers To the [facility] Pain Questionnaire  [facility] Pain -  Patient Entered Questionnaire/Answers 2/10/2022   What number best describes your pain right now:  0 = No pain  to  10 = Worst pain imaginable 6   How would you describe the pain? dull, aching   Which of the following worsen your pain? -   Which of the following improve or reduce your pain?  medication   What number best describes your average pain for the past week:  0 = No pain  to  10 = Worst pain imaginable 4   What number best describes your LOWEST pain in past 24 hours:  0 = No pain  to  10 = Worst pain imaginable 3   What number best describes your WORST pain in past 24 hours:  0 = No pain  to  10 = Worst pain imaginable 6   When is your pain worst? Constant   What non-medicine treatments have you already had for your pain? pain clinic, spinal medicine pump   Have you tried treating your pain with medication?  Yes   Are you currently taking medications for your pain? Yes     Annamarie was able to join via video. She is satisfied with pain control now with the recent medication changes. The patient reports that the medications are helping. She is able to sleep through the night. She has noted a 40% reduction in the pain overall.     ROS:  Review of Systems   All other systems reviewed and are negative.    Significant Medical History:   Past Medical History:   Diagnosis Date     CARDIAC DYSRHYTHMIAS [facility] 10/3/2007    Taking atenelol for years for this     History of skin  cancer      Mitral valve prolapse      Neurogenic bladder      Sacral decubitus ulcer, stage IV (H)      Thoracic spinal cord injury (H)      Past Surgical History:  Past Surgical History:   Procedure Laterality Date     DECOMPRESSION LUMBAR ONE LEVEL N/A 2019    Procedure: Posterior spinal decompression;  Surgeon: Alf Ritchie MD;  Location: [facility] OR     INSERT INTRATHECAL PAIN PUMP N/A 2022    Procedure: INSERTION, INTRATHECAL ANALGESIC PUMP, externalized trial;  Surgeon: Luis Orourke MD;  Location[facility]     INSERT INTRATHECAL PAIN PUMP Right 2022    Procedure: INSERTION, INTRATHECAL ANALGESIC PUMP;  Surgeon: Luis Orourke MD;  Location: [facility]     INSERT STIMULATOR AND LEADS INTERNAL DORSAL COLUMN N/A 2021    Procedure: Posterior thoracic 12 to Lumbar 1 level for placement of spinal cord stimulator paddle and placement of implantable pulse generator/battery over right buttock;  Surgeon: Luis Orourke MD;  Location: [facility] OR     IR SPINAL ANGIOGRAM  10/16/2019     IR SPINAL ANGIOGRAM  2019     LAPAROSCOPIC TUBAL LIGATION       REPAIR SPINAL ARERIOVENOUS MALFORMATION N/A 2019    Procedure: with surgical disconnection arterial venous fistula Thoracic 5;  Surgeon: Alf Ritchie MD;  Location: [facility] OR          Family History:  Family History   Problem Relation Age of Onset     C.A.D. Father          41     Deep Vein Thrombosis (DVT) No family hx of      Anesthesia Reaction No family hx of           Social History:  Social History     Socioeconomic History     Marital status:      Spouse name: Not on file     Number of children: Not on file     Years of education: Not on file     Highest education level: Not on file   Occupational History     Occupation: NA     Employer: [employer]   Tobacco Use     Smoking status: Never Smoker     Smokeless tobacco: Never Used   Substance and Sexual Activity     Alcohol use: No     Drug use: No     Sexual activity: Yes      Partners: Male   Other Topics Concern     Parent/sibling w/ CABG, MI or angioplasty before 65F 55M? Not Asked   Social History Narrative    Lives with spouse - works in [place].     Social Determinants of Health     Financial Resource Strain: Not on file   Food Insecurity: Not on file   Transportation Needs: Not on file   Physical Activity: Not on file   Stress: Not on file   Social Connections: Not on file   Intimate Partner Violence: Not on file   Housing Stability: Not on file     Social History     Social History Narrative    Lives with spouse - works in [place].          Allergies:  Allergies   Allergen Reactions     Contrast Dye Rash     Painful rash after x-ray contrast       Current Medications:   Current Outpatient Medications   Medication Sig Dispense Refill     acetaminophen (TYLENOL) 500 MG tablet Take 500-1,000 mg by mouth every 6 hours as needed for mild pain       baclofen (LIORESAL) 10 MG tablet Take 10 mg by mouth 3 times daily as needed for muscle spasms       ciprofloxacin (CIPRO) 500 MG tablet Take 500 mg by mouth 2 times daily For 7 days       COMPOUND CONTAINING CONTROLLED SUBSTANCE (CMPD RX) - PHARMACY TO MIX COMPOUNDED MEDICATION New Intrathecal Medications dosage adjustment, concentration to remain the same.     Dilaudid    5mg/ml                                                          Bupivacaine 10 mg/ml  Ziconitide 2.5 mcg/ml        Total Volume in Refill:20 mL      Basal:   Dilaudid    1.75mg/day                                                       Bupivacaine 3.5 mgday  Ziconitide 0.87 mcg/day     PTM was started at 0.15 mg every 2 hours maximum of 5 per day 20 mL 0     gabapentin (NEURONTIN) 600 MG tablet Take 1,200 mg by mouth 3 times daily  1 tablet 0     lidocaine (LIDODERM) 5 % patch Apply 1 patch topically daily as needed        LORazepam (ATIVAN) 0.5 MG tablet Take 1 tablet by mouth daily as needed for anxiety        medical cannabis (Patient's own supply) See Admin  Instructions (The purpose of this order is to document that the patient reports taking medical cannabis.  This is not a prescription, and is not used to certify that the patient has a qualifying medical condition.)     Liquid oral formulation.       melatonin 3 MG tablet Take 3 mg by mouth nightly as needed        Menthol, Topical Analgesic, (BIOFREEZE) 4 % GEL Externally apply topically daily as needed       metoprolol succinate ER (TOPROL-XL) 25 MG 24 hr tablet Take 12.5 mg by mouth every evening        Multiple Vitamins-Minerals (WOMENS MULTI PO) Take 1 tablet by mouth daily       NARCAN 4 MG/0.1ML nasal spray INHALE ONE SPRAY IN ONE NOSTRIL AS NEEDED FOR OPIOID. ANTIDOTE, MAY REPEAT IN OTHER NOSTRIL IF NEEDED       nystatin (NYSTOP) 076825 UNIT/GM external powder Apply topically 3 times daily as needed Cream       THEANINE PO Take 1 tablet by mouth daily as needed (takes when she remembers)       ondansetron (ZOFRAN) 4 MG tablet Take 4 mg by mouth every 8 hours as needed for nausea        polyethylene glycol (MIRALAX) 17 GM/Dose powder Take 17 g by mouth daily as needed for constipation         Physical Exam:     There were no vitals taken for this visit.    General Appearance: No distress, seated comfortably  Mood: Euthymic  HE ENT: Non constricted pupils  Respiratory: Non labored breathing    ASSESSMENT AND PLAN:     Encounter Diagnosis:  Laminectomies from T3-T12  Spinal subdural hematoma  Paraplegia  Neuropathic pain   Paddle SCS insitu  Opioid dependence  IDDS insitu       Anayeli Gagnon is a 71 year old year old female  who presents to the pain clinic with improved neuropathic pain after pump titration. I will plan to continue to increase the ziconitide portion only.     RECOMMENDATIONS:     1. Medications Current  Dilaudid    5mg/ml                                                          Bupivacaine 10 mg/ml  Ziconitide 2.5 mcg/ml        Total Volume in Refill:20  mL      Basal:   Dilaudid    2.6mg/day                                                       Bupivacaine 5.2 mgday  Ziconitide 1.3 mcg/day      PTM hydromorphone dose is 0.15    New Prescription ( plan to increase ziconitide by 0.5 over the 5 PTM. Thus a total of 1 mcg/day increase in prialt)    Dilaudid    5mg/ml                                                          Bupivacaine 10 mg/ml  Ziconitide 4.5 mcg/ml    Total Volume in Refill:20 mL      Basal:   Dilaudid    2.6mg/day                                                       Bupivacaine 5.2 mgday  Ziconitide 2.3 mcg/day    PTM to stay the same at 0.15 hydromorphone     2. We will send the new prescription to "[facility] for pump fill and dose adjustment.     Follow up: 3-4 weeks. Video is ok.

## 2022-03-10 NOTE — LETTER
3/10/2022       RE: Anayeli Gagnon  [address] [facility] [address]     Dear Colleague,    Thank you for referring your patient, Anayeli Gagnon, to the [facility] CLINIC FOR COMPREHENSIVE PAIN MANAGEMENT [place] at [facility]. Please see a copy of my visit note below.      Pain clinic follow up note    HPI:   Anayeli Gagnon is a 71 year old year old female  who presents to the pain clinic with chronic neuropathic pain, s/p recent pump dose adjustment    Patient Supplied Answers To the [facility] Pain Questionnaire  [facility] Pain -  Patient Entered Questionnaire/Answers 2/10/2022   What number best describes your pain right now:  0 = No pain  to  10 = Worst pain imaginable 6   How would you describe the pain? dull, aching   Which of the following worsen your pain? -   Which of the following improve or reduce your pain?  medication   What number best describes your average pain for the past week:  0 = No pain  to  10 = Worst pain imaginable 4   What number best describes your LOWEST pain in past 24 hours:  0 = No pain  to  10 = Worst pain imaginable 3   What number best describes your WORST pain in past 24 hours:  0 = No pain  to  10 = Worst pain imaginable 6   When is your pain worst? Constant   What non-medicine treatments have you already had for your pain? pain clinic, spinal medicine pump   Have you tried treating your pain with medication?  Yes   Are you currently taking medications for your pain? Yes     Annamarie was able to join via video. She is satisfied with pain control now with the recent medication changes. The patient reports that the medications are helping. She is able to sleep through the night. She has noted a 40% reduction in the pain overall.     ROS:  Review of Systems   All other systems reviewed and are negative.    Significant Medical History:   Past Medical History:   Diagnosis Date     CARDIAC DYSRHYTHMIAS NEC 10/3/2007    Taking  atenelol for years for this     History of skin cancer      Mitral valve prolapse      Neurogenic bladder      Sacral decubitus ulcer, stage IV (H)      Thoracic spinal cord injury (H)      Past Surgical History:  Past Surgical History:   Procedure Laterality Date     DECOMPRESSION LUMBAR ONE LEVEL N/A 2019    Procedure: Posterior spinal decompression;  Surgeon: Alf Ritchie MD;  Location: [facility] OR     INSERT INTRATHECAL PAIN PUMP N/A 2022    Procedure: INSERTION, INTRATHECAL ANALGESIC PUMP, externalized trial;  Surgeon: Luis Orourke MD;  Location: [facility] OR     INSERT INTRATHECAL PAIN PUMP Right 2022    Procedure: INSERTION, INTRATHECAL ANALGESIC PUMP;  Surgeon: Luis Orourke MD;  Location: [facility] OR     INSERT STIMULATOR AND LEADS INTERNAL DORSAL COLUMN N/A 2021    Procedure: Posterior thoracic 12 to Lumbar 1 level for placement of spinal cord stimulator paddle and placement of implantable pulse generator/battery over right buttock;  Surgeon: Luis Orourke MD;  Location: [facility] OR     IR SPINAL ANGIOGRAM  10/16/2019     IR SPINAL ANGIOGRAM  2019     LAPAROSCOPIC TUBAL LIGATION       REPAIR SPINAL ARERIOVENOUS MALFORMATION N/A 2019    Procedure: with surgical disconnection arterial venous fistula Thoracic 5;  Surgeon: Alf Ritchie MD;  Location: [facility] OR          Family History:  Family History   Problem Relation Age of Onset     C.A.D. Father          41     Deep Vein Thrombosis (DVT) No family hx of      Anesthesia Reaction No family hx of           Social History:  Social History     Socioeconomic History     Marital status:      Spouse name: Not on file     Number of children: Not on file     Years of education: Not on file     Highest education level: Not on file   Occupational History     Occupation: NA     Employer: [employer]   Tobacco Use     Smoking status: Never Smoker     Smokeless tobacco: Never Used   Substance and Sexual Activity     Alcohol  use: No     Drug use: No     Sexual activity: Yes     Partners: Male   Other Topics Concern     Parent/sibling w/ CABG, MI or angioplasty before 65F 55M? Not Asked   Social History Narrative    Lives with spouse - works in [place].     Social Determinants of Health     Financial Resource Strain: Not on file   Food Insecurity: Not on file   Transportation Needs: Not on file   Physical Activity: Not on file   Stress: Not on file   Social Connections: Not on file   Intimate Partner Violence: Not on file   Housing Stability: Not on file     Social History     Social History Narrative    Lives with spouse - works in [place].          Allergies:  Allergies   Allergen Reactions     Contrast Dye Rash     Painful rash after x-ray contrast       Current Medications:   Current Outpatient Medications   Medication Sig Dispense Refill     acetaminophen (TYLENOL) 500 MG tablet Take 500-1,000 mg by mouth every 6 hours as needed for mild pain       baclofen (LIORESAL) 10 MG tablet Take 10 mg by mouth 3 times daily as needed for muscle spasms       ciprofloxacin (CIPRO) 500 MG tablet Take 500 mg by mouth 2 times daily For 7 days       COMPOUND CONTAINING CONTROLLED SUBSTANCE (CMPD RX) - PHARMACY TO MIX COMPOUNDED MEDICATION New Intrathecal Medications dosage adjustment, concentration to remain the same.     Dilaudid    5mg/ml                                                          Bupivacaine 10 mg/ml  Ziconitide 2.5 mcg/ml        Total Volume in Refill:20 mL      Basal:   Dilaudid    1.75mg/day                                                       Bupivacaine 3.5 mgday  Ziconitide 0.87 mcg/day     PTM was started at 0.15 mg every 2 hours maximum of 5 per day 20 mL 0     gabapentin (NEURONTIN) 600 MG tablet Take 1,200 mg by mouth 3 times daily  1 tablet 0     lidocaine (LIDODERM) 5 % patch Apply 1 patch topically daily as needed        LORazepam (ATIVAN) 0.5 MG tablet Take 1 tablet by mouth daily as needed for anxiety         medical cannabis (Patient's own supply) See Admin Instructions (The purpose of this order is to document that the patient reports taking medical cannabis.  This is not a prescription, and is not used to certify that the patient has a qualifying medical condition.)     Liquid oral formulation.       melatonin 3 MG tablet Take 3 mg by mouth nightly as needed        Menthol, Topical Analgesic, (BIOFREEZE) 4 % GEL Externally apply topically daily as needed       metoprolol succinate ER (TOPROL-XL) 25 MG 24 hr tablet Take 12.5 mg by mouth every evening        Multiple Vitamins-Minerals (WOMENS MULTI PO) Take 1 tablet by mouth daily       NARCAN 4 MG/0.1ML nasal spray INHALE ONE SPRAY IN ONE NOSTRIL AS NEEDED FOR OPIOID. ANTIDOTE, MAY REPEAT IN OTHER NOSTRIL IF NEEDED       nystatin (NYSTOP) 866452 UNIT/GM external powder Apply topically 3 times daily as needed Cream       THEANINE PO Take 1 tablet by mouth daily as needed (takes when she remembers)       ondansetron (ZOFRAN) 4 MG tablet Take 4 mg by mouth every 8 hours as needed for nausea        polyethylene glycol (MIRALAX) 17 GM/Dose powder Take 17 g by mouth daily as needed for constipation         Physical Exam:     There were no vitals taken for this visit.    General Appearance: No distress, seated comfortably  Mood: Euthymic  HE ENT: Non constricted pupils  Respiratory: Non labored breathing    ASSESSMENT AND PLAN:     Encounter Diagnosis:  Laminectomies from T3-T12  Spinal subdural hematoma  Paraplegia  Neuropathic pain   Paddle SCS insitu  Opioid dependence  IDDS insitu       Anayeli Gagnon is a 71 year old year old female  who presents to the pain clinic with improved neuropathic pain after pump titration. I will plan to continue to increase the ziconitide portion only.     RECOMMENDATIONS:     1. Medications Current  Dilaudid    5mg/ml                                                          Bupivacaine 10 mg/ml  Ziconitide 2.5 mcg/ml        Total  Volume in Refill:20 mL      Basal:   Dilaudid    2.6mg/day                                                       Bupivacaine 5.2 mgday  Ziconitide 1.3 mcg/day      PTM hydromorphone dose is 0.15    New Prescription ( plan to increase ziconitide by 0.5 over the 5 PTM. Thus a total of 1 mcg/day increase in prialt)    Dilaudid    5mg/ml                                                          Bupivacaine 10 mg/ml  Ziconitide 4.5 mcg/ml    Total Volume in Refill:20 mL      Basal:   Dilaudid    2.6mg/day                                                       Bupivacaine 5.2 mgday  Ziconitide 2.3 mcg/day    PTM to stay the same at 0.15 hydromorphone     2. We will send the new prescription to [facility] for pump fill and dose adjustment.     Follow up: 3-4 weeks. Video is ok.             Jasmine Espinoza MD

## 2022-03-10 NOTE — TELEPHONE ENCOUNTER
[facility] Call Center    Phone Message    May a detailed message be left on voicemail: yes     Reason for Call: Medication Refill Request    Has the patient contacted the pharmacy for the refill? Yes   Name of medication being requested: hydromorphone pain pump medication  Provider who prescribed the medication: Dr. Espinoza  Pharmacy: [facility]   Date medication is needed: 3/10/22   [facility] called to f/u on refill request      Action Taken: Message routed to:  [facility] pain

## 2022-03-11 NOTE — TELEPHONE ENCOUNTER
[facility] Call Center    Phone Message    May a detailed message be left on voicemail: yes     Reason for Call: Other:     Emily returning a call to Camille.  But she still has another question and would like a call back to discuss.         Action Taken: Message routed to:  [facility] Pain    Travel Screening: Not Applicable

## 2022-03-11 NOTE — CONFIDENTIAL NOTE
RN returned call to Emily with [employer]. Emily stated she received the voicemail and had no further questions at this time.       Camille Talavera RN

## 2022-03-14 ENCOUNTER — MEDICAL CORRESPONDENCE (OUTPATIENT)
Dept: HEALTH INFORMATION MANAGEMENT | Facility: CLINIC | Age: 72
End: 2022-03-14
Payer: MEDICARE

## 2022-03-15 ENCOUNTER — MEDICAL CORRESPONDENCE (OUTPATIENT)
Dept: HEALTH INFORMATION MANAGEMENT | Facility: CLINIC | Age: 72
End: 2022-03-15
Payer: MEDICARE

## 2022-03-16 ENCOUNTER — MEDICAL CORRESPONDENCE (OUTPATIENT)
Dept: HEALTH INFORMATION MANAGEMENT | Facility: CLINIC | Age: 72
End: 2022-03-16
Payer: MEDICARE

## 2022-03-30 ENCOUNTER — TELEPHONE (OUTPATIENT)
Dept: ANESTHESIOLOGY | Facility: CLINIC | Age: 72
End: 2022-03-30
Payer: MEDICARE

## 2022-03-30 NOTE — TELEPHONE ENCOUNTER
[facility] Call Center    Phone Message    May a detailed message be left on voicemail: no     Reason for Call: Other: Reema at [employer] requesting call back to verify a request for a refill for the pt's pain pump medication was received by the clinic, she stated it was faxed over on 3/21/22     Action Taken: Message routed to:  [facility] pain

## 2022-04-05 ENCOUNTER — VIRTUAL VISIT (OUTPATIENT)
Dept: ANESTHESIOLOGY | Facility: CLINIC | Age: 72
End: 2022-04-05
Payer: MEDICARE

## 2022-04-05 DIAGNOSIS — M79.2 NEUROPATHIC PAIN: Primary | ICD-10-CM

## 2022-04-05 PROCEDURE — 99214 OFFICE O/P EST MOD 30 MIN: CPT | Mod: 95 | Performed by: ANESTHESIOLOGY

## 2022-04-05 NOTE — LETTER
4/5/2022       RE: Anayeli Gagnon  [address] [facility] [address]     Dear Colleague,    Thank you for referring your patient, Anayeli Gagnon, to the [facility] CLINIC FOR COMPREHENSIVE PAIN MANAGEMENT [place] at [facility]. Please see a copy of my visit note below.      Pain clinic follow up note    HPI:   Anayeli Gagnon is a 71 year old year old female  who presents to the pain clinic with neuropathic pain, s/p recent prialt titration    Patient Supplied Answers To the [facility] Pain Questionnaire  [facility] Pain -  Patient Entered Questionnaire/Answers 2/10/2022   What number best describes your pain right now:  0 = No pain  to  10 = Worst pain imaginable 6   How would you describe the pain? dull, aching   Which of the following worsen your pain? -   Which of the following improve or reduce your pain?  medication   What number best describes your average pain for the past week:  0 = No pain  to  10 = Worst pain imaginable 4   What number best describes your LOWEST pain in past 24 hours:  0 = No pain  to  10 = Worst pain imaginable 3   What number best describes your WORST pain in past 24 hours:  0 = No pain  to  10 = Worst pain imaginable 6   When is your pain worst? Constant   What non-medicine treatments have you already had for your pain? pain clinic, spinal medicine pump   Have you tried treating your pain with medication?  Yes   Are you currently taking medications for your pain? Yes     The patient states that she is saving 1 PTM bolus for bedtime. She would like to increase PTM available to her from 5 to 6 doses.      Pain today is 1/10    Patient reports compared to the last visit their pain is improved by following percent:    Pain intensity: 20 % over the last month and overall 40% improvement from baseline  Pain frequency: 20 %  over the last month and overall 40% improvement from baseline  Duration of pain episodes: 20 % over the last  month and overall 40% improvement from baseline  Falling asleep: It takes a couple hours. The evening bolus helps her sleep faster.   Staying asleep: Usually able to stay asleep and wakes up at 4-5 AM.   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 She is going outside more.     ROS:  Review of Systems   All other systems reviewed and are negative.    Significant Medical History:   Past Medical History:   Diagnosis Date     CARDIAC DYSRHYTHMIAS NEC 10/3/2007    Taking atenelol for years for this     History of skin cancer      Mitral valve prolapse      Neurogenic bladder      Sacral decubitus ulcer, stage IV (H)      Thoracic spinal cord injury (H)           Past Surgical History:  Past Surgical History:   Procedure Laterality Date     DECOMPRESSION LUMBAR ONE LEVEL N/A 2019    Procedure: Posterior spinal decompression;  Surgeon: Alf Ritchie MD;  Location: [facility] OR     INSERT INTRATHECAL PAIN PUMP N/A 2022    Procedure: INSERTION, INTRATHECAL ANALGESIC PUMP, externalized trial;  Surgeon: Luis Orourke MD;  Location: [facility] OR     INSERT INTRATHECAL PAIN PUMP Right 2022    Procedure: INSERTION, INTRATHECAL ANALGESIC PUMP;  Surgeon: Luis Orourke MD;  Location: [facility] OR     INSERT STIMULATOR AND LEADS INTERNAL DORSAL COLUMN N/A 2021    Procedure: Posterior thoracic 12 to Lumbar 1 level for placement of spinal cord stimulator paddle and placement of implantable pulse generator/battery over right buttock;  Surgeon: Luis Orourke MD;  Location: [facility] OR     IR SPINAL ANGIOGRAM  10/16/2019     IR SPINAL ANGIOGRAM  2019     LAPAROSCOPIC TUBAL LIGATION       REPAIR SPINAL ARERIOVENOUS MALFORMATION N/A 2019    Procedure: with surgical disconnection arterial venous fistula Thoracic 5;  Surgeon: Alf Ritchie MD;  Location: [facility] OR          Family History:  Family History   Problem Relation Age of Onset     C.A.D. Father          41     Deep Vein Thrombosis (DVT) No  family hx of      Anesthesia Reaction No family hx of           Social History:  Social History     Socioeconomic History     Marital status:      Spouse name: Not on file     Number of children: Not on file     Years of education: Not on file     Highest education level: Not on file   Occupational History     Occupation: NA     Employer: [employer]   Tobacco Use     Smoking status: Never Smoker     Smokeless tobacco: Never Used   Substance and Sexual Activity     Alcohol use: No     Drug use: No     Sexual activity: Yes     Partners: Male   Other Topics Concern     Parent/sibling w/ CABG, MI or angioplasty before 65F 55M? Not Asked   Social History Narrative    Lives with spouse - works in [place].     Social Determinants of Health     Financial Resource Strain: Not on file   Food Insecurity: Not on file   Transportation Needs: Not on file   Physical Activity: Not on file   Stress: Not on file   Social Connections: Not on file   Intimate Partner Violence: Not on file   Housing Stability: Not on file     Social History     Social History Narrative    Lives with spouse - works in [place].          Allergies:  Allergies   Allergen Reactions     Contrast Dye Rash     Painful rash after x-ray contrast       Current Medications:   Current Outpatient Medications   Medication Sig Dispense Refill     acetaminophen (TYLENOL) 500 MG tablet Take 500-1,000 mg by mouth every 6 hours as needed for mild pain       baclofen (LIORESAL) 10 MG tablet Take 10 mg by mouth 3 times daily as needed for muscle spasms       ciprofloxacin (CIPRO) 500 MG tablet Take 500 mg by mouth 2 times daily For 7 days       COMPOUND CONTAINING CONTROLLED SUBSTANCE (CMPD RX) - PHARMACY TO MIX COMPOUNDED MEDICATION New Prescription ( plan to increase ziconitide by 0.5 over the 5 PTM. Thus a total of 1 mcg/day increase in prialt)     Dilaudid    5mg/ml                                                          Bupivacaine 10 mg/ml  Ziconitide 4.5  mcg/ml     Total Volume in Refill:20 mL      Basal:   Dilaudid    2.6mg/day                                                       Bupivacaine 5.2 mgday  Ziconitide 2.3 mcg/day        PTM was started at 0.15 mg every 2 hours maximum of 5 per day 20 mL 0     gabapentin (NEURONTIN) 600 MG tablet Take 1,200 mg by mouth 3 times daily  1 tablet 0     lidocaine (LIDODERM) 5 % patch Apply 1 patch topically daily as needed        LORazepam (ATIVAN) 0.5 MG tablet Take 1 tablet by mouth daily as needed for anxiety        medical cannabis (Patient's own supply) See Admin Instructions (The purpose of this order is to document that the patient reports taking medical cannabis.  This is not a prescription, and is not used to certify that the patient has a qualifying medical condition.)     Liquid oral formulation.       melatonin 3 MG tablet Take 3 mg by mouth nightly as needed        Menthol, Topical Analgesic, (BIOFREEZE) 4 % GEL Externally apply topically daily as needed       metoprolol succinate ER (TOPROL-XL) 25 MG 24 hr tablet Take 12.5 mg by mouth every evening        Multiple Vitamins-Minerals (WOMENS MULTI PO) Take 1 tablet by mouth daily       NARCAN 4 MG/0.1ML nasal spray INHALE ONE SPRAY IN ONE NOSTRIL AS NEEDED FOR OPIOID. ANTIDOTE, MAY REPEAT IN OTHER NOSTRIL IF NEEDED       nystatin (NYSTOP) 300745 UNIT/GM external powder Apply topically 3 times daily as needed Cream       THEANINE PO Take 1 tablet by mouth daily as needed (takes when she remembers)       ondansetron (ZOFRAN) 4 MG tablet Take 4 mg by mouth every 8 hours as needed for nausea        polyethylene glycol (MIRALAX) 17 GM/Dose powder Take 17 g by mouth daily as needed for constipation           Physical Exam:     There were no vitals taken for this visit.    General Appearance: No distress, seated comfortably  Mood: Euthymic  HE ENT: Non constricted pupils  Respiratory: Non labored breathing    ASSESSMENT AND PLAN:     Encounter Diagnosis:  Laminectomies  from T3-T12  Spinal subdural hematoma  Paraplegia  Neuropathic pain   Paddle SCS insitu  Opioid dependence  IDDS insitu       Anayeli Gagnon is a 71 year old year old female  who presents to the pain clinic with improved pain control with ziconitide introduction and titration. The patient has an upcoming pump refill.     RECOMMENDATIONS:     1. Medications: Current medications:     Dilaudid    10mg/ml                                                          Bupivacaine 20 mg/ml  Ziconitide 9 mcg/ml     Total Volume in Refill:20 mL      Basal:   Dilaudid    2.6 mg/day                                                       Bupivacaine 5.2 mgday  Ziconitide 2.3 mcg/day     PTM to stay the same at 0.15 hydromorphone     Maximum dose in 24 hours, dilaudid 3.48, bupivacaine 6.9 and ziconitide 3.13 mcg    New prescription: Ziconitide concentration increased so that the patient received 0.3 mcg over the last maximum of 3.13 in a 24 hour period.    Dilaudid    10mg/ml                                                          Bupivacaine 20 mg/ml  Ziconitide 10 mcg/ml     Total Volume in Refill:20 mL      Basal:   Dilaudid    3.477 mg/day                                                       Bupivacaine 6.955 mg/day  Ziconitide 3.477 mcg/day     PTM to stay the same at 0.15 hydromorphone, 2 hour lockout, maximum 6 in a day.     Maximum dose in 24 hours, dilaudid 4.3490 mg , bupivacaine 8.6 mg and ziconitide 4.349 mcg    2. We will send the new prescription to [facility] for pump fill and dose adjustment.      Follow up: 3-4 weeks. Video is ok.      Pt's next refill with [facility] 4/8/22, prescription sent last week for syringe with following concentrations:   Hydromorphone 10 mg/mL  Bupivacaine 20 mg/mL  Prialt 9 mcg/mL    Verbal orders given by Dr. Espinoza for revised dosage adjustment recommendation. Orders sent to [facility]:    Dosage adjustment only:  Basal:  Dilaudid 3.477 mg/day  Bupivacaine 6.955 mg/day  Ziconitide 3.130  mcg/day  PTM to stay the same at 0.15 hydromorphone, 2 hour lockout, maximum 6 in a day.  Maximum dose in 24 hours, dilaudid 4.3490 mg , bupivacaine 8.6 mg and ziconitide 4.002 mcg    Santa Wallace DNP, RN      Jasmine Espinoza MD

## 2022-04-05 NOTE — PROGRESS NOTES
Type of service:  Video Visit    Video Start Time: 9:03 AM    Video End Time:9:28 AM    Originating Location (pt. Location): Home    Distant Location (provider location):  [facility] [place]     Platform used for Video Visit: [facility]    Pain clinic follow up note    HPI:   Anayeli Gagnon is a 71 year old year old female  who presents to the pain clinic with neuropathic pain, s/p recent prialt titration    Patient Supplied Answers To the [facility] Pain Questionnaire  [facility] Pain -  Patient Entered Questionnaire/Answers 2/10/2022   What number best describes your pain right now:  0 = No pain  to  10 = Worst pain imaginable 6   How would you describe the pain? dull, aching   Which of the following worsen your pain? -   Which of the following improve or reduce your pain?  medication   What number best describes your average pain for the past week:  0 = No pain  to  10 = Worst pain imaginable 4   What number best describes your LOWEST pain in past 24 hours:  0 = No pain  to  10 = Worst pain imaginable 3   What number best describes your WORST pain in past 24 hours:  0 = No pain  to  10 = Worst pain imaginable 6   When is your pain worst? Constant   What non-medicine treatments have you already had for your pain? pain clinic, spinal medicine pump   Have you tried treating your pain with medication?  Yes   Are you currently taking medications for your pain? Yes     The patient states that she is saving 1 PTM bolus for bedtime. She would like to increase PTM available to her from 5 to 6 doses.      Pain today is 1/10    Patient reports compared to the last visit their pain is improved by following percent:    Pain intensity: 20 % over the last month and overall 40% improvement from baseline  Pain frequency: 20 %  over the last month and overall 40% improvement from baseline  Duration of pain episodes: 20 % over the last month and overall 40% improvement from  baseline  Falling asleep: It takes a couple hours. The evening bolus helps her sleep faster.   Staying asleep: Usually able to stay asleep and wakes up at 4-5 AM.   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 She is going outside more.     ROS:  Review of Systems   All other systems reviewed and are negative.    Significant Medical History:   Past Medical History:   Diagnosis Date     CARDIAC DYSRHYTHMIAS NEC 10/3/2007    Taking atenelol for years for this     History of skin cancer      Mitral valve prolapse      Neurogenic bladder      Sacral decubitus ulcer, stage IV (H)      Thoracic spinal cord injury (H)           Past Surgical History:  Past Surgical History:   Procedure Laterality Date     DECOMPRESSION LUMBAR ONE LEVEL N/A 2019    Procedure: Posterior spinal decompression;  Surgeon: Alf Ritchie MD;  Location: [facility] OR     INSERT INTRATHECAL PAIN PUMP N/A 2022    Procedure: INSERTION, INTRATHECAL ANALGESIC PUMP, externalized trial;  Surgeon: Luis Orourke MD;  Location: [facility] OR     INSERT INTRATHECAL PAIN PUMP Right 2022    Procedure: INSERTION, INTRATHECAL ANALGESIC PUMP;  Surgeon: Luis Orourke MD;  Location: [facility] OR     INSERT STIMULATOR AND LEADS INTERNAL DORSAL COLUMN N/A 2021    Procedure: Posterior thoracic 12 to Lumbar 1 level for placement of spinal cord stimulator paddle and placement of implantable pulse generator/battery over right buttock;  Surgeon: Luis Orourke MD;  Location: [facility] OR     IR SPINAL ANGIOGRAM  10/16/2019     IR SPINAL ANGIOGRAM  2019     LAPAROSCOPIC TUBAL LIGATION       REPAIR SPINAL ARERIOVENOUS MALFORMATION N/A 2019    Procedure: with surgical disconnection arterial venous fistula Thoracic 5;  Surgeon: Alf Ritchie MD;  Location: [facility] OR          Family History:  Family History   Problem Relation Age of Onset     C.A.D. Father          41     Deep Vein Thrombosis (DVT) No family hx of      Anesthesia Reaction No  family hx of           Social History:  Social History     Socioeconomic History     Marital status:      Spouse name: Not on file     Number of children: Not on file     Years of education: Not on file     Highest education level: Not on file   Occupational History     Occupation: NA     Employer: [employer]   Tobacco Use     Smoking status: Never Smoker     Smokeless tobacco: Never Used   Substance and Sexual Activity     Alcohol use: No     Drug use: No     Sexual activity: Yes     Partners: Male   Other Topics Concern     Parent/sibling w/ CABG, MI or angioplasty before 65F 55M? Not Asked   Social History Narrative    Lives with spouse - works in [place].     Social Determinants of Health     Financial Resource Strain: Not on file   Food Insecurity: Not on file   Transportation Needs: Not on file   Physical Activity: Not on file   Stress: Not on file   Social Connections: Not on file   Intimate Partner Violence: Not on file   Housing Stability: Not on file     Social History     Social History Narrative    Lives with spouse - works in [place].          Allergies:  Allergies   Allergen Reactions     Contrast Dye Rash     Painful rash after x-ray contrast       Current Medications:   Current Outpatient Medications   Medication Sig Dispense Refill     acetaminophen (TYLENOL) 500 MG tablet Take 500-1,000 mg by mouth every 6 hours as needed for mild pain       baclofen (LIORESAL) 10 MG tablet Take 10 mg by mouth 3 times daily as needed for muscle spasms       ciprofloxacin (CIPRO) 500 MG tablet Take 500 mg by mouth 2 times daily For 7 days       COMPOUND CONTAINING CONTROLLED SUBSTANCE (CMPD RX) - PHARMACY TO MIX COMPOUNDED MEDICATION New Prescription ( plan to increase ziconitide by 0.5 over the 5 PTM. Thus a total of 1 mcg/day increase in prialt)     Dilaudid    5mg/ml                                                          Bupivacaine 10 mg/ml  Ziconitide 4.5 mcg/ml     Total Volume in Refill:20  mL      Basal:   Dilaudid    2.6mg/day                                                       Bupivacaine 5.2 mgday  Ziconitide 2.3 mcg/day        PTM was started at 0.15 mg every 2 hours maximum of 5 per day 20 mL 0     gabapentin (NEURONTIN) 600 MG tablet Take 1,200 mg by mouth 3 times daily  1 tablet 0     lidocaine (LIDODERM) 5 % patch Apply 1 patch topically daily as needed        LORazepam (ATIVAN) 0.5 MG tablet Take 1 tablet by mouth daily as needed for anxiety        medical cannabis (Patient's own supply) See Admin Instructions (The purpose of this order is to document that the patient reports taking medical cannabis.  This is not a prescription, and is not used to certify that the patient has a qualifying medical condition.)     Liquid oral formulation.       melatonin 3 MG tablet Take 3 mg by mouth nightly as needed        Menthol, Topical Analgesic, (BIOFREEZE) 4 % GEL Externally apply topically daily as needed       metoprolol succinate ER (TOPROL-XL) 25 MG 24 hr tablet Take 12.5 mg by mouth every evening        Multiple Vitamins-Minerals (WOMENS MULTI PO) Take 1 tablet by mouth daily       NARCAN 4 MG/0.1ML nasal spray INHALE ONE SPRAY IN ONE NOSTRIL AS NEEDED FOR OPIOID. ANTIDOTE, MAY REPEAT IN OTHER NOSTRIL IF NEEDED       nystatin (NYSTOP) 209039 UNIT/GM external powder Apply topically 3 times daily as needed Cream       THEANINE PO Take 1 tablet by mouth daily as needed (takes when she remembers)       ondansetron (ZOFRAN) 4 MG tablet Take 4 mg by mouth every 8 hours as needed for nausea        polyethylene glycol (MIRALAX) 17 GM/Dose powder Take 17 g by mouth daily as needed for constipation           Physical Exam:     There were no vitals taken for this visit.    General Appearance: No distress, seated comfortably  Mood: Euthymic  HE ENT: Non constricted pupils  Respiratory: Non labored breathing    ASSESSMENT AND PLAN:     Encounter Diagnosis:  Laminectomies from T3-T12  Spinal subdural  hematoma  Paraplegia  Neuropathic pain   Paddle SCS insitu  Opioid dependence  IDDS insitu       Anayeli Gagnon is a 71 year old year old female  who presents to the pain clinic with improved pain control with ziconitide introduction and titration. The patient has an upcoming pump refill.     RECOMMENDATIONS:     1. Medications: Current medications:     Dilaudid    10mg/ml                                                          Bupivacaine 20 mg/ml  Ziconitide 9 mcg/ml     Total Volume in Refill:20 mL      Basal:   Dilaudid    2.6 mg/day                                                       Bupivacaine 5.2 mgday  Ziconitide 2.3 mcg/day     PTM to stay the same at 0.15 hydromorphone     Maximum dose in 24 hours, dilaudid 3.48, bupivacaine 6.9 and ziconitide 3.13 mcg    New prescription: Ziconitide concentration increased so that the patient received 0.3 mcg over the last maximum of 3.13 in a 24 hour period.    Dilaudid    10mg/ml                                                          Bupivacaine 20 mg/ml  Ziconitide 10 mcg/ml     Total Volume in Refill:20 mL      Basal:   Dilaudid    3.477 mg/day                                                       Bupivacaine 6.955 mg/day  Ziconitide 3.477 mcg/day     PTM to stay the same at 0.15 hydromorphone, 2 hour lockout, maximum 6 in a day.     Maximum dose in 24 hours, dilaudid 4.3490 mg , bupivacaine 8.6 mg and ziconitide 4.349 mcg    2. We will send the new prescription to [facility] for pump fill and dose adjustment.      Follow up: 3-4 weeks. Video is ok.

## 2022-04-05 NOTE — NURSING NOTE
Patient presents with:  RECHECK: UMP RETURN, Patient reports, 1/10 llow back, legs, feet      Data Unavailable     Pain Medications     Analgesics Other Refills Start End     acetaminophen (TYLENOL) 500 MG tablet          Sig - Route: Take 500-1,000 mg by mouth every 6 hours as needed for mild pain - Oral    Class: Historical          What medications are you using for pain?   Pain pump        How will you be connecting to the visit? mychart  How would you like to obtain your AVS? MyChart  If the video visit is dropped, the invitation should be resent by: Text to cell phone: [phone]   Will anyone else be joining your video visit? No           Edgar Adams, EMT

## 2022-04-05 NOTE — PROGRESS NOTES
Pt's next refill with [facility] 4/8/22, prescription sent last week for syringe with following concentrations:   Hydromorphone 10 mg/mL  Bupivacaine 20 mg/mL  Prialt 9 mcg/mL    Verbal orders given by Dr. Espinoza for revised dosage adjustment recommendation. Orders sent to [facility]:    Dosage adjustment only:  Basal:  Dilaudid 3.477 mg/day  Bupivacaine 6.955 mg/day  Ziconitide 3.130 mcg/day  PTM to stay the same at 0.15 hydromorphone, 2 hour lockout, maximum 6 in a day.  Maximum dose in 24 hours, dilaudid 4.3490 mg , bupivacaine 8.6 mg and ziconitide 4.002 mcg    Santa Wallace DNP, RN

## 2022-04-05 NOTE — PATIENT INSTRUCTIONS
Treatment planning:    New prescription will be sent to [facility] for pump refill and dose adjustment.      Recommended Follow up:      Follow up in 3-4 weeks. Video visit is okay.          Please call [phone], option #1 to schedule your clinic appointment if you don't already have an appointment scheduled.        To speak with a nurse, schedule/reschedule/cancel a clinic appointment, or request a medication refill call: [phone], option #1.    You can also reach us by [employer]: [email or web address]

## 2022-04-08 ENCOUNTER — MEDICAL CORRESPONDENCE (OUTPATIENT)
Dept: HEALTH INFORMATION MANAGEMENT | Facility: CLINIC | Age: 72
End: 2022-04-08
Payer: MEDICARE

## 2022-04-22 ENCOUNTER — DOCUMENTATION ONLY (OUTPATIENT)
Dept: ANESTHESIOLOGY | Facility: CLINIC | Age: 72
End: 2022-04-22
Payer: MEDICARE

## 2022-04-22 DIAGNOSIS — M79.2 INTRACTABLE NEUROPATHIC PAIN OF LUMBOSACRAL ORIGIN: ICD-10-CM

## 2022-04-22 NOTE — PROGRESS NOTES
ITP dosage and settings updated 4/22/22, reflective of last adjustments made on 4/5/22 by [facility].     Santa Wallace, DNP, RN

## 2022-05-05 ENCOUNTER — VIRTUAL VISIT (OUTPATIENT)
Dept: ANESTHESIOLOGY | Facility: CLINIC | Age: 72
End: 2022-05-05
Payer: MEDICARE

## 2022-05-05 DIAGNOSIS — M79.2 NEUROPATHIC PAIN: Primary | ICD-10-CM

## 2022-05-05 PROCEDURE — 99214 OFFICE O/P EST MOD 30 MIN: CPT | Mod: 95 | Performed by: ANESTHESIOLOGY

## 2022-05-05 ASSESSMENT — PAIN SCALES - GENERAL: PAINLEVEL: MODERATE PAIN (4)

## 2022-05-05 NOTE — NURSING NOTE
Patient presents with:  Follow Up: Follow-up Video Lower Back Pain pain level 4/10      Moderate Pain (4)     Pain Medications     Analgesics Other Refills Start End     acetaminophen (TYLENOL) 500 MG tablet          Sig - Route: Take 500-1,000 mg by mouth every 6 hours as needed for mild pain - Oral    Class: Historical          What medications are you using for pain? Pain Pump, Tylenol, Baclafen    (New patients only) Have you been seen by another pain clinic/ provider? no    (Return Patients only) What refills are you needing today? yes

## 2022-05-05 NOTE — PROGRESS NOTES
Type of service:  Video Visit    Video Start Time: 1:35 PM    Video End Time:1:44 PM    Originating Location (pt. Location): Home    Distant Location (provider location):  [facility] [place]     Platform used for Video Visit: [facility]    Pain clinic follow up note    HPI:   Anayeli Gagnon is a 71 year old year old female  who presents to the pain clinic with neuropathic LE pain    Patient Supplied Answers To the [facility] Pain Questionnaire  [facility] Pain -  Patient Entered Questionnaire/Answers 5/5/2022   What number best describes your pain right now:  0 = No pain  to  10 = Worst pain imaginable 4   How would you describe the pain? burning   Which of the following worsen your pain? nothing worsens the pain   Which of the following improve or reduce your pain?  nothing relieves the pain   What number best describes your average pain for the past week:  0 = No pain  to  10 = Worst pain imaginable 4   What number best describes your LOWEST pain in past 24 hours:  0 = No pain  to  10 = Worst pain imaginable 2   What number best describes your WORST pain in past 24 hours:  0 = No pain  to  10 = Worst pain imaginable 8   When is your pain worst? Night   What non-medicine treatments have you already had for your pain? pain clinic   Have you tried treating your pain with medication?  Yes   Are you currently taking medications for your pain? Yes             Annamarie states that the pain is overall much better. She experiences worse pain most evenings. She saves the last bolus for the evening. She feels that the bolus helps. She uses tylenol every day. She uses baclofen on an as needed basis for left leg stiffness. She thinks that the baclofen helps with the leg stiffness. She has also noticed some swelling in the feet and ankle.     She has noticed some new discharge from the colostomy location.     [facility] nurse is refilling her on 5/19 th.    ROS:  Review of Systems   All  other systems reviewed and are negative.    Significant Medical History:   Past Medical History:   Diagnosis Date    CARDIAC DYSRHYTHMIAS NEC 10/3/2007    Taking atenelol for years for this    History of skin cancer     Mitral valve prolapse     Neurogenic bladder     Sacral decubitus ulcer, stage IV (H)     Thoracic spinal cord injury (H)           Past Surgical History:  Past Surgical History:   Procedure Laterality Date    DECOMPRESSION LUMBAR ONE LEVEL N/A 2019    Procedure: Posterior spinal decompression;  Surgeon: Alf Ritchie MD;  Location: [facility] OR    INSERT INTRATHECAL PAIN PUMP N/A 2022    Procedure: INSERTION, INTRATHECAL ANALGESIC PUMP, externalized trial;  Surgeon: Luis Orourke MD;  Location: [facility] OR    INSERT INTRATHECAL PAIN PUMP Right 2022    Procedure: INSERTION, INTRATHECAL ANALGESIC PUMP;  Surgeon: Luis Orourke MD;  Location: [facility] OR    INSERT STIMULATOR AND LEADS INTERNAL DORSAL COLUMN N/A 2021    Procedure: Posterior thoracic 12 to Lumbar 1 level for placement of spinal cord stimulator paddle and placement of implantable pulse generator/battery over right buttock;  Surgeon: Luis Orourke MD;  Location: [facility] OR    IR SPINAL ANGIOGRAM  10/16/2019    IR SPINAL ANGIOGRAM  2019    LAPAROSCOPIC TUBAL LIGATION      REPAIR SPINAL ARERIOVENOUS MALFORMATION N/A 2019    Procedure: with surgical disconnection arterial venous fistula Thoracic 5;  Surgeon: Alf Ritchie MD;  Location: [facility] OR          Family History:  Family History   Problem Relation Age of Onset    C.A.D. Father          41    Deep Vein Thrombosis (DVT) No family hx of     Anesthesia Reaction No family hx of           Social History:  Social History     Socioeconomic History    Marital status:      Spouse name: Not on file    Number of children: Not on file    Years of education: Not on file    Highest education level: Not on file   Occupational History    Occupation: NA      Employer: [employer]   Tobacco Use    Smoking status: Never Smoker    Smokeless tobacco: Never Used   Substance and Sexual Activity    Alcohol use: No    Drug use: No    Sexual activity: Yes     Partners: Male   Other Topics Concern    Parent/sibling w/ CABG, MI or angioplasty before 65F 55M? Not Asked   Social History Narrative    Lives with spouse - works in [place].     Social Determinants of Health     Financial Resource Strain: Not on file   Food Insecurity: Not on file   Transportation Needs: Not on file   Physical Activity: Not on file   Stress: Not on file   Social Connections: Not on file   Intimate Partner Violence: Not on file   Housing Stability: Not on file     Social History     Social History Narrative    Lives with spouse - works in [place].          Allergies:  Allergies   Allergen Reactions    Contrast Dye Rash     Painful rash after x-ray contrast       Current Medications:   Current Outpatient Medications   Medication Sig Dispense Refill    acetaminophen (TYLENOL) 500 MG tablet Take 500-1,000 mg by mouth every 6 hours as needed for mild pain      baclofen (LIORESAL) 10 MG tablet Take 10 mg by mouth 3 times daily as needed for muscle spasms      ciprofloxacin (CIPRO) 500 MG tablet Take 500 mg by mouth 2 times daily For 7 days      COMPOUND CONTAINING CONTROLLED SUBSTANCE (CMPD RX) - PHARMACY TO MIX COMPOUNDED MEDICATION Hydromorphone 10 mg/mL  Bupivacaine 20 mg/mL  Prialt 9 mcg/mL  Pump volume: 20 mL     Basal:  Dilaudid 3.477 mg/day  Bupivacaine 6.955 mg/day  Ziconitide 3.130 mcg/day  PTM to stay the same at 0.15 hydromorphone, 2 hour lockout, maximum 6 in a day.  Maximum dose in 24 hours, dilaudid 4.3490 mg , bupivacaine 8.6 mg and ziconitide 4.002 mcg    LAST UPDATED ON: 4/22/22 20 mL 0    gabapentin (NEURONTIN) 600 MG tablet Take 1,200 mg by mouth 3 times daily  1 tablet 0    lidocaine (LIDODERM) 5 % patch Apply 1 patch topically daily as needed       LORazepam (ATIVAN) 0.5 MG tablet Take  1 tablet by mouth daily as needed for anxiety       medical cannabis (Patient's own supply) See Admin Instructions (The purpose of this order is to document that the patient reports taking medical cannabis.  This is not a prescription, and is not used to certify that the patient has a qualifying medical condition.)     Liquid oral formulation.      melatonin 3 MG tablet Take 3 mg by mouth nightly as needed       Menthol, Topical Analgesic, (BIOFREEZE) 4 % GEL Externally apply topically daily as needed      metoprolol succinate ER (TOPROL-XL) 25 MG 24 hr tablet Take 12.5 mg by mouth every evening       Multiple Vitamins-Minerals (WOMENS MULTI PO) Take 1 tablet by mouth daily      NARCAN 4 MG/0.1ML nasal spray INHALE ONE SPRAY IN ONE NOSTRIL AS NEEDED FOR OPIOID. ANTIDOTE, MAY REPEAT IN OTHER NOSTRIL IF NEEDED      nystatin (MYCOSTATIN) 202234 UNIT/GM external powder Apply topically 3 times daily as needed Cream      THEANINE PO Take 1 tablet by mouth daily as needed (takes when she remembers)      ondansetron (ZOFRAN) 4 MG tablet Take 4 mg by mouth every 8 hours as needed for nausea       polyethylene glycol (MIRALAX) 17 GM/Dose powder Take 17 g by mouth daily as needed for constipation             Physical Exam:     There were no vitals taken for this visit.    General Appearance: No distress, seated comfortably  Mood: Euthymic  HE ENT: Non constricted pupils  Respiratory: Non labored breathing    ASSESSMENT AND PLAN:     Encounter Diagnosis:  Laminectomies from T3-T12  Spinal subdural hematoma  Paraplegia  Neuropathic pain   Paddle SCS insitu  Opioid dependence  IDDS insitu     Anayeli Gagnon is a 71 year old year old female  who presents to the pain clinic with severe lower extremity neuropathic pain for follow up and intrathecal medication dose adjustment.     RECOMMENDATIONS:     1. Medications:   Dilaudid    10mg/ml                                                          Bupivacaine 20 mg/ml  Ziconitide  9 mcg/ml     Total Volume in Refill:20 mL      Basal:   Dilaudid    3.477 mg/day                                                       Bupivacaine 6.955 mg/day  Ziconitide 3.130 mcg/day     PTM to stay the same at 0.15 hydromorphone, 2 hour lockout, maximum 6 in a day.      Maximum dose in 24 hours, dilaudid 4.3490 mg , bupivacaine 8.6 mg and ziconitide 4.002 mcg    New dosage:   Dilaudid    10mg/ml                                                          Bupivacaine 20 mg/ml  Ziconitide 10 mcg/ml     Total Volume in Refill:20 mL      Basal:   Dilaudid    4.5mg/day                                                       Bupivacaine 9.0 mg/day  Ziconitide 4.5 mcg/day     PTM to stay the same at 0.15 hydromorphone, 2 hour lockout, maximum 6 in a day.      I d/w patient that with the new dosage adjustment the PTM requirement will likely reduce.     2. We will send the new prescription to [facility] for pump fill and dose adjustment.        Follow up: 1st week of [place]. Video is ok.       Addendum:    Dosage verified with Dr. Espinoza.   Santa Wallace, DNP, RN

## 2022-05-05 NOTE — LETTER
5/5/2022       RE: Anayeli Gagnon  [address] [facility] [address]     Dear Colleague,    Thank you for referring your patient, Anayeli Gagnon, to the [facility] [place] at [facility]. Please see a copy of my visit note below.      Pain clinic follow up note    HPI:   Anayeli Gagnon is a 71 year old year old female  who presents to the pain clinic with neuropathic LE pain    Patient Supplied Answers To the [facility] Pain Questionnaire  [facility] Pain -  Patient Entered Questionnaire/Answers 5/5/2022   What number best describes your pain right now:  0 = No pain  to  10 = Worst pain imaginable 4   How would you describe the pain? burning   Which of the following worsen your pain? nothing worsens the pain   Which of the following improve or reduce your pain?  nothing relieves the pain   What number best describes your average pain for the past week:  0 = No pain  to  10 = Worst pain imaginable 4   What number best describes your LOWEST pain in past 24 hours:  0 = No pain  to  10 = Worst pain imaginable 2   What number best describes your WORST pain in past 24 hours:  0 = No pain  to  10 = Worst pain imaginable 8   When is your pain worst? Night   What non-medicine treatments have you already had for your pain? pain clinic   Have you tried treating your pain with medication?  Yes   Are you currently taking medications for your pain? Yes             Annamarie states that the pain is overall much better. She experiences worse pain most evenings. She saves the last bolus for the evening. She feels that the bolus helps. She uses tylenol every day. She uses baclofen on an as needed basis for left leg stiffness. She thinks that the baclofen helps with the leg stiffness. She has also noticed some swelling in the feet and ankle.     She has noticed some new discharge from the colostomy location.     [facility] nurse is refilling  her on .    ROS:  Review of Systems   All other systems reviewed and are negative.    Significant Medical History:   Past Medical History:   Diagnosis Date     CARDIAC DYSRHYTHMIAS NEC 10/3/2007    Taking atenelol for years for this     History of skin cancer      Mitral valve prolapse      Neurogenic bladder      Sacral decubitus ulcer, stage IV (H)      Thoracic spinal cord injury (H)           Past Surgical History:  Past Surgical History:   Procedure Laterality Date     DECOMPRESSION LUMBAR ONE LEVEL N/A 2019    Procedure: Posterior spinal decompression;  Surgeon: Alf Ritchie MD;  Location: [facility] OR     INSERT INTRATHECAL PAIN PUMP N/A 2022    Procedure: INSERTION, INTRATHECAL ANALGESIC PUMP, externalized trial;  Surgeon: Luis Orourke MD;  Location: [facility] OR     INSERT INTRATHECAL PAIN PUMP Right 2022    Procedure: INSERTION, INTRATHECAL ANALGESIC PUMP;  Surgeon: Luis Orourke MD;  Location: [facility] OR     INSERT STIMULATOR AND LEADS INTERNAL DORSAL COLUMN N/A 2021    Procedure: Posterior thoracic 12 to Lumbar 1 level for placement of spinal cord stimulator paddle and placement of implantable pulse generator/battery over right buttock;  Surgeon: Luis Orourke MD;  Location: [facility] OR     IR SPINAL ANGIOGRAM  10/16/2019     IR SPINAL ANGIOGRAM  2019     LAPAROSCOPIC TUBAL LIGATION       REPAIR SPINAL ARERIOVENOUS MALFORMATION N/A 2019    Procedure: with surgical disconnection arterial venous fistula Thoracic 5;  Surgeon: Alf Ritchie MD;  Location: [facility] OR          Family History:  Family History   Problem Relation Age of Onset     C.A.D. Father          41     Deep Vein Thrombosis (DVT) No family hx of      Anesthesia Reaction No family hx of           Social History:  Social History     Socioeconomic History     Marital status:      Spouse name: Not on file     Number of children: Not on file     Years of education: Not on file     Highest  education level: Not on file   Occupational History     Occupation: NA     Employer: [employer]   Tobacco Use     Smoking status: Never Smoker     Smokeless tobacco: Never Used   Substance and Sexual Activity     Alcohol use: No     Drug use: No     Sexual activity: Yes     Partners: Male   Other Topics Concern     Parent/sibling w/ CABG, MI or angioplasty before 65F 55M? Not Asked   Social History Narrative    Lives with spouse - works in [place].     Social Determinants of Health     Financial Resource Strain: Not on file   Food Insecurity: Not on file   Transportation Needs: Not on file   Physical Activity: Not on file   Stress: Not on file   Social Connections: Not on file   Intimate Partner Violence: Not on file   Housing Stability: Not on file     Social History     Social History Narrative    Lives with spouse - works in [place].          Allergies:  Allergies   Allergen Reactions     Contrast Dye Rash     Painful rash after x-ray contrast       Current Medications:   Current Outpatient Medications   Medication Sig Dispense Refill     acetaminophen (TYLENOL) 500 MG tablet Take 500-1,000 mg by mouth every 6 hours as needed for mild pain       baclofen (LIORESAL) 10 MG tablet Take 10 mg by mouth 3 times daily as needed for muscle spasms       ciprofloxacin (CIPRO) 500 MG tablet Take 500 mg by mouth 2 times daily For 7 days       COMPOUND CONTAINING CONTROLLED SUBSTANCE (CMPD RX) - PHARMACY TO MIX COMPOUNDED MEDICATION Hydromorphone 10 mg/mL  Bupivacaine 20 mg/mL  Prialt 9 mcg/mL  Pump volume: 20 mL     Basal:  Dilaudid 3.477 mg/day  Bupivacaine 6.955 mg/day  Ziconitide 3.130 mcg/day  PTM to stay the same at 0.15 hydromorphone, 2 hour lockout, maximum 6 in a day.  Maximum dose in 24 hours, dilaudid 4.3490 mg , bupivacaine 8.6 mg and ziconitide 4.002 mcg    LAST UPDATED ON: 4/22/22 20 mL 0     gabapentin (NEURONTIN) 600 MG tablet Take 1,200 mg by mouth 3 times daily  1 tablet 0     lidocaine (LIDODERM) 5 %  patch Apply 1 patch topically daily as needed        LORazepam (ATIVAN) 0.5 MG tablet Take 1 tablet by mouth daily as needed for anxiety        medical cannabis (Patient's own supply) See Admin Instructions (The purpose of this order is to document that the patient reports taking medical cannabis.  This is not a prescription, and is not used to certify that the patient has a qualifying medical condition.)     Liquid oral formulation.       melatonin 3 MG tablet Take 3 mg by mouth nightly as needed        Menthol, Topical Analgesic, (BIOFREEZE) 4 % GEL Externally apply topically daily as needed       metoprolol succinate ER (TOPROL-XL) 25 MG 24 hr tablet Take 12.5 mg by mouth every evening        Multiple Vitamins-Minerals (WOMENS MULTI PO) Take 1 tablet by mouth daily       NARCAN 4 MG/0.1ML nasal spray INHALE ONE SPRAY IN ONE NOSTRIL AS NEEDED FOR OPIOID. ANTIDOTE, MAY REPEAT IN OTHER NOSTRIL IF NEEDED       nystatin (MYCOSTATIN) 993343 UNIT/GM external powder Apply topically 3 times daily as needed Cream       THEANINE PO Take 1 tablet by mouth daily as needed (takes when she remembers)       ondansetron (ZOFRAN) 4 MG tablet Take 4 mg by mouth every 8 hours as needed for nausea        polyethylene glycol (MIRALAX) 17 GM/Dose powder Take 17 g by mouth daily as needed for constipation        Physical Exam:     There were no vitals taken for this visit.    General Appearance: No distress, seated comfortably  Mood: Euthymic  HE ENT: Non constricted pupils  Respiratory: Non labored breathing    ASSESSMENT AND PLAN:     Encounter Diagnosis:  Laminectomies from T3-T12  Spinal subdural hematoma  Paraplegia  Neuropathic pain   Paddle SCS insitu  Opioid dependence  IDDS insitu     Anayelicresencio Gagnon is a 71 year old year old female  who presents to the pain clinic with severe lower extremity neuropathic pain for follow up and intrathecal medication dose adjustment.     RECOMMENDATIONS:     1. Medications:    Dilaudid    10mg/ml                                                          Bupivacaine 20 mg/ml  Ziconitide 9 mcg/ml     Total Volume in Refill:20 mL      Basal:   Dilaudid    3.477 mg/day                                                       Bupivacaine 6.955 mg/day  Ziconitide 3.130 mcg/day     PTM to stay the same at 0.15 hydromorphone, 2 hour lockout, maximum 6 in a day.      Maximum dose in 24 hours, dilaudid 4.3490 mg , bupivacaine 8.6 mg and ziconitide 4.002 mcg    New dosage:   Dilaudid    10mg/ml                                                          Bupivacaine 20 mg/ml  Ziconitide 10 mcg/ml     Total Volume in Refill:20 mL      Basal:   Dilaudid    4.5mg/day                                                       Bupivacaine 9.0 mg/day  Ziconitide 4.5 mcg/day     PTM to stay the same at 0.15 hydromorphone, 2 hour lockout, maximum 6 in a day.      I d/w patient that with the new dosage adjustment the PTM requirement will likely reduce.     2. We will send the new prescription to [facility] for pump fill and dose adjustment.        Follow up: 1st week of [place]. Video is ok.     Sincerely,    Jasmine Espinoza MD

## 2022-05-05 NOTE — PROGRESS NOTES
Annamarie is a 71 year old who is being evaluated via a billable video visit.      How would you like to obtain your AVS? MyChart  If the video visit is dropped, the invitation should be resent by: Text to cell phone: [phone]  Will anyone else be joining your video visit? No

## 2022-06-04 ENCOUNTER — HEALTH MAINTENANCE LETTER (OUTPATIENT)
Age: 72
End: 2022-06-04

## 2022-06-13 ENCOUNTER — TELEPHONE (OUTPATIENT)
Dept: ANESTHESIOLOGY | Facility: CLINIC | Age: 72
End: 2022-06-13
Payer: MEDICARE

## 2022-06-13 DIAGNOSIS — M79.2 INTRACTABLE NEUROPATHIC PAIN OF LUMBOSACRAL ORIGIN: ICD-10-CM

## 2022-06-13 NOTE — TELEPHONE ENCOUNTER
Health Call Center    Phone Message    May a detailed message be left on voicemail: yes     Reason for Call: Medication Refill Request    Has the patient contacted the pharmacy for the refill? Yes   Name of medication being requested: Pain pump medication  Provider who prescribed the medication: Dr Espinoza  Pharmacy: [facility]  Date medication is needed: ASAMARIA D, yaneth Villalba, she faxed the order last week and patient needs it by the end of week. Please reach to Orly if any questions and confirm if clinic has received the orders.          Action Taken: Message routed to:  [facility] Pain clinic    Travel Screening: Not Applicable

## 2022-06-13 NOTE — CONFIDENTIAL NOTE
RN returned call to [facility] and spoke with call center staff. Writer left message for pharmacy that a signed prescription was uploaded to [facility] Portal today signed by . Left call back number to pain clinic if any further questions/concerns.      Camille Talavera RN

## 2022-06-21 ENCOUNTER — MEDICAL CORRESPONDENCE (OUTPATIENT)
Dept: HEALTH INFORMATION MANAGEMENT | Facility: CLINIC | Age: 72
End: 2022-06-21
Payer: MEDICARE

## 2022-06-22 ENCOUNTER — MEDICAL CORRESPONDENCE (OUTPATIENT)
Dept: HEALTH INFORMATION MANAGEMENT | Facility: CLINIC | Age: 72
End: 2022-06-22

## 2022-06-27 ENCOUNTER — TELEPHONE (OUTPATIENT)
Dept: ANESTHESIOLOGY | Facility: CLINIC | Age: 72
End: 2022-06-27

## 2022-06-27 NOTE — TELEPHONE ENCOUNTER
RN reviewed chart. Patient is overdue for a follow up appointment with Dr. Espinoza. Patient recently seen for a pump refill by Pentabel[facility]. RN routing to clinic staff to call patient and schedule a video appointment asap.       Camille Talavera RN

## 2022-07-11 ENCOUNTER — MEDICAL CORRESPONDENCE (OUTPATIENT)
Dept: HEALTH INFORMATION MANAGEMENT | Facility: CLINIC | Age: 72
End: 2022-07-11

## 2022-07-11 ENCOUNTER — TELEPHONE (OUTPATIENT)
Dept: ANESTHESIOLOGY | Facility: CLINIC | Age: 72
End: 2022-07-11

## 2022-07-11 NOTE — TELEPHONE ENCOUNTER
Prescription signed by Dr. Espinoza and uploaded to [email or web address] portal 7/11/22.    Camille Talavera RN

## 2022-07-11 NOTE — TELEPHONE ENCOUNTER
[facility] Call Center    Phone Message    May a detailed message be left on voicemail: no     Reason for Call: Medication Refill Request    Has the patient contacted the pharmacy for the refill? Yes   Name of medication being requested: pain pump medication  Provider who prescribed the medication: Dr. Espinoza  Pharmacy: [facility]  Date medication is needed: 7/11/22

## 2022-07-20 ENCOUNTER — APPOINTMENT (OUTPATIENT)
Dept: GENERAL RADIOLOGY | Facility: CLINIC | Age: 72
End: 2022-07-20
Attending: EMERGENCY MEDICINE
Payer: MEDICARE

## 2022-07-20 ENCOUNTER — HOSPITAL ENCOUNTER (OUTPATIENT)
Facility: CLINIC | Age: 72
Setting detail: OBSERVATION
Discharge: HOME | End: 2022-07-22
Attending: EMERGENCY MEDICINE | Admitting: INTERNAL MEDICINE
Payer: MEDICARE

## 2022-07-20 ENCOUNTER — MEDICAL CORRESPONDENCE (OUTPATIENT)
Dept: HEALTH INFORMATION MANAGEMENT | Facility: CLINIC | Age: 72
End: 2022-07-20

## 2022-07-20 ENCOUNTER — TRANSFERRED RECORDS (OUTPATIENT)
Dept: HEALTH INFORMATION MANAGEMENT | Facility: CLINIC | Age: 72
End: 2022-07-20

## 2022-07-20 ENCOUNTER — APPOINTMENT (OUTPATIENT)
Dept: ULTRASOUND IMAGING | Facility: CLINIC | Age: 72
End: 2022-07-20
Attending: EMERGENCY MEDICINE
Payer: MEDICARE

## 2022-07-20 DIAGNOSIS — R11.0 NAUSEA: ICD-10-CM

## 2022-07-20 DIAGNOSIS — Z11.52 ENCOUNTER FOR SCREENING LABORATORY TESTING FOR SEVERE ACUTE RESPIRATORY SYNDROME CORONAVIRUS 2 (SARS-COV-2): ICD-10-CM

## 2022-07-20 DIAGNOSIS — R79.89 ELEVATED TROPONIN: ICD-10-CM

## 2022-07-20 LAB
ANION GAP SERPL CALCULATED.3IONS-SCNC: 8 MMOL/L (ref 7–15)
ATRIAL RATE - MUSE: 83 BPM
BASOPHILS # BLD AUTO: 0 10E3/UL (ref 0–0.2)
BASOPHILS NFR BLD AUTO: 0 %
BUN SERPL-MCNC: 9.3 MG/DL (ref 8–23)
CALCIUM SERPL-MCNC: 8.4 MG/DL (ref 8.8–10.2)
CHLORIDE SERPL-SCNC: 105 MMOL/L (ref 98–107)
CREAT SERPL-MCNC: 0.49 MG/DL (ref 0.51–0.95)
DEPRECATED HCO3 PLAS-SCNC: 26 MMOL/L (ref 22–29)
DIASTOLIC BLOOD PRESSURE - MUSE: NORMAL MMHG
EOSINOPHIL # BLD AUTO: 0 10E3/UL (ref 0–0.7)
EOSINOPHIL NFR BLD AUTO: 0 %
ERYTHROCYTE [DISTWIDTH] IN BLOOD BY AUTOMATED COUNT: 12.6 % (ref 10–15)
GFR SERPL CREATININE-BSD FRML MDRD: >90 ML/MIN/1.73M2
GLUCOSE SERPL-MCNC: 110 MG/DL (ref 70–99)
HCT VFR BLD AUTO: 39.7 % (ref 35–47)
HGB BLD-MCNC: 12.9 G/DL (ref 11.7–15.7)
HOLD SPECIMEN: NORMAL
IMM GRANULOCYTES # BLD: 0 10E3/UL
IMM GRANULOCYTES NFR BLD: 0 %
INTERPRETATION ECG - MUSE: NORMAL
LYMPHOCYTES # BLD AUTO: 0.6 10E3/UL (ref 0.8–5.3)
LYMPHOCYTES NFR BLD AUTO: 8 %
MCH RBC QN AUTO: 30.4 PG (ref 26.5–33)
MCHC RBC AUTO-ENTMCNC: 32.5 G/DL (ref 31.5–36.5)
MCV RBC AUTO: 93 FL (ref 78–100)
MONOCYTES # BLD AUTO: 0.2 10E3/UL (ref 0–1.3)
MONOCYTES NFR BLD AUTO: 3 %
NEUTROPHILS # BLD AUTO: 6.9 10E3/UL (ref 1.6–8.3)
NEUTROPHILS NFR BLD AUTO: 89 %
NRBC # BLD AUTO: 0 10E3/UL
NRBC BLD AUTO-RTO: 0 /100
P AXIS - MUSE: 54 DEGREES
PLATELET # BLD AUTO: 155 10E3/UL (ref 150–450)
POTASSIUM SERPL-SCNC: 4.4 MMOL/L (ref 3.4–5.3)
PR INTERVAL - MUSE: 172 MS
QRS DURATION - MUSE: 88 MS
QT - MUSE: 332 MS
QTC - MUSE: 390 MS
R AXIS - MUSE: 1 DEGREES
RBC # BLD AUTO: 4.25 10E6/UL (ref 3.8–5.2)
SARS-COV-2 RNA RESP QL NAA+PROBE: NEGATIVE
SODIUM SERPL-SCNC: 139 MMOL/L (ref 136–145)
SYSTOLIC BLOOD PRESSURE - MUSE: NORMAL MMHG
T AXIS - MUSE: -24 DEGREES
TROPONIN T SERPL HS-MCNC: 32 NG/L
TROPONIN T SERPL HS-MCNC: 39 NG/L
VENTRICULAR RATE- MUSE: 83 BPM
WBC # BLD AUTO: 7.8 10E3/UL (ref 4–11)

## 2022-07-20 PROCEDURE — 84484 ASSAY OF TROPONIN QUANT: CPT | Performed by: EMERGENCY MEDICINE

## 2022-07-20 PROCEDURE — 99285 EMERGENCY DEPT VISIT HI MDM: CPT | Mod: CS | Performed by: EMERGENCY MEDICINE

## 2022-07-20 PROCEDURE — 96374 THER/PROPH/DIAG INJ IV PUSH: CPT

## 2022-07-20 PROCEDURE — U0003 INFECTIOUS AGENT DETECTION BY NUCLEIC ACID (DNA OR RNA); SEVERE ACUTE RESPIRATORY SYNDROME CORONAVIRUS 2 (SARS-COV-2) (CORONAVIRUS DISEASE [COVID-19]), AMPLIFIED PROBE TECHNIQUE, MAKING USE OF HIGH THROUGHPUT TECHNOLOGIES AS DESCRIBED BY CMS-2020-01-R: HCPCS | Performed by: EMERGENCY MEDICINE

## 2022-07-20 PROCEDURE — 72080 X-RAY EXAM THORACOLMB 2/> VW: CPT

## 2022-07-20 PROCEDURE — 250N000013 HC RX MED GY IP 250 OP 250 PS 637: Performed by: STUDENT IN AN ORGANIZED HEALTH CARE EDUCATION/TRAINING PROGRAM

## 2022-07-20 PROCEDURE — 36415 COLL VENOUS BLD VENIPUNCTURE: CPT | Performed by: EMERGENCY MEDICINE

## 2022-07-20 PROCEDURE — 96361 HYDRATE IV INFUSION ADD-ON: CPT

## 2022-07-20 PROCEDURE — 76705 ECHO EXAM OF ABDOMEN: CPT

## 2022-07-20 PROCEDURE — 250N000013 HC RX MED GY IP 250 OP 250 PS 637: Performed by: EMERGENCY MEDICINE

## 2022-07-20 PROCEDURE — 99221 1ST HOSP IP/OBS SF/LOW 40: CPT | Mod: AI | Performed by: STUDENT IN AN ORGANIZED HEALTH CARE EDUCATION/TRAINING PROGRAM

## 2022-07-20 PROCEDURE — 76705 ECHO EXAM OF ABDOMEN: CPT | Mod: 26 | Performed by: RADIOLOGY

## 2022-07-20 PROCEDURE — 99207 PR APP CREDIT; MD BILLING SHARED VISIT: CPT | Performed by: STUDENT IN AN ORGANIZED HEALTH CARE EDUCATION/TRAINING PROGRAM

## 2022-07-20 PROCEDURE — 96372 THER/PROPH/DIAG INJ SC/IM: CPT | Performed by: STUDENT IN AN ORGANIZED HEALTH CARE EDUCATION/TRAINING PROGRAM

## 2022-07-20 PROCEDURE — 250N000011 HC RX IP 250 OP 636: Performed by: STUDENT IN AN ORGANIZED HEALTH CARE EDUCATION/TRAINING PROGRAM

## 2022-07-20 PROCEDURE — 99285 EMERGENCY DEPT VISIT HI MDM: CPT | Mod: CS,25

## 2022-07-20 PROCEDURE — 96376 TX/PRO/DX INJ SAME DRUG ADON: CPT

## 2022-07-20 PROCEDURE — 82374 ASSAY BLOOD CARBON DIOXIDE: CPT | Performed by: EMERGENCY MEDICINE

## 2022-07-20 PROCEDURE — 72080 X-RAY EXAM THORACOLMB 2/> VW: CPT | Mod: 26 | Performed by: RADIOLOGY

## 2022-07-20 PROCEDURE — C9803 HOPD COVID-19 SPEC COLLECT: HCPCS

## 2022-07-20 PROCEDURE — 93005 ELECTROCARDIOGRAM TRACING: CPT

## 2022-07-20 PROCEDURE — 250N000011 HC RX IP 250 OP 636: Performed by: EMERGENCY MEDICINE

## 2022-07-20 PROCEDURE — 82310 ASSAY OF CALCIUM: CPT | Performed by: EMERGENCY MEDICINE

## 2022-07-20 PROCEDURE — 96375 TX/PRO/DX INJ NEW DRUG ADDON: CPT

## 2022-07-20 PROCEDURE — 93010 ELECTROCARDIOGRAM REPORT: CPT | Performed by: EMERGENCY MEDICINE

## 2022-07-20 PROCEDURE — 85025 COMPLETE CBC W/AUTO DIFF WBC: CPT | Performed by: EMERGENCY MEDICINE

## 2022-07-20 PROCEDURE — 258N000003 HC RX IP 258 OP 636: Performed by: EMERGENCY MEDICINE

## 2022-07-20 PROCEDURE — 258N000003 HC RX IP 258 OP 636: Performed by: INTERNAL MEDICINE

## 2022-07-20 PROCEDURE — 120N000002 HC R&B MED SURG/OB UMMC

## 2022-07-20 RX ORDER — AMOXICILLIN 250 MG
2 CAPSULE ORAL 2 TIMES DAILY PRN
Status: DISCONTINUED | OUTPATIENT
Start: 2022-07-20 | End: 2022-07-22 | Stop reason: HOSPADM

## 2022-07-20 RX ORDER — ASPIRIN 81 MG/1
324 TABLET, CHEWABLE ORAL ONCE
Status: COMPLETED | OUTPATIENT
Start: 2022-07-20 | End: 2022-07-20

## 2022-07-20 RX ORDER — ONDANSETRON 4 MG/1
4 TABLET, ORALLY DISINTEGRATING ORAL EVERY 6 HOURS PRN
Status: DISCONTINUED | OUTPATIENT
Start: 2022-07-20 | End: 2022-07-22 | Stop reason: HOSPADM

## 2022-07-20 RX ORDER — ONDANSETRON 2 MG/ML
4 INJECTION INTRAMUSCULAR; INTRAVENOUS ONCE
Status: DISCONTINUED | OUTPATIENT
Start: 2022-07-20 | End: 2022-07-20

## 2022-07-20 RX ORDER — ONDANSETRON 2 MG/ML
4 INJECTION INTRAMUSCULAR; INTRAVENOUS EVERY 6 HOURS PRN
Status: DISCONTINUED | OUTPATIENT
Start: 2022-07-20 | End: 2022-07-22 | Stop reason: HOSPADM

## 2022-07-20 RX ORDER — GABAPENTIN 600 MG/1
1200 TABLET ORAL 3 TIMES DAILY
Status: DISCONTINUED | OUTPATIENT
Start: 2022-07-20 | End: 2022-07-22 | Stop reason: HOSPADM

## 2022-07-20 RX ORDER — LIDOCAINE 4 G/G
1 PATCH TOPICAL DAILY PRN
Status: DISCONTINUED | OUTPATIENT
Start: 2022-07-20 | End: 2022-07-22 | Stop reason: HOSPADM

## 2022-07-20 RX ORDER — NALOXONE HYDROCHLORIDE 0.4 MG/ML
0.4 INJECTION, SOLUTION INTRAMUSCULAR; INTRAVENOUS; SUBCUTANEOUS
Status: DISCONTINUED | OUTPATIENT
Start: 2022-07-20 | End: 2022-07-22 | Stop reason: HOSPADM

## 2022-07-20 RX ORDER — LIDOCAINE 40 MG/G
CREAM TOPICAL
Status: DISCONTINUED | OUTPATIENT
Start: 2022-07-20 | End: 2022-07-22 | Stop reason: HOSPADM

## 2022-07-20 RX ORDER — BACLOFEN 10 MG/1
10 TABLET ORAL ONCE
Status: COMPLETED | OUTPATIENT
Start: 2022-07-20 | End: 2022-07-20

## 2022-07-20 RX ORDER — ACETAMINOPHEN 325 MG/1
325-650 TABLET ORAL EVERY 6 HOURS PRN
Status: DISCONTINUED | OUTPATIENT
Start: 2022-07-20 | End: 2022-07-22 | Stop reason: HOSPADM

## 2022-07-20 RX ORDER — ONDANSETRON 2 MG/ML
4 INJECTION INTRAMUSCULAR; INTRAVENOUS ONCE
Status: COMPLETED | OUTPATIENT
Start: 2022-07-20 | End: 2022-07-20

## 2022-07-20 RX ORDER — SODIUM CHLORIDE 9 MG/ML
INJECTION, SOLUTION INTRAVENOUS CONTINUOUS
Status: DISCONTINUED | OUTPATIENT
Start: 2022-07-20 | End: 2022-07-22

## 2022-07-20 RX ORDER — ACETAMINOPHEN 500 MG
1000 TABLET ORAL ONCE
Status: COMPLETED | OUTPATIENT
Start: 2022-07-20 | End: 2022-07-20

## 2022-07-20 RX ORDER — NALOXONE HYDROCHLORIDE 0.4 MG/ML
0.2 INJECTION, SOLUTION INTRAMUSCULAR; INTRAVENOUS; SUBCUTANEOUS
Status: DISCONTINUED | OUTPATIENT
Start: 2022-07-20 | End: 2022-07-22 | Stop reason: HOSPADM

## 2022-07-20 RX ORDER — LORAZEPAM 0.5 MG/1
0.5 TABLET ORAL DAILY PRN
Status: DISCONTINUED | OUTPATIENT
Start: 2022-07-20 | End: 2022-07-22 | Stop reason: HOSPADM

## 2022-07-20 RX ORDER — NITROFURANTOIN 25; 75 MG/1; MG/1
100 CAPSULE ORAL EVERY 12 HOURS SCHEDULED
Status: DISCONTINUED | OUTPATIENT
Start: 2022-07-20 | End: 2022-07-22 | Stop reason: HOSPADM

## 2022-07-20 RX ORDER — HYDROMORPHONE HYDROCHLORIDE 1 MG/ML
0.5 INJECTION, SOLUTION INTRAMUSCULAR; INTRAVENOUS; SUBCUTANEOUS ONCE
Status: COMPLETED | OUTPATIENT
Start: 2022-07-20 | End: 2022-07-20

## 2022-07-20 RX ORDER — AMOXICILLIN 250 MG
1 CAPSULE ORAL 2 TIMES DAILY PRN
Status: DISCONTINUED | OUTPATIENT
Start: 2022-07-20 | End: 2022-07-22 | Stop reason: HOSPADM

## 2022-07-20 RX ORDER — BACLOFEN 10 MG/1
10 TABLET ORAL 3 TIMES DAILY PRN
Status: DISCONTINUED | OUTPATIENT
Start: 2022-07-20 | End: 2022-07-22 | Stop reason: HOSPADM

## 2022-07-20 RX ORDER — HYDROMORPHONE HYDROCHLORIDE 1 MG/ML
0.5 INJECTION, SOLUTION INTRAMUSCULAR; INTRAVENOUS; SUBCUTANEOUS
Status: ACTIVE | OUTPATIENT
Start: 2022-07-20 | End: 2022-07-20

## 2022-07-20 RX ORDER — ENOXAPARIN SODIUM 100 MG/ML
40 INJECTION SUBCUTANEOUS EVERY 24 HOURS
Status: DISCONTINUED | OUTPATIENT
Start: 2022-07-20 | End: 2022-07-22 | Stop reason: HOSPADM

## 2022-07-20 RX ADMIN — SODIUM CHLORIDE 1000 ML: 9 INJECTION, SOLUTION INTRAVENOUS at 17:21

## 2022-07-20 RX ADMIN — SODIUM CHLORIDE: 9 INJECTION, SOLUTION INTRAVENOUS at 21:34

## 2022-07-20 RX ADMIN — Medication 12.5 MG: at 23:05

## 2022-07-20 RX ADMIN — ONDANSETRON 4 MG: 2 INJECTION INTRAMUSCULAR; INTRAVENOUS at 17:18

## 2022-07-20 RX ADMIN — ACETAMINOPHEN 1000 MG: 500 TABLET ORAL at 16:48

## 2022-07-20 RX ADMIN — HYDROMORPHONE HYDROCHLORIDE 0.5 MG: 1 INJECTION, SOLUTION INTRAMUSCULAR; INTRAVENOUS; SUBCUTANEOUS at 18:32

## 2022-07-20 RX ADMIN — ENOXAPARIN SODIUM 40 MG: 40 INJECTION SUBCUTANEOUS at 23:05

## 2022-07-20 RX ADMIN — BACLOFEN 10 MG: 10 TABLET ORAL at 18:55

## 2022-07-20 RX ADMIN — GABAPENTIN 1200 MG: 600 TABLET, FILM COATED ORAL at 23:05

## 2022-07-20 RX ADMIN — ASPIRIN 81 MG CHEWABLE TABLET 324 MG: 81 TABLET CHEWABLE at 17:21

## 2022-07-20 ASSESSMENT — ENCOUNTER SYMPTOMS
SHORTNESS OF BREATH: 0
NAUSEA: 1
DIZZINESS: 1
VOMITING: 0

## 2022-07-20 ASSESSMENT — ACTIVITIES OF DAILY LIVING (ADL)
ADLS_ACUITY_SCORE: 35

## 2022-07-20 NOTE — ED NOTES
"Bed: ED25  Expected date:   Expected time:   Means of arrival:   Comments:  Bernard [address]  71 F  \"Doubled up on meds\" Unknown which medication  Yellow  "

## 2022-07-20 NOTE — ED TRIAGE NOTES
Pt BIBA from home. Was getting home infusion and received 3mg more pain meds than she should have. Pt received IM narcan, zofran and 1 L of fluids before arrival. Pt is alert and oriented upon arrival. States her pain to be a 5/10 and in her head, ankle, and feet. VSS on 2L O2. Pt also states colostomy bag has not been filling for the past 3 days. Pt is also currently receiving treatment for UTI.

## 2022-07-20 NOTE — ED PROVIDER NOTES
"    [place] EMERGENCY DEPARTMENT ([facility])  7/20/22  History     Chief Complaint   Patient presents with     Drug Overdose     The history is provided by the patient and the EMS personnel.     Anayeli Gagnon is a 71 year old female who has a significant medical history of thoracic spinal cord injury, s/p colostomy and Cunningham catheter 2/2 spinal surgery complications, neurogenic bladder, s/p intrathecal pain pump placement (1/21/2022), mitral valve prolapse, hypertension, amongst others, who presents to the Emergency Department with via EMS with c/o drug overdose.  Patient states that she received an overdose of pain medications during her home infusion appointment today.  Patient states that her pain pump was filled, after the nurse left her home and began experiencing dizziness, disorientation, and nausea.  Patient states that she cannot account for the time between experiencing symptoms and awaking in the ambulance.  EMS reports that the patient received IM Narcan, Zofran, 1 L of fluids before arrival.      History provided by her  states that he was gone while she was getting a pain pump filled.  He reports he came back and found her slumped over the chair.  He was able to arouse her but she had slurred speech, was unable to communicate much but said she was \"dizzy\".  He reports the nurse came and administered intranasal Narcan and within about 5 minutes she was awake and alert.  No recurrent episodes.    Here in the ED, the patient states that she has a headache that she does not feel well.  She reports nausea, denies vomiting.  She denies fever, shortness of breath, or chest pain.  Patient states that she is feeling better than before, but has not returned back to baseline.  She reports being treated for severe UTI and she started antibiotics 3 days ago (she states that her antibiotic prescription was changed today).  She states that these antibiotics have altered her stool output, " stating that her stool output was per rectum instead of her colostomy bag.  She reports that this concerned her, she presented to [facility] ED for work-up of bowel blockage (patient states her mother passed away from bowel blockage, this caused her to feel anxious), work-up notable for negative CT.     Patient states that the pain pump was placed after spinal surgery complication, she reports 2 spontaneous spinal hematomas occurred 2 years ago.  She underwent 2 spinal surgeries in which the nerve damage caused her to lose bowel and bladder control as well as motor function in her legs.  She states that the pump was placed because she has severe, residual nerve damage pain.   She reports taking additional baclofen and gabapentin as needed.  Patient states that her pain pump is managed here at [facility] by Dr. Espinoza.       Past Medical History  Past Medical History:   Diagnosis Date     CARDIAC DYSRHYTHMIAS NEC 10/3/2007    Taking atenelol for years for this     History of skin cancer      Mitral valve prolapse      Neurogenic bladder      Sacral decubitus ulcer, stage IV (H)      Thoracic spinal cord injury (H)      Past Surgical History:   Procedure Laterality Date     DECOMPRESSION LUMBAR ONE LEVEL N/A 12/27/2019    Procedure: Posterior spinal decompression;  Surgeon: Alf Ritchie MD;  Location: [facility] OR     INSERT INTRATHECAL PAIN PUMP N/A 1/19/2022    Procedure: INSERTION, INTRATHECAL ANALGESIC PUMP, externalized trial;  Surgeon: Luis Orourke MD;  Location: [facility] OR     INSERT INTRATHECAL PAIN PUMP Right 1/21/2022    Procedure: INSERTION, INTRATHECAL ANALGESIC PUMP;  Surgeon: Luis Orourke MD;  Location: [facility] OR     INSERT STIMULATOR AND LEADS INTERNAL DORSAL COLUMN N/A 4/7/2021    Procedure: Posterior thoracic 12 to Lumbar 1 level for placement of spinal cord stimulator paddle and placement of implantable pulse generator/battery over right buttock;  Surgeon: Luis Orourke MD;  Location: [facility] OR     IR SPINAL  ANGIOGRAM  10/16/2019     IR SPINAL ANGIOGRAM  2019     LAPAROSCOPIC TUBAL LIGATION       REPAIR SPINAL ARERIOVENOUS MALFORMATION N/A 2019    Procedure: with surgical disconnection arterial venous fistula Thoracic 5;  Surgeon: Alf Ritchie MD;  Location: [facility] OR     No current outpatient medications on file.    Allergies   Allergen Reactions     Contrast Dye Rash     Painful rash after x-ray contrast     Family History  Family History   Problem Relation Age of Onset     C.A.D. Father          41     Deep Vein Thrombosis (DVT) No family hx of      Anesthesia Reaction No family hx of      Social History   Social History     Tobacco Use     Smoking status: Never Smoker     Smokeless tobacco: Never Used   Substance Use Topics     Alcohol use: No     Drug use: No      Past medical history, past surgical history, medications, allergies, family history, and social history were reviewed with the patient. No additional pertinent items.     I have reviewed the Medications, Allergies, Past Medical and Surgical History, and Social History in the Epic system.    Review of Systems   Respiratory: Negative for shortness of breath.    Cardiovascular: Negative for chest pain.   Gastrointestinal: Positive for nausea. Negative for vomiting.   Neurological: Positive for dizziness.     A complete review of systems was performed with pertinent positives and negatives noted in the HPI, and all other systems negative.    Physical Exam   BP: (!) 142/81  Pulse: 95  Temp: 98.3  F (36.8  C)  Resp: 16  SpO2: 95 %      Physical Exam   General: awake, alert, NAD  Head: normal cephalic  HEENT: pupils equal, conjugate gaze intact  Neck: Supple  CV: regular rate and rhythm without murmur  Lungs: clear to auscultation  Abd: soft, non-tender, no guarding, no peritoneal signs.  Colostomy bag noted in her left lower quadrant stool in bag.  EXT: Patient has some mild edema swelling in his bilateral lower extremities.  Neuro: awake,  answers questions appropriately. No focal deficits noted         ED Course     At 3:00 PM the patient was seen and examined by Jr Hammer MD in Room ED25.        Procedures              EKG Interpretation:      Interpreted by Jr Hammer MD  Time reviewed: 3:30 PM  Symptoms at time of EKG: See HPI   Rhythm: normal sinus   Rate: normal  Axis: normal  Ectopy: none  Conduction: normal  ST Segments/ T Waves: Nonspecific T wave abnormality  Q Waves: none  Comparison to prior: Unchanged from 21-JAN-2022    Clinical Impression: No signs of acute ischemia, unchanged from previous ECG.          The medical record was reviewed and interpreted.  Current labs reviewed and interpreted.  Previous labs reviewed and interpreted.  EKG reviewed and interpreted: nonspecific T wave changes, unchanged from previsou.     No results found for this or any previous visit (from the past 24 hour(s)).  Medications   lidocaine 1 % 0.1-1 mL (has no administration in time range)   lidocaine (LMX4) cream (has no administration in time range)   sodium chloride (PF) 0.9% PF flush 3 mL (3 mLs Intracatheter Given 7/22/22 0506)   sodium chloride (PF) 0.9% PF flush 3 mL (has no administration in time range)   melatonin tablet 1 mg (has no administration in time range)   enoxaparin ANTICOAGULANT (LOVENOX) injection 40 mg (40 mg Subcutaneous Given 7/21/22 2224)   senna-docusate (SENOKOT-S/PERICOLACE) 8.6-50 MG per tablet 1 tablet (has no administration in time range)     Or   senna-docusate (SENOKOT-S/PERICOLACE) 8.6-50 MG per tablet 2 tablet (has no administration in time range)   ondansetron (ZOFRAN ODT) ODT tab 4 mg ( Oral See Alternative 7/22/22 0802)     Or   ondansetron (ZOFRAN) injection 4 mg (4 mg Intravenous Given 7/22/22 0802)   acetaminophen (TYLENOL) tablet 325-650 mg (650 mg Oral Given 7/22/22 0641)   baclofen (LIORESAL) tablet 10 mg (has no administration in time range)   gabapentin (NEURONTIN) tablet 1,200 mg (1,200 mg Oral Given  7/22/22 0759)   Lidocaine (LIDOCARE) 4 % Patch 1 patch (has no administration in time range)   menthol (Topical Analgesic) 2.5% (BENGAY VANISHING SCENT) 2.5 % topical gel (has no administration in time range)   metoprolol succinate ER (TOPROL-XL) 24 hr half-tab 12.5 mg (12.5 mg Oral Given 7/21/22 1943)   naloxone (NARCAN) injection 0.2 mg (has no administration in time range)     Or   naloxone (NARCAN) injection 0.4 mg (has no administration in time range)     Or   naloxone (NARCAN) injection 0.2 mg (has no administration in time range)     Or   naloxone (NARCAN) injection 0.4 mg (has no administration in time range)   nitroFURantoin macrocrystal-monohydrate (MACROBID) capsule 100 mg (100 mg Oral Given 7/22/22 0759)   LORazepam (ATIVAN) tablet 0.5 mg (0.5 mg Oral Given 7/22/22 0950)   lidocaine patch in PLACE ( Transdermal Patch Free Period 7/22/22 0506)   HYDROmorphone (PF) (DILAUDID) injection 0.5 mg (has no administration in time range)   HYDROmorphone (DILAUDID) injection 0.2 mg (0.2 mg Intravenous Given 7/21/22 0610)   medication given by implanted intrathecal pump ( Does not apply Rate/Dose Verify 7/21/22 2222)   acetaminophen (TYLENOL) tablet 1,000 mg (1,000 mg Oral Given 7/20/22 1648)   0.9% sodium chloride BOLUS (0 mLs Intravenous Stopped 7/20/22 2133)   aspirin (ASA) chewable tablet 324 mg (324 mg Oral Given 7/20/22 1721)   ondansetron (ZOFRAN) injection 4 mg (4 mg Intravenous Given 7/20/22 1718)   HYDROmorphone (PF) (DILAUDID) injection 0.5 mg (0.5 mg Intravenous Given 7/20/22 1832)   baclofen (LIORESAL) tablet 10 mg (10 mg Oral Given 7/20/22 1855)             Assessments & Plan (with Medical Decision Making)   Anayeli Gagnon is a 71 year old female who presents with what sounds like a suspicious complication from filling her intrathecal pump.  Other sources of altered mental status could also be considered such as infection, electrolyte abnormality, some type of intercranial head pathology but  seems less likely given that she is now awake alert and at baseline and that she responded Narcan.    I discussed the case with the physician that manages her pain pump she noted that there was no concern about improper medication being administered but rather concern is either that her intrathecal pump got dislodged into the subcu tissue or that medication could have been administered around the reservoir and absorbed through the subcu that way.  Patient has a medication has combination of both Dilaudid and bupivacaine.  Does not sound like patient had any chest pain, seizures, and an EKG obtained here shows normal sinus rhythm.    Here patient has normal vital signs is an awake and alert.    On reassessment patient continued to complain of generalized body pain and nausea but no vomiting.  She was given IV Zofran followed by a small dose of IV Dilaudid.    Her pain medicine physician I ordered an x-ray of the thoracolumbar to assess for catheter placement along with an ultrasound around her pain pump to assess for fluid collection.    Ultrasound negative for fluid collection.    Patient's labs are notable for normal white count, normal hemoglobin, no left shift.    She did have an elevated troponin.  EKG without signs of acute ischemia.  We will plan to trend the troponin as she still has ongoing nausea and feels generally unwell.  Patient is mildly low calcium but otherwise unremarkable BMP.    X-ray was read by the physician managing the pain pump.  Noted that it was in good position felt the pump was safe to use.    On reassessment patient was still feeling unwell despite getting Dilaudid and Zofran here in the emergency department.  Plan is to admit for further monitoring and evaluation of her symptoms.  She continued to deny chest pain throughout her ER visit and remained vitally stable.    I have reviewed the nursing notes.    I have reviewed the findings, diagnosis, plan and need for follow up with the  patient.    Current Discharge Medication List          Final diagnoses:   Nausea   Elevated troponin       I, Xavier Muñoz am serving as a trained medical scribe to document services personally performed by Jr Hammer, based on the provider's statements to me.      I, Jr Hammer, was physically present and have reviewed and verified the accuracy of this note documented by Xavier Muñoz.     Jr Hammer MD  7/20/2022   [facility] EMERGENCY DEPARTMENT     Jr Hammer MD  07/22/22 1210

## 2022-07-21 ENCOUNTER — TELEPHONE (OUTPATIENT)
Dept: ANESTHESIOLOGY | Facility: CLINIC | Age: 72
End: 2022-07-21

## 2022-07-21 LAB
ANION GAP SERPL CALCULATED.3IONS-SCNC: 10 MMOL/L (ref 7–15)
BUN SERPL-MCNC: 6.7 MG/DL (ref 8–23)
CALCIUM SERPL-MCNC: 8.2 MG/DL (ref 8.8–10.2)
CHLORIDE SERPL-SCNC: 106 MMOL/L (ref 98–107)
CREAT SERPL-MCNC: 0.47 MG/DL (ref 0.51–0.95)
DEPRECATED HCO3 PLAS-SCNC: 19 MMOL/L (ref 22–29)
ERYTHROCYTE [DISTWIDTH] IN BLOOD BY AUTOMATED COUNT: 12.4 % (ref 10–15)
GFR SERPL CREATININE-BSD FRML MDRD: >90 ML/MIN/1.73M2
GLUCOSE SERPL-MCNC: 75 MG/DL (ref 70–99)
HCT VFR BLD AUTO: 39 % (ref 35–47)
HGB BLD-MCNC: 12.4 G/DL (ref 11.7–15.7)
MCH RBC QN AUTO: 30.8 PG (ref 26.5–33)
MCHC RBC AUTO-ENTMCNC: 31.8 G/DL (ref 31.5–36.5)
MCV RBC AUTO: 97 FL (ref 78–100)
PLATELET # BLD AUTO: 131 10E3/UL (ref 150–450)
POTASSIUM SERPL-SCNC: 3.5 MMOL/L (ref 3.4–5.3)
RBC # BLD AUTO: 4.03 10E6/UL (ref 3.8–5.2)
SODIUM SERPL-SCNC: 135 MMOL/L (ref 136–145)
TROPONIN T SERPL HS-MCNC: 38 NG/L
WBC # BLD AUTO: 6.8 10E3/UL (ref 4–11)

## 2022-07-21 PROCEDURE — 85027 COMPLETE CBC AUTOMATED: CPT | Performed by: STUDENT IN AN ORGANIZED HEALTH CARE EDUCATION/TRAINING PROGRAM

## 2022-07-21 PROCEDURE — 96376 TX/PRO/DX INJ SAME DRUG ADON: CPT

## 2022-07-21 PROCEDURE — 96361 HYDRATE IV INFUSION ADD-ON: CPT

## 2022-07-21 PROCEDURE — 99232 SBSQ HOSP IP/OBS MODERATE 35: CPT | Performed by: INTERNAL MEDICINE

## 2022-07-21 PROCEDURE — G0378 HOSPITAL OBSERVATION PER HR: HCPCS

## 2022-07-21 PROCEDURE — 250N000013 HC RX MED GY IP 250 OP 250 PS 637: Performed by: STUDENT IN AN ORGANIZED HEALTH CARE EDUCATION/TRAINING PROGRAM

## 2022-07-21 PROCEDURE — 80048 BASIC METABOLIC PNL TOTAL CA: CPT | Performed by: STUDENT IN AN ORGANIZED HEALTH CARE EDUCATION/TRAINING PROGRAM

## 2022-07-21 PROCEDURE — 36415 COLL VENOUS BLD VENIPUNCTURE: CPT | Performed by: STUDENT IN AN ORGANIZED HEALTH CARE EDUCATION/TRAINING PROGRAM

## 2022-07-21 PROCEDURE — 258N000003 HC RX IP 258 OP 636: Performed by: INTERNAL MEDICINE

## 2022-07-21 PROCEDURE — 258N000003 HC RX IP 258 OP 636: Performed by: EMERGENCY MEDICINE

## 2022-07-21 PROCEDURE — 84484 ASSAY OF TROPONIN QUANT: CPT | Performed by: STUDENT IN AN ORGANIZED HEALTH CARE EDUCATION/TRAINING PROGRAM

## 2022-07-21 PROCEDURE — 250N000011 HC RX IP 250 OP 636: Performed by: STUDENT IN AN ORGANIZED HEALTH CARE EDUCATION/TRAINING PROGRAM

## 2022-07-21 PROCEDURE — 999N000128 HC STATISTIC PERIPHERAL IV START W/O US GUIDANCE

## 2022-07-21 PROCEDURE — 96372 THER/PROPH/DIAG INJ SC/IM: CPT | Performed by: STUDENT IN AN ORGANIZED HEALTH CARE EDUCATION/TRAINING PROGRAM

## 2022-07-21 PROCEDURE — 250N000011 HC RX IP 250 OP 636: Performed by: INTERNAL MEDICINE

## 2022-07-21 RX ORDER — HYDROMORPHONE HCL IN WATER/PF 6 MG/30 ML
0.2 PATIENT CONTROLLED ANALGESIA SYRINGE INTRAVENOUS
Status: DISCONTINUED | OUTPATIENT
Start: 2022-07-21 | End: 2022-07-22 | Stop reason: HOSPADM

## 2022-07-21 RX ADMIN — NITROFURANTOIN (MONOHYDRATE/MACROCRYSTALS) 100 MG: 75; 25 CAPSULE ORAL at 00:03

## 2022-07-21 RX ADMIN — ACETAMINOPHEN 650 MG: 325 TABLET, FILM COATED ORAL at 18:44

## 2022-07-21 RX ADMIN — SODIUM CHLORIDE: 9 INJECTION, SOLUTION INTRAVENOUS at 04:52

## 2022-07-21 RX ADMIN — NITROFURANTOIN (MONOHYDRATE/MACROCRYSTALS) 100 MG: 75; 25 CAPSULE ORAL at 09:09

## 2022-07-21 RX ADMIN — GABAPENTIN 1200 MG: 600 TABLET, FILM COATED ORAL at 19:42

## 2022-07-21 RX ADMIN — SODIUM CHLORIDE: 9 INJECTION, SOLUTION INTRAVENOUS at 22:21

## 2022-07-21 RX ADMIN — ONDANSETRON 4 MG: 4 TABLET, ORALLY DISINTEGRATING ORAL at 09:26

## 2022-07-21 RX ADMIN — HYDROMORPHONE HYDROCHLORIDE 0.2 MG: 0.2 INJECTION, SOLUTION INTRAMUSCULAR; INTRAVENOUS; SUBCUTANEOUS at 06:10

## 2022-07-21 RX ADMIN — GABAPENTIN 1200 MG: 600 TABLET, FILM COATED ORAL at 09:08

## 2022-07-21 RX ADMIN — ENOXAPARIN SODIUM 40 MG: 40 INJECTION SUBCUTANEOUS at 22:24

## 2022-07-21 RX ADMIN — LORAZEPAM 0.5 MG: 0.5 TABLET ORAL at 09:11

## 2022-07-21 RX ADMIN — NITROFURANTOIN (MONOHYDRATE/MACROCRYSTALS) 100 MG: 75; 25 CAPSULE ORAL at 19:42

## 2022-07-21 RX ADMIN — SODIUM CHLORIDE: 9 INJECTION, SOLUTION INTRAVENOUS at 00:05

## 2022-07-21 RX ADMIN — GABAPENTIN 1200 MG: 600 TABLET, FILM COATED ORAL at 14:56

## 2022-07-21 RX ADMIN — Medication 12.5 MG: at 19:43

## 2022-07-21 ASSESSMENT — ACTIVITIES OF DAILY LIVING (ADL)
ADLS_ACUITY_SCORE: 35

## 2022-07-21 NOTE — TELEPHONE ENCOUNTER
"RN received incoming call from ZACHARIAH Mcneal, nurse with [facility], to update that patient presented to ED yesterday after her pump refill visit with [facility]. Patient presented with symptoms of overdose/altered mental status. Nurse with [facility] believes the pump may have a \"leaky septum\", which may have caused medication to leak into subcutaneous tissue. [facility] RN reports the medication volume in the pump is reading 19.9 ml's, which may be reading inaccurately due to possible \"leaky septum\". Stated it should be 17-18 ml's. Dr. Espinoza has been updated and is in contact with ED physicians. Elizabet and Dr. Espinoza stated the next refill will be arranged to be done in the Pain Clinic.     Camille Talavera RN    "

## 2022-07-21 NOTE — H&P
[facility]    History and Physical - Hospitalist Service, GOLD TEAM        Date of Admission:  7/20/2022    Assessment & Plan      Anayeli Gagnon is a 71 year old female admitted on 7/20/2022 for suspected drug overdose during filling of intrathecal pain pump. She was found slumped in chair during home visit refill of pain pump. Noted dizziness, disorientation, and nausea during filling. Received narcan with improvement in LOC. No recurrence in AMS. EKG no acute ischemic changes, however labs notable for elevated troponin.     #Suspected Unintentional Drug Overdose  #Severe Chronic Neuropathic Pain  #Indwelling Intrathecal Pain Pump  -Etiology likely drug overdose given timing of symptoms and resolution with narcan. Less likely cardiac, infectious, intracranial pathology.     -XR and US confirm placement of pump, no surrounding fluid   -Pain pump deemed safe to use by pain specialist, plan to restart in the am under supervision  -PRN Dilaudid x1 dose overnight, discontinue if resuming pain pump  -Consult Pain Service   -Narcan PRN  -Continue PTA gabapentin TID, PRN baclofen    #Elevated Troponin  -Trop initially 39, down trending to 32  -Suspect demand due to overdose  -EKG no acute ischemic changes, no chest pain, vitally stable  -Trend troponin    #Acute Cystitis  #Hx. Recurrent UTI  #Indwelling Cunningham Catheter  Diagnosed at urgent care 7/17, prescribed Cipro but developed diarrhea per rectum (has colostomy) and was evaluated in the ED 7/19. Abx changed to Ceftin. Urgent care then contacted patient 7/20 and changed to nitrofurantoin based on UCx.   -Continue nitrofurantoin, end date 7/24    #Depression, Anxiety  -Continue PTA Ativan PRN     #Hypertension  -Continue PTA metoprolol       Diet:  Regular  DVT Prophylaxis: Enoxaparin (Lovenox) SQ  Cunningham Catheter: Not present  Central Lines: None  Cardiac Monitoring: None  Code Status:  DNR/DNI    Clinically Significant  Risk Factors Present on Admission                          Disposition Plan         The patient's care was discussed with the Attending Physician, Dr. Grier.    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 Patient notes she was getting her pain pump filled at a home infusion appointment. The nurse left and she began experiencing dizziness, disorientation, and nausea. She does not recall any events until arriving to the ED. Her  reports coming home and finding her slumped over in the chair. The nurse came and administered narcan and within 5 minutes she was awake and alert. No recurrent episodes.     In the ED, patient was vitally stable. She notes ongoing HA and nausea. Her primary pain physician was contacted and noted concern for malposition of intrathecal pump during refill or possible medication administration in the reservoir causing subcu absorption. Ultrasound shows no surrounding fluid and XR confirms proper positioning. Pain provider reviewed images and feels pump is safe to use.     Of note, patient was diagnosed with UTI 3 days prior and just changed antibiotics from Cipro to Ceftin yesterday.      Review of  Systems    Patient endorses HA and nausea. Denies CP, SOB, abdominal pain, fevers, chills, or dizziness.     Past Medical History    I have reviewed this patient's medical history and updated it with pertinent information if needed.   Past Medical History:   Diagnosis Date     CARDIAC DYSRHYTHMIAS NEC 10/3/2007    Taking atenelol for years for this     History of skin cancer      Mitral valve prolapse      Neurogenic bladder      Sacral decubitus ulcer, stage IV (H)      Thoracic spinal cord injury (H)        Past Surgical History   I have reviewed this patient's surgical history and updated it with pertinent information if needed.  Past Surgical History:   Procedure Laterality Date     DECOMPRESSION LUMBAR ONE LEVEL N/A 12/27/2019    Procedure: Posterior spinal decompression;  Surgeon: Alf Ritchie MD;  Location: [facility] OR     INSERT INTRATHECAL PAIN PUMP N/A 1/19/2022    Procedure: INSERTION, INTRATHECAL ANALGESIC PUMP, externalized trial;  Surgeon: Luis Orourke MD;  Location: [facility] OR     INSERT INTRATHECAL PAIN PUMP Right 1/21/2022    Procedure: INSERTION, INTRATHECAL ANALGESIC PUMP;  Surgeon: Luis Orourke MD;  Location: [facility] OR     INSERT STIMULATOR AND LEADS INTERNAL DORSAL COLUMN N/A 4/7/2021    Procedure: Posterior thoracic 12 to Lumbar 1 level for placement of spinal cord stimulator paddle and placement of implantable pulse generator/battery over right buttock;  Surgeon: Luis Orourke MD;  Location: [facility] OR     IR SPINAL ANGIOGRAM  10/16/2019     IR SPINAL ANGIOGRAM  12/20/2019     LAPAROSCOPIC TUBAL LIGATION       REPAIR SPINAL ARERIOVENOUS MALFORMATION N/A 12/27/2019    Procedure: with surgical disconnection arterial venous fistula Thoracic 5;  Surgeon: Alf Ritchie MD;  Location: [facility] OR       Social History   I have reviewed this patient's social history and updated it with pertinent information if needed.  Social History     Tobacco Use     Smoking status: Never Smoker     Smokeless  tobacco: Never Used   Substance Use Topics     Alcohol use: No     Drug use: No       Family History   I have reviewed this patient's family history and updated it with pertinent information if needed.  Family History   Problem Relation Age of Onset     C.A.D. Father          41     Deep Vein Thrombosis (DVT) No family hx of      Anesthesia Reaction No family hx of        Prior to Admission Medications   Prior to Admission Medications   Prescriptions Last Dose Informant Patient Reported? Taking?   COMPOUND CONTAINING CONTROLLED SUBSTANCE (CMPD RX) - PHARMACY TO MIX COMPOUNDED MEDICATION 2022 at am Self No Yes   Sig: New dosage:   Dilaudid    10mg/ml                                                          Bupivacaine 20 mg/ml  Ziconitide 10 mcg/ml     Total Volume in Refill:20 mL      Basal:   Dilaudid    4.5mg/day                                                       Bupivacaine 9.0 mg/day  Ziconitide 4.5 mcg/day     PTM to stay the same at 0.15 hydromorphone, 2 hour lockout, maximum 6 in a day.   LORazepam (ATIVAN) 0.5 MG tablet Unknown at prn Self Yes Yes   Sig: Take 1 tablet by mouth daily as needed for anxiety    Menthol, Topical Analgesic, (BIOFREEZE) 4 % GEL Unknown at prn Self Yes Yes   Sig: Externally apply topically daily as needed   Multiple Vitamins-Minerals (WOMENS MULTI PO) Not Taking at Unknown time Self Yes No   Sig: Take 1 tablet by mouth daily   Patient not taking: Reported on 2022   NARCAN 4 MG/0.1ML nasal spray 2022 at am Self Yes Yes   Sig: INHALE ONE SPRAY IN ONE NOSTRIL AS NEEDED FOR OPIOID. ANTIDOTE, MAY REPEAT IN OTHER NOSTRIL IF NEEDED   THEANINE PO Not Taking at Unknown time Self Yes No   Sig: Take 1 tablet by mouth daily as needed (takes when she remembers)   Patient not taking: Reported on 2022   acetaminophen (TYLENOL) 500 MG tablet 2022 at pm Self Yes Yes   Sig: Take 500-1,000 mg by mouth every 6 hours as needed for mild pain   baclofen (LIORESAL) 10 MG  tablet 7/20/2022 at pm Self Yes Yes   Sig: Take 10 mg by mouth 3 times daily as needed for muscle spasms   ciprofloxacin (CIPRO) 500 MG tablet Not Taking at Unknown time Self Yes No   Sig: Take 500 mg by mouth 2 times daily For 7 days   Patient not taking: Reported on 7/20/2022   gabapentin (NEURONTIN) 600 MG tablet 7/20/2022 at pm Self Yes Yes   Sig: Take 1,200 mg by mouth 3 times daily    lidocaine (LIDODERM) 5 % patch Unknown at prn Self Yes Yes   Sig: Apply 1 patch topically daily as needed    medical cannabis (Patient's own supply) Not Taking at Unknown time Self Yes No   Sig: See Admin Instructions (The purpose of this order is to document that the patient reports taking medical cannabis.  This is not a prescription, and is not used to certify that the patient has a qualifying medical condition.)     Liquid oral formulation.   Patient not taking: Reported on 7/20/2022   melatonin 3 MG tablet Unknown at prn Self Yes Yes   Sig: Take 3 mg by mouth nightly as needed    metoprolol succinate ER (TOPROL-XL) 25 MG 24 hr tablet 7/20/2022 at am Self Yes Yes   Sig: Take 12.5 mg by mouth every evening    nystatin (MYCOSTATIN) 924053 UNIT/GM external powder Unknown at prn Self Yes Yes   Sig: Apply topically 3 times daily as needed Cream   ondansetron (ZOFRAN) 4 MG tablet  Self Yes No   Sig: Take 4 mg by mouth every 8 hours as needed for nausea    polyethylene glycol (MIRALAX) 17 GM/Dose powder  Self Yes No   Sig: Take 17 g by mouth daily as needed for constipation       Facility-Administered Medications Last Administration Doses Remaining   naloxone (NARCAN) injection 0.1-0.4 mg None recorded 1        Allergies   Allergies   Allergen Reactions     Contrast Dye Rash     Painful rash after x-ray contrast       Physical Exam   Vital Signs: Temp: 98.3  F (36.8  C) Temp src: Oral BP: (!) 142/81 Pulse: 95   Resp: 16 SpO2: 95 % O2 Device: Nasal cannula Oxygen Delivery: 2 LPM  Weight: 0 lbs 0 oz    General Appearance: Alert, no  acute distress.  HEENT: Normocephalic.   Respiratory: Clear bilaterally, no wheeze or rhonchi.   Cardiovascular: RRR.   GI: Non-tender. Colostomy with brown stool in bag.   Genitourinary: Cunningham in situ draining sourav urine.   Skin: Warm, dry, no rash.   Musculoskeletal: Chronic bilateral LE edema, paraplegia.   Neurologic: No sensation bilateral LE, no focal deficits.   Psychiatric: Pleasant, cooperative.     Data   Data reviewed today: I reviewed all medications, new labs and imaging results over the last 24 hours. I personally reviewed the EKG tracing showing NSR with no acute ST/T changes. .    Recent Labs   Lab 07/20/22  1534   WBC 7.8   HGB 12.9   MCV 93         POTASSIUM 4.4   CHLORIDE 105   CO2 26   BUN 9.3   CR 0.49*   ANIONGAP 8   ADAN 8.4*   *

## 2022-07-21 NOTE — CONSULTS
Pain Service Consultation Note  [facility]      Patient Name: Anayeli Gagnon  MRN: [id number]   Age: 71 year old  Sex: female  Date: July 21, 2022                                      Reviewed: Yes    Referring Provider:  Rocio Carr PA-C                                       Referring Service:  Observation/ED  Reason for Consultation: AMS after Pain Pump Refill    Assessment/Recommendations:  71 year old old female with PMHx significant for mitral valve prolapse, hypertension, thoracic spinal cord injury, spinal surgery complications, neurogenic bladder, s/p intrathecal pain pump placement on 1/21/2022.  She experienced an episode of altered mental status at her home on 7/20/2022. This occurred shortly after having her pump refilled.  Per chart documentation,  found patient slumped over chair with slurred speech and difficulty communicating. Infusion nurse returned to home and administered intranasal narcan. Within 5 minutes, she was awake and alert. EMS reports that the patient received IM Narcan, zofran, 1 L of fluids prior to arrival to the hospital. Unsure if she had a total of 1 or 2 doses of narcan.     She has also been diagnosed with a UTI and started taking antibiotics 3 days ago. Her antibiotic was changed yesterday as well.    Her intrathecal pump was interrogated this morning and settings were confirmed to be consistent with prior programming and therefore correct.  Of note, the volume noted in the pump was 19.6 mL of 20 mL capacity. However, this volume may be a calculation based on initial refill amount and not an actual accurate amount. Exact amount in the reserve can be determined at next refill by aspirating the volume in the intrathecal pump. There is no need to do this at this time.     We observed the patient administer herself a bolus from the IT pump (she is allotted 6 per day with a lockout interval of 2 hours).  We monitored the patient for 5  "minutes after the bolus and the patient was stable. The nurse remained with the patient after this period as well.       Plan:   Patient may resume use of her intrathecal pump at this time. If there are any further questions or concerns, please contact the Inpatient Pain Service.       Thank you for the opportunity to participate in the care of Anayeli Gagnon  Pain Service will sign off.    Discussed with attending anesthesiologist  Primary Service Contacted with Recommendations? Yes    Please Page the Pain Team Via [facility]: \"Adult acute inpatient pain management/[facility]\"    Tiffanie Oneill MD  7/21/2022      Chief Complaint:  Altered mental status after Intrathecal Pump Refill on 7/20/2022      History of Present Illness:  Anayeli Gagnon is a 71 year old old female who was brought to the ED yesterday after experiencing an episode of confusion, dizziness, nausea, and difficulty with speech soon after her intrathecal pump was refilled. She was given narcan with resolution of symptoms prior to arrival in the hospital.  She did not use her IT Pump boluses (PTMs) after this episode. However, she did have IV dilaudid prn boluses available to her in the ED.  While in the hospital, she states she has not had any new episodes similar to the one that brought her to the hospital. However, she does continue to feel nauseated, with a headache, and \"does not feel right.\"  She states her anxiety is also significantly increased at this time.     Other than her IT Pump, she also takes Gabapentin 1200 mg TID. She has been on this dose for approximately 3 years, per the patient.     Past Medical History:  Past Medical History:   Diagnosis Date     CARDIAC DYSRHYTHMIAS NEC 10/3/2007    Taking atenelol for years for this     History of skin cancer      Mitral valve prolapse      Neurogenic bladder      Sacral decubitus ulcer, stage IV (H)      Thoracic spinal cord injury (H)          Family History:    Family History   Problem " "Relation Age of Onset     C.APariD. Father          41     Deep Vein Thrombosis (DVT) No family hx of      Anesthesia Reaction No family hx of       reviewed family history and noncontributory to pain consult    Social History:  Social History     Tobacco Use     Smoking status: Never Smoker     Smokeless tobacco: Never Used   Substance Use Topics     Alcohol use: No       *      Review of Systems:  Complete ROS reviewed. Unless otherwise noted, all other systems found to be negative.        Laboratory Results:  Recent Labs   Lab Test 22  0552 22  1534 22  0420   INR  --   --  0.99   *   < > 144*   PTT  --   --  33   BUN 6.7*   < > 15    < > = values in this interval not displayed.       Interrogation report will be printed and scanned into her record.      Allergies:  Allergies   Allergen Reactions     Contrast Dye Rash     Painful rash after x-ray contrast       Current Pain Related Medications:  Medications related to Pain Management (From now, onward)    Start     Dose/Rate Route Frequency Ordered Stop    22 013  HYDROmorphone (DILAUDID) injection 0.2 mg         0.2 mg Intravenous EVERY 3 HOURS PRN 22  LORazepam (ATIVAN) tablet 0.5 mg        Note to Pharmacy: PTA Sig:Take 1 tablet by mouth daily as needed for anxiety       0.5 mg Oral DAILY PRN 22 2247      22 220  lidocaine patch in PLACE          Transdermal EVERY 8 HOURS SCHEDULED 22 22022 220  gabapentin (NEURONTIN) tablet 1,200 mg        Note to Pharmacy: PTA Sig:Take 1,200 mg by mouth 3 times daily       1,200 mg Oral 3 TIMES DAILY 22 215  lidocaine 1 % 0.1-1 mL         0.1-1 mL Other EVERY 1 HOUR PRN 22 2155      22 215  lidocaine (LMX4) cream          Topical EVERY 1 HOUR PRN 22 21522 215  senna-docusate (SENOKOT-S/PERICOLACE) 8.6-50 MG per tablet 1 tablet        \"Or\" Linked Group Details    1 tablet " "Oral 2 TIMES DAILY PRN 07/20/22 2155 07/20/22 2155  senna-docusate (SENOKOT-S/PERICOLACE) 8.6-50 MG per tablet 2 tablet        \"Or\" Linked Group Details    2 tablet Oral 2 TIMES DAILY PRN 07/20/22 2155 07/20/22 2155  acetaminophen (TYLENOL) tablet 325-650 mg         325-650 mg Oral EVERY 6 HOURS PRN 07/20/22 2155 07/20/22 2155  baclofen (LIORESAL) tablet 10 mg        Note to Pharmacy: PTA Sig:Take 10 mg by mouth 3 times daily as needed for muscle spasms      10 mg Oral 3 TIMES DAILY PRN 07/20/22 2155 07/20/22 2155  Lidocaine (LIDOCARE) 4 % Patch 1 patch        Note to Pharmacy: PTA Sig:Apply 1 patch topically daily as needed       1 patch  over 12 Hours Transdermal DAILY PRN 07/20/22 2155              Physical Exam:  Vitals: /61 (BP Location: Left arm, Patient Position: Supine)   Pulse 68   Temp 98.7  F (37.1  C) (Oral)   Resp 16   SpO2 93%     Physical Exam:     CONSTITUTIONAL/GENERAL APPEARANCE:  NAD  EYES: EOMI, sclera anicteric, PERRLA  ENT/NECK: atraumatic, lips and oral mucous membranes dry  RESPIRATORY: non-labored breathing. No cough, wheeze  CV: RRR, no audible rubs, gallops, or murmurs  ABDOMEN: Soft, non-tender, non-distended, stoma present, palpable IT Pump on right lower quadrant of abdomen.   MUSCULOSKELETAL/BACK/SPINE/EXTREMITIES: weakness of bilateral lower extremities - wheelchair dependent at baseline  NEURO: Alert and Oriented x3. Answers questions appropriately  SKIN/VASCULAR EXAM:  No jaundice, no visible rashes or lesions      Total time spent 60 minutes with greater than 50% in consultation, education and coordination of care.  Also discussed with RN,  And MD.    Tiffanie Oneill MD    Pain Medicine  Department of Anesthesiology  [facility]           "

## 2022-07-21 NOTE — PROGRESS NOTES
Care Management Follow Up    Length of Stay (days): 1    Expected Discharge Date:  TBD     Concerns to be Addressed:     CHURCH  Patient plan of care discussed at interdisciplinary rounds: No    Anticipated Discharge Disposition:       Anticipated Discharge Services:    Anticipated Discharge DME:      Patient/family educated on Medicare website which has current facility and service quality ratings:    Education Provided on the Discharge Plan:    Patient/Family in Agreement with the Plan:      Referrals Placed by CM/SW:    Private pay costs discussed: insurance costs out of pocket expenses, co-pays and deductibles    Additional Information:  Writer met with patient to discuss and complete CHURCH paperwork. Writer answered any of patient's questions regarding insurance coverage. Writer encouraged patient to follow up with their insurance plan directly to receive the most accurate information. Patient signed CHURCH form and the form was placed on the patient's chart.    ________________    GT Colon, [facility]  ED/Observation   [facility]  Phone: [phone]  Pager: [phone]  Fax: [phone]    On-call pager, [phone], 4:00pm to midnight

## 2022-07-21 NOTE — PROGRESS NOTES
[facility]    Medicine Progress Note - Hospitalist Service, GOLD TEAM 11    Date of Admission:  7/20/2022    Assessment & Plan          Anayeli Gagnon is a 71 year old female admitted on 7/20/2022 for suspected drug overdose during filling of intrathecal pain pump. She was found slumped in chair during home visit refill of pain pump. Noted dizziness, disorientation, and nausea during filling. Received narcan with improvement in LOC. No recurrence in AMS.       Suspected Unintentional Drug Overdose  Severe Chronic Neuropathic Pain  Indwelling Intrathecal Pain Pump  -Etiology likely drug overdose given timing of symptoms and resolution with narcan. Less likely cardiac, infectious, intracranial pathology.     -XR and US confirm placement of pump, no surrounding fluid   -Pain pump deemed safe to use by pain specialist, plan to restart in the am under supervision  -evaluated by Pain Service   -Home regimen pump resumed  -Narcan PRN  -Continue PTA gabapentin TID, PRN baclofen       #Elevated Troponin  -Trop initially 39, down trending to 32  -Suspect demand due to overdose  -EKG no acute ischemic changes, no chest pain, vitally stable       #Acute Cystitis  #Hx. Recurrent UTI  #Indwelling Cunningham Catheter  Diagnosed at urgent care 7/17, prescribed Cipro but developed diarrhea per rectum (has colostomy) and was evaluated in the ED 7/19. Abx changed to Ceftin. Urgent care then contacted patient 7/20 and changed to nitrofurantoin based on UCx.   -Continue nitrofurantoin, end date 7/24     #Depression, Anxiety  -Continue PTA Ativan PRN      #Hypertension  -Continue PTA metoprolol            Diet: Combination Diet Regular Diet Adult    DVT Prophylaxis: Pneumatic Compression Devices  Cunningham Catheter: Not present  Central Lines: None  Cardiac Monitoring: None  Code Status: No CPR- Do NOT Intubate      Disposition Plan         The patient's care was discussed with the  Patient.    LAURI BATES MD  Hospitalist Service, GOLD TEAM 11  [facility]  Securely message with the [employer] Web Console (learn more here)  Text page via [facility] Paging/Directory   Please see signed in provider for up to date coverage information      Clinically Significant Risk Factors Present on Admission                          ______________________________________________________________________    Interval History   Reports feeling much better. She is not sure what happened yesterday. No more confusion. She has resumed her pump boluses and tolerating well. Denies any fever, chills, or urinary symptoms  Rest of 14 point ROS negative    Data reviewed today: I reviewed all medications, new labs and imaging results over the last 24 hours. I personally reviewed no images or EKG's today.    Physical Exam   Vital Signs: Temp: 98.7  F (37.1  C) Temp src: Oral BP: 107/61 Pulse: 68   Resp: 16 SpO2: 93 % O2 Device: None (Room air) Oxygen Delivery: 2 LPM  Weight: 0 lbs 0 oz  General Appearance: AO x3, no distress, no confusion  Respiratory: CTA, no wheeizng or crackles  Cardiovascular: S1 and S2 normal  GI: non tender, non distended  Skin: normla turgor  Other: No pedal edema     Data   Recent Labs   Lab 07/21/22  0552 07/20/22  1534   WBC 6.8 7.8   HGB 12.4 12.9   MCV 97 93   * 155   * 139   POTASSIUM 3.5 4.4   CHLORIDE 106 105   CO2 19* 26   BUN 6.7* 9.3   CR 0.47* 0.49*   ANIONGAP 10 8   ADAN 8.2* 8.4*   GLC 75 110*     Recent Results (from the past 24 hour(s))   US Abdomen Limited    Narrative    Exam: US ABDOMEN LIMITED, 7/20/2022 4:06 PM    Indication: Concern for fluid around the pump pocket    Comparison: No recent exams available for comparison    TECHNIQUE: Limited grayscale evaluation of the area of question.     Findings:   Right lower quadrant abdominal wall around the pain pump was scanned.   Metallic artifact limits evaluation.  No large fluid  collections  around the pain pump identified.      Impression    Impression: No appreciable fluid collections around the pain pump  identified.     I have personally reviewed the examination and initial interpretation  and I agree with the findings.    SHRUTI PINTO DO         SYSTEM ID:  [id number]   XR Thoracic Lumbar Standing 2 Views    Narrative    EXAM: XR THORACIC LUMBAR STANDING 2 VIEWS  LOCATION: [facility]  DATE/TIME: 7/20/2022 5:54 PM    INDICATION: assess location of intrathecal catheter.  COMPARISON: X-ray 01/19/2022    FINDINGS: Tip of the intrathecal catheter is at mid T11. On the original x-ray from 01/19/2022, the tip was at upper T10, indicating caudal migration. Continuity of the catheter cannot be assessed because only the tip is adequately visualized. The rest   of the catheter is not radiopaque enough to be visualized radiographically.       Medications     sodium chloride 75 mL/hr at 07/21/22 1315       enoxaparin ANTICOAGULANT  40 mg Subcutaneous Q24H     gabapentin  1,200 mg Oral TID     lidocaine   Transdermal Q8H [facility]     metoprolol succinate ER  12.5 mg Oral QPM     naloxone  0.1-0.4 mg Intravenous Once     nitroFURantoin macrocrystal-monohydrate  100 mg Oral Q12H DYLAN (08/20)     sodium chloride (PF)  3 mL Intracatheter Q8H

## 2022-07-21 NOTE — UTILIZATION REVIEW
"Admission Status; Secondary Review Determination         Under the authority of the Utilization Management Committee, the utilization review process indicated a secondary review on the above patient.  The review outcome is based on review of the medical records, discussions with staff, and applying clinical experience noted on the date of the review.          (x) Observation Status Appropriate - This patient does not meet hospital inpatient criteria and is placed in observation status. If this patient's primary payer is Medicare and was admitted as an inpatient, Condition Code 44 should be used and patient status changed to \"observation\".     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 She presented to the ED after suspected accidental overdose from her intrathecal pain pump. She had her pain pump filled duriung a home infusion appointment (dilaudid, bupivacaine, and ziconitide). The nurse left and she began experiencing dizziness, disorientation, and nausea. She does not recall any events until arriving to the ED. Her  came home and found her slumped over in a chair. He called her home health nurse who went back and administered narcan. Within 5 minutes she was awake and alert. She was taken to the ED for evaluation. Vital signs were stable.  Labs were unremarkable except for high-sensitivity troponin of 39 which is elevated.  Neither x-ray of lumbar spine nor ultrasound of abdomen showed abnormality associated with intrathecal pump.  There was concern about intrathecal pump malfunction or mishap during refill process resulting in absorption of excess Dilaudid.  In the emergency department she had ongoing headache and " nausea.  She was admitted to the hospital for close monitoring.  Serial troponins were flat without rise.  No adverse events were noted overnight.  She was seen by anesthesiology this morning and her pump seems to be working okay.  Vital signs are stable.     The severity of illness, intensity of service provided, expected LOS and risk for adverse outcome make the care appropriate for further observation; however, doesn't meet criteria for hospital inpatient admission. Dr Leonidas Morejon notified of this determination.    This document was produced using voice recognition software.      The information on this document is developed by the utilization review team in order for the business office to ensure compliance.  This only denotes the appropriateness of proper admission status and does not reflect the quality of care rendered.         The definitions of Inpatient Status and Observation Status used in making the determination above are those provided in the CMS Coverage Manual, Chapter 1 and Chapter 6, section 70.4.      Sincerely,    Aquiles Joyce MD    Utilization Review  Physician Advisor  [facility].

## 2022-07-22 VITALS
SYSTOLIC BLOOD PRESSURE: 152 MMHG | DIASTOLIC BLOOD PRESSURE: 63 MMHG | OXYGEN SATURATION: 94 % | RESPIRATION RATE: 16 BRPM | TEMPERATURE: 98.2 F | HEART RATE: 70 BPM

## 2022-07-22 PROCEDURE — G0378 HOSPITAL OBSERVATION PER HR: HCPCS

## 2022-07-22 PROCEDURE — 250N000013 HC RX MED GY IP 250 OP 250 PS 637: Performed by: STUDENT IN AN ORGANIZED HEALTH CARE EDUCATION/TRAINING PROGRAM

## 2022-07-22 PROCEDURE — 250N000011 HC RX IP 250 OP 636: Performed by: STUDENT IN AN ORGANIZED HEALTH CARE EDUCATION/TRAINING PROGRAM

## 2022-07-22 PROCEDURE — 96376 TX/PRO/DX INJ SAME DRUG ADON: CPT

## 2022-07-22 RX ADMIN — ACETAMINOPHEN 650 MG: 325 TABLET, FILM COATED ORAL at 06:41

## 2022-07-22 RX ADMIN — LORAZEPAM 0.5 MG: 0.5 TABLET ORAL at 09:50

## 2022-07-22 RX ADMIN — NITROFURANTOIN (MONOHYDRATE/MACROCRYSTALS) 100 MG: 75; 25 CAPSULE ORAL at 07:59

## 2022-07-22 RX ADMIN — ONDANSETRON 4 MG: 2 INJECTION INTRAMUSCULAR; INTRAVENOUS at 08:02

## 2022-07-22 RX ADMIN — GABAPENTIN 1200 MG: 600 TABLET, FILM COATED ORAL at 07:59

## 2022-07-22 NOTE — PROGRESS NOTES
Patient discharged home with all belongings. Discharge paperwork was reviewed with patient and she verbalized understanding. All questions were answered. Patients'  brought in the patients power chair for discharge. PIV was removed prior to discharge.

## 2022-07-22 NOTE — PLAN OF CARE
Goal Outcome Evaluation:    Plan of Care Reviewed With: patient     Overall Patient Progress: improving    Time: 0441-0730  Status: Admit from ED for suspected drug overdose during filling of intrathecal pain pump  VS: Stable on RA.   Neuros: A&Ox4, calm, calls appropriately.   Cardiac: WNL, denies chest pain.   Respiratory: WNL, denies SOB.   GI/: Cunningham patent with adequate output. LLQ colostomy, +stool, +gas.   Diet/Nausea: Regular diet, denies nausea  Skin: Intact, see previous skin note   LDA: L PIV SL. R PIV removed per pt's request.   Labs: Reviewed  Pain: managed with prn tylenol x1 and intrathecal pain pump  Activity: paraplegic uses wheelchair, lift, turn/reposition Q 2hrs.   New changes this shift: None   Plan: possible discharge today. Continue POC.

## 2022-07-22 NOTE — PROGRESS NOTES
Admitted/transferred from: ED  2 RN skin assessment completed by Juan Tapia, RN and Nora Whipple RN.  Skin assessment finding: RLQ intrathecal pain pump, LLQ colostomy appliance intact, Cunningham cath in place, R shin small scab, small bruise on L foot.   Interventions/actions: encourage frequent turn/reposition, pillows in use, maintain adequate hydration.     Bedside Emergency Equipment Present:  Suction Regulator: YES  Suction Canister: YES  Tubing between Regulator and Canister: YES  O2 Regulator with Tree: YES  Ambu Bag: YES

## 2022-07-22 NOTE — PLAN OF CARE
BP (!) 152/63 (BP Location: Left arm)   Pulse 70   Temp 98.2  F (36.8  C) (Oral)   Resp 16   SpO2 94%     Shift: 0915-9389    Status: Suspected drug overdose during filling of intrathecal pain pump and UTI.   Neuro: A&O x 4. Paraplegia LE's.   Resp: WDL. RA. Denies SOB.   Cardiac: WDL. Hypertensive, within range.  GI/: Reported nausea this AM, gave PRN zofran. Ate 25% of breakfast.   Skin: Warm and dry.   Activity: Assist of 2, lift.   Incision & Drainage: PIV SL.    Pain: Reported pain 5/10. Patient has an intrathecal pain pump. Refused PRN dilaudid.   Labs: Reviewed.   Changes: No acute changes.   Plan: Discharge home today.         Goal Outcome Evaluation: Adequate for care transition.

## 2022-07-23 DIAGNOSIS — Z71.89 OTHER SPECIFIED COUNSELING: ICD-10-CM

## 2022-07-24 ENCOUNTER — PATIENT OUTREACH (OUTPATIENT)
Dept: CARE COORDINATION | Facility: CLINIC | Age: 72
End: 2022-07-24

## 2022-07-24 NOTE — PROGRESS NOTES
"Clinic Care Coordination Contact  [facility]: Post-Discharge Note  SITUATION                                                      Admission:    Admission Date: 07/20/22   Reason for Admission: Suspected Unintentional Drug Overdose  #Severe Chronic Neuropathic Pain  #Indwelling Intrathecal Pain Pump  Discharge:   Discharge Date: 07/22/22  Discharge Diagnosis: Suspected Unintentional Drug Overdose  #Severe Chronic Neuropathic Pain  #Indwelling Intrathecal Pain Pump    BACKGROUND                                                      Per hospital discharge summary and inpatient provider notes:  Anayeli Gagnon is a 71 year old female admitted on 7/20/2022 for suspected drug overdose during filling of intrathecal pain pump. She was found slumped in chair during home visit refill of pain pump. Noted dizziness, disorientation, and nausea during filling. Received narcan with improvement in LOC. No recurrence in AMS. EKG no acute ischemic changes, however labs notable for elevated troponin.        ASSESSMENT      Enrollment  Primary Care Care Coordination Status: Not a Candidate    Discharge Assessment  How are you doing now that you are home?: \"I've felt better\"  How are your symptoms? (Red Flag symptoms escalate to triage hotline per guidelines): Unchanged  Do you feel your condition is stable enough to be safe at home until your provider visit?: Yes  Does the patient have their discharge instructions? : Yes  Does the patient have questions regarding their discharge instructions? : No  Were you started on any new medications or were there changes to any of your previous medications? : No  Does the patient have all of their medications?: Yes  Do you have questions regarding any of your medications? : No (feeling nauseated but suspects from antibiotic)  Do you have all of your needed medical supplies or equipment (DME)?  (i.e. oxygen tank, CPAP, cane, etc.): Yes  Discharge follow-up appointment scheduled within 14 " calendar days? : Yes  Discharge Follow Up Appointment Date: 07/28/22  Discharge Follow Up Appointment Scheduled with?: Specialty Care Provider         Post-op (Clinicians Only)  Did the patient have surgery or a procedure: No    No complaints of chronic pain but notes nausea which she suspects is from antibiotic to treat UTI. She is tolerating oatmeal and peanut butter toast. States diarrhea has improved.     PLAN                                                      Outpatient Plan:  Keep appointment Wednesday as scheduled with specialist.    Future Appointments   Date Time Provider Department Center   8/1/2022 10:00 AM Jasmine Espinoza MD [facility]         For any urgent concerns, please contact our 24 hour nurse triage line: [phone] ([phone])

## 2022-07-25 ENCOUNTER — MEDICAL CORRESPONDENCE (OUTPATIENT)
Dept: ANESTHESIOLOGY | Facility: CLINIC | Age: 72
End: 2022-07-25

## 2022-07-27 ENCOUNTER — TELEPHONE (OUTPATIENT)
Dept: ANESTHESIOLOGY | Facility: CLINIC | Age: 72
End: 2022-07-27

## 2022-07-27 DIAGNOSIS — M79.2 INTRACTABLE NEUROPATHIC PAIN OF LUMBOSACRAL ORIGIN: ICD-10-CM

## 2022-07-27 NOTE — TELEPHONE ENCOUNTER
RN received incoming call from nurse, Elizabet, with [employer]. Elizabet wanted to follow up and see that patient is scheduled for her next pump refill in clinic. Writer informed that patient is scheduled for 8/8 with Dr. Espinoza in clinic, and that a pump syringe has been ordered through [employer]. Elizabet advised that patient stay for at least an hour after the pump refill to monitor patient. Patient was recently seen in ED after pump refill with altered LOC, due to possible leaky septum of the pain pump.     Camille Talavera RN

## 2022-07-27 NOTE — TELEPHONE ENCOUNTER
RN called patient to update that next pump refill will need to be completed in clinic with Dr. Espinoza. Patient verbalized understanding. Patient scheduled for 8/8 at [facility]. RN gave patient address with date and time. Patient verbalized understanding. [place]azao prescription for IT pump medication pended for Dr. Espinoza to sign.       Camille Talavera RN     154.9

## 2022-07-30 NOTE — DISCHARGE SUMMARY
[facility]  Hospitalist Discharge Summary      Date of Admission:  7/20/2022  Date of Discharge:  7/22/2022 12:21 PM  Discharging Provider: LAURI BATES MD  Discharge Service: Hospitalist Service, GOLD TEAM 11    Discharge Diagnoses     Suspected Unintentional Drug Overdose  Severe Chronic Neuropathic Pain  Indwelling Intrathecal Pain Pump  Elevated Troponin- stress induced type II MI  Acute Cystitis      Follow-ups Needed After Discharge   Follow-up Appointments     Adult [facility] Follow-up and recommended labs and tests      Follow up with primary care provider, SHAY YEPEZ, within 7 days for   hospital follow- up.  No follow up labs or test are needed.      Appointments on [place] and/or [facility] (with [facility] or [facility]   provider or service). Call [phone] if you haven't heard regarding   these appointments within 7 days of discharge.             Unresulted Labs Ordered in the Past 30 Days of this Admission     No orders found from 6/20/2022 to 7/21/2022.      These results will be followed up by pcp    Discharge Disposition   Discharged to home  Condition at discharge: Stable      Hospital Course     Anayeli Gagnon is a 71 year old female admitted on 7/20/2022 for suspected drug overdose during filling of intrathecal pain pump. She was found slumped in chair during home visit refill of pain pump. Noted dizziness, disorientation, and nausea during filling. Received narcan with improvement in mental status, her symptoms were thought to be due to drug overdose given timing of symptoms and resolution with narcan. XR and US confirm placement of pump, no surrounding fluid. Her mental status improved to baseline. -Pain pump deemed safe to use by pain specialist, plan to restart in the am under supervision. Patient was evaluated with pain management and her home pump regimen was resumed which tolerated without any side effects. She was monitored  another night on her pump regimen and deemed safe for discharge next day.   Prior to discharge she was AO x3, denied any complaints.           Consultations This Hospital Stay   PAIN MANAGEMENT ADULT IP CONSULT  NURSING TO CONSULT FOR VASCULAR ACCESS CARE IP CONSULT    Code Status   Prior    Time Spent on this Encounter   I, LAURI BATES MD, personally saw the patient today and spent greater than 30 minutes discharging this patient.       LAURI BATES MD  [facility] [address] [address]  Phone: [phone]  ______________________________________________________________________    Physical Exam   Vital Signs:                   Weight: 0 lbs 0 oz  General Appearance: AO x3, no distress  Respiratory: CTA bilaterally  Cardiovascular: S1 and S2 normal  GI: non tender, non distended  Skin: normla skin turgor  Other: No pedal edema        Primary Care Physician   SHAY YEPEZ    Discharge Orders      Reason for your hospital stay    Altered mental status due to medication     Activity    Your activity upon discharge: activity as tolerated     Resume Home Care Services     Adult [facility] Follow-up and recommended labs and tests    Follow up with primary care provider, SHAY YEPEZ, within 7 days for hospital follow- up.  No follow up labs or test are needed.      Appointments on [place] and/or [facility] (with [facility] or [facility] provider or service). Call [phone] if you haven't heard regarding these appointments within 7 days of discharge.     Diet    Follow this diet upon discharge: Orders Placed This Encounter      Combination Diet Regular Diet Adult       Significant Results and Procedures   Results for orders placed or performed during the hospital encounter of 07/20/22   XR Thoracic Lumbar Standing 2 Views    Narrative    EXAM: XR THORACIC LUMBAR STANDING 2 VIEWS  LOCATION: [facility]  DATE/TIME: 7/20/2022  5:54 PM    INDICATION: assess location of intrathecal catheter.  COMPARISON: X-ray 01/19/2022    FINDINGS: Tip of the intrathecal catheter is at mid T11. On the original x-ray from 01/19/2022, the tip was at upper T10, indicating caudal migration. Continuity of the catheter cannot be assessed because only the tip is adequately visualized. The rest   of the catheter is not radiopaque enough to be visualized radiographically.     US Abdomen Limited    Narrative    Exam: US ABDOMEN LIMITED, 7/20/2022 4:06 PM    Indication: Concern for fluid around the pump pocket    Comparison: No recent exams available for comparison    TECHNIQUE: Limited grayscale evaluation of the area of question.     Findings:   Right lower quadrant abdominal wall around the pain pump was scanned.   Metallic artifact limits evaluation.  No large fluid collections  around the pain pump identified.      Impression    Impression: No appreciable fluid collections around the pain pump  identified.     I have personally reviewed the examination and initial interpretation  and I agree with the findings.    SHRUTI PINTO DO         SYSTEM ID:  [id number]       Discharge Medications   Discharge Medication List as of 7/22/2022 10:30 AM      CONTINUE these medications which have NOT CHANGED    Details   acetaminophen (TYLENOL) 500 MG tablet Take 500-1,000 mg by mouth every 6 hours as needed for mild pain, Historical      baclofen (LIORESAL) 10 MG tablet Take 10 mg by mouth 3 times daily as needed for muscle spasms, Historical      gabapentin (NEURONTIN) 600 MG tablet Take 1,200 mg by mouth 3 times daily , Disp-1 tablet, R-0, Historical      lidocaine (LIDODERM) 5 % patch Apply 1 patch topically daily as needed Historical      LORazepam (ATIVAN) 0.5 MG tablet Take 1 tablet by mouth daily as needed for anxiety , Historical      melatonin 3 MG tablet Take 3 mg by mouth nightly as needed , Historical      Menthol, Topical Analgesic, (BIOFREEZE) 4 % GEL  Externally apply topically daily as neededHistorical      metoprolol succinate ER (TOPROL-XL) 25 MG 24 hr tablet Take 12.5 mg by mouth every evening , Historical      NARCAN 4 MG/0.1ML nasal spray INHALE ONE SPRAY IN ONE NOSTRIL AS NEEDED FOR OPIOID. ANTIDOTE, MAY REPEAT IN OTHER NOSTRIL IF NEEDED, GAURAV, Historical      nystatin (MYCOSTATIN) 999822 UNIT/GM external powder Apply topically 3 times daily as needed CreamHistorical      COMPOUND CONTAINING CONTROLLED SUBSTANCE (CMPD RX) - PHARMACY TO MIX COMPOUNDED MEDICATION New dosage:   Dilaudid    10mg/ml                                                          Bupivacaine 20 mg/ml  Ziconitide 10 mcg/ml     Total Volume in Refill:20 mL      Basal:   Dilaudid    4.5mg/day                                                        Bupivacaine 9.0 mg/day  Ziconitide 4.5 mcg/day     PTM to stay the same at 0.15 hydromorphone, 2 hour lockout, maximum 6 in a day., Disp-20 mL, R-0, [employer]      medical cannabis (Patient's own supply) See Admin Instructions (The purpose of this order is to document that the patient reports taking medical cannabis.  This is not a prescription, and is not used to certify that the patient has a qualifying medical condition.)     Liquid oral formulation.,  Historical      Multiple Vitamins-Minerals (WOMENS MULTI PO) Take 1 tablet by mouth daily, Historical      ondansetron (ZOFRAN) 4 MG tablet Take 4 mg by mouth every 8 hours as needed for nausea , Historical      polyethylene glycol (MIRALAX) 17 GM/Dose powder Take 17 g by mouth daily as needed for constipation , Historical      THEANINE PO Take 1 tablet by mouth daily as needed (takes when she remembers), Historical         STOP taking these medications       ciprofloxacin (CIPRO) 500 MG tablet Comments:   Reason for Stopping:             Allergies   Allergies   Allergen Reactions     Contrast Dye Rash     Painful rash after x-ray contrast

## 2022-08-08 ENCOUNTER — OFFICE VISIT (OUTPATIENT)
Dept: ANESTHESIOLOGY | Facility: CLINIC | Age: 72
End: 2022-08-08
Payer: MEDICARE

## 2022-08-08 VITALS
HEART RATE: 66 BPM | TEMPERATURE: 98.2 F | OXYGEN SATURATION: 96 % | DIASTOLIC BLOOD PRESSURE: 64 MMHG | SYSTOLIC BLOOD PRESSURE: 132 MMHG

## 2022-08-08 DIAGNOSIS — Z97.8 PRESENCE OF INTRATHECAL PUMP: Primary | ICD-10-CM

## 2022-08-08 PROCEDURE — 62370 ANL SP INF PMP W/MDREPRG&FIL: CPT | Performed by: ANESTHESIOLOGY

## 2022-08-08 RX ORDER — NALOXONE HYDROCHLORIDE 0.4 MG/ML
.1-.4 INJECTION, SOLUTION INTRAMUSCULAR; INTRAVENOUS; SUBCUTANEOUS ONCE
Status: DISCONTINUED | OUTPATIENT
Start: 2022-08-08 | End: 2022-09-27

## 2022-08-08 NOTE — PROGRESS NOTES
Pump electronic analysis, refill and reprogram  The patient was placed in a sitting position on her electronic wheel chair.  The pump refill port was scanned with ultrasound, identified and marked. The borders of the pump were identified and the area was cleansed in the usual sterile fashion using chlorhexidine swabs cleansing from the center outward.  After the chlorhexidine dried the site was prepped again with chlorhexidine gluconate 4% prior to needle insertion a [employer] refill kit was used for this fill.  The needle was placed in the port without difficulty.  Approximately 7 mL of solution was aspirated and discarded per the clinic protocol.  Upon interrogation reservoir volume was 9 mL as well.  The patient's pump was filled with 20 mL of solution of preservative-free medications and 1 mL was utilized for the filter and tubing preparation. The total solution of the fill in preparation contained dilaudid 10 mg/ml, bupivacaine 20 mg/ml and ziconitide 10 mcg/ml.  There was negative drop from the pump at the start of the fill.  The pump was filled over approximately 1 minute.  Upon completion of the fill the needle was removed and direct pressure was applied to the puncture site.  The area was then cleansed and there was no bleeding noted from the puncture site. The area was scanned again and there was NO fluid around the refill port.    The patient's pump was electronically analyzed and reprogrammed during the procedure to update the refill volume. There were no dosage changes made today.   The patient was monitored for an extended period of time today.     The pump program and all calculations were verified by Santa sifuentes RN.    Basal:    Dilaudid    4.5mg/day                                                        Bupivacaine 9.0 mg/day   Ziconitide 4.5 mcg/day       PTM to stay the same at 0.15 hydromorphone, 2 hour lockout, maximum 6 in a day.     The concentration was not changed.  The rate was  unchanged.  Ultrasound Guidance used to confirm position Yes  Refill date 9/10    Counseling: Greater than 50% of this patient visit was spent in counseling the patient regarding the treatment of their pain, coordinating their overall treatment plan and assessing their progress.      New prescription will be [facility] to [email or web address] for the next refill.   Dilaudid    10mg/ml                                                           Bupivacaine 20 mg/ml   Ziconitide 10 mcg/ml       Total Volume in Refill:20 mL        Basal:    Dilaudid    5 mg/day                                                        Bupivacaine 10 mg/day   Ziconitide 5 mcg/day       PTM to stay the same at 0.15 hydromorphone, 2 hour lockout, maximum 6 in a day.

## 2022-08-08 NOTE — LETTER
8/8/2022       RE: Anayeli Gagnon  [address] [place] [facility] [place] [address]     Dear Colleague,    Thank you for referring your patient, Anayeli Gagnon, to the [facility] [place] at [facility]. Please see a copy of my visit note below.    Pump electronic analysis, refill and reprogram  The patient was placed in a sitting position on her electronic wheel chair.  The pump refill port was scanned with ultrasound, identified and marked. The borders of the pump were identified and the area was cleansed in the usual sterile fashion using chlorhexidine swabs cleansing from the center outward.  After the chlorhexidine dried the site was prepped again with chlorhexidine gluconate 4% prior to needle insertion a [email or web address] refill kit was used for this fill.  The needle was placed in the port without difficulty.  Approximately 7 mL of solution was aspirated and discarded per the clinic protocol.  Upon interrogation reservoir volume was 9 mL as well.  The patient's pump was filled with 20 mL of solution of preservative-free medications and 1 mL was utilized for the filter and tubing preparation. The total solution of the fill in preparation contained dilaudid 10 mg/ml, bupivacaine 20 mg/ml and ziconitide 10 mcg/ml.  There was negative drop from the pump at the start of the fill.  The pump was filled over approximately 1 minute.  Upon completion of the fill the needle was removed and direct pressure was applied to the puncture site.  The area was then cleansed and there was no bleeding noted from the puncture site. The area was scanned again and there was NO fluid around the refill port.    The patient's pump was electronically analyzed and reprogrammed during the procedure to update the refill volume. There were no dosage changes made today.   The patient was monitored for an extended period of time today.     The pump  program and all calculations were verified by Santa sifuentes RN.    Basal:    Dilaudid    4.5mg/day                                                        Bupivacaine 9.0 mg/day   Ziconitide 4.5 mcg/day       PTM to stay the same at 0.15 hydromorphone, 2 hour lockout, maximum 6 in a day.     The concentration was not changed.  The rate was unchanged.  Ultrasound Guidance used to confirm position Yes  Refill date 9/10    Counseling: Greater than 50% of this patient visit was spent in counseling the patient regarding the treatment of their pain, coordinating their overall treatment plan and assessing their progress.      New prescription will be sent to [facility] for the next refill.   Dilaudid    10mg/ml                                                           Bupivacaine 20 mg/ml   Ziconitide 10 mcg/ml       Total Volume in Refill:20 mL        Basal:    Dilaudid    5 mg/day                                                        Bupivacaine 10 mg/day   Ziconitide 5 mcg/day       PTM to stay the same at 0.15 hydromorphone, 2 hour lockout, maximum 6 in a day.              Sincerely,    Jasmine Espinoza MD

## 2022-08-08 NOTE — PATIENT INSTRUCTIONS
Treatment planning:    Pump refill completed at today's visit      Recommended Follow up:          Please call [phone] to schedule your clinic appointment if you don't already have an appointment scheduled.        To speak with a nurse, schedule/reschedule/cancel a clinic appointment, or request a medication refill call: [phone]    You can also reach us by [email or web address]: [email or web address]

## 2022-08-08 NOTE — NURSING NOTE
Chief Complaint   Patient presents with     RECHECK     Pump refill     Tyra Giordano CMA at 11:08 AM on 8/8/2022.

## 2022-08-11 ENCOUNTER — MEDICAL CORRESPONDENCE (OUTPATIENT)
Dept: HEALTH INFORMATION MANAGEMENT | Facility: CLINIC | Age: 72
End: 2022-08-11

## 2022-08-17 DIAGNOSIS — M79.2 INTRACTABLE NEUROPATHIC PAIN OF LUMBOSACRAL ORIGIN: ICD-10-CM

## 2022-08-31 ENCOUNTER — MEDICAL CORRESPONDENCE (OUTPATIENT)
Dept: HEALTH INFORMATION MANAGEMENT | Facility: CLINIC | Age: 72
End: 2022-08-31

## 2022-09-09 ENCOUNTER — TELEPHONE (OUTPATIENT)
Dept: ANESTHESIOLOGY | Facility: CLINIC | Age: 72
End: 2022-09-09

## 2022-09-09 NOTE — TELEPHONE ENCOUNTER
[facility] Call Center    Phone Message    May a detailed message be left on voicemail: yes     Reason for Call: Medication Refill Request    Has the patient contacted the pharmacy for the refill? Yes     Name of medication being requested:     Pain pump medication Prialt 10 microgram per mil    Hydromorphone 10 mg per mil    Bupivacaine 20 mg per mil    Provider who prescribed the medication: Olga    Pharmacy: [facility] Fax # is [phone]

## 2022-09-12 NOTE — TELEPHONE ENCOUNTER
Prescription pended and signed by Dr. Espinoza. Prescription sent to [email or web address] portal this am.       Camille Talavera RN

## 2022-09-13 DIAGNOSIS — M79.2 INTRACTABLE NEUROPATHIC PAIN OF LUMBOSACRAL ORIGIN: ICD-10-CM

## 2022-09-13 NOTE — PROGRESS NOTES
IT Pump Syringe prescription sent to [facility] for next refill week of 9/15/22.     Camille Talavera RN

## 2022-09-15 ENCOUNTER — MEDICAL CORRESPONDENCE (OUTPATIENT)
Dept: HEALTH INFORMATION MANAGEMENT | Facility: CLINIC | Age: 72
End: 2022-09-15

## 2022-09-26 ENCOUNTER — TELEPHONE (OUTPATIENT)
Dept: ANESTHESIOLOGY | Facility: CLINIC | Age: 72
End: 2022-09-26

## 2022-09-26 ENCOUNTER — HOSPITAL ENCOUNTER (OUTPATIENT)
Facility: CLINIC | Age: 72
Setting detail: OBSERVATION
Discharge: HOME | End: 2022-09-27
Attending: EMERGENCY MEDICINE | Admitting: INTERNAL MEDICINE
Payer: MEDICARE

## 2022-09-26 ENCOUNTER — APPOINTMENT (OUTPATIENT)
Dept: CT IMAGING | Facility: CLINIC | Age: 72
End: 2022-09-26
Attending: NURSE PRACTITIONER
Payer: MEDICARE

## 2022-09-26 DIAGNOSIS — R31.9 URINARY TRACT INFECTION WITH HEMATURIA, SITE UNSPECIFIED: ICD-10-CM

## 2022-09-26 DIAGNOSIS — R11.0 NAUSEA: ICD-10-CM

## 2022-09-26 DIAGNOSIS — N39.0 URINARY TRACT INFECTION WITH HEMATURIA, SITE UNSPECIFIED: ICD-10-CM

## 2022-09-26 DIAGNOSIS — M62.81 GENERALIZED MUSCLE WEAKNESS: ICD-10-CM

## 2022-09-26 DIAGNOSIS — Z97.8 PRESENCE OF INTRATHECAL PUMP: ICD-10-CM

## 2022-09-26 DIAGNOSIS — U07.1 INFECTION DUE TO 2019 NOVEL CORONAVIRUS: ICD-10-CM

## 2022-09-26 DIAGNOSIS — M79.2 INTRACTABLE NEUROPATHIC PAIN OF LUMBOSACRAL ORIGIN: Primary | ICD-10-CM

## 2022-09-26 LAB
ALBUMIN SERPL BCG-MCNC: 4.3 G/DL (ref 3.5–5.2)
ALBUMIN UR-MCNC: 50 MG/DL
ALP SERPL-CCNC: 72 U/L (ref 35–104)
ALT SERPL W P-5'-P-CCNC: 13 U/L (ref 10–35)
AMORPH CRY #/AREA URNS HPF: ABNORMAL /HPF
ANION GAP SERPL CALCULATED.3IONS-SCNC: 14 MMOL/L (ref 7–15)
APPEARANCE UR: ABNORMAL
AST SERPL W P-5'-P-CCNC: 25 U/L (ref 10–35)
BACTERIA #/AREA URNS HPF: ABNORMAL /HPF
BASOPHILS # BLD AUTO: 0 10E3/UL (ref 0–0.2)
BASOPHILS NFR BLD AUTO: 0 %
BILIRUB SERPL-MCNC: 0.4 MG/DL
BILIRUB UR QL STRIP: NEGATIVE
BUN SERPL-MCNC: 6.6 MG/DL (ref 8–23)
CALCIUM SERPL-MCNC: 8.9 MG/DL (ref 8.8–10.2)
CHLORIDE SERPL-SCNC: 100 MMOL/L (ref 98–107)
COLOR UR AUTO: ABNORMAL
CREAT SERPL-MCNC: 0.48 MG/DL (ref 0.51–0.95)
DEPRECATED HCO3 PLAS-SCNC: 26 MMOL/L (ref 22–29)
EOSINOPHIL # BLD AUTO: 0 10E3/UL (ref 0–0.7)
EOSINOPHIL NFR BLD AUTO: 0 %
ERYTHROCYTE [DISTWIDTH] IN BLOOD BY AUTOMATED COUNT: 13.2 % (ref 10–15)
GFR SERPL CREATININE-BSD FRML MDRD: >90 ML/MIN/1.73M2
GLUCOSE SERPL-MCNC: 85 MG/DL (ref 70–99)
GLUCOSE UR STRIP-MCNC: NEGATIVE MG/DL
HCT VFR BLD AUTO: 44.6 % (ref 35–47)
HGB BLD-MCNC: 14.7 G/DL (ref 11.7–15.7)
HGB UR QL STRIP: ABNORMAL
HOLD SPECIMEN: NORMAL
IMM GRANULOCYTES # BLD: 0 10E3/UL
IMM GRANULOCYTES NFR BLD: 0 %
KETONES UR STRIP-MCNC: >150 MG/DL
LACTATE SERPL-SCNC: 1.1 MMOL/L (ref 0.7–2)
LEUKOCYTE ESTERASE UR QL STRIP: ABNORMAL
LYMPHOCYTES # BLD AUTO: 0.8 10E3/UL (ref 0.8–5.3)
LYMPHOCYTES NFR BLD AUTO: 12 %
MCH RBC QN AUTO: 30.1 PG (ref 26.5–33)
MCHC RBC AUTO-ENTMCNC: 33 G/DL (ref 31.5–36.5)
MCV RBC AUTO: 91 FL (ref 78–100)
MONOCYTES # BLD AUTO: 0.3 10E3/UL (ref 0–1.3)
MONOCYTES NFR BLD AUTO: 5 %
MUCOUS THREADS #/AREA URNS LPF: PRESENT /LPF
NEUTROPHILS # BLD AUTO: 5.8 10E3/UL (ref 1.6–8.3)
NEUTROPHILS NFR BLD AUTO: 83 %
NITRATE UR QL: POSITIVE
NRBC # BLD AUTO: 0 10E3/UL
NRBC BLD AUTO-RTO: 0 /100
PH UR STRIP: 5.5 [PH] (ref 5–7)
PLATELET # BLD AUTO: 171 10E3/UL (ref 150–450)
POTASSIUM SERPL-SCNC: 3.6 MMOL/L (ref 3.4–5.3)
PROT SERPL-MCNC: 6.7 G/DL (ref 6.4–8.3)
RBC # BLD AUTO: 4.88 10E6/UL (ref 3.8–5.2)
RBC URINE: 3 /HPF
SODIUM SERPL-SCNC: 140 MMOL/L (ref 136–145)
SP GR UR STRIP: 1.02 (ref 1–1.03)
SQUAMOUS EPITHELIAL: <1 /HPF
UROBILINOGEN UR STRIP-MCNC: NORMAL MG/DL
WBC # BLD AUTO: 7 10E3/UL (ref 4–11)
WBC URINE: 7 /HPF

## 2022-09-26 PROCEDURE — 96372 THER/PROPH/DIAG INJ SC/IM: CPT

## 2022-09-26 PROCEDURE — 96361 HYDRATE IV INFUSION ADD-ON: CPT

## 2022-09-26 PROCEDURE — 250N000011 HC RX IP 250 OP 636: Performed by: NURSE PRACTITIONER

## 2022-09-26 PROCEDURE — 80053 COMPREHEN METABOLIC PANEL: CPT | Performed by: EMERGENCY MEDICINE

## 2022-09-26 PROCEDURE — 96376 TX/PRO/DX INJ SAME DRUG ADON: CPT

## 2022-09-26 PROCEDURE — 250N000011 HC RX IP 250 OP 636: Performed by: EMERGENCY MEDICINE

## 2022-09-26 PROCEDURE — 36415 COLL VENOUS BLD VENIPUNCTURE: CPT | Performed by: EMERGENCY MEDICINE

## 2022-09-26 PROCEDURE — 258N000003 HC RX IP 258 OP 636: Performed by: NURSE PRACTITIONER

## 2022-09-26 PROCEDURE — 99285 EMERGENCY DEPT VISIT HI MDM: CPT | Performed by: EMERGENCY MEDICINE

## 2022-09-26 PROCEDURE — 250N000011 HC RX IP 250 OP 636

## 2022-09-26 PROCEDURE — 120N000002 HC R&B MED SURG/OB UMMC

## 2022-09-26 PROCEDURE — 250N000013 HC RX MED GY IP 250 OP 250 PS 637

## 2022-09-26 PROCEDURE — 36415 COLL VENOUS BLD VENIPUNCTURE: CPT | Mod: 59 | Performed by: EMERGENCY MEDICINE

## 2022-09-26 PROCEDURE — 96366 THER/PROPH/DIAG IV INF ADDON: CPT

## 2022-09-26 PROCEDURE — 81001 URINALYSIS AUTO W/SCOPE: CPT | Mod: 91

## 2022-09-26 PROCEDURE — 85025 COMPLETE CBC W/AUTO DIFF WBC: CPT | Performed by: EMERGENCY MEDICINE

## 2022-09-26 PROCEDURE — 87040 BLOOD CULTURE FOR BACTERIA: CPT | Performed by: EMERGENCY MEDICINE

## 2022-09-26 PROCEDURE — G1010 CDSM STANSON: HCPCS

## 2022-09-26 PROCEDURE — 83036 HEMOGLOBIN GLYCOSYLATED A1C: CPT

## 2022-09-26 PROCEDURE — 96365 THER/PROPH/DIAG IV INF INIT: CPT

## 2022-09-26 PROCEDURE — 96375 TX/PRO/DX INJ NEW DRUG ADDON: CPT

## 2022-09-26 PROCEDURE — G1010 CDSM STANSON: HCPCS | Mod: GC | Performed by: STUDENT IN AN ORGANIZED HEALTH CARE EDUCATION/TRAINING PROGRAM

## 2022-09-26 PROCEDURE — 87086 URINE CULTURE/COLONY COUNT: CPT | Performed by: EMERGENCY MEDICINE

## 2022-09-26 PROCEDURE — 81001 URINALYSIS AUTO W/SCOPE: CPT | Performed by: EMERGENCY MEDICINE

## 2022-09-26 PROCEDURE — 74176 CT ABD & PELVIS W/O CONTRAST: CPT | Mod: 26 | Performed by: STUDENT IN AN ORGANIZED HEALTH CARE EDUCATION/TRAINING PROGRAM

## 2022-09-26 PROCEDURE — 83605 ASSAY OF LACTIC ACID: CPT | Performed by: EMERGENCY MEDICINE

## 2022-09-26 PROCEDURE — 99223 1ST HOSP IP/OBS HIGH 75: CPT | Mod: AI | Performed by: STUDENT IN AN ORGANIZED HEALTH CARE EDUCATION/TRAINING PROGRAM

## 2022-09-26 PROCEDURE — 99285 EMERGENCY DEPT VISIT HI MDM: CPT | Mod: 25

## 2022-09-26 RX ORDER — POLYETHYLENE GLYCOL 3350 17 G/17G
17 POWDER, FOR SOLUTION ORAL
COMMUNITY
End: 2022-09-27

## 2022-09-26 RX ORDER — ONDANSETRON 2 MG/ML
4 INJECTION INTRAMUSCULAR; INTRAVENOUS EVERY 6 HOURS PRN
Status: DISCONTINUED | OUTPATIENT
Start: 2022-09-26 | End: 2022-09-27 | Stop reason: HOSPADM

## 2022-09-26 RX ORDER — ENOXAPARIN SODIUM 100 MG/ML
40 INJECTION SUBCUTANEOUS EVERY 24 HOURS
Status: DISCONTINUED | OUTPATIENT
Start: 2022-09-26 | End: 2022-09-27 | Stop reason: HOSPADM

## 2022-09-26 RX ORDER — LIDOCAINE 4 G/G
1 PATCH TOPICAL DAILY PRN
Status: DISCONTINUED | OUTPATIENT
Start: 2022-09-26 | End: 2022-09-27 | Stop reason: HOSPADM

## 2022-09-26 RX ORDER — ONDANSETRON 4 MG/1
4 TABLET, FILM COATED ORAL
COMMUNITY
Start: 2022-09-20 | End: 2022-09-27

## 2022-09-26 RX ORDER — NALOXONE HYDROCHLORIDE 0.4 MG/ML
0.4 INJECTION, SOLUTION INTRAMUSCULAR; INTRAVENOUS; SUBCUTANEOUS
Status: DISCONTINUED | OUTPATIENT
Start: 2022-09-26 | End: 2022-09-27 | Stop reason: HOSPADM

## 2022-09-26 RX ORDER — GABAPENTIN 600 MG/1
1200 TABLET ORAL 3 TIMES DAILY
Status: DISCONTINUED | OUTPATIENT
Start: 2022-09-26 | End: 2022-09-27 | Stop reason: HOSPADM

## 2022-09-26 RX ORDER — SODIUM CHLORIDE 9 MG/ML
INJECTION, SOLUTION INTRAVENOUS CONTINUOUS
Status: DISCONTINUED | OUTPATIENT
Start: 2022-09-26 | End: 2022-09-26

## 2022-09-26 RX ORDER — MORPHINE SULFATE 4 MG/ML
4 INJECTION, SOLUTION INTRAMUSCULAR; INTRAVENOUS
Status: COMPLETED | OUTPATIENT
Start: 2022-09-26 | End: 2022-09-26

## 2022-09-26 RX ORDER — OXYCODONE HYDROCHLORIDE 5 MG/1
5 TABLET ORAL EVERY 4 HOURS PRN
Status: DISCONTINUED | OUTPATIENT
Start: 2022-09-26 | End: 2022-09-27 | Stop reason: HOSPADM

## 2022-09-26 RX ORDER — HYDROMORPHONE HYDROCHLORIDE 1 MG/ML
0.5 INJECTION, SOLUTION INTRAMUSCULAR; INTRAVENOUS; SUBCUTANEOUS
Status: COMPLETED | OUTPATIENT
Start: 2022-09-26 | End: 2022-09-26

## 2022-09-26 RX ORDER — CEFUROXIME AXETIL 500 MG/1
500 TABLET ORAL EVERY 12 HOURS
COMMUNITY
Start: 2022-09-24 | End: 2022-09-27

## 2022-09-26 RX ORDER — HYDROXYZINE HYDROCHLORIDE 50 MG/1
50 TABLET, FILM COATED ORAL
Status: DISCONTINUED | OUTPATIENT
Start: 2022-09-26 | End: 2022-09-27 | Stop reason: HOSPADM

## 2022-09-26 RX ORDER — NIRMATRELVIR AND RITONAVIR 300-100 MG
KIT ORAL
COMMUNITY
Start: 2022-09-19 | End: 2023-01-23

## 2022-09-26 RX ORDER — NALOXONE HYDROCHLORIDE 0.4 MG/ML
0.2 INJECTION, SOLUTION INTRAMUSCULAR; INTRAVENOUS; SUBCUTANEOUS
Status: DISCONTINUED | OUTPATIENT
Start: 2022-09-26 | End: 2022-09-27 | Stop reason: HOSPADM

## 2022-09-26 RX ORDER — NITROFURANTOIN 25; 75 MG/1; MG/1
CAPSULE ORAL
COMMUNITY
Start: 2022-07-20 | End: 2022-09-27

## 2022-09-26 RX ORDER — LORAZEPAM 0.5 MG/1
0.5 TABLET ORAL DAILY PRN
Status: DISCONTINUED | OUTPATIENT
Start: 2022-09-26 | End: 2022-09-27

## 2022-09-26 RX ORDER — LIDOCAINE 40 MG/G
CREAM TOPICAL
Status: DISCONTINUED | OUTPATIENT
Start: 2022-09-26 | End: 2022-09-27 | Stop reason: HOSPADM

## 2022-09-26 RX ORDER — POLYETHYLENE GLYCOL 3350 17 G/17G
17 POWDER, FOR SOLUTION ORAL DAILY PRN
Status: DISCONTINUED | OUTPATIENT
Start: 2022-09-26 | End: 2022-09-27 | Stop reason: HOSPADM

## 2022-09-26 RX ORDER — CEFTRIAXONE 1 G/1
1 INJECTION, POWDER, FOR SOLUTION INTRAMUSCULAR; INTRAVENOUS EVERY 24 HOURS
Status: DISCONTINUED | OUTPATIENT
Start: 2022-09-27 | End: 2022-09-27 | Stop reason: HOSPADM

## 2022-09-26 RX ORDER — HYDROMORPHONE HYDROCHLORIDE 1 MG/ML
0.3 INJECTION, SOLUTION INTRAMUSCULAR; INTRAVENOUS; SUBCUTANEOUS
Status: DISCONTINUED | OUTPATIENT
Start: 2022-09-26 | End: 2022-09-27 | Stop reason: HOSPADM

## 2022-09-26 RX ORDER — ONDANSETRON 4 MG/1
4 TABLET, FILM COATED ORAL EVERY 8 HOURS PRN
Status: DISCONTINUED | OUTPATIENT
Start: 2022-09-26 | End: 2022-09-26

## 2022-09-26 RX ORDER — LORAZEPAM 0.5 MG/1
1 TABLET ORAL DAILY PRN
COMMUNITY
Start: 2022-09-16

## 2022-09-26 RX ORDER — ONDANSETRON 4 MG/1
4 TABLET, ORALLY DISINTEGRATING ORAL EVERY 6 HOURS PRN
Status: DISCONTINUED | OUTPATIENT
Start: 2022-09-26 | End: 2022-09-27 | Stop reason: HOSPADM

## 2022-09-26 RX ORDER — LANOLIN ALCOHOL/MO/W.PET/CERES
3 CREAM (GRAM) TOPICAL
Status: ON HOLD | COMMUNITY
End: 2023-05-11

## 2022-09-26 RX ORDER — NYSTATIN 100000 [USP'U]/G
POWDER TOPICAL 3 TIMES DAILY PRN
Status: DISCONTINUED | OUTPATIENT
Start: 2022-09-26 | End: 2022-09-26

## 2022-09-26 RX ORDER — BACLOFEN 10 MG/1
10 TABLET ORAL 3 TIMES DAILY PRN
Status: DISCONTINUED | OUTPATIENT
Start: 2022-09-26 | End: 2022-09-27 | Stop reason: HOSPADM

## 2022-09-26 RX ORDER — CEFTRIAXONE 1 G/1
1 INJECTION, POWDER, FOR SOLUTION INTRAMUSCULAR; INTRAVENOUS ONCE
Status: COMPLETED | OUTPATIENT
Start: 2022-09-26 | End: 2022-09-26

## 2022-09-26 RX ORDER — ACETAMINOPHEN 325 MG/1
975 TABLET ORAL EVERY 6 HOURS PRN
Status: DISCONTINUED | OUTPATIENT
Start: 2022-09-26 | End: 2022-09-27 | Stop reason: HOSPADM

## 2022-09-26 RX ADMIN — BACLOFEN 10 MG: 10 TABLET ORAL at 21:25

## 2022-09-26 RX ADMIN — SODIUM CHLORIDE: 9 INJECTION, SOLUTION INTRAVENOUS at 14:27

## 2022-09-26 RX ADMIN — CEFTRIAXONE SODIUM 1 G: 1 INJECTION, POWDER, FOR SOLUTION INTRAMUSCULAR; INTRAVENOUS at 15:52

## 2022-09-26 RX ADMIN — OXYCODONE HYDROCHLORIDE 5 MG: 5 TABLET ORAL at 17:18

## 2022-09-26 RX ADMIN — ACETAMINOPHEN 975 MG: 325 TABLET, FILM COATED ORAL at 17:18

## 2022-09-26 RX ADMIN — GABAPENTIN 1200 MG: 600 TABLET, FILM COATED ORAL at 20:24

## 2022-09-26 RX ADMIN — HYDROMORPHONE HYDROCHLORIDE 0.5 MG: 1 INJECTION, SOLUTION INTRAMUSCULAR; INTRAVENOUS; SUBCUTANEOUS at 16:31

## 2022-09-26 RX ADMIN — MORPHINE SULFATE 4 MG: 4 INJECTION INTRAVENOUS at 12:05

## 2022-09-26 RX ADMIN — HYDROMORPHONE HYDROCHLORIDE 0.3 MG: 1 INJECTION, SOLUTION INTRAMUSCULAR; INTRAVENOUS; SUBCUTANEOUS at 22:03

## 2022-09-26 RX ADMIN — LORAZEPAM 0.5 MG: 0.5 TABLET ORAL at 21:20

## 2022-09-26 RX ADMIN — OXYCODONE HYDROCHLORIDE 5 MG: 5 TABLET ORAL at 21:26

## 2022-09-26 RX ADMIN — ONDANSETRON 4 MG: 2 INJECTION INTRAMUSCULAR; INTRAVENOUS at 17:18

## 2022-09-26 RX ADMIN — SODIUM CHLORIDE 1000 ML: 9 INJECTION, SOLUTION INTRAVENOUS at 12:49

## 2022-09-26 RX ADMIN — ENOXAPARIN SODIUM 40 MG: 40 INJECTION SUBCUTANEOUS at 20:24

## 2022-09-26 RX ADMIN — HYDROXYZINE HYDROCHLORIDE 50 MG: 50 TABLET, FILM COATED ORAL at 22:43

## 2022-09-26 ASSESSMENT — ENCOUNTER SYMPTOMS
NUMBNESS: 1
ENDOCRINE NEGATIVE: 1
FATIGUE: 1
CARDIOVASCULAR NEGATIVE: 1
ALLERGIC/IMMUNOLOGIC NEGATIVE: 1
CHILLS: 1
HEADACHES: 1
APPETITE CHANGE: 1
RESPIRATORY NEGATIVE: 1
GASTROINTESTINAL NEGATIVE: 1
EYES NEGATIVE: 1
WEAKNESS: 1
FREQUENCY: 1
MYALGIAS: 1
ARTHRALGIAS: 1

## 2022-09-26 ASSESSMENT — ACTIVITIES OF DAILY LIVING (ADL)
ADLS_ACUITY_SCORE: 37
ADLS_ACUITY_SCORE: 33
ADLS_ACUITY_SCORE: 37

## 2022-09-26 NOTE — H&P
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    Complicated UTI, Catheter-Associated  Neurogenic Bladder   She has a history of neurogenic bladder with chronic Cunningham in place.  She has her Cunningham exchange every 3 weeks, with her last exchanged 2 weeks ago.  She reports ongoing fevers, chills, nausea over the past week and a half.  Her initial UA was concerning for infection however was not a clean collection.  Suspicion for complicated UTI remains high given her ongoing symptoms.  -Continue ceftriaxone 1 g every 24 hours  -Exchange Cunningham catheter  -Collect clean specimen after Cunningham exchange  -Follow-up culture results    Severe Chronic Neuropathic Pain   She has a history of chronic neuropathic pain in her bilateral feet and ankles, as well as in her rectum following her skin graft.  She follows with the pain clinic and has an intrathecal pain pump in place.  She reports significant worsening of her pain over the past 2 weeks.  -Pain Consult for evaluation of her pain pump   -Continue PTA pain meds  -As needed IV hydromorphone    Paraplegia   H/O Thoracic Spine Hematoma   Neurogenic Bowel s/p Colostomy   H/O Sacral Ulcer S/P Skin Graft   She has a history of a sacral ulcer with osteomyelitis status post skin grafting.  Appreciate walk consult in ostomy management and prevention of any pressure ulcers, as well as thorough skin exam.  - Continue PTA baclofen  -[facility] consult for ostomy management    Confirmed COVID-19 infection       Symptom Onset 9/18/2022   Date of 1st Positive Test 9/19/2022   Vaccination Status Partially Vaccinated       -  COVID-19 special precautions, continuous pulse-ox  - Oxygen: continue current support with O2 Device: None (Room air)   - Antibiotics: not indicated   - COVID-Focused Medications: No COVID-specific therapies are appropriate at this time.  - DVT Prophylaxis:             - At high risk of thrombotic complications due to COVID-19 (DDimer = N/A )         - PROPHYLACTIC dosing: lovenox 40mg daily     Generalized Anxiety Disorder   - PTA PRN Ativan   - PRN Hydroxyzine     Diet: Combination Diet Regular Diet Adult    DVT Prophylaxis: Enoxaparin (Lovenox) SQ  Cunningham Catheter: PRESENT, indication:    Fluids: None  Central Lines: None  Cardiac Monitoring: None  Code Status: No CPR- Do NOT Intubate      Clinically Significant Risk Factors Present on Admission                          Disposition Plan      Expected Discharge Date: 09/28/2022                The patient's care was discussed with the Attending Physician, Dr. Gordon.    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    She reports that she went to the ED first on 9/19 due to significant fevers, chills, nausea, vomiting, and increased ostomy output.  At that time she was diagnosed with COVID and was sent home with attacks of it.  She reports that she did not complete her course approximate as she started to feel worsening  pain and nausea.  She again presented to an outside ED on 9/24 with complaints of worsening rectal pain, as well as bilateral foot and ankle pain.  At that time she was diagnosed with a UTI and started on a cephalosporin, however she went home and continued to have poor appetite, nausea and was unable to take her antibiotic.    She presents today due to complaint of ongoing severe pain which she says is still located in her rectum and bilateral feet and ankles.  She has ongoing poor appetite but her nausea is improved.  She still says that her stools are looser than her baseline but have improved from prior as well.  She has a history of a skin graft from the sacral ulcer, but reports no concerns with skin breakdown or pressure ulcers currently.  She has a chronic Cunningham that she gets exchanged every 3 weeks.  Her last exchange was 2 weeks ago.  She reports she initially had fevers and chills with her COVID diagnosis, but those have since subsided.  Her main concern is that she has had ongoing pain not relieved with her current regimen which includes her pain pump, baclofen, and acetaminophen.    Her last admission at [facility] was in July for severe chronic neuropathic pain and concern for suspected overdose.  Her symptoms improved with Narcan at that time and the pump was found to be in the correct place.  Since then she reports that her pain has been fairly well controlled with the pain pump. Over the past week with her recent infection her symptoms have flared.    Review of Systems    The 10 point Review of Systems is negative other than noted in the HPI or here.     Past Medical History    I have reviewed this patient's medical history and updated it with pertinent information if needed.   Past Medical History:   Diagnosis Date     CARDIAC DYSRHYTHMIAS NEC 10/3/2007    Taking atenelol for years for this     History of skin cancer      Mitral valve prolapse      Neurogenic bladder      Sacral decubitus ulcer, stage IV  (H)      Thoracic spinal cord injury (H)       Past Surgical History   Past surgical history review with no previous surgeries identified.    Social History   I have personally reviewed the social history with the patient.  She lives at home with her .  She reports that she has adequate support including lift assistance, home health care, hospital bed, and a power wheelchair.    Family History   I have reviewed this patient's family history and updated it with pertinent information if needed.  Family History   Problem Relation Age of Onset     C.A.D. Father          41     Deep Vein Thrombosis (DVT) No family hx of      Anesthesia Reaction No family hx of        Prior to Admission Medications   Prior to Admission Medications   Prescriptions Last Dose Informant Patient Reported? Taking?   COMPOUND CONTAINING CONTROLLED SUBSTANCE (CMPD RX) - PHARMACY TO MIX COMPOUNDED MEDICATION   Yes No   Sig: Next Refill by [facility] week of 9/15/22  Concentrations:  Dilaudid    10mg/ml                                                          Bupivacaine 20 mg/ml  Ziconitide 10 mcg/ml     Total Volume in Refill:20 mL      Basal:   Dilaudid    5.0 mg/day                                                       Bupivacaine 10.0 mg/day  Ziconitide 5.0 mcg/day     PTM to stay the same at 0.15 hydromorphone, 2 hour lockout, maximum 6 in a day.   LORazepam (ATIVAN) 0.5 MG tablet  Self Yes No   Sig: Take 1 tablet by mouth daily as needed for anxiety    LORazepam (ATIVAN) 0.5 MG tablet   Yes Yes   Sig: Take 1 tablet by mouth 2 times daily as needed   Menthol, Topical Analgesic, (BIOFREEZE) 4 % GEL  Self Yes No   Sig: Externally apply topically daily as needed   Multiple Vitamins-Minerals (WOMENS MULTI PO)  Self Yes No   Sig: Take 1 tablet by mouth daily   NARCAN 4 MG/0.1ML nasal spray  Self Yes No   Sig: INHALE ONE SPRAY IN ONE NOSTRIL AS NEEDED FOR OPIOID. ANTIDOTE, MAY REPEAT IN OTHER NOSTRIL IF NEEDED   PAXLOVID, 300/100, therapy  pack   Yes No   Sig: TAKE 300 MG NIRMATRELVIR (TWO 150MG TABLETS) WITH 100MG RITONAVIR (ONE 100MG TABLET), ALL 3 TABLETS TAKEN TOGETHER ORALLY TWICE DAILY FOR 5   THEANINE PO  Self Yes No   Sig: Take 1 tablet by mouth daily as needed (takes when she remembers)   Patient not taking: Reported on 7/20/2022   acetaminophen (TYLENOL) 500 MG tablet  Self Yes No   Sig: Take 500-1,000 mg by mouth every 6 hours as needed for mild pain   baclofen (LIORESAL) 10 MG tablet  Self Yes No   Sig: Take 10 mg by mouth 3 times daily as needed for muscle spasms   cefuroxime (CEFTIN) 500 MG tablet   Yes Yes   Sig: Take 500 mg by mouth every 12 hours   gabapentin (NEURONTIN) 600 MG tablet  Self Yes No   Sig: Take 1,200 mg by mouth 3 times daily    lidocaine (LIDODERM) 5 % patch  Self Yes No   Sig: Apply 1 patch topically daily as needed    medical cannabis (Patient's own supply)  Self Yes No   Sig: See Admin Instructions (The purpose of this order is to document that the patient reports taking medical cannabis.  This is not a prescription, and is not used to certify that the patient has a qualifying medical condition.)     Liquid oral formulation.   Patient not taking: Reported on 7/20/2022   melatonin 3 MG tablet  Self Yes No   Sig: Take 3 mg by mouth nightly as needed    melatonin 3 MG tablet   Yes No   Sig: Take 3 mg by mouth   metoprolol succinate ER (TOPROL-XL) 25 MG 24 hr tablet  Self Yes No   Sig: Take 12.5 mg by mouth every evening    naloxone (NARCAN) 4 MG/0.1ML nasal spray   No No   Sig: Spray 1 spray (4 mg) into one nostril alternating nostrils as needed for opioid reversal every 2-3 minutes until assistance arrives   nitroFURantoin macrocrystal-monohydrate (MACROBID) 100 MG capsule   Yes No   Sig: TAKE ONE CAPSULE BY MOUTH TWICE A DAY FOR 5 DAYS   nystatin (MYCOSTATIN) 417729 UNIT/GM external powder  Self Yes No   Sig: Apply topically 3 times daily as needed Cream   ondansetron (ZOFRAN) 4 MG tablet  Self Yes No   Sig: Take 4  mg by mouth every 8 hours as needed for nausea    ondansetron (ZOFRAN) 4 MG tablet   Yes Yes   Sig: Take 4 mg by mouth   polyethylene glycol (MIRALAX) 17 GM/Dose powder  Self Yes No   Sig: Take 17 g by mouth daily as needed for constipation    polyethylene glycol (MIRALAX) 17 GM/Dose powder   Yes No   Sig: Take 17 g by mouth      Facility-Administered Medications Last Administration Doses Remaining   naloxone (NARCAN) injection 0.1-0.4 mg None recorded 1   naloxone (NARCAN) injection 0.1-0.4 mg None recorded 1        Allergies   Allergies   Allergen Reactions     Contrast Dye Rash     Painful rash after x-ray contrast       Physical Exam   Vital Signs: Temp: 98.1  F (36.7  C) Temp src: Oral BP: (!) 158/65 Pulse: 82   Resp: 12 SpO2: 95 % O2 Device: None (Room air)    Weight: 125 lbs 0 oz    General Appearance: Alert, mild distress secondary to pain, nontoxic  HEENT: EOM intact, sclera anicteric, moist mucous membranes, normocephalic  Respiratory: Clear to auscultation bilaterally  Cardiovascular: Regular rate and rhythm  GI: Soft, nontender, colostomy with semiformed stool output  Lymph/Hematologic: No bruising is noted  Genitourinary: Chronic Cunningham catheter in place  Skin: No rashes or lesions, area of edema without erythema over sacrum  Musculoskeletal: No pitting edema in bilateral lower extremities  Neurologic: Alert, normal speech, 5 out of 5 strength in bilateral upper extremities  Psychiatric: Normal affect    Data   Data reviewed today: I reviewed all medications, new labs and imaging results over the last 24 hours. I personally reviewed no images or EKG's today.    Recent Labs   Lab 09/26/22  1101   WBC 7.0   HGB 14.7   MCV 91         POTASSIUM 3.6   CHLORIDE 100   CO2 26   BUN 6.6*   CR 0.48*   ANIONGAP 14   ADAN 8.9   GLC 85   ALBUMIN 4.3   PROTTOTAL 6.7   BILITOTAL 0.4   ALKPHOS 72   ALT 13   AST 25     Recent Results (from the past 24 hour(s))   CT Abdomen Pelvis w/o Contrast    Narrative     EXAMINATION: CT ABDOMEN PELVIS W/O CONTRAST, 9/26/2022 2:13 PM    TECHNIQUE:  Helical CT images from the lung bases through the  symphysis pubis were obtained without IV contrast.     COMPARISON: CT myelogram of the thoracic and lumbar spine dated  9/20/2018. CT abdomen pelvis dated 11/29/2017.    HISTORY: rectal pain, hx skin graft for pressure ulcer, graft appears  intact evaluate for deeper injury/infection, negative spinal xray july 2022, allergy to contrast    FINDINGS:    Abdomen and pelvis:  Scattered hypodensities throughout the liver, the majority of which  are too small to accurately characterize by CT. There are several  larger hypodensities in the right and left hepatic dome which measure  simple fluid attenuation and likely represent simple cysts, for  example on series 2, images 23 and 28. These appear similar to the  prior CT dated 11/29/2017. The gallbladder is within normal limits.  Mild dilatation of the proximal common bile duct measuring up to 9 mm  in diameter, similar to 11/20/1917. No significant intrahepatic  biliary dilatation. Generalized atrophy of the pancreatic parenchyma.  The spleen is within normal limits. Small splenule along the anterior  pole of the spleen. The adrenal glands are within normal limits. No  obstructing renal calculi or hydronephrosis in either kidney. The  urinary bladder is decompressed with a Cunningham catheter. Trace non  dependent air in bladder presumably from recent catheterization.  The  reproductive organs are within normal limits.    Small sliding hiatal hernia. No abnormally dilated loops of small or  large bowel. Postsurgical changes of left lower quadrant colostomy. No  free air in the abdomen. No portal venous gas or evidence of  pneumatosis. Normal caliber of the abdominal aorta. Mild to moderate  atherosclerotic changes of the abdominal aorta and iliac vessels. No  abnormal lymph nodes in the abdomen or pelvis.    Lung bases: The cardiac size is normal.  No pericardial effusion.  Linear scarring in the inferior lingula lobe. Trace left pleural  effusion. Fat-containing right Morgagni hernia.    Bones and soft tissues:  Spinal stimulator implantable devices within the anterior and  posterior right subcutaneous tissue. Small fat-containing inguinal  hernias. Diffuse osteopenic appearance of the bones. Postsurgical  changes of posterior spinal decompression throughout the visualized  thoracic spine through L5. Advanced degenerative changes of the hips.  Somewhat prominent soft tissue thickening about the right gluteal  cleft (series 2, image 211). No adjacent inflammatory stranding or  soft tissue gas.      Impression    IMPRESSION:   1. No acute findings in the abdomen or pelvis. Specifically, no  evidence of a deep soft tissue infection as questioned.  2.  Somewhat prominent soft tissue thickening about the right gluteal  cleft without adjacent inflammatory stranding or soft tissue gas.  3. Chronic and incidental findings as described in the report.    I have personally reviewed the examination and initial interpretation  and I agree with the findings.    KAMI SCHMID MD         SYSTEM ID:  [id number]

## 2022-09-26 NOTE — ED NOTES
Lab called and two different people have tried getting second blood culture. They have to have vascular access come down to get second blood culture.

## 2022-09-26 NOTE — ED NOTES
Lab called RN saying they tried again for blood cultures without vascular access and were unsuccessful. Lab stated that only one person had tried poking the patient according to their [place] but RN was told two people had already tried and they were going to get vascular access. Lab was updated on this and they said they were going to put this in their SBAR.

## 2022-09-26 NOTE — ED TRIAGE NOTES
"Triage Assessment & Note:    BP (!) 173/79   Pulse 90   Temp 98  F (36.7  C) (Temporal)   Resp 18   Ht 1.626 m (5' 4\")   Wt 56.7 kg (125 lb)   SpO2 92%   BMI 21.46 kg/m      Patient presents with: PT has covid and today c/o headache and increased body pain and general ill feeling.     Home Treatments/Remedies: None    Febrile / Afebrile? Afebrile     Duration of C/o: 8 days     Efrem Harrell RN  September 26, 2022         Triage Assessment     Row Name 09/26/22 1027       Triage Assessment (Adult)    Airway WDL WDL       Respiratory WDL    Respiratory WDL WDL       Cardiac WDL    Cardiac WDL WDL              "

## 2022-09-26 NOTE — ED NOTES
"ED Triage Provider Note  [facility]  Encounter Date: Sep 26, 2022    History:  Chief Complaint   Patient presents with     Generalized Weakness     Anayeli Gagnon is a 71 year old female who presents to the ED with myalgias and headache. Patient was diagnosed with COVID 1 week ago and is having worsening generalized pain fatigue and headache.  She was also recently diagnosed with UTI.  She states she has not been taking antibiotics as they make her feel sick.    Review of Systems:  Myalgias    Exam:  BP (!) 173/79   Pulse 90   Temp 98  F (36.7  C) (Temporal)   Resp 18   Ht 1.626 m (5' 4\")   Wt 56.7 kg (125 lb)   SpO2 92%   BMI 21.46 kg/m    General: Appears uncomfortable. Appears stated age.  Uses wheelchair at baseline  Cardio: Regular rate, extremities well perfused  Resp: Normal work of breathing, grossly normal respiratory rate  Neuro: Alert. CN II-XII grossly intact. Grossly intact strength.       Medical Decision Making:  Patient arriving to the ED with problem as above. A medical screening exam was performed. Lab  orders initiated from Triage. The patient is appropriate to wait in triage.       Alf Rodriguez,  on 9/26/2022 at 11:17 AM     Alf Rodriguez,   09/26/22 1934    "

## 2022-09-26 NOTE — ED PROVIDER NOTES
ED Provider Note  [facility]      History     Chief Complaint   Patient presents with     Generalized Weakness     HPI  Anayeli Gagnon is a 71 year old female with a PMH significant for paraplegia, neuromuscular dysfunction of bladder, MRSA, mitral valve prolapse, GERD, hypertension, chronic pain syndrome, history of basal cell cancer of anxiety and depression was diagnosed with COVID on 9/19/2022 who presents to the emergency department with myalgias and headache. Patient was diagnosed with COVID 1 week ago, took 1-2 days of Paxlovid, then stopped due to feeling worse. She is now having worsening generalized pain fatigue and headache.  She was also recently diagnosed with UTI.  She states she has not been taking antibiotics as they make her feel sick, took total of 2 doses of her antibiotics. Patient reporting significant and worsening rectal pain. History of skin graft for sacral pressure ulcer.     Past Medical History  Past Medical History:   Diagnosis Date     CARDIAC DYSRHYTHMIAS NEC 10/3/2007    Taking atenelol for years for this     History of skin cancer      Mitral valve prolapse      Neurogenic bladder      Sacral decubitus ulcer, stage IV (H)      Thoracic spinal cord injury (H)      Past Surgical History:   Procedure Laterality Date     DECOMPRESSION LUMBAR ONE LEVEL N/A 12/27/2019    Procedure: Posterior spinal decompression;  Surgeon: Alf Ritchie MD;  Location: [facility] OR     INSERT INTRATHECAL PAIN PUMP N/A 1/19/2022    Procedure: INSERTION, INTRATHECAL ANALGESIC PUMP, externalized trial;  Surgeon: Luis Orourke MD;  Location: [facility] OR     INSERT INTRATHECAL PAIN PUMP Right 1/21/2022    Procedure: INSERTION, INTRATHECAL ANALGESIC PUMP;  Surgeon: Luis Orourke MD;  Location: [facility] OR     INSERT STIMULATOR AND LEADS INTERNAL DORSAL COLUMN N/A 4/7/2021    Procedure: Posterior thoracic 12 to Lumbar 1 level for placement of spinal cord stimulator paddle and  placement of implantable pulse generator/battery over right buttock;  Surgeon: Luis Orourke MD;  Location: [facility] OR     IR SPINAL ANGIOGRAM  10/16/2019     IR SPINAL ANGIOGRAM  2019     LAPAROSCOPIC TUBAL LIGATION       REPAIR SPINAL ARERIOVENOUS MALFORMATION N/A 2019    Procedure: with surgical disconnection arterial venous fistula Thoracic 5;  Surgeon: Alf Ritchie MD;  Location: [facility] OR     cefuroxime (CEFTIN) 500 MG tablet  LORazepam (ATIVAN) 0.5 MG tablet  acetaminophen (TYLENOL) 500 MG tablet  baclofen (LIORESAL) 10 MG tablet  COMPOUND CONTAINING CONTROLLED SUBSTANCE (CMPD RX) - PHARMACY TO MIX COMPOUNDED MEDICATION  gabapentin (NEURONTIN) 600 MG tablet  lidocaine (LIDODERM) 5 % patch  LORazepam (ATIVAN) 0.5 MG tablet  medical cannabis (Patient's own supply)  melatonin 3 MG tablet  melatonin 3 MG tablet  Menthol, Topical Analgesic, (BIOFREEZE) 4 % GEL  metoprolol succinate ER (TOPROL-XL) 25 MG 24 hr tablet  Multiple Vitamins-Minerals (WOMENS MULTI PO)  naloxone (NARCAN) 4 MG/0.1ML nasal spray  NARCAN 4 MG/0.1ML nasal spray  nitroFURantoin macrocrystal-monohydrate (MACROBID) 100 MG capsule  nystatin (MYCOSTATIN) 358083 UNIT/GM external powder  ondansetron (ZOFRAN) 4 MG tablet  PAXLOVID, 300/100, therapy pack  polyethylene glycol (MIRALAX) 17 GM/Dose powder  THEANINE PO      Allergies   Allergen Reactions     Contrast Dye Rash     Painful rash after x-ray contrast     Family History  Family History   Problem Relation Age of Onset     C.A.D. Father          41     Deep Vein Thrombosis (DVT) No family hx of      Anesthesia Reaction No family hx of      Social History   Social History     Tobacco Use     Smoking status: Never Smoker     Smokeless tobacco: Never Used   Substance Use Topics     Alcohol use: No     Drug use: No      Past medical history, past surgical history, medications, allergies, family history, and social history were reviewed with the patient. No additional pertinent  "items.       Review of Systems   Constitutional: Positive for appetite change, chills and fatigue.   HENT: Negative.    Eyes: Negative.    Respiratory: Negative.    Cardiovascular: Negative.    Gastrointestinal: Negative.    Endocrine: Negative.    Genitourinary: Positive for frequency.   Musculoskeletal: Positive for arthralgias and myalgias.   Allergic/Immunologic: Negative.    Neurological: Positive for weakness, numbness and headaches.     A complete review of systems was performed with pertinent positives and negatives noted in the HPI, and all other systems negative.    Physical Exam   BP: (!) 173/79  Pulse: 90  Temp: 98  F (36.7  C)  Resp: 18  Height: 162.6 cm (5' 4\")  Weight: 56.7 kg (125 lb)  SpO2: 92 %  Physical Exam  Constitutional:       Appearance: Normal appearance.   HENT:      Head: Normocephalic and atraumatic.   Cardiovascular:      Rate and Rhythm: Normal rate and regular rhythm.      Pulses: Normal pulses.      Heart sounds: Normal heart sounds.   Pulmonary:      Effort: Pulmonary effort is normal. No respiratory distress.      Breath sounds: Normal breath sounds.   Abdominal:      General: Abdomen is flat.      Palpations: Abdomen is soft.      Tenderness: There is abdominal tenderness. There is no right CVA tenderness, left CVA tenderness or rebound.   Musculoskeletal:      Right lower le+ Pitting Edema present.      Left lower le+ Pitting Edema present.   Skin:     General: Skin is warm and dry.      Capillary Refill: Capillary refill takes less than 2 seconds.      Findings: No abscess, ecchymosis or erythema.      Comments: Intact skin flap to sacral area of rectum, good capillary refill to skin flap   Neurological:      General: No focal deficit present.      Mental Status: She is alert and oriented to person, place, and time.      Cranial Nerves: Cranial nerves 2-12 are intact.      Sensory: Sensory deficit present.      Gait: Gait abnormal.      Comments: Numbness below waist BLE. " Very limited movement below waist. Wheelchair bound.   Psychiatric:         Mood and Affect: Mood normal.         Behavior: Behavior normal.         ED Course      Procedures            Results for orders placed or performed during the hospital encounter of 09/26/22   [facility] Draw     Status: None    Narrative    The following orders were created for panel order [facility] Draw.  Procedure                               Abnormality         Status                     ---------                               -----------         ------                     Extra Blue Top Tube[[id number]]                              Final result               Extra Red Top Tube[[id number]]                               Final result               Extra Green Top (Lithium...[[id number]]                      Final result               Extra Purple Top Tube[[id number]]                            Final result                 Please view results for these tests on the individual orders.   Extra Blue Top Tube     Status: None   Result Value Ref Range    Hold Specimen JIC    Extra Red Top Tube     Status: None   Result Value Ref Range    Hold Specimen JIC    Extra Green Top (Lithium Heparin) Tube     Status: None   Result Value Ref Range    Hold Specimen JIC    Extra Purple Top Tube     Status: None   Result Value Ref Range    Hold Specimen JIC    Comprehensive metabolic panel     Status: Abnormal   Result Value Ref Range    Sodium 140 136 - 145 mmol/L    Potassium 3.6 3.4 - 5.3 mmol/L    Chloride 100 98 - 107 mmol/L    Carbon Dioxide (CO2) 26 22 - 29 mmol/L    Anion Gap 14 7 - 15 mmol/L    Urea Nitrogen 6.6 (L) 8.0 - 23.0 mg/dL    Creatinine 0.48 (L) 0.51 - 0.95 mg/dL    Calcium 8.9 8.8 - 10.2 mg/dL    Glucose 85 70 - 99 mg/dL    Alkaline Phosphatase 72 35 - 104 U/L    AST 25 10 - 35 U/L    ALT 13 10 - 35 U/L    Protein Total 6.7 6.4 - 8.3 g/dL    Albumin 4.3 3.5 - 5.2 g/dL    Bilirubin Total 0.4 <=1.2 mg/dL    GFR Estimate >90 >60 mL/min/1.73m2    Lactic acid whole blood     Status: Normal   Result Value Ref Range    Lactic Acid 1.1 0.7 - 2.0 mmol/L   CBC with platelets and differential     Status: None   Result Value Ref Range    WBC Count 7.0 4.0 - 11.0 10e3/uL    RBC Count 4.88 3.80 - 5.20 10e6/uL    Hemoglobin 14.7 11.7 - 15.7 g/dL    Hematocrit 44.6 35.0 - 47.0 %    MCV 91 78 - 100 fL    MCH 30.1 26.5 - 33.0 pg    MCHC 33.0 31.5 - 36.5 g/dL    RDW 13.2 10.0 - 15.0 %    Platelet Count 171 150 - 450 10e3/uL    % Neutrophils 83 %    % Lymphocytes 12 %    % Monocytes 5 %    % Eosinophils 0 %    % Basophils 0 %    % Immature Granulocytes 0 %    NRBCs per 100 WBC 0 <1 /100    Absolute Neutrophils 5.8 1.6 - 8.3 10e3/uL    Absolute Lymphocytes 0.8 0.8 - 5.3 10e3/uL    Absolute Monocytes 0.3 0.0 - 1.3 10e3/uL    Absolute Eosinophils 0.0 0.0 - 0.7 10e3/uL    Absolute Basophils 0.0 0.0 - 0.2 10e3/uL    Absolute Immature Granulocytes 0.0 <=0.4 10e3/uL    Absolute NRBCs 0.0 10e3/uL   CBC with platelets differential     Status: None    Narrative    The following orders were created for panel order CBC with platelets differential.  Procedure                               Abnormality         Status                     ---------                               -----------         ------                     CBC with platelets and d...[[id number]]                      Final result                 Please view results for these tests on the individual orders.     Medications   0.9% sodium chloride BOLUS (1,000 mLs Intravenous New Bag 9/26/22 1249)     Followed by   sodium chloride 0.9% infusion (has no administration in time range)   cefTRIAXone (ROCEPHIN) 1 g vial to attach to  mL bag for ADULTS or NS 50 mL bag for PEDS (has no administration in time range)   morphine (PF) injection 4 mg (4 mg Intravenous Given 9/26/22 1205)        Assessments & Plan (with Medical Decision Making)   Anayeli Gagnon is a 71 year old female with a PMH significant for paraplegia,  neuromuscular dysfunction of bladder, MRSA, mitral valve prolapse, GERD, hypertension, chronic pain syndrome, history of basal cell cancer of anxiety and depression was diagnosed with COVID on 9/19/2022 who presents to the emergency department with myalgias and headache.     IMPRESSION: ill appearing female in no acute distress. Known covid positive, possible symptoms related to covid as unable to complete course of paxlovid. Also known UTI. however also possible she has a superimposed additional bacterial infection vs PE.      PLAN: patient initially seen by triage physician who ordered labs. I evaluated the patient once she was placed in a room in the emergency department. As there is no obvious explanation for her rectal pain based on skin examination and recent spinal xrays, will obtain CT. She is allergic to contrast, unfortunately will have to obtain CT w/o contrast.    RESULTS:  - Labs: No evidence of leukocytosis WBC 7.0. lactate 1.1. No electrolyte disturbance, normal rental function. Urine positive for nitrites and leukocytes, blood, protein, RBCs 3, WBCs 7.   Blood cultures pending at time of admission.  - Imaging: CT showed no  evidence of a deep soft tissue infection. Did show somewhat prominent soft tissue thickening about the right gluteal  cleft without adjacent inflammatory stranding or soft tissue gas. No evidence on CT to explain patients severe rectal pain.      INTERVENTIONS:   - 1 liter NS bolus  - Morphine IV  - dilaudid  - ceftriaxone 1gm     DISCUSSIONS:  - w/ IM: patient unable to tolerate PO intake, specifically treatment for covid 19 or UTI. Admit for IV antibiotics and further workup of pain.  - w/ Patient: discussed admission for IV antibiotics. Discussed results of labs and CT, no clear evidence for cause of severe rectal pain. Patient in agreement with admission and will be admitted to general medicine service.       I have reviewed the nursing notes. I have reviewed the findings,  diagnosis, plan and need for follow up with the patient.    --    ED Attending Physician Attestation    I Alf Rodriguez DO, cared for this patient with the Advanced Practice Provider (MIKKI). I have performed a history and physical examination of the patient independent of the MIKKI. I reviewed the MIKKI's documentation above and agree with the documented findings and plan of care. I personally provided a substantive portion of the care for this patient.    I personally performed the substantive portion of the medical decision making for this visit - please see the MIKKI's documentation for full details.    Key management decisions made by me and carried out under my direction: No additional    I personally performed the substantive portion of the history for this visit - please see the MIKKI's documentation for full details.  Key additional history findings made by me: No additional    I personally performed the substantive portion of the physical exam - please see the MIKKI's documentation for full details.  I concur with the MIKKI exam findings, in addition I have noted: No additional    Summary of HPI, PE, ED Course   Patient is a 71 year old female evaluated in the emergency department for generalized pain in setting of recent COVID diagnosis. Exam notable for patient. ED course notable for urinary tract infection.   -Start ceftriaxone IV  -Await urine culture results. After the completion of care in the emergency department, the patient was admitted to inpatient.    Critical Care & Procedures  Not applicable.    Medical Decision Making  The medical record was reviewed and interpreted.  Current labs reviewed and interpreted.  Current images reviewed and interpreted: CT abdomen pelvis.      Alf Rodriguez DO  Emergency Medicine       New Prescriptions    No medications on file       Final diagnoses:   None       --  EMELI Sifuentes [facility] EMERGENCY DEPARTMENT  9/26/2022     Lora Page,  APRN CNP  09/26/22 2117       Alf Rodriguez,   09/27/22 3509

## 2022-09-26 NOTE — TELEPHONE ENCOUNTER
[facility] Call Center    Phone Message    May a detailed message be left on voicemail: yes     Reason for Call: Medication Refill Request    Has the patient contacted the pharmacy for the refill? Yes     Name of medication being requested:     Prialt 10mcg per mil    Hydromorphone 10mg per mil    Bupivacaine 20mg per mil    Provider who prescribed the medication: Olag    Pharmacy: [facility] - please fax to [phone]    Date medication is needed: ASAP

## 2022-09-27 VITALS
RESPIRATION RATE: 9 BRPM | TEMPERATURE: 98.1 F | HEART RATE: 61 BPM | BODY MASS INDEX: 21.34 KG/M2 | SYSTOLIC BLOOD PRESSURE: 133 MMHG | DIASTOLIC BLOOD PRESSURE: 86 MMHG | WEIGHT: 125 LBS | HEIGHT: 64 IN | OXYGEN SATURATION: 98 %

## 2022-09-27 LAB
ALBUMIN UR-MCNC: 30 MG/DL
ANION GAP SERPL CALCULATED.3IONS-SCNC: 12 MMOL/L (ref 7–15)
APPEARANCE UR: CLEAR
BILIRUB UR QL STRIP: NEGATIVE
BUN SERPL-MCNC: 6.2 MG/DL (ref 8–23)
CALCIUM SERPL-MCNC: 7.7 MG/DL (ref 8.8–10.2)
CHLORIDE SERPL-SCNC: 106 MMOL/L (ref 98–107)
COLOR UR AUTO: ABNORMAL
CREAT SERPL-MCNC: 0.52 MG/DL (ref 0.51–0.95)
DEPRECATED HCO3 PLAS-SCNC: 21 MMOL/L (ref 22–29)
ERYTHROCYTE [DISTWIDTH] IN BLOOD BY AUTOMATED COUNT: 13.4 % (ref 10–15)
GFR SERPL CREATININE-BSD FRML MDRD: >90 ML/MIN/1.73M2
GLUCOSE SERPL-MCNC: 58 MG/DL (ref 70–99)
GLUCOSE UR STRIP-MCNC: NEGATIVE MG/DL
HBA1C MFR BLD: 5.8 %
HCT VFR BLD AUTO: 42.1 % (ref 35–47)
HGB BLD-MCNC: 14 G/DL (ref 11.7–15.7)
HGB UR QL STRIP: NEGATIVE
KETONES UR STRIP-MCNC: >150 MG/DL
LEUKOCYTE ESTERASE UR QL STRIP: NEGATIVE
MCH RBC QN AUTO: 30.4 PG (ref 26.5–33)
MCHC RBC AUTO-ENTMCNC: 33.3 G/DL (ref 31.5–36.5)
MCV RBC AUTO: 92 FL (ref 78–100)
MUCOUS THREADS #/AREA URNS LPF: PRESENT /LPF
NITRATE UR QL: NEGATIVE
PH UR STRIP: 6 [PH] (ref 5–7)
PLATELET # BLD AUTO: 154 10E3/UL (ref 150–450)
POTASSIUM SERPL-SCNC: 3 MMOL/L (ref 3.4–5.3)
RBC # BLD AUTO: 4.6 10E6/UL (ref 3.8–5.2)
RBC URINE: <1 /HPF
SODIUM SERPL-SCNC: 139 MMOL/L (ref 136–145)
SP GR UR STRIP: 1.02 (ref 1–1.03)
UROBILINOGEN UR STRIP-MCNC: NORMAL MG/DL
WBC # BLD AUTO: 6.4 10E3/UL (ref 4–11)
WBC URINE: 2 /HPF

## 2022-09-27 PROCEDURE — 36415 COLL VENOUS BLD VENIPUNCTURE: CPT

## 2022-09-27 PROCEDURE — 85027 COMPLETE CBC AUTOMATED: CPT

## 2022-09-27 PROCEDURE — 99217 PR OBSERVATION CARE DISCHARGE: CPT | Performed by: INTERNAL MEDICINE

## 2022-09-27 PROCEDURE — G0378 HOSPITAL OBSERVATION PER HR: HCPCS

## 2022-09-27 PROCEDURE — 80048 BASIC METABOLIC PNL TOTAL CA: CPT

## 2022-09-27 PROCEDURE — 250N000011 HC RX IP 250 OP 636

## 2022-09-27 PROCEDURE — G0463 HOSPITAL OUTPT CLINIC VISIT: HCPCS

## 2022-09-27 PROCEDURE — 250N000013 HC RX MED GY IP 250 OP 250 PS 637

## 2022-09-27 RX ORDER — OXYCODONE HYDROCHLORIDE 5 MG/1
5 TABLET ORAL EVERY 4 HOURS PRN
Qty: 20 TABLET | Refills: 0 | Status: ON HOLD | OUTPATIENT
Start: 2022-09-27 | End: 2023-05-11

## 2022-09-27 RX ORDER — POTASSIUM CHLORIDE 1.5 G/1.58G
20 POWDER, FOR SOLUTION ORAL ONCE
Status: COMPLETED | OUTPATIENT
Start: 2022-09-27 | End: 2022-09-27

## 2022-09-27 RX ORDER — DEXTROSE MONOHYDRATE 25 G/50ML
25-50 INJECTION, SOLUTION INTRAVENOUS
Status: DISCONTINUED | OUTPATIENT
Start: 2022-09-27 | End: 2022-09-27 | Stop reason: HOSPADM

## 2022-09-27 RX ORDER — POTASSIUM CHLORIDE 7.45 MG/ML
10 INJECTION INTRAVENOUS
Status: DISCONTINUED | OUTPATIENT
Start: 2022-09-27 | End: 2022-09-27

## 2022-09-27 RX ORDER — NICOTINE POLACRILEX 4 MG
15-30 LOZENGE BUCCAL
Status: DISCONTINUED | OUTPATIENT
Start: 2022-09-27 | End: 2022-09-27 | Stop reason: HOSPADM

## 2022-09-27 RX ORDER — ONDANSETRON 4 MG/1
4 TABLET, FILM COATED ORAL EVERY 6 HOURS PRN
Qty: 20 TABLET | Refills: 1 | Status: SHIPPED | OUTPATIENT
Start: 2022-09-27

## 2022-09-27 RX ORDER — LORAZEPAM 0.5 MG/1
0.5 TABLET ORAL 2 TIMES DAILY PRN
Status: DISCONTINUED | OUTPATIENT
Start: 2022-09-27 | End: 2022-09-27 | Stop reason: HOSPADM

## 2022-09-27 RX ADMIN — LORAZEPAM 0.5 MG: 0.5 TABLET ORAL at 08:38

## 2022-09-27 RX ADMIN — ACETAMINOPHEN 975 MG: 325 TABLET, FILM COATED ORAL at 12:07

## 2022-09-27 RX ADMIN — ONDANSETRON 4 MG: 4 TABLET, ORALLY DISINTEGRATING ORAL at 08:20

## 2022-09-27 RX ADMIN — POTASSIUM CHLORIDE 10 MEQ: 7.46 INJECTION, SOLUTION INTRAVENOUS at 08:18

## 2022-09-27 RX ADMIN — OXYCODONE HYDROCHLORIDE 5 MG: 5 TABLET ORAL at 12:07

## 2022-09-27 RX ADMIN — POTASSIUM CHLORIDE 20 MEQ: 1.5 POWDER, FOR SOLUTION ORAL at 10:25

## 2022-09-27 RX ADMIN — POTASSIUM CHLORIDE 20 MEQ: 1.5 POWDER, FOR SOLUTION ORAL at 12:07

## 2022-09-27 RX ADMIN — Medication 12.5 MG: at 08:18

## 2022-09-27 RX ADMIN — GABAPENTIN 1200 MG: 600 TABLET, FILM COATED ORAL at 08:18

## 2022-09-27 ASSESSMENT — ACTIVITIES OF DAILY LIVING (ADL)
ADLS_ACUITY_SCORE: 37

## 2022-09-27 NOTE — PROGRESS NOTES
Discharge instructions reviewed, understood, and signed by patient. VSS, PIV removed, new medications reviewed and understood, patient has all belongings. Patient left unit via wheelchair with .

## 2022-09-27 NOTE — DISCHARGE SUMMARY
[facility]  Hospitalist Discharge Summary      Date of Admission:  9/26/2022  Date of Discharge:  9/27/2022  Discharging Provider: Daniel Last MD  Discharge Service: Medicine Service, ALAN TEAM 5    Discharge Diagnoses   COVID-19 Infection  Concern for complicated urinary tract infection, ruled out  Acute on chronic neuropathic pain      Follow-ups Needed After Discharge   Follow-up Appointments     Adult [facility] Follow-up and recommended labs and tests      Follow up with primary care provider, SHAY YEPEZ, within 7 days for   hospital follow- up.    Continue care with pain clinic and your psychiatrist as previously   scheduled    Appointments on [place] and/or [facility] (with [facility] or [facility]   provider or service). Call [phone] if you haven't heard regarding   these appointments within 7 days of discharge.             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       Severe Chronic Neuropathic Pain   Acute pain flare  She has a history of chronic neuropathic pain in her bilateral feet and ankles, as well as in her rectum following her skin graft.  She follows with the pain clinic and has an intrathecal pain pump in place.  She reported significant worsening of her pain over the past 2 weeks which initially led to  concern for urinary tract infection (see below).     Pain team consulted, and they interrogated her pain pump and all seems to be working well. Given lack of evidence of UTI, suspect that inflammatory response from COVID infection is driving increase in pain. She was feeling significantly better by HD2 and was discharged home.   - Continue PTA pain medication. Will provide a small prescription for 20 tablets of 5 mg Oxycodone to help her get through this acute flare in her chronic pain as she recovers from COVID.     Confirmed COVID-19 infection       Symptom Onset 9/18/2022   Date of 1st Positive Test 9/19/2022   Vaccination Status Partially Vaccinated         As above, suspect inflammatory response is driving increase in chronic pain. She had no evidence of severe disease and had no oxygen needs. She was started on paxlovid as an outpatient and continue this on discharge.      Concern for complicated UTI, Catheter-Associated, ruled out  Neurogenic Bladder   She has a history of neurogenic bladder with chronic Hudson in place. She has her Hudson exchange every 3 weeks, with her last exchanged 2 weeks ago.  She reported  ongoing fevers, chills, nausea over the past week and a half and had been started no cefuroxime as an outpatient on 9/24.      Her initial UA was somewhat concerning for infection  (trace LE, nitrite positive,  WBCs) however this was not a clean collection. She received one dose of ceftriaxone and hudson was exchanged. Repeat UA with no evidence of infection (no LE, nitrite and only 2 WBCs so antibiotics were not continued.   - Will follow up urine culture and contact her if antibiotics should be restarted on discharge.     Paraplegia   H/O Thoracic Spine Hematoma   Neurogenic Bowel s/p Colostomy   H/O Sacral Ulcer S/P Skin Graft   She has a history of a sacral ulcer with osteomyelitis status post skin grafting. No ulcers noted during admission.     Generalized Anxiety Disorder   - PTA PRN Ativan   -  PRN Hydroxyzine        Consultations This Hospital Stay   MEDICATION HISTORY IP PHARMACY CONSULT  WOUND OSTOMY CONTINENCE NURSE  IP CONSULT  PAIN MANAGEMENT ADULT IP CONSULT    Code Status   No CPR- Do NOT Intubate    Time Spent on this Encounter   I, Daniel Last MD, personally saw the patient today and spent greater than 30 minutes discharging this patient.       Daniel Last MD  [facility] EMERGENCY DEPARTMENT  [address] [address]  Phone: [phone]  ______________________________________________________________________    Physical Exam   Vital Signs: Temp: 98.1  F (36.7  C) Temp src: Oral BP: 133/86 Pulse: 61   Resp: 9 SpO2: 98 % O2 Device: None (Room air)    Weight: 125 lbs 0 oz  Gen: Awake, alert, NAD  ENT: MMM  CV: RRR, no MRG, no LE edema  Resp: breathing comfortably, no crackles noted, non-labored  GI: Soft, NT, ND, NABS         Primary Care Physician   SHAY YEPEZ    Discharge Orders      Reason for your hospital stay    You were admitted to the hospital for worsening pain in the setting of COVID 19 infection and concern for urinary tract infection. We do not think that you have a urinary tract infection.     Activity    Your activity upon discharge: activity as tolerated, up with assistive device     Resume Home Care Services     Adult [facility] Follow-up and recommended labs and tests    Follow up with primary care provider, SHAY YEPEZ, within 7 days for hospital follow- up.    Continue care with pain clinic and your psychiatrist as previously scheduled    Appointments on [place] and/or [facility] (with [facility] or [facility] provider or service). Call [phone] if you haven't heard regarding these appointments within 7 days of discharge.     No CPR- Do NOT Intubate     Diet    Follow this diet upon discharge: Orders Placed This Encounter      Combination Diet Regular Diet Adult       Significant Results and Procedures   Results for orders placed or  performed during the hospital encounter of 09/26/22   CT Abdomen Pelvis w/o Contrast    Narrative    EXAMINATION: CT ABDOMEN PELVIS W/O CONTRAST, 9/26/2022 2:13 PM    TECHNIQUE:  Helical CT images from the lung bases through the  symphysis pubis were obtained without IV contrast.     COMPARISON: CT myelogram of the thoracic and lumbar spine dated  9/20/2018. CT abdomen pelvis dated 11/29/2017.    HISTORY: rectal pain, hx skin graft for pressure ulcer, graft appears  intact evaluate for deeper injury/infection, negative spinal xray july 2022, allergy to contrast    FINDINGS:    Abdomen and pelvis:  Scattered hypodensities throughout the liver, the majority of which  are too small to accurately characterize by CT. There are several  larger hypodensities in the right and left hepatic dome which measure  simple fluid attenuation and likely represent simple cysts, for  example on series 2, images 23 and 28. These appear similar to the  prior CT dated 11/29/2017. The gallbladder is within normal limits.  Mild dilatation of the proximal common bile duct measuring up to 9 mm  in diameter, similar to 11/20/1917. No significant intrahepatic  biliary dilatation. Generalized atrophy of the pancreatic parenchyma.  The spleen is within normal limits. Small splenule along the anterior  pole of the spleen. The adrenal glands are within normal limits. No  obstructing renal calculi or hydronephrosis in either kidney. The  urinary bladder is decompressed with a Cunningham catheter. Trace non  dependent air in bladder presumably from recent catheterization.  The  reproductive organs are within normal limits.    Small sliding hiatal hernia. No abnormally dilated loops of small or  large bowel. Postsurgical changes of left lower quadrant colostomy. No  free air in the abdomen. No portal venous gas or evidence of  pneumatosis. Normal caliber of the abdominal aorta. Mild to moderate  atherosclerotic changes of the abdominal aorta and iliac  vessels. No  abnormal lymph nodes in the abdomen or pelvis.    Lung bases: The cardiac size is normal. No pericardial effusion.  Linear scarring in the inferior lingula lobe. Trace left pleural  effusion. Fat-containing right Morgagni hernia.    Bones and soft tissues:  Spinal stimulator implantable devices within the anterior and  posterior right subcutaneous tissue. Small fat-containing inguinal  hernias. Diffuse osteopenic appearance of the bones. Postsurgical  changes of posterior spinal decompression throughout the visualized  thoracic spine through L5. Advanced degenerative changes of the hips.  Somewhat prominent soft tissue thickening about the right gluteal  cleft (series 2, image 211). No adjacent inflammatory stranding or  soft tissue gas.      Impression    IMPRESSION:   1. No acute findings in the abdomen or pelvis. Specifically, no  evidence of a deep soft tissue infection as questioned.  2.  Somewhat prominent soft tissue thickening about the right gluteal  cleft without adjacent inflammatory stranding or soft tissue gas.  3. Chronic and incidental findings as described in the report.    I have personally reviewed the examination and initial interpretation  and I agree with the findings.    KAMI SCHMID MD         SYSTEM ID:  [id number]       Discharge Medications   Current Discharge Medication List      START taking these medications    Details   oxyCODONE (ROXICODONE) 5 MG tablet Take 1 tablet (5 mg) by mouth every 4 hours as needed for moderate to severe pain  Qty: 20 tablet, Refills: 0    Associated Diagnoses: Infection due to 2019 novel coronavirus; Intractable neuropathic pain of lumbosacral origin         CONTINUE these medications which have CHANGED    Details   ondansetron (ZOFRAN) 4 MG tablet Take 1 tablet (4 mg) by mouth every 6 hours as needed for nausea  Qty: 20 tablet, Refills: 1    Associated Diagnoses: Infection due to 2019 novel coronavirus; Nausea         CONTINUE these medications  which have NOT CHANGED    Details   LORazepam (ATIVAN) 0.5 MG tablet Take 1 tablet by mouth 2 times daily as needed      acetaminophen (TYLENOL) 500 MG tablet Take 500-1,000 mg by mouth every 6 hours as needed for mild pain      baclofen (LIORESAL) 10 MG tablet Take 10 mg by mouth 3 times daily as needed for muscle spasms      COMPOUND CONTAINING CONTROLLED SUBSTANCE (CMPD RX) - PHARMACY TO MIX COMPOUNDED MEDICATION Next Refill by [facility] week of 9/15/22  Concentrations:  Dilaudid    10mg/ml                                                          Bupivacaine 20 mg/ml  Ziconitide 10 mcg/ml     Total Volume in Refill:20 mL      Basal:   Dilaudid    5.0 mg/day                                                       Bupivacaine 10.0 mg/day  Ziconitide 5.0 mcg/day     PTM to stay the same at 0.15 hydromorphone, 2 hour lockout, maximum 6 in a day.  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  This is not a prescription, and is not used to certify that the patient has a qualifying medical condition.)     Liquid oral formulation.      !! melatonin 3 MG tablet Take 3 mg by mouth      !! melatonin 3 MG tablet Take 3 mg by mouth nightly as needed       metoprolol succinate ER (TOPROL-XL) 25 MG 24 hr tablet Take 12.5 mg by mouth every evening       Multiple Vitamins-Minerals (WOMENS MULTI PO) Take 1 tablet by mouth daily      naloxone (NARCAN) 4 MG/0.1ML nasal spray Spray 1 spray (4 mg) into one nostril alternating nostrils as needed for opioid reversal every 2-3  minutes until assistance arrives  Qty: 0.2 mL, Refills: 0    Associated Diagnoses: Presence of intrathecal pump      PAXLOVID, 300/100, therapy pack TAKE 300 MG NIRMATRELVIR (TWO 150MG TABLETS) WITH 100MG RITONAVIR (ONE 100MG TABLET), ALL 3 TABLETS TAKEN TOGETHER ORALLY TWICE DAILY FOR 5      polyethylene glycol (MIRALAX) 17 GM/Dose powder Take 17 g by mouth daily as needed for constipation        !! - Potential duplicate medications found. Please discuss with provider.      STOP taking these medications       cefuroxime (CEFTIN) 500 MG tablet Comments:   Reason for Stopping:         Menthol, Topical Analgesic, (BIOFREEZE) 4 % GEL Comments:   Reason for Stopping:         nitroFURantoin macrocrystal-monohydrate (MACROBID) 100 MG capsule Comments:   Reason for Stopping:         nystatin (MYCOSTATIN) 706103 UNIT/GM external powder Comments:   Reason for Stopping:         THEANINE PO Comments:   Reason for Stopping:             Allergies   Allergies   Allergen Reactions     Contrast Dye Rash     Painful rash after x-ray contrast

## 2022-09-27 NOTE — CONSULTS
"Pain Service Consultation Note  [facility]      Patient Name: Anayeli Gagnon  MRN: [id number]   Age: 71 year old  Sex: female  Date: September 27, 2022                                      Reviewed: Yes    Referring Provider:  Cesia Barker MD                                             Referring Service:  medicine  Reason for Consultation: \"Patient with worsening chronic pain with pain pump in place - please evaluate that pain pump is still functioning and in the right location.\"    Assessment/Recommendations:  Anayeli Gagnon is a 71 year old female who has PMH of 71 year old old female with PMHx significant for mitral valve prolapse, hypertension, thoracic spinal cord injury, spinal surgery complications, neurogenic bladder, s/p intrathecal pain pump placement on 1/21/2022 by Dr. Espinoza of [facility] Pain clinic for LE neuropathic pain who was admitted on 9/26/22 for all over body weakness/pain/fever/chills.  She is Covid +, known since 9/19/22, was on Paxlovid but discontinued due to N/V.    Pain service was consulted to assist with IT pump interrogation.    Annamarie was sleeping/resting in bed at time of visit.  Easily aroused to voice.  She states pain is all over and likely due to having COVID. She states she has not been able to eat or rest for several days. At this time, she feels better.  States pain is 5/10 which is tolerable allowing her to sleep/rest.  Has oxycodone and IV Dilaudid PRN ordered.          The IT pump was interrogated on 9/27/22.       Procedure:  Pump Check     Pre-Operative Diagnosis: chronic pain & presence of intrathecal pump     Post-Operative Diagnosis: Same     Indication for IT pump: chronic pain      Findings:   Pump managed outpatient by Dr. Espinoza at [facility] Pain clinic    [employer] IT pump 20 cc    The pump is filled with:    Basal Rate:    Dilaudid    5 mg/day                                                        Bupivacaine 10 mg/day " "  Ziconitide 5mcg/day       PTM to stay the same at 0.15mg  hydromorphone, 2 hour lockout, maximum 6 boluses in a day.       Refill  Before 10/15/22  But she has an appointment for pump refill on 10/6/22 at 10 am.         PLAN:  1. Continue IT pump management as is, unchanged.  It appears functioning per pump interrogation. Pt has been using PTM boluses, most days near max of 6 per 24 hours.  2. Keep appointment for pump refill on 10/6/22 at 10 am.  3. Inpatient pain management per primary service.  4. Bowel regimen.         Thank you for the opportunity to participate in the care of Anayeli Gagnon  Pain Service will sign off.    Discussed with attending anesthesiologist  Primary Service Contacted with Recommendations? Yes    Please Page the Pain Team [facility]: \"PAIN MANAGEMENT ACUTE INPATIENT [facility]\"    Mayuri Santillan PA-C  9/27/2022      Chief Complaint:  All over body pain      History of Present Illness:  Anayeli Gagnon is a 71 year old old female who was Covid + on 9/19/22 presented on 9/26/22 with continued fever/chills/malaise.  Also ruling out UTI.    Annamarie states she has pain all over but is feeling some improvement since admitted.  Rates pain level 5/10.  Is finally able to rest and sleep.  Is having an appetite again.         Per [place]  review pulled from system on 09/27/22.   09/16/2022  2   09/16/2022  Lorazepam 0.5 MG Tablet    60.00  30 [facility]   [id number]   Cob (0337)   0/0  1.00 LME  [place]   [place]   09/12/2022  5   09/09/2022  Hydromorphone Hcl Powder    0.20  40 [facility]   [id number]   Pen (4627)   0/0  20.00 MME  Other   [place]   08/24/2022  5   08/24/2022  Hydromorphone Hcl Powder    0.20  40 [facility]   [id number]   Pen (4627)   0/0  20.00 [place]  [place]   [place]   08/18/2022  2   09/29/2021  Gabapentin 600 MG Tablet    540.00  90 [facility]   [id number]   Cob (0337)   3/3  4.02 LME  [place]are   [place]   08/04/2022  4   07/27/2022  Hydromorphone Hcl Powder    0.20  30 [facility] " History:  Past Medical History:   Diagnosis Date     CARDIAC DYSRHYTHMIAS NEC 10/3/2007    Taking atenelol for years for this     History of skin cancer      Mitral valve prolapse      Neurogenic bladder      Sacral decubitus ulcer, stage IV (H)      Thoracic spinal cord injury (H)          Family History:    Family History   Problem Relation Age of Onset     C.A.D. Father          41     Deep Vein Thrombosis (DVT) No family hx of      Anesthesia Reaction No family hx of        Social History:  Social History     Tobacco Use     Smoking status: Never Smoker     Smokeless tobacco: Never Used   Substance Use Topics     Alcohol use: No               Review of Systems:  Complete ROS reviewed. Unless otherwise noted, all other systems found to be negative.        Laboratory Results:  Recent Labs   Lab Test 22  0907 22  0613 22  1534 22  0420   INR  --   --   --  0.99     --    < > 144*   PTT  --   --   --  33   BUN  --  6.2*   < > 15    < > = values in this interval not displayed.           Allergies:  Allergies   Allergen Reactions     Contrast Dye Rash     Painful rash after x-ray contrast         Pain Medications:  Pain Medications/Prescriber: for IT pump: Dr. Espinoza    Current Pain Related Medications:  Medications related to Pain Management (From now, onward)    Start     Dose/Rate Route Frequency Ordered Stop    22 0820  LORazepam (ATIVAN) tablet 0.5 mg        Note to Pharmacy: PTA Sig:Take 1 tablet by mouth daily as needed for anxiety       0.5 mg Oral 2 TIMES DAILY PRN 22 0816      22 0720  medication given by implanted intrathecal pump          Does not apply CONTINUOUS 22 0721      22 0000  oxyCODONE (ROXICODONE) 5 MG tablet         5 mg Oral EVERY 4 HOURS PRN 22 1130      22 2239  hydrOXYzine (ATARAX) tablet 50 mg         50 mg Oral AT BEDTIME PRN 22 22322 2200  lidocaine patch in PLACE          Transdermal EVERY 8  "HOURS SCHEDULED 09/26/22 1736 09/26/22 2000  gabapentin (NEURONTIN) tablet 1,200 mg        Note to Pharmacy: PTA Sig:Take 1,200 mg by mouth 3 times daily       1,200 mg Oral 3 TIMES DAILY 09/26/22 1713 09/26/22 1713  HYDROmorphone (PF) (DILAUDID) injection 0.3 mg         0.3 mg Intravenous EVERY 3 HOURS PRN 09/26/22 1713 09/26/22 1713  polyethylene glycol (MIRALAX) Packet 17 g        Note to Pharmacy: PTA Sig:Take 17 g by mouth daily as needed for constipation       17 g Oral DAILY PRN 09/26/22 1713 09/26/22 1713  baclofen (LIORESAL) tablet 10 mg        Note to Pharmacy: PTA Sig:Take 10 mg by mouth 3 times daily as needed for muscle spasms      10 mg Oral 3 TIMES DAILY PRN 09/26/22 1713 09/26/22 1713  Lidocaine (LIDOCARE) 4 % Patch 1 patch        Note to Pharmacy: PTA Sig:Apply 1 patch topically daily as needed       1 patch  over 12 Hours Transdermal DAILY PRN 09/26/22 1713 09/26/22 1713  lidocaine 1 % 0.1-1 mL         0.1-1 mL Other EVERY 1 HOUR PRN 09/26/22 1713 09/26/22 1713  lidocaine (LMX4) cream          Topical EVERY 1 HOUR PRN 09/26/22 1713 09/26/22 1713  polyethylene glycol (MIRALAX) Packet 17 g         17 g Oral DAILY PRN 09/26/22 1713 09/26/22 1713  acetaminophen (TYLENOL) tablet 975 mg         975 mg Oral EVERY 6 HOURS PRN 09/26/22 1713 09/26/22 1713  oxyCODONE (ROXICODONE) tablet 5 mg         5 mg Oral EVERY 4 HOURS PRN 09/26/22 1713              Physical Exam:  Vitals: /86   Pulse 61   Temp 98.1  F (36.7  C) (Oral)   Resp 9   Ht 1.626 m (5' 4\")   Wt 56.7 kg (125 lb)   SpO2 98%   BMI 21.46 kg/m      Physical Exam:     CONSTITUTIONAL/GENERAL APPEARANCE:  NAD  EYES: EOMI, sclera anicteric, PERRLA  ENT/NECK: atraumatic, lips and oral mucous membranes dry  RESPIRATORY: non-labored breathing. No cough, wheeze  CV: HR within normal limits  ABDOMEN: Soft, non-tender, non-distended  MUSCULOSKELETAL/BACK/SPINE/EXTREMITIES: Moves all extremities " purposefully.  No edema or obvious joint deformities   NEURO: Alert and Oriented x3. Answers questions appropriately  SKIN/VASCULAR EXAM:  No jaundice, no visible rashes or lesions

## 2022-09-27 NOTE — ED NOTES
"Text/Page to Will night intern:  PT having anxiety & uncontrolled pain. Stating \"I feel like I'm going to die,\". All PRNs given.  "

## 2022-09-27 NOTE — CONSULTS
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    Areas Assessed:      Areas visualized during today's visit: Anterior surfaces and Posterior surfaces     Assessment of Established end Colostomy:  How long has patient had ostomy: many years   Stoma: viable, healthy and protruberant  Mucutaneous junction: intact  Peristomal skin: none   Output: watery    Is patient independent with ostomy care?: Yes  Home pouching system: two piece shiva convex 44mm fecal   Pouching issues and/or educational needs:none  Interventions completed today: pouch change assistance and supplies   Pouching system in use while hospitalized:  Shiva two piece and flat barrier ring   Supplies location: gathered and at bedside      Assessed buttock and posterior surfaces and skin is intact, small wound to R foot that is improving per pt, originally from wheelchair pedals     Treatment Plan:     LLQ Colostomy pouching plan:   Pouching system: ostomy supplies pouches: [place] 44 FECAL ([id number]) ostomy supplies barrier: [place] 44mm CONVEX ([id number])  Frequency of pouch changes: Twice weekly and PRN leakage  WOC follow up plan: weekly  Bedside RN interventions: Change pouch PRN if leaking using the supplies above, Empty pouch when 1/3 to 1/2 full, ensure to clean pouch outlet after emptying to prevent odor, Notify WOC for ongoing pouch leakage and Document stoma appearance and output volume, color, and consistency every shift        Orders: Written    RECOMMEND PRIMARY TEAM ORDER: None, at this time  Education provided: plan of  care  Discussed plan of care with: Patient  WOC nurse follow-up plan: weekly  Notify WOC if wound(s) deteriorate.  Nursing to notify the Provider(s) and re-consult the WOC Nurse if new skin concern.    DATA:     Current support surface: Standard  Atmos Air mattress  Containment of urine/stool: Continent of bladder  BMI: Body mass index is 21.46 kg/m .   Active diet order: Orders Placed This Encounter      Combination Diet Regular Diet Adult      Diet     Output: No intake/output data recorded.     Labs: Recent Labs   Lab 09/27/22  0907 09/26/22  1101   ALBUMIN  --  4.3   HGB 14.0 14.7   WBC 6.4 7.0   A1C  --  5.8*     Pressure injury risk assessment:   Sensory Perception: 2-->very limited  Moisture: 4-->rarely moist  Activity: 2-->chairfast  Mobility: 2-->very limited  Nutrition: 2-->probably inadequate  Friction and Shear: 3-->no apparent problem  Jose De Jesus Score: 15      Dept. Pager: [phone]  Dept. Office Number: [phone]

## 2022-09-27 NOTE — UTILIZATION REVIEW
Admission Status; Secondary Review Determination       Under the authority of the Utilization Management Committee, the utilization review process indicated a secondary review on the above patient. The review outcome is based on review of the medical records, discussions with staff, and applying clinical experience noted on the date of the review.     (x) Inpatient Status Appropriate - This patient's medical care is consistent with medical management for inpatient care and reasonable inpatient medical practice.     RATIONALE FOR DETERMINATION   71 year old female who has a history of thoracic spinal cord injury complicated by paraplegia, neurogenic bladder with chronic Cunningham, and colostomy, and is admitted for concern for complicated UTI and pain management.  Patient requires inpatient admission versus short stay observation or outpatient treatment for the following reasons: Very complex patient with complex history paraplegia sacral wounds, has a history of a sacral ulcer with osteomyelitis status post skin grafting/ wound care, severe pain requiring IV narcotics, IV antibiotic pending culture results    This is a conditional review for a Medicare patient, at the time of this review patient is anticipated to require at least 1 more night of hospital care; however if plan changes and discharges before 2 midnights please send for post discharge review.        This document was produced using voice recognition software       The information on this document is developed by the utilization review team in order for the business office to ensure compliance. This only denotes the appropriateness of proper admission status and does not reflect the quality of care rendered.   The definitions of Inpatient Status and Observation Status used in making the determination above are those provided in the CMS Coverage Manual, Chapter 1 and Chapter 6, section 70.4.   Sincerely,   JOANNA DHILLON MD   System Medical Director    Utilization Management   [facility].

## 2022-09-28 ENCOUNTER — MEDICAL CORRESPONDENCE (OUTPATIENT)
Dept: HEALTH INFORMATION MANAGEMENT | Facility: CLINIC | Age: 72
End: 2022-09-28

## 2022-09-28 LAB — BACTERIA UR CULT: NORMAL

## 2022-09-28 NOTE — TELEPHONE ENCOUNTER
RN pended prescription for IT pump syringe and routed to Dr. Espinoza. Once signed, writer will send to [employer] for compounding of syringe.     Camille Talavera RN

## 2022-09-29 ENCOUNTER — PATIENT OUTREACH (OUTPATIENT)
Dept: CARE COORDINATION | Facility: CLINIC | Age: 72
End: 2022-09-29

## 2022-09-29 NOTE — PROGRESS NOTES
[facility] Care Resource Center Contact  [facility]/Voicemail     Clinical Data: Transitional Care Management Outreach     Outreach attempted x 2.  Left message on patient's voicemail, providing [facility]'s 24/7 scheduling and nurse triage phone number [phone] ([phone]) for questions/concerns and/or to schedule an appt with an [facility] provider, if they do not have a PCP.      Plan:  [facility] will do no further outreaches at this time.       Deborah Villa RN  Connected Care Resource Center, [facility]    *Connected Care Resource Team does NOT follow patient ongoing. Referrals are identified based on internal discharge reports and the outreach is to ensure patient has an understanding of their discharge instructions.

## 2022-10-01 LAB — BACTERIA BLD CULT: NO GROWTH

## 2022-10-06 ENCOUNTER — MEDICAL CORRESPONDENCE (OUTPATIENT)
Dept: HEALTH INFORMATION MANAGEMENT | Facility: CLINIC | Age: 72
End: 2022-10-06

## 2022-10-09 ENCOUNTER — HEALTH MAINTENANCE LETTER (OUTPATIENT)
Age: 72
End: 2022-10-09

## 2022-10-12 ENCOUNTER — TELEPHONE (OUTPATIENT)
Dept: ANESTHESIOLOGY | Facility: CLINIC | Age: 72
End: 2022-10-12

## 2022-10-12 NOTE — TELEPHONE ENCOUNTER
[facility] Call Center    Phone Message    May a detailed message be left on voicemail: yes     Reason for Call: Other: Patient wants a stronger medication for her pain pump since the pain has become unbareable most days.      Action Taken: Other: Pain    Travel Screening: Not Applicable

## 2022-10-13 NOTE — TELEPHONE ENCOUNTER
RN spoke with patient to [facility] video visit with Dr. Espinoza for 10/20. Patient confirmed appointment date and time.    Alia Reyes RN

## 2022-10-18 ENCOUNTER — TELEPHONE (OUTPATIENT)
Dept: ANESTHESIOLOGY | Facility: CLINIC | Age: 72
End: 2022-10-18

## 2022-10-18 NOTE — TELEPHONE ENCOUNTER
[facility] Call Center    Phone Message    May a detailed message be left on voicemail: yes     Reason for Call: Medication Refill Request    Has the patient contacted the pharmacy for the refill? Yes   Name of medication being requested: COMPOUND CONTAINING CONTROLLED SUBSTANCE (CMPD RX  Provider who prescribed the medication: Dr Espinoza  Pharmacy: [facility]  Date medication is needed:10/24/2022      Action Taken: Message routed to:  [facility]): Pain    Travel Screening: Not Applicable

## 2022-10-19 NOTE — TELEPHONE ENCOUNTER
RN returned call to [facility] pharmacy and spoke with pharmacist. Writer updated that patient has an appointment with Dr. Espinoza on 10/20 and a pump syringe prescription will be sent to [facility] after the appointment. Pharmacist verbalized understanding.     Camille Talavera RN

## 2022-10-20 ENCOUNTER — VIRTUAL VISIT (OUTPATIENT)
Dept: ANESTHESIOLOGY | Facility: CLINIC | Age: 72
End: 2022-10-20
Attending: ANESTHESIOLOGY
Payer: MEDICARE

## 2022-10-20 DIAGNOSIS — M79.2 NEUROPATHIC PAIN: Primary | ICD-10-CM

## 2022-10-20 PROCEDURE — 99214 OFFICE O/P EST MOD 30 MIN: CPT | Mod: 95 | Performed by: ANESTHESIOLOGY

## 2022-10-20 ASSESSMENT — ENCOUNTER SYMPTOMS
FEVER: 0
BLURRED VISION: 0
CHILLS: 0
WEIGHT LOSS: 0
PALPITATIONS: 0

## 2022-10-20 NOTE — PROGRESS NOTES
Type of service:  Video Visit    Video Start Time: 9:30    Video End Time:10:00 AM    Originating Location (pt. Location): Home    Distant Location (provider location):  [facility] [place]     Platform used for Video Visit: [facility]     Pain clinic follow up note    HPI:   Anayeli Gagnon is a 71 year old year old female  who presents to the pain clinic via video visit for follow up and ITP adjustment.    Patient Supplied Answers To the [facility] Pain Questionnaire  [facility] Pain -  Patient Entered Questionnaire/Answers 7/31/2022   What number best describes your pain right now:  0 = No pain  to  10 = Worst pain imaginable 5   How would you describe the pain? burning, sharp, numbness, dull, aching, throbbing, pressure   Which of the following worsen your pain? -   Which of the following improve or reduce your pain? medication   What number best describes your average pain for the past week:  0 = No pain  to  10 = Worst pain imaginable 7   What number best describes your LOWEST pain in past 24 hours:  0 = No pain  to  10 = Worst pain imaginable -   What number best describes your WORST pain in past 24 hours:  0 = No pain  to  10 = Worst pain imaginable 7   When is your pain worst? PM   What non-medicine treatments have you already had for your pain? TENS (electrical stimulator), none   Have you tried treating your pain with medication?  -   Are you currently taking medications for your pain? -         No images are attached to the encounter.    Pain today is increased from her baseline. Her pain is now hovering around a 7/10 during the day and the best it gets is a 3/10 after a bolus. The bolus is only providing around 1hr of relief compared to the 2 it used to offer and she is using all of her available bolus'. She is looking to increase the dose so that it provides more relief and for longer. She is scheduled for a refill on 10/27. Today she is in a good mood as she is  going with her son to visit a friend in [place] for lunch.    She denies any acute changes in her B/B, strength.    No new imaging reviewed with the patient:    ROS:  Review of Systems   Constitutional: Negative for chills, fever, malaise/fatigue and weight loss.   Eyes: Negative for blurred vision.   Cardiovascular: Negative for chest pain and palpitations.   Skin: Negative for rash.         Significant Medical History:   Past Medical History:   Diagnosis Date     CARDIAC DYSRHYTHMIAS NEC 10/3/2007    Taking atenelol for years for this     History of skin cancer      Mitral valve prolapse      Neurogenic bladder      Sacral decubitus ulcer, stage IV (H)      Thoracic spinal cord injury (H)           Past Surgical History:  Past Surgical History:   Procedure Laterality Date     DECOMPRESSION LUMBAR ONE LEVEL N/A 2019    Procedure: Posterior spinal decompression;  Surgeon: Alf Ritchie MD;  Location: [facility] OR     INSERT INTRATHECAL PAIN PUMP N/A 2022    Procedure: INSERTION, INTRATHECAL ANALGESIC PUMP, externalized trial;  Surgeon: Luis Orourke MD;  Location: [facility] OR     INSERT INTRATHECAL PAIN PUMP Right 2022    Procedure: INSERTION, INTRATHECAL ANALGESIC PUMP;  Surgeon: Luis Orourke MD;  Location: [facility] OR     INSERT STIMULATOR AND LEADS INTERNAL DORSAL COLUMN N/A 2021    Procedure: Posterior thoracic 12 to Lumbar 1 level for placement of spinal cord stimulator paddle and placement of implantable pulse generator/battery over right buttock;  Surgeon: Luis Orourke MD;  Location: [facility] OR     IR SPINAL ANGIOGRAM  10/16/2019     IR SPINAL ANGIOGRAM  2019     LAPAROSCOPIC TUBAL LIGATION       REPAIR SPINAL ARERIOVENOUS MALFORMATION N/A 2019    Procedure: with surgical disconnection arterial venous fistula Thoracic 5;  Surgeon: Alf Ritchie MD;  Location: [facility] OR          Family History:  Family History   Problem Relation Age of Onset     C.A.D. Father          41      Deep Vein Thrombosis (DVT) No family hx of      Anesthesia Reaction No family hx of           Social History:  Social History     Socioeconomic History     Marital status:      Spouse name: Not on file     Number of children: Not on file     Years of education: Not on file     Highest education level: Not on file   Occupational History     Occupation: NA     Employer: [employer]   Tobacco Use     Smoking status: Never     Smokeless tobacco: Never   Substance and Sexual Activity     Alcohol use: No     Drug use: No     Sexual activity: Yes     Partners: Male   Other Topics Concern     Parent/sibling w/ CABG, MI or angioplasty before 65F 55M? Not Asked   Social History Narrative    Lives with spouse - works in [employer].     Social Determinants of Health     Financial Resource Strain: Not on file   Food Insecurity: Not on file   Transportation Needs: Not on file   Physical Activity: Not on file   Stress: Not on file   Social Connections: Not on file   Intimate Partner Violence: Not on file   Housing Stability: Not on file     Social History     Social History Narrative    Lives with spouse - works in [employer].          Allergies:  Allergies   Allergen Reactions     Contrast Dye Rash     Painful rash after x-ray contrast       Current Medications:   Current Outpatient Medications   Medication Sig Dispense Refill     acetaminophen (TYLENOL) 500 MG tablet Take 500-1,000 mg by mouth every 6 hours as needed for mild pain       baclofen (LIORESAL) 10 MG tablet Take 10 mg by mouth 3 times daily as needed for muscle spasms       COMPOUND CONTAINING CONTROLLED SUBSTANCE (CMPD RX) - PHARMACY TO MIX COMPOUNDED MEDICATION Next Refill by [facility] week of 9/15/22  Concentrations:  Dilaudid    10mg/ml                                                          Bupivacaine 20 mg/ml  Ziconitide 10 mcg/ml     Total Volume in Refill:20 mL      Basal:   Dilaudid    5.0  mg/day                                                       Bupivacaine 10.0 mg/day  Ziconitide 5.0 mcg/day     PTM to stay the same at 0.15 hydromorphone, 2 hour lockout, maximum 6 in a day. 20 mL 0     gabapentin (NEURONTIN) 600 MG tablet Take 1,200 mg by mouth 3 times daily  1 tablet 0     lidocaine (LIDODERM) 5 % patch Apply 1 patch topically daily as needed        LORazepam (ATIVAN) 0.5 MG tablet Take 1 tablet by mouth 2 times daily as needed       medical cannabis (Patient's own supply) See Admin Instructions (The purpose of this order is to document that the patient reports taking medical cannabis.  This is not a prescription, and is not used to certify that the patient has a qualifying medical condition.)     Liquid oral formulation. (Patient not taking: Reported on 7/20/2022)       melatonin 3 MG tablet Take 3 mg by mouth       melatonin 3 MG tablet Take 3 mg by mouth nightly as needed        metoprolol succinate ER (TOPROL-XL) 25 MG 24 hr tablet Take 12.5 mg by mouth every evening        Multiple Vitamins-Minerals (WOMENS MULTI PO) Take 1 tablet by mouth daily       naloxone (NARCAN) 4 MG/0.1ML nasal spray Spray 1 spray (4 mg) into one nostril alternating nostrils as needed for opioid reversal every 2-3 minutes until assistance arrives 0.2 mL 0     ondansetron (ZOFRAN) 4 MG tablet Take 1 tablet (4 mg) by mouth every 6 hours as needed for nausea 20 tablet 1     oxyCODONE (ROXICODONE) 5 MG tablet Take 1 tablet (5 mg) by mouth every 4 hours as needed for moderate to severe pain 20 tablet 0     PAXLOVID, 300/100, therapy pack TAKE 300 MG NIRMATRELVIR (TWO 150MG TABLETS) WITH 100MG RITONAVIR (ONE 100MG TABLET), ALL 3 TABLETS TAKEN TOGETHER ORALLY TWICE DAILY FOR 5       polyethylene glycol (MIRALAX) 17 GM/Dose powder Take 17 g by mouth daily as needed for constipation                Current Pain Medications:  ITP  Tylenol  Baclofen  Gabapentin      Physical Exam:     There were no vitals taken for this  visit.    General Appearance: No distress, seated comfortably  Mood: Euthymic  HE ENT: Non constricted pupils  Respiratory: Non labored breathing  Skin: No appreciable rashes over exposed skin  MS: Normal muscle bulk  Neuro: Cranial nerves 2-12 intact  Gait: Appeared in motorized wheelchair in video visit      ASSESSMENT AND PLAN:     Encounter Diagnosis:  Thoracic spinal cord injury  Incomplete paraplegia  Neuropathic pain  OA  Hx of deep tissue injuries       Anayeli Gagnon is a 71 year old year old female  who presents to the pain clinic via video visit for follow up and ITP adjustment. We discussed that a pump adjustment is in order given the escalation of her pain so we will put in the orders to be adjusted at her next refill on 10/25. She has no new complaints or concerns otherwise and will follow up after her refill as needed.    RECOMMENDATIONS:     1. Medications: Pump adjustments will be ordered to be applied during refill on 10/25. Please refer to Dr. Espinoza's note for specific dose adjustments    2. Procedure: No procedures at this time    3. Education: Patient understands and agrees with plan.    Follow up: As needed if pain character or intensity change significantly

## 2022-10-20 NOTE — PROGRESS NOTES
Annamarie is a 71 year old who is being evaluated via a billable video visit.      How would you like to obtain your AVS? MyChart  If the video visit is dropped, the invitation should be resent by: Send to e-mail at: [email or web address]  Will anyone else be joining your video visit? No                Distant Location (provider location):  On-site    Platform used for Video Visit: [employer]

## 2022-10-20 NOTE — LETTER
10/20/2022       RE: Anayeli Gagnon  [address] [facility] [address]     Dear Colleague,    Thank you for referring your patient, [facility]eli [facility]agnon, [facility]. Please see a copy of my visit note below.    ATTENDING ATTESTATION  I saw the patient along with the pain medicine fellow Dr. Alden Blackwell. Please see his note above for full details. I was involved in gathering history, physical examination and development of the plan of care.     ASSESSMENT AND PLAN:     Encounter Diagnosis:  Laminectomies from T3-T12  Spinal subdural hematoma  Paraplegia  Neuropathic pain   Paddle SCS insitu  Opioid dependence  IDDS insitu      Anayeli Gagnon is a 71 year old y.o. old female who presents to the pain clinic with chronic neuropathic pain from spinal cord injury    RECOMMENDATIONS:     1. Medications: We are prescribing the patient IDDS dose increase with [facility] at her upcoming appointment with [facility] next week. The patient is agreeable to wait for a few days to have dose increased with the refill. Dosing, side effects, risks/benefits/alternatives were discussed with the patient in detail.    Current medications   Dilaudid    10mg/ml                                                           Bupivacaine 20 mg/ml   Ziconitide 10 mcg/ml       Total Volume in Refill:20 mL        Basal:    Dilaudid    5 mg/day                                                        Bupivacaine 10 mg/day   Ziconitide 5 mcg/day     New dose:     Dilaudid    10mg/ml                                                           Bupivacaine 20 mg/ml   Ziconitide 10 mcg/ml       Total Volume in Refill:20 mL        Basal:    Dilaudid    6 mg/day                                                        Bupivacaine 12 mg/day   Ziconitide 6 mcg/day     PTM 0.2 hydromorphone, 2 hour lockout, maximum 6/day.     Follow up: video 4 weeks.       Pain  clinic follow up note    HPI:   Anayeli Gagnon is a 71 year old year old female  who presents to the pain clinic via video visit for follow up and ITP adjustment.    Patient Supplied Answers To the [facility] Pain Questionnaire  [facility] Pain -  Patient Entered Questionnaire/Answers 7/31/2022   What number best describes your pain right now:  0 = No pain  to  10 = Worst pain imaginable 5   How would you describe the pain? burning, sharp, numbness, dull, aching, throbbing, pressure   Which of the following worsen your pain? -   Which of the following improve or reduce your pain? medication   What number best describes your average pain for the past week:  0 = No pain  to  10 = Worst pain imaginable 7   What number best describes your LOWEST pain in past 24 hours:  0 = No pain  to  10 = Worst pain imaginable -   What number best describes your WORST pain in past 24 hours:  0 = No pain  to  10 = Worst pain imaginable 7   When is your pain worst? PM   What non-medicine treatments have you already had for your pain? TENS (electrical stimulator), none   Have you tried treating your pain with medication?  -   Are you currently taking medications for your pain? -         No images are attached to the encounter.    Pain today is increased from her baseline. Her pain is now hovering around a 7/10 during the day and the best it gets is a 3/10 after a bolus. The bolus is only providing around 1hr of relief compared to the 2 it used to offer and she is using all of her available bolus'. She is looking to increase the dose so that it provides more relief and for longer. She is scheduled for a refill on 10/27. Today she is in a good mood as she is going with her son to visit a friend in [place] for lunch.    She denies any acute changes in her B/B, strength.    No new imaging reviewed with the patient:    ROS:  Review of Systems   Constitutional: Negative for chills, fever, malaise/fatigue and weight loss.   Eyes: Negative for blurred  vision.   Cardiovascular: Negative for chest pain and palpitations.   Skin: Negative for rash.         Significant Medical History:   Past Medical History:   Diagnosis Date     CARDIAC DYSRHYTHMIAS NEC 10/3/2007    Taking atenelol for years for this     History of skin cancer      Mitral valve prolapse      Neurogenic bladder      Sacral decubitus ulcer, stage IV (H)      Thoracic spinal cord injury (H)           Past Surgical History:  Past Surgical History:   Procedure Laterality Date     DECOMPRESSION LUMBAR ONE LEVEL N/A 2019    Procedure: Posterior spinal decompression;  Surgeon: Alf Ritchie MD;  Location: [facility] OR     INSERT INTRATHECAL PAIN PUMP N/A 2022    Procedure: INSERTION, INTRATHECAL ANALGESIC PUMP, externalized trial;  Surgeon: Lusi Orourke MD;  Location: [facility] OR     INSERT INTRATHECAL PAIN PUMP Right 2022    Procedure: INSERTION, INTRATHECAL ANALGESIC PUMP;  Surgeon: Luis Orourke MD;  Location: [facility] OR     INSERT STIMULATOR AND LEADS INTERNAL DORSAL COLUMN N/A 2021    Procedure: Posterior thoracic 12 to Lumbar 1 level for placement of spinal cord stimulator paddle and placement of implantable pulse generator/battery over right buttock;  Surgeon: Luis Orourke MD;  Location: [facility] OR     IR SPINAL ANGIOGRAM  10/16/2019     IR SPINAL ANGIOGRAM  2019     LAPAROSCOPIC TUBAL LIGATION       REPAIR SPINAL ARERIOVENOUS MALFORMATION N/A 2019    Procedure: with surgical disconnection arterial venous fistula Thoracic 5;  Surgeon: Alf Ritchie MD;  Location: [facility] OR          Family History:  Family History   Problem Relation Age of Onset     C.A.D. Father          41     Deep Vein Thrombosis (DVT) No family hx of      Anesthesia Reaction No family hx of           Social History:  Social History     Socioeconomic History     Marital status:      Spouse name: Not on file     Number of children: Not on file     Years of education: Not on file      Highest education level: Not on file   Occupational History     Occupation: NA     Employer: [employer]   Tobacco Use     Smoking status: Never     Smokeless tobacco: Never   Substance and Sexual Activity     Alcohol use: No     Drug use: No     Sexual activity: Yes     Partners: Male   Other Topics Concern     Parent/sibling w/ CABG, MI or angioplasty before 65F 55M? Not Asked   Social History Narrative    Lives with spouse - works in [place].     Social Determinants of Health     Financial Resource Strain: Not on file   Food Insecurity: Not on file   Transportation Needs: Not on file   Physical Activity: Not on file   Stress: Not on file   Social Connections: Not on file   Intimate Partner Violence: Not on file   Housing Stability: Not on file     Social History     Social History Narrative    Lives with spouse - works in [place].          Allergies:  Allergies   Allergen Reactions     Contrast Dye Rash     Painful rash after x-ray contrast       Current Medications:   Current Outpatient Medications   Medication Sig Dispense Refill     acetaminophen (TYLENOL) 500 MG tablet Take 500-1,000 mg by mouth every 6 hours as needed for mild pain       baclofen (LIORESAL) 10 MG tablet Take 10 mg by mouth 3 times daily as needed for muscle spasms       COMPOUND CONTAINING CONTROLLED SUBSTANCE (CMPD RX) - PHARMACY TO MIX COMPOUNDED MEDICATION Next Refill by [facility] week of 9/15/22  Concentrations:  Dilaudid    10mg/ml                                                          Bupivacaine 20 mg/ml  Ziconitide 10 mcg/ml     Total Volume in Refill:20 mL      Basal:   Dilaudid    5.0 mg/day                                                       Bupivacaine 10.0 mg/day  Ziconitide 5.0 mcg/day     PTM to stay the same at 0.15 hydromorphone, 2 hour lockout, maximum 6 in a day. 20 mL 0     gabapentin (NEURONTIN) 600 MG tablet Take 1,200 mg by mouth 3 times daily  1 tablet 0     lidocaine (LIDODERM) 5 % patch Apply 1 patch topically  daily as needed        LORazepam (ATIVAN) 0.5 MG tablet Take 1 tablet by mouth 2 times daily as needed       medical cannabis (Patient's own supply) See Admin Instructions (The purpose of this order is to document that the patient reports taking medical cannabis.  This is not a prescription, and is not used to certify that the patient has a qualifying medical condition.)     Liquid oral formulation. (Patient not taking: Reported on 7/20/2022)       melatonin 3 MG tablet Take 3 mg by mouth       melatonin 3 MG tablet Take 3 mg by mouth nightly as needed        metoprolol succinate ER (TOPROL-XL) 25 MG 24 hr tablet Take 12.5 mg by mouth every evening        Multiple Vitamins-Minerals (WOMENS MULTI PO) Take 1 tablet by mouth daily       naloxone (NARCAN) 4 MG/0.1ML nasal spray Spray 1 spray (4 mg) into one nostril alternating nostrils as needed for opioid reversal every 2-3 minutes until assistance arrives 0.2 mL 0     ondansetron (ZOFRAN) 4 MG tablet Take 1 tablet (4 mg) by mouth every 6 hours as needed for nausea 20 tablet 1     oxyCODONE (ROXICODONE) 5 MG tablet Take 1 tablet (5 mg) by mouth every 4 hours as needed for moderate to severe pain 20 tablet 0     PAXLOVID, 300/100, therapy pack TAKE 300 MG NIRMATRELVIR (TWO 150MG TABLETS) WITH 100MG RITONAVIR (ONE 100MG TABLET), ALL 3 TABLETS TAKEN TOGETHER ORALLY TWICE DAILY FOR 5       polyethylene glycol (MIRALAX) 17 GM/Dose powder Take 17 g by mouth daily as needed for constipation                Current Pain Medications:  ITP  Tylenol  Baclofen  Gabapentin      Physical Exam:     There were no vitals taken for this visit.    General Appearance: No distress, seated comfortably  Mood: Euthymic  HE ENT: Non constricted pupils  Respiratory: Non labored breathing  Skin: No appreciable rashes over exposed skin  MS: Normal muscle bulk  Neuro: Cranial nerves 2-12 intact  Gait: Appeared in motorized wheelchair in video visit      ASSESSMENT AND PLAN:     Encounter  Diagnosis:  Thoracic spinal cord injury  Incomplete paraplegia  Neuropathic pain  OA  Hx of deep tissue injuries       Anayeli Gagnon is a 71 year old year old female  who presents to the pain clinic via video visit for follow up and ITP adjustment. We discussed that a pump adjustment is in order given the escalation of her pain so we will put in the orders to be adjusted at her next refill on 10/25. She has no new complaints or concerns otherwise and will follow up after her refill as needed.    RECOMMENDATIONS:     1. Medications: Pump adjustments will be ordered to be applied during refill on 10/25. Please refer to Dr. Espinoza's note for specific dose adjustments    2. Procedure: No procedures at this time    3. Education: Patient understands and agrees with plan.    Follow up: As needed if pain character or intensity change significantly           Sincerely,    Jasmine Espinoza MD

## 2022-10-20 NOTE — PROGRESS NOTES
ATTENDING ATTESTATION  I saw the patient along with the pain medicine fellow Dr. Alden Blackwell. Please see his note above for full details. I was involved in gathering history, physical examination and development of the plan of care.     ASSESSMENT AND PLAN:     Encounter Diagnosis:  Laminectomies from T3-T12  Spinal subdural hematoma  Paraplegia  Neuropathic pain   Paddle SCS insitu  Opioid dependence  IDDS insitu      Anayeli Gagnon is a 71 year old y.o. old female who presents to the pain clinic with chronic neuropathic pain from spinal cord injury    RECOMMENDATIONS:     1. Medications: We are prescribing the patient IDDS dose increase with [facility] at her upcoming appointment with [facility] next week. The patient is agreeable to wait for a few days to have dose increased with the refill. Dosing, side effects, risks/benefits/alternatives were discussed with the patient in detail.    Current medications   Dilaudid    10mg/ml                                                           Bupivacaine 20 mg/ml   Ziconitide 10 mcg/ml       Total Volume in Refill:20 mL        Basal:    Dilaudid    5 mg/day                                                        Bupivacaine 10 mg/day   Ziconitide 5 mcg/day     New dose:     Dilaudid    10mg/ml                                                           Bupivacaine 20 mg/ml   Ziconitide 10 mcg/ml       Total Volume in Refill:20 mL        Basal:    Dilaudid    6 mg/day                                                        Bupivacaine 12 mg/day   Ziconitide 6 mcg/day     PTM 0.2 hydromorphone, 2 hour lockout, maximum 6/day.     Follow up: video 4 weeks.

## 2022-10-25 ENCOUNTER — TELEPHONE (OUTPATIENT)
Dept: ANESTHESIOLOGY | Facility: CLINIC | Age: 72
End: 2022-10-25

## 2022-10-25 DIAGNOSIS — M79.2 INTRACTABLE NEUROPATHIC PAIN OF LUMBOSACRAL ORIGIN: ICD-10-CM

## 2022-10-25 NOTE — TELEPHONE ENCOUNTER
RN received incoming call from nurse Chan with [facility]. Rocio wanted to clarify the orders received for Anayeli's refill on Thurs. Writer clarified the orders are correct as followed:    New dose:      Dilaudid    10mg/ml                                                           Bupivacaine 20 mg/ml   Ziconitide 10 mcg/ml       Total Volume in Refill:20 mL        Basal:      Dilaudid    6 mg/day                                     Bupivacaine 12 mg/day   Ziconitide 6 mcg/day      PTM 0.2 hydromorphone, 2 hour lockout, maximum 6/day.       Camille Talavera RN

## 2022-11-07 ENCOUNTER — TELEPHONE (OUTPATIENT)
Dept: ANESTHESIOLOGY | Facility: CLINIC | Age: 72
End: 2022-11-07

## 2022-11-07 ENCOUNTER — MEDICAL CORRESPONDENCE (OUTPATIENT)
Dept: HEALTH INFORMATION MANAGEMENT | Facility: CLINIC | Age: 72
End: 2022-11-07

## 2022-11-07 NOTE — TELEPHONE ENCOUNTER
[facility] Call Center    Phone Message    May a detailed message be left on voicemail: yes     Reason for Call: Medication Refill Request    Has the patient contacted the pharmacy for the refill? Yes   Name of medication being requested: COMPOUND CONTAINING CONTROLLED SUBSTANCE (CMPD RX) - PHARMACY TO MIX COMPOUNDED MEDICATION  Dilaudid    10mg/ml                                            Bupivacaine 20 mg/ml   Ziconitide 10 mcg/ml     Provider who prescribed the medication: Dr Espinoza  Pharmacy: [id number]  Date medication is needed: 11/11/2022       Action Taken: Message routed to:  [facility] Pain    Travel Screening: Not Applicable

## 2022-11-08 NOTE — TELEPHONE ENCOUNTER
RN sent over signed prescription via [facility] portal and [facility] nurse notified.    Alia Reyes RNCC

## 2022-11-17 ENCOUNTER — MEDICAL CORRESPONDENCE (OUTPATIENT)
Dept: HEALTH INFORMATION MANAGEMENT | Facility: CLINIC | Age: 72
End: 2022-11-17

## 2022-11-18 ENCOUNTER — DOCUMENTATION ONLY (OUTPATIENT)
Dept: ANESTHESIOLOGY | Facility: CLINIC | Age: 72
End: 2022-11-18

## 2022-11-18 NOTE — PROGRESS NOTES
Scanned nursing [email or web address] to [facility] record    Edgar Winston, EMT

## 2022-11-28 ENCOUNTER — MEDICAL CORRESPONDENCE (OUTPATIENT)
Dept: HEALTH INFORMATION MANAGEMENT | Facility: CLINIC | Age: 72
End: 2022-11-28

## 2022-11-28 ENCOUNTER — TELEPHONE (OUTPATIENT)
Dept: ANESTHESIOLOGY | Facility: CLINIC | Age: 72
End: 2022-11-28

## 2022-11-28 NOTE — PROGRESS NOTES
Concentrations:  Dilaudid    10mg/ml                                                          Bupivacaine 20 mg/ml  Ziconitide 10 mcg/ml     Total Volume in Refill:20 mL      Basal:   Dilaudid    6 mg/day                                                       Bupivacaine 12 mg/day  Ziconitide 6 mcg/day     PTM 0.2 hydromorphone, 2 hour lockout, maximum 6 in a day.    Concentrations:  Dilaudid    14 mg/ml                                                          Bupivacaine 28 mg/ml  Ziconitide 14 mcg/ml     Total Volume in Refill: 20 mL      Basal:   Dilaudid    7 mg/day                                                       Bupivacaine 14 mg/day  Ziconitide 7 mcg/day     PTM 0.2 hydromorphone, 2 hour lockout, maximum 6 in a day.    Maximum 24 hour dose  Hydromorphone 8.16 mg/day  Bupivacaine 16.3 mg/day  Ziconitide 8.16 mcg/day

## 2022-11-28 NOTE — TELEPHONE ENCOUNTER
RN spoke with patient to get an update on pain management with intrathecal pump. Patient reports pain is decent throughout the day, but pain increases to an 8/10 in the evenings. Patient reports pain bilateral in feet, ankles, and groin area. Patient reports using all PTM doses daily. [facility] visit scheduled on 12/8 for refill. Writer will route patient update to Dr. Espinoza for recommendations.    Alia Reyes RNCC

## 2022-12-08 ENCOUNTER — MEDICAL CORRESPONDENCE (OUTPATIENT)
Dept: HEALTH INFORMATION MANAGEMENT | Facility: CLINIC | Age: 72
End: 2022-12-08

## 2022-12-08 ENCOUNTER — TRANSFERRED RECORDS (OUTPATIENT)
Dept: HEALTH INFORMATION MANAGEMENT | Facility: CLINIC | Age: 72
End: 2022-12-08

## 2022-12-12 ENCOUNTER — DOCUMENTATION ONLY (OUTPATIENT)
Dept: ANESTHESIOLOGY | Facility: CLINIC | Age: 72
End: 2022-12-12

## 2022-12-12 ENCOUNTER — TELEPHONE (OUTPATIENT)
Dept: ANESTHESIOLOGY | Facility: CLINIC | Age: 72
End: 2022-12-12

## 2022-12-12 NOTE — TELEPHONE ENCOUNTER
RN spoke with patient to schedule a follow up appointment. Writer asked patient how her pain was being managed after her last dose adjustment on 12/8. Patient reports her current pain 3/10. Patient reports she continues to use all PTM doses per day. Patient has max 6 boluses per day. Patient reports pain 8/10 in the evenings. Patient wants to continue with current dose at this time. Writer advised patient to contact clinic if pain gets worse or pain is no longer being managed with current dose. Patient understood and agreed. Writer scheduled patient for a 3 month follow up visit with Dr. Espinoza on 3/6/23.     Alia Reyes RNCC

## 2022-12-12 NOTE — PROGRESS NOTES
Received nursing note and telemetry from [facility]. Faxed to scanning for pt record.    Edgar Winston EMT

## 2022-12-22 ENCOUNTER — DOCUMENTATION ONLY (OUTPATIENT)
Dept: ANESTHESIOLOGY | Facility: CLINIC | Age: 72
End: 2022-12-22

## 2022-12-22 ENCOUNTER — MEDICAL CORRESPONDENCE (OUTPATIENT)
Dept: HEALTH INFORMATION MANAGEMENT | Facility: CLINIC | Age: 72
End: 2022-12-22

## 2022-12-22 NOTE — PROGRESS NOTES
RN pended prescription for intrathecal pump medications for [facility]. Writer notified and sent prescription to Dr. Espinoza for signature. Writer sent signed prescription to [facility] via the portal and notified [facility] nurse.    Alia Reyes RNCC

## 2023-01-10 ENCOUNTER — TELEPHONE (OUTPATIENT)
Dept: ANESTHESIOLOGY | Facility: CLINIC | Age: 73
End: 2023-01-10

## 2023-01-10 ENCOUNTER — MEDICAL CORRESPONDENCE (OUTPATIENT)
Dept: HEALTH INFORMATION MANAGEMENT | Facility: CLINIC | Age: 73
End: 2023-01-10
Payer: MEDICARE

## 2023-01-10 NOTE — TELEPHONE ENCOUNTER
RN spoke with patient to get an update on pain management from intrathecal pump. Patient reports good pain management and is comfortable with current dose. Writer will pend prescription and send to provider for signature with no adjustments. Writer reminded patient of follow up appointment on 3/6/23 and instructed patient to contact clinic with any concerns in between visits. Patient understood and agreed.    Alia Reyes RNCC

## 2023-01-24 ENCOUNTER — MEDICAL CORRESPONDENCE (OUTPATIENT)
Dept: HEALTH INFORMATION MANAGEMENT | Facility: CLINIC | Age: 73
End: 2023-01-24
Payer: MEDICARE

## 2023-01-25 ENCOUNTER — MEDICAL CORRESPONDENCE (OUTPATIENT)
Dept: HEALTH INFORMATION MANAGEMENT | Facility: CLINIC | Age: 73
End: 2023-01-25
Payer: MEDICARE

## 2023-01-30 ENCOUNTER — DOCUMENTATION ONLY (OUTPATIENT)
Dept: ANESTHESIOLOGY | Facility: CLINIC | Age: 73
End: 2023-01-30
Payer: MEDICARE

## 2023-01-30 NOTE — PROGRESS NOTES
Received nursing note and telemetry from [employer]. Scanned to patient records.    Edgar Winston, EMT

## 2023-02-06 ENCOUNTER — TELEPHONE (OUTPATIENT)
Dept: ANESTHESIOLOGY | Facility: CLINIC | Age: 73
End: 2023-02-06
Payer: MEDICARE

## 2023-02-06 NOTE — TELEPHONE ENCOUNTER
RN received call from ZACHARIAH Mcneal, nurse with [facility]. Elizabet will be seeing Annamarie for a pump refill on 2/15/23 and will need a prescription sent by Thurs 2/9/23. Writer verbalized understanding and will update RNCC and Dr. Espinoza of prescription.       Camille Talavera RN

## 2023-02-15 ENCOUNTER — TELEPHONE (OUTPATIENT)
Dept: ANESTHESIOLOGY | Facility: CLINIC | Age: 73
End: 2023-02-15
Payer: MEDICARE

## 2023-02-15 ENCOUNTER — MEDICAL CORRESPONDENCE (OUTPATIENT)
Dept: HEALTH INFORMATION MANAGEMENT | Facility: CLINIC | Age: 73
End: 2023-02-15
Payer: MEDICARE

## 2023-02-15 NOTE — TELEPHONE ENCOUNTER
RN called patient to follow up after update received from [facility] nurse. Writer offered patient to follow up with provider on Monday 2/20, but patient refused and wants to keep her appointment on 3/6. Writer asked how many PTM boluses she was using per day and patient reports she is using all of them. Patient states she wants to wait until follow up appointment to make any adjustments to her pump. Writer advised patient to contact the clinic if she has any questions or concerns prior to her follow up. Patient understood and agreed.    Alia Reyes RNCC

## 2023-02-18 ENCOUNTER — HEALTH MAINTENANCE LETTER (OUTPATIENT)
Age: 73
End: 2023-02-18

## 2023-02-27 ENCOUNTER — MEDICAL CORRESPONDENCE (OUTPATIENT)
Dept: HEALTH INFORMATION MANAGEMENT | Facility: CLINIC | Age: 73
End: 2023-02-27
Payer: MEDICARE

## 2023-03-06 ENCOUNTER — VIRTUAL VISIT (OUTPATIENT)
Dept: ANESTHESIOLOGY | Facility: CLINIC | Age: 73
End: 2023-03-06
Payer: MEDICARE

## 2023-03-06 DIAGNOSIS — M79.2 NEUROPATHIC PAIN: Primary | ICD-10-CM

## 2023-03-06 PROCEDURE — 99214 OFFICE O/P EST MOD 30 MIN: CPT | Mod: VID | Performed by: ANESTHESIOLOGY

## 2023-03-06 RX ORDER — BACLOFEN 10 MG/1
10 TABLET ORAL 3 TIMES DAILY
Qty: 180 TABLET | Refills: 0 | Status: ON HOLD | OUTPATIENT
Start: 2023-03-06 | End: 2023-05-07

## 2023-03-06 ASSESSMENT — ENCOUNTER SYMPTOMS
PALPITATIONS: 0
CHILLS: 0
WEIGHT LOSS: 0
FEVER: 0
BLURRED VISION: 0

## 2023-03-06 ASSESSMENT — PAIN SCALES - GENERAL: PAINLEVEL: MODERATE PAIN (5)

## 2023-03-06 NOTE — NURSING NOTE
Patient presents with:  Follow Up: Pain management      Moderate Pain (5)     Pain Medications     Analgesics Other Refills Start End     acetaminophen (TYLENOL) 500 MG tablet          Sig - Route: Take 500-1,000 mg by mouth every 6 hours as needed for mild pain - Oral    Class: Historical    Opioid Agonists Refills Start End     oxyCODONE (ROXICODONE) 5 MG tablet    0 9/27/2022     Sig - Route: Take 1 tablet (5 mg) by mouth every 4 hours as needed for moderate to severe pain - Oral    Class: E-Prescribe    Earliest Fill Date: 9/27/2022          What medications are you using for pain? Pump meds, Tylenol    (Return Patients only) What refills are you needing today? Pump refill Keli Winston, EMT

## 2023-03-06 NOTE — NURSING NOTE
Annamarie is a 72 year old who is being evaluated via a billable video visit.      How would you like to obtain your AVS? Mail a copy  If the video visit is dropped, the invitation should be resent by: Text to cell phone: [phone]  Will anyone else be joining your video visit? Maria Eugenia Winston, EMT

## 2023-03-06 NOTE — PATIENT INSTRUCTIONS
Medications:    baclofen (LIORESAL) 10 MG tablet -  Take 1 tablet (10 mg) by mouth 3 times daily      Please provide the clinic with a minium of 1 week notice, on all prescription refills.       Treatment planning:    No adjustments made to pump dosing.       Recommended Follow up:      Follow up in 4 weeks, video ok      To speak with a nurse, schedule/reschedule/cancel a clinic appointment, or request a medication refill call: [phone].    You can also reach us by [employer]: [email or web address]

## 2023-03-06 NOTE — PROGRESS NOTES
Type of service:  Video Visit    Originating Location (pt. Location): Home    Distant Location (provider location):  [facility]     Platform used for Video Visit: [employer]     Pain clinic follow up note    HPI:   Anayeli Gagnon is a 71 year old year old female  who presents to the pain clinic via video visit for follow up and ITP adjustment.    Patient Supplied Answers To the [facility] Pain Questionnaire  [facility] Pain -  Patient Entered Questionnaire/Answers 3/5/2023   What number best describes your pain right now:  0 = No pain  to  10 = Worst pain imaginable 8   How would you describe the pain? burning, sharp, numbness, dull, aching, throbbing, pressure   Which of the following worsen your pain? sitting, coughing / sneezing   Which of the following improve or reduce your pain? nothing relieves the pain   What number best describes your average pain for the past week:  0 = No pain  to  10 = Worst pain imaginable 7   What number best describes your LOWEST pain in past 24 hours:  0 = No pain  to  10 = Worst pain imaginable 2   What number best describes your WORST pain in past 24 hours:  0 = No pain  to  10 = Worst pain imaginable 8   When is your pain worst? Night, Constant   What non-medicine treatments have you already had for your pain? spinal cord stimulator   Have you tried treating your pain with medication?  -   Are you currently taking medications for your pain? -       Since her last visit with Dr. Espinoza she has continued fatigue and pain following exertion/activity. Ocassionaly she wakes up during the night and uses a bolus. She uses her boluses throughout the day and then saves 1-2 for the evening when it worsens. She wakes up feeling improved. She spends her time sewing/cleaning/housework. Bending over in the chair causes pain.    Her pain continues to hover at 7/10 during the day and the best it gets is a 3/10 after a bolus. The bolus is only  providing around 1-2 hours. The pain continues to localize to her groin and feet/ankles. She is scheduled for a refill on 3/8.    She denies any acute changes in her B/B, strength.    No new imaging reviewed with the patient:    ROS:  Review of Systems   Constitutional: Negative for chills, fever, malaise/fatigue and weight loss.   Eyes: Negative for blurred vision.   Cardiovascular: Negative for chest pain and palpitations.   Skin: Negative for rash.         Significant Medical History:   Past Medical History:   Diagnosis Date     CARDIAC DYSRHYTHMIAS NEC 10/3/2007    Taking atenelol for years for this     History of skin cancer      Mitral valve prolapse      Neurogenic bladder      Sacral decubitus ulcer, stage IV (H)      Thoracic spinal cord injury (H)           Past Surgical History:  Past Surgical History:   Procedure Laterality Date     DECOMPRESSION LUMBAR ONE LEVEL N/A 12/27/2019    Procedure: Posterior spinal decompression;  Surgeon: Alf Ritchie MD;  Location: [facility] OR     INSERT INTRATHECAL PAIN PUMP N/A 1/19/2022    Procedure: INSERTION, INTRATHECAL ANALGESIC PUMP, externalized trial;  Surgeon: Luis Orourke MD;  Location: [facility] OR     INSERT INTRATHECAL PAIN PUMP Right 1/21/2022    Procedure: INSERTION, INTRATHECAL ANALGESIC PUMP;  Surgeon: Luis Orourke MD;  Location: [facility] OR     INSERT STIMULATOR AND LEADS INTERNAL DORSAL COLUMN N/A 4/7/2021    Procedure: Posterior thoracic 12 to Lumbar 1 level for placement of spinal cord stimulator paddle and placement of implantable pulse generator/battery over right buttock;  Surgeon: Luis Orourke MD;  Location: [facility] OR     IR SPINAL ANGIOGRAM  10/16/2019     IR SPINAL ANGIOGRAM  12/20/2019     LAPAROSCOPIC TUBAL LIGATION       REPAIR SPINAL ARERIOVENOUS MALFORMATION N/A 12/27/2019    Procedure: with surgical disconnection arterial venous fistula Thoracic 5;  Surgeon: Alf Ritchie MD;  Location: [facility] OR          Family History:  Family History    Problem Relation Age of Onset     C.A.D. Father          41     Deep Vein Thrombosis (DVT) No family hx of      Anesthesia Reaction No family hx of           Social History:  Social History     Socioeconomic History     Marital status:      Spouse name: Not on file     Number of children: Not on file     Years of education: Not on file     Highest education level: Not on file   Occupational History     Occupation: NA     Employer: [employer]   Tobacco Use     Smoking status: Never     Smokeless tobacco: Never   Substance and Sexual Activity     Alcohol use: No     Drug use: No     Sexual activity: Yes     Partners: Male   Other Topics Concern     Parent/sibling w/ CABG, MI or angioplasty before 65F 55M? Not Asked   Social History Narrative    Lives with spouse - works in [place].     Social Determinants of Health     Financial Resource Strain: Not on file   Food Insecurity: Not on file   Transportation Needs: Not on file   Physical Activity: Not on file   Stress: Not on file   Social Connections: Not on file   Intimate Partner Violence: Not on file   Housing Stability: Not on file     Social History     Social History Narrative    Lives with spouse - works in [place].          Allergies:  Allergies   Allergen Reactions     Contrast Dye Rash     Painful rash after x-ray contrast       Current Medications:   Current Outpatient Medications   Medication Sig Dispense Refill     acetaminophen (TYLENOL) 500 MG tablet Take 500-1,000 mg by mouth every 6 hours as needed for mild pain       baclofen (LIORESAL) 10 MG tablet Take 10 mg by mouth 3 times daily as needed for muscle spasms       COMPOUND CONTAINING CONTROLLED SUBSTANCE (CMPD RX) - PHARMACY TO MIX COMPOUNDED MEDICATION Concentrations: Dilaudid    14 mg/ml                                                         Bupivacaine 28 mg/ml Ziconitide 14 mcg/ml   Total Volume in Refill: 20 mL    Basal:  Dilaudid    7  mg/day                                                      Bupivacaine 14 mg/day Ziconitide 7 mcg/day   PTM 0.2 hydromorphone, 2 hour lockout, maximum 6 in a day.  Maximum 24 hour dose Hydromorphone 8.16 mg/day Bupivacaine 16.3 mg/day Ziconitide 8.16 mcg/day  Pentec to increase ose on 12/8/2022 20 mL 0     gabapentin (NEURONTIN) 600 MG tablet Take 1,200 mg by mouth 3 times daily  1 tablet 0     lidocaine (LIDODERM) 5 % patch Apply 1 patch topically daily as needed        LORazepam (ATIVAN) 0.5 MG tablet Take 1 tablet by mouth 2 times daily as needed       melatonin 3 MG tablet Take 3 mg by mouth       melatonin 3 MG tablet Take 3 mg by mouth nightly as needed        metoprolol succinate ER (TOPROL-XL) 25 MG 24 hr tablet Take 12.5 mg by mouth every evening        Multiple Vitamins-Minerals (WOMENS MULTI PO) Take 1 tablet by mouth daily       ondansetron (ZOFRAN) 4 MG tablet Take 1 tablet (4 mg) by mouth every 6 hours as needed for nausea 20 tablet 1     medical cannabis (Patient's own supply) See Admin Instructions (The purpose of this order is to document that the patient reports taking medical cannabis.  This is not a prescription, and is not used to certify that the patient has a qualifying medical condition.)     Liquid oral formulation. (Patient not taking: Reported on 7/20/2022)       naloxone (NARCAN) 4 MG/0.1ML nasal spray Spray 1 spray (4 mg) into one nostril alternating nostrils as needed for opioid reversal every 2-3 minutes until assistance arrives 0.2 mL 0     oxyCODONE (ROXICODONE) 5 MG tablet Take 1 tablet (5 mg) by mouth every 4 hours as needed for moderate to severe pain 20 tablet 0     polyethylene glycol (MIRALAX) 17 GM/Dose powder Take 17 g by mouth daily as needed for constipation                Current Pain Medications:  ITP  Tylenol  Baclofen  Gabapentin      Physical Exam:     There were no vitals taken for this visit.  Examination limited due to virtual nature  General Appearance: No  distress, seated comfortably  Mood: Euthymic  HE ENT: EOMI  Respiratory: Non labored breathing    ASSESSMENT AND PLAN:     Encounter Diagnosis:  Thoracic spinal cord injury  Incomplete paraplegia  Neuropathic pain  OA  Hx of deep tissue injuries       Anayeli Gganon is a 71 year old year old female  who presents to the pain clinic via video visit. Her pain is manageable at the current levels and is worse with activity and in the evening. PTM provides relief for 1-2 hours. She has had muscle jerking in the legs.     RECOMMENDATIONS:     1. Medications: Start Baclofen 10 mg po TID, no change to IDDS dosing    2. Procedure: No procedures at this time    3. Education: Patient understands and agrees with plan.    Concentrations:  Dilaudid    14 mg/ml                                                          Bupivacaine 28 mg/ml  Ziconitide 14 mcg/ml     Total Volume in Refill: 20 mL      Basal:   Dilaudid    7 mg/day                                                       Bupivacaine 14 mg/day  Ziconitide 7 mcg/day     PTM 0.2 hydromorphone, 2 hour lockout, maximum 6 in a day.    Maximum 24 hour dose  Hydromorphone 8.16 mg/day  Bupivacaine 16.3 mg/day  Ziconitide 8.16 mcg/day    Follow up: in 4 weeks over video    The patient's assessment and plan was discussed with my attending physician Dr. Espinoza.    Mickey Shipman, DO  Pain medicine fellow      Answers for HPI/ROS submitted by the patient on 3/5/2023  General Symptoms: No  Skin Symptoms: No  HENT Symptoms: No  EYE SYMPTOMS: No  HEART SYMPTOMS: No  LUNG SYMPTOMS: No  INTESTINAL SYMPTOMS: No  URINARY SYMPTOMS: No  GYNECOLOGIC SYMPTOMS: No  BREAST SYMPTOMS: No  SKELETAL SYMPTOMS: No  BLOOD SYMPTOMS: No  NERVOUS SYSTEM SYMPTOMS: No  MENTAL HEALTH SYMPTOMS: No

## 2023-03-06 NOTE — PROGRESS NOTES
Video-Visit Details    Type of service:  Video Visit    Video Start Time (time video started): 1036 AM    Video End Time (time video stopped): 1104 Am    Originating Location (pt. Location): Home        Distant Location (provider location):  On-site    Mode of Communication:  Video Conference via [employer]      ATTENDING ATTESTATION  I saw the patient along with the pain medicine fellow Dr. Mickey Shipman. Please see his note above for full details. I was involved in gathering history, physical examination and development of the plan of care. Briefly, Annamarie is stable with the IDDS medications. In Nov 2022 I made a slight increase in the basal infusion. The patient is reporting that her pain is worse with activity and towards the end of the day. Pain makes her tired. She clarifies that this is not new. She feels the best in the monring. She reports jerking of the legs below the waist especially in the evenings when her pain is worse. She reports that the PTM helps for 1 hour. She does not experience increased tiredness with the PTM. She is able to complete her work after the PTM.     ASSESSMENT AND PLAN:     Encounter Diagnosis:  Laminectomies from T3-T12  Spinal subdural hematoma  Paraplegia  Neuropathic pain   Paddle SCS insitu  Opioid dependence  IDDS insitu     Anayeli Gagnon is a 72 year old y.o. old female who presents to the pain clinic with neuropathic pain after spinal cord injury. The patient pain is manageable after IDDS.     I discussed 3 options with the patient. 1. Leave treatment as is. 2. Provide her with a 7th PTM dose for the end of the day. 3. Take baclofen RTC in addition to her current IDDS.     The patient agreed to take baclofen RTC>     RECOMMENDATIONS:     1. Medications: We are prescribing the patient baclofen 10 mg po tid. Dosing, side effects, risks/benefits/alternatives were discussed with the patient in detail.    Continue IDDS as is:   Concentrations:  Dilaudid    14  mg/ml                                                          Bupivacaine 28 mg/ml  Ziconitide 14 mcg/ml     Total Volume in Refill: 20 mL      Basal:   Dilaudid    7 mg/day                                                       Bupivacaine 14 mg/day  Ziconitide 7 mcg/day     PTM 0.2 hydromorphone, 2 hour lockout, maximum 6 in a day.     Maximum 24 hour dose  Hydromorphone 8.16 mg/day  Bupivacaine 16.3 mg/day  Ziconitide 8.16 mcg/day        Follow up: 4 weeks. video        Answers for HPI/ROS submitted by the patient on 3/5/2023  General Symptoms: No  Skin Symptoms: No  HENT Symptoms: No  EYE SYMPTOMS: No  HEART SYMPTOMS: No  LUNG SYMPTOMS: No  INTESTINAL SYMPTOMS: No  URINARY SYMPTOMS: No  GYNECOLOGIC SYMPTOMS: No  BREAST SYMPTOMS: No  SKELETAL SYMPTOMS: No  BLOOD SYMPTOMS: No  NERVOUS SYSTEM SYMPTOMS: No  MENTAL HEALTH SYMPTOMS: No

## 2023-03-08 ENCOUNTER — MEDICAL CORRESPONDENCE (OUTPATIENT)
Dept: HEALTH INFORMATION MANAGEMENT | Facility: CLINIC | Age: 73
End: 2023-03-08
Payer: MEDICARE

## 2023-03-13 ENCOUNTER — DOCUMENTATION ONLY (OUTPATIENT)
Dept: ANESTHESIOLOGY | Facility: CLINIC | Age: 73
End: 2023-03-13
Payer: MEDICARE

## 2023-03-13 ENCOUNTER — MEDICAL CORRESPONDENCE (OUTPATIENT)
Dept: HEALTH INFORMATION MANAGEMENT | Facility: CLINIC | Age: 73
End: 2023-03-13
Payer: MEDICARE

## 2023-03-13 NOTE — PROGRESS NOTES
RN pended intrathecal pump prescription and sent to provider for signature. Signed script sent to [facility] via the portal.     Alia Reyes RNCC

## 2023-03-30 ENCOUNTER — TRANSFERRED RECORDS (OUTPATIENT)
Dept: HEALTH INFORMATION MANAGEMENT | Facility: CLINIC | Age: 73
End: 2023-03-30
Payer: MEDICARE

## 2023-03-30 ENCOUNTER — MEDICAL CORRESPONDENCE (OUTPATIENT)
Dept: HEALTH INFORMATION MANAGEMENT | Facility: CLINIC | Age: 73
End: 2023-03-30
Payer: MEDICARE

## 2023-04-10 ENCOUNTER — VIRTUAL VISIT (OUTPATIENT)
Dept: ANESTHESIOLOGY | Facility: CLINIC | Age: 73
End: 2023-04-10
Payer: MEDICARE

## 2023-04-10 DIAGNOSIS — M79.2 NEUROPATHIC PAIN: Primary | ICD-10-CM

## 2023-04-10 PROCEDURE — 99214 OFFICE O/P EST MOD 30 MIN: CPT | Mod: VID | Performed by: ANESTHESIOLOGY

## 2023-04-10 ASSESSMENT — ENCOUNTER SYMPTOMS
FEVER: 0
BLURRED VISION: 0
CHILLS: 0
PALPITATIONS: 0
WEIGHT LOSS: 0

## 2023-04-10 ASSESSMENT — PAIN SCALES - GENERAL: PAINLEVEL: MODERATE PAIN (4)

## 2023-04-10 NOTE — PROGRESS NOTES
Virtual Visit Details    Type of service:  Video Visit   Video Start Time: 10:30  Video End Time:10:54 AM    Originating Location (pt. Location): Home  Distant Location (provider location):  On-site  Platform used for Video Visit: [facility]      Pain clinic follow up note    HPI:   Anayeli Gagnon is a 72F who presents to the pain clinic via video visit for follow up and ITP adjustment.    Patient Supplied Answers To the  Pain Questionnaire      4/9/2023     8:55 PM   [facility] -  Patient Entered Questionnaire/Answers   What number best describes your pain right now:  0 = No pain  to  10 = Worst pain imaginable 8   How would you describe the pain sharp    dull, aching    throbbing    pressure   Which of the following worsen your pain sitting   Which of the following improve or reduce your pain lying down    relaxation   What number best describes your average pain for the past week:  0 = No pain  to  10 = Worst pain imaginable 6   What number best describes your LOWEST pain in past 24 hours:  0 = No pain  to  10 = Worst pain imaginable 3   What number best describes your WORST pain in past 24 hours:  0 = No pain  to  10 = Worst pain imaginable 9   When is your pain worst Night   What non-medicine treatments have you already had for your pain none       Since her last visit with Dr. Espinoza she feels to about the same as always. She has continued fatigue and pain following exertion/activity. She has tried taking the 3 baclofen as recommended last visit but that did not help. She uses her boluses throughout the day and then saves 1-2 for the evening when it worsens. Last visit, adding another bolus was discussed as an option for the evening.    Once she is able to fall asleep, she has no difficulties staying asleep.     Her pain continues to hover at 7/10 during the day and the best it gets is a 3/10 after a bolus. The bolus is only providing around 1-2 hours. The pain continues to localize to her groin and migrates to  her feet/ankles by the night time. It is improved somewhat by elevation in a recliner. She had her pump refilled on 3/8/22. She denies any acute changes in her B/B, strength.    No new imaging reviewed with the patient:    ROS:  Review of Systems   Constitutional: Negative for chills, fever, malaise/fatigue and weight loss.   Eyes: Negative for blurred vision.   Cardiovascular: Negative for chest pain and palpitations.   Skin: Negative for rash.         Significant Medical History:   Past Medical History:   Diagnosis Date     CARDIAC DYSRHYTHMIAS NEC 10/3/2007    Taking atenelol for years for this     History of skin cancer      Mitral valve prolapse      Neurogenic bladder      Sacral decubitus ulcer, stage IV (H)      Thoracic spinal cord injury (H)           Past Surgical History:  Past Surgical History:   Procedure Laterality Date     DECOMPRESSION LUMBAR ONE LEVEL N/A 12/27/2019    Procedure: Posterior spinal decompression;  Surgeon: Alf Ritchie MD;  Location: [facility]     INSERT INTRATHECAL PAIN PUMP N/A 1/19/2022    Procedure: INSERTION, INTRATHECAL ANALGESIC PUMP, externalized trial;  Surgeon: Luis Orourke MD;  Location: [facility]     INSERT INTRATHECAL PAIN PUMP Right 1/21/2022    Procedure: INSERTION, INTRATHECAL ANALGESIC PUMP;  Surgeon: Luis Orourke MD;  Location: [facility]     INSERT STIMULATOR AND LEADS INTERNAL DORSAL COLUMN N/A 4/7/2021    Procedure: Posterior thoracic 12 to Lumbar 1 level for placement of spinal cord stimulator paddle and placement of implantable pulse generator/battery over right buttock;  Surgeon: Luis Orourke MD;  Location: [facility]     IR SPINAL ANGIOGRAM  10/16/2019     IR SPINAL ANGIOGRAM  12/20/2019     LAPAROSCOPIC TUBAL LIGATION       REPAIR SPINAL ARERIOVENOUS MALFORMATION N/A 12/27/2019    Procedure: with surgical disconnection arterial venous fistula Thoracic 5;  Surgeon: Alf Ritchie MD;  Location: [facility] OR          Family History:  Family History    Problem Relation Age of Onset     C.A.D. Father          41     Deep Vein Thrombosis (DVT) No family hx of      Anesthesia Reaction No family hx of           Social History:  Social History     Socioeconomic History     Marital status:      Spouse name: Not on file     Number of children: Not on file     Years of education: Not on file     Highest education level: Not on file   Occupational History     Occupation: NA     Employer: [employer]   Tobacco Use     Smoking status: Never     Smokeless tobacco: Never   Vaping Use     Vaping status: Not on file   Substance and Sexual Activity     Alcohol use: No     Drug use: No     Sexual activity: Yes     Partners: Male   Other Topics Concern     Parent/sibling w/ CABG, MI or angioplasty before 65F 55M? Not Asked   Social History Narrative    Lives with spouse - works in [place].     Social Determinants of Health     Financial Resource Strain: Not on file   Food Insecurity: Not on file   Transportation Needs: Not on file   Physical Activity: Not on file   Stress: Not on file   Social Connections: Not on file   Intimate Partner Violence: Not on file   Housing Stability: Not on file     Social History     Social History Narrative    Lives with spouse - works in [place].          Allergies:  Allergies   Allergen Reactions     Contrast Dye Rash     Painful rash after x-ray contrast       Current Medications:   Current Outpatient Medications   Medication Sig Dispense Refill     acetaminophen (TYLENOL) 500 MG tablet Take 500-1,000 mg by mouth every 6 hours as needed for mild pain       baclofen (LIORESAL) 10 MG tablet Take 1 tablet (10 mg) by mouth 3 times daily for 60 days 180 tablet 0     COMPOUND CONTAINING CONTROLLED SUBSTANCE (CMPD RX) - PHARMACY TO MIX COMPOUNDED MEDICATION Concentrations: Dilaudid    14 mg/ml                                                         Bupivacaine 28 mg/ml Ziconitide 14 mcg/ml   Total Volume in Refill: 20 mL    Basal:   Dilaudid    7 mg/day                                                      Bupivacaine 14 mg/day Ziconitide 7 mcg/day   PTM 0.2 hydromorphone, 2 hour lockout, maximum 6 in a day.  Maximum 24 hour dose Hydromorphone 8.16 mg/day Bupivacaine 16.3 mg/day Ziconitide 8.16 mcg/day  Pentec to increase ose on 12/8/2022 20 mL 0     gabapentin (NEURONTIN) 600 MG tablet Take 1,200 mg by mouth 3 times daily  1 tablet 0     lidocaine (LIDODERM) 5 % patch Apply 1 patch topically daily as needed        LORazepam (ATIVAN) 0.5 MG tablet Take 1 tablet by mouth 2 times daily as needed       melatonin 3 MG tablet Take 3 mg by mouth       melatonin 3 MG tablet Take 3 mg by mouth nightly as needed        metoprolol succinate ER (TOPROL-XL) 25 MG 24 hr tablet Take 12.5 mg by mouth every evening        Multiple Vitamins-Minerals (WOMENS MULTI PO) Take 1 tablet by mouth daily       ondansetron (ZOFRAN) 4 MG tablet Take 1 tablet (4 mg) by mouth every 6 hours as needed for nausea 20 tablet 1     medical cannabis (Patient's own supply) See Admin Instructions (The purpose of this order is to document that the patient reports taking medical cannabis.  This is not a prescription, and is not used to certify that the patient has a qualifying medical condition.)     Liquid oral formulation. (Patient not taking: Reported on 7/20/2022)       naloxone (NARCAN) 4 MG/0.1ML nasal spray Spray 1 spray (4 mg) into one nostril alternating nostrils as needed for opioid reversal every 2-3 minutes until assistance arrives 0.2 mL 0     oxyCODONE (ROXICODONE) 5 MG tablet Take 1 tablet (5 mg) by mouth every 4 hours as needed for moderate to severe pain 20 tablet 0     polyethylene glycol (MIRALAX) 17 GM/Dose powder Take 17 g by mouth daily as needed for constipation                Current Pain Medications:  ITP  Tylenol  Baclofen  Gabapentin      Physical Exam:     There were no vitals taken for this visit.  Examination limited due to virtual nature  General  Appearance: No distress, seated comfortably  Mood: Euthymic  HE ENT: EOMI  Respiratory: Non labored breathing    ASSESSMENT AND PLAN:     Encounter Diagnosis:  Thoracic spinal cord injury  Incomplete paraplegia  Neuropathic pain  OA  Hx of deep tissue injuries       Anayeli Gagnon is a 72F who presents to the pain clinic via video visit. Her pain is manageable at the current levels and is worse with activity and in the evening. PTM provides relief for 1-2 hours but her pain is worse in the evening where her ankles and feet begin to swell and ache. We discussed compression stockings and elevation of her feet during the day.    RECOMMENDATIONS:     1. Medications: She will continue to take baclofen PRN for any muscle spasms. No change to IDDS dosing this visit    2. Procedure: No procedures at this time    3. Education: Patient understands and agrees with plan. She will also try to utilize compression stockings during the day time which her  will help to don/doff the stockings.    Concentrations:  Dilaudid    14 mg/ml                                                          Bupivacaine 28 mg/ml  Ziconitide 14 mcg/ml     Total Volume in Refill: 20 mL      Basal:   Dilaudid    7 mg/day                                                       Bupivacaine 14 mg/day  Ziconitide 7 mcg/day     PTM 0.2 hydromorphone, 2 hour lockout, maximum 6 in a day.    Maximum 24 hour dose  Hydromorphone 8.16 mg/day  Bupivacaine 16.3 mg/day  Ziconitide 8.16 mcg/day    Follow up: in 3 weeks over video    The patient's assessment and plan was discussed with my attending physician Dr. Espinoza.      Alden Jeffers M.D.  [facility] Pain Fellow        Answers for HPI/ROS submitted by the patient on 4/9/2023  General Symptoms: No  Skin Symptoms: No  HENT Symptoms: No  EYE SYMPTOMS: No  HEART SYMPTOMS: No  LUNG SYMPTOMS: No  INTESTINAL SYMPTOMS: No  URINARY SYMPTOMS: No  GYNECOLOGIC SYMPTOMS: No  BREAST SYMPTOMS: No  SKELETAL SYMPTOMS: No  BLOOD  SYMPTOMS: No  NERVOUS SYSTEM SYMPTOMS: No  MENTAL HEALTH SYMPTOMS: No

## 2023-04-10 NOTE — LETTER
4/10/2023       RE: Anayeli Gagnon  [id number] [facility] [address]       Dear Colleague,    Thank you for referring your patient, Anayeli Gagnon, to the [facility]IVE [facility]NAG[facility] [place] at [facility]. Please see a copy of my visit note below.      Pain clinic follow up note    HPI:   Anayeli Gagnon is a 72F who presents to the pain clinic via video visit for follow up and ITP adjustment.    Patient Supplied Answers To the [facility] Pain Questionnaire      4/9/2023     8:55 PM   [facility] Pain -  Patient Entered Questionnaire/Answers   What number best describes your pain right now:  0 = No pain  to  10 = Worst pain imaginable 8   How would you describe the pain sharp    dull, aching    throbbing    pressure   Which of the following worsen your pain sitting   Which of the following improve or reduce your pain lying down    relaxation   What number best describes your average pain for the past week:  0 = No pain  to  10 = Worst pain imaginable 6   What number best describes your LOWEST pain in past 24 hours:  0 = No pain  to  10 = Worst pain imaginable 3   What number best describes your WORST pain in past 24 hours:  0 = No pain  to  10 = Worst pain imaginable 9   When is your pain worst Night   What non-medicine treatments have you already had for your pain none       Since her last visit with Dr. Espinoza she feels to about the same as always. She has continued fatigue and pain following exertion/activity. She has tried taking the 3 baclofen as recommended last visit but that did not help. She uses her boluses throughout the day and then saves 1-2 for the evening when it worsens. Last visit, adding another bolus was discussed as an option for the evening.    Once she is able to fall asleep, she has no difficulties staying asleep.     Her pain continues to hover at 7/10 during the day and the best it gets is a 3/10 after a  bolus. The bolus is only providing around 1-2 hours. The pain continues to localize to her groin and migrates to her feet/ankles by the night time. It is improved somewhat by elevation in a recliner. She had her pump refilled on 3/8/22. She denies any acute changes in her B/B, strength.    No new imaging reviewed with the patient:    ROS:  Review of Systems   Constitutional: Negative for chills, fever, malaise/fatigue and weight loss.   Eyes: Negative for blurred vision.   Cardiovascular: Negative for chest pain and palpitations.   Skin: Negative for rash.         Significant Medical History:   Past Medical History:   Diagnosis Date    CARDIAC DYSRHYTHMIAS NEC 10/3/2007    Taking atenelol for years for this    History of skin cancer     Mitral valve prolapse     Neurogenic bladder     Sacral decubitus ulcer, stage IV (H)     Thoracic spinal cord injury (H)           Past Surgical History:  Past Surgical History:   Procedure Laterality Date    DECOMPRESSION LUMBAR ONE LEVEL N/A 12/27/2019    Procedure: Posterior spinal decompression;  Surgeon: Alf Ritchie MD;  Location: [facility] OR    INSERT INTRATHECAL PAIN PUMP N/A 1/19/2022    Procedure: INSERTION, INTRATHECAL ANALGESIC PUMP, externalized trial;  Surgeon: Luis Orourke MD;  Location: [facility] OR    INSERT INTRATHECAL PAIN PUMP Right 1/21/2022    Procedure: INSERTION, INTRATHECAL ANALGESIC PUMP;  Surgeon: Luis Orourke MD;  Location: [facility] OR    INSERT STIMULATOR AND LEADS INTERNAL DORSAL COLUMN N/A 4/7/2021    Procedure: Posterior thoracic 12 to Lumbar 1 level for placement of spinal cord stimulator paddle and placement of implantable pulse generator/battery over right buttock;  Surgeon: Luis Orourke MD;  Location: [facility] OR    IR SPINAL ANGIOGRAM  10/16/2019    IR SPINAL ANGIOGRAM  12/20/2019    LAPAROSCOPIC TUBAL LIGATION      REPAIR SPINAL ARERIOVENOUS MALFORMATION N/A 12/27/2019    Procedure: with surgical disconnection arterial venous fistula Thoracic 5;   Surgeon: Alf Ritchie MD;  Location:  OR          Family History:  Family History   Problem Relation Age of Onset    C.A.D. Father          41    Deep Vein Thrombosis (DVT) No family hx of     Anesthesia Reaction No family hx of           Social History:  Social History     Socioeconomic History    Marital status:      Spouse name: Not on file    Number of children: Not on file    Years of education: Not on file    Highest education level: Not on file   Occupational History    Occupation: NA     Employer: [employer]   Tobacco Use    Smoking status: Never    Smokeless tobacco: Never   Vaping Use    Vaping status: Not on file   Substance and Sexual Activity    Alcohol use: No    Drug use: No    Sexual activity: Yes     Partners: Male   Other Topics Concern    Parent/sibling w/ CABG, MI or angioplasty before 65F 55M? Not Asked   Social History Narrative    Lives with spouse - works in [place].     Social Determinants of Health     Financial Resource Strain: Not on file   Food Insecurity: Not on file   Transportation Needs: Not on file   Physical Activity: Not on file   Stress: Not on file   Social Connections: Not on file   Intimate Partner Violence: Not on file   Housing Stability: Not on file     Social History     Social History Narrative    Lives with spouse - works in [place].          Allergies:  Allergies   Allergen Reactions    Contrast Dye Rash     Painful rash after x-ray contrast       Current Medications:   Current Outpatient Medications   Medication Sig Dispense Refill    acetaminophen (TYLENOL) 500 MG tablet Take 500-1,000 mg by mouth every 6 hours as needed for mild pain      baclofen (LIORESAL) 10 MG tablet Take 1 tablet (10 mg) by mouth 3 times daily for 60 days 180 tablet 0    COMPOUND CONTAINING CONTROLLED SUBSTANCE (CMPD RX) - PHARMACY TO MIX COMPOUNDED MEDICATION Concentrations: Dilaudid    14 mg/ml                                                         Bupivacaine 28  mg/ml Ziconitide 14 mcg/ml   Total Volume in Refill: 20 mL    Basal:  Dilaudid    7 mg/day                                                      Bupivacaine 14 mg/day Ziconitide 7 mcg/day   PTM 0.2 hydromorphone, 2 hour lockout, maximum 6 in a day.  Maximum 24 hour dose Hydromorphone 8.16 mg/day Bupivacaine 16.3 mg/day Ziconitide 8.16 mcg/day  [facility] to increase ose on 12/8/2022 20 mL 0    gabapentin (NEURONTIN) 600 MG tablet Take 1,200 mg by mouth 3 times daily  1 tablet 0    lidocaine (LIDODERM) 5 % patch Apply 1 patch topically daily as needed       LORazepam (ATIVAN) 0.5 MG tablet Take 1 tablet by mouth 2 times daily as needed      melatonin 3 MG tablet Take 3 mg by mouth      melatonin 3 MG tablet Take 3 mg by mouth nightly as needed       metoprolol succinate ER (TOPROL-XL) 25 MG 24 hr tablet Take 12.5 mg by mouth every evening       Multiple Vitamins-Minerals (WOMENS MULTI PO) Take 1 tablet by mouth daily      ondansetron (ZOFRAN) 4 MG tablet Take 1 tablet (4 mg) by mouth every 6 hours as needed for nausea 20 tablet 1    medical cannabis (Patient's own supply) See Admin Instructions (The purpose of this order is to document that the patient reports taking medical cannabis.  This is not a prescription, and is not used to certify that the patient has a qualifying medical condition.)     Liquid oral formulation. (Patient not taking: Reported on 7/20/2022)      naloxone (NARCAN) 4 MG/0.1ML nasal spray Spray 1 spray (4 mg) into one nostril alternating nostrils as needed for opioid reversal every 2-3 minutes until assistance arrives 0.2 mL 0    oxyCODONE (ROXICODONE) 5 MG tablet Take 1 tablet (5 mg) by mouth every 4 hours as needed for moderate to severe pain 20 tablet 0    polyethylene glycol (MIRALAX) 17 GM/Dose powder Take 17 g by mouth daily as needed for constipation                Current Pain Medications:  ITP  Tylenol  Baclofen  Gabapentin      Physical Exam:     There were no vitals taken for this  visit.  Examination limited due to virtual nature  General Appearance: No distress, seated comfortably  Mood: Euthymic  HE ENT: EOMI  Respiratory: Non labored breathing    ASSESSMENT AND PLAN:     Encounter Diagnosis:  Thoracic spinal cord injury  Incomplete paraplegia  Neuropathic pain  OA  Hx of deep tissue injuries       Anayeli Gagnon is a 72F who presents to the pain clinic via video visit. Her pain is manageable at the current levels and is worse with activity and in the evening. PTM provides relief for 1-2 hours but her pain is worse in the evening where her ankles and feet begin to swell and ache. We discussed compression stockings and elevation of her feet during the day.    RECOMMENDATIONS:     1. Medications: She will continue to take baclofen PRN for any muscle spasms. No change to IDDS dosing this visit    2. Procedure: No procedures at this time    3. Education: Patient understands and agrees with plan. She will also try to utilize compression stockings during the day time which her  will help to don/doff the stockings.    Concentrations:  Dilaudid    14 mg/ml                                                          Bupivacaine 28 mg/ml  Ziconitide 14 mcg/ml     Total Volume in Refill: 20 mL      Basal:   Dilaudid    7 mg/day                                                       Bupivacaine 14 mg/day  Ziconitide 7 mcg/day     PTM 0.2 hydromorphone, 2 hour lockout, maximum 6 in a day.    Maximum 24 hour dose  Hydromorphone 8.16 mg/day  Bupivacaine 16.3 mg/day  Ziconitide 8.16 mcg/day    Follow up: in 3 weeks over video    The patient's assessment and plan was discussed with my attending physician Dr. Espinoza.      Alden Jeffers M.D.  [facility] Pain Fellow        Answers for HPI/ROS submitted by the patient on 4/9/2023  General Symptoms: No  Skin Symptoms: No  HENT Symptoms: No  EYE SYMPTOMS: No  HEART SYMPTOMS: No  LUNG SYMPTOMS: No  INTESTINAL SYMPTOMS: No  URINARY SYMPTOMS: No  GYNECOLOGIC  SYMPTOMS: No  BREAST SYMPTOMS: No  SKELETAL SYMPTOMS: No  BLOOD SYMPTOMS: No  NERVOUS SYSTEM SYMPTOMS: No  MENTAL HEALTH SYMPTOMS: No          Again, thank you for allowing me to participate in the care of your patient.      Sincerely,    Jasmine Espinoza MD

## 2023-04-10 NOTE — NURSING NOTE
Patient presents with:  Follow Up: Baclofen not helping with pain      Moderate Pain (4)       What medications are you using for pain? Pump meds, baclofen, Tylenol    (Return Patients only) What refills are you needing today? None    Edgar Winston, EMT

## 2023-04-10 NOTE — NURSING NOTE
Annamarie is a 72 year old who is being evaluated via a billable video visit.      How would you like to obtain your AVS? Mail a copy  If the video visit is dropped, the invitation should be resent by: Text to cell phone: [phone]  Will anyone else be joining your video visit? Maria Eugenia Winston, EMT

## 2023-04-11 ENCOUNTER — TELEPHONE (OUTPATIENT)
Dept: ANESTHESIOLOGY | Facility: CLINIC | Age: 73
End: 2023-04-11
Payer: MEDICARE

## 2023-04-11 NOTE — TELEPHONE ENCOUNTER
RN spoke with patient to schedule follow up visit. Patient agreed to video visit on 5/22/23. Writer scheduled patient and patient appreciated the call.    Alia Reyes RNCC

## 2023-04-20 ENCOUNTER — MEDICAL CORRESPONDENCE (OUTPATIENT)
Dept: HEALTH INFORMATION MANAGEMENT | Facility: CLINIC | Age: 73
End: 2023-04-20
Payer: MEDICARE

## 2023-04-20 ENCOUNTER — TRANSFERRED RECORDS (OUTPATIENT)
Dept: HEALTH INFORMATION MANAGEMENT | Facility: CLINIC | Age: 73
End: 2023-04-20
Payer: MEDICARE

## 2023-04-24 ENCOUNTER — MEDICAL CORRESPONDENCE (OUTPATIENT)
Dept: HEALTH INFORMATION MANAGEMENT | Facility: CLINIC | Age: 73
End: 2023-04-24
Payer: MEDICARE

## 2023-05-01 ENCOUNTER — MEDICAL CORRESPONDENCE (OUTPATIENT)
Dept: HEALTH INFORMATION MANAGEMENT | Facility: CLINIC | Age: 73
End: 2023-05-01
Payer: MEDICARE

## 2023-05-01 ENCOUNTER — DOCUMENTATION ONLY (OUTPATIENT)
Dept: ANESTHESIOLOGY | Facility: CLINIC | Age: 73
End: 2023-05-01
Payer: MEDICARE

## 2023-05-01 NOTE — PROGRESS NOTES
RN pended prescription for IDDS. Writer sent prescription to provider for signature. Signed script was sent to [facility] via the portal and an email was sent to update the [facility] nurse. No adjustments made.    Alia Reyes RNCC

## 2023-05-05 ENCOUNTER — HOSPITAL ENCOUNTER (INPATIENT)
Facility: CLINIC | Age: 73
LOS: 2 days | Discharge: HOME | DRG: 065 | End: 2023-05-07
Attending: EMERGENCY MEDICINE | Admitting: PSYCHIATRY & NEUROLOGY
Payer: MEDICARE

## 2023-05-05 ENCOUNTER — APPOINTMENT (OUTPATIENT)
Dept: GENERAL RADIOLOGY | Facility: CLINIC | Age: 73
DRG: 065 | End: 2023-05-05
Attending: EMERGENCY MEDICINE
Payer: MEDICARE

## 2023-05-05 ENCOUNTER — APPOINTMENT (OUTPATIENT)
Dept: CT IMAGING | Facility: CLINIC | Age: 73
DRG: 065 | End: 2023-05-05
Attending: EMERGENCY MEDICINE
Payer: MEDICARE

## 2023-05-05 DIAGNOSIS — I63.9 CEREBROVASCULAR ACCIDENT (CVA), UNSPECIFIED MECHANISM (H): ICD-10-CM

## 2023-05-05 DIAGNOSIS — I63.9 OCCIPITAL STROKE (H): Primary | ICD-10-CM

## 2023-05-05 DIAGNOSIS — N39.0 URINARY TRACT INFECTION ASSOCIATED WITH CATHETERIZATION OF URINARY TRACT, UNSPECIFIED INDWELLING URINARY CATHETER TYPE, INITIAL ENCOUNTER (H): ICD-10-CM

## 2023-05-05 DIAGNOSIS — Z11.52 ENCOUNTER FOR SCREENING LABORATORY TESTING FOR SEVERE ACUTE RESPIRATORY SYNDROME CORONAVIRUS 2 (SARS-COV-2): ICD-10-CM

## 2023-05-05 DIAGNOSIS — T83.511A URINARY TRACT INFECTION ASSOCIATED WITH CATHETERIZATION OF URINARY TRACT, UNSPECIFIED INDWELLING URINARY CATHETER TYPE, INITIAL ENCOUNTER (H): ICD-10-CM

## 2023-05-05 LAB
ALBUMIN SERPL BCG-MCNC: 4.4 G/DL (ref 3.5–5.2)
ALBUMIN UR-MCNC: 30 MG/DL
ALP SERPL-CCNC: 76 U/L (ref 35–104)
ALT SERPL W P-5'-P-CCNC: 9 U/L (ref 10–35)
ANION GAP SERPL CALCULATED.3IONS-SCNC: 12 MMOL/L (ref 7–15)
APPEARANCE UR: CLEAR
AST SERPL W P-5'-P-CCNC: 23 U/L (ref 10–35)
BACTERIA #/AREA URNS HPF: ABNORMAL /HPF
BASOPHILS # BLD AUTO: 0.1 10E3/UL (ref 0–0.2)
BASOPHILS NFR BLD AUTO: 1 %
BILIRUB SERPL-MCNC: 0.3 MG/DL
BILIRUB UR QL STRIP: NEGATIVE
BUN SERPL-MCNC: 11.6 MG/DL (ref 8–23)
CALCIUM SERPL-MCNC: 9.4 MG/DL (ref 8.8–10.2)
CHLORIDE SERPL-SCNC: 103 MMOL/L (ref 98–107)
CHOLEST SERPL-MCNC: 195 MG/DL
COLOR UR AUTO: ABNORMAL
CREAT SERPL-MCNC: 0.71 MG/DL (ref 0.51–0.95)
DEPRECATED HCO3 PLAS-SCNC: 27 MMOL/L (ref 22–29)
EOSINOPHIL # BLD AUTO: 0.1 10E3/UL (ref 0–0.7)
EOSINOPHIL NFR BLD AUTO: 2 %
ERYTHROCYTE [DISTWIDTH] IN BLOOD BY AUTOMATED COUNT: 13.4 % (ref 10–15)
GFR SERPL CREATININE-BSD FRML MDRD: 89 ML/MIN/1.73M2
GLUCOSE SERPL-MCNC: 84 MG/DL (ref 70–99)
GLUCOSE UR STRIP-MCNC: NEGATIVE MG/DL
HBA1C MFR BLD: 5.6 %
HCO3 BLDV-SCNC: 31 MMOL/L (ref 21–28)
HCT VFR BLD AUTO: 43.1 % (ref 35–47)
HDLC SERPL-MCNC: 60 MG/DL
HGB BLD-MCNC: 14 G/DL (ref 11.7–15.7)
HGB UR QL STRIP: NEGATIVE
IMM GRANULOCYTES # BLD: 0 10E3/UL
IMM GRANULOCYTES NFR BLD: 0 %
INR PPP: 1 (ref 0.85–1.15)
KETONES UR STRIP-MCNC: NEGATIVE MG/DL
LACTATE BLD-SCNC: 0.9 MMOL/L
LDLC SERPL CALC-MCNC: 116 MG/DL
LEUKOCYTE ESTERASE UR QL STRIP: ABNORMAL
LYMPHOCYTES # BLD AUTO: 1.6 10E3/UL (ref 0.8–5.3)
LYMPHOCYTES NFR BLD AUTO: 22 %
MCH RBC QN AUTO: 30.6 PG (ref 26.5–33)
MCHC RBC AUTO-ENTMCNC: 32.5 G/DL (ref 31.5–36.5)
MCV RBC AUTO: 94 FL (ref 78–100)
MONOCYTES # BLD AUTO: 0.5 10E3/UL (ref 0–1.3)
MONOCYTES NFR BLD AUTO: 7 %
MUCOUS THREADS #/AREA URNS LPF: PRESENT /LPF
NEUTROPHILS # BLD AUTO: 5.2 10E3/UL (ref 1.6–8.3)
NEUTROPHILS NFR BLD AUTO: 68 %
NITRATE UR QL: NEGATIVE
NONHDLC SERPL-MCNC: 135 MG/DL
NRBC # BLD AUTO: 0 10E3/UL
NRBC BLD AUTO-RTO: 0 /100
PCO2 BLDV: 61 MM HG (ref 40–50)
PH BLDV: 7.31 [PH] (ref 7.32–7.43)
PH UR STRIP: 6 [PH] (ref 5–7)
PLATELET # BLD AUTO: 166 10E3/UL (ref 150–450)
PO2 BLDV: 20 MM HG (ref 25–47)
POTASSIUM SERPL-SCNC: 4 MMOL/L (ref 3.4–5.3)
PROT SERPL-MCNC: 7.1 G/DL (ref 6.4–8.3)
RBC # BLD AUTO: 4.58 10E6/UL (ref 3.8–5.2)
RBC URINE: 1 /HPF
SAO2 % BLDV: 26 % (ref 94–100)
SODIUM SERPL-SCNC: 142 MMOL/L (ref 136–145)
SP GR UR STRIP: 1.02 (ref 1–1.03)
SQUAMOUS EPITHELIAL: <1 /HPF
T4 FREE SERPL-MCNC: 1.05 NG/DL (ref 0.9–1.7)
TRANSITIONAL EPI: <1 /HPF
TRIGL SERPL-MCNC: 95 MG/DL
TROPONIN T SERPL HS-MCNC: 53 NG/L
TROPONIN T SERPL HS-MCNC: 55 NG/L
TSH SERPL DL<=0.005 MIU/L-ACNC: 4.53 UIU/ML (ref 0.3–4.2)
UROBILINOGEN UR STRIP-MCNC: NORMAL MG/DL
WBC # BLD AUTO: 7.6 10E3/UL (ref 4–11)
WBC URINE: 37 /HPF

## 2023-05-05 PROCEDURE — 84484 ASSAY OF TROPONIN QUANT: CPT

## 2023-05-05 PROCEDURE — C9803 HOPD COVID-19 SPEC COLLECT: HCPCS

## 2023-05-05 PROCEDURE — 99291 CRITICAL CARE FIRST HOUR: CPT | Mod: 25 | Performed by: EMERGENCY MEDICINE

## 2023-05-05 PROCEDURE — 70450 CT HEAD/BRAIN W/O DYE: CPT | Mod: 26 | Performed by: RADIOLOGY

## 2023-05-05 PROCEDURE — 82803 BLOOD GASES ANY COMBINATION: CPT

## 2023-05-05 PROCEDURE — 80053 COMPREHEN METABOLIC PANEL: CPT | Performed by: EMERGENCY MEDICINE

## 2023-05-05 PROCEDURE — 36415 COLL VENOUS BLD VENIPUNCTURE: CPT

## 2023-05-05 PROCEDURE — 80061 LIPID PANEL: CPT

## 2023-05-05 PROCEDURE — 96365 THER/PROPH/DIAG IV INF INIT: CPT

## 2023-05-05 PROCEDURE — 93010 ELECTROCARDIOGRAM REPORT: CPT | Performed by: EMERGENCY MEDICINE

## 2023-05-05 PROCEDURE — 83036 HEMOGLOBIN GLYCOSYLATED A1C: CPT

## 2023-05-05 PROCEDURE — 84484 ASSAY OF TROPONIN QUANT: CPT | Performed by: EMERGENCY MEDICINE

## 2023-05-05 PROCEDURE — 84443 ASSAY THYROID STIM HORMONE: CPT | Performed by: EMERGENCY MEDICINE

## 2023-05-05 PROCEDURE — 71046 X-RAY EXAM CHEST 2 VIEWS: CPT | Mod: 26 | Performed by: RADIOLOGY

## 2023-05-05 PROCEDURE — 36415 COLL VENOUS BLD VENIPUNCTURE: CPT | Performed by: EMERGENCY MEDICINE

## 2023-05-05 PROCEDURE — 85610 PROTHROMBIN TIME: CPT | Performed by: EMERGENCY MEDICINE

## 2023-05-05 PROCEDURE — 84439 ASSAY OF FREE THYROXINE: CPT | Performed by: EMERGENCY MEDICINE

## 2023-05-05 PROCEDURE — 120N000002 HC R&B MED SURG/OB UMMC

## 2023-05-05 PROCEDURE — G1010 CDSM STANSON: HCPCS | Mod: GC | Performed by: RADIOLOGY

## 2023-05-05 PROCEDURE — 85004 AUTOMATED DIFF WBC COUNT: CPT | Performed by: EMERGENCY MEDICINE

## 2023-05-05 PROCEDURE — 250N000013 HC RX MED GY IP 250 OP 250 PS 637

## 2023-05-05 PROCEDURE — 70450 CT HEAD/BRAIN W/O DYE: CPT | Mod: ME

## 2023-05-05 PROCEDURE — 99291 CRITICAL CARE FIRST HOUR: CPT | Mod: 25

## 2023-05-05 PROCEDURE — 93005 ELECTROCARDIOGRAM TRACING: CPT

## 2023-05-05 PROCEDURE — 87088 URINE BACTERIA CULTURE: CPT | Performed by: EMERGENCY MEDICINE

## 2023-05-05 PROCEDURE — 71046 X-RAY EXAM CHEST 2 VIEWS: CPT

## 2023-05-05 PROCEDURE — 87040 BLOOD CULTURE FOR BACTERIA: CPT | Performed by: EMERGENCY MEDICINE

## 2023-05-05 PROCEDURE — 250N000011 HC RX IP 250 OP 636: Performed by: EMERGENCY MEDICINE

## 2023-05-05 PROCEDURE — 81003 URINALYSIS AUTO W/O SCOPE: CPT | Performed by: EMERGENCY MEDICINE

## 2023-05-05 RX ORDER — NALOXONE HYDROCHLORIDE 0.4 MG/ML
0.2 INJECTION, SOLUTION INTRAMUSCULAR; INTRAVENOUS; SUBCUTANEOUS
Status: DISCONTINUED | OUTPATIENT
Start: 2023-05-05 | End: 2023-05-07 | Stop reason: HOSPADM

## 2023-05-05 RX ORDER — POLYETHYLENE GLYCOL 3350 17 G/17G
17 POWDER, FOR SOLUTION ORAL 2 TIMES DAILY PRN
Status: DISCONTINUED | OUTPATIENT
Start: 2023-05-05 | End: 2023-05-07 | Stop reason: HOSPADM

## 2023-05-05 RX ORDER — OXYCODONE HYDROCHLORIDE 5 MG/1
5 TABLET ORAL EVERY 4 HOURS PRN
Status: DISCONTINUED | OUTPATIENT
Start: 2023-05-05 | End: 2023-05-07 | Stop reason: HOSPADM

## 2023-05-05 RX ORDER — LIDOCAINE 40 MG/G
CREAM TOPICAL
Status: DISCONTINUED | OUTPATIENT
Start: 2023-05-05 | End: 2023-05-07 | Stop reason: HOSPADM

## 2023-05-05 RX ORDER — CEFTRIAXONE 1 G/1
1 INJECTION, POWDER, FOR SOLUTION INTRAMUSCULAR; INTRAVENOUS ONCE
Status: COMPLETED | OUTPATIENT
Start: 2023-05-05 | End: 2023-05-05

## 2023-05-05 RX ORDER — NALOXONE HYDROCHLORIDE 0.4 MG/ML
0.4 INJECTION, SOLUTION INTRAMUSCULAR; INTRAVENOUS; SUBCUTANEOUS
Status: DISCONTINUED | OUTPATIENT
Start: 2023-05-05 | End: 2023-05-07 | Stop reason: HOSPADM

## 2023-05-05 RX ORDER — LORAZEPAM 0.5 MG/1
0.5 TABLET ORAL 2 TIMES DAILY PRN
Status: DISCONTINUED | OUTPATIENT
Start: 2023-05-05 | End: 2023-05-07 | Stop reason: HOSPADM

## 2023-05-05 RX ORDER — SENNOSIDES 8.6 MG
8.6 TABLET ORAL 2 TIMES DAILY PRN
Status: DISCONTINUED | OUTPATIENT
Start: 2023-05-05 | End: 2023-05-07 | Stop reason: HOSPADM

## 2023-05-05 RX ORDER — ASPIRIN 325 MG
325 TABLET ORAL ONCE
Status: COMPLETED | OUTPATIENT
Start: 2023-05-05 | End: 2023-05-05

## 2023-05-05 RX ORDER — LIDOCAINE 4 G/G
1 PATCH TOPICAL
Status: DISCONTINUED | OUTPATIENT
Start: 2023-05-06 | End: 2023-05-07 | Stop reason: HOSPADM

## 2023-05-05 RX ORDER — ACETAMINOPHEN 500 MG
500-1000 TABLET ORAL EVERY 6 HOURS PRN
Status: DISCONTINUED | OUTPATIENT
Start: 2023-05-05 | End: 2023-05-07 | Stop reason: HOSPADM

## 2023-05-05 RX ORDER — BACLOFEN 10 MG/1
10 TABLET ORAL 3 TIMES DAILY
Status: DISCONTINUED | OUTPATIENT
Start: 2023-05-05 | End: 2023-05-07 | Stop reason: HOSPADM

## 2023-05-05 RX ORDER — GABAPENTIN 600 MG/1
1200 TABLET ORAL 3 TIMES DAILY
Status: DISCONTINUED | OUTPATIENT
Start: 2023-05-05 | End: 2023-05-07 | Stop reason: HOSPADM

## 2023-05-05 RX ORDER — ONDANSETRON 4 MG/1
4 TABLET, FILM COATED ORAL EVERY 6 HOURS PRN
Status: DISCONTINUED | OUTPATIENT
Start: 2023-05-05 | End: 2023-05-07 | Stop reason: HOSPADM

## 2023-05-05 RX ORDER — CLOPIDOGREL BISULFATE 75 MG/1
300 TABLET ORAL ONCE
Status: COMPLETED | OUTPATIENT
Start: 2023-05-05 | End: 2023-05-05

## 2023-05-05 RX ADMIN — CLOPIDOGREL BISULFATE 300 MG: 75 TABLET ORAL at 21:35

## 2023-05-05 RX ADMIN — CEFTRIAXONE SODIUM 1 G: 1 INJECTION, POWDER, FOR SOLUTION INTRAMUSCULAR; INTRAVENOUS at 20:13

## 2023-05-05 RX ADMIN — OXYCODONE HYDROCHLORIDE 5 MG: 5 TABLET ORAL at 22:18

## 2023-05-05 RX ADMIN — GABAPENTIN 1200 MG: 600 TABLET, FILM COATED ORAL at 23:21

## 2023-05-05 RX ADMIN — ACETAMINOPHEN 1000 MG: 500 TABLET ORAL at 21:35

## 2023-05-05 RX ADMIN — ASPIRIN 325 MG ORAL TABLET 325 MG: 325 PILL ORAL at 21:35

## 2023-05-05 ASSESSMENT — VISUAL ACUITY
OD: 20/40
OS: 20/30

## 2023-05-05 ASSESSMENT — ACTIVITIES OF DAILY LIVING (ADL)
ADLS_ACUITY_SCORE: 35

## 2023-05-05 NOTE — ED TRIAGE NOTES
Patient brought in to Ed by spouse for confusion, stroke like symptoms   started 2 week ago. Reported dizziness and head today( new) was seen at [facility], dx UTI started abx. PMT UTI, paralyzed waist down( 2 spinal hematomas)   Triage Assessment     Row Name 05/05/23 1430       Triage Assessment (Adult)    Airway WDL WDL       Respiratory WDL    Respiratory WDL WDL       Skin Circulation/Temperature WDL    Skin Circulation/Temperature WDL WDL       Cardiac WDL    Cardiac WDL chest pain       Peripheral/Neurovascular WDL    Peripheral Neurovascular WDL WDL       Cognitive/Neuro/Behavioral WDL    Cognitive/Neuro/Behavioral WDL WDL

## 2023-05-05 NOTE — ED PROVIDER NOTES
[place] EMERGENCY DEPARTMENT ([facility])    5/05/23       ED PROVIDER NOTE        History     Chief Complaint   Patient presents with     Shortness of Breath     Dizziness     HPI  Anayeli Gagnon is a 72 year old female with a PMH significant for possible TIA 4/26, prior thoracic cord injury with resulting paraplegia secondary to thoracic hematoma (with neurogenic bladder and chronic indwelling catheter), HTN, prior mitral valve prolapse, GERD, chronic pain syndrome, depression, anxiety, et al. who presents to the ED for evaluation of a 2-day history of vision changes (ongoing), with additional symptoms within the last number of weeks including confusion (word finding difficulties, difficulty finding things), that have since improved.      Patient presents today with a 2-day history of vision changes, headache, and some dizziness sensation. Patient reports vision changes in the right eye only, with a dark spot.  No double vision, no flashers or floaters.  No particular eye pain, but is having some headache mostly in the forehead on both sides and a little bit back from there.  Was gradual in onset a couple of days ago, no thunderclap onset of headache.  No traumas or falls.  Denies any fevers or chills.  Has her baseline paraplegia and no new numbness, tingling or weakness.  Outside of the right eye no other vision changes, no hearing changes.  No sore throat, trouble swallowing, change in voice position.  The confusion she had previously with difficulty with word finding, not making sense, etc. has improved.  She has had the headache and vision changes for the last 2 days.  They do note that she had the recent UTI diagnosis, no acute new urine changes.  No new pain outside of the headache over the last 2 days.  No neck pain or stiffness, no new back symptoms.  No new extremity symptoms.  No rashes, skin changes, decub ulcers, etc. that they have noticed that are new or change.  No  "lightheadedness, dizziness, syncope or near syncope.  No change in bowel habits, no blood in the urine or stool or melena.  No vaginal bleeding or discharge.  Has chronic swelling to lower extremities but this is unchanged.  No other new symptoms or complaints this time.  Full ROS completed without any additional findings.      Prior to this, couple weeks ago, they noted that she had some confusion, and went to a local ED, where it appears in Care Everywhere, she was admitted for observation with potential diagnosis of TIA.   Per Chart Review: Patient admitted to [facility] 4/26-4/27 after her  encountered her awakening 04/26 with slurred speech and confusion/\"not making sense\" is how he describes her today. While there she had an EKG, CT head and CTA of the head and neck, echocardiogram, abdominal x-ray as well as a repeat head CT.CT head showed some decreased attenuation in the left basal ganglia. CTA head and neck was negative for acute issues. During further evaluation the patient was noted to have an elevated troponin. Thought that elevated troponin was most likely type II MI in the setting of TIA versus stroke, also found to have UTI, and was ultimately discharged.         Past Medical History  Past Medical History:   Diagnosis Date     CARDIAC DYSRHYTHMIAS NEC 10/3/2007    Taking atenelol for years for this     History of skin cancer      Mitral valve prolapse      Neurogenic bladder      Sacral decubitus ulcer, stage IV (H)      Thoracic spinal cord injury (H)      Past Surgical History:   Procedure Laterality Date     DECOMPRESSION LUMBAR ONE LEVEL N/A 12/27/2019    Procedure: Posterior spinal decompression;  Surgeon: Alf Ritchie MD;  Location: [facility] OR     INSERT INTRATHECAL PAIN PUMP N/A 1/19/2022    Procedure: INSERTION, INTRATHECAL ANALGESIC PUMP, externalized trial;  Surgeon: Luis Orourke MD;  Location: [facility] OR     INSERT INTRATHECAL PAIN PUMP Right 1/21/2022    Procedure: " INSERTION, INTRATHECAL ANALGESIC PUMP;  Surgeon: Luis Orourke MD;  Location: [facility] OR     INSERT STIMULATOR AND LEADS INTERNAL DORSAL COLUMN N/A 2021    Procedure: Posterior thoracic 12 to Lumbar 1 level for placement of spinal cord stimulator paddle and placement of implantable pulse generator/battery over right buttock;  Surgeon: Luis Orourke MD;  Location: [facility] OR     IR SPINAL ANGIOGRAM  10/16/2019     IR SPINAL ANGIOGRAM  2019     LAPAROSCOPIC TUBAL LIGATION       REPAIR SPINAL ARERIOVENOUS MALFORMATION N/A 2019    Procedure: with surgical disconnection arterial venous fistula Thoracic 5;  Surgeon: Alf Ritchie MD;  Location: [facility] OR     acetaminophen (TYLENOL) 500 MG tablet  baclofen (LIORESAL) 10 MG tablet  COMPOUND CONTAINING CONTROLLED SUBSTANCE (CMPD RX) - PHARMACY TO MIX COMPOUNDED MEDICATION  gabapentin (NEURONTIN) 600 MG tablet  lidocaine (LIDODERM) 5 % patch  LORazepam (ATIVAN) 0.5 MG tablet  medical cannabis (Patient's own supply)  melatonin 3 MG tablet  melatonin 3 MG tablet  metoprolol succinate ER (TOPROL-XL) 25 MG 24 hr tablet  Multiple Vitamins-Minerals (WOMENS MULTI PO)  naloxone (NARCAN) 4 MG/0.1ML nasal spray  ondansetron (ZOFRAN) 4 MG tablet  oxyCODONE (ROXICODONE) 5 MG tablet  polyethylene glycol (MIRALAX) 17 GM/Dose powder      Allergies   Allergen Reactions     Contrast Dye Rash     Painful rash after x-ray contrast     Family History  Family History   Problem Relation Age of Onset     C.A.D. Father          41     Deep Vein Thrombosis (DVT) No family hx of      Anesthesia Reaction No family hx of      Social History   Social History     Tobacco Use     Smoking status: Never     Smokeless tobacco: Never   Substance Use Topics     Alcohol use: No     Drug use: No      Past medical history, past surgical history, medications, allergies, family history, and social history were reviewed with the patient. No additional pertinent items.      A complete review of  systems was performed with pertinent positives and negatives noted in the HPI, and all other systems negative.    Physical Exam      Physical Exam  CONSTITUTIONAL: Patient sitting in her motorized wheelchair.  She is awake, alert, mentating appropriately.  Normal speech.  Does look to have some lower extremity edema that they described as being consistent with usual and not any worse, baseline paraplegia.  HENT:   - Head: Normocephalic and atraumatic.   - Ears: External ear grossly normal.   - Nose: Nose normal. No rhinorrhea. No epistaxis.   - Mouth/Throat: MMM  EYES: PERRL.  EOMI, though seems to have visual cut on the right eye.  No photophobia. conjuctive and lids WNL.   NECK: No fullness or fluctuance, no stiffness/rigidity or obvious meningeal findings  CARDIOVASCULAR: Normal rate, regular rhythm and no appreciable abnormal heart sounds.  PULMONARY/CHEST: Normal work of breathing. No accessory muscle usage or stridor. No respiratory distress.  No appreciable abnormal breath sounds.  ABDOMEN: Soft, non-distended. No tenderness. No peritoneal findings, no rigidity, rebound or guarding.  No palpable masses or abnormal pulsatility appreciated. Catheter in place. Clear to yellow urine, no blood  MUSCULOSKELETAL: Does have bilateral lower extremity edema, reportedly baseline, no calf tenderness (but again limited by paraplegia). Warm and well perfused.   NEUROLOGIC: There is a visual field cut on the right eye, otherwise no obvious cranial nerve abnormalities appreciated.  Has baseline paraplegia.  No other focal neuro deficits appreciated.  No seizure-like movements. Normal speech, normal mentation.   SKIN: Skin is warm and dry. No rash noted. No diaphoresis. No pallor.   PSYCHIATRIC: Normal mood and affect. Speech and behavior normal. Thought processes linear. Cognition and memory are normal. No SI/HI reported.    ED Course, Procedures, & Data     ED Course as of 05/05/23 2143   Fri May 05, 2023   [id number] Spoke with  charge nurse, will make sure patient is next to room   1838 Spoke with Radiology.  They believe that she has had a subacute to acute stroke, which was that with her multiple days of vision symptoms.   1838 Spoke with the Stroke team.  They will see and evaluate the patient here in the ED.  Upon evaluating the patient they will decide at the patient needs to be admitted to their service versus medicine, though likely their service.  They are currently doing service signout/handoff will come and see the patient as soon as possible.   2049 Received call back from the Neurology.  They will admit the patient for further evaluation and management.  They understand that the patient has a listed contrast dye allergy as well as indwelling device which may affect both CT and MR imaging orders and will discuss this as a team and make a plan.  They are placing some additional orders for patient's admission.            EKG Interpretation:      Interpreted by Ashley Asher MD  Time reviewed: 15:56:47  Symptoms at time of EKG: SOB and dizziness   Rhythm: normal sinus   Rate: 75 bpm  Axis: normal  Ectopy: none  Conduction:   ST Segments/ T Waves: More nonspecific ST/T waves V4+V5  Comparison to prior: More nonspecific ST/T waves V4+V5, Decreased voltage compared to Jul 20, 2022 EKG  Clinical Impression: Sinus, nonspecific ST/T wave findings are more notable in V4/V5 as well as some decreased voltage    Critical care was performed.   Critical Care Addendum  My initial assessment, based on my focused history, physical exam and discussion with loved one/guest, established a high suspicion that Anayeli Gagnon has ischemic or hemorrhagic stroke, which requires immediate intervention, and therefore she is critically ill.     After the initial assessment, the care team consulted with Neuro Stroke service to provide stabilization care. Due to the critical nature of this patient, I reassessed nursing observations, vital signs,  physical exam, 12 lead ECG analysis and neurologic status multiple times prior to her disposition.     Time also spent performing documentation, discussion with family to obtain medical information for decision making, reviewing test results, discussion with consultants and coordination of care.     Critical care time (excluding teaching time and procedures): 40 minutes.     Assessment & Plan    IMPRESSION:   72 year old female w/ PMH notable for possible TIA 4/26, prior thoracic cord injury with resulting paraplegia secondary to thoracic hematoma (with neurogenic bladder and chronic indwelling catheter), HTN, prior mitral valve prolapse, GERD, chronic pain syndrome, depression, anxiety, et al. who presents to the ED for evaluation of a 2-day history of vision changes (ongoing), with additional symptoms within the last number of weeks including confusion (word finding difficulties, difficulty finding things), that have since improved.     Clinically, patient peers nontoxic, NAD.  Mentating appropriately.  Does seem to have visual field cut on the right eye, but otherwise no acute neuro findings from her usual baseline paraplegia, etc.  Has some chronic lower extremity edema that is also reportedly baseline.  Currently he is mentating appropriately as mentioned, no word finding difficulties, confusion, memory issues, etc.    DDx includes, but not limited to, stroke, which certainly could be possible given her recent TIA and perhaps these vision symptoms from over the last couple of days could be related to such, also considered encephalopathy/delirium as she had a recent UTI, also considered electrolyte abnormalities, other occult infection, etc.  Less likely primary cardiac, etc.    PLAN:   - Screening ECG, laboratory studies, urine studies, head imaging (has a listed contrast dye allergy limiting head CT with contrast, as well as has an indwelling stimulator/device in place that cannot immediately go to MR also  "limiting imaging options  - Will get a Neuro/Stroke consult, but would not meet criteria for acute stroke code activation or TNK/TPA given symptoms present already for at least 2 days.   - Risks/benefits of pursuing imaging reviewed and accepted.     RESULTS:  Labs:   - Initial troponin 55, repeat troponin 53  - Lactate normal at 0.9, VBG pH 7.31, CO2 61, bicarb 31 no obvious emergent findings on CMP  - TSH 4.53, free T4 within normal range at 1.05 CBC unremarkable  Urine:   - looks like probably still has some UTI with 37 WBCs, moderate bacteria, moderate LE, no ketones  Imaging: Written preliminary reports reviewed:  - CXR: Pending at signout  - CT Head: \"1. Acute to subacute infarct medial left occipital lobe.  2. No acute intracranial hemorrhage.  3. Chronic small vessel ischemic disease.\"  Results/reports reviewed w/ patient who expresses understanding of findings and F/U recommendations.    INTERVENTIONS:   - IV ceftriaxone  - Neuro Stroke admit/consult    RE-EVALUATION:  - Pt otherwise continues to do well here in the ED, no acute issues or apparent concerning changes in vitals or clinical appearance.    DISCUSSIONS:  - w/ Neuro: They have seen and evaluated the patient here in the ED. Reviewed patient/presentation, current state of workup/any pending studies. They will admit for further evaluation/management, F/U pending studies as needed, coordinate w/ consulting services as needed. No additional requests/recommendations for workup/management for in the ED at this time.  - w/ Patient: I have reviewed the available findings, plan with the patient and her loved one/guest. They expressed understanding and agreement with this plan. All questions answered to the best of our ability at this time.       DISPOSITION/PLANNING:  IMPRESSION:   - Acute/subacute stroke (symptoms x2 days that correlate w/ imaging findings)  - UTI (catheter associated)  DISPOSITION:  - admit to Neuro for further evaluation and " management  PENDING:   - Urine culture, finalized CT report  OTHER RECOMMENDATIONS:   - Ongoing stroke workup  - Ongoing UTI management      ______________________________________________________________________    I, Padmini Justice, am serving as a trained medical scribe to document services personally performed by Ashley Asher MD, based on the provider's statements to me.   I, Ashley Asher MD, was physically present and have reviewed and verified the accuracy of this note documented by Padmini Justice.    Ashley Asher MD  [facility] EMERGENCY DEPARTMENT  5/5/2023     Ashley Asher MD  05/07/23 0229

## 2023-05-06 ENCOUNTER — APPOINTMENT (OUTPATIENT)
Dept: OCCUPATIONAL THERAPY | Facility: CLINIC | Age: 73
DRG: 065 | End: 2023-05-06
Attending: PSYCHIATRY & NEUROLOGY
Payer: MEDICARE

## 2023-05-06 ENCOUNTER — APPOINTMENT (OUTPATIENT)
Dept: CARDIOLOGY | Facility: CLINIC | Age: 73
DRG: 065 | End: 2023-05-06
Payer: MEDICARE

## 2023-05-06 LAB
ANION GAP SERPL CALCULATED.3IONS-SCNC: 12 MMOL/L (ref 7–15)
ATRIAL RATE - MUSE: 75 BPM
BUN SERPL-MCNC: 14.3 MG/DL (ref 8–23)
CALCIUM SERPL-MCNC: 8.7 MG/DL (ref 8.8–10.2)
CHLORIDE SERPL-SCNC: 105 MMOL/L (ref 98–107)
CREAT SERPL-MCNC: 0.63 MG/DL (ref 0.51–0.95)
DEPRECATED HCO3 PLAS-SCNC: 24 MMOL/L (ref 22–29)
DIASTOLIC BLOOD PRESSURE - MUSE: NORMAL MMHG
ERYTHROCYTE [DISTWIDTH] IN BLOOD BY AUTOMATED COUNT: 13.2 % (ref 10–15)
GFR SERPL CREATININE-BSD FRML MDRD: >90 ML/MIN/1.73M2
GLUCOSE BLDC GLUCOMTR-MCNC: 115 MG/DL (ref 70–99)
GLUCOSE BLDC GLUCOMTR-MCNC: 86 MG/DL (ref 70–99)
GLUCOSE SERPL-MCNC: 137 MG/DL (ref 70–99)
HCT VFR BLD AUTO: 38.1 % (ref 35–47)
HGB BLD-MCNC: 12.2 G/DL (ref 11.7–15.7)
INTERPRETATION ECG - MUSE: NORMAL
LVEF ECHO: NORMAL
MCH RBC QN AUTO: 30.2 PG (ref 26.5–33)
MCHC RBC AUTO-ENTMCNC: 32 G/DL (ref 31.5–36.5)
MCV RBC AUTO: 94 FL (ref 78–100)
P AXIS - MUSE: 77 DEGREES
PLATELET # BLD AUTO: 151 10E3/UL (ref 150–450)
POTASSIUM SERPL-SCNC: 3.8 MMOL/L (ref 3.4–5.3)
PR INTERVAL - MUSE: 154 MS
QRS DURATION - MUSE: 90 MS
QT - MUSE: 364 MS
QTC - MUSE: 406 MS
R AXIS - MUSE: 58 DEGREES
RBC # BLD AUTO: 4.04 10E6/UL (ref 3.8–5.2)
SARS-COV-2 RNA RESP QL NAA+PROBE: NEGATIVE
SODIUM SERPL-SCNC: 141 MMOL/L (ref 136–145)
SYSTOLIC BLOOD PRESSURE - MUSE: NORMAL MMHG
T AXIS - MUSE: 46 DEGREES
TROPONIN I SERPL-MCNC: 0.01 NG/ML (ref 0–0.29)
TROPONIN T SERPL HS-MCNC: 46 NG/L
VENTRICULAR RATE- MUSE: 75 BPM
WBC # BLD AUTO: 6.5 10E3/UL (ref 4–11)

## 2023-05-06 PROCEDURE — U0003 INFECTIOUS AGENT DETECTION BY NUCLEIC ACID (DNA OR RNA); SEVERE ACUTE RESPIRATORY SYNDROME CORONAVIRUS 2 (SARS-COV-2) (CORONAVIRUS DISEASE [COVID-19]), AMPLIFIED PROBE TECHNIQUE, MAKING USE OF HIGH THROUGHPUT TECHNOLOGIES AS DESCRIBED BY CMS-2020-01-R: HCPCS | Performed by: EMERGENCY MEDICINE

## 2023-05-06 PROCEDURE — 82962 GLUCOSE BLOOD TEST: CPT

## 2023-05-06 PROCEDURE — 250N000013 HC RX MED GY IP 250 OP 250 PS 637

## 2023-05-06 PROCEDURE — 85027 COMPLETE CBC AUTOMATED: CPT

## 2023-05-06 PROCEDURE — 93306 TTE W/DOPPLER COMPLETE: CPT

## 2023-05-06 PROCEDURE — 93306 TTE W/DOPPLER COMPLETE: CPT | Mod: 26 | Performed by: STUDENT IN AN ORGANIZED HEALTH CARE EDUCATION/TRAINING PROGRAM

## 2023-05-06 PROCEDURE — 250N000012 HC RX MED GY IP 250 OP 636 PS 637

## 2023-05-06 PROCEDURE — 80048 BASIC METABOLIC PNL TOTAL CA: CPT

## 2023-05-06 PROCEDURE — 120N000002 HC R&B MED SURG/OB UMMC

## 2023-05-06 PROCEDURE — 97530 THERAPEUTIC ACTIVITIES: CPT | Mod: GO

## 2023-05-06 PROCEDURE — 97165 OT EVAL LOW COMPLEX 30 MIN: CPT | Mod: GO

## 2023-05-06 PROCEDURE — 36415 COLL VENOUS BLD VENIPUNCTURE: CPT

## 2023-05-06 PROCEDURE — 99222 1ST HOSP IP/OBS MODERATE 55: CPT | Mod: AI | Performed by: PSYCHIATRY & NEUROLOGY

## 2023-05-06 RX ORDER — DIPHENHYDRAMINE HCL 50 MG
50 CAPSULE ORAL
Status: COMPLETED | OUTPATIENT
Start: 2023-05-06 | End: 2023-05-07

## 2023-05-06 RX ORDER — SULFAMETHOXAZOLE/TRIMETHOPRIM 800-160 MG
1 TABLET ORAL 2 TIMES DAILY
Status: DISCONTINUED | OUTPATIENT
Start: 2023-05-06 | End: 2023-05-07

## 2023-05-06 RX ORDER — CLOPIDOGREL BISULFATE 75 MG/1
75 TABLET ORAL DAILY
Status: DISCONTINUED | OUTPATIENT
Start: 2023-05-06 | End: 2023-05-07

## 2023-05-06 RX ORDER — METHYLPREDNISOLONE 32 MG/1
32 TABLET ORAL
Status: COMPLETED | OUTPATIENT
Start: 2023-05-06 | End: 2023-05-06

## 2023-05-06 RX ORDER — ATORVASTATIN CALCIUM 40 MG/1
40 TABLET, FILM COATED ORAL EVERY EVENING
Status: DISCONTINUED | OUTPATIENT
Start: 2023-05-06 | End: 2023-05-07 | Stop reason: HOSPADM

## 2023-05-06 RX ORDER — ASPIRIN 325 MG
325 TABLET ORAL DAILY
Status: DISCONTINUED | OUTPATIENT
Start: 2023-05-06 | End: 2023-05-07

## 2023-05-06 RX ORDER — METHYLPREDNISOLONE 32 MG/1
32 TABLET ORAL
Status: COMPLETED | OUTPATIENT
Start: 2023-05-06 | End: 2023-05-07

## 2023-05-06 RX ADMIN — OXYCODONE HYDROCHLORIDE 5 MG: 5 TABLET ORAL at 08:22

## 2023-05-06 RX ADMIN — SULFAMETHOXAZOLE AND TRIMETHOPRIM 1 TABLET: 800; 160 TABLET ORAL at 11:59

## 2023-05-06 RX ADMIN — ASPIRIN 325 MG ORAL TABLET 325 MG: 325 PILL ORAL at 11:59

## 2023-05-06 RX ADMIN — BACLOFEN 10 MG: 10 TABLET ORAL at 08:17

## 2023-05-06 RX ADMIN — Medication 12.5 MG: at 21:46

## 2023-05-06 RX ADMIN — BACLOFEN 10 MG: 10 TABLET ORAL at 14:10

## 2023-05-06 RX ADMIN — METHYLPREDNISOLONE 32 MG: 32 TABLET ORAL at 20:22

## 2023-05-06 RX ADMIN — LORAZEPAM 0.5 MG: 0.5 TABLET ORAL at 00:12

## 2023-05-06 RX ADMIN — LIDOCAINE PATCH 4% 1 PATCH: 40 PATCH TOPICAL at 08:23

## 2023-05-06 RX ADMIN — SULFAMETHOXAZOLE AND TRIMETHOPRIM 1 TABLET: 800; 160 TABLET ORAL at 20:34

## 2023-05-06 RX ADMIN — CLOPIDOGREL BISULFATE 75 MG: 75 TABLET ORAL at 11:59

## 2023-05-06 RX ADMIN — ACETAMINOPHEN 1000 MG: 500 TABLET ORAL at 12:07

## 2023-05-06 RX ADMIN — GABAPENTIN 1200 MG: 600 TABLET, FILM COATED ORAL at 20:33

## 2023-05-06 RX ADMIN — Medication 12.5 MG: at 00:14

## 2023-05-06 RX ADMIN — ATORVASTATIN CALCIUM 40 MG: 40 TABLET, FILM COATED ORAL at 20:34

## 2023-05-06 RX ADMIN — BACLOFEN 10 MG: 10 TABLET ORAL at 00:13

## 2023-05-06 RX ADMIN — BACLOFEN 10 MG: 10 TABLET ORAL at 20:34

## 2023-05-06 RX ADMIN — GABAPENTIN 1200 MG: 600 TABLET, FILM COATED ORAL at 14:09

## 2023-05-06 RX ADMIN — GABAPENTIN 1200 MG: 600 TABLET, FILM COATED ORAL at 08:16

## 2023-05-06 ASSESSMENT — ACTIVITIES OF DAILY LIVING (ADL)
ADLS_ACUITY_SCORE: 30
ADLS_ACUITY_SCORE: 37
ADLS_ACUITY_SCORE: 30
ADLS_ACUITY_SCORE: 37
ADLS_ACUITY_SCORE: 35
DEPENDENT_IADLS:: INDEPENDENT;TRANSPORTATION
ADLS_ACUITY_SCORE: 35
ADLS_ACUITY_SCORE: 41
ADLS_ACUITY_SCORE: 35
PREVIOUS_RESPONSIBILITIES: HOUSEKEEPING

## 2023-05-06 NOTE — PROGRESS NOTES
Shift: 700-1530    V/S & pain: VSS on NC when sleeping, RA when awake,  pain managed w/ PRN oxycodone and tylenol   Neuro: A/O x4 calm and cooperative. Severe right field vision cut. Pt briefly thought the year was 1983 but self corrected to accurate year.    Respiratory: stable on 3L NC , lung sounds clear/equal bilaterally, continuous pulse ox monitoring to the desk  Cardiac: telemetry   Skin: 3+ Pitting edema bilateral lower extremity- pt states this is baseline due to her paraplegia. Preventative sacral mepilex in place   GI/: neurogenic bladder and chronic indwelling catheter with adequate output, colostomy with no output this shift.  No BM this shift. Pt stated it is normal for her to go 2-3 days without bowel movement.   Nutrition: regular diet w/ fairappetite and adequate po intake, denies N/V, accucheck if NPO   Lines/drains: R. AC saline locked   Activity:  Chairfast, Assist of 1 to turn, A2 with lift out of bed   Labs: Rn managed potassium and magnesium    Plan: no acute events this shift, ECHO completed today. CTA to be completed tomorrow with premedicating r/t contrast allergy. Call light within reach and is able to make needs known, awaiting safe discharge plans.   Gave Report to 6A RN.

## 2023-05-06 NOTE — PLAN OF CARE
SLP: Orders received. Chart reviewed and discussed with care team.  SLP not indicated as pt has passed a swallow screen per provider. When I discussed with RN and reviewed pt's chart there are currently no concern for dysphagia or changes in speech/language function warranting SLP evaluation at this time.      Defer discharge recommendations to PT/OT and medical team. Please re consult SLP should concerns arise.  Will complete orders.

## 2023-05-06 NOTE — PLAN OF CARE
Arrived from:ED  Belongings/meds: Cloth, Phone/.Purse.   2 RN Skin Assessment Completed by: Khang.   Non-intact findings documented (yes/no/NA): Yes.

## 2023-05-06 NOTE — H&P
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  On interview, Annamarie notes that all of a sudden in the morning she noticed a big bright dark [place] in her field of vision.  Denies that the Te[place] had any color to it and cannot localize to either side.  She notes that the symptoms went away after 3 or 4 hours and did not seek care until 5/5/2023.  CT scan was obtained that shows a subacute hypodensity in the left occipital lobe in the PCA territory concerning for a subacute ischemic infarct.  Patient was unable to get vessel imaging with CTA due to a noted contrast allergy.  The patient is unable to currently get an MRI due to a chronic baclofen pump that would require appropriate staff to turn off the device for the duration of the study.  Patient denies a history of smoking, she denies a history of atrial fibrillation, hyperlipidemia or other cardiac etiologies (however she notes that there is a strong family history of this and hyperlipidemia).  On examination, the patient does have chronic paraplegia in the bilateral lower extremities.  She cannot feel or move these extremities to command, however with manipulation her left leg spontaneously extends at the knee.  She also does have a notable right visual field cut that is limited to the right eye with full visual fields in the left eye, which does localize to the lesion seen on CT.  As such, was explained to the patient that she would need to be admitted for routine stroke work-up.  The patient was in agreement with this plan.    Intravenous Thrombolysis  Not given due to:   -  "unclear or unfavorable risk-benefit profile for extended window thrombolysis beyond the conventional 4.5 hour time window    Endovascular Treatment  Exam not consistent with proximal vessel occlusion, unable to obtain vessel imaging due to MRI contraindicatin (baclofen pump) and contrast dye allergy.  As well as last known normal > 24 hrs.    Impression     72-year-old female with history of chronic paraplegia due to thoracic cord injury complicated by hematoma, hypertension, GERD, chronic pain syndrome, depression/anxiety who presents with a 1 to 2-day history of visual field changes.  She notes that she had an acute onset of a \"black [place]\" in her vision that subsided after 3 hours.  On examination there is a noted right visual field cut in the right eye that spares visual fields in the left eye, as well as some chronic paraplegia in the bilateral lower extremities.  Patient did pass a bedside swallow study for me.  As such, a regular diet with thin liquids is reasonable for admission.  The new visual field cut is certainly explained by a hypodensity seen in the left occipital lobe and the left PCA territory.  This is concerning for an acute to subacute ischemic infarct.  Etiology of this infarct is at the present moment undetermined.  Immediate work-up was limited because the patient has a contrast dye as well as a baclofen pump.  As a result, vessel imaging with CTA or MRA/MRI were not able to be obtained in the immediate phase of the work-up.  However this does warrant further stroke work-up as intracranial atherosclerosis versus cardioembolism versus ESUS are all differentials for the etiology of the stroke.  We will plan to admit the patient for routine stroke work-up, and the patient was in agreement with this plan.    Plan  #Acute to subacute ischemic stroke in the L occipital lobe, L PCA territory  - Admit to Neurology  - Permissive HTN; labetalol PRN for SBP > 220  - Avoid hypotonic IV fluids  - Aspirin " 325 mg x1 in ED for secondary stroke prevention  - Daily aspirin plan to be deliberated during formal rounds on 5/6/23  - Plavix (clopidogrel) 300 mg PO loading dose x 1  - Plavix (clopidogrel) 75 mg PO Daily  - Statin: pending lipid panel  - MRI Stroke Protocol  - Vessel imaging with either CTA (will need pre-medication as there is contrast allergy) vs. MRA when able to obtain complicated by baclofen pump which would need to be monitored by appropriate staff.  - Telemetry, EKG  - Bedside Glucose Monitoring  - Nutrition: regular diet thin liquids (passed bedside swallow)  - A1c, Lipid Panel, Troponin x 3  - PT/OT/SLP  - PM&R  - Stroke Education  - Depression Screen  - Apnea Screen  - Euthermia, Euglycemia    #Acute UTI  - Ceftriaxone in ED  - Will deliberate regarding PO ABX (pt passed swallow study) on formal rounds on 5/6/23    #Paraplegia 2/2 Thoracic spinal injury  #Chronic Pain Syndrome  Baclofen pump in place, unable to get MRI without this device turned off.  - Continue baclofen orally, PTA dosing 10mg TID  - Gabapentin 1200 mg TID  - PTA Opioid regimen, narcan ordered PRN  - Bowel regimen PRN      Prophylaxis            For VTE Prevention:  - pneumatic compression device    For Acid Suppression:  - GI prophylaxis is not indicated    Code Status  Special Code: Okay with CPR, does not want intubation.  Discussed this with pt and with pt's .  There is an advance directive listed in Care Everywhere, however I am unable to review the actual records.  Varying records of code status between DNR/DNI and full code.  After discussion with pt and pt's , there was agreement in the above code status.    During initial physical assessment, the plan of care was discussed and developed with patient and spouse.  Plan of care includes: admission for stroke work up, code status..    Patient was admitted via [facility] ([place])    The patient will be admitted to the Neuro Critical Care/Stroke  team..     The patient was discussed with Stroke Fellow, Dr. Kang.  The Stroke Staff is Dr. Castellanos.    Trino Reed DO  Neurology Resident, PGY-2  ASCOM: *[id number]  ___________________________________________________    Nutrition: Regular diet, thin liquids (pt passed bedside swallow study)    Clinically Significant Risk Factors Present on Admission                                 Past Medical History   Past Medical History:   Diagnosis Date     CARDIAC DYSRHYTHMIAS NEC 10/3/2007    Taking atenelol for years for this     History of skin cancer      Mitral valve prolapse      Neurogenic bladder      Sacral decubitus ulcer, stage IV (H)      Thoracic spinal cord injury (H)      Past Surgical History   Past Surgical History:   Procedure Laterality Date     DECOMPRESSION LUMBAR ONE LEVEL N/A 12/27/2019    Procedure: Posterior spinal decompression;  Surgeon: Alf Ritchie MD;  Location: [facility] OR     INSERT INTRATHECAL PAIN PUMP N/A 1/19/2022    Procedure: INSERTION, INTRATHECAL ANALGESIC PUMP, externalized trial;  Surgeon: Luis Orourke MD;  Location: [facility] OR     INSERT INTRATHECAL PAIN PUMP Right 1/21/2022    Procedure: INSERTION, INTRATHECAL ANALGESIC PUMP;  Surgeon: Luis Orourke MD;  Location: [facility] OR     INSERT STIMULATOR AND LEADS INTERNAL DORSAL COLUMN N/A 4/7/2021    Procedure: Posterior thoracic 12 to Lumbar 1 level for placement of spinal cord stimulator paddle and placement of implantable pulse generator/battery over right buttock;  Surgeon: Luis Orourke MD;  Location: [facility] OR     IR SPINAL ANGIOGRAM  10/16/2019     IR SPINAL ANGIOGRAM  12/20/2019     LAPAROSCOPIC TUBAL LIGATION       REPAIR SPINAL ARERIOVENOUS MALFORMATION N/A 12/27/2019    Procedure: with surgical disconnection arterial venous fistula Thoracic 5;  Surgeon: Alf Ritchie MD;  Location: [facility] OR     Medications   Home Meds  Prior to Admission medications    Medication Sig Start Date End Date Taking? Authorizing Provider    acetaminophen (TYLENOL) 500 MG tablet Take 500-1,000 mg by mouth every 6 hours as needed for mild pain    Unknown, Entered By History   baclofen (LIORESAL) 10 MG tablet Take 1 tablet (10 mg) by mouth 3 times daily for 60 days 3/6/23 5/5/23  Jasmine Espinoza MD   COMPOUND CONTAINING CONTROLLED SUBSTANCE (CMPD RX) - PHARMACY TO MIX COMPOUNDED MEDICATION Concentrations: Dilaudid    14 mg/ml                                                         Bupivacaine 28 mg/ml Ziconitide 14 mcg/ml   Total Volume in Refill: 20 mL    Basal:  Dilaudid    7 mg/day                                                      Bupivacaine 14 mg/day Ziconitide 7 mcg/day   PTM 0.2 hydromorphone, 2 hour lockout, maximum 6 in a day.  Maximum 24 hour dose Hydromorphone 8.16 mg/day Bupivacaine 16.3 mg/day Ziconitide 8.16 mcg/day  Pentec to increase ose on 12/8/2022 10/25/22   Jasmine Espinoza MD   gabapentin (NEURONTIN) 600 MG tablet Take 1,200 mg by mouth 3 times daily  3/4/21   Luis Orourke MD   lidocaine (LIDODERM) 5 % patch Apply 1 patch topically daily as needed  9/29/21   Reported, Patient   LORazepam (ATIVAN) 0.5 MG tablet Take 1 tablet by mouth 2 times daily as needed 9/16/22   Reported, Patient   medical cannabis (Patient's own supply) See Admin Instructions (The purpose of this order is to document that the patient reports taking medical cannabis.  This is not a prescription, and is not used to certify that the patient has a qualifying medical condition.)     Liquid oral formulation.  Patient not taking: Reported on 7/20/2022    Reported, Patient   melatonin 3 MG tablet Take 3 mg by mouth    Reported, Patient   melatonin 3 MG tablet Take 3 mg by mouth nightly as needed     Reported, Patient   metoprolol succinate ER (TOPROL-XL) 25 MG 24 hr tablet Take 12.5 mg by mouth every evening     Unknown, Entered By History   Multiple Vitamins-Minerals (WOMENS MULTI PO) Take 1 tablet by mouth daily    Unknown, Entered By History   naloxone (NARCAN)  4 MG/0.1ML nasal spray Spray 1 spray (4 mg) into one nostril alternating nostrils as needed for opioid reversal every 2-3 minutes until assistance arrives 22   Jasmine Espinoza MD   ondansetron (ZOFRAN) 4 MG tablet Take 1 tablet (4 mg) by mouth every 6 hours as needed for nausea 22   Daniel Last MD   oxyCODONE (ROXICODONE) 5 MG tablet Take 1 tablet (5 mg) by mouth every 4 hours as needed for moderate to severe pain 22   Daniel Last MD   polyethylene glycol (MIRALAX) 17 GM/Dose powder Take 17 g by mouth daily as needed for constipation     Reported, Patient       Scheduled Meds    baclofen  10 mg Oral TID     gabapentin  1,200 mg Oral TID     lidocaine  1 patch Transdermal Q24H    And     lidocaine   Transdermal Q8H DYLAN     metoprolol succinate ER  12.5 mg Oral QPM     sodium chloride (PF)  3 mL Intracatheter Q8H       Infusion Meds    - MEDICATION INSTRUCTIONS -       - MEDICATION INSTRUCTIONS -         PRN Meds      Allergies   Allergies   Allergen Reactions     Contrast Dye Rash     Painful rash after x-ray contrast     Family History   Family History   Problem Relation Age of Onset     C.A.D. Father          41     Deep Vein Thrombosis (DVT) No family hx of      Anesthesia Reaction No family hx of      Social History   Social History     Tobacco Use     Smoking status: Never     Smokeless tobacco: Never   Substance Use Topics     Alcohol use: No     Drug use: No       Review of Systems   The 10 point Review of Systems is negative other than noted in the HPI.       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  There is no facial asymmetry.  Facial sensation is intact in the V1 through V3 distributions.  Tongue protrudes to the midline.  Shoulder shrug strong bilaterally.  Motor: There is no drift in the bilateral upper extremities, and the patient is full strength in , elbow flexion/extension, and shoulder abduction bilaterally.  Patient is chronically paraplegic in the bilateral lower extremities and cannot volitionally move either limb, however with sensory manipulation of the left leg, the limb spontaneously extends at the knee and the patient notes that this was not under control likely secondary to a spinal reflex.  There is markedly increased tone in the bilateral lower extremities as a result of chronic spasticity to her thoracic cord lesion.  Sensory: Light touch is intact without extinction to the bilateral upper extremities.  There is no sensation in the bilateral lower extremities.  Coordination: Finger-nose-finger intact bilaterally.  The patient is unable to perform heel-to-shin due to chronic paraplegia  Station/Gait:  deferred    Dysphagia Screen  Passed screening, no dysarthria - Regular Diet with thin liquids  05/06/2023 2045    Stroke Scales    NIHSS  1a. Level of Consciousness 0-->Alert, keenly responsive   1b. LOC Questions 0-->Answers both questions correctly   1c. LOC Commands 0-->Performs both tasks correctly   2.   Best Gaze 0-->Normal   3.   Visual 1-->Partial hemianopia   4.   Facial Palsy 0-->Normal symmetrical movements   5a. Motor Arm, Left 0-->No drift, limb holds 90 (or 45) degrees for full 10 secs   5b. Motor Arm, Right 0-->No drift, limb holds 90 (or 45) degrees for full 10 secs   6a. Motor Leg, Left 4-->No movement (BASELINE PARAPLEGIA)   6b.  Motor Leg, right 4-->No movement (BASELINE PARAPLEGIA)   7.   Limb Ataxia 0-->Absent   8.   Sensory 0-->Normal, no sensory loss   9.   Best Language 0-->No aphasia, normal   10. Dysarthria 0-->Normal   11. Extinction and Inattention  0-->No abnormality   Total 9 (05/05/23 2127)       Modified [place] Score (Pre-morbid)  4-Moderately severe disability; unable to walk without assistance and unable to attend to own bodily    Imaging  I personally reviewed all imaging; relevant findings per the HPI.    Lab Results Data   CBC  Recent Labs   Lab 05/05/23  1704   WBC 7.6   RBC 4.58   HGB 14.0   HCT 43.1        Basic Metabolic Panel   Recent Labs   Lab 05/05/23  1704      POTASSIUM 4.0   CHLORIDE 103   CO2 27   BUN 11.6   CR 0.71   GLC 84   ADAN 9.4     Liver Panel  Recent Labs   Lab 05/05/23  1704   PROTTOTAL 7.1   ALBUMIN 4.4   BILITOTAL 0.3   ALKPHOS 76   AST 23   ALT 9*     INR    Recent Labs   Lab Test 05/05/23  1704 01/21/22  0420 01/19/22  0627   INR 1.00 0.99 1.02      Lipid Profile    Recent Labs   Lab Test 05/05/23  1925   CHOL 195   HDL 60   *   TRIG 95     A1C    Recent Labs   Lab Test 05/05/23  1704 09/26/22  1101   A1C 5.6 5.8*     Troponin    Recent Labs   Lab 05/05/23  2335 05/05/23  1925 05/05/23  1704   CTROPT 46* 53* 55*          Stroke Code / Stroke Consult Data Data    Not a stroke code

## 2023-05-06 NOTE — CONSULTS
Care Management Initial Consult    General Information  Assessment completed with: Patient, VM-chart review,  (Anayeli)  Type of CM/SW Visit: Initial Assessment    Primary Care Provider verified and updated as needed: Yes (Jazmin Interiano [phone] [facility] [address] [address])   Readmission within the last 30 days: previous discharge plan unsuccessful (ED visit 5/4/2023 ([facility]) for palpitations; Admission 4/26/2023  ([facility]) CVA)   Return Category: Exacerbation of disease  Reason for Consult: discharge planning  Advance Care Planning: Advance Care Planning Reviewed: other (see comments) (Annamarie reports that she has an HCD at home. Copy requested)  Annamarie reports that she has an HCD at home. Copy requested  General Information Comments:      Annamarie reports she has an HCD at home. Copy requested)    Communication Assessment  Patient's communication style: spoken language (English or Bilingual)             Cognitive  Cognitive/Neuro/Behavioral: .WDL except, orientation  Level of Consciousness: alert  Arousal Level: opens eyes spontaneously  Orientation: disoriented to, time (initially answered 1983 and lubin for president but corrected herself)  Mood/Behavior: calm  Best Language: 0 - No aphasia  Speech: fluent    Living Environment:   People in home: spouse  Jay  Current living Arrangements: house- Annamarie lives in a one-level accessable home   Able to return to prior arrangements: yes  Living Arrangement Comments:  (Annamarie's home is wheelchair accessable)    Family/Social Support:  Care provided by: self, spouse/significant other (Jay assists as needed)  Provides care for: no one  Marital Status:   , Children, Other (specify) (Restorationist /Holiness)  Jay       Description of Support System: Supportive, Involved    Support Assessment: Adequate family and caregiver support, Adequate social supports    Current Resources:   Patient  "receiving home care services: No     Community Resources: DME  Equipment currently used at home:    Supplies currently used at home:      Employment/Financial:  Employment Status: disabled, retired        Financial Concerns: No concerns identified   Referral to Financial Worker: No     Lifestyle & Psychosocial Needs:  Social Determinants of Health     Tobacco Use: Low Risk  (10/20/2022)    Patient History      Smoking Tobacco Use: Never      Smokeless Tobacco Use: Never      Passive Exposure: Not on file   Alcohol Use: Not on file   Financial Resource Strain: Not on file   Food Insecurity: Not on file   Transportation Needs: Not on file   Physical Activity: Not on file   Stress: Not on file   Social Connections: Not on file   Intimate Partner Violence: Not on file   Depression: Not at risk (3/10/2022)    PHQ-2      PHQ-2 Score: 0   Recent Concern: Depression - At risk (2/3/2022)    PHQ-2      PHQ-2 Score: 3   Housing Stability: Not on file       Functional Status:  Prior to admission patient needed assistance:   Dependent ADLs:: Wheelchair-independent, Independent (Annamarie's  Jay assists her as needed)  Dependent IADLs:: Independent, Transportation (Jay assists as needed)       Mental Health Status:  Mental Health Status: Current Concern (Annamarie reports past struggles with depression)  Mental Health Management: Other (see comment) (In the past, Annamarie has used medication and individual therapy to manage her mental health but currently prefers to self-manage it.)    Chemical Dependency Status:  Chemical Dependency Status: No Current Concerns             Values/Beliefs:  Spiritual, Cultural Beliefs, Sikhism Practices, Values that affect care: no          Values/Beliefs Comment: Annamarie is active in her [employer]    Additional Information:    Anayeli Gagnon \"Annamarie\"is a 72 year old female with a past history of possible TIA on 4/26/2023, 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  On interview, Annamarie notes that all of a sudden in the morning she noticed a big bright dark [place] in her field of vision.  Denies that the [place] had any color to it and cannot localize to either side.  Exam shows R homonymous incongruous hemianopia.      Care Management/Social Work Consult placed for discharge planning. VIPUL performed chart review to begin assessment.    1205 SW met with Annamarie at bedside to complete assessment. SW introduced self and explained the reason for the visit. Annamarie agreed to speak with SW      Annamarie lives in a one-level accessable home with her  Jay. Their two daughters live close by and are able to provided assistance if/when needed. Annamarie uses an electric wheelchair for ambulation, but is otherwise mostly independent in her IADLs andADLs. Jay assists her with dressing, but she is able to independently manage the rest of her cares. Because she no longer drives she depends on Jay or their daughters for transportation.     Annamarie's  and children provide most of her emotional and practical support. She is active in her [employer] and her [employer] community also provides much support.    Annamarie reports she has an HCD at home. VIPUL requested a copy and provided SW work email for an electronic copy if available.No additional questions or concerns were reported. SW provided availability and contact information and excused self from Annamarie's bedside.    SW will continue to follow as needed.    GT Tellez, [facility]  ED/OBS   [facility]  Phone: [phone]  Pager: [phone]  Fax: [phone]     On-call pager, [phone], 4:00 pm to midnight,a

## 2023-05-06 NOTE — PROGRESS NOTES
"Shift: 1976-1461  VS: /79 (BP Location: Right arm, Patient Position: Right side)   Pulse 81   Temp 98.6  F (37  C) (Oral)   Resp 16   Ht 1.626 m (5' 4\")   Wt 63.6 kg (140 lb 3.4 oz)   SpO2 97%   BMI 24.07 kg/m    Pain: chronic baclofen pump, oxy prn  Neuro: A&Ox4, hemianopis R eye, paraplegic @ baseline  Cardiac: WDL  Respiratory: WDL (desaturating overnight 2 LPM placed)   Diet/Appetite:  Poor appetite - regular diet  /GI: Chronic indwelling hudson, colostomy; no bm this shift. bm q 2 days is normal per patient  LDA's: PIV R AC, saline locked  Skin: WDL  Activity: chairfast       "

## 2023-05-07 ENCOUNTER — APPOINTMENT (OUTPATIENT)
Dept: CT IMAGING | Facility: CLINIC | Age: 73
DRG: 065 | End: 2023-05-07
Payer: MEDICARE

## 2023-05-07 ENCOUNTER — APPOINTMENT (OUTPATIENT)
Dept: EDUCATION SERVICES | Facility: CLINIC | Age: 73
DRG: 065 | End: 2023-05-07
Attending: PSYCHIATRY & NEUROLOGY
Payer: MEDICARE

## 2023-05-07 ENCOUNTER — APPOINTMENT (OUTPATIENT)
Dept: CARDIOLOGY | Facility: CLINIC | Age: 73
DRG: 065 | End: 2023-05-07
Payer: MEDICARE

## 2023-05-07 ENCOUNTER — APPOINTMENT (OUTPATIENT)
Dept: OCCUPATIONAL THERAPY | Facility: CLINIC | Age: 73
DRG: 065 | End: 2023-05-07
Payer: MEDICARE

## 2023-05-07 VITALS
BODY MASS INDEX: 23.94 KG/M2 | DIASTOLIC BLOOD PRESSURE: 49 MMHG | SYSTOLIC BLOOD PRESSURE: 102 MMHG | WEIGHT: 140.21 LBS | OXYGEN SATURATION: 100 % | TEMPERATURE: 98.3 F | RESPIRATION RATE: 18 BRPM | HEIGHT: 64 IN | HEART RATE: 71 BPM

## 2023-05-07 LAB
ANION GAP SERPL CALCULATED.3IONS-SCNC: 10 MMOL/L (ref 7–15)
BACTERIA UR CULT: ABNORMAL
BUN SERPL-MCNC: 11.5 MG/DL (ref 8–23)
CALCIUM SERPL-MCNC: 9.2 MG/DL (ref 8.8–10.2)
CHLORIDE SERPL-SCNC: 105 MMOL/L (ref 98–107)
CREAT SERPL-MCNC: 0.7 MG/DL (ref 0.51–0.95)
DEPRECATED HCO3 PLAS-SCNC: 27 MMOL/L (ref 22–29)
ERYTHROCYTE [DISTWIDTH] IN BLOOD BY AUTOMATED COUNT: 13 % (ref 10–15)
GFR SERPL CREATININE-BSD FRML MDRD: 90 ML/MIN/1.73M2
GLUCOSE SERPL-MCNC: 153 MG/DL (ref 70–99)
HCT VFR BLD AUTO: 42.3 % (ref 35–47)
HGB BLD-MCNC: 13.8 G/DL (ref 11.7–15.7)
MCH RBC QN AUTO: 30.3 PG (ref 26.5–33)
MCHC RBC AUTO-ENTMCNC: 32.6 G/DL (ref 31.5–36.5)
MCV RBC AUTO: 93 FL (ref 78–100)
PLATELET # BLD AUTO: 162 10E3/UL (ref 150–450)
POTASSIUM SERPL-SCNC: 4.3 MMOL/L (ref 3.4–5.3)
RBC # BLD AUTO: 4.55 10E6/UL (ref 3.8–5.2)
SODIUM SERPL-SCNC: 142 MMOL/L (ref 136–145)
WBC # BLD AUTO: 6.2 10E3/UL (ref 4–11)

## 2023-05-07 PROCEDURE — 99239 HOSP IP/OBS DSCHRG MGMT >30: CPT | Mod: GC | Performed by: PSYCHIATRY & NEUROLOGY

## 2023-05-07 PROCEDURE — 80048 BASIC METABOLIC PNL TOTAL CA: CPT

## 2023-05-07 PROCEDURE — 93228 REMOTE 30 DAY ECG REV/REPORT: CPT | Performed by: INTERNAL MEDICINE

## 2023-05-07 PROCEDURE — 70496 CT ANGIOGRAPHY HEAD: CPT | Mod: 26 | Performed by: RADIOLOGY

## 2023-05-07 PROCEDURE — 250N000012 HC RX MED GY IP 250 OP 636 PS 637

## 2023-05-07 PROCEDURE — 97535 SELF CARE MNGMENT TRAINING: CPT | Mod: GO

## 2023-05-07 PROCEDURE — 250N000013 HC RX MED GY IP 250 OP 250 PS 637

## 2023-05-07 PROCEDURE — 70498 CT ANGIOGRAPHY NECK: CPT | Mod: 26 | Performed by: RADIOLOGY

## 2023-05-07 PROCEDURE — 250N000009 HC RX 250: Performed by: PSYCHIATRY & NEUROLOGY

## 2023-05-07 PROCEDURE — 36415 COLL VENOUS BLD VENIPUNCTURE: CPT

## 2023-05-07 PROCEDURE — 97530 THERAPEUTIC ACTIVITIES: CPT | Mod: GO

## 2023-05-07 PROCEDURE — 999N000147 HC STATISTIC PT IP EVAL DEFER

## 2023-05-07 PROCEDURE — 93270 REMOTE 30 DAY ECG REV/REPORT: CPT

## 2023-05-07 PROCEDURE — 250N000011 HC RX IP 250 OP 636: Performed by: PSYCHIATRY & NEUROLOGY

## 2023-05-07 PROCEDURE — 85027 COMPLETE CBC AUTOMATED: CPT

## 2023-05-07 PROCEDURE — 70498 CT ANGIOGRAPHY NECK: CPT | Mod: MG

## 2023-05-07 PROCEDURE — 70450 CT HEAD/BRAIN W/O DYE: CPT

## 2023-05-07 PROCEDURE — 250N000011 HC RX IP 250 OP 636

## 2023-05-07 RX ORDER — ATORVASTATIN CALCIUM 40 MG/1
40 TABLET, FILM COATED ORAL EVERY EVENING
Qty: 30 TABLET | Refills: 3 | Status: SHIPPED | OUTPATIENT
Start: 2023-05-07

## 2023-05-07 RX ORDER — SULFAMETHOXAZOLE/TRIMETHOPRIM 800-160 MG
1 TABLET ORAL 2 TIMES DAILY
Status: DISCONTINUED | OUTPATIENT
Start: 2023-05-07 | End: 2023-05-07 | Stop reason: HOSPADM

## 2023-05-07 RX ORDER — IOPAMIDOL 755 MG/ML
75 INJECTION, SOLUTION INTRAVASCULAR ONCE
Status: COMPLETED | OUTPATIENT
Start: 2023-05-07 | End: 2023-05-07

## 2023-05-07 RX ORDER — ASPIRIN 81 MG/1
81 TABLET ORAL DAILY
Status: DISCONTINUED | OUTPATIENT
Start: 2023-05-07 | End: 2023-05-07 | Stop reason: HOSPADM

## 2023-05-07 RX ORDER — SULFAMETHOXAZOLE/TRIMETHOPRIM 800-160 MG
1 TABLET ORAL 2 TIMES DAILY
Qty: 6 TABLET | Refills: 0 | Status: ON HOLD | OUTPATIENT
Start: 2023-05-07 | End: 2023-05-12

## 2023-05-07 RX ADMIN — IOPAMIDOL 75 ML: 755 INJECTION, SOLUTION INTRAVENOUS at 08:31

## 2023-05-07 RX ADMIN — BACLOFEN 10 MG: 10 TABLET ORAL at 16:09

## 2023-05-07 RX ADMIN — SULFAMETHOXAZOLE AND TRIMETHOPRIM 1 TABLET: 800; 160 TABLET ORAL at 11:35

## 2023-05-07 RX ADMIN — DIPHENHYDRAMINE HYDROCHLORIDE 50 MG: 50 CAPSULE ORAL at 07:20

## 2023-05-07 RX ADMIN — ACETAMINOPHEN 1000 MG: 500 TABLET ORAL at 15:01

## 2023-05-07 RX ADMIN — GABAPENTIN 1200 MG: 600 TABLET, FILM COATED ORAL at 16:10

## 2023-05-07 RX ADMIN — METHYLPREDNISOLONE 32 MG: 32 TABLET ORAL at 06:30

## 2023-05-07 RX ADMIN — BACLOFEN 10 MG: 10 TABLET ORAL at 11:33

## 2023-05-07 RX ADMIN — SODIUM CHLORIDE, PRESERVATIVE FREE 90 ML: 5 INJECTION INTRAVENOUS at 08:29

## 2023-05-07 RX ADMIN — ONDANSETRON HYDROCHLORIDE 4 MG: 4 TABLET, FILM COATED ORAL at 08:17

## 2023-05-07 RX ADMIN — ASPIRIN 81 MG: 81 TABLET ORAL at 11:44

## 2023-05-07 RX ADMIN — SULFAMETHOXAZOLE AND TRIMETHOPRIM 1 TABLET: 800; 160 TABLET ORAL at 18:45

## 2023-05-07 RX ADMIN — ATORVASTATIN CALCIUM 40 MG: 40 TABLET, FILM COATED ORAL at 18:45

## 2023-05-07 RX ADMIN — GABAPENTIN 1200 MG: 600 TABLET, FILM COATED ORAL at 11:33

## 2023-05-07 ASSESSMENT — ACTIVITIES OF DAILY LIVING (ADL)
ADLS_ACUITY_SCORE: 30

## 2023-05-07 NOTE — PLAN OF CARE
Status: Admit 5/5 with acute ischemic stroke in L PCA, UTI+  Vitals: HTN within parameters, on CCM.  Neuros: Alert, disoriented to exact date. Forgetful. R field cut. BUE 5, BLE 0, paraplegic.   IV: PIV SL  Labs/Electrolytes: WNL  Resp/trach: LS clear, on RA during the day. Continuous SAT monitor in place.  Diet: Regular diet, poor po. Nausea this AM, prn zofran given.  Bowel status: colostomy in place, +Gas  : hudson in place for neurogenic bladder  Skin: edema to BLE  Pain: denied, implanted baclofen pain pump for chronic pain, remote at bedside.   Activity: A2 Lift/Pivot. Wheelchair at baseline  Plan: CTA completed this AM, no reactions noted. Stroke PLC completed today at 1400. Possible discharge home today pending OT recs.

## 2023-05-07 NOTE — CONSULTS
Stroke Education Note    The following information has been reviewed with the patient and spouse:    1. Warning signs of stroke    2. Calling 911 if having warning signs of stroke    3. All modifiable risk factors: hypertension, CAD, atrial fib, diabetes, hypercholesterolemia, smoking, substance abuse, diet, physical inactivity, obesity, sleep apnea.    4. Patient's risk factors for stroke which include: HTN, hypercholesterolemia, physical inactivity    5. Follow-up plan for after discharge    6. Discharge medications which include: ASA, Lipitor, metoprolol    In addition, the above information was given to the patient and spouse in writing as a part of the [facility] Stroke Class Handout.    Learner's response to risk factors / lifestyle modification education: Committment to change     Charis Aviles RN

## 2023-05-07 NOTE — PHARMACY-CONSULT NOTE
Pharmacy Consult to evaluate for medication related stroke core measures    Anayeli Gagnon, 72 year old female admitted for visual changes on 5/5/2023.    Thrombolytic was not given because of Time from onset contraindications    VTE Prophylaxis SCDs /PCDs placed on 5/5, as appropriate prior to end of hospital day 2.    Antithrombotic: aspirin and clopidogrel started on 5/6, as appropriate by end of hospital day 2. Continue antithrombotic therapy on discharge to meet quality measures, unless contraindicated.    Anticoagulation if history of A-fib/flutter: Patient does not have history of A-fib/flutter - anticoagulation not required for medication related stroke core measures.     LDL Cholesterol Calculated   Date Value Ref Range Status   05/05/2023 116 (H) <=100 mg/dL Final   11/17/2011 132 (H) 0 - 129 mg/dL Final     Comment:     LDL Cholesterol is the primary guide to therapy: LDL-cholesterol goal in high   risk patients is <100 mg/dL and in very high risk patients is <70 mg/dL.       Patient currently receiving Lipitor (atorvastatin) continue statin on discharge to meet quality measures, unless contraindicated.    Recommendations: None at this time    Thank you for the consult.    Jonas Valdivia RPH 5/7/2023 4:25 PM

## 2023-05-07 NOTE — PLAN OF CARE
Status: Presented with confusion and stroke like symptoms. Dx UTI, oral antibiotics started, stroke workup in progress  Vitals: VSS on 3L NC  Neuros: Slept well. Ox4 but can be forgetful. Paraplegic at baseline. Strength upper ext. 5/5, lower 0/5 with N/T  IV: PIV SL  Resp/trach: 2L NC when sleeping, per report from previous shift, pt was noted to be in 80's on RA when asleep last evening  Diet: Regular diet, good appetite  Bowel status: Colostomy intact, + gas, no BM. Pt reported that is baseline to have BM Q 2-3 days  : +UTI, on abx.   Cunningham for neurogenic bladder replaced yesterday, good amount, pale yellow urine output overnight.   Skin: 2-3+ Pitting edema bilateral lower extremity- pt states this is baseline due to her paraplegia. Preventative sacral mepilex in place   Pain: Denies pain, patient has Medtronic implanted pain pump for chronic pain. Remote is at bedside.  Activity: Ax2 to pivot/Lift out of bed to home wheelchair  Social: No calls overnight  Plan: CTA scheduled for 0820 5/7.  Pt has had previous reaction to contrast, so being pre-medicated. Methylpred x2 doses (last marge and this am @ 0630).  Benadryl po at 0720.

## 2023-05-07 NOTE — PLAN OF CARE
6A PT Deferral: Per discussion with OT patient without acute change in mobility. Is dependent for transfers at baseline and has ample support from spouse. OT to follow for visual changes. Will complete PT orders at this time.

## 2023-05-07 NOTE — PROGRESS NOTES
"   05/06/23 1413   Appointment Info   Signing Clinician's Name / Credentials (OT) Eliz Gonzales, OTR/L       Present no   Living Environment   People in Home spouse   Home Accessibility no concerns;wheelchair accessible   Transportation Anticipated family or friend will provide  (Spouse drives)   Living Environment Comments Patient lives with  who provides 24/7 assistance since she became paraplegic. She has a walk in shower and uses a rolling shower chair.   Self-Care   Usual Activity Tolerance good   Current Activity Tolerance moderate   Regular Exercise No   Equipment Currently Used at Home wheelchair, power;dressing device;lift device;shower chair   Fall history within last six months no   Activity/Exercise/Self-Care Comment Enjoys knitting/crocheting   Instrumental Activities of Daily Living (IADL)   Previous Responsibilities housekeeping   IADL Comments Spouse assists with all driving, cooking and laundry.  RN assists with hudson/ostomy cares every 3 weeks.   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  On interview, Annamarie notes that all of a sudden in the morning she noticed a big bright dark [place] in her field of vision.  Denies that the [place] had any color to it and cannot localize to either side.  Exam shows R homonymous incongruous hemianopia.\"   Existing Precautions/Restrictions fall   General Observations and Info Activity orders: up with assist   Cognitive Status Examination   Orientation Status person;place "   Cognitive Status Comments Word finding deficits, will benefit from cognitive screen   Visual Perception   Visual Impairment/Limitations peripheral vision impaired right   Visual Field Deficit homonymous hemianopsia, right   Sensory   Sensory Quick Adds UE sensation intact   Pain Assessment   Patient Currently in Pain No   Range of Motion Comprehensive   General Range of Motion bilateral upper extremity ROM WFL   Strength Comprehensive (MMT)   General Manual Muscle Testing (MMT) Assessment no strength deficits identified   Coordination   Upper Extremity Coordination No deficits were identified   Bed Mobility   Bed Mobility rolling right   Rolling Right [place] (Bed Mobility) moderate assist (50% patient effort);verbal cues   Assistive Device (Bed Mobility) bed rails   Transfers   Transfer Comments Needs universal sling and ceiling lift/Golvo for dependent transfer from bed to power wheelchair   Balance   Balance Assessment sitting balance: static   Activities of Daily Living   BADL Assessment/Intervention lower body dressing   Lower Body Dressing Assessment/Training   Position (Lower Body Dressing) sitting up in bed   Assistive Devices (Lower Body Dressing) reacher   [place] Level (Lower Body Dressing) moderate assist (50% patient effort)   Clinical Impression   Criteria for Skilled Therapeutic Interventions Met (OT) Yes, treatment indicated   OT Diagnosis Patient presented to OT with decreased independence in activities of daily living.   Influenced by the following impairments Right visual field cut, cog deficits   OT Problem List-Impairments impacting ADL activity tolerance impaired;cognition;vision   Assessment of Occupational Performance 3-5 Performance Deficits   Identified Performance Deficits Dressing, bathing, transferring   Planned Therapy Interventions (OT) ADL retraining;cognition;transfer training;visual perception;home program guidelines;progressive activity/exercise   Clinical Decision  Making Complexity (OT) low complexity   Risk & Benefits of therapy have been explained evaluation/treatment results reviewed   OT Total Evaluation Time   OT Eval, Low Complexity Minutes (30473) 6   OT Goals   Therapy Frequency (OT) 6 times/wk   OT Predicted Duration/Target Date for Goal Attainment 05/12/23   OT Goals Hygiene/Grooming;Upper Body Dressing;Lower Body Dressing;Upper Body Bathing;Lower Body Bathing;Transfers;Cognition;Home Management   OT: Hygiene/Grooming independent;from wheelchair   OT: Upper Body Dressing Independent;from wheelchair   OT: Lower Body Dressing Modified independent;from wheelchair   OT: Upper Body Bathing Independent   OT: Lower Body Bathing Modified independent   OT: Transfer   (Patient will tolerate dependent transfer to and from power WC with use of ceiling lift/Golvo and universal sling.)   OT: Home Management Independent;with light demand household tasks;from wheelchair   OT: Cognitive Patient/caregiver will verbalize understanding of cognitive assessment results/recommendations as needed for safe discharge planning   Therapeutic Activities   Therapeutic Activity Minutes (18527) 45   Symptoms noted during/after treatment fatigue   Treatment Detail/Skilled Intervention OT: Initial evaluation and treatment session completed. Patient largely unaware of deficits this afternoon, multiple times reporting no visual limitations at this time. However, she is primarily limited by significant loss of peripheral vision in right eye and word finding difficulties. She is unable to find small or large objects placed on the right side of her and will need ongoing rehab to learn compensatory strategies. At baseline (due to paraplegia), patient uses a power wheelchair for mobility and a sling with Michaela lift to transfer with assistance of spouse. To prepare for OOB activity, patient completed bed mobility with moderate assistance and sat edge of bed x5 minutes with CGA/minimal assistance. Unable to  transfer to power wheel chair at this time given spatial limitations in her ED room and no ceiling lift available. Patient returned to bed and left with all needs in reach, declining any grooming/hygiene at this time as wants to eat lunch.   OT Discharge Planning   OT Plan OT plan: cognitive screen, bed to power WC transfer with use of ceiling lift/Golvo and universal sling, PT needs?   OT Discharge Recommendation (DC Rec) home with assist;home with outpatient occupational therapy   OT Rationale for DC Rec Anticipate patient will be able to discharge home with 24/7 assistance from spouse for mobility needs. OP OT follow up for right visual field cut and word finding difficulties.   OT Brief overview of current status Bed mobility completed with moderate assistance and patient tolerated edge of bed sitting x5 minutes with CGA.   Total Session Time   Timed Code Treatment Minutes 45   Total Session Time (sum of timed and untimed services) 51

## 2023-05-07 NOTE — PROGRESS NOTES
"[facility]    Stroke Progress Note    Interval Events  NAEO. This morning, Annamarie feels \"like she went on a trip\" after the contrast but is experiencing no rash, difficulty breathing or distress just some mental fog. No new or worsening symptoms. Discussed plan including pending CTA results and next steps with Annamarie, no questions or concerns.     Impression   72-year-old female with history of chronic paraplegia due to thoracic cord injury complicated by hematoma, hypertension, GERD, chronic pain syndrome, depression/anxiety who presented on 5/5 with a 2 day history of visual field changes.  She notes that she had an acute onset of a \"black [place]\" in her vision that subsided after 3 hours. On examination there is a noted right visual field cut in the right eye that spares visual fields in the left eye, as well as some chronic paraplegia in the bilateral lower extremities. The new visual field cut is certainly explained by a hypodensity seen in the left occipital lobe and the left PCA territory. This is concerning for an acute to subacute ischemic infarct. Etiology of this infarct is at the present moment undetermined.  Further stroke workup for intracranial atherosclerosis versus ESUS  were all are on the differentials. Unlikely cardioembolic with a reassuring ECHO study and minimal risk factors, though patient is paraplegic. Small vessel also unlikely with minimal risk factors. Admitted for further workup including a CTA requiring premedication due to known contrast allergy.  Of note, cannot get MRI because of baclofen pump.    CTA showed no significant atherosclerosis, but did reveal absence of contrast filling in the left distal P3 segment.  Thus, at this point, etiology is ESUS.  Hence we will transition from DAPT to aspirin 81 mg monotherapy.  We are waiting for PT to assess her, if no concerns, then we will discharge her home after that with a 30-day heart " monitor and follow-up with the general neurology team, and her pending course of UTI treatment with Bactrim.    Plan  #Acute to subacute ischemic stroke in the L occipital lobe, L PCA territory, etiology status  - Atorvastatin 40 mg with LDL goal  40-70  - PT/OT/SLP  - CTA completed  - Transitioned to Aspirin 81 monotherapy from DAPT    #Acute UTI  UA on admission with signs of infection. Treated in the ED with a dose of CTX. Cultures grew >100,000 CFU/mL Klebsiella oxytoca, susceptible to Bactrim.   - Ceftriaxone in ED 5/5  - Continued on bactrim 800 BID (5/6-5/10)    #Compensated Respiratory Acidosis  No history of asthma, COPD or other lung disease. No obvious etiology for respiratory depression with  Negative sleep study in 2022. Per chart review does have a history of desaturations when sleeping.   - VBG on admission: 7.31/61/20/31  - CTM     #Paraplegia 2/2 Thoracic spinal injury  #Chronic Pain Syndrome  Noted on 11/2017 to have a spontaneous thoracic hematoma due to AVM with weakness over 6 months progressing to paralysis waist down and further evolution to neurogenic bladder s/p hudson and bowel s/p colectomy.   Baclofen pump in place, unable to get MRI.   - Continue baclofen orally, PTA dosing 10mg TID  - Gabapentin 1200 mg TID  - PTA Opioid regimen, narcan ordered PRN  - Bowel regimen PRN    PPX: SCD for DVT  Diet: Regular  Abx: PO Bactrim 800 BID    Patient Follow-up    CTA showed no significant atherosclerosis, but did reveal absence of contrast filling in the left distal P3 segment.  Thus, at this point, etiology is ESUS.  Hence we will transition from DAPT to aspirin 81 mg monotherapy.  We are waiting for PT to assess her, if no concerns, then we will discharge her home after that with a 30-day heart monitor and follow-up with the general neurology team, and her pending course of UTI treatment with Bactrim.    Patient seen and evaluated with Dr. Tosin Augustine. Discussed with attending, Dr. Csatellanos.      Suresh Alvarez ([place]), MS3   [employer]    Resident/Fellow Attestation   I, Tosin Augustine, was present with the medical student who participated in the service and in the documentation of the note.  I have verified the history and personally performed the physical exam and medical decision making.  I agree with the assessment and plan of care as documented in the note.      FRANK Fraire  PGY1 Resident  Stroke [facility] *[id number]    HPI Summary  Anayeli Gagnon is a 72 year old female with a past history of possible TIA on 4/26/2023, prior thoracic cord injury resulting in paraplegia complicated by hematoma, hypertension, chronic pain syndrome who presented to the ED with concerns for visual changes that happened on 5/4/2020 at 3am.  On interview, Annamarie notes that all of a sudden in the morning she noticed a big bright dark [place] in her field of vision.  Denies that the [place] had any color to it and cannot localize to either side.  She notes that the symptoms went away after 3 or 4 hours and did not seek care until 5/5/2023. CT scan was obtained that shows a subacute hypodensity in the left occipital lobe in the PCA territory concerning for a subacute ischemic infarct.  Patient was unable to initially get vessel imaging with CTA due to a noted contrast allergy. The patient is unable to currently get an MRI due to a chronic baclofen pump.  Patient denies a history of smoking, she denies a history of atrial fibrillation, hyperlipidemia or other cardiac etiologies (however she notes that there is a strong family history of this and hyperlipidemia).  On examination, the patient does have chronic paraplegia in the bilateral lower extremities. She also does have a notable right visual field cut that is limited to the right eye with full visual fields in the left eye, which does localize to the lesion seen on CT.  As such, was explained to the patient that she would need to be admitted  for routine stroke work-up.  The patient was in agreement with this plan.    Stroke Evaluation Summarized    MRI/Head CT 05/07/23 0851 CTA Head Neck with Contrast  1. Head CTA demonstrates absence of contrast filling in the left posterior cerebral artery at the distal P3 segment near the junction with P4, likely secondary to occlusion. This can alternatively be technical.   2. Neck CTA demonstrates no stenosis of the major cervical arteries.     05/05/23 1816 CT Head w/o Contrast  1. Acute to subacute infarct medial left occipital lobe.  2. No acute intracranial hemorrhage.  3. Chronic small vessel ischemic disease.        Echocardiogram 05/06/23  Global and regional left ventricular function is normal with an EF of 55-60%.  Global right ventricular function is normal.  The inferior vena cava was normal in size with preserved respiratory  variability.  No pericardial effusion is present.   EKG/Telemetry 05/06/23  Sinus rhythm with sinus arrhythmia   Nonspecific T wave abnormality   Abnormal ECG   Unconfirmed report - interpretation of this ECG is computer generated - see medical record for final interpretation    Other Testing 5/5 Urinalysis with signs of infection    XR CHEST 2 VIEWS  5/5/2023 8:14 PM   Mild blunting of the bilateral costophrenic angles, trace pleural effusions versus pleural thickening. No focal airspace disease.     LDL  5/5/2023: 116 mg/dL   A1C  5/5/2023: 5.6 %   Troponin 5/5/2023: 46 ng/L; 0.01 ng/mL     ______________________________________________________    Clinically Significant Risk Factors Present on Admission                 Medications   Scheduled Meds    aspirin  81 mg Oral Daily     atorvastatin  40 mg Oral QPM     baclofen  10 mg Oral TID     gabapentin  1,200 mg Oral TID     lidocaine  1 patch Transdermal Q24H    And     lidocaine   Transdermal Q8H DYLAN     metoprolol succinate ER  12.5 mg Oral QPM     sodium chloride (PF)  3 mL Intracatheter Q8H     sulfamethoxazole-trimethoprim   1 tablet Oral BID       Infusion Meds    - MEDICATION INSTRUCTIONS -       - MEDICATION INSTRUCTIONS -         PRN Meds  acetaminophen, lidocaine 4%, lidocaine (buffered or not buffered), LORazepam, - MEDICATION INSTRUCTIONS -, - MEDICATION INSTRUCTIONS -, naloxone **OR** naloxone **OR** naloxone **OR** naloxone, ondansetron, oxyCODONE, polyethylene glycol, sennosides, sodium chloride (PF)       PHYSICAL EXAMINATION  Temp:  [97.7  F (36.5  C)-98.6  F (37  C)] 98.6  F (37  C)  Pulse:  [66-84] 77  Resp:  [16] 16  BP: (118-163)/(53-77) 133/70  SpO2:  [90 %-99 %] 95 %      General:  In bed. NAD.   HEENT: Normocephalic/atraumatic  Cardio: RRR  Pulmonary: No respiratory distress. Breathing comfortably on room air  Skin: No rash, urticaria or other skin changes across extremities, chest and abdomen.     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 There is no facial asymmetry. Facial sensation is intact in the V1 through V3 distributions. Tongue protrudes to the midline. Shoulder shrug strong bilaterally.  Motor: There is no drift in the bilateral upper extremities, and the patient is full strength in , elbow flexion/extension, and shoulder abduction bilaterally. Patient is chronically paraplegic in the bilateral lower extremities and cannot volitionally move either limb.  Markedly increased tone in the bilateral lower extremities as a result of chronic spasticity to her thoracic cord lesion.   Sensory: Light touch is intact without extinction to the bilateral upper extremities. Sensation to deep palpation in the bilateral lower extremities.  Coordination: Finger-nose-finger intact bilaterally.  The patient is unable to perform heel-to-shin due to chronic  paraplegia.  Reflexes: Biceps, brachioradialis and triceps intact. No patellar or plantar reflex. Sustained clonus in LLE.   Station/Gait:  deferred    Stroke Scales    NIHSS  1a. Level of Consciousness 0-->Alert, keenly responsive   1b. LOC Questions 0-->Answers both questions correctly   1c. LOC Commands 0-->Performs both tasks correctly   2.   Best Gaze 0-->Normal   3.   Visual 1-->Partial hemianopia   4.   Facial Palsy 0-->Normal symmetrical movements   5a. Motor Arm, Left 0-->No drift, limb holds 90 (or 45) degrees for full 10 secs   5b. Motor Arm, Right 0-->No drift, limb holds 90 (or 45) degrees for full 10 secs   6a. Motor Leg, Left 4-->No movement   6b. Motor Leg, right 4-->No movement   7.   Limb Ataxia 0-->Absent   8.   Sensory 0-->Normal, no sensory loss   9.   Best Language 0-->No aphasia, normal   10. Dysarthria 0-->Normal   11. Extinction and Inattention  0-->No abnormality   Total 9 (05/07/23 0811)       Modified Polo Score (Pre-morbid)  4    Imaging  I personally reviewed all imaging; relevant findings per HPI.     Lab Results  CBC  Recent Labs   Lab 05/07/23  0739 05/06/23  0538 05/05/23  1704   WBC 6.2 6.5 7.6   RBC 4.55 4.04 4.58   HGB 13.8 12.2 14.0   HCT 42.3 38.1 43.1    151 166     Basic Metabolic Panel    Recent Labs   Lab 05/07/23  0739 05/06/23  1811 05/06/23  0538 05/06/23  0454 05/05/23  1704     --  141  --  142   POTASSIUM 4.3  --  3.8  --  4.0   CHLORIDE 105  --  105  --  103   CO2 27  --  24  --  27   BUN 11.5  --  14.3  --  11.6   CR 0.70  --  0.63  --  0.71   * 115* 137*   < > 84   ADAN 9.2  --  8.7*  --  9.4    < > = values in this interval not displayed.     Liver Panel  Recent Labs   Lab 05/05/23  1704   PROTTOTAL 7.1   ALBUMIN 4.4   BILITOTAL 0.3   ALKPHOS 76   AST 23   ALT 9*     INR    Recent Labs   Lab Test 05/05/23  1704 01/21/22  0420 01/19/22  0627   INR 1.00 0.99 1.02      Lipid Profile    Recent Labs   Lab Test 05/05/23  1925   CHOL 195   HDL 60    *   TRIG 95     A1C    Recent Labs   Lab Test 05/05/23  1704 09/26/22  1101   A1C 5.6 5.8*

## 2023-05-07 NOTE — PLAN OF CARE
Status: Presented with confusion and stroke like symptoms. Dx UTI, oral antibiotics started, stroke workup in progress  Vitals: VSS on 2L NC  Neuros: AOx4 but can be forgetful. When asked year, pt stated 2013 and then corrected to 2023, reported some blurry vision. Parapeligic at baseline. Strength upper ext. 5/5, lower 0/5  IV: PIV SL  Resp/trach: 2L NC when sleeping, 02 dips into 70/80s when sleeping  Diet: Regular diet, good appetite  Bowel status: Colostomy   : Cunningham replaced with 16f, catheter wipes used  Skin: 3+ Pitting edema bilateral lower extremity- pt states this is baseline due to her paraplegia. Preventative sacral mepilex in place   Pain: Denies pain, patient has Medtronic pain pump that is surgically placed due to chronic pain. Remote is at bedside, looks like a cellphone  Activity: Ax2 to pivot/Lift out of bed  Social: Daughters visited at bedside this PM  Plan: CTA scheduled for 0820 5/7, pt has had previous reaction to contrast. Preventative meds started (check MAR under PRN). Methylpredisolone needs to be given at 0620 and benadryl at 0720.  Continue abx and stroke workup.  Updates this shift: Removed and replaced urinary cath. 16f,     Stroke Patients:   PLC scheduled or completed: to be scheduled  Pneumoboots in place: yes    Doris and Marjan

## 2023-05-07 NOTE — DISCHARGE SUMMARY
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  On interview, Annamarie notes that all of a sudden in the morning she noticed a big bright dark [place] in her field of vision.  Denies that the [place] had any color to it and cannot localize to either side.  She notes that the symptoms went away after 3 or 4 hours and did not seek care until 5/5/2023.  CT scan was obtained that shows a subacute hypodensity in the left occipital lobe in the PCA territory concerning for a subacute ischemic infarct.  Patient was unable to get vessel imaging with CTA due to a noted contrast allergy.  The patient is unable to currently get an MRI due to a chronic baclofen pump that would require appropriate staff to turn off the device for the duration of the study.  Patient denies a history of smoking, she denies a history of atrial fibrillation, hyperlipidemia or other cardiac etiologies (however she notes that there is " "a strong family history of this and hyperlipidemia).  On examination, the patient does have chronic paraplegia in the bilateral lower extremities.  She cannot feel or move these extremities to command, however with manipulation her left leg spontaneously extends at the knee.  She also does have a notable right visual field cut that is limited to the right eye with full visual fields in the left eye, which does localize to the lesion seen on CT. As such, was explained to the patient that she would need to be admitted for routine stroke work-up. The patient was in agreement with this plan.\"    Please see H&P dated 5/5/2023 for further details about presentation.    Brief Hospital Course:   72-year-old female with history of chronic paraplegia due to thoracic cord injury complicated by hematoma, hypertension, GERD, chronic pain syndrome, depression/anxiety who presented on 5/5/2023 with a 2 day history of visual field changes. She notes that she had an acute onset of a \"black [place]\" in her vision that subsided after 3 hours. On examination there is a noted right visual field cut in the right eye that spares visual fields in the left eye, as well as some chronic paraplegia in the bilateral lower extremities. The new visual field cut is certainly explained by a hypodensity seen in the left occipital lobe and the left PCA territory found on CT imaging. This is concerning for an acute to subacute ischemic infarct. Unable to obtain MRI/MRA with baclofen pump. CT performed in the context of contrast allergy after premedication per radiology protocol. CTA did reveal absence of contrast filling in the left posterior cerebral artery at the distal P3 segment near the junction  with P4, likely secondary to occlusion. This is consistent with the stroke that she had.  No atherosclerosis, so this rules out Intracranial atherosclerosis as a cause. Etiology is likely ESUS as a result, unless the cardiac monitor shows signs of A-fib, " in which case it will become cardioembolic and she would need anticoagulation instead of aspirin.    Plan at discharge:  - Will be going home with a 30 day cardiac event monitor.   - Follow-up with general neurology in 4 to 6 weeks for hospital follow-up plus results of event monitor.  - Continue aspirin 81 mg daily and Lipitor 40 mg daily.  - Referral to outpatient Occupational Therapy for vision rehab.    Rehab evaluation: OT and PT. PLC learning done.     Smoking Cessation: patient is not a smoker    BP Long-term Goal: 140/90 or less    Antithrombotic/Anticoagulant Agent: aspirin 81 mg    Statins: Started on atorvastatin 40mg       Hgb A1C Goal: < 7.0    Complications: UTI.     Other problems addressed during this hospitalization:  UTI due to Indwelling Catheter  Pt with chronic indwelling catheter secondary to neurogenic bladder. Changes catheter every 3 weeks with assistance of home health. UA on admission with signs of infection. Treated in the ED on  with a dose of ceftriaxone. Cultures grew >100,000 CFU/mL Klebsiella oxytoca, susceptible to Bactrim. Transitioned to oral bactrim 800 BID until completion of 5 day course on 5/10/2023.      PERTINENT INVESTIGATIONS  Labs  Lipid Panel: Recent Labs   Lab Test 23  1925   CHOL 195   HDL 60   *   TRIG 95     A1C:   Lab Results   Component Value Date    A1C 5.6 2023    A1C 5.3 2011     INR:   Recent Labs   Lab 23  1704   INR 1.00      Coag Panel / Hypercoag Workup: Not indicated    Pending test results: none    Echo:   Global and regional left ventricular function is normal with an EF of 55-60%.  Global right ventricular function is normal.  The inferior vena cava was normal in size with preserved respiratory  variability.  No pericardial effusion is present.  Atria normal.     Imagin23 0851 CTA Head Neck with Contrast  1. Head CTA demonstrates absence of contrast filling in the left posterior cerebral artery at the distal  P3 segment near the junction with P4, likely secondary to occlusion. This can alternatively be technical.   2. Neck CTA demonstrates no stenosis of the major cervical arteries.                05/05/23 1816 CT Head w/o Contrast  1. Acute to subacute infarct medial left occipital lobe.  2. No acute intracranial hemorrhage.  3. Chronic small vessel ischemic disease.    Endovascular procedure: None     Cardiac Monitoring: Patient had > 24 hrs of cardiac monitor while in hospital.    Findings: No atrial fibrillation was found.    Sleep Apnea Screen:   Questions/Answers      1. Prior to your stroke, have you been told that you snore? No.    2. Prior to your stroke, have you been told that you struggle to breath while you are sleeping? No.    3. Prior to your stroke, do you feel tired and sleepy even after getting a normal night of sleep? No.    Sleep Apnea Screen Findings: Patient has 0-1 symptoms of sleep apnea.  Further sleep study is not recommended at this time.    PHQ-9 Depression Screen Score: 5    Education discussed with: patient on blood pressure management, cholesterol management and follow-up recommendations/plan.    During daily rounds, the plan of care was discussed and developed with patient.  Plan of care includes: optimization of risk factors.    PHYSICAL EXAMINATION  Vital Signs:  B/P: 163/77, T: 98, P: 66, R: 16    General:  In bed. NAD.   HEENT: Normocephalic/atraumatic  Cardio: RRR  Pulmonary: No respiratory distress. Breathing comfortably on room air  Skin: No rash, urticaria or other skin changes across extremities, chest and abdomen.     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 There is no facial asymmetry. Facial sensation is intact in the  V1 through V3 distributions. Tongue protrudes to the midline. Shoulder shrug strong bilaterally.  Motor: There is no drift in the bilateral upper extremities, and the patient is full strength in , elbow flexion/extension, and shoulder abduction bilaterally. Patient is chronically paraplegic in the bilateral lower extremities and cannot volitionally move either limb.  Markedly increased tone in the bilateral lower extremities as a result of chronic spasticity to her thoracic cord lesion.   Sensory: Light touch is intact without extinction to the bilateral upper extremities. Sensation to deep palpation in the bilateral lower extremities.  Coordination: Finger-nose-finger intact bilaterally.  The patient is unable to perform heel-to-shin due to chronic paraplegia.  Reflexes: Biceps, brachioradialis and triceps intact. No patellar or plantar reflex. Sustained clonus in LLE.   Station/Gait:  deferred    National Institutes of Health Stroke Scale (on day of discharge)  NIHSS Total Score: 9    Modified Mauro Score (Discharge)  4-Moderately severe disability; unable to walk without assistance and unable to attend to own bodily    Medications    Current Discharge Medication List      CONTINUE these medications which have NOT CHANGED    Details   acetaminophen (TYLENOL) 500 MG tablet Take 500-1,000 mg by mouth every 6 hours as needed for mild pain      COMPOUND CONTAINING CONTROLLED SUBSTANCE (CMPD RX) - PHARMACY TO MIX COMPOUNDED MEDICATION Concentrations: Dilaudid    14 mg/ml                                                         Bupivacaine 28 mg/ml Ziconitide 14 mcg/ml   Total Volume in Refill: 20 mL    Basal:  Dilaudid    7 mg/day                                                      Bupivacaine 14 mg/day Ziconitide 7 mcg/day   PTM 0.2 hydromorphone, 2 hour lockout, maximum 6 in a day.  Maximum 24 hour dose Hydromorphone 8.16 mg/day Bupivacaine 16.3 mg/day Ziconitide 8.16 mcg/day  Pentec to increase ose on  12/8/2022  Qty: 20 mL, Refills: 0    Associated Diagnoses: Intractable neuropathic pain of lumbosacral origin      gabapentin (NEURONTIN) 600 MG tablet Take 1,200 mg by mouth 3 times daily   Qty: 1 tablet, Refills: 0    Associated Diagnoses: Arteriovenous fistula of spinal cord vessels      lidocaine (LIDODERM) 5 % patch Apply 1 patch topically daily as needed       LORazepam (ATIVAN) 0.5 MG tablet Take 1 tablet by mouth 2 times daily as needed      medical cannabis (Patient's own supply) See Admin Instructions (The purpose of this order is to document that the patient reports taking medical cannabis.  This is not a prescription, and is not used to certify that the patient has a qualifying medical condition.)     Liquid oral formulation.      !! melatonin 3 MG tablet Take 3 mg by mouth      !! melatonin 3 MG tablet Take 3 mg by mouth nightly as needed       metoprolol succinate ER (TOPROL-XL) 25 MG 24 hr tablet Take 12.5 mg by mouth every evening       Multiple Vitamins-Minerals (WOMENS MULTI PO) Take 1 tablet by mouth daily      naloxone (NARCAN) 4 MG/0.1ML nasal spray Spray 1 spray (4 mg) into one nostril alternating nostrils as needed for opioid reversal every 2-3 minutes until assistance arrives  Qty: 0.2 mL, Refills: 0    Associated Diagnoses: Presence of intrathecal pump      ondansetron (ZOFRAN) 4 MG tablet Take 1 tablet (4 mg) by mouth every 6 hours as needed for nausea  Qty: 20 tablet, Refills: 1    Associated Diagnoses: Infection due to 2019 novel coronavirus; Nausea      oxyCODONE (ROXICODONE) 5 MG tablet Take 1 tablet (5 mg) by mouth every 4 hours as needed for moderate to severe pain  Qty: 20 tablet, Refills: 0    Associated Diagnoses: Infection due to 2019 novel coronavirus; Intractable neuropathic pain of lumbosacral origin      polyethylene glycol (MIRALAX) 17 GM/Dose powder Take 17 g by mouth daily as needed for constipation        !! - Potential duplicate medications found. Please discuss with  provider.      STOP taking these medications       baclofen (LIORESAL) 10 MG tablet Comments:   Reason for Stopping:             Additional recommendations and follow up:    No discharge procedures on file.    Patient was seen and discussed with the Attending, Dr. Castellanos.    FRANK Fraire  PGY1 Resident  Stroke [facility] *[phone]  Pager: [phone] [facility]

## 2023-05-08 ASSESSMENT — PATIENT HEALTH QUESTIONNAIRE - PHQ9: SUM OF ALL RESPONSES TO PHQ QUESTIONS 1-9: 5

## 2023-05-08 NOTE — PLAN OF CARE
Status: Presented with confusion and stroke like symptoms. Dx UTI, oral antibiotics started, stroke workup in progress  Vitals: VSS on RA  Neuros: AOx4. Parapeligic at baseline. Strength upper ext. 5/5, lower 0/5  IV: PIV SL, removed for discharge   Resp/trach: 2L NC when sleeping, 02 dips into 70/80s when sleeping  Diet: Regular diet, good appetite  Bowel status: Colostomy   : Cunningham care completed  Skin: 3+ Pitting edema bilateral lower extremity- pt states this is baseline due to her paraplegia. Preventative sacral mepilex in place   Pain: Denies pain, patient has [employer] pain pump that is surgically placed due to chronic pain. Remote is at bedside, looks like a cellphone  Activity: Ax2 to pivot/Lift out of bed  Social: Spouse at bedside this PM  Plan: Discharge home with oral abx     Stroke Patients:   PLC scheduled or completed: to be scheduled  Pneumoboots in place: yes     Kerri     Discharge time/date: 5/7/23 19:45  Walked or Wheelchair: Power WC from home  PIV removed: Yes  Reviewed AVS with patient: Yes  Medication due times added to AVS in EPIC: Yes  Verbalized understanding of discharge with teachback: Yes  Medications retrieved from pharmacy: Yes  Supplies sent home: n/a  Belongings from security with patient: Yes    Kerri

## 2023-05-08 NOTE — PROGRESS NOTES
Occupational Therapy Discharge Summary    Reason for therapy discharge:    Discharged to home with outpatient therapy.    Progress towards therapy goal(s). See goals on Care Plan in [facility] electronic health record for goal details.  Goals partially met.  Barriers to achieving goals:   discharge from facility.    Therapy recommendation(s):    Continued therapy is recommended.  Rationale/Recommendations:  To provide AE, adaptive strategies, and DME PRN for visual related changes.

## 2023-05-09 ENCOUNTER — PATIENT OUTREACH (OUTPATIENT)
Dept: CARE COORDINATION | Facility: CLINIC | Age: 73
End: 2023-05-09
Payer: MEDICARE

## 2023-05-09 NOTE — PROGRESS NOTES
[facility]    Background: Transitional Care Management program identified per system criteria and reviewed by [facility] team for possible outreach.    Assessment:  Upon chart review, patient is in communication with a clinic team member, nurse or provider within their PCP clinic, [facility], for reason of discussing hospital follow up plan of care and answering questions patient may have related to discharge instructions. [facility] Team member will update episode status of Transitional Care Management program to enrolled per system workflows.     [facility] made first outreach attempt on 5/8/2023 to reach patient for hospital follow up and left message and that time.    Plan: [facility] will make no additional outreach attempts to minimize duplicative efforts and reduce potential confusion for patient.      Deborah Villa RN  [facility], [facility]    *[facility] Resource Team does NOT follow patient ongoing. Referrals are identified based on internal discharge reports and the outreach is to ensure patient has an understanding of their discharge instructions.

## 2023-05-10 ENCOUNTER — HOSPITAL ENCOUNTER (OUTPATIENT)
Facility: CLINIC | Age: 73
Setting detail: OBSERVATION
Discharge: HOME | End: 2023-05-12
Attending: EMERGENCY MEDICINE | Admitting: EMERGENCY MEDICINE
Payer: MEDICARE

## 2023-05-10 ENCOUNTER — APPOINTMENT (OUTPATIENT)
Dept: CT IMAGING | Facility: CLINIC | Age: 73
End: 2023-05-10
Attending: EMERGENCY MEDICINE
Payer: MEDICARE

## 2023-05-10 ENCOUNTER — APPOINTMENT (OUTPATIENT)
Dept: GENERAL RADIOLOGY | Facility: CLINIC | Age: 73
End: 2023-05-10
Attending: EMERGENCY MEDICINE
Payer: MEDICARE

## 2023-05-10 DIAGNOSIS — M79.661 PAIN IN BOTH LOWER LEGS: ICD-10-CM

## 2023-05-10 DIAGNOSIS — R10.84 ABDOMINAL PAIN, GENERALIZED: ICD-10-CM

## 2023-05-10 DIAGNOSIS — M79.662 PAIN IN BOTH LOWER LEGS: ICD-10-CM

## 2023-05-10 LAB
ALBUMIN SERPL BCG-MCNC: 4.3 G/DL (ref 3.5–5.2)
ALP SERPL-CCNC: 70 U/L (ref 35–104)
ALT SERPL W P-5'-P-CCNC: 12 U/L (ref 10–35)
ANION GAP SERPL CALCULATED.3IONS-SCNC: 12 MMOL/L (ref 7–15)
AST SERPL W P-5'-P-CCNC: 31 U/L (ref 10–35)
ATRIAL RATE - MUSE: 71 BPM
BACTERIA BLD CULT: NO GROWTH
BACTERIA BLD CULT: NO GROWTH
BASOPHILS # BLD AUTO: 0 10E3/UL (ref 0–0.2)
BASOPHILS NFR BLD AUTO: 1 %
BILIRUB SERPL-MCNC: 0.4 MG/DL
BUN SERPL-MCNC: 14 MG/DL (ref 8–23)
CALCIUM SERPL-MCNC: 9.1 MG/DL (ref 8.8–10.2)
CHLORIDE SERPL-SCNC: 100 MMOL/L (ref 98–107)
CK SERPL-CCNC: 124 U/L (ref 26–192)
CREAT SERPL-MCNC: 0.6 MG/DL (ref 0.51–0.95)
CREAT SERPL-MCNC: 0.79 MG/DL (ref 0.51–0.95)
DEPRECATED HCO3 PLAS-SCNC: 26 MMOL/L (ref 22–29)
DIASTOLIC BLOOD PRESSURE - MUSE: NORMAL MMHG
EOSINOPHIL # BLD AUTO: 0.1 10E3/UL (ref 0–0.7)
EOSINOPHIL NFR BLD AUTO: 2 %
ERYTHROCYTE [DISTWIDTH] IN BLOOD BY AUTOMATED COUNT: 13.1 % (ref 10–15)
GFR SERPL CREATININE-BSD FRML MDRD: 78 ML/MIN/1.73M2
GFR SERPL CREATININE-BSD FRML MDRD: >90 ML/MIN/1.73M2
GLUCOSE BLDC GLUCOMTR-MCNC: 71 MG/DL (ref 70–99)
GLUCOSE BLDC GLUCOMTR-MCNC: 72 MG/DL (ref 70–99)
GLUCOSE BLDC GLUCOMTR-MCNC: 80 MG/DL (ref 70–99)
GLUCOSE SERPL-MCNC: 101 MG/DL (ref 70–99)
HCT VFR BLD AUTO: 42.1 % (ref 35–47)
HGB BLD-MCNC: 13.8 G/DL (ref 11.7–15.7)
IMM GRANULOCYTES # BLD: 0 10E3/UL
IMM GRANULOCYTES NFR BLD: 0 %
INTERPRETATION ECG - MUSE: NORMAL
LIPASE SERPL-CCNC: 11 U/L (ref 13–60)
LYMPHOCYTES # BLD AUTO: 1.4 10E3/UL (ref 0.8–5.3)
LYMPHOCYTES NFR BLD AUTO: 18 %
MCH RBC QN AUTO: 30.3 PG (ref 26.5–33)
MCHC RBC AUTO-ENTMCNC: 32.8 G/DL (ref 31.5–36.5)
MCV RBC AUTO: 93 FL (ref 78–100)
MONOCYTES # BLD AUTO: 0.4 10E3/UL (ref 0–1.3)
MONOCYTES NFR BLD AUTO: 5 %
NEUTROPHILS # BLD AUTO: 6.1 10E3/UL (ref 1.6–8.3)
NEUTROPHILS NFR BLD AUTO: 74 %
NRBC # BLD AUTO: 0 10E3/UL
NRBC BLD AUTO-RTO: 0 /100
P AXIS - MUSE: 71 DEGREES
PLATELET # BLD AUTO: 203 10E3/UL (ref 150–450)
POTASSIUM SERPL-SCNC: 4.6 MMOL/L (ref 3.4–5.3)
PR INTERVAL - MUSE: 142 MS
PROT SERPL-MCNC: 6.9 G/DL (ref 6.4–8.3)
QRS DURATION - MUSE: 82 MS
QT - MUSE: 376 MS
QTC - MUSE: 408 MS
R AXIS - MUSE: 21 DEGREES
RBC # BLD AUTO: 4.55 10E6/UL (ref 3.8–5.2)
SODIUM SERPL-SCNC: 138 MMOL/L (ref 136–145)
SYSTOLIC BLOOD PRESSURE - MUSE: NORMAL MMHG
T AXIS - MUSE: -3 DEGREES
TROPONIN T SERPL HS-MCNC: 29 NG/L
TROPONIN T SERPL HS-MCNC: 36 NG/L
VENTRICULAR RATE- MUSE: 71 BPM
WBC # BLD AUTO: 8.1 10E3/UL (ref 4–11)

## 2023-05-10 PROCEDURE — 258N000003 HC RX IP 258 OP 636: Performed by: EMERGENCY MEDICINE

## 2023-05-10 PROCEDURE — 96376 TX/PRO/DX INJ SAME DRUG ADON: CPT

## 2023-05-10 PROCEDURE — 82962 GLUCOSE BLOOD TEST: CPT

## 2023-05-10 PROCEDURE — 71045 X-RAY EXAM CHEST 1 VIEW: CPT

## 2023-05-10 PROCEDURE — 99285 EMERGENCY DEPT VISIT HI MDM: CPT | Mod: 25 | Performed by: EMERGENCY MEDICINE

## 2023-05-10 PROCEDURE — 36415 COLL VENOUS BLD VENIPUNCTURE: CPT | Performed by: PHYSICIAN ASSISTANT

## 2023-05-10 PROCEDURE — G0378 HOSPITAL OBSERVATION PER HR: HCPCS

## 2023-05-10 PROCEDURE — 96366 THER/PROPH/DIAG IV INF ADDON: CPT

## 2023-05-10 PROCEDURE — 80053 COMPREHEN METABOLIC PANEL: CPT | Performed by: EMERGENCY MEDICINE

## 2023-05-10 PROCEDURE — 82565 ASSAY OF CREATININE: CPT | Performed by: PHYSICIAN ASSISTANT

## 2023-05-10 PROCEDURE — 250N000011 HC RX IP 250 OP 636: Performed by: PHYSICIAN ASSISTANT

## 2023-05-10 PROCEDURE — 84484 ASSAY OF TROPONIN QUANT: CPT | Performed by: EMERGENCY MEDICINE

## 2023-05-10 PROCEDURE — 96374 THER/PROPH/DIAG INJ IV PUSH: CPT

## 2023-05-10 PROCEDURE — 99285 EMERGENCY DEPT VISIT HI MDM: CPT | Mod: 25

## 2023-05-10 PROCEDURE — 250N000011 HC RX IP 250 OP 636: Performed by: EMERGENCY MEDICINE

## 2023-05-10 PROCEDURE — 71045 X-RAY EXAM CHEST 1 VIEW: CPT | Mod: 26 | Performed by: RADIOLOGY

## 2023-05-10 PROCEDURE — 93010 ELECTROCARDIOGRAM REPORT: CPT | Performed by: EMERGENCY MEDICINE

## 2023-05-10 PROCEDURE — 96372 THER/PROPH/DIAG INJ SC/IM: CPT | Performed by: PHYSICIAN ASSISTANT

## 2023-05-10 PROCEDURE — 83690 ASSAY OF LIPASE: CPT | Performed by: EMERGENCY MEDICINE

## 2023-05-10 PROCEDURE — 82550 ASSAY OF CK (CPK): CPT | Performed by: EMERGENCY MEDICINE

## 2023-05-10 PROCEDURE — 250N000011 HC RX IP 250 OP 636: Performed by: STUDENT IN AN ORGANIZED HEALTH CARE EDUCATION/TRAINING PROGRAM

## 2023-05-10 PROCEDURE — 36415 COLL VENOUS BLD VENIPUNCTURE: CPT | Performed by: EMERGENCY MEDICINE

## 2023-05-10 PROCEDURE — 93005 ELECTROCARDIOGRAM TRACING: CPT

## 2023-05-10 PROCEDURE — 70450 CT HEAD/BRAIN W/O DYE: CPT | Mod: MF

## 2023-05-10 PROCEDURE — 96365 THER/PROPH/DIAG IV INF INIT: CPT

## 2023-05-10 PROCEDURE — 70450 CT HEAD/BRAIN W/O DYE: CPT | Mod: 26 | Performed by: RADIOLOGY

## 2023-05-10 PROCEDURE — 96375 TX/PRO/DX INJ NEW DRUG ADDON: CPT

## 2023-05-10 PROCEDURE — 99223 1ST HOSP IP/OBS HIGH 75: CPT | Mod: AI | Performed by: PHYSICIAN ASSISTANT

## 2023-05-10 PROCEDURE — 85048 AUTOMATED LEUKOCYTE COUNT: CPT | Performed by: EMERGENCY MEDICINE

## 2023-05-10 PROCEDURE — G1010 CDSM STANSON: HCPCS | Performed by: RADIOLOGY

## 2023-05-10 PROCEDURE — 250N000009 HC RX 250: Performed by: EMERGENCY MEDICINE

## 2023-05-10 PROCEDURE — 258N000003 HC RX IP 258 OP 636: Performed by: PHYSICIAN ASSISTANT

## 2023-05-10 RX ORDER — ACETAMINOPHEN 325 MG/1
975 TABLET ORAL 3 TIMES DAILY
Status: DISCONTINUED | OUTPATIENT
Start: 2023-05-11 | End: 2023-05-12 | Stop reason: HOSPADM

## 2023-05-10 RX ORDER — HYDROMORPHONE HYDROCHLORIDE 2 MG/1
4 TABLET ORAL EVERY 4 HOURS PRN
Status: DISCONTINUED | OUTPATIENT
Start: 2023-05-10 | End: 2023-05-10

## 2023-05-10 RX ORDER — SODIUM CHLORIDE 9 MG/ML
INJECTION, SOLUTION INTRAVENOUS CONTINUOUS
Status: DISCONTINUED | OUTPATIENT
Start: 2023-05-10 | End: 2023-05-12 | Stop reason: HOSPADM

## 2023-05-10 RX ORDER — AMOXICILLIN 250 MG
1 CAPSULE ORAL 2 TIMES DAILY
Status: DISCONTINUED | OUTPATIENT
Start: 2023-05-10 | End: 2023-05-12

## 2023-05-10 RX ORDER — NALOXONE HYDROCHLORIDE 0.4 MG/ML
0.2 INJECTION, SOLUTION INTRAMUSCULAR; INTRAVENOUS; SUBCUTANEOUS
Status: DISCONTINUED | OUTPATIENT
Start: 2023-05-10 | End: 2023-05-12 | Stop reason: HOSPADM

## 2023-05-10 RX ORDER — AMOXICILLIN 250 MG
2 CAPSULE ORAL 2 TIMES DAILY
Status: DISCONTINUED | OUTPATIENT
Start: 2023-05-10 | End: 2023-05-12

## 2023-05-10 RX ORDER — HYDROMORPHONE HYDROCHLORIDE 1 MG/ML
0.5 INJECTION, SOLUTION INTRAMUSCULAR; INTRAVENOUS; SUBCUTANEOUS ONCE
Status: COMPLETED | OUTPATIENT
Start: 2023-05-10 | End: 2023-05-10

## 2023-05-10 RX ORDER — HYDROMORPHONE HYDROCHLORIDE 1 MG/ML
0.5 INJECTION, SOLUTION INTRAMUSCULAR; INTRAVENOUS; SUBCUTANEOUS
Status: DISCONTINUED | OUTPATIENT
Start: 2023-05-10 | End: 2023-05-10

## 2023-05-10 RX ORDER — DEXTROSE MONOHYDRATE 25 G/50ML
25-50 INJECTION, SOLUTION INTRAVENOUS
Status: DISCONTINUED | OUTPATIENT
Start: 2023-05-10 | End: 2023-05-12 | Stop reason: HOSPADM

## 2023-05-10 RX ORDER — ONDANSETRON 2 MG/ML
4 INJECTION INTRAMUSCULAR; INTRAVENOUS ONCE
Status: COMPLETED | OUTPATIENT
Start: 2023-05-10 | End: 2023-05-10

## 2023-05-10 RX ORDER — SODIUM CHLORIDE, SODIUM LACTATE, POTASSIUM CHLORIDE, CALCIUM CHLORIDE 600; 310; 30; 20 MG/100ML; MG/100ML; MG/100ML; MG/100ML
INJECTION, SOLUTION INTRAVENOUS CONTINUOUS
Status: ACTIVE | OUTPATIENT
Start: 2023-05-10 | End: 2023-05-10

## 2023-05-10 RX ORDER — POLYETHYLENE GLYCOL 3350 17 G/17G
17 POWDER, FOR SOLUTION ORAL DAILY
Status: DISCONTINUED | OUTPATIENT
Start: 2023-05-10 | End: 2023-05-12

## 2023-05-10 RX ORDER — BACLOFEN 5 MG/1
10 TABLET ORAL EVERY 8 HOURS PRN
Status: DISCONTINUED | OUTPATIENT
Start: 2023-05-10 | End: 2023-05-11

## 2023-05-10 RX ORDER — POLYETHYLENE GLYCOL 3350 17 G/17G
17 POWDER, FOR SOLUTION ORAL DAILY PRN
Status: DISCONTINUED | OUTPATIENT
Start: 2023-05-10 | End: 2023-05-12 | Stop reason: HOSPADM

## 2023-05-10 RX ORDER — HEPARIN SODIUM 5000 [USP'U]/.5ML
5000 INJECTION, SOLUTION INTRAVENOUS; SUBCUTANEOUS EVERY 12 HOURS
Status: DISCONTINUED | OUTPATIENT
Start: 2023-05-10 | End: 2023-05-12 | Stop reason: HOSPADM

## 2023-05-10 RX ORDER — LIDOCAINE 40 MG/G
CREAM TOPICAL
Status: DISCONTINUED | OUTPATIENT
Start: 2023-05-10 | End: 2023-05-12 | Stop reason: HOSPADM

## 2023-05-10 RX ORDER — ONDANSETRON 2 MG/ML
4 INJECTION INTRAMUSCULAR; INTRAVENOUS EVERY 6 HOURS PRN
Status: DISCONTINUED | OUTPATIENT
Start: 2023-05-10 | End: 2023-05-12 | Stop reason: HOSPADM

## 2023-05-10 RX ORDER — ONDANSETRON 4 MG/1
4 TABLET, ORALLY DISINTEGRATING ORAL EVERY 6 HOURS PRN
Status: DISCONTINUED | OUTPATIENT
Start: 2023-05-10 | End: 2023-05-12 | Stop reason: HOSPADM

## 2023-05-10 RX ORDER — NALOXONE HYDROCHLORIDE 0.4 MG/ML
0.4 INJECTION, SOLUTION INTRAMUSCULAR; INTRAVENOUS; SUBCUTANEOUS
Status: DISCONTINUED | OUTPATIENT
Start: 2023-05-10 | End: 2023-05-12 | Stop reason: HOSPADM

## 2023-05-10 RX ORDER — CALCIUM CARBONATE 500 MG/1
1000 TABLET, CHEWABLE ORAL 4 TIMES DAILY PRN
Status: DISCONTINUED | OUTPATIENT
Start: 2023-05-10 | End: 2023-05-12 | Stop reason: HOSPADM

## 2023-05-10 RX ORDER — NICOTINE POLACRILEX 4 MG
15-30 LOZENGE BUCCAL
Status: DISCONTINUED | OUTPATIENT
Start: 2023-05-10 | End: 2023-05-12 | Stop reason: HOSPADM

## 2023-05-10 RX ADMIN — Medication 10 MG: at 06:32

## 2023-05-10 RX ADMIN — HEPARIN SODIUM 5000 UNITS: 5000 INJECTION, SOLUTION INTRAVENOUS; SUBCUTANEOUS at 11:51

## 2023-05-10 RX ADMIN — SODIUM CHLORIDE 1000 ML: 9 INJECTION, SOLUTION INTRAVENOUS at 05:45

## 2023-05-10 RX ADMIN — ONDANSETRON 4 MG: 2 INJECTION INTRAMUSCULAR; INTRAVENOUS at 06:22

## 2023-05-10 RX ADMIN — HYDROMORPHONE HYDROCHLORIDE 0.5 MG: 1 INJECTION, SOLUTION INTRAMUSCULAR; INTRAVENOUS; SUBCUTANEOUS at 04:47

## 2023-05-10 RX ADMIN — HYDROMORPHONE HYDROCHLORIDE 0.5 MG: 1 INJECTION, SOLUTION INTRAMUSCULAR; INTRAVENOUS; SUBCUTANEOUS at 10:36

## 2023-05-10 RX ADMIN — HEPARIN SODIUM 5000 UNITS: 5000 INJECTION, SOLUTION INTRAVENOUS; SUBCUTANEOUS at 23:34

## 2023-05-10 RX ADMIN — SODIUM CHLORIDE, POTASSIUM CHLORIDE, SODIUM LACTATE AND CALCIUM CHLORIDE: 600; 310; 30; 20 INJECTION, SOLUTION INTRAVENOUS at 15:59

## 2023-05-10 RX ADMIN — ONDANSETRON 4 MG: 2 INJECTION INTRAMUSCULAR; INTRAVENOUS at 15:51

## 2023-05-10 RX ADMIN — ONDANSETRON 4 MG: 2 INJECTION INTRAMUSCULAR; INTRAVENOUS at 04:47

## 2023-05-10 RX ADMIN — HYDROMORPHONE HYDROCHLORIDE 1 MG: 1 INJECTION, SOLUTION INTRAMUSCULAR; INTRAVENOUS; SUBCUTANEOUS at 11:51

## 2023-05-10 RX ADMIN — SODIUM CHLORIDE: 9 INJECTION, SOLUTION INTRAVENOUS at 08:30

## 2023-05-10 RX ADMIN — Medication: at 15:59

## 2023-05-10 RX ADMIN — Medication: at 11:58

## 2023-05-10 ASSESSMENT — ACTIVITIES OF DAILY LIVING (ADL)
ADLS_ACUITY_SCORE: 35
ADLS_ACUITY_SCORE: 33
ADLS_ACUITY_SCORE: 35
ADLS_ACUITY_SCORE: 35
ADLS_ACUITY_SCORE: 39
ADLS_ACUITY_SCORE: 35

## 2023-05-10 NOTE — PROGRESS NOTES
Report given to [facility]. Patient is going to [address] [place] [address]. VSS. RN aware that patient will need PCA pump restarted. BG80. Family at the bedside. Transport set up. Separate transport was set up for her wheelchair.

## 2023-05-10 NOTE — ED PROVIDER NOTES
ED Provider Note  [facility]      History     Chief Complaint   Patient presents with     Chest Pain     Nausea     Abdominal Pain     HPI  Anayeli Gagnon is a 72 year old female who has a past medical history of paraplegia, status post intrathecal pain pump, neuropathic pain of the lower extremities and chronic back pain, cystitis presenting with ongoing pain.  She says she cannot take it anymore regarding with the pain in her legs.  No significant change over the past several days.  Denies fevers.  Denies any chest pain to myself despite saying this to the nurse.  Also denies shortness of breath today.  She does have some abdominal pain which is unchanged.  She is tolerating some p.o. intake but has not been interested in eating.  She is not on blood thinners.  No new numbness, tingling or weakness of her extremities.  Denies any visual changes or double vision.  Denies falls or injuries.  She is not on blood thinners.    Past Medical History  Past Medical History:   Diagnosis Date     CARDIAC DYSRHYTHMIAS [facility] 10/3/2007    Taking atenelol for years for this     History of skin cancer      Mitral valve prolapse      Neurogenic bladder      Sacral decubitus ulcer, stage IV (H)      Thoracic spinal cord injury (H)      Past Surgical History:   Procedure Laterality Date     DECOMPRESSION LUMBAR ONE LEVEL N/A 12/27/2019    Procedure: Posterior spinal decompression;  Surgeon: Alf Ritchie MD;  Location: [facility] OR     INSERT INTRATHECAL PAIN PUMP N/A 1/19/2022    Procedure: INSERTION, INTRATHECAL ANALGESIC PUMP, externalized trial;  Surgeon: Luis Orourke MD;  Location: [facility] OR     INSERT INTRATHECAL PAIN PUMP Right 1/21/2022    Procedure: INSERTION, INTRATHECAL ANALGESIC PUMP;  Surgeon: Luis Orourke MD;  Location: [facility] OR     INSERT STIMULATOR AND LEADS INTERNAL DORSAL COLUMN N/A 4/7/2021    Procedure: Posterior thoracic 12 to Lumbar 1 level for placement of spinal cord  stimulator paddle and placement of implantable pulse generator/battery over right buttock;  Surgeon: Luis Orourke MD;  Location: [facility] OR     IR SPINAL ANGIOGRAM  10/16/2019     IR SPINAL ANGIOGRAM  2019     LAPAROSCOPIC TUBAL LIGATION       REPAIR SPINAL ARERIOVENOUS MALFORMATION N/A 2019    Procedure: with surgical disconnection arterial venous fistula Thoracic 5;  Surgeon: Alf Ritchie MD;  Location: [facility] OR     acetaminophen (TYLENOL) 500 MG tablet  aspirin (ASA) 81 MG EC tablet  atorvastatin (LIPITOR) 40 MG tablet  COMPOUND CONTAINING CONTROLLED SUBSTANCE (CMPD RX) - PHARMACY TO MIX COMPOUNDED MEDICATION  gabapentin (NEURONTIN) 600 MG tablet  lidocaine (LIDODERM) 5 % patch  LORazepam (ATIVAN) 0.5 MG tablet  medical cannabis (Patient's own supply)  melatonin 3 MG tablet  melatonin 3 MG tablet  metoprolol succinate ER (TOPROL-XL) 25 MG 24 hr tablet  Multiple Vitamins-Minerals (WOMENS MULTI PO)  naloxone (NARCAN) 4 MG/0.1ML nasal spray  ondansetron (ZOFRAN) 4 MG tablet  oxyCODONE (ROXICODONE) 5 MG tablet  polyethylene glycol (MIRALAX) 17 GM/Dose powder  sulfamethoxazole-trimethoprim (BACTRIM DS) 800-160 MG tablet      Allergies   Allergen Reactions     Contrast Dye Rash     Painful rash after x-ray contrast     Family History  Family History   Problem Relation Age of Onset     C.A.D. Father          41     Deep Vein Thrombosis (DVT) No family hx of      Anesthesia Reaction No family hx of      Social History   Social History     Tobacco Use     Smoking status: Never     Smokeless tobacco: Never   Substance Use Topics     Alcohol use: No     Drug use: No         A medically appropriate review of systems was performed with pertinent positives and negatives noted in the HPI, and all other systems negative.    Physical Exam   BP: (!) 140/72  Pulse: 73  Temp: 98.3  F (36.8  C)  Resp: 18  Weight: 63.5 kg (140 lb)  SpO2: 96 %  Physical Exam  Physical Exam   Constitutional: oriented to person,  place, and time. appears well-developed and well-nourished.   HENT:   Head: Normocephalic and atraumatic.   Neck: Normal range of motion.   Pulmonary/Chest: Effort normal. No respiratory distress.   Cardiac: No murmurs, rubs, gallops. RRR.  Abdominal: Abdomen soft, nontender, nondistended. No rebound tenderness.  MSK: Long bones without deformity or evidence of trauma.  No lower extremity edema.  Distal lower extremities warm well perfused.  Compartments soft and lower extremities.  No point tenderness over the lumbar or thoracic spine.  Neurological: , bicep, tricep strength symmetric.  No arm drift.  No facial droop.  No slurred speech.  Cranial nerves II through XII normal.  No nuchal rigidity.  Skin: Skin is warm and dry.   Psychiatric:  normal mood and affect.  behavior is normal. Thought content normal.       ED Course, Procedures, & Data      Procedures       ED Course Selections:        EKG Interpretation:      Interpreted by Philipp Hermosillo MD  Time reviewed: 0420  Symptoms at time of EKG: chest pain   Rhythm: normal sinus   Rate: Normal  Axis: Normal  Ectopy: none  Conduction: normal  ST Segments/ T Waves: T wave inversions in the inferior and precordial lateral leads stable from prior  Q Waves: none  Comparison to prior: Unchanged    Clinical Impression: Stable T wave inversions, no other acute changes                           Results for orders placed or performed during the hospital encounter of 05/10/23   CBC with platelets differential     Status: None ()    Narrative    The following orders were created for panel order CBC with platelets differential.  Procedure                               Abnormality         Status                     ---------                               -----------         ------                     CBC with platelets and d...[[id number]]                                                   Please view results for these tests on the individual orders.     Medications    HYDROmorphone (PF) (DILAUDID) injection 0.5 mg (has no administration in time range)   ondansetron (ZOFRAN) injection 4 mg (has no administration in time range)   0.9% sodium chloride BOLUS (has no administration in time range)     Followed by   sodium chloride 0.9% infusion (has no administration in time range)     Labs Ordered and Resulted from Time of ED Arrival to Time of ED Departure - No data to display  No orders to display          Critical care was not performed.     Medical Decision Making  The patient's presentation was of moderate complexity (a chronic illness mild to moderate exacerbation, progression, or side effect of treatment).    The patient's evaluation involved:  review of external note(s) from 1 sources (prior hospitalizatoin)  ordering and/or review of 3+ test(s) in this encounter (see separate area of note for details)  independent interpretation of testing performed by another health professional (CXR without acute abnormality)    The patient's management necessitated high risk (a decision regarding hospitalization).      Assessment & Plan    MDM  Patient presenting with ongoing chronic pain.  This does not seem to be an acute injury or illness as she was recently discharged from the hospital similar symptoms.  She is coming in for pain control and failure to thrive.  CT was done due to new onset headache in the setting of recent stroke to make sure it is not a hemorrhagic conversion.  She has normal neurologic exam.  Plan for admission for pain control at this point.  Patient agrees with plan.    I have reviewed the nursing notes. I have reviewed the findings, diagnosis, plan and need for follow up with the patient.    New Prescriptions    No medications on file       Final diagnoses:   Abdominal pain, generalized   Pain in both lower legs       Philipp Hermosillo  [facility] EMERGENCY DEPARTMENT  5/10/2023     Philipp Hermosillo MD  05/10/23 0624

## 2023-05-10 NOTE — LETTER
Transition Communication Hand-off for Care Transitions to Next Level of Care Provider    Name: Anayeli Gagnon  : 1950  MRN #: [id number]  Primary Care Provider: SHAY YEPEZ     Primary Clinic: [address] [facility] [address]  [facility] [address]     Reason for Hospitalization:  Abdominal pain, generalized [R10.84]  Pain in both lower legs [M79.661, M79.662]  Admit Date/Time: 5/10/2023  4:48 AM  Discharge Date: 23  Payor Source: Payor: MEDICARE / Plan: MEDICARE / Product Type: Medicare /              Reason for Communication Hand-off Referral: Other care transition    Discharge Plan:     Discharge home with family and home care services with [facility]  Concern for non-adherence with plan of care:   N  Discharge Needs Assessment:  Needs      Flowsheet Row Most Recent Value   Equipment Currently Used at Home wheelchair, power          Follow-up plan:    Future Appointments   Date Time Provider Department Center   2023  9:45 AM Jasmine Espinoza MD [facility]   2023  2:30 PM Krista Marquez APRN CNP [facility]       Any outstanding tests or procedures:              Key Recommendations:      Candice Freire RN    AVS/Discharge Summary is the source of truth; this is a helpful guide for improved communication of patient story

## 2023-05-10 NOTE — ED TRIAGE NOTES
Pt was discharge on sun for hospitalization d/t stroke work-up, has been having increased chest pain and ABD pain, nausea but has not vomited, chronic neuropathy, dizziness, no SOB noted, couldn't take pain anymore and felt like she was having a major heart attack.      Triage Assessment     Row Name 05/10/23 0356       Triage Assessment (Adult)    Airway WDL WDL       Respiratory WDL    Respiratory WDL WDL       Skin Circulation/Temperature WDL    Skin Circulation/Temperature WDL WDL       Cardiac WDL    Cardiac WDL X;chest pain       Chest Pain Assessment    Chest Pain Location midsternal    Character sharp    Duration progressive over the last week    Precipitating Factors nothing    Associated Signs/Symptoms dizziness;anxiety       Peripheral/Neurovascular WDL    Peripheral Neurovascular WDL WDL       Cognitive/Neuro/Behavioral WDL    Cognitive/Neuro/Behavioral WDL WDL

## 2023-05-10 NOTE — PROGRESS NOTES
Care Management Follow Up    Length of Stay (days): 0    Expected Discharge Date:  TBD     Concerns to be Addressed:     CHURCH Document  Patient plan of care discussed at interdisciplinary rounds: No    Anticipated Discharge Disposition:  TBD     Anticipated Discharge Services:    Anticipated Discharge DME:      Patient/family educated on Medicare website which has current facility and service quality ratings:  N/A  Education Provided on the Discharge Plan:  N/A  Patient/Family in Agreement with the Plan:  N/A    Referrals Placed by CM/SW:  Not at this time  Private pay costs discussed: insurance costs out of pocket expenses, co-pays and deductibles    Additional Information:    VIPUL attempted to meet with pt to review the CHURCH document and found that pt had been transferred to [place]. Due to the time sensitive nature of the document, VIPUL contacted pt  via telephone    1700 Due to Annamarie's Observation status, VPIUL called her on her cell phone ([phone]) to review the Medicare Outpatient Observation Notice (MOON) and why pt was receiving it. Annamarie reported that it wasn't a good time for her and VIPUL asked permission to review the document with her , Jay De Luna gave verbal permission to do so.    1712 VIPUL called Jay  ([phone]) to review the Medicare Outpatient Observation Notice (MOON) and why pt was receiving it.  VIPUL introduced self and explained the reason for the call. Jay agreed to speak with VIPUL MATTHEW explained the document, and addressed questions and concerns. Jay voiced understanding of the document at 1714.      VIPUL offered to email Jay a blank copy of the MOON. He accepted and VIPUL verified his email address ([email or web address]). No additional questions or concerns were reported. VIPUL provided availability and contact information, as well as Unit RN station phone number,  and the call was ended.    1728 VIPUL emailed CHURCH document to Gary. No PHI was disclosed.    1730 VIPUL [facility]CH  [facility] ([phone]), and placed document in Medical records basket in Unit J ED    SW will continue to follow as needed.    GT Tellez, [facility]  ED/OBS   [facility]  Phone: [phone]  Pager: [phone]  Fax: [phone]     On-call pager, [phone], 4:00 pm to midnight

## 2023-05-10 NOTE — H&P
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    # Acute on chronic pain  # Thoracic cord injury resulting in paraplegia  # Chronic pain s/p intrathecal Baclofen pump  # Neurogenic bladder s/p chronic indwelling Cunningham fatheter  -Dilaudid 0.5mg IV given twice in the ER iwht minimal relief. Will plan to start a Dilaudid pca with 0.3mg q15min, max of 1.2mg per hour. No clinical bolus. No continuous rate. This can be adjusted to whatever rate she needs to be comfortable.   -Tylenol 975mg TID scheduled  -Lactated Ringers x 1L   -Note that her Baclofen pump is not MRI compatible  -Encourage oral intake with a regular diet and consider Nutrition consult tomorrow if appetite does improve once pain is well treated    # Recent TIA in L occipital lobe  -She was just discharged from the Neurology service on 5/7/23.   -Continue with cardiac event monitor for 30 days  -Follow up with Neurology in 4-6 weeks and OT for vision rehab  -Continue ASA 81mg once daily and Lipitor 40mg once daily        Diet: Combination Diet Regular Diet Adult    DVT Prophylaxis: Heparin 5000 U twice daily  Cunningham Catheter: PRESENT, indication:    Lines: None     Cardiac Monitoring: None  Code Status: Full Code      Clinically Significant Risk Factors Present on Admission                               Disposition Plan  From  home, will discharge back to home once medically stable, likely in 48hr     Expected Discharge Date: 05/11/2023                  ALICIA Polo  Hospitalist Service, [facility]  Securely message with [employer] (more info)  Text page via [facility] Paging/Directory   See signed in provider for up to date coverage information    ______________________________________________________________________    Chief Complaint   Acute on chronic pain    History of Present Illness   Anayeli Gagnon is a 72 year old female who has a past medical history of paraplegia, chronic pain status post intrathecal pain pump, chronic neuropathic pain in the lower extremities, neurogenic bladder status post chronic indwelling Cunningham catheter who was recently admitted to the neurology service 5/5/2023 through 5/7/2023 for a TIA with possible subacute stroke in the left occipital lobe who presents back to the ER today with intractable pain from the waist down.  She is prescribed oxycodone at home and has maxed out the dosing of her oxycodone and still writhing in pain.  She reports having her eating affected by this pain and has been unable to eat very much food for the past few days.  She denies any chest pain, fevers, chills, headaches, vision changes, diarrhea, abdominal pain, new rashes or sores.  She denies any recent trauma.  Her pain pump is intact and reports having enough pain medication and there is been no leakage of this pain medicine of recent.    Upon arrival to the ER her vital signs included temp 98.3 Fahrenheit, heart rate 84 and regular, /69 and she is 94% on room air.  Lab work including a BMP, CBC, CK and lipase were all without any acute abnormalities.  Her troponin was initially elevated at 36 and her repeat troponin improved to 29 with an EKG that did not show any acute ischemic changes.  She was given Dilaudid 0.5 mg IV with a small amount of relief and  has needed every hour dosing of pain medicine since arrival to the ER for mild improvement in her pain.  She is being admitted to the [facility] service for further care.  A bed has been requested at the [facility] to an internal medicine service and she is awaiting transfer to the [facility].  She will remain on the [facility] service until her transfer is complete.        Past Medical History    Past Medical History:   Diagnosis Date     CARDIAC DYSRHYTHMIAS NEC 10/3/2007    Taking atenelol for years for this     History of skin cancer      Mitral valve prolapse      Neurogenic bladder      Sacral decubitus ulcer, stage IV (H)      Thoracic spinal cord injury (H)        Past Surgical History   Past Surgical History:   Procedure Laterality Date     DECOMPRESSION LUMBAR ONE LEVEL N/A 12/27/2019    Procedure: Posterior spinal decompression;  Surgeon: Alf Ritchie MD;  [facility]     INSERT INTRATHECAL PAIN PUMP N/A 1/19/2022    Procedure: INSERTION, INTRATHECAL ANALGESIC PUMP, externalized trial;  Surgeon: Luis Orourke MD;  [facility] OR     INSERT INTRATHECAL PAIN PUMP Right 1/21/2022    Procedure: INSERTION, INTRATHECAL ANALGESIC PUMP;  Surgeon: Luis Orourke MD;  [facility] OR     INSERT STIMULATOR AND LEADS INTERNAL DORSAL COLUMN N/A 4/7/2021    Procedure: Posterior thoracic 12 to Lumbar 1 level for placement of spinal cord stimulator paddle and placement of implantable pulse generator/battery over right buttock;  Surgeon: Luis Orourke MD;  Location[facility] OR     IR SPINAL ANGIOGRAM  10/16/2019     IR SPINAL ANGIOGRAM  12/20/2019     LAPAROSCOPIC TUBAL LIGATION       REPAIR SPINAL ARERIOVENOUS MALFORMATION N/A 12/27/2019    Procedure: with surgical disconnection arterial venous fistula Thoracic 5;  Surgeon: Alf Ritchie MD;  Location: [facility] OR       Prior to Admission Medications   Prior to Admission Medications   Prescriptions Last Dose Informant Patient Reported?  Taking?   COMPOUND CONTAINING CONTROLLED SUBSTANCE (CMPD RX) - PHARMACY TO MIX COMPOUNDED MEDICATION   Yes No   Sig: Concentrations: Dilaudid    14 mg/ml                                                         Bupivacaine 28 mg/ml Ziconitide 14 mcg/ml   Total Volume in Refill: 20 mL    Basal:  Dilaudid    7 mg/day                                                      Bupivacaine 14 mg/day Ziconitide 7 mcg/day   PTM 0.2 hydromorphone, 2 hour lockout, maximum 6 in a day.  Maximum 24 hour dose Hydromorphone 8.16 mg/day Bupivacaine 16.3 mg/day Ziconitide 8.16 mcg/day  Pentec to increase ose on 12/8/2022   LORazepam (ATIVAN) 0.5 MG tablet   Yes No   Sig: Take 1 tablet by mouth 2 times daily as needed   Multiple Vitamins-Minerals (WOMENS MULTI PO)  Self Yes No   Sig: Take 1 tablet by mouth daily   acetaminophen (TYLENOL) 500 MG tablet  Self Yes No   Sig: Take 500-1,000 mg by mouth every 6 hours as needed for mild pain   aspirin (ASA) 81 MG EC tablet   No No   Sig: Take 1 tablet (81 mg) by mouth daily   atorvastatin (LIPITOR) 40 MG tablet   No No   Sig: Take 1 tablet (40 mg) by mouth every evening   gabapentin (NEURONTIN) 600 MG tablet  Self Yes No   Sig: Take 1,200 mg by mouth 3 times daily    lidocaine (LIDODERM) 5 % patch  Self Yes No   Sig: Apply 1 patch topically daily as needed    medical cannabis (Patient's own supply)  Self Yes No   Sig: See Admin Instructions (The purpose of this order is to document that the patient reports taking medical cannabis.  This is not a prescription, and is not used to certify that the patient has a qualifying medical condition.)     Liquid oral formulation.   Patient not taking: Reported on 7/20/2022   melatonin 3 MG tablet  Self Yes No   Sig: Take 3 mg by mouth nightly as needed    melatonin 3 MG tablet   Yes No   Sig: Take 3 mg by mouth   metoprolol succinate ER (TOPROL-XL) 25 MG 24 hr tablet  Self Yes No   Sig: Take 12.5 mg by mouth every evening    naloxone (NARCAN) 4 MG/0.1ML nasal  spray   No No   Sig: Spray 1 spray (4 mg) into one nostril alternating nostrils as needed for opioid reversal every 2-3 minutes until assistance arrives   ondansetron (ZOFRAN) 4 MG tablet   No No   Sig: Take 1 tablet (4 mg) by mouth every 6 hours as needed for nausea   oxyCODONE (ROXICODONE) 5 MG tablet   No No   Sig: Take 1 tablet (5 mg) by mouth every 4 hours as needed for moderate to severe pain   polyethylene glycol (MIRALAX) 17 GM/Dose powder  Self Yes No   Sig: Take 17 g by mouth daily as needed for constipation    sulfamethoxazole-trimethoprim (BACTRIM DS) 800-160 MG tablet   No No   Sig: Take 1 tablet by mouth 2 times daily for 3 days      Facility-Administered Medications: None        Review of Systems    The 10 point Review of Systems is negative other than noted in the HPI or here.      Physical Exam   Vital Signs: Temp: 98.3  F (36.8  C) Temp src: Oral BP: (!) 144/69 Pulse: 84   Resp: 18 SpO2: 96 % O2 Device: None (Room air)    Weight: 140 lbs 0 oz    General Appearance: 72 year old female resting in her wheelchair, quite uncomfortable secondary to pain  Respiratory: breathing comfortably on room air, no adventitious sounds to bilateral auscultation  Cardiovascular: regular rate and rhythm, no appreciable murmurs, rubs or gallops  GI: bowel sounds active, soft, non-tender to palpation throughout  Skin: warm, dry, no open sores, lesions or ulcerations, pain pump in place over right flank, no surrounding erythema or drainage      Data     I have personally reviewed the following data over the past 24 hrs:    8.1  \   13.8   / 203     138 100 14.0 /  101 (H)   4.6 26 0.79 \       ALT: 12 AST: 31 AP: 70 TBILI: 0.4   ALB: 4.3 TOT PROTEIN: 6.9 LIPASE: 11 (L)       Trop: 29 (H) BNP: N/A

## 2023-05-10 NOTE — LETTER
Recipient: [facility]          Sender:  Candice Freire RN, BA  Care Coordinator  6 Med Surg  [phone], pager [phone]      For weekend social work needs, contact information below and available on [facility]:     SW Weekend [place] Pager: [phone]  SW Weekend 6MS, 8A, 10ICU- Pager: [phone]     For weekend RN care coordinator needs (home discharge with needs including home care, assisted living facility returns, durable medical equip, IV antibiotics) - page [phone]         Date: May 12, 2023  Patient Name:  Anayeli Gagnon  Patient YOB: 1950  Routing Message:      [employer] orders    The documents accompanying this notice contain confidential information belonging to the sender.  This information is intended only for the use of the individual or entity named above.  The authorized recipient of this information is prohibited from disclosing this information to any other party and is required to destroy the information after its stated need has been fulfilled, unless otherwise required by state law.    If you are not the intended recipient, you are hereby notified that any disclosure, copy, distribution or action taken in reliance on the contents of these documents is strictly prohibited.  If you have received this document in error, please return it by fax to [phone] with a note on the cover sheet explaining why you are returning it (e.g. not your patient, not your provider, etc.).  If you need further assistance, please call .  Documents may also be returned by mail to [employer], , [address], [place], [place] [address].

## 2023-05-10 NOTE — PROGRESS NOTES
VS: BP (!) 160/78 (BP Location: Left arm, Patient Position: Supine, Cuff Size: Adult Regular)   Pulse 86   Temp 98.1  F (36.7  C) (Oral)   Resp 16   Wt 63.5 kg (140 lb)   SpO2 94%   BMI 24.03 kg/m     O2: 94% on room air. Denies shortness of breath.   Output: Catheter in place, colostomy in place   Last BM: Colostomy in place   Activity: Assist of 2 with lift transfers   Skin: Visible skin intact   Pain: 8/10 pain, specifically on her tailbone   CMS: A&O x4. Able to make needs known and use call light appropriately. Denies chest pain. Complaints of nausea - gave IV zofran   Dressing: IV dressing clean, dry, intact   Diet: Regular diet   LDA: R PIV infusing   Equipment: IV pump, IV pole, personal belongings, call light within reach   Plan: TBD - continue with plan of care

## 2023-05-11 ENCOUNTER — DOCUMENTATION ONLY (OUTPATIENT)
Dept: ANESTHESIOLOGY | Facility: CLINIC | Age: 73
End: 2023-05-11
Payer: MEDICARE

## 2023-05-11 ENCOUNTER — DOCUMENTATION ONLY (OUTPATIENT)
Dept: ANESTHESIOLOGY | Facility: CLINIC | Age: 73
End: 2023-05-11

## 2023-05-11 LAB
GLUCOSE BLDC GLUCOMTR-MCNC: 69 MG/DL (ref 70–99)
GLUCOSE BLDC GLUCOMTR-MCNC: 72 MG/DL (ref 70–99)
GLUCOSE BLDC GLUCOMTR-MCNC: 78 MG/DL (ref 70–99)
GLUCOSE BLDC GLUCOMTR-MCNC: 79 MG/DL (ref 70–99)
GLUCOSE BLDC GLUCOMTR-MCNC: 80 MG/DL (ref 70–99)
GLUCOSE BLDC GLUCOMTR-MCNC: 91 MG/DL (ref 70–99)

## 2023-05-11 PROCEDURE — 250N000011 HC RX IP 250 OP 636: Performed by: PHYSICIAN ASSISTANT

## 2023-05-11 PROCEDURE — 99232 SBSQ HOSP IP/OBS MODERATE 35: CPT | Performed by: INTERNAL MEDICINE

## 2023-05-11 PROCEDURE — G0378 HOSPITAL OBSERVATION PER HR: HCPCS

## 2023-05-11 PROCEDURE — 250N000013 HC RX MED GY IP 250 OP 250 PS 637: Performed by: INTERNAL MEDICINE

## 2023-05-11 PROCEDURE — 250N000013 HC RX MED GY IP 250 OP 250 PS 637: Performed by: PHYSICIAN ASSISTANT

## 2023-05-11 PROCEDURE — 82962 GLUCOSE BLOOD TEST: CPT

## 2023-05-11 PROCEDURE — 96366 THER/PROPH/DIAG IV INF ADDON: CPT

## 2023-05-11 PROCEDURE — 96376 TX/PRO/DX INJ SAME DRUG ADON: CPT

## 2023-05-11 PROCEDURE — 258N000003 HC RX IP 258 OP 636: Performed by: EMERGENCY MEDICINE

## 2023-05-11 RX ORDER — SULFAMETHOXAZOLE/TRIMETHOPRIM 800-160 MG
1 TABLET ORAL 2 TIMES DAILY
Status: COMPLETED | OUTPATIENT
Start: 2023-05-11 | End: 2023-05-11

## 2023-05-11 RX ORDER — PROCHLORPERAZINE 25 MG
12.5 SUPPOSITORY, RECTAL RECTAL EVERY 12 HOURS PRN
Status: DISCONTINUED | OUTPATIENT
Start: 2023-05-11 | End: 2023-05-12 | Stop reason: HOSPADM

## 2023-05-11 RX ORDER — ATORVASTATIN CALCIUM 40 MG/1
40 TABLET, FILM COATED ORAL EVERY EVENING
Status: DISCONTINUED | OUTPATIENT
Start: 2023-05-11 | End: 2023-05-12 | Stop reason: HOSPADM

## 2023-05-11 RX ORDER — BACLOFEN 10 MG/1
10 TABLET ORAL 2 TIMES DAILY
COMMUNITY
End: 2024-07-19

## 2023-05-11 RX ORDER — GABAPENTIN 600 MG/1
1200 TABLET ORAL 3 TIMES DAILY
Status: DISCONTINUED | OUTPATIENT
Start: 2023-05-11 | End: 2023-05-12 | Stop reason: HOSPADM

## 2023-05-11 RX ORDER — PROCHLORPERAZINE MALEATE 5 MG
5 TABLET ORAL EVERY 6 HOURS PRN
Status: DISCONTINUED | OUTPATIENT
Start: 2023-05-11 | End: 2023-05-12 | Stop reason: HOSPADM

## 2023-05-11 RX ORDER — BACLOFEN 5 MG/1
10 TABLET ORAL 3 TIMES DAILY
Status: DISCONTINUED | OUTPATIENT
Start: 2023-05-11 | End: 2023-05-12 | Stop reason: HOSPADM

## 2023-05-11 RX ORDER — ASPIRIN 81 MG/1
81 TABLET ORAL DAILY
Status: DISCONTINUED | OUTPATIENT
Start: 2023-05-11 | End: 2023-05-12 | Stop reason: HOSPADM

## 2023-05-11 RX ORDER — ALBUTEROL SULFATE 90 UG/1
2 AEROSOL, METERED RESPIRATORY (INHALATION)
Status: DISCONTINUED | OUTPATIENT
Start: 2023-05-11 | End: 2023-05-12 | Stop reason: HOSPADM

## 2023-05-11 RX ADMIN — ONDANSETRON 4 MG: 2 INJECTION INTRAMUSCULAR; INTRAVENOUS at 17:13

## 2023-05-11 RX ADMIN — ATORVASTATIN CALCIUM 40 MG: 40 TABLET, FILM COATED ORAL at 20:46

## 2023-05-11 RX ADMIN — BACLOFEN 10 MG: 5 TABLET ORAL at 20:46

## 2023-05-11 RX ADMIN — SODIUM CHLORIDE: 9 INJECTION, SOLUTION INTRAVENOUS at 00:56

## 2023-05-11 RX ADMIN — ONDANSETRON 4 MG: 2 INJECTION INTRAMUSCULAR; INTRAVENOUS at 00:18

## 2023-05-11 RX ADMIN — DEXTROSE 15 G: 15 GEL ORAL at 17:46

## 2023-05-11 RX ADMIN — ACETAMINOPHEN 650 MG: 325 TABLET ORAL at 00:07

## 2023-05-11 RX ADMIN — BACLOFEN 10 MG: 5 TABLET ORAL at 16:56

## 2023-05-11 RX ADMIN — SULFAMETHOXAZOLE AND TRIMETHOPRIM 1 TABLET: 800; 160 TABLET ORAL at 11:43

## 2023-05-11 RX ADMIN — ONDANSETRON 4 MG: 2 INJECTION INTRAMUSCULAR; INTRAVENOUS at 06:11

## 2023-05-11 RX ADMIN — ASPIRIN 81 MG: 81 TABLET, COATED ORAL at 11:43

## 2023-05-11 RX ADMIN — METOPROLOL SUCCINATE 12.5 MG: 25 TABLET, EXTENDED RELEASE ORAL at 20:46

## 2023-05-11 ASSESSMENT — ACTIVITIES OF DAILY LIVING (ADL)
ADLS_ACUITY_SCORE: 39
ADLS_ACUITY_SCORE: 41
ADLS_ACUITY_SCORE: 39
ADLS_ACUITY_SCORE: 41
ADLS_ACUITY_SCORE: 39
ADLS_ACUITY_SCORE: 41

## 2023-05-11 NOTE — PROGRESS NOTES
Brief Social Work Note    Expected Discharge Date:  TBD     Concerns to be Addressed:    PCA pump    Additional Information:    1250 SW received VM from Ivonne at [facility] ([phone]) who reported that pt was scheduled to have her PCA pump refilled today and that she had just found out pt was hospitalized. Ivonne requested a call back.      1315 SW returned the call and LVM asking for a call back at her convenience.    No call back has been received yet.    SW will continue to follow as needed.    GT Tellez, [facility]  ED/OBS   [facility]  Phone: [phone]  Pager: [phone]  Fax: [phone]     On-call pager, [phone], 4:00 pm to midnight

## 2023-05-11 NOTE — PROVIDER NOTIFICATION
"\"Pt evening BG was 69, encouraged apple juice but nausea was not tolerating it. Rechecked BG and she was at 78. Gave around half of a 15g Glucogel packet since she still wasn't tolerating it well after giving Zofran. Will recheck BG in 15 minutes.\"    \"Recheck was 80, gave more Glucogel but pt did not tolerate well.\"    \"Pt refused BG recheck as well as more of the Glucogel\"   "

## 2023-05-11 NOTE — PROGRESS NOTES
RN returned call to Elizabet to update that Dr. Espinoza was informed and advised to either discard syringe or refill patient at home if patient gets discharged prior to expiration. Elizabet confirmed she obtained consent to refill inpatient at [facility] and will refill if approved. Writer informed Dr. Espinoza will review the patient's chart as well.    Alia Reyes RNCC

## 2023-05-11 NOTE — PROGRESS NOTES
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     # Acute on chronic pain  # Thoracic cord injury resulting in paraplegia  # Chronic pain s/p intrathecal Baclofen pump  # Neurogenic bladder s/p chronic indwelling Cunningham fatheter  -I had an extensive conversation with Dr. Espinoza (pt's outpatient pain specialist). She recommended to stop Dilaudid PCA. Patient can use her pain pump here in the hospital. Pt's pain pump has Dilaudid, Bupivacaine & Ziconitide.   -No clear explanation for pain. No new decubitus ulcer or skin breakdown. No significant peripheral edema.   -Restart PTA Baclofen. Pt's pump doesn't have any baclofen.   -Restart PTA Neurontin.        # Recent TIA in L occipital lobe  -She was just discharged from the Neurology service on 5/7/23.   -Continue with cardiac event monitor for 30 days  -Follow up with Neurology in 4-6 weeks and OT for vision rehab  -Continue ASA 81mg once daily and Lipitor 40mg once daily     # UTI  -She was recently diagnosed with UTI, last day of antibiotics yesterday but she didn't take it. Complete treatment with Bactrim x 1 day.          Diet: Combination Diet Regular Diet Adult    DVT Prophylaxis: Heparin SQ  Cunningham Catheter: PRESENT, indication:    Lines: None     Cardiac Monitoring: None  Code Status: Full Code      Clinically Significant Risk Factors Present on Admission                     # Acute Respiratory Failure: Documented O2 saturation < 91%.  Continue supplemental oxygen as needed             Disposition Plan     Expected Discharge Date: 05/11/2023                  Gino Hutchins MD  Hospitalist Service, GOLD TEAM 16  [facility]  Securely message with [employer] (more info)  Text page via [facility] Paging/Directory   See signed in provider for up to date coverage information  ______________________________________________________________________    Interval History     Pt is having nausea. + muscle spasms.   Pt's family at bedside.   Other ROS negative.     Physical Exam   Vital Signs: Temp: 98.4  F (36.9  C) Temp src: Oral BP: (!) 151/76 Pulse: 102   Resp: 16 SpO2: 95 % O2 Device: None (Room air)    Weight: 140 lbs 0 oz    General Appearance: Alert, chronically ill, mild distress.   Respiratory: Normal respiratory effort, no crackles or wheezing.   Cardiovascular: RRR, no appreciable murmurs, rubs or gallops  GI: bowel sounds active, soft, non-tender to palpation throughout  Skin: warm, dry, no open sores, lesions or ulcerations, pain pump in place over right flank, no surrounding erythema or drainage      Medical Decision Making       45 MINUTES SPENT BY ME on the date of service doing chart review, history, exam, documentation & further activities per the note.      Data     I have personally reviewed the following data over the past 24 hrs:    N/A  \   N/A   / N/A     N/A N/A N/A /  79   N/A N/A 0.60 \       Imaging results reviewed over the past 24 hrs:   No results found for this or any previous visit (from the past 24 hour(s)).

## 2023-05-11 NOTE — PLAN OF CARE
Goal Outcome Evaluation:    Patient is A&O x4. Intermittent confusion noted. Call light within reach. Able to make needs known effectively.    RA. Regular diet. Needs assistance x2 with lift device. Cunningham cath in place. Colostomy bag in place. Emptied during the shift.     Pain managed with continuous Dilaudid via CADD, R PIV infusing with NS at 125ml/hr. Complaints of headache, Tylenol given. Took only 2 pill from ordered dose and throws up right after taking the pill. Notified Provider. Complaints of n/v, PRN Zofran given.  Denies SOB, chest pain and new n/t.

## 2023-05-11 NOTE — PROGRESS NOTES
RN received call from Elizabet [facility] nurse, regarding patient being admitted. Writer reviewed chart and noted patient was admitted to [facility]. Elizabet informed she was supposed to refill patient's pump today and has the medication syringe that expires 5/12. Elizabet informed patient's alarm date is 5/20/23. Writer informed a message would be sent to the provider and we would call back with an update.    Alia Reyes RNCC

## 2023-05-11 NOTE — PHARMACY-ADMISSION MEDICATION HISTORY
Pharmacist Admission Medication History    Admission medication history is complete. The information provided in this note is only as accurate as the sources available at the time of the update.    Medication reconciliation/reorder completed by provider prior to medication history? Yes    Information Source(s): Patient, [facility] and [facility] ([phone]) via in-person    Pertinent Information:   -Confirmed current pump settings with [facility] via phone  -Baclofen was discontinued after last hospitalization, patient's family reports this was a mistake and she still uses it prn    Changes made to PTA medication list:    Added: baclofen    Deleted: medical cannabis, melatonin duplicate, oxycodone    Changed:   o Lorazepam 0.5 mg bid prn anxiety --> 0.5 mg daily prn anxiety (per fill hx)       Allergies reviewed with patient and updates made in EHR: yes    Medication History Completed By: Natalia Valentine [facility] 5/11/2023 3:00 PM    Prior to Admission medications    Medication Sig Last Dose Taking? Auth Provider Long Term End Date   acetaminophen (TYLENOL) 500 MG tablet Take 500-1,000 mg by mouth every 6 hours as needed for mild pain Unknown Yes Unknown, Entered By History     aspirin (ASA) 81 MG EC tablet Take 1 tablet (81 mg) by mouth daily Unknown Yes Tosin Augustine MD     atorvastatin (LIPITOR) 40 MG tablet Take 1 tablet (40 mg) by mouth every evening Unknown Yes Tosin Augustine MD Yes    baclofen (LIORESAL) 10 MG tablet Take 10 mg by mouth 3 times daily as needed for muscle spasms Unknown Yes Unknown, Entered By History No    COMPOUND CONTAINING CONTROLLED SUBSTANCE (CMPD RX) - PHARMACY TO MIX COMPOUNDED MEDICATION Concentrations:  Dilaudid    14 mg/ml                                                          Bupivacaine 28 mg/ml  Ziconotide 14 mcg/ml     Total Volume in Refill: 20 mL      Basal:   Dilaudid   6.993  mg/day                                                       Bupivacaine 13.986 mg/day  Ziconotide 6.993 mcg/day     PTM 0.2 hydromorphone, 0.4 mg bupivacaine, 0.2 mcg ziconotide, 2 hour lockout, maximum 6 in a day.    Maximum 24 hour dose  Hydromorphone 8.134 mg/day  Bupivacaine 16.267 mg/day  Ziconitide 8.134 mcg/day    Last filled 4/20/23. Pump alarm 5/20/23  Managed by [employer] ([phone])  Yes Jasmine Espinoza MD     gabapentin (NEURONTIN) 600 MG tablet Take 1,200 mg by mouth 3 times daily  Unknown Yes Luis Orourke MD Yes    lidocaine (LIDODERM) 5 % patch Apply 1 patch topically daily as needed  Unknown Yes Reported, Patient     LORazepam (ATIVAN) 0.5 MG tablet Take 1 tablet by mouth daily as needed for anxiety Unknown Yes Reported, Patient     melatonin 3 MG tablet Take 3 mg by mouth nightly as needed  Unknown Yes Reported, Patient     metoprolol succinate ER (TOPROL-XL) 25 MG 24 hr tablet Take 12.5 mg by mouth every evening  Unknown Yes Unknown, Entered By History Yes    Multiple Vitamins-Minerals (WOMENS MULTI PO) Take 1 tablet by mouth daily Unknown Yes Unknown, Entered By History     ondansetron (ZOFRAN) 4 MG tablet Take 1 tablet (4 mg) by mouth every 6 hours as needed for nausea Unknown Yes Daniel Last MD     polyethylene glycol (MIRALAX) 17 GM/Dose powder Take 17 g by mouth daily as needed for constipation  Unknown Yes Reported, Patient

## 2023-05-11 NOTE — PROGRESS NOTES
I had a detailed conversation with Dr. Farias this morning about Annamarie. I updated him about her long term care plan. We discussed that the intrathecal drug delivery system is her primary pain control with PTM boluses for break through pain. As we discussed the case it became apparent that the patient had discontinued baclofen possibly worsening lower extremity spasms. We discussed discharge of PCA as the intrathecal opioid doses were robust for pain control. It was also noted that gabapentin was discontinued and needed to be restarted.   The patient was recently treated for UTI.   Intrathecal medication doses:   Concentrations:  Dilaudid    14 mg/ml                                                          Bupivacaine 28 mg/ml  Ziconitide 14 mcg/ml     Total Volume in Refill: 20 mL      Basal:   Dilaudid    7 mg/day                                                       Bupivacaine 14 mg/day  Ziconitide 7 mcg/day     PTM 0.2 hydromorphone, 2 hour lockout, maximum 6 in a day.     Maximum 24 hour dose  Hydromorphone 8.16 mg/day  Bupivacaine 16.3 mg/day  Ziconitide 8.16 mcg/day    Our team has been in communication with [facility] nurses who plan to refill the patient pump today or tomorrow after discharge. The patient pump refill is due 5/22/2023.

## 2023-05-11 NOTE — PROGRESS NOTES
[id number]    Plan of Care Reviewed With: Patient    Overall Patient Progress: No Change    Outcome Evaluation: Pt is A & O x4 on RA - intermittently confused. C/O 5/10 abdominal/chest/BLE pain - pt has personal intrathecal pump. Pt also C/O nausea - denied intervention during shift. Pt has R PIV continuously infusing NS @ 100 ml/hr. Pt has hudson catheter in place - adequately draining, emptied during shift, bag changed & catheter cares completed w/ catheter wipes. Pt also has colostomy - entire ostomy changed. Pt is not oob/chair, is assist x2 w/ lift & uses call light appropriately. Contact precautions maintained.     BG    BG at 0619 was 72 - per orders, no insulin was administered.    BG at 1139 was 79 - per orders, no insulin was administered.     Offered apple juice among other drinks/snacks throughout shift however pt refused.    Shift Updates    At start of shift, pt c/o nausea & being unable to tolerate PO medications (refused AM meds) - MD notified & aware. New PRN order for Compazine placed.     Continuous pulse ox maintained throughout shift.     PCA pump discontinued & NS rate decreased from 125 to 100 ml/hr.    Vitals: BP (!) 151/76 (BP Location: Left arm)   Pulse 102   Temp 98.4  F (36.9  C) (Oral)   Resp 16   Wt 63.5 kg (140 lb)   SpO2 95%   BMI 24.03 kg/m      Plan: TBD. Intrathecal pump personnel would like to be notified regarding pt's discharge/etc. Continue w/ plan of care.

## 2023-05-12 ENCOUNTER — MEDICAL CORRESPONDENCE (OUTPATIENT)
Dept: HEALTH INFORMATION MANAGEMENT | Facility: CLINIC | Age: 73
End: 2023-05-12
Payer: MEDICARE

## 2023-05-12 VITALS
TEMPERATURE: 97.8 F | HEART RATE: 83 BPM | WEIGHT: 140 LBS | RESPIRATION RATE: 16 BRPM | BODY MASS INDEX: 24.03 KG/M2 | SYSTOLIC BLOOD PRESSURE: 158 MMHG | OXYGEN SATURATION: 96 % | DIASTOLIC BLOOD PRESSURE: 80 MMHG

## 2023-05-12 LAB
GLUCOSE BLDC GLUCOMTR-MCNC: 88 MG/DL (ref 70–99)
GLUCOSE BLDC GLUCOMTR-MCNC: 97 MG/DL (ref 70–99)

## 2023-05-12 PROCEDURE — 96372 THER/PROPH/DIAG INJ SC/IM: CPT | Mod: XU | Performed by: PHYSICIAN ASSISTANT

## 2023-05-12 PROCEDURE — 82962 GLUCOSE BLOOD TEST: CPT

## 2023-05-12 PROCEDURE — 250N000011 HC RX IP 250 OP 636: Performed by: PHYSICIAN ASSISTANT

## 2023-05-12 PROCEDURE — 250N000013 HC RX MED GY IP 250 OP 250 PS 637: Performed by: INTERNAL MEDICINE

## 2023-05-12 PROCEDURE — G0378 HOSPITAL OBSERVATION PER HR: HCPCS

## 2023-05-12 PROCEDURE — 99239 HOSP IP/OBS DSCHRG MGMT >30: CPT | Performed by: INTERNAL MEDICINE

## 2023-05-12 RX ORDER — ACETAMINOPHEN 650 MG/1
650 SUPPOSITORY RECTAL EVERY 4 HOURS PRN
Status: DISCONTINUED | OUTPATIENT
Start: 2023-05-12 | End: 2023-05-12 | Stop reason: HOSPADM

## 2023-05-12 RX ORDER — ACETAMINOPHEN 325 MG/1
650 TABLET ORAL EVERY 4 HOURS PRN
Status: DISCONTINUED | OUTPATIENT
Start: 2023-05-12 | End: 2023-05-12 | Stop reason: HOSPADM

## 2023-05-12 RX ORDER — LORAZEPAM 0.5 MG/1
0.5 TABLET ORAL DAILY PRN
Status: DISCONTINUED | OUTPATIENT
Start: 2023-05-12 | End: 2023-05-12 | Stop reason: HOSPADM

## 2023-05-12 RX ADMIN — GABAPENTIN 1200 MG: 600 TABLET, FILM COATED ORAL at 08:01

## 2023-05-12 RX ADMIN — HEPARIN SODIUM 5000 UNITS: 5000 INJECTION, SOLUTION INTRAVENOUS; SUBCUTANEOUS at 11:13

## 2023-05-12 RX ADMIN — ONDANSETRON 4 MG: 4 TABLET, ORALLY DISINTEGRATING ORAL at 07:02

## 2023-05-12 RX ADMIN — ASPIRIN 81 MG: 81 TABLET, COATED ORAL at 08:01

## 2023-05-12 RX ADMIN — LORAZEPAM 0.5 MG: 0.5 TABLET ORAL at 11:13

## 2023-05-12 RX ADMIN — BACLOFEN 10 MG: 5 TABLET ORAL at 08:01

## 2023-05-12 RX ADMIN — HEPARIN SODIUM 5000 UNITS: 5000 INJECTION, SOLUTION INTRAVENOUS; SUBCUTANEOUS at 00:13

## 2023-05-12 RX ADMIN — ACETAMINOPHEN 975 MG: 325 TABLET ORAL at 08:01

## 2023-05-12 ASSESSMENT — ACTIVITIES OF DAILY LIVING (ADL)
ADLS_ACUITY_SCORE: 41

## 2023-05-12 NOTE — PROGRESS NOTES
BP (!) 157/80 (BP Location: Right arm)   Pulse 86   Temp 98.1  F (36.7  C) (Oral)   Resp 16   Wt 63.5 kg (140 lb)   SpO2 98%   BMI 24.03 kg/m       VSS, irritable throughout shift, A&Ox4. Denies chest pain, SOB, and dizziness. Nausea intermittent, PRN IV Zofran administered with some relief  LS clear, on room air. Continuous pulse ox continued  BS active, colostomy in place. Cunningham catheter in place  Ax2 with lift, has personal electric wheelchair  R PIV SL  Denies any pain, abdominal discomfort with intermittent nausea  Regular diet, able to make needs known  Call light in reach, continue POC. Family to remain at bedside    BG  @1704: 69, apple juice encouraged, pt drank half but not tolerating PO well. Upon recheck, BG 78. Glucose gel was administered, pt tolerated half of 15 mg dose. Recheck was 80 and more Glucose gel was encouraged. Pt refused further recheck of BG and Glucose gel    @2156: 91, no insulin administered per bedtime insulin parameters

## 2023-05-12 NOTE — PLAN OF CARE
Reviewed discharge orders with the patient. Medication orders were reviewed and instructions given as to the frequency to take each med, along with when last medication was given. Patient belongings sent with patient. Patient instructed to follow up with primary physican with any further questions they may have. Patient states they understand discharge orders as they are written and has no questions. Patient was discharged at 1230.    BP (!) 158/80 (BP Location: Right arm, Patient Position: Semi-Nunez's, Cuff Size: Adult Regular)   Pulse 83   Temp 97.8  F (36.6  C) (Oral)   Resp 16   Wt 63.5 kg (140 lb)   SpO2 96%   BMI 24.03 kg/m

## 2023-05-12 NOTE — PLAN OF CARE
Goal Outcome Evaluation:  Blood pressure (!) 157/80, pulse 86, temperature 98.1  F (36.7  C), temperature source Oral, resp. rate 16, weight 63.5 kg (140 lb), SpO2 98 %, not currently breastfeeding.  End of shift Summary: See flowsheet for VS and detail assessments.    Changes this Shift:   Pt A&O disoriented to time and place, confused and forgetful. No numbness and tingling. Pt is on RA LS clear BUL and dim BLL no SOB noted. Pt has hduson catheter and colostomy in place. Pt is not oob and on scheduled turn Q2hr during this shift. Skin dry and clean. PIV WNL patent infusing.

## 2023-05-12 NOTE — PROGRESS NOTES
Brief Social Work Progress Note    Information Obtained: Writer received call  from Ivonne at [employer] [facility] ([phone]) who is working with pt RE: her Pain pump. Writer provided Ivonne with unit SW contact information.     Follow-Up Plan: Defer to pt's Unit SW.  ________________    GT Colon, [facility]  ED/Observation   [facility]  Phone: [phone]  Pager: [phone]  Fax: [phone]    On-call pager, [phone], 4:00pm to midnight

## 2023-05-12 NOTE — DISCHARGE SUMMARY
[facility]  Hospitalist Discharge Summary      Date of Admission:  5/10/2023  Date of Discharge:  5/12/2023  Discharging Provider: Gino Hutchins MD  Discharge Service: Hospitalist Service, GOLD TEAM 16    Discharge Diagnoses     Acute on chronic pain   Thoracic cord injury resulting in paraplegia  Chronic pain s/p intrathecal Baclofen pump  Neurogenic bladder s/p chronic indwelling Cunningham fatheter    Clinically Significant Risk Factors          Follow-ups Needed After Discharge   Follow-up Appointments     Adult [facility] Follow-up and recommended labs and tests      Follow up with primary care provider, SHAY YEPEZ, within 7 days for   hospital follow- up.  No follow up labs or test are needed.      Appointments on [place] and/or [facility] (with [facility] or [facility]   provider or service). Call [phone] if you haven't heard regarding   these appointments within 7 days of discharge.             Unresulted Labs Ordered in the Past 30 Days of this Admission     No orders found from 4/10/2023 to 5/11/2023.          Discharge Disposition   Discharged to home  Condition at discharge: Stable    Hospital Course   71 yo female who has a past medical history of thoracic cord injury resulting in paraplegia, chronic pain status post intrathecal pain pump, chronic neuropathic pain in the lower extremities, neurogenic bladder status post chronic indwelling Cunningham catheter who was recently admitted to the neurology service 5/5/2023 through 5/7/2023 for a TIA with possible subacute stroke in the left occipital lobe who presents back to the ER today with intractable pain from the waist down. She was started on a Dilaudid PCA at the ED.  No clear etiology for acute on chronic pain. Patient was transferred to the [facility] and was admitted to the medical floor.      # Acute on chronic pain  # Thoracic cord injury resulting in paraplegia  # Chronic pain s/p  intrathecal Baclofen pump  # Neurogenic bladder s/p chronic indwelling Cunningham fatheter  -I had an extensive conversation with Dr. Espinoza (pt's outpatient pain specialist). She recommended to stop Dilaudid PCA. Patient can use her pain pump here in the hospital. Pt's pain pump has Dilaudid, Bupivacaine & Ziconitide.   -No new decubitus ulcer or skin breakdown. No significant peripheral edema.   -She was restarted on PTA Baclofen and PTA Neurontin.   -Patient also received gentle hydration and supportive management.   -Acute pain resolved. Patient remained hemodynamically stable and afebrile. She will follow-up with with pain team as outpatient.      # Recent TIA in L occipital lobe  -She was just discharged from the Neurology service on 5/7/23.   -Continue with cardiac event monitor for 30 days  -Follow up with Neurology in 4-6 weeks and OT for vision rehab  -Continue ASA 81mg once daily and Lipitor 40mg once daily      # UTI  -Patient completed treatment with Bactrim.        Consultations This Hospital Stay   NUTRITION SERVICES ADULT IP CONSULT  CARE MANAGEMENT / SOCIAL WORK IP CONSULT    Code Status   Full Code    Time Spent on this Encounter   I, Gino Hutchins MD, personally saw the patient today and spent greater than 30 minutes discharging this patient.       Gino Hutchins MD  [facility] MED SURG  [facility] [address]  Phone: [phone]  Fax: [phone]  ______________________________________________________________________    Physical Exam   Vital Signs: Temp: 97.8  F (36.6  C) Temp src: Oral BP: (!) 158/80 Pulse: 83   Resp: 16 SpO2: 96 % O2 Device: None (Room air)    Weight: 140 lbs 0 oz  General Appearance: Alert, chronically ill, no acute distress.   Respiratory: Normal respiratory effort, no crackles or wheezing.   Cardiovascular: RRR, no appreciable murmurs, rubs or gallops  GI: bowel sounds active, soft, non-tender to palpation throughout  Skin: warm,  dry, no open sores, lesions or ulcerations, pain pump in place over right flank, no surrounding erythema or drainage       Primary Care Physician   SHAY YEPEZ    Discharge Orders      Reason for your hospital stay    73 yo female who has a past medical history of thoracic cord injury resulting in paraplegia, chronic pain status post intrathecal pain pump, chronic neuropathic pain in the lower extremities, neurogenic bladder status post chronic indwelling Cunningham catheter who was recently admitted to the neurology service 5/5/2023 through 5/7/2023 for a TIA with possible subacute stroke in the left occipital lobe who presents back to the ER today with intractable pain from the waist down.     Activity    Your activity upon discharge: activity as tolerated     Adult [facility] Follow-up and recommended labs and tests    Follow up with primary care provider, SHAY YEPEZ, within 7 days for hospital follow- up.  No follow up labs or test are needed.      Appointments on [place] and/or [facility] (with [facility] or [facility] provider or service). Call [phone] if you haven't heard regarding these appointments within 7 days of discharge.     Diet    Follow this diet upon discharge: Orders Placed This Encounter      Snacks/Supplements Adult: Ensure Clear; With Meals      Combination Diet Regular Diet Adult       Significant Results and Procedures   Results for orders placed or performed during the hospital encounter of 05/10/23   Head CT w/o contrast    Narrative    EXAM: CT HEAD W/O CONTRAST  LOCATION: [facility]  DATE/TIME: 5/10/2023 7:08 AM CDT    INDICATION: HA, recent stroke.  COMPARISON: Head CT 5/7/2023.  TECHNIQUE: Routine CT Head without IV contrast. Multiplanar reformats. Dose reduction techniques were used.    FINDINGS:  INTRACRANIAL CONTENTS: No intracranial hemorrhage, extraaxial collection, or mass effect. Evolving subacute ischemic infarct at the  parieto-occipital junction on the left again noted. No definite new infarcts. Mild presumed chronic small vessel ischemic   changes. Mild generalized volume loss. No hydrocephalus.     VISUALIZED ORBITS/SINUSES/MASTOIDS: No intraorbital abnormality. No paranasal sinus mucosal disease. No middle ear or mastoid effusion.    BONES/SOFT TISSUES: No acute abnormality.      Impression    IMPRESSION:  1.  Evolving subacute ischemic infarct at the parietal-occipital junction on the left again noted.  2.  No evidence for acute intracranial pathology.  3.  Brain atrophy and presumed chronic microvascular ischemic changes as above.       XR Chest 1 View    Narrative    EXAM: XR CHEST 1 VIEW  LOCATION: [facility]  DATE/TIME: 5/10/2023 5:09 AM CDT    INDICATION: chest pain, sob  COMPARISON: 05/05/2023.      Impression    IMPRESSION: Mild left basilar atelectasis and possible small left pleural effusion. Right lung appears clear. Normal heart size and pulmonary vascularity. No pneumothorax. Small electronic device projects over the left mid chest.       Discharge Medications   Current Discharge Medication List      CONTINUE these medications which have NOT CHANGED    Details   acetaminophen (TYLENOL) 500 MG tablet Take 500-1,000 mg by mouth every 6 hours as needed for mild pain      aspirin (ASA) 81 MG EC tablet Take 1 tablet (81 mg) by mouth daily  Qty: 30 tablet, Refills: 3    Associated Diagnoses: Cerebrovascular accident (CVA), unspecified mechanism (H); Occipital stroke (H)      atorvastatin (LIPITOR) 40 MG tablet Take 1 tablet (40 mg) by mouth every evening  Qty: 30 tablet, Refills: 3    Associated Diagnoses: Cerebrovascular accident (CVA), unspecified mechanism (H); Occipital stroke (H)      baclofen (LIORESAL) 10 MG tablet Take 10 mg by mouth 3 times daily as needed for muscle spasms      COMPOUND CONTAINING CONTROLLED SUBSTANCE (CMPD RX) - PHARMACY TO MIX COMPOUNDED  MEDICATION Concentrations:  Dilaudid    14 mg/ml                                                          Bupivacaine 28 mg/ml  Ziconotide 14 mcg/ml     Total Volume in Refill: 20 mL      Basal:   Dilaudid   6.993 mg/day                                                       Bupivacaine 13.986 mg/day  Ziconotide 6.993 mcg/day     PTM 0.2 hydromorphone, 0.4 mg bupivacaine, 0.2 mcg ziconotide, 2 hour lockout, maximum 6 in a day.    Maximum 24 hour dose  Hydromorphone 8.134 mg/day  Bupivacaine 16.267 mg/day  Ziconitide 8.134 mcg/day    Last filled 4/20/23. 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      lidocaine (LIDODERM) 5 % patch Apply 1 patch topically daily as needed       LORazepam (ATIVAN) 0.5 MG tablet Take 1 tablet by mouth daily as needed for anxiety      melatonin 3 MG tablet Take 3 mg by mouth nightly as needed       metoprolol succinate ER (TOPROL-XL) 25 MG 24 hr tablet Take 12.5 mg by mouth every evening       Multiple Vitamins-Minerals (WOMENS MULTI PO) Take 1 tablet by mouth daily      ondansetron (ZOFRAN) 4 MG tablet Take 1 tablet (4 mg) by mouth every 6 hours as needed for nausea  Qty: 20 tablet, Refills: 1    Associated Diagnoses: Infection due to 2019 novel coronavirus; Nausea      polyethylene glycol (MIRALAX) 17 GM/Dose powder Take 17 g by mouth daily as needed for constipation          STOP taking these medications       sulfamethoxazole-trimethoprim (BACTRIM DS) 800-160 MG tablet Comments:   Reason for Stopping:             Allergies   Allergies   Allergen Reactions     Contrast Dye Rash     Painful rash after x-ray contrast

## 2023-05-12 NOTE — PROGRESS NOTES
Care Management Discharge Note    Discharge Date: 05/12/2023       Discharge Disposition:  Home    Discharge Services:  Home Care    Discharge DME:      Discharge Transportation:      Private pay costs discussed: Not applicable    Does the patient's insurance plan have a 3 day qualifying hospital stay waiver?  NA    PAS Confirmation Code:  NA  Patient/family educated on Medicare website which has current facility and service quality ratings:  NA    Education Provided on the Discharge Plan:  yes  Persons Notified of Discharge Plans: patient, family  Patient/Family in Agreement with the Plan:  yes    Handoff Referral Completed: Yes    Additional Information:    Per patient care rounds patient will be discharging home today.  She does not need an IMM since she is under observation status.    Call to Ivonne at [facility] ([phone]). They will connect directly with the patient and will try to see her this afternoon to fill her pump.    Added home care DENICE orders and faxed orders to Willie Aldana as well as notifying by phone of discharge.    Family to transport patient home.    Candice Freire RN, BA  Care Coordinator  6 Med Surg  [phone], pager [phone]      For weekend social work needs, contact information below and available on [facility]:      Weekend [place] Pager: [phone]   Weekend 6MS, 8A, 10ICU- Pager: [phone]     For weekend RN care coordinator needs (home discharge with needs including home care, assisted living facility returns, durable medical equip, IV antibiotics) - page [phone]

## 2023-05-17 ENCOUNTER — MEDICAL CORRESPONDENCE (OUTPATIENT)
Dept: HEALTH INFORMATION MANAGEMENT | Facility: CLINIC | Age: 73
End: 2023-05-17
Payer: MEDICARE

## 2023-05-19 ENCOUNTER — THERAPY VISIT (OUTPATIENT)
Dept: OCCUPATIONAL THERAPY | Facility: CLINIC | Age: 73
End: 2023-05-19
Payer: COMMERCIAL

## 2023-05-19 DIAGNOSIS — I63.9 CEREBROVASCULAR ACCIDENT (CVA), UNSPECIFIED MECHANISM (H): ICD-10-CM

## 2023-05-19 DIAGNOSIS — I63.9 OCCIPITAL STROKE (H): ICD-10-CM

## 2023-05-19 PROCEDURE — 97165 OT EVAL LOW COMPLEX 30 MIN: CPT | Mod: GO | Performed by: SPECIALIST/TECHNOLOGIST

## 2023-05-19 PROCEDURE — 97535 SELF CARE MNGMENT TRAINING: CPT | Mod: GO | Performed by: SPECIALIST/TECHNOLOGIST

## 2023-05-19 NOTE — PROGRESS NOTES
"OCCUPATIONAL THERAPY EVALUATION  Type of Visit: Evaluation    See electronic medical record for Abuse and Falls Screening details.    Subjective      Presenting condition or subjective complaint: memory sight  Date of onset: 05/10/23    Relevant medical history: Bladder or bowel problems; Depression; Dizziness; Migraines or headaches; Pain at night or rest   Dates & types of surgery: 2 years ago back surgery    Prior diagnostic imaging/testing results: CT scan     Prior therapy history for the same diagnosis, illness or injury: No      Prior Level of Function   Transfers: Michaela   Ambulation: Power Wheelchair  ADL: Assistive equipment and person, Michaela to shower chair, spouse assists with ADLs, hudson catheter and ostomy in place.   IADL: Finances, Laundry, Meal preparation, Medication management    Living Environment  Social support: With a significant other or spouse   Type of home: House   Stairs to enter the home: No       Ramp: No   Stairs inside the home: No       Help at home: Self Cares (home health aide/personal care attendant, family, etc)  Equipment owned: Power wheelchair or scooter     Employment: No    Hobbies/Interests: quilting    Patient goals for therapy: improve memory and sight    Pain assessment: Pain present  Location: bilateral feet, groin/Ratin/10     Objective   Cognitive Status Examination  Orientation: Oriented to person, place and time   Level of Consciousness: Alert  Follows Commands and Answers Questions: 75% of the time, 50% of the time, Follows multi step instructions, Follows single step instructions  Personal Safety and Judgement: Intact  Memory: Impaired  Attention: No deficits identified  Organization/Problem Solving: Sequencing impaired, Categorization of information impaired  Executive Function: Working memory impaired, decreased storage of information for performing tasks    VISUAL SKILLS  Visual Acuity: Reports on improving migraine like aura (\"shiny\";\"vertical line\") " Patient reports this has continued to improve since hospitalization. Very mild in nature this date, does not impact acuity.   Visual Field: Peripheral vision WFL this date  Visual Attention: Appears normal, Patient's inpatient note mentions R hemianopsia, however, patient does not demonstrate significant unilateral inattention. 100% accuracy with number cross out, no distortion with clock draw. Patient reports habit of visual scanning to R    SENSATION:  No deficits identified    POSTURE: WFL  RANGE OF MOTION: No deficits identified  STRENGTH: Not formally assessed this date, appears WFL for clipboard writing tasks  MUSCLE TONE: WFL  COORDINATION: WFL  BALANCE: Non-ambulatory, PWC    FUNCTIONAL MOBILITY    Wheelchair: PWC at baseline, prior thoracic cord injury resulting in paraplegia     INSTRUMENTAL ACTIVITIES OF DAILY LIVING (IADL): Patients spouse completes much of the household tasks including cooking and laundry, patient assists as able. Spouse drives. Patient ind with medications and finances prior to recent hospitalization; spouse has been assisting with this since recent stroke due to memory concerns.  Patient reports no concerns with communication/computer use.       Assessment & Plan   CLINICAL IMPRESSIONS   Medical Diagnosis: Cerebrovascular accident (CVA), unspecified mechanism (H)  Occipital stroke (H)    Treatment Diagnosis: Impaired IADLs, social and leisure particpation and performance secondary to changes in cognition    Impression/Assessment: Pt is a 72 year old female presenting to Occupational Therapy due to recent L occipital stroke  .  The following significant findings have been identified: Impaired cognition and Impaired visual perception.  These identified deficits interfere with their ability to perform medication management, financial management and particpation in congitively demanding social and leisure activities as compared to previous level of function.     Clinical Decision Making  (Complexity):   Assessment of Occupational Performance: 1-3 Performance Deficits  Occupational Performance Limitations: health management and maintenance, home establishment and management, meal preparation and cleanup, leisure activities and social participation  Clinical Decision Making (Complexity): Low complexity    PLAN OF CARE  Treatment Interventions:   Interventions: Cognitive Skills, Self-Care/Home Management, Therapeutic Activity, Visual perceptual    Long Term Goals   OT Goal 1  Goal Identifier: Cog Assess  Goal Description: Patient will participate in cognitive testing, as indicated, in order to determine any cognitive deficits/limitations to ensure patient s safety at home and in the community during ADL, IADL, work, and leisure tasks  Rationale: In order to maximize safety and independence with cognitive function within the home or community;In order to maximize independence with tasks requiring functional cognition/executive function for school, work, medication or financial mgmt  Target Date: 07/17/23  OT Goal 2  Goal Identifier: Cog Skills  Goal Description: Patient will complete moderate level cognitive tasks with various demands (problem solving, functional calculations, divided/alternating attention, EF, error detection/correction) with no more than min assist for consecutive 3 sessions to increase problem solving skills, attention, and safety/judgement for improved performance in ADL, IADL, leisure, and work related tasks  Rationale: In order to maximize safety and independence with cognitive function within the home or community;In order to maximize independence with tasks requiring functional cognition/executive function for school, work, medication or financial mgmt  Target Date: 07/17/23  OT Goal 3  Goal Identifier: Compensatory  Goal Description: Patient will verbalize understanding of/ demonstrate up to 5 memory  or visual perceptual accommodations or compensation techniques she can  utilize within her home and community for improved performance of ADL, IADL, social and leisure tasks  Rationale: In order to safely and appropriately apply compensatory strategies with ADL/IADL performance  Target Date: 07/17/23      Frequency of Treatment: 1x/week  Duration of Treatment: 8 weeks         Risks and benefits of evaluation/treatment have been explained.   Patient/Family/caregiver agrees with Plan of Care.     Evaluation Time:    OT Ba Low Complexity Minutes (50431): 35      Signing Clinician: MARTHA PILLAI [facility]                                                                                   OUTPATIENT OCCUPATIONAL THERAPY      PLAN OF TREATMENT FOR OUTPATIENT REHABILITATION   Patient's Last Name, First Name, Anayeli Beyer YOB: 1950   Provider's Name   [facility]   Medical Record No.  [id number]     Onset Date: 05/10/23 Start of Care Date: 05/19/23     Medical Diagnosis:  Cerebrovascular accident (CVA), unspecified mechanism (H)  Occipital stroke (H)      OT Treatment Diagnosis:  Impaired IADLs, social and leisure particpation and performance secondary to changes in cognition Plan of Treatment  Frequency/Duration:1x/week/8 weeks    Certification date from 05/19/23   To 07/17/23        See note for plan of treatment details and functional goals     MARTHA PILLAI                         I CERTIFY THE NEED FOR THESE SERVICES FURNISHED UNDER        THIS PLAN OF TREATMENT AND WHILE UNDER MY CARE .             Physician Signature               Date    X_____________________________________________________                        Referring Provider:  Tosin Augustine      Initial Assessment  See Epic Evaluation- 05/19/23

## 2023-05-22 ENCOUNTER — MEDICAL CORRESPONDENCE (OUTPATIENT)
Dept: HEALTH INFORMATION MANAGEMENT | Facility: CLINIC | Age: 73
End: 2023-05-22

## 2023-05-22 ENCOUNTER — VIRTUAL VISIT (OUTPATIENT)
Dept: ANESTHESIOLOGY | Facility: CLINIC | Age: 73
End: 2023-05-22
Payer: MEDICARE

## 2023-05-22 DIAGNOSIS — G24.9 DYSTONIA: Primary | ICD-10-CM

## 2023-05-22 PROCEDURE — 99214 OFFICE O/P EST MOD 30 MIN: CPT | Mod: VID | Performed by: ANESTHESIOLOGY

## 2023-05-22 ASSESSMENT — ENCOUNTER SYMPTOMS
JOINT SWELLING: 0
WEAKNESS: 0
MEMORY LOSS: 0
MUSCLE CRAMPS: 0
DISTURBANCES IN COORDINATION: 0
STIFFNESS: 0
BACK PAIN: 0
NECK PAIN: 0
NERVOUS/ANXIOUS: 0
DYSURIA: 0
FEVER: 0
TINGLING: 0
DIZZINESS: 0
MUSCLE WEAKNESS: 0
TREMORS: 0
INSOMNIA: 0
PANIC: 1
PARALYSIS: 0
ARTHRALGIAS: 0
HEADACHES: 0
LOSS OF CONSCIOUSNESS: 0
SPEECH CHANGE: 0
SEIZURES: 0
DECREASED CONCENTRATION: 0
DEPRESSION: 0
NUMBNESS: 0
MYALGIAS: 0

## 2023-05-22 ASSESSMENT — PAIN SCALES - GENERAL: PAINLEVEL: MODERATE PAIN (4)

## 2023-05-22 NOTE — LETTER
5/22/2023       RE: Anayeli Gagnon  [address] [facility] [address]       Dear Colleague,    Thank you for referring your patient, Anayeli Gagnon, to the [facility] FOR COMPREHENSIVE PAIN MANAGEMENT [place] at [facility]. Please see a copy of my visit note below.    Annamarie is a 72 year old who is being evaluated via a billable video visit.      How would you like to obtain your AVS? MyChart  If the video visit is dropped, the invitation should be resent by: Text to cell phone: [phone]  Will anyone else be joining your video visit? No      Prior A/P 4/10/23    Anayeli Gagnon is a 72F who presents to the pain clinic via video visit. Her pain is manageable at the current levels and is worse with activity and in the evening. PTM provides relief for 1-2 hours but her pain is worse in the evening where her ankles and feet begin to swell and ache. We discussed compression stockings and elevation of her feet during the day.     RECOMMENDATIONS:      1. Medications: She will continue to take baclofen PRN for any muscle spasms. No change to IDDS dosing this visit     2. Procedure: No procedures at this time     3. Education: Patient understands and agrees with plan. She will also try to utilize compression stockings during the day time which her  will help to don/doff the stockings.     Concentrations:  Dilaudid    14 mg/ml                                                          Bupivacaine 28 mg/ml  Ziconitide 14 mcg/ml     Total Volume in Refill: 20 mL      Basal:   Dilaudid    7 mg/day                                                       Bupivacaine 14 mg/day  Ziconitide 7 mcg/day     PTM 0.2 hydromorphone, 2 hour lockout, maximum 6 in a day.     Maximum 24 hour dose  Hydromorphone 8.16 mg/day  Bupivacaine 16.3 mg/day  Ziconitide 8.16 mcg/day      Type of service:  Video Visit    Video Start Time: 0945    Video End  Time:10:14 AM    Originating Location (pt. Location): Home    Distant Location (provider location):  [facility]NA[facility] [place]     Platform used for Video Visit: Zoom (Telehealth)    Pain clinic follow up note    HPI:   Anayeli Gagnon is a 72 year old year old female  who presents to the pain clinic with continuing bilateral lower extremity pain. Describes pain in her groin area and from the bilateral knees into her feet, pain is aching and burning, worse at night, tends to be an 8-9/10, then 2-3/10 in the mornings. She believes things have been worsening over the past month.    She has been utilizing her pain pump (all 6PTM doses/day), baclofen (not consistently, only as needed), gabapentin, and tylenol. She is not currently using her compression stockings or regularly elevating her feet.     She was recently hospitalized and diagnosed with a UTI. Reports these symptoms have essentially resolved, but she continues to have significant pain in her lower extremities especially in the evenings.     She is not doing physical therapy.      Patient Supplied Answers To the  Pain Questionnaire      5/22/2023     9:19 AM    Pain -  Patient Entered Questionnaire/Answers   What number best describes your pain right now:  0 = No pain  to  10 = Worst pain imaginable 4   How would you describe the pain dull, aching   Which of the following worsen your pain standing    walking   Which of the following improve or reduce your pain medication   What number best describes your average pain for the past week:  0 = No pain  to  10 = Worst pain imaginable 4   What number best describes your LOWEST pain in past 24 hours:  0 = No pain  to  10 = Worst pain imaginable 3   What number best describes your WORST pain in past 24 hours:  0 = No pain  to  10 = Worst pain imaginable 10   When is your pain worst Constant   What non-medicine treatments have you already had for your pain pain  clinic                     ROS:  Review of Systems   Constitutional: Negative for fever.   Genitourinary: Negative for dysuria and urgency.   Musculoskeletal: Negative for back pain, myalgias and neck pain.   Neurological: Negative for dizziness, tingling, tremors, speech change, seizures, loss of consciousness, weakness and headaches.   Psychiatric/Behavioral: Negative for depression and memory loss. The patient is not nervous/anxious and does not have insomnia.    All other systems reviewed and are negative.        Significant Medical History:   Past Medical History:   Diagnosis Date    CARDIAC DYSRHYTHMIAS NEC 10/3/2007    Taking atenelol for years for this    History of skin cancer     Mitral valve prolapse     Neurogenic bladder     Sacral decubitus ulcer, stage IV (H)     Thoracic spinal cord injury (H)           Past Surgical History:  Past Surgical History:   Procedure Laterality Date    DECOMPRESSION LUMBAR ONE LEVEL N/A 12/27/2019    Procedure: Posterior spinal decompression;  Surgeon: Alf Ritchie MD;  Location: [facility] OR    INSERT INTRATHECAL PAIN PUMP N/A 1/19/2022    Procedure: INSERTION, INTRATHECAL ANALGESIC PUMP, externalized trial;  Surgeon: Luis Orourke MD;  Location: [facility] OR    INSERT INTRATHECAL PAIN PUMP Right 1/21/2022    Procedure: INSERTION, INTRATHECAL ANALGESIC PUMP;  Surgeon: Luis Orourke MD;  Location: [facility] OR    INSERT STIMULATOR AND LEADS INTERNAL DORSAL COLUMN N/A 4/7/2021    Procedure: Posterior thoracic 12 to Lumbar 1 level for placement of spinal cord stimulator paddle and placement of implantable pulse generator/battery over right buttock;  Surgeon: Luis Orourke MD;  Location: [facility] OR    IR SPINAL ANGIOGRAM  10/16/2019    IR SPINAL ANGIOGRAM  12/20/2019    LAPAROSCOPIC TUBAL LIGATION      REPAIR SPINAL ARERIOVENOUS MALFORMATION N/A 12/27/2019    Procedure: with surgical disconnection arterial venous fistula Thoracic 5;  Surgeon: Alf Ritchie MD;  Location: [facility]  OR          Family History:  Family History   Problem Relation Age of Onset    C.A.D. Father          41    Deep Vein Thrombosis (DVT) No family hx of     Anesthesia Reaction No family hx of           Social History:  Social History     Socioeconomic History    Marital status:      Spouse name: Not on file    Number of children: Not on file    Years of education: Not on file    Highest education level: Not on file   Occupational History    Occupation: NA     Employer: [employer]   Tobacco Use    Smoking status: Never    Smokeless tobacco: Never   Vaping Use    Vaping status: Not on file   Substance and Sexual Activity    Alcohol use: No    Drug use: No    Sexual activity: Yes     Partners: Male   Other Topics Concern    Parent/sibling w/ CABG, MI or angioplasty before 65F 55M? Not Asked   Social History Narrative    Lives with spouse - works in [place].     Social Determinants of Health     Financial Resource Strain: Not on file   Food Insecurity: Not on file   Transportation Needs: Not on file   Physical Activity: Not on file   Stress: Not on file   Social Connections: Not on file   Intimate Partner Violence: Not on file   Housing Stability: Not on file     Social History     Social History Narrative    Lives with spouse - works in [place].          Allergies:  Allergies   Allergen Reactions    Contrast Dye Rash     Painful rash after x-ray contrast       Current Medications:   Current Outpatient Medications   Medication Sig Dispense Refill    acetaminophen (TYLENOL) 500 MG tablet Take 500-1,000 mg by mouth every 6 hours as needed for mild pain      aspirin (ASA) 81 MG EC tablet Take 1 tablet (81 mg) by mouth daily 30 tablet 3    atorvastatin (LIPITOR) 40 MG tablet Take 1 tablet (40 mg) by mouth every evening 30 tablet 3    baclofen (LIORESAL) 10 MG tablet Take 10 mg by mouth 3 times daily as needed for muscle spasms      COMPOUND CONTAINING CONTROLLED SUBSTANCE (CMPD RX) - PHARMACY TO MIX  COMPOUNDED MEDICATION Concentrations:  Dilaudid    14 mg/ml                                                          Bupivacaine 28 mg/ml  Ziconotide 14 mcg/ml     Total Volume in Refill: 20 mL      Basal:   Dilaudid   6.993 mg/day                                                       Bupivacaine 13.986 mg/day  Ziconotide 6.993 mcg/day     PTM 0.2 hydromorphone, 0.4 mg bupivacaine, 0.2 mcg ziconotide, 2 hour lockout, maximum 6 in a day.    Maximum 24 hour dose  Hydromorphone 8.134 mg/day  Bupivacaine 16.267 mg/day  Ziconitide 8.134 mcg/day    Last filled 4/20/23. Pump alarm 5/20/23  Managed by [employer] ([phone]) 20 mL 0    gabapentin (NEURONTIN) 600 MG tablet Take 1,200 mg by mouth 3 times daily  1 tablet 0    lidocaine (LIDODERM) 5 % patch Apply 1 patch topically daily as needed       LORazepam (ATIVAN) 0.5 MG tablet Take 1 tablet by mouth daily as needed for anxiety      melatonin 3 MG tablet Take 3 mg by mouth nightly as needed       metoprolol succinate ER (TOPROL-XL) 25 MG 24 hr tablet Take 12.5 mg by mouth every evening       Multiple Vitamins-Minerals (WOMENS MULTI PO) Take 1 tablet by mouth daily      ondansetron (ZOFRAN) 4 MG tablet Take 1 tablet (4 mg) by mouth every 6 hours as needed for nausea 20 tablet 1    polyethylene glycol (MIRALAX) 17 GM/Dose powder Take 17 g by mouth daily as needed for constipation                Current Pain Medications:    Baclofen 10mg tid (was only taking as needed)  Gabapentin 600mg tid  Tylenol    Physical Exam:     There were no vitals taken for this visit.    Limited due to video visit.  General Appearance: No distress, seated comfortably  Mood: Euthymic  HE ENT: Non constricted pupils  Respiratory: Non labored breathing  Right lower extremity does show some dystonia while seated in wheelchair. Moderate pitting edema of the bilateral lower extremities.    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      Anayeli Gagnon is a 72 year old year old female  who presents to the pain clinic with bilateral lower extremity pain and leg swelling. She was also recently hospitalized for pain and a UTI. Spent extensive time discussing options with patient. She was taking baclofen only as needed, recommend altering this approach. Significant time was spent discussing the importance of continuing oral medications as an adjunct to her ITP. Pt did admit she does not like taking oral medications. It was expressed to her that it seems as though her symptoms get worse when she stops taking her baclofen regularly. She verbalized understanding of this. Recommend she follow the treatment plan that was previously discussed in clinic which included 10mg baclofen in the mid afternoon, then 20mg in the evening, particularly considering her pain tends to be worse in the evening.     She also has not been using her compression stockings. She was educated that the stockings and elevating her legs in the mid afternoon could benefit for her pain, as she reports swelling continues to be uncomfortable for her. We will also refer her to PM&R for further evaluation of spasticity and consideration for other possible treatments (such as Botox). Discussed with PMR provider about expert opinion on possible intrathecal baclofen ( is it indicated?)    Discussed with patient that her UTI could also exacerbate her pain due to autonomic dysreflexia, and she should pay close attention to these symptoms and ensure the infection has been adequately treated. She reports her urinary symptoms have resolved as of this visit.     RECOMMENDATIONS:     1. Medications: Continue current medications. Of note, pt recently started taking baclofen as needed instead of regularly and her pain has been worsening. Instructed her to take 10mg in the mid afternoon and 20mg in the evening before bed as her symptoms tend to worsen at  night.    2. Procedure: None at this time. Will refer to PM&R for further evaluation of spasticity as well as for consideration of possible treatment options (such as botox).    3. Education: All questions answered. Pt was instructed to utilize her compression stockings as she has not been doing this. Also instructed to elevate her legs in the mid afternoon due to swelling.    Follow up: in-person visit in 4-6 weeks.    Pt seen by fellow with Dr. Espinoza          Answers for HPI/ROS submitted by the patient on 5/22/2023  General Symptoms: No  Skin Symptoms: No  HENT Symptoms: No  EYE SYMPTOMS: No  HEART SYMPTOMS: No  LUNG SYMPTOMS: No  INTESTINAL SYMPTOMS: No  URINARY SYMPTOMS: No  GYNECOLOGIC SYMPTOMS: No  BREAST SYMPTOMS: No  SKELETAL SYMPTOMS: Yes  BLOOD SYMPTOMS: No  NERVOUS SYSTEM SYMPTOMS: Yes  MENTAL HEALTH SYMPTOMS: Yes  Swollen joints: No  Joint pain: No  Bone pain: No  Muscle cramps: No  Muscle weakness: No  Joint stiffness: No  Bone fracture: No  Trouble with coordination: No  Paralysis: No  Numbness: No  Trouble thinking or concentrating: No  Mood changes: No  Panic attacks: Yes    ATTENDING ATTESTATION  I saw the patient along with the pain medicine fellow Dr. Logan Nichols. Please see his note above for full details. I was involved in gathering history, physical examination and development of the plan of care.   The conversation involved ruling out reasons for persistent autonomic dysreflexia symptoms. The patient has struggle with increased pain and spasms in the evening. Today dystonia was noted in the right LE. We discussed the importance of multidisciplinary treatment and importance of baclofen in the setting of spinal cord injury. In the past she seems to have reduced flares with regular intake of baclofen. We emphasized the importance of non invasive, non pharmacologic measures such as elevating LE and compression socks. The patient agrees that symptoms are reduced when feet are elevated. I personally  discussed the case with one of my PMR colleagues for input for possible botox for dystonia and or intrathecal baclofen and dosing.                   Again, thank you for allowing me to participate in the care of your patient.      Sincerely,    Jasmine Espinoza MD

## 2023-05-22 NOTE — NURSING NOTE
Patient presents with:  Follow Up: Follow-up Abdominal Area-Pain Pump      Moderate Pain (4)     Pain Medications     Analgesics Other Refills Start End     acetaminophen (TYLENOL) 500 MG tablet          Sig - Route: Take 500-1,000 mg by mouth every 6 hours as needed for mild pain - Oral    Class: Historical    Salicylates Refills Start End     aspirin (ASA) 81 MG EC tablet    3 5/8/2023     Sig - Route: Take 1 tablet (81 mg) by mouth daily - Oral    Class: E-Prescribe    Renewals     Renewal requests to authorizing provider (Tosin Augustine MD) <b>prohibited</b>                What medications are you using for pain? Tylenol,Pain Pump    (New patients only) Have you been seen by another pain clinic/ provider? no    (Return Patients only) What refills are you needing today? no

## 2023-05-22 NOTE — PROGRESS NOTES
Annamarie is a 72 year old who is being evaluated via a billable video visit.      How would you like to obtain your AVS? MyChart  If the video visit is dropped, the invitation should be resent by: Text to cell phone: [phone]  Will anyone else be joining your video visit? No

## 2023-05-22 NOTE — PROGRESS NOTES
Prior A/P 4/10/23    Anayeli Gagnon is a 72F who presents to the pain clinic via video visit. Her pain is manageable at the current levels and is worse with activity and in the evening. PTM provides relief for 1-2 hours but her pain is worse in the evening where her ankles and feet begin to swell and ache. We discussed compression stockings and elevation of her feet during the day.     RECOMMENDATIONS:      1. Medications: She will continue to take baclofen PRN for any muscle spasms. No change to IDDS dosing this visit     2. Procedure: No procedures at this time     3. Education: Patient understands and agrees with plan. She will also try to utilize compression stockings during the day time which her  will help to don/doff the stockings.     Concentrations:  Dilaudid    14 mg/ml                                                          Bupivacaine 28 mg/ml  Ziconitide 14 mcg/ml     Total Volume in Refill: 20 mL      Basal:   Dilaudid    7 mg/day                                                       Bupivacaine 14 mg/day  Ziconitide 7 mcg/day     PTM 0.2 hydromorphone, 2 hour lockout, maximum 6 in a day.     Maximum 24 hour dose  Hydromorphone 8.16 mg/day  Bupivacaine 16.3 mg/day  Ziconitide 8.16 mcg/day      Type of service:  Video Visit    Video Start Time: 0945    Video End Time:10:14 AM    Originating Location (pt. Location): Home    Distant Location (provider location):  [facility] [place]     Platform used for Video Visit: Zoom (Telehealth)    Pain clinic follow up note    HPI:   Anayeli Gagnon is a 72 year old year old female  who presents to the pain clinic with continuing bilateral lower extremity pain. Describes pain in her groin area and from the bilateral knees into her feet, pain is aching and burning, worse at night, tends to be an 8-9/10, then 2-3/10 in the mornings. She believes things have been worsening over the past  month.    She has been utilizing her pain pump (all 6PTM doses/day), baclofen (not consistently, only as needed), gabapentin, and tylenol. She is not currently using her compression stockings or regularly elevating her feet.     She was recently hospitalized and diagnosed with a UTI. Reports these symptoms have essentially resolved, but she continues to have significant pain in her lower extremities especially in the evenings.     She is not doing physical therapy.      Patient Supplied Answers To the  Pain Questionnaire      5/22/2023     9:19 AM    Pain -  Patient Entered Questionnaire/Answers   What number best describes your pain right now:  0 = No pain  to  10 = Worst pain imaginable 4   How would you describe the pain dull, aching   Which of the following worsen your pain standing    walking   Which of the following improve or reduce your pain medication   What number best describes your average pain for the past week:  0 = No pain  to  10 = Worst pain imaginable 4   What number best describes your LOWEST pain in past 24 hours:  0 = No pain  to  10 = Worst pain imaginable 3   What number best describes your WORST pain in past 24 hours:  0 = No pain  to  10 = Worst pain imaginable 10   When is your pain worst Constant   What non-medicine treatments have you already had for your pain pain clinic                     ROS:  Review of Systems   Constitutional: Negative for fever.   Genitourinary: Negative for dysuria and urgency.   Musculoskeletal: Negative for back pain, myalgias and neck pain.   Neurological: Negative for dizziness, tingling, tremors, speech change, seizures, loss of consciousness, weakness and headaches.   Psychiatric/Behavioral: Negative for depression and memory loss. The patient is not nervous/anxious and does not have insomnia.    All other systems reviewed and are negative.        Significant Medical History:   Past Medical History:   Diagnosis Date     CARDIAC DYSRHYTHMIAS [facility] 10/3/2007     Taking atenelol for years for this     History of skin cancer      Mitral valve prolapse      Neurogenic bladder      Sacral decubitus ulcer, stage IV (H)      Thoracic spinal cord injury (H)           Past Surgical History:  Past Surgical History:   Procedure Laterality Date     DECOMPRESSION LUMBAR ONE LEVEL N/A 2019    Procedure: Posterior spinal decompression;  Surgeon: Alf Ritchie MD;  Location: [facility] OR     INSERT INTRATHECAL PAIN PUMP N/A 2022    Procedure: INSERTION, INTRATHECAL ANALGESIC PUMP, externalized trial;  Surgeon: Luis Orourke MD;  Location: [facility] OR     INSERT INTRATHECAL PAIN PUMP Right 2022    Procedure: INSERTION, INTRATHECAL ANALGESIC PUMP;  Surgeon: Luis Orourke MD;  Location: [facility] OR     INSERT STIMULATOR AND LEADS INTERNAL DORSAL COLUMN N/A 2021    Procedure: Posterior thoracic 12 to Lumbar 1 level for placement of spinal cord stimulator paddle and placement of implantable pulse generator/battery over right buttock;  Surgeon: Luis Orourke MD;  Location: [facility] OR     IR SPINAL ANGIOGRAM  10/16/2019     IR SPINAL ANGIOGRAM  2019     LAPAROSCOPIC TUBAL LIGATION       REPAIR SPINAL ARERIOVENOUS MALFORMATION N/A 2019    Procedure: with surgical disconnection arterial venous fistula Thoracic 5;  Surgeon: Alf Ritchie MD;  Location: [facility] OR          Family History:  Family History   Problem Relation Age of Onset     C.A.D. Father          41     Deep Vein Thrombosis (DVT) No family hx of      Anesthesia Reaction No family hx of           Social History:  Social History     Socioeconomic History     Marital status:      Spouse name: Not on file     Number of children: Not on file     Years of education: Not on file     Highest education level: Not on file   Occupational History     Occupation: NA     Employer: [employer]   Tobacco Use     Smoking status: Never     Smokeless tobacco: Never   Vaping Use     Vaping status: Not on file    Substance and Sexual Activity     Alcohol use: No     Drug use: No     Sexual activity: Yes     Partners: Male   Other Topics Concern     Parent/sibling w/ CABG, MI or angioplasty before 65F 55M? Not Asked   Social History Narrative    Lives with spouse - works in [place].     Social Determinants of Health     Financial Resource Strain: Not on file   Food Insecurity: Not on file   Transportation Needs: Not on file   Physical Activity: Not on file   Stress: Not on file   Social Connections: Not on file   Intimate Partner Violence: Not on file   Housing Stability: Not on file     Social History     Social History Narrative    Lives with spouse - works in [place].          Allergies:  Allergies   Allergen Reactions     Contrast Dye Rash     Painful rash after x-ray contrast       Current Medications:   Current Outpatient Medications   Medication Sig Dispense Refill     acetaminophen (TYLENOL) 500 MG tablet Take 500-1,000 mg by mouth every 6 hours as needed for mild pain       aspirin (ASA) 81 MG EC tablet Take 1 tablet (81 mg) by mouth daily 30 tablet 3     atorvastatin (LIPITOR) 40 MG tablet Take 1 tablet (40 mg) by mouth every evening 30 tablet 3     baclofen (LIORESAL) 10 MG tablet Take 10 mg by mouth 3 times daily as needed for muscle spasms       COMPOUND CONTAINING CONTROLLED SUBSTANCE (CMPD RX) - PHARMACY TO MIX COMPOUNDED MEDICATION Concentrations:  Dilaudid    14 mg/ml                                                          Bupivacaine 28 mg/ml  Ziconotide 14 mcg/ml     Total Volume in Refill: 20 mL      Basal:   Dilaudid   6.993 mg/day                                                       Bupivacaine 13.986 mg/day  Ziconotide 6.993 mcg/day     PTM 0.2 hydromorphone, 0.4 mg bupivacaine, 0.2 mcg ziconotide, 2 hour lockout, maximum 6 in a day.    Maximum 24 hour dose  Hydromorphone 8.134 mg/day  Bupivacaine 16.267 mg/day  Ziconitide 8.134 mcg/day    Last filled 4/20/23. Pump alarm 5/20/23  Managed by  Staff Ranker ([phone]) 20 mL 0     gabapentin (NEURONTIN) 600 MG tablet Take 1,200 mg by mouth 3 times daily  1 tablet 0     lidocaine (LIDODERM) 5 % patch Apply 1 patch topically daily as needed        LORazepam (ATIVAN) 0.5 MG tablet Take 1 tablet by mouth daily as needed for anxiety       melatonin 3 MG tablet Take 3 mg by mouth nightly as needed        metoprolol succinate ER (TOPROL-XL) 25 MG 24 hr tablet Take 12.5 mg by mouth every evening        Multiple Vitamins-Minerals (WOMENS MULTI PO) Take 1 tablet by mouth daily       ondansetron (ZOFRAN) 4 MG tablet Take 1 tablet (4 mg) by mouth every 6 hours as needed for nausea 20 tablet 1     polyethylene glycol (MIRALAX) 17 GM/Dose powder Take 17 g by mouth daily as needed for constipation                Current Pain Medications:    Baclofen 10mg tid (was only taking as needed)  Gabapentin 600mg tid  Tylenol    Physical Exam:     There were no vitals taken for this visit.    Limited due to video visit.  General Appearance: No distress, seated comfortably  Mood: Euthymic  HE ENT: Non constricted pupils  Respiratory: Non labored breathing  Right lower extremity does show some dystonia while seated in wheelchair. Moderate pitting edema of the bilateral lower extremities.    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      Anayeli Gagnon is a 72 year old year old female  who presents to the pain clinic with bilateral lower extremity pain and leg swelling. She was also recently hospitalized for pain and a UTI. Spent extensive time discussing options with patient. She was taking baclofen only as needed, recommend altering this approach. Significant time was spent discussing the importance of continuing oral medications as an adjunct to her ITP. Pt did admit she does not like taking oral medications. It was expressed to her that it seems as though her  symptoms get worse when she stops taking her baclofen regularly. She verbalized understanding of this. Recommend she follow the treatment plan that was previously discussed in clinic which included 10mg baclofen in the mid afternoon, then 20mg in the evening, particularly considering her pain tends to be worse in the evening.     She also has not been using her compression stockings. She was educated that the stockings and elevating her legs in the mid afternoon could benefit for her pain, as she reports swelling continues to be uncomfortable for her. We will also refer her to PM&R for further evaluation of spasticity and consideration for other possible treatments (such as Botox). Discussed with PMR provider about expert opinion on possible intrathecal baclofen ( is it indicated?)    Discussed with patient that her UTI could also exacerbate her pain due to autonomic dysreflexia, and she should pay close attention to these symptoms and ensure the infection has been adequately treated. She reports her urinary symptoms have resolved as of this visit.     RECOMMENDATIONS:     1. Medications: Continue current medications. Of note, pt recently started taking baclofen as needed instead of regularly and her pain has been worsening. Instructed her to take 10mg in the mid afternoon and 20mg in the evening before bed as her symptoms tend to worsen at night.    2. Procedure: None at this time. Will refer to PM&R for further evaluation of spasticity as well as for consideration of possible treatment options (such as botox).    3. Education: All questions answered. Pt was instructed to utilize her compression stockings as she has not been doing this. Also instructed to elevate her legs in the mid afternoon due to swelling.    Follow up: in-person visit in 4-6 weeks.    Pt seen by fellow with Dr. Espinoza          Answers for HPI/ROS submitted by the patient on 5/22/2023  General Symptoms: No  Skin Symptoms: No  HENT Symptoms:  No  EYE SYMPTOMS: No  HEART SYMPTOMS: No  LUNG SYMPTOMS: No  INTESTINAL SYMPTOMS: No  URINARY SYMPTOMS: No  GYNECOLOGIC SYMPTOMS: No  BREAST SYMPTOMS: No  SKELETAL SYMPTOMS: Yes  BLOOD SYMPTOMS: No  NERVOUS SYSTEM SYMPTOMS: Yes  MENTAL HEALTH SYMPTOMS: Yes  Swollen joints: No  Joint pain: No  Bone pain: No  Muscle cramps: No  Muscle weakness: No  Joint stiffness: No  Bone fracture: No  Trouble with coordination: No  Paralysis: No  Numbness: No  Trouble thinking or concentrating: No  Mood changes: No  Panic attacks: Yes    ATTENDING ATTESTATION  I saw the patient along with the pain medicine fellow Dr. Logan Nichols. Please see his note above for full details. I was involved in gathering history, physical examination and development of the plan of care.   The conversation involved ruling out reasons for persistent autonomic dysreflexia symptoms. The patient has struggle with increased pain and spasms in the evening. Today dystonia was noted in the right LE. We discussed the importance of multidisciplinary treatment and importance of baclofen in the setting of spinal cord injury. In the past she seems to have reduced flares with regular intake of baclofen. We emphasized the importance of non invasive, non pharmacologic measures such as elevating LE and compression socks. The patient agrees that symptoms are reduced when feet are elevated. I personally discussed the case with one of my PMR colleagues for input for possible botox for dystonia and or intrathecal baclofen and dosing.

## 2023-05-22 NOTE — PATIENT INSTRUCTIONS
Referrals:    PM&R referral placed      Treatment planning:    Wear compression stockings and elevate legs during midday.  Take Baclofen: 1 tablet in afternoon, 2 tablets in PM.  Take caution with UTI, be seen by emergency department at first sign of symptoms to be evaluated.      Recommended Follow up:      Follow up 4-6 weeks in person.      To speak with a nurse, schedule/reschedule/cancel a clinic appointment, or request a medication refill call: [phone].    You can also reach us by [facility]: [email or web address]

## 2023-05-23 ASSESSMENT — ENCOUNTER SYMPTOMS
DIZZINESS: 0
SEIZURES: 0
NERVOUS/ANXIOUS: 0
SPEECH CHANGE: 0
HEADACHES: 0
DEPRESSION: 0
TREMORS: 0
MEMORY LOSS: 0
TINGLING: 0
INSOMNIA: 0
BACK PAIN: 0
LOSS OF CONSCIOUSNESS: 0
NECK PAIN: 0
WEAKNESS: 0
MYALGIAS: 0

## 2023-05-23 NOTE — PROGRESS NOTES
DISCHARGE  Reason for Discharge: Patient found to be in home health episode as of 5/23/23    Equipment Issued: NA    Discharge Plan: Patient to continue with home health OT, follow up with PCP for further OT needs once home health completed.     Referring Provider:  Tosin Augustine    05/19/23 0500   Appointment Info   Treating Provider Lisa Kraus OTR/L   Visits Used 1/10   Medical Diagnosis Cerebrovascular accident (CVA), unspecified mechanism (H)  Occipital stroke (H)   OT Tx Diagnosis Impaired IADLs, social and leisure particpation and performance secondary to changes in cognition   Quick Add  Certification   Progress Note/Certification   Start Of Care Date 05/19/23   Onset of Illness/Injury or Date of Surgery 05/10/23   Therapy Frequency 1x/week   Predicted Duration 8 weeks   Certification date from 05/19/23   Certification date to 07/17/23   KX Modifier Statement I certify the need for these services furnished under this plan of treatment and while under my care.  (Physician co-signature of this document indicates review and certification of the therapy plan)   Goals   OT Goals 1;2;3   OT Goal 1   Goal Identifier Cog Assess   Goal Description Patient will participate in cognitive testing, as indicated, in order to determine any cognitive deficits/limitations to ensure patient s safety at home and in the community during ADL, IADL, work, and leisure tasks   Rationale In order to maximize safety and independence with cognitive function within the home or community;In order to maximize independence with tasks requiring functional cognition/executive function for school, work, medication or financial mgmt   Target Date 07/17/23   OT Goal 2   Goal Identifier Cog Skills   Goal Description Patient will complete moderate level cognitive tasks with various demands (problem solving, functional calculations, divided/alternating attention, EF, error detection/correction) with no more than min assist for consecutive 3  sessions to increase problem solving skills, attention, and safety/judgement for improved performance in ADL, IADL, leisure, and work related tasks   Rationale In order to maximize safety and independence with cognitive function within the home or community;In order to maximize independence with tasks requiring functional cognition/executive function for school, work, medication or financial mgmt   Target Date 07/17/23   OT Goal 3   Goal Identifier Compensatory   Goal Description Patient will verbalize understanding of/ demonstrate up to 5 memory  or visual perceptual accommodations or compensation techniques she can utilize within her home and community for improved performance of ADL, IADL, social and leisure tasks   Rationale In order to safely and appropriately apply compensatory strategies with ADL/IADL performance   Target Date 07/17/23   Objective Measures   Objective Measures Objective Measure 1   Objective Measure 1   Objective Measure SLUMS  (15/30)   Details 1/3 functional calculations, 1/3 animal naming, 2/5 item list delayed recall, 0/2 number sequencing, 2/4 clock draw (hour/minute hands), 4/8 story detail recall   Treatment Interventions (OT)   Interventions Self Care/Home Management   Self Care/Home Management   Self-Care/Home Mgmt/ADL, Compensatory, Meal Prep Minutes (14541) 15 Minutes   Skilled Intervention Duplicate number crossout complete to assess for spatial/unilateral inttatention. Education on visual scanning to R for mobility and with daily tasks for improved R attention.   Patient Response/Progress 100% accuracy with crossout. Receptive to education   Eval/Assessments   OT Eval, Low Complexity Minutes (20317) 35   Total Session Time   Timed Code Treatment Minutes 15   Total Treatment Time (sum of timed and untimed services) 50

## 2023-05-30 ENCOUNTER — TELEPHONE (OUTPATIENT)
Dept: ANESTHESIOLOGY | Facility: CLINIC | Age: 73
End: 2023-05-30
Payer: MEDICARE

## 2023-05-30 NOTE — TELEPHONE ENCOUNTER
RN received call on vocera from [employer] nurse Adelina requesting signed POT form by 6/1/23. Writer reviewed chart and found a signed copy. Writer faxed form to [employer] and sent email update to Adelina.    Alia Reyes RNCC

## 2023-06-12 ENCOUNTER — TELEPHONE (OUTPATIENT)
Dept: ANESTHESIOLOGY | Facility: CLINIC | Age: 73
End: 2023-06-12
Payer: MEDICARE

## 2023-06-12 NOTE — TELEPHONE ENCOUNTER
RN spoke with patient's spouse Jay who informed patient was not available at the moment. Writer asked for patient to return call to pain clinic to discuss follow up appointment. Jay agreed to relay the message, provided contact number [phone].    Alia Reyes RNCC

## 2023-06-26 NOTE — PROGRESS NOTES
Annamarie is a 72 year old who is being evaluated via a billable video visit.      Video-Visit Details    Type of service:  Video Visit   Video Start Time: 2:32 PM  Video End Time:2:47 PM    Originating Location (pt. Location): Home   Distant Location (provider location):  On-site  Platform used for Video Visit: Playto  __________________________________________________________    ___________________________________  ESTABLISHED PATIENT NEUROLOGY NOTE    DATE OF VISIT: 6/27/2023  MRN: [id number]  PATIENT NAME: Anayeli Gagnon  YOB: 1950      Chief Complaint: Patient presents with:  Stroke: Patient reports her right eye is troubling her with vision.    SUBJECTIVE:                                                      History of Present Illness: Anayeli Gagnon is a 72 year old female presenting for follow-up for ischemic stroke in the L occipital lobe, L PCA territory.     She was hospitalized at [facility] on 05/05/23 - 05/07/23. 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    She presented to the hospital with visual changes that happened on 5/4/2020 3 in the morning. Annamarie notes that all of a sudden in the morning she noticed a big bright dark [place] in her field of vision.  Denies that the [place] had any color to it and cannot localize to either side.  Exam shows R homonymous incongruous hemianopia.     Stroke Evaluation summarized:    MRI/Head CT 05/07/23 0862   CTA Head Neck with Contrast  1. Head CTA demonstrates absence of contrast filling in the left posterior cerebral artery at the distal P3 segment near the junction with P4, likely secondary to occlusion. This can alternatively be technical.   2. Neck CTA demonstrates no stenosis of the major cervical arteries.                05/05/23 1816 CT  Head w/o Contrast  1. Acute to subacute infarct medial left occipital lobe.  2. No acute intracranial hemorrhage.  3. Chronic small vessel ischemic disease.                    Echocardiogram 05/06/23  Global and regional left ventricular function is normal with an EF of 55-60%.  Global right ventricular function is normal.  The inferior vena cava was normal in size with preserved respiratory  variability.  No pericardial effusion is present.   EKG/Telemetry 05/06/23  Sinus rhythm with sinus arrhythmia   Nonspecific T wave abnormality   Abnormal ECG   Unconfirmed report - interpretation of this ECG is computer generated - see medical record for final interpretation    Other Testing 5/5 Urinalysis with signs of infection     XR CHEST 2 VIEWS  5/5/2023 8:14 PM   Mild blunting of the bilateral costophrenic angles, trace pleural effusions versus pleural thickening. No focal airspace disease.      LDL  5/5/2023: 116 mg/dL   A1C  5/5/2023: 5.6 %   Troponin 5/5/2023: 46 ng/L; 0.01 ng/mL      CTA showed no significant atherosclerosis, but did reveal absence of contrast filling in the left distal P3 segment.  Thus, at this point, etiology is ESUS.  Hence we will transition from DAPT to aspirin 81 mg monotherapy.  We are waiting for PT to assess her, if no concerns, then we will discharge her home after that with a 30-day heart monitor and follow-up with the general neurology team, and her pending course of UTI treatment with Bactrim.    06/27/23: Anayeli arrives to clinic virtually today for poststroke follow-up.  She was evaluated in the emergency department for visual changes.  She reports that she currently has right vision deficit that is more prominent when she veers her eyes to the right versus turning her whole head to the right.  She denies any other symptoms.  No facial droop, dysphagia or dysarthria.  No unilateral numbness or weakness.  She has plans to make an appointment with her ophthalmologist for  comprehensive eye exam and updated prescription.    Anayeli states that she is taking metoprolol for many years due to irregular heartbeat.  Blood pressure typically well controlled in the 130s over 60s.  Last LDL is 116, she is on atorvastatin.  No history of diabetes, smoking or BRADY.    Annamarie expresses concerns over increased depression.  She was wheelchair-bound 3 years ago due to spinal hematoma.  She feels a lack of mobility of being stuck at home in a wheelchair has impacted her mood.  She is interested in a referral to mental health for talk therapy to navigate her loss of independence/mobility.  She has also been battling a UTI for the past month.  She received a call today that states she may have to restart her antibiotics.  She has had increased vaginal and rectal pain.     Current Prevention Medications:    Aspirin 81 mg tablet daily  Atorvastatin 40 mg tablet daily  Metoprolol 25 mg tablet    Perceived Side Effects: No    Medication Notes:   AED Medication Compliance:  compliant      Psycho-Social History: Anayeli Gagnon currently lives [place] with . Highest level of education Some college. Employment status: Retired CNA    Patient does not smoke, no alcohol use, no recreational drug use.   We reviewed importance of mental and emotional wellbeing and impact on health.      Current Medications:   acetaminophen (TYLENOL) 500 MG tablet, Take 500-1,000 mg by mouth every 6 hours as needed for mild pain  aspirin (ASA) 81 MG EC tablet, Take 1 tablet (81 mg) by mouth daily  atorvastatin (LIPITOR) 40 MG tablet, Take 1 tablet (40 mg) by mouth every evening  baclofen (LIORESAL) 10 MG tablet, Take 10 mg by mouth 3 times daily as needed for muscle spasms  COMPOUND CONTAINING CONTROLLED SUBSTANCE (CMPD RX) - PHARMACY TO MIX COMPOUNDED MEDICATION, Concentrations:  Dilaudid    14 mg/ml                                                          Bupivacaine 28 mg/ml  Ziconotide 14 mcg/ml     Total Volume in  Refill: 20 mL      Basal:   Dilaudid   6.993 mg/day                                                       Bupivacaine 13.986 mg/day  Ziconotide 6.993 mcg/day     PTM 0.2 hydromorphone, 0.4 mg bupivacaine, 0.2 mcg ziconotide, 2 hour lockout, maximum 6 in a day.    Maximum 24 hour dose  Hydromorphone 8.134 mg/day  Bupivacaine 16.267 mg/day  Ziconitide 8.134 mcg/day    Last filled 4/20/23. Pump alarm 5/20/23  Managed by [employer] ([phone])  gabapentin (NEURONTIN) 600 MG tablet, Take 1,200 mg by mouth 3 times daily   lidocaine (LIDODERM) 5 % patch, Apply 1 patch topically daily as needed   LORazepam (ATIVAN) 0.5 MG tablet, Take 1 tablet by mouth daily as needed for anxiety  melatonin 3 MG tablet, Take 3 mg by mouth nightly as needed   metoprolol succinate ER (TOPROL-XL) 25 MG 24 hr tablet, Take 12.5 mg by mouth every evening   Multiple Vitamins-Minerals (WOMENS MULTI PO), Take 1 tablet by mouth daily  ondansetron (ZOFRAN) 4 MG tablet, Take 1 tablet (4 mg) by mouth every 6 hours as needed for nausea  polyethylene glycol (MIRALAX) 17 GM/Dose powder, Take 17 g by mouth daily as needed for constipation     No current facility-administered medications on file prior to visit.    Past Medical History:   Patient  has a past medical history of CARDIAC DYSRHYTHMIAS NEC (10/3/2007), History of skin cancer, Mitral valve prolapse, Neurogenic bladder, Sacral decubitus ulcer, stage IV (H), and Thoracic spinal cord injury (H).  Surgical History:  She  has a past surgical history that includes Laparoscopic tubal ligation; IR Spinal Angiogram (10/16/2019); Decompression lumbar one level (N/A, 12/27/2019); Repair Spinal Areriovenous Malformation (N/A, 12/27/2019); IR Spinal Angiogram (12/20/2019); Insert stimulator and leads internal dorsal column (N/A, 4/7/2021); Insert Intrathecal Pain Pump (N/A, 1/19/2022); and Insert Intrathecal Pain Pump (Right, 1/21/2022).  Family and Social History:  Reviewed, and she  reports that she has  never smoked. She has never used smokeless tobacco. She reports that she does not drink alcohol and does not use drugs.  Reviewed, and family history includes C.A.D. in her father.    RECENT DIAGNOSTIC STUDIES:   Labs:    Coagulation studies:  Recent Labs   Lab Test 05/05/23  1704 01/21/22  0420 01/19/22  0627   INR 1.00 0.99 1.02        Lipid panel:  Recent Labs   Lab Test 05/05/23  1925   CHOL 195   HDL 60   *   TRIG 95       HbA1C:  Recent Labs   Lab Test 05/05/23  1704 09/26/22  1101   A1C 5.6 5.8*       Troponin:  No lab results found.  Recent Labs   Lab Test 05/05/23  2335   TROPONINI 0.01     No lab results found.    Imaging: See [facility]   Cardiac event monitor  - no afib     REVIEW OF SYSTEMS:                                                      10-point review of systems is negative except as mentioned above in HPI.    EXAM:                                                      Physical Exam:   Vitals: No vitals were obtained today due to virtual visit.     MENTAL STATUS: Alert, attentive.   GENERAL: Healthy, alert and no distress  EYES: Eyes grossly normal to inspection.  No discharge   RESP: No audible wheeze, cough, or visible cyanosis.  No visible retractions or increased work of breathing.    MS: No gross musculoskeletal defects noted and normal range of motion of the upper extremities.  SKIN: Visible skin clear. No significant rash, abnormal pigmentation or lesions to face or neck.  NEURO: Cranial nerves grossly intact.  Mentation and speech appropriate for age. Speech is fluent. Normal comprehension. Normal concentration. Adequate fund of knowledge. EOMI with normal smooth pursuit, facial movements symmetric, hearing not formally tested but intact to conversation, no dysarthria.  PSYCH: Mentation appears normal, affect normal/bright, judgement and insight intact, normal speech and appearance well-groomed.     ASSESSMENT and PLAN:                                                      Assessment:     ICD-10-CM    1. Cerebrovascular accident (CVA), unspecified mechanism (H)  I63.9 Stroke Hospital Follow Up (for neurologist use only)      2. Occipital stroke (H)  I63.9 Stroke Hospital Follow Up (for neurologist use only)        Anayeli Gagnon is a 72 year old female presenting for follow-up for ischemic stroke in the L occipital lobe, L PCA territory. She was hospitalized at [facility] on 05/05/23 - 05/07/23. 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 She presented to the hospital with visual changes.  She continues to have right-sided visual changes.  She will follow with her ophthalmologist for comprehensive eye exam and new prescription glasses.  She also discusses her concerns of depressed mood.  Mental health referral placed.  We discussed the interventions outlined below and will plan to see the patient back in 6 months.  Anayeli understands and agrees with this plan      Plan:     Symptom management  - Eye doctor appointment  - Mental health referral    Secondary prevention  -Continue aspirin 81 mgas prescribed     Lifestyle  - mediterranean diet  - exercise    Risk Factors   Elevated blood Pressure  BP: 130's/60's   - Goal <130/80  - Continue preventive therapy: Metoprolol as prescribed     Elevated cholesterol levels   LDL: 116  - Goal < 70 mg/dl  - Continue preventive therapy: Atorvastatin as prescribed    --- Plan to follow-up with your primary care provider to manage your vascular risk factors  --- Plan on follow up in the Neurology Clinic in 6 months.  --- Please feel free to reach out if you have any further questions or concerns.  --- Seek immediate medical attention if an emergency arises or if your health becomes progressively worse.     For any acute neurologic deficits she was advised to  go  directly to the hospital rather than call the clinic.    It was a pleasure to meet you today!     Total Time: Total time spent for face to face visit, reviewing labs/imaging studies, counseling and coordination of care was: 30 Minutes spent on the date of the encounter doing chart review, review of test results, patient visit and documentation       This note was dictated using voice recognition software.  Any grammatical or context distortions are unintentional and inherent to the software.    Krista Marquez, DNP, APRN, CNP  [facility]

## 2023-06-27 ENCOUNTER — VIRTUAL VISIT (OUTPATIENT)
Dept: NEUROLOGY | Facility: CLINIC | Age: 73
End: 2023-06-27
Payer: MEDICARE

## 2023-06-27 VITALS — WEIGHT: 125 LBS | BODY MASS INDEX: 21.34 KG/M2 | HEIGHT: 64 IN

## 2023-06-27 DIAGNOSIS — I63.9 OCCIPITAL STROKE (H): ICD-10-CM

## 2023-06-27 DIAGNOSIS — F43.21 GRIEF: Primary | ICD-10-CM

## 2023-06-27 DIAGNOSIS — I63.9 CEREBROVASCULAR ACCIDENT (CVA), UNSPECIFIED MECHANISM (H): ICD-10-CM

## 2023-06-27 PROCEDURE — 99203 OFFICE O/P NEW LOW 30 MIN: CPT | Mod: VID

## 2023-06-27 NOTE — LETTER
6/27/2023         RE: Anayeli Gagnon  [address] [facility] [address]        Dear Colleague,    Thank you for referring your patient, Anayeli Gagnon, to the [facility] NEUROLOGY [facility] [place]. Please see a copy of my visit note below.    Annamarie is a 72 year old who is being evaluated via a billable video visit.      Video-Visit Details    Type of service:  Video Visit   Video Start Time: 2:32 PM  Video End Time:2:47 PM    Originating Location (pt. Location): Home   Distant Location (provider location):  On-site  Platform used for Video Visit: [employer]  __________________________________________________________    ___________________________________  ESTABLISHED PATIENT NEUROLOGY NOTE    DATE OF VISIT: 6/27/2023  MRN: [id number]  PATIENT NAME: Anayeli Gagnon  YOB: 1950      Chief Complaint: Patient presents with:  Stroke: Patient reports her right eye is troubling her with vision.    SUBJECTIVE:                                                      History of Present Illness: Anayeli Gagnon is a 72 year old female presenting for follow-up for ischemic stroke in the L occipital lobe, L PCA territory.     She was hospitalized at [facility] on 05/05/23 - 05/07/23. 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    She presented to the hospital with visual changes that happened on 5/4/2020 3 in the morning. Annamarie notes that all of a sudden in the morning she noticed a big bright dark [place] in her field of vision.  Denies that the [place] had any color to it and cannot localize to either side.  Exam shows R homonymous incongruous hemianopia.     Stroke Evaluation summarized:    MRI/Head CT 05/07/23 0851   CTA Head Neck with Contrast  1. Head CTA demonstrates absence of contrast  filling in the left posterior cerebral artery at the distal P3 segment near the junction with P4, likely secondary to occlusion. This can alternatively be technical.   2. Neck CTA demonstrates no stenosis of the major cervical arteries.                05/05/23 1816 CT Head w/o Contrast  1. Acute to subacute infarct medial left occipital lobe.  2. No acute intracranial hemorrhage.  3. Chronic small vessel ischemic disease.                    Echocardiogram 05/06/23  Global and regional left ventricular function is normal with an EF of 55-60%.  Global right ventricular function is normal.  The inferior vena cava was normal in size with preserved respiratory  variability.  No pericardial effusion is present.   EKG/Telemetry 05/06/23  Sinus rhythm with sinus arrhythmia   Nonspecific T wave abnormality   Abnormal ECG   Unconfirmed report - interpretation of this ECG is computer generated - see medical record for final interpretation    Other Testing 5/5 Urinalysis with signs of infection     XR CHEST 2 VIEWS  5/5/2023 8:14 PM   Mild blunting of the bilateral costophrenic angles, trace pleural effusions versus pleural thickening. No focal airspace disease.      LDL  5/5/2023: 116 mg/dL   A1C  5/5/2023: 5.6 %   Troponin 5/5/2023: 46 ng/L; 0.01 ng/mL      CTA showed no significant atherosclerosis, but did reveal absence of contrast filling in the left distal P3 segment.  Thus, at this point, etiology is ESUS.  Hence we will transition from DAPT to aspirin 81 mg monotherapy.  We are waiting for PT to assess her, if no concerns, then we will discharge her home after that with a 30-day heart monitor and follow-up with the general neurology team, and her pending course of UTI treatment with Bactrim.    06/27/23: Anayeli arrives to clinic virtually today for poststroke follow-up.  She was evaluated in the emergency department for visual changes.  She reports that she currently has right vision deficit that is more prominent  when she veers her eyes to the right versus turning her whole head to the right.  She denies any other symptoms.  No facial droop, dysphagia or dysarthria.  No unilateral numbness or weakness.  She has plans to make an appointment with her ophthalmologist for comprehensive eye exam and updated prescription.    Anayeli states that she is taking metoprolol for many years due to irregular heartbeat.  Blood pressure typically well controlled in the 130s over 60s.  Last LDL is 116, she is on atorvastatin.  No history of diabetes, smoking or BRADY.    Annamarie expresses concerns over increased depression.  She was wheelchair-bound 3 years ago due to spinal hematoma.  She feels a lack of mobility of being stuck at home in a wheelchair has impacted her mood.  She is interested in a referral to mental health for talk therapy to navigate her loss of independence/mobility.  She has also been battling a UTI for the past month.  She received a call today that states she may have to restart her antibiotics.  She has had increased vaginal and rectal pain.     Current Prevention Medications:    Aspirin 81 mg tablet daily  Atorvastatin 40 mg tablet daily  Metoprolol 25 mg tablet    Perceived Side Effects: No    Medication Notes:   AED Medication Compliance:  compliant      Psycho-Social History: Anayeli Gagnon currently lives [place] with . Highest level of education Some college. Employment status: Retired CNA    Patient does not smoke, no alcohol use, no recreational drug use.   We reviewed importance of mental and emotional wellbeing and impact on health.      Current Medications:   acetaminophen (TYLENOL) 500 MG tablet, Take 500-1,000 mg by mouth every 6 hours as needed for mild pain  aspirin (ASA) 81 MG EC tablet, Take 1 tablet (81 mg) by mouth daily  atorvastatin (LIPITOR) 40 MG tablet, Take 1 tablet (40 mg) by mouth every evening  baclofen (LIORESAL) 10 MG tablet, Take 10 mg by mouth 3 times daily as needed for  muscle spasms  COMPOUND CONTAINING CONTROLLED SUBSTANCE (CMPD RX) - PHARMACY TO MIX COMPOUNDED MEDICATION, Concentrations:  Dilaudid    14 mg/ml                                                          Bupivacaine 28 mg/ml  Ziconotide 14 mcg/ml     Total Volume in Refill: 20 mL      Basal:   Dilaudid   6.993 mg/day                                                       Bupivacaine 13.986 mg/day  Ziconotide 6.993 mcg/day     PTM 0.2 hydromorphone, 0.4 mg bupivacaine, 0.2 mcg ziconotide, 2 hour lockout, maximum 6 in a day.    Maximum 24 hour dose  Hydromorphone 8.134 mg/day  Bupivacaine 16.267 mg/day  Ziconitide 8.134 mcg/day    Last filled 4/20/23. Pump alarm 5/20/23  Managed by [employer] ([phone])  gabapentin (NEURONTIN) 600 MG tablet, Take 1,200 mg by mouth 3 times daily   lidocaine (LIDODERM) 5 % patch, Apply 1 patch topically daily as needed   LORazepam (ATIVAN) 0.5 MG tablet, Take 1 tablet by mouth daily as needed for anxiety  melatonin 3 MG tablet, Take 3 mg by mouth nightly as needed   metoprolol succinate ER (TOPROL-XL) 25 MG 24 hr tablet, Take 12.5 mg by mouth every evening   Multiple Vitamins-Minerals (WOMENS MULTI PO), Take 1 tablet by mouth daily  ondansetron (ZOFRAN) 4 MG tablet, Take 1 tablet (4 mg) by mouth every 6 hours as needed for nausea  polyethylene glycol (MIRALAX) 17 GM/Dose powder, Take 17 g by mouth daily as needed for constipation     No current facility-administered medications on file prior to visit.    Past Medical History:   Patient  has a past medical history of CARDIAC DYSRHYTHMIAS NEC (10/3/2007), History of skin cancer, Mitral valve prolapse, Neurogenic bladder, Sacral decubitus ulcer, stage IV (H), and Thoracic spinal cord injury (H).  Surgical History:  She  has a past surgical history that includes Laparoscopic tubal ligation; IR Spinal Angiogram (10/16/2019); Decompression lumbar one level (N/A, 12/27/2019); Repair Spinal Areriovenous Malformation (N/A, 12/27/2019); IR  Spinal Angiogram (12/20/2019); Insert stimulator and leads internal dorsal column (N/A, 4/7/2021); Insert Intrathecal Pain Pump (N/A, 1/19/2022); and Insert Intrathecal Pain Pump (Right, 1/21/2022).  Family and Social History:  Reviewed, and she  reports that she has never smoked. She has never used smokeless tobacco. She reports that she does not drink alcohol and does not use drugs.  Reviewed, and family history includes C.A.D. in her father.    RECENT DIAGNOSTIC STUDIES:   Labs:    Coagulation studies:  Recent Labs   Lab Test 05/05/23  1704 01/21/22  0420 01/19/22  0627   INR 1.00 0.99 1.02        Lipid panel:  Recent Labs   Lab Test 05/05/23  1925   CHOL 195   HDL 60   *   TRIG 95       HbA1C:  Recent Labs   Lab Test 05/05/23  1704 09/26/22  1101   A1C 5.6 5.8*       Troponin:  No lab results found.  Recent Labs   Lab Test 05/05/23  2335   TROPONINI 0.01     No lab results found.    Imaging: See [facility]   Cardiac event monitor  - no afib     REVIEW OF SYSTEMS:                                                      10-point review of systems is negative except as mentioned above in HPI.    EXAM:                                                      Physical Exam:   Vitals: No vitals were obtained today due to virtual visit.     MENTAL STATUS: Alert, attentive.   GENERAL: Healthy, alert and no distress  EYES: Eyes grossly normal to inspection.  No discharge   RESP: No audible wheeze, cough, or visible cyanosis.  No visible retractions or increased work of breathing.    MS: No gross musculoskeletal defects noted and normal range of motion of the upper extremities.  SKIN: Visible skin clear. No significant rash, abnormal pigmentation or lesions to face or neck.  NEURO: Cranial nerves grossly intact.  Mentation and speech appropriate for age. Speech is fluent. Normal comprehension. Normal concentration. Adequate fund of knowledge. EOMI with normal smooth pursuit, facial movements symmetric, hearing not formally  tested but intact to conversation, no dysarthria.  PSYCH: Mentation appears normal, affect normal/bright, judgement and insight intact, normal speech and appearance well-groomed.     ASSESSMENT and PLAN:                                                      Assessment:    ICD-10-CM    1. Cerebrovascular accident (CVA), unspecified mechanism (H)  I63.9 Stroke Hospital Follow Up (for neurologist use only)      2. Occipital stroke (H)  I63.9 Stroke Hospital Follow Up (for neurologist use only)        Anayeli Gagnon is a 72 year old female presenting for follow-up for ischemic stroke in the L occipital lobe, L PCA territory. She was hospitalized at [facility] on 05/05/23 - 05/07/23. 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 She presented to the hospital with visual changes.  She continues to have right-sided visual changes.  She will follow with her ophthalmologist for comprehensive eye exam and new prescription glasses.  She also discusses her concerns of depressed mood.  Mental health referral placed.  We discussed the interventions outlined below and will plan to see the patient back in 6 months.  Anayeli understands and agrees with this plan      Plan:     Symptom management  - Eye doctor appointment  - Mental health referral    Secondary prevention  -Continue aspirin 81 mgas prescribed     Lifestyle  - mediterranean diet  - exercise    Risk Factors   Elevated blood Pressure  BP: 130's/60's   - Goal <130/80  - Continue preventive therapy: Metoprolol as prescribed     Elevated cholesterol levels   LDL: 116  - Goal < 70 mg/dl  - Continue preventive therapy: Atorvastatin as prescribed    --- Plan to follow-up with your primary care provider to manage your vascular risk factors  --- Plan on follow up in the Neurology  Clinic in 6 months.  --- Please feel free to reach out if you have any further questions or concerns.  --- Seek immediate medical attention if an emergency arises or if your health becomes progressively worse.     For any acute neurologic deficits she was advised to  go directly to the hospital rather than call the clinic.    It was a pleasure to meet you today!     Total Time: Total time spent for face to face visit, reviewing labs/imaging studies, counseling and coordination of care was: 30 Minutes spent on the date of the encounter doing chart review, review of test results, patient visit and documentation       This note was dictated using voice recognition software.  Any grammatical or context distortions are unintentional and inherent to the software.    Krista Marquez, KIRAN, APRN, CNP  [facility]       Again, thank you for allowing me to participate in the care of your patient.        Sincerely,        EMELI Watt CNP

## 2023-06-27 NOTE — PATIENT INSTRUCTIONS
Plan:     Symptom management  - Eye doctor appointment  - Mental health referral    Secondary prevention  -Continue aspirin 81 mgas prescribed     Lifestyle  - mediterranean diet  - exercise    Risk Factors   Elevated blood Pressure  BP: 130's/60's   - Goal <130/80  - Continue preventive therapy: Metoprolol as prescribed     Elevated cholesterol levels   LDL: 116  - Goal < 70 mg/dl  - Continue preventive therapy: Atorvastatin as prescribed    --- Plan to follow-up with your primary care provider to manage your vascular risk factors  --- Plan on follow up in the Neurology Clinic in 6 months.  --- Please feel free to reach out if you have any further questions or concerns.  --- Seek immediate medical attention if an emergency arises or if your health becomes progressively worse.     For any acute neurologic deficits she was advised to  go directly to the hospital rather than call the clinic.    It was a pleasure to meet you today!

## 2023-06-27 NOTE — NURSING NOTE
Chief Complaint   Patient presents with     Stroke     Patient reports her right eye is troubling her with vision.     Lisa Chang on 6/27/2023 at 1:58 PM

## 2023-07-03 ENCOUNTER — ANCILLARY PROCEDURE (OUTPATIENT)
Dept: CT IMAGING | Facility: CLINIC | Age: 73
End: 2023-07-03
Attending: PHYSICAL MEDICINE & REHABILITATION
Payer: MEDICARE

## 2023-07-03 ENCOUNTER — LAB (OUTPATIENT)
Dept: LAB | Facility: CLINIC | Age: 73
End: 2023-07-03
Payer: MEDICARE

## 2023-07-03 ENCOUNTER — OFFICE VISIT (OUTPATIENT)
Dept: PHYSICAL MEDICINE AND REHAB | Facility: CLINIC | Age: 73
End: 2023-07-03
Payer: MEDICARE

## 2023-07-03 VITALS — OXYGEN SATURATION: 94 % | DIASTOLIC BLOOD PRESSURE: 76 MMHG | SYSTOLIC BLOOD PRESSURE: 124 MMHG | HEART RATE: 68 BPM

## 2023-07-03 DIAGNOSIS — G24.9 DYSTONIA: ICD-10-CM

## 2023-07-03 DIAGNOSIS — N31.9 NEUROGENIC BLADDER: ICD-10-CM

## 2023-07-03 DIAGNOSIS — R78.81 BACTEREMIA: Primary | ICD-10-CM

## 2023-07-03 DIAGNOSIS — R78.81 BACTEREMIA: ICD-10-CM

## 2023-07-03 LAB
ALBUMIN UR-MCNC: 10 MG/DL
APPEARANCE UR: CLEAR
BACTERIA #/AREA URNS HPF: ABNORMAL /HPF
BASOPHILS # BLD AUTO: 0.1 10E3/UL (ref 0–0.2)
BASOPHILS NFR BLD AUTO: 1 %
BILIRUB UR QL STRIP: NEGATIVE
COLOR UR AUTO: ABNORMAL
CRP SERPL-MCNC: 6.96 MG/L
EOSINOPHIL # BLD AUTO: 0.3 10E3/UL (ref 0–0.7)
EOSINOPHIL NFR BLD AUTO: 3 %
ERYTHROCYTE [DISTWIDTH] IN BLOOD BY AUTOMATED COUNT: 13.9 % (ref 10–15)
GLUCOSE UR STRIP-MCNC: NEGATIVE MG/DL
HCT VFR BLD AUTO: 40.5 % (ref 35–47)
HGB BLD-MCNC: 13 G/DL (ref 11.7–15.7)
HGB UR QL STRIP: NEGATIVE
IMM GRANULOCYTES # BLD: 0 10E3/UL
IMM GRANULOCYTES NFR BLD: 0 %
KETONES UR STRIP-MCNC: NEGATIVE MG/DL
LEUKOCYTE ESTERASE UR QL STRIP: NEGATIVE
LYMPHOCYTES # BLD AUTO: 2.1 10E3/UL (ref 0.8–5.3)
LYMPHOCYTES NFR BLD AUTO: 22 %
MCH RBC QN AUTO: 30.4 PG (ref 26.5–33)
MCHC RBC AUTO-ENTMCNC: 32.1 G/DL (ref 31.5–36.5)
MCV RBC AUTO: 95 FL (ref 78–100)
MONOCYTES # BLD AUTO: 0.7 10E3/UL (ref 0–1.3)
MONOCYTES NFR BLD AUTO: 7 %
MUCOUS THREADS #/AREA URNS LPF: PRESENT /LPF
NEUTROPHILS # BLD AUTO: 6.5 10E3/UL (ref 1.6–8.3)
NEUTROPHILS NFR BLD AUTO: 67 %
NITRATE UR QL: NEGATIVE
NRBC # BLD AUTO: 0 10E3/UL
NRBC BLD AUTO-RTO: 0 /100
PH UR STRIP: 5.5 [PH] (ref 5–7)
PLATELET # BLD AUTO: 173 10E3/UL (ref 150–450)
RBC # BLD AUTO: 4.27 10E6/UL (ref 3.8–5.2)
RBC URINE: 2 /HPF
SP GR UR STRIP: 1.01 (ref 1–1.03)
UROBILINOGEN UR STRIP-MCNC: NORMAL MG/DL
WBC # BLD AUTO: 9.7 10E3/UL (ref 4–11)
WBC URINE: 1 /HPF

## 2023-07-03 PROCEDURE — 99000 SPECIMEN HANDLING OFFICE-LAB: CPT | Performed by: PATHOLOGY

## 2023-07-03 PROCEDURE — 87086 URINE CULTURE/COLONY COUNT: CPT | Performed by: PHYSICAL MEDICINE & REHABILITATION

## 2023-07-03 PROCEDURE — 74176 CT ABD & PELVIS W/O CONTRAST: CPT | Mod: MG | Performed by: RADIOLOGY

## 2023-07-03 PROCEDURE — 36415 COLL VENOUS BLD VENIPUNCTURE: CPT | Performed by: PATHOLOGY

## 2023-07-03 PROCEDURE — 81001 URINALYSIS AUTO W/SCOPE: CPT | Performed by: PATHOLOGY

## 2023-07-03 PROCEDURE — 85025 COMPLETE CBC W/AUTO DIFF WBC: CPT | Performed by: PATHOLOGY

## 2023-07-03 PROCEDURE — G1010 CDSM STANSON: HCPCS | Mod: GC | Performed by: RADIOLOGY

## 2023-07-03 PROCEDURE — 99205 OFFICE O/P NEW HI 60 MIN: CPT | Performed by: PHYSICAL MEDICINE & REHABILITATION

## 2023-07-03 PROCEDURE — 86140 C-REACTIVE PROTEIN: CPT | Performed by: PATHOLOGY

## 2023-07-03 RX ORDER — AMOXICILLIN AND CLAVULANATE POTASSIUM 500; 125 MG/1; MG/1
TABLET, FILM COATED ORAL
Status: ON HOLD | COMMUNITY
Start: 2023-06-30 | End: 2023-09-11

## 2023-07-03 ASSESSMENT — PAIN SCALES - GENERAL: PAINLEVEL: SEVERE PAIN (7)

## 2023-07-03 NOTE — LETTER
"7/3/2023       RE: Anayeli Gagnon  [address] [facility] [address]       Dear Colleague,    Thank you for referring your patient, Anayeli Gagnon, to the [facility] PHYSICAL MEDICINE AND REHABILITATION CLINIC [place] at [facility]. Please see a copy of my visit note below.    .  [facility]   PM&R clinic note        Interval history:     Anayeli Gagnon presents to clinic today reg her rehab needs. Patient has PMHx of thoracic SCI with paraplegia managing her bladder with SP catheter.  She has h/o UTI for the last 6 weeks managed with antibiotics. She is taking her 3rd antibiotics but still has cloudy and bloody urine. Seen by Urology at [facility] \" but wasn't helpful and offered nothing\"  This has caused her pain and spasticity to get worse since the UTI despite working with pain management team to optimize her pain pump.   Patient reports worsening tightness since her UTI with significant functional limitation. Painful tightness. Managed with SP catheter that was changed 2 weeks ago.  She was given Baclofen 10 mg TID but could not tolerate that dose and felt sleepy. Takes Baclofen 10 mg BID that helped with spasticity.    Social history is unchanged,   Medications:  Current Outpatient Medications   Medication Sig Dispense Refill    acetaminophen (TYLENOL) 500 MG tablet Take 500-1,000 mg by mouth every 6 hours as needed for mild pain      amoxicillin-clavulanate (AUGMENTIN) 500-125 MG tablet TAKE ONE TABLET BY MOUTH EVERY 12 HOURS FOR 5 DAYS      aspirin (ASA) 81 MG EC tablet Take 1 tablet (81 mg) by mouth daily 30 tablet 3    atorvastatin (LIPITOR) 40 MG tablet Take 1 tablet (40 mg) by mouth every evening 30 tablet 3    baclofen (LIORESAL) 10 MG tablet Take 10 mg by mouth 3 times daily as needed for muscle spasms      COMPOUND CONTAINING CONTROLLED SUBSTANCE (CMPD RX) - PHARMACY TO MIX COMPOUNDED " MEDICATION Concentrations:  Dilaudid    14 mg/ml                                                          Bupivacaine 28 mg/ml  Ziconotide 14 mcg/ml     Total Volume in Refill: 20 mL      Basal:   Dilaudid   6.993 mg/day                                                       Bupivacaine 13.986 mg/day  Ziconotide 6.993 mcg/day     PTM 0.2 hydromorphone, 0.4 mg bupivacaine, 0.2 mcg ziconotide, 2 hour lockout, maximum 6 in a day.    Maximum 24 hour dose  Hydromorphone 8.134 mg/day  Bupivacaine 16.267 mg/day  Ziconitide 8.134 mcg/day    Last filled 4/20/23. Pump alarm 5/20/23  Managed by [email or web address] ([phone]) 20 mL 0    gabapentin (NEURONTIN) 600 MG tablet Take 1,200 mg by mouth 3 times daily  1 tablet 0    lidocaine (LIDODERM) 5 % patch Apply 1 patch topically daily as needed       LORazepam (ATIVAN) 0.5 MG tablet Take 1 tablet by mouth daily as needed for anxiety      metoprolol succinate ER (TOPROL-XL) 25 MG 24 hr tablet Take 12.5 mg by mouth every evening       Multiple Vitamins-Minerals (WOMENS MULTI PO) Take 1 tablet by mouth daily      ondansetron (ZOFRAN) 4 MG tablet Take 1 tablet (4 mg) by mouth every 6 hours as needed for nausea 20 tablet 1    polyethylene glycol (MIRALAX) 17 GM/Dose powder Take 17 g by mouth daily as needed for constipation       melatonin 3 MG tablet Take 3 mg by mouth nightly as needed  (Patient not taking: Reported on 7/3/2023)                Physical Exam:   /76   Pulse 68   SpO2 94%   Gen: NAD, pleasant and cooperative     Neuro/MSK:   Limited exam give  Her pain.      Labs/Imaging:  Lab Results   Component Value Date    WBC 8.1 05/10/2023    HGB 13.8 05/10/2023    HCT 42.1 05/10/2023    MCV 93 05/10/2023     05/10/2023     Lab Results   Component Value Date     05/10/2023    POTASSIUM 4.6 05/10/2023    CHLORIDE 100 05/10/2023    CO2 26 05/10/2023    GLC 97 05/12/2023     Lab Results   Component Value Date    GFRESTIMATED >90 05/10/2023    GFRESTBLACK >90  04/07/2021     Lab Results   Component Value Date    AST 31 05/10/2023    ALT 12 05/10/2023    ALKPHOS 70 05/10/2023    BILITOTAL 0.4 05/10/2023     Lab Results   Component Value Date    INR 1.00 05/05/2023     Lab Results   Component Value Date    BUN 14.0 05/10/2023    CR 0.60 05/10/2023              Assessment/Plan     .(N31.9) Neurogenic bladder  (primary encounter diagnosis)    (G24.9) Dystonia    A 72 year old female with thoracic SCI with complicated UTI that has not resolved despite multiple antibiotics. This is accompanied with bloody and cloudy urine. I can not exclude urosepsis vs hydronephrosis vs bladders stones.  I offered the patient to go straight to the ER to be admitted to urosepsis workup and management by nephrology, urology and ID. Patient did not see the need for that.    Work-up: blood and urine workup was ordered to r/out urosepsis. CT abdomen and pelvis to r/out urine stones (patient is allergic to contrast)    Medications: continue baclofen oral and pain pump. For the time, managing her spasticity is limited given that UTI has not resolved and any further increase in Baclofen or any anti-spasticity medicationscan worsen her mental status.     Interventions: Urgent Urology referral given her bloody and cloudy urine    Follow up: once cleared by urology.   I advised the  to monitor for intolerable pain or worsening mental status and if this occur, to seek medical attention through ER.  \  80 minutes spent on the date of the encounter doing chart review, history and exam, documentation and further activities as noted above          Again, thank you for allowing me to participate in the care of your patient.      Sincerely,    Shelli Frost MD

## 2023-07-03 NOTE — PROGRESS NOTES
".  [facility]   PM&R clinic note        Interval history:     Anayeli Gagnon presents to clinic today reg her rehab needs. Patient has PMHx of thoracic SCI with paraplegia managing her bladder with urinary catheter.  She has h/o UTI for the last 6 weeks managed with antibiotics. She is taking her 3rd antibiotics but still has cloudy and bloody urine. Seen by Urology at [facility] \" but wasn't helpful and offered nothing\"  This has caused her pain and spasticity to get worse since the UTI despite working with pain management team to optimize her pain pump.   Patient reports worsening tightness since her UTI with significant functional limitation. Painful tightness. Managed with SP catheter that was changed 2 weeks ago.  She was given Baclofen 10 mg TID but could not tolerate that dose and felt sleepy. Takes Baclofen 10 mg BID that helped with spasticity.    Social history is unchanged,   Medications:  Current Outpatient Medications   Medication Sig Dispense Refill     acetaminophen (TYLENOL) 500 MG tablet Take 500-1,000 mg by mouth every 6 hours as needed for mild pain       amoxicillin-clavulanate (AUGMENTIN) 500-125 MG tablet TAKE ONE TABLET BY MOUTH EVERY 12 HOURS FOR 5 DAYS       aspirin (ASA) 81 MG EC tablet Take 1 tablet (81 mg) by mouth daily 30 tablet 3     atorvastatin (LIPITOR) 40 MG tablet Take 1 tablet (40 mg) by mouth every evening 30 tablet 3     baclofen (LIORESAL) 10 MG tablet Take 10 mg by mouth 3 times daily as needed for muscle spasms       COMPOUND CONTAINING CONTROLLED SUBSTANCE (CMPD RX) - PHARMACY TO MIX COMPOUNDED MEDICATION Concentrations:  Dilaudid    14 mg/ml                                                          Bupivacaine 28 mg/ml  Ziconotide 14 mcg/ml     Total Volume in Refill: 20 mL      Basal:   Dilaudid   6.993 mg/day                                                       Bupivacaine 13.986 mg/day  Ziconotide 6.993 mcg/day     PTM 0.2 " hydromorphone, 0.4 mg bupivacaine, 0.2 mcg ziconotide, 2 hour lockout, maximum 6 in a day.    Maximum 24 hour dose  Hydromorphone 8.134 mg/day  Bupivacaine 16.267 mg/day  Ziconitide 8.134 mcg/day    Last filled 4/20/23. Pump alarm 5/20/23  Managed by [facility] ([phone]) 20 mL 0     gabapentin (NEURONTIN) 600 MG tablet Take 1,200 mg by mouth 3 times daily  1 tablet 0     lidocaine (LIDODERM) 5 % patch Apply 1 patch topically daily as needed        LORazepam (ATIVAN) 0.5 MG tablet Take 1 tablet by mouth daily as needed for anxiety       metoprolol succinate ER (TOPROL-XL) 25 MG 24 hr tablet Take 12.5 mg by mouth every evening        Multiple Vitamins-Minerals (WOMENS MULTI PO) Take 1 tablet by mouth daily       ondansetron (ZOFRAN) 4 MG tablet Take 1 tablet (4 mg) by mouth every 6 hours as needed for nausea 20 tablet 1     polyethylene glycol (MIRALAX) 17 GM/Dose powder Take 17 g by mouth daily as needed for constipation        melatonin 3 MG tablet Take 3 mg by mouth nightly as needed  (Patient not taking: Reported on 7/3/2023)                Physical Exam:   /76   Pulse 68   SpO2 94%   Gen: NAD, pleasant and cooperative     Neuro/MSK:   Limited exam give  Her pain.      Labs/Imaging:  Lab Results   Component Value Date    WBC 8.1 05/10/2023    HGB 13.8 05/10/2023    HCT 42.1 05/10/2023    MCV 93 05/10/2023     05/10/2023     Lab Results   Component Value Date     05/10/2023    POTASSIUM 4.6 05/10/2023    CHLORIDE 100 05/10/2023    CO2 26 05/10/2023    GLC 97 05/12/2023     Lab Results   Component Value Date    GFRESTIMATED >90 05/10/2023    GFRESTBLACK >90 04/07/2021     Lab Results   Component Value Date    AST 31 05/10/2023    ALT 12 05/10/2023    ALKPHOS 70 05/10/2023    BILITOTAL 0.4 05/10/2023     Lab Results   Component Value Date    INR 1.00 05/05/2023     Lab Results   Component Value Date    BUN 14.0 05/10/2023    CR 0.60 05/10/2023              Assessment/Plan     .(N31.9)  Neurogenic bladder  (primary encounter diagnosis)    (G24.9) Dystonia    A 72 year old female with thoracic SCI with complicated UTI that has not resolved despite multiple antibiotics. This is accompanied with bloody and cloudy urine. I can not exclude urosepsis vs hydronephrosis vs bladders stones.  I offered the patient to go straight to the ER to be admitted to urosepsis workup and management by nephrology, urology and ID. Patient did not see the need for that.    1. Work-up: blood and urine workup was ordered to r/out urosepsis. CT abdomen and pelvis to r/out urine stones (patient is allergic to contrast)    2. Medications: continue baclofen oral and pain pump. For the time, managing her spasticity is limited given that UTI has not resolved and any further increase in Baclofen or any anti-spasticity medicationscan worsen her mental status.     3. Interventions: Urgent Urology referral given her bloody and cloudy urine    4. Follow up: once cleared by urology.   I advised the  to monitor for intolerable pain or worsening mental status and if this occur, to seek medical attention through ER.  Addendum: based on her labs, ID referral was initiated to manage her UTI      Shelli Frost MD  Physical Medicine & Rehabilitation      80 minutes spent on the date of the encounter doing chart review, history and exam, documentation and further activities as noted above

## 2023-07-05 ENCOUNTER — VIRTUAL VISIT (OUTPATIENT)
Dept: UROLOGY | Facility: CLINIC | Age: 73
End: 2023-07-05
Payer: MEDICARE

## 2023-07-05 ENCOUNTER — PRE VISIT (OUTPATIENT)
Dept: UROLOGY | Facility: CLINIC | Age: 73
End: 2023-07-05

## 2023-07-05 DIAGNOSIS — Z97.8 FOLEY CATHETER IN PLACE: ICD-10-CM

## 2023-07-05 DIAGNOSIS — R33.9 URINARY RETENTION: ICD-10-CM

## 2023-07-05 DIAGNOSIS — R10.2 VAGINAL PAIN: ICD-10-CM

## 2023-07-05 DIAGNOSIS — N39.0 RECURRENT UTI: Primary | ICD-10-CM

## 2023-07-05 PROCEDURE — 99215 OFFICE O/P EST HI 40 MIN: CPT | Mod: 95 | Performed by: PHYSICIAN ASSISTANT

## 2023-07-05 NOTE — PROGRESS NOTES
Name: Anayeli Gagnon    MRN: [id number]   YOB: 1950                 Chief Complaint:   Recurrent UTI         Assessment and Plan:   72 year old female with neurogenic bladder and bowel secondary to thoracic SCI s/p colostomy who has managed her neurogenic urinary retention and incontinence with indwelling Cunningham catheter for the last 3-4 years. Seen today for evaluation of recurrent UTI associated with intermittent gross hematuria. She finished a course of Augmentin for UTI this morning. UA from 2 days ago looks improved; UCx in process. CT scan from 2 days ago without clear nidus for infections. She reports ongoing UTI symptoms of vaginal pain and lethargy. We discussed that with a negative UA, these symptoms would seem less likely to be due to UTI. Alternate etiologies include vaginitis - infectious vs atrophic (she was recently treated for BV and exam commented on noticeable atrophy), mechanical pain from chronic urethral catheter in wheelchair-bound patient, neuropathic pain, vs other. Ideally, she should have exam and wet prep to evaluate for vaginitis; cannot complete this today secondary to virtual visit. We also discussed alternative forms of catheterization to urethral Cunningham including SP tube versus CIC. She is not sure she would be able to CIC, but would consider SP tube. Ultimately, given the recurrent UTI with intermittent gross hematuria in a patient with long-term indwelling catheter, will plan for cystoscopy for intravesical examination. Can perform pelvic exam and wet prep at the time of cystoscopy, but if booked out more than a few days from now, advised patient to be seen locally by primary care or urgent care for exam and wet prep. Lastly, we considered repeat UDS given her history of poor bladder compliance on prior UDS in 2021. However, given that her urinary incontinence has resolved and she has no hydronephrosis on recent imaging, will hold off for now.      Plan:  -Cystoscopy with Dr. Robles or Dr. Chambers next available.  -Advise patient to been seen locally by urgent care or primary care for exam and wet prep if cystoscopy scheduled more than a few days from now. Concern for recurrent vaginitis based on history with inability to complete exam / testing during today's virtual visit.  -If wet prep negative, consider topical estrogen cream.  -Continue Cunningham catheter changes by home health nurse for now (q2 weeks).  -Discussion with urologist about possible conversion to SP tube to see if eliminating urethral catheter improves vaginal pain.     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 Frost for evaluation of recurrent UTI. Briefly regarding her urologic history, she had urinary retention and urinary incontinence after the SCI and was trialed on anticholinergics and alpha blockers before ultimately having Cunningham catheter placed in late 2019 or early 2020. Given persistent incontinence around the Cunningham catheter, she was restarted on oxybutynin and underwent urodynamics at outside institution on 4/12/21 which demonstrated a hypertonic, spastic bladder with low capacity (125 mL). Given that she was on maximal dose anticholinergic therapy with persistent poor bladder compliance, bladder Botox injections were recommended, but she ultimately did not go through with this.    Today, Annamarie reports worsening UTIs over the last year. She has had them intermittently since her SCI, but they have increased in frequency and severity over the last year. Symptoms with infection include vaginal pain, lethargy, chills, dark urine, and intermittent gross hematuria. She denies fevers. Review of PM&R  progress note from 7/3/23 also reports worsening pain and generalized spasticity when she has a UTI. She has been on antibiotics for UTI for over a month, having just finished a course of Augmentin this morning. UA from 7/3/23 looks improved; urine culture in progress. Annamarie reports that the color of the urine has improved, but she continues to experience vaginal pain and lethargy. She had a CT scan 2 days ago that did not reveal any obvious nidus for UTIs.    Her Cunningham catheter was previously changed every 3-4 weeks, but they are moving to every 2 week changes starting now. Next catheter change scheduled tomorrow or the following day. Home health nurse performs catheter changes without difficulty. Annamarie does not believe that she has experienced urinary incontinence recently. She reports diminished sensation in her groin, but states that she is not wet.     She denies vaginal discharge at this time. Has never used any form of vaginal estrogen.   Reviewed the following exam findings from office visit with outside internal medicine service 6/13/2023:  Genital exam external genitalia is pink in color there is noticeable erythema. No redness or warmth. There is noticeable vaginal atrophy. Wet prep is obtained.  Assessment/ Plan  1. Vaginal pain. Await wet prep findings. Question whether component is related to vaginal atrophy. Consider topical estrogen if wet prep is negative.    Wet prep ultimately returned with gardnerella vaginalis for which she was prescribed Metrogel.     Note that patient has a LLQ colostomy.          Past Medical History:     Past Medical History:   Diagnosis Date     CARDIAC DYSRHYTHMIAS NEC 10/3/2007    Taking atenelol for years for this     History of skin cancer      Mitral valve prolapse      Neurogenic bladder      Sacral decubitus ulcer, stage IV (H)      Thoracic spinal cord injury (H)             Past Surgical History:     Past Surgical History:   Procedure Laterality Date      DECOMPRESSION LUMBAR ONE LEVEL N/A 2019    Procedure: Posterior spinal decompression;  Surgeon: Alf Ritchie MD;  Location: [facility] OR     INSERT INTRATHECAL PAIN PUMP N/A 2022    Procedure: INSERTION, INTRATHECAL ANALGESIC PUMP, externalized trial;  Surgeon: Luis Orourke MD;  Location: [facility] OR     INSERT INTRATHECAL PAIN PUMP Right 2022    Procedure: INSERTION, INTRATHECAL ANALGESIC PUMP;  Surgeon: Luis Orourke MD;  Location: [facility] OR     INSERT STIMULATOR AND LEADS INTERNAL DORSAL COLUMN N/A 2021    Procedure: Posterior thoracic 12 to Lumbar 1 level for placement of spinal cord stimulator paddle and placement of implantable pulse generator/battery over right buttock;  Surgeon: Luis Orourke MD;  Location: [facility] OR     IR SPINAL ANGIOGRAM  10/16/2019     IR SPINAL ANGIOGRAM  2019     LAPAROSCOPIC TUBAL LIGATION       REPAIR SPINAL ARERIOVENOUS MALFORMATION N/A 2019    Procedure: with surgical disconnection arterial venous fistula Thoracic 5;  Surgeon: Alf Ritchie MD;  Location: [facility] OR            Social History:     Social History     Tobacco Use     Smoking status: Never     Smokeless tobacco: Never   Substance Use Topics     Alcohol use: No            Family History:     Family History   Problem Relation Age of Onset     C.A.D. Father          41     Deep Vein Thrombosis (DVT) No family hx of      Anesthesia Reaction No family hx of             Allergies:     Allergies   Allergen Reactions     Contrast Dye Rash     Painful rash after x-ray contrast     Nitrofurantoin Nausea and Vomiting            Medications:     Current Outpatient Medications   Medication Sig     acetaminophen (TYLENOL) 500 MG tablet Take 500-1,000 mg by mouth every 6 hours as needed for mild pain     amoxicillin-clavulanate (AUGMENTIN) 500-125 MG tablet TAKE ONE TABLET BY MOUTH EVERY 12 HOURS FOR 5 DAYS     aspirin (ASA) 81 MG EC tablet Take 1 tablet (81 mg) by mouth daily     atorvastatin  (LIPITOR) 40 MG tablet Take 1 tablet (40 mg) by mouth every evening     baclofen (LIORESAL) 10 MG tablet Take 10 mg by mouth 3 times daily as needed for muscle spasms     COMPOUND CONTAINING CONTROLLED SUBSTANCE (CMPD RX) - PHARMACY TO MIX COMPOUNDED MEDICATION Concentrations:  Dilaudid    14 mg/ml                                                          Bupivacaine 28 mg/ml  Ziconotide 14 mcg/ml     Total Volume in Refill: 20 mL      Basal:   Dilaudid   6.993 mg/day                                                       Bupivacaine 13.986 mg/day  Ziconotide 6.993 mcg/day     PTM 0.2 hydromorphone, 0.4 mg bupivacaine, 0.2 mcg ziconotide, 2 hour lockout, maximum 6 in a day.    Maximum 24 hour dose  Hydromorphone 8.134 mg/day  Bupivacaine 16.267 mg/day  Ziconitide 8.134 mcg/day    Last filled 4/20/23. Pump alarm 5/20/23  Managed by [facility] ([phone])     gabapentin (NEURONTIN) 600 MG tablet Take 1,200 mg by mouth 3 times daily      lidocaine (LIDODERM) 5 % patch Apply 1 patch topically daily as needed      LORazepam (ATIVAN) 0.5 MG tablet Take 1 tablet by mouth daily as needed for anxiety     melatonin 3 MG tablet Take 3 mg by mouth nightly as needed  (Patient not taking: Reported on 7/3/2023)     metoprolol succinate ER (TOPROL-XL) 25 MG 24 hr tablet Take 12.5 mg by mouth every evening      Multiple Vitamins-Minerals (WOMENS MULTI PO) Take 1 tablet by mouth daily     ondansetron (ZOFRAN) 4 MG tablet Take 1 tablet (4 mg) by mouth every 6 hours as needed for nausea     polyethylene glycol (MIRALAX) 17 GM/Dose powder Take 17 g by mouth daily as needed for constipation      No current facility-administered medications for this visit.             Review of Systems:    ROS: 14 point ROS neg other than the symptoms noted above in the HPI and PMH.          Physical Exam:   GENERAL: Healthy, alert and no distress  EYES: Eyes grossly normal to inspection.  No discharge or erythema, or obvious scleral/conjunctival  abnormalities.  RESP: No audible wheeze, cough, or visible cyanosis.  No visible retractions or increased work of breathing.    SKIN: Visible skin clear. No significant rash, abnormal pigmentation or lesions.  NEURO: Cranial nerves grossly intact.  Mentation and speech appropriate for age.  PSYCH: Mentation appears normal, affect normal/bright, judgement and insight intact, normal speech and appearance well-groomed.       Labs:      Creatinine   Date Value Ref Range Status   05/10/2023 0.60 0.51 - 0.95 mg/dL Final   04/07/2021 0.58 0.52 - 1.04 mg/dL Final       Lab Results   Component Value Date    CULTURE  CULTURE      CULTURE    CULTURE Culture in progress  >100K Staph epidermidis, 50-100K Enterococcus faecalis  >100K Enterococcus faecalis  >100K Enterococcus faecalis 07/03/2023 06/27/2023 06/13/2023 05/30/2023      CULTURE >100,000 CFU/mL Klebsiella oxytoca 05/05/2023    CULTURE No Growth 05/05/2023    CULTURE  CULTURE      CULTURE    CULTURE No Growth  10-25K Enterococcus faecalis, <10K gram neg bacilli  >100K Klebsiella oxytoca  >100K Klebsiella pneumoniae 05/05/2023 04/27/2023 04/18/2023 01/17/2023    CULTURE >100,000 CFU/mL Mixture of urogenital cj 09/26/2022    CULTURE No Growth 09/26/2022       Color Urine (no units)   Date Value   07/03/2023 Light Yellow   05/05/2013      Comment:     yellow     Appearance Urine (no units)   Date Value   07/03/2023 Clear   05/05/2013      Comment:     very cloudy     Glucose Urine (mg/dL)   Date Value   07/03/2023 Negative   01/06/2011 Negative     Bilirubin Urine (no units)   Date Value   07/03/2023 Negative   01/06/2011 Negative     Ketones Urine (mg/dL)   Date Value   07/03/2023 Negative   01/06/2011 Negative     Specific Gravity Urine (no units)   Date Value   07/03/2023 1.015   05/05/2013 1.010     pH Urine   Date Value   07/03/2023 5.5   01/06/2011 6.0 pH     Protein Albumin Urine (mg/dL)   Date Value   07/03/2023 10 (A)   01/06/2011 Negative      Urobilinogen Urine (EU/dL)   Date Value   01/06/2011 0.2     Nitrite Urine (no units)   Date Value   07/03/2023 Negative   01/06/2011 Negative     Leukocyte Esterase Urine (no units)   Date Value   07/03/2023 Negative   05/05/2013 Large       6/13/2023:  Wet prep:  Trichomonas vaginalis Negative Negative    Gardnerella vaginalis, PCR Negative Positive Abnormal     Candida species, PCR Negative Negative          Urodynamics:    UDS at outside institution 4/12/2021:  Urodynamic study performed with pressure sensors in the urethra and rectum and EMG patch electrodes on the perineum. Review of the EMG tracing demonstrates no evidence of increased activity throughout the exam.    Initial uroflow was unable to be performed.    The patient's bladder was filled at a rate of 50 cc/min, but she demonstrated leakage quite promptly. Examination of the urodynamic tracing did not demonstrate any evidence of discrete detrusor contraction though a true intravesical pressure sensor was not utilized.    Urethral pressure sensor did demonstrate some evidence of poor compliance.    The patient was noted to leak at 70 cc, 85 cc, 112 cc and at capacity of 125 cc. Detrusor pressures were less than 40 cm of water    Completion uroflow was unable to to be completed as the patient did not have any sensation.    Impression: Incomplete urodynamics testing due to low capacity bladder and poor bladder compliance resulting in significant incontinence.      Imaging:    CT ABDOMEN PELVIS W/O CONTRAST, 7/3/2023     FINDINGS:     Lower thorax: Normal.     Liver: Similar hepatic cysts and too small to characterize  hypodensities.     Biliary System: Normal gallbladder. No extrahepatic biliary ductal  dilation.     Pancreas: No mass or pancreatic ductal dilation.     Spleen: Normal.     Adrenal glands: No mass or nodules     Kidneys: No suspicious mass, obstructing calculus or hydronephrosis.     Vasculature: Aortoiliac atherosclerotic  calcifications with no  aneurysmal dilation.     Lymph nodes: No significant lymphadenopathy.     Gastrointestinal tract :Normal appendix. Normal caliber small bowel.  Stable left lower quadrant colostomy. Contrast residue in the colon.     Mesentery/peritoneum/retroperitoneum: No mass. No free fluid or air.     Pelvis: Urinary bladder is decompressed by a Cunningham catheter.     Soft tissues: Unchanged atrophic appearance of the right psoas muscle.  Similar soft tissue thickening/edema at the right gluteal region.  Spinal stimulator device in the right lower back subcutaneous tissues  with leads stable at the level of L1. Pain pump device in the right  anterior subcutaneous tissues.     Osseous structures: Multilevel degenerative changes of the visualized  thoracolumbar spine. No aggressive or acute osseous lesion.                                                                      IMPRESSION:   1.  No urinary tract stones.  2.  Stable postoperative changes left lower quadrant colostomy and  spinal stimulator placement.      Outside records:    I spent 10 minutes reviewing outside records.       70 minutes spent on the date of the encounter doing chart review, review of outside records, review of test results, interpretation of tests, patient visit and documentation

## 2023-07-05 NOTE — NURSING NOTE
Is the patient currently in the state of [place]? YES    Visit mode:VIDEO    If the visit is dropped, the patient can be reconnected by: VIDEO VISIT: Text to cell phone: [phone]    Will anyone else be joining the visit? NO      How would you like to obtain your AVS? MyChart    Are changes needed to the allergy or medication list? NO    Reason for visit: Consult (Neurogenic bladder/recurrent UTI)

## 2023-07-05 NOTE — TELEPHONE ENCOUNTER
Action 2023 jtv 11:46 AM   Action Taken CSS called patient. Patient gave VB OK to update medical records. Patient states she's only seen her PCP. Patient states there are no images and no labs.      MEDICAL RECORDS REQUEST   [facility]  Urology Clinic  [address] [place] [id number]  PHONE: [phone]  Fax: [phone]        FUTURE VISIT INFORMATION                                                   Anayeli Gagnon, : 1950 scheduled for future visit at [facility]    APPOINTMENT INFORMATION:    Date: 2023    Provider:  Dulce Be PA-C    Reason for Visit/Diagnosis: Neurogenic bladder / recurrent UTI / KIDNEYSTONES    REFERRAL INFORMATION:    Referring provider:  Shelli Frost MD in [facility]    RECORDS REQUESTED FOR VISIT                                                     NOTES  STATUS/DETAILS   OFFICE NOTE from referring provider  yes, 2023 -- Shelli Frost MD in [facility]   OFFICE NOTE from other specialist  yes   DISCHARGE REPORT from the ER  yes   MEDICATION LIST  yes   LABS     URINALYSIS (UA)  yes   images  yes, 2023 -- CT ABD PELVIS     Joint diagnostic appointment coordinated correctly          (ensure right order & amount of time) Yes   RECORD COLLECTION COMPLETE Yes

## 2023-07-05 NOTE — PATIENT INSTRUCTIONS
UROLOGY CLINIC VISIT PATIENT INSTRUCTIONS    Cystoscopy with Dr. Robles or Dr. Chambers next available.     If the cystoscopy is scheduled more than a few days from now, you should be seen by primary care or urgent care to have a pelvic exam and be assessed for a vaginal infection.    Further discussion and next steps at the time of your cystoscopy appointment.    CYSTOSCOPY    What is a Cystoscopy?  This is a procedure done to check for problems inside the bladder.  Problems may include polyps (growths), tumors, inflammation (swelling and redness) and other concerns.    The doctor inserts a thin tube (called a cystoscope) into the bladder.  The tube is about the size of a pencil.  We will give you numbing medicine to reduce the pain or discomfort you may feel.    The tube allows the doctor to:  The doctor will be able to see inside the bladder by filling the bladder with water.  The water makes it easier to see any problems that may be present.    If needed, the doctor may use the tube to:  The doctor is able to take tissue samples (biopsies).  Samples are sent to the lab for testing.  The doctor can also burn off any small growths or tumors that are found.  This is call fulguration.    What happens after the exam?  You may go back to your normal diet and activity as you feel ready, unless your doctor tells you not to.    For the next two days, you may notice:  Some blood in your urine.  Some burning when you urinate (use the toilet).  An urge to urinate more often.  Bladder spasms.    These are normal after the procedure. They should go away on their own after a day or two.      You can help to relieve the above listed symptoms by:  Drinking 6 to 8 large glasses of water each day (includes drinks at meals).  This will help clear the urine.  Take warm baths to relieve pain and bladder spasms.  Do not add anything to the bath water.  Your doctor may prescribe pain medicine.  You may also take Tylenol (acetaminophen) for  pain.    When should I call my doctor?  A fever over 100.0 F (38 C) for more than a day.  (Before you call the doctor, check your temperature under your tongue.)  Chills.  Failure to urinate: No urine comes out when you try to use the toilet.  (Try soaking in a bathtub full of warm water.  If still no urine, call your doctor.)  A lot of blood in the urine or blood clots larger than a nickel.  Pain in the back or abdomen (belly / stomach area).  Pain or spasms that are not relieved by warm tub baths and pain medicine.  Severe pain, burning or other problems while passing urine.  Pain that gets worse after two days.      If you have any issues, questions or concerns in the meantime, do not hesitate to contact us at [phone] or via [email or web address].     It was a pleasure meeting with you today.  Thank you for allowing me and my team the privilege of caring for you today.  YOU are the reason we are here, and I truly hope we provided you with the excellent service you deserve.  Please let us know if there is anything else we can do for you so that we can be sure you are leaving completely satisfied with your care experience.

## 2023-07-05 NOTE — LETTER
7/5/2023       RE: Anayeli Gagnon  [address] [facility] [address]     Dear Colleague,    Thank you for referring your patient, Anayeli Gagnon, to the [facility]OLOGY [facility] [place] at [facility]. Please see a copy of my visit note below.      Name: Anayeli Gagnon    MRN: [id number]   YOB: 1950                 Chief Complaint:   Recurrent UTI         Assessment and Plan:   72 year old female with neurogenic bladder and bowel secondary to thoracic SCI s/p colostomy who has managed her neurogenic urinary retention and incontinence with indwelling Cunningham catheter for the last 3-4 years. Seen today for evaluation of recurrent UTI associated with intermittent gross hematuria. She finished a course of Augmentin for UTI this morning. UA from 2 days ago looks improved; UCx in process. CT scan from 2 days ago without clear nidus for infections. She reports ongoing UTI symptoms of vaginal pain and lethargy. We discussed that with a negative UA, these symptoms would seem less likely to be due to UTI. Alternate etiologies include vaginitis - infectious vs atrophic (she was recently treated for BV and exam commented on noticeable atrophy), mechanical pain from chronic urethral catheter in wheelchair-bound patient, neuropathic pain, vs other. Ideally, she should have exam and wet prep to evaluate for vaginitis; cannot complete this today secondary to virtual visit. We also discussed alternative forms of catheterization to urethral Cunningham including SP tube versus CIC. She is not sure she would be able to CIC, but would consider SP tube. Ultimately, given the recurrent UTI with intermittent gross hematuria in a patient with long-term indwelling catheter, will plan for cystoscopy for intravesical examination. Can perform pelvic exam and wet prep at the time of cystoscopy, but if booked out more than a few days from now, advised patient to  be seen locally by primary care or urgent care for exam and wet prep. Lastly, we considered repeat UDS given her history of poor bladder compliance on prior UDS in 2021. However, given that her urinary incontinence has resolved and she has no hydronephrosis on recent imaging, will hold off for now.     Plan:  -Cystoscopy with Dr. Robles or Dr. Chambers next available.  -Advise patient to been seen locally by urgent care or primary care for exam and wet prep if cystoscopy scheduled more than a few days from now. Concern for recurrent vaginitis based on history with inability to complete exam / testing during today's virtual visit.  -If wet prep negative, consider topical estrogen cream.  -Continue Cunningham catheter changes by home health nurse for now (q2 weeks).  -Discussion with urologist about possible conversion to SP tube to see if eliminating urethral catheter improves vaginal pain.     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 Frost for evaluation of recurrent UTI. Briefly regarding her urologic history, she had urinary retention and urinary incontinence after the SCI and was trialed on anticholinergics and alpha blockers before ultimately having Cunningham catheter placed in late 2019 or early 2020. Given persistent incontinence around the Cunningham catheter, she was restarted on oxybutynin and underwent urodynamics at outside institution on 4/12/21 which demonstrated a hypertonic, spastic bladder with low capacity (125 mL). Given that she was on maximal dose anticholinergic therapy with persistent poor bladder compliance, bladder Botox injections were recommended, but she ultimately did not go through with this.    Today, Annamarie  reports worsening UTIs over the last year. She has had them intermittently since her SCI, but they have increased in frequency and severity over the last year. Symptoms with infection include vaginal pain, lethargy, chills, dark urine, and intermittent gross hematuria. She denies fevers. Review of PM&R progress note from 7/3/23 also reports worsening pain and generalized spasticity when she has a UTI. She has been on antibiotics for UTI for over a month, having just finished a course of Augmentin this morning. UA from 7/3/23 looks improved; urine culture in progress. Annamarie reports that the color of the urine has improved, but she continues to experience vaginal pain and lethargy. She had a CT scan 2 days ago that did not reveal any obvious nidus for UTIs.    Her Cunningham catheter was previously changed every 3-4 weeks, but they are moving to every 2 week changes starting now. Next catheter change scheduled tomorrow or the following day. Home health nurse performs catheter changes without difficulty. Annamarie does not believe that she has experienced urinary incontinence recently. She reports diminished sensation in her groin, but states that she is not wet.     She denies vaginal discharge at this time. Has never used any form of vaginal estrogen.   Reviewed the following exam findings from office visit with outside internal medicine service 6/13/2023:  Genital exam external genitalia is pink in color there is noticeable erythema. No redness or warmth. There is noticeable vaginal atrophy. Wet prep is obtained.  Assessment/ Plan  1. Vaginal pain. Await wet prep findings. Question whether component is related to vaginal atrophy. Consider topical estrogen if wet prep is negative.    Wet prep ultimately returned with gardnerella vaginalis for which she was prescribed Metrogel.     Note that patient has a LLQ colostomy.          Past Medical History:     Past Medical History:   Diagnosis Date    CARDIAC DYSRHYTHMIAS NEC  10/3/2007    Taking atenelol for years for this    History of skin cancer     Mitral valve prolapse     Neurogenic bladder     Sacral decubitus ulcer, stage IV (H)     Thoracic spinal cord injury (H)             Past Surgical History:     Past Surgical History:   Procedure Laterality Date    DECOMPRESSION LUMBAR ONE LEVEL N/A 2019    Procedure: Posterior spinal decompression;  Surgeon: Alf Ritchie MD;  Location: [facility] OR    INSERT INTRATHECAL PAIN PUMP N/A 2022    Procedure: INSERTION, INTRATHECAL ANALGESIC PUMP, externalized trial;  Surgeon: Luis Orourke MD;  Location: [facility] OR    INSERT INTRATHECAL PAIN PUMP Right 2022    Procedure: INSERTION, INTRATHECAL ANALGESIC PUMP;  Surgeon: Luis Orourke MD;  Location: [facility] OR    INSERT STIMULATOR AND LEADS INTERNAL DORSAL COLUMN N/A 2021    Procedure: Posterior thoracic 12 to Lumbar 1 level for placement of spinal cord stimulator paddle and placement of implantable pulse generator/battery over right buttock;  Surgeon: Luis Orourke MD;  Location: [facility] OR    IR SPINAL ANGIOGRAM  10/16/2019    IR SPINAL ANGIOGRAM  2019    LAPAROSCOPIC TUBAL LIGATION      REPAIR SPINAL ARERIOVENOUS MALFORMATION N/A 2019    Procedure: with surgical disconnection arterial venous fistula Thoracic 5;  Surgeon: Alf Ritchie MD;  Location: [facility] OR            Social History:     Social History     Tobacco Use    Smoking status: Never    Smokeless tobacco: Never   Substance Use Topics    Alcohol use: No            Family History:     Family History   Problem Relation Age of Onset    C.A.D. Father          41    Deep Vein Thrombosis (DVT) No family hx of     Anesthesia Reaction No family hx of             Allergies:     Allergies   Allergen Reactions    Contrast Dye Rash     Painful rash after x-ray contrast    Nitrofurantoin Nausea and Vomiting            Medications:     Current Outpatient Medications   Medication Sig    acetaminophen (TYLENOL)  500 MG tablet Take 500-1,000 mg by mouth every 6 hours as needed for mild pain    amoxicillin-clavulanate (AUGMENTIN) 500-125 MG tablet TAKE ONE TABLET BY MOUTH EVERY 12 HOURS FOR 5 DAYS    aspirin (ASA) 81 MG EC tablet Take 1 tablet (81 mg) by mouth daily    atorvastatin (LIPITOR) 40 MG tablet Take 1 tablet (40 mg) by mouth every evening    baclofen (LIORESAL) 10 MG tablet Take 10 mg by mouth 3 times daily as needed for muscle spasms    COMPOUND CONTAINING CONTROLLED SUBSTANCE (CMPD RX) - PHARMACY TO MIX COMPOUNDED MEDICATION Concentrations:  Dilaudid    14 mg/ml                                                          Bupivacaine 28 mg/ml  Ziconotide 14 mcg/ml     Total Volume in Refill: 20 mL      Basal:   Dilaudid   6.993 mg/day                                                       Bupivacaine 13.986 mg/day  Ziconotide 6.993 mcg/day     PTM 0.2 hydromorphone, 0.4 mg bupivacaine, 0.2 mcg ziconotide, 2 hour lockout, maximum 6 in a day.    Maximum 24 hour dose  Hydromorphone 8.134 mg/day  Bupivacaine 16.267 mg/day  Ziconitide 8.134 mcg/day    Last filled 4/20/23. Pump alarm 5/20/23  Managed by [facility] ([phone])    gabapentin (NEURONTIN) 600 MG tablet Take 1,200 mg by mouth 3 times daily     lidocaine (LIDODERM) 5 % patch Apply 1 patch topically daily as needed     LORazepam (ATIVAN) 0.5 MG tablet Take 1 tablet by mouth daily as needed for anxiety    melatonin 3 MG tablet Take 3 mg by mouth nightly as needed  (Patient not taking: Reported on 7/3/2023)    metoprolol succinate ER (TOPROL-XL) 25 MG 24 hr tablet Take 12.5 mg by mouth every evening     Multiple Vitamins-Minerals (WOMENS MULTI PO) Take 1 tablet by mouth daily    ondansetron (ZOFRAN) 4 MG tablet Take 1 tablet (4 mg) by mouth every 6 hours as needed for nausea    polyethylene glycol (MIRALAX) 17 GM/Dose powder Take 17 g by mouth daily as needed for constipation      No current facility-administered medications for this visit.             Review  of Systems:    ROS: 14 point ROS neg other than the symptoms noted above in the HPI and PMH.          Physical Exam:   GENERAL: Healthy, alert and no distress  EYES: Eyes grossly normal to inspection.  No discharge or erythema, or obvious scleral/conjunctival abnormalities.  RESP: No audible wheeze, cough, or visible cyanosis.  No visible retractions or increased work of breathing.    SKIN: Visible skin clear. No significant rash, abnormal pigmentation or lesions.  NEURO: Cranial nerves grossly intact.  Mentation and speech appropriate for age.  PSYCH: Mentation appears normal, affect normal/bright, judgement and insight intact, normal speech and appearance well-groomed.       Labs:      Creatinine   Date Value Ref Range Status   05/10/2023 0.60 0.51 - 0.95 mg/dL Final   04/07/2021 0.58 0.52 - 1.04 mg/dL Final       Lab Results   Component Value Date    CULTURE  CULTURE      CULTURE    CULTURE Culture in progress  >100K Staph epidermidis, 50-100K Enterococcus faecalis  >100K Enterococcus faecalis  >100K Enterococcus faecalis 07/03/2023 06/27/2023 06/13/2023 05/30/2023      CULTURE >100,000 CFU/mL Klebsiella oxytoca 05/05/2023    CULTURE No Growth 05/05/2023    CULTURE  CULTURE      CULTURE    CULTURE No Growth  10-25K Enterococcus faecalis, <10K gram neg bacilli  >100K Klebsiella oxytoca  >100K Klebsiella pneumoniae 05/05/2023 04/27/2023 04/18/2023 01/17/2023    CULTURE >100,000 CFU/mL Mixture of urogenital cj 09/26/2022    CULTURE No Growth 09/26/2022       Color Urine (no units)   Date Value   07/03/2023 Light Yellow   05/05/2013      Comment:     yellow     Appearance Urine (no units)   Date Value   07/03/2023 Clear   05/05/2013      Comment:     very cloudy     Glucose Urine (mg/dL)   Date Value   07/03/2023 Negative   01/06/2011 Negative     Bilirubin Urine (no units)   Date Value   07/03/2023 Negative   01/06/2011 Negative     Ketones Urine (mg/dL)   Date Value   07/03/2023 Negative    01/06/2011 Negative     Specific Gravity Urine (no units)   Date Value   07/03/2023 1.015   05/05/2013 1.010     pH Urine   Date Value   07/03/2023 5.5   01/06/2011 6.0 pH     Protein Albumin Urine (mg/dL)   Date Value   07/03/2023 10 (A)   01/06/2011 Negative     Urobilinogen Urine (EU/dL)   Date Value   01/06/2011 0.2     Nitrite Urine (no units)   Date Value   07/03/2023 Negative   01/06/2011 Negative     Leukocyte Esterase Urine (no units)   Date Value   07/03/2023 Negative   05/05/2013 Large       6/13/2023:  Wet prep:  Trichomonas vaginalis Negative Negative    Gardnerella vaginalis, PCR Negative Positive Abnormal     Candida species, PCR Negative Negative          Urodynamics:    UDS at outside institution 4/12/2021:  Urodynamic study performed with pressure sensors in the urethra and rectum and EMG patch electrodes on the perineum. Review of the EMG tracing demonstrates no evidence of increased activity throughout the exam.    Initial uroflow was unable to be performed.    The patient's bladder was filled at a rate of 50 cc/min, but she demonstrated leakage quite promptly. Examination of the urodynamic tracing did not demonstrate any evidence of discrete detrusor contraction though a true intravesical pressure sensor was not utilized.    Urethral pressure sensor did demonstrate some evidence of poor compliance.    The patient was noted to leak at 70 cc, 85 cc, 112 cc and at capacity of 125 cc. Detrusor pressures were less than 40 cm of water    Completion uroflow was unable to to be completed as the patient did not have any sensation.    Impression: Incomplete urodynamics testing due to low capacity bladder and poor bladder compliance resulting in significant incontinence.      Imaging:    CT ABDOMEN PELVIS W/O CONTRAST, 7/3/2023     FINDINGS:     Lower thorax: Normal.     Liver: Similar hepatic cysts and too small to characterize  hypodensities.     Biliary System: Normal gallbladder. No extrahepatic  biliary ductal  dilation.     Pancreas: No mass or pancreatic ductal dilation.     Spleen: Normal.     Adrenal glands: No mass or nodules     Kidneys: No suspicious mass, obstructing calculus or hydronephrosis.     Vasculature: Aortoiliac atherosclerotic calcifications with no  aneurysmal dilation.     Lymph nodes: No significant lymphadenopathy.     Gastrointestinal tract :Normal appendix. Normal caliber small bowel.  Stable left lower quadrant colostomy. Contrast residue in the colon.     Mesentery/peritoneum/retroperitoneum: No mass. No free fluid or air.     Pelvis: Urinary bladder is decompressed by a Cunningham catheter.     Soft tissues: Unchanged atrophic appearance of the right psoas muscle.  Similar soft tissue thickening/edema at the right gluteal region.  Spinal stimulator device in the right lower back subcutaneous tissues  with leads stable at the level of L1. Pain pump device in the right  anterior subcutaneous tissues.     Osseous structures: Multilevel degenerative changes of the visualized  thoracolumbar spine. No aggressive or acute osseous lesion.                                                                      IMPRESSION:   1.  No urinary tract stones.  2.  Stable postoperative changes left lower quadrant colostomy and  spinal stimulator placement.      Outside records:    I spent 10 minutes reviewing outside records.       70 minutes spent on the date of the encounter doing chart review, review of outside records, review of test results, interpretation of tests, patient visit and documentation      Virtual Visit Details    Type of service:  Video Visit   Video Start Time: 1:06 PM  Video End Time:1:21 PM    Originating Location (pt. Location): Home    Distant Location (provider location):  On-site  Platform used for Video Visit: coJuvo audio through [employer]

## 2023-07-05 NOTE — PROGRESS NOTES
Virtual Visit Details    Type of service:  Video Visit   Video Start Time: 1:06 PM  Video End Time:1:21 PM    Originating Location (pt. Location): Home    Distant Location (provider location):  On-site  Platform used for Video Visit: mTraks audio through [employer]

## 2023-07-06 LAB — BACTERIA UR CULT: ABNORMAL

## 2023-07-10 ENCOUNTER — MYC MEDICAL ADVICE (OUTPATIENT)
Dept: PHYSICAL MEDICINE AND REHAB | Facility: CLINIC | Age: 73
End: 2023-07-10
Payer: MEDICARE

## 2023-07-10 ENCOUNTER — TELEPHONE (OUTPATIENT)
Dept: INFECTIOUS DISEASES | Facility: CLINIC | Age: 73
End: 2023-07-10

## 2023-07-10 NOTE — TELEPHONE ENCOUNTER
[facility] Call Center    Phone Message    May a detailed message be left on voicemail: yes     Reason for Call: Other: Nurse from Dr. Dawn team called and informed Annamarie is currenrly on the wrong antibotic and needs to be seen sooner than 8/18/23. Or assist with getting patient put on the correct one please contact the nurse lindsey to assist at [phone]    Action Taken: Other: id    Travel Screening: Not Applicable

## 2023-07-10 NOTE — TELEPHONE ENCOUNTER
SITUATION:  We received urine culture results:   Result Note  Culture of 7/3/23 <10,000 CFU/mL Enterococcus faecalis Abnormal             Resulting Agency: [facility]     Susceptibility     Enterococcus faecalis     MARTIN     Ampicillin <=2 ug/mL Susceptible     Nitrofurantoin 32 ug/mL Susceptible     Penicillin 8 ug/mL Susceptible     Vancomycin 2 ug/mL Susceptible               previous Rx through [facility] providers: last Abx was augmentin from 6/30 to July 5 / Also s/p cipro completed 5 day course on June 20 and Macrobid 1 week course completed June 6 per care everywhere visit of June 27 with Elena Cadet CNP      BACKGROUND:   Difficulty treating ongoing UTI, hematuria, increased pain in pt with SCI, neurogenic bladder  Had virtual visit with Urology at [facility] on 7/5/23 who ordered cystoscopy (not booked yet) and entertained idea of vaginitis as cause of Sx    ASSESSMENT / ACTION:  1) Needs I & D consult - orders entered by Dr Frost - closest consult location to pt is [facility]  Pt was booked for consult for August 18 - this is six weeks out    Writer called to I & D [phone]  At 4:30 today to discuss possibly moving appt up sooner.    2) As far as [facility] follow up, pt may see our provider Dr Luis Walden who is Spinal Cord Injury specialist      REQUEST / RECOMMENDATION:    1) pending talk with ID to get sooner appointment    2) pt said she is not interested in f/u with [facility] for spinal cord injury care.    Elle Lea RN on 7/10/2023 at 4:59 PM

## 2023-07-11 NOTE — TELEPHONE ENCOUNTER
Spoke to pt, Annamarie, she accepted  The 7/24 video appointment with Gene Salcido, RN  You 3 hours ago (7:21 AM)     QAMAR Almeida,     I was able to get her on for 7/24 at 2:30 pm for a video visit. Would you check with her to see if that works for her?      Thanks for you help on getting pt seen sooner!    Elle Lea RN on 7/11/2023 at 11:00 AM

## 2023-07-12 ENCOUNTER — MEDICAL CORRESPONDENCE (OUTPATIENT)
Dept: HEALTH INFORMATION MANAGEMENT | Facility: CLINIC | Age: 73
End: 2023-07-12
Payer: MEDICARE

## 2023-07-12 NOTE — TELEPHONE ENCOUNTER
DIAGNOSIS: Bacteremia. Ref by Dr Frost. Sched per pt   DATE RECEIVED: 7.24.23   NOTES (Gather within 2 years) STATUS DETAILS   OFFICE NOTE from referring provider   Internal 7.3.23  Thai Elam  Phys Med   OFFICE NOTE from other specialist Internal/CE 7.5.23  Lahti  Urology    6.27.23, 6.13.23, 7.27.22  Helio  [facility]    4.18.23  Ailabounjuan  [facility]    + more related encounter in Epic and CE   DISCHARGE SUMMARY from hospital Internal 7.20-7.22.22  [facility]   DISCHARGE REPORT from the ER CE 6.18.23  Sebastian  [facility] ER    [facility]   LABS (any labs) Internal    MEDICATION LIST Internal    IMAGING  (NEED IMAGES AND REPORTS)     Osteomyelitis: Foot imaging      Liver Abscess: Abdominal imagineg     Other (anything related to diagnoses Internal 7.3.23, 9.26.22, 7.19.22  CT Abd/Pelvis    7.20.22  [facility]

## 2023-07-13 NOTE — TELEPHONE ENCOUNTER
"On the call to patient Annamarie on July 10, this writer also offered the option that we send her culture results to her PCP in [facility] or the Urologist she has been seen by also in [facility] - the purpose of doing that is so her current treating providers have the newest information and could act on the newest results in treating her UTI - Annamarie adamantly said \"no, don't do that\" and indicated she was not planning to return to those providers.    Elle Lea RN on 7/13/2023 at 12:16 PM    "

## 2023-07-14 ENCOUNTER — TELEPHONE (OUTPATIENT)
Dept: UROLOGY | Facility: CLINIC | Age: 73
End: 2023-07-14
Payer: MEDICARE

## 2023-07-14 NOTE — TELEPHONE ENCOUNTER
Per chart review. Patient was to see Dr. Chambers or Dr. Robles for a cystoscopy. Patient is scheduled with Dulce Be PA-C in .     If patient has new concerns and would like to keep appointment with Dulce Be PA-C that is fine but we need to switch appointment to virtual appointment. If patient is requesting in person please schedule next available.     Marleny Ozuna LPN on 7/14/2023 at 10:47 AM

## 2023-07-16 ASSESSMENT — ENCOUNTER SYMPTOMS
HEMATURIA: 1
PANIC: 0
DEPRESSION: 1
SYNCOPE: 0
LEG PAIN: 1
DISTURBANCES IN COORDINATION: 0
EYE IRRITATION: 1
STIFFNESS: 1
FLANK PAIN: 0
VOMITING: 0
HYPOTENSION: 1
EYE PAIN: 1
MEMORY LOSS: 1
MYALGIAS: 1
ORTHOPNEA: 0
NERVOUS/ANXIOUS: 1
JOINT SWELLING: 1
MUSCLE WEAKNESS: 1
BACK PAIN: 1
LOSS OF CONSCIOUSNESS: 0
ARTHRALGIAS: 1
CONSTIPATION: 0
LIGHT-HEADEDNESS: 0
WEAKNESS: 1
DIZZINESS: 1
INSOMNIA: 0
DYSURIA: 1
EYE REDNESS: 0
JAUNDICE: 0
NECK PAIN: 0
HYPERTENSION: 0
DOUBLE VISION: 0
PARALYSIS: 1
NUMBNESS: 1
BLOOD IN STOOL: 0
EYE WATERING: 1
DECREASED CONCENTRATION: 0
SEIZURES: 0
PALPITATIONS: 1
ABDOMINAL PAIN: 1
SPEECH CHANGE: 1
BLOATING: 1
TREMORS: 0
DIFFICULTY URINATING: 0
DIARRHEA: 1
RECTAL PAIN: 1
BOWEL INCONTINENCE: 0
HEARTBURN: 1
HEADACHES: 1
SLEEP DISTURBANCES DUE TO BREATHING: 0
EXERCISE INTOLERANCE: 0
MUSCLE CRAMPS: 0
TINGLING: 0
NAUSEA: 1

## 2023-07-17 ENCOUNTER — TELEPHONE (OUTPATIENT)
Dept: PHYSICAL MEDICINE AND REHAB | Facility: CLINIC | Age: 73
End: 2023-07-17

## 2023-07-17 ENCOUNTER — TRANSFERRED RECORDS (OUTPATIENT)
Dept: HEALTH INFORMATION MANAGEMENT | Facility: CLINIC | Age: 73
End: 2023-07-17

## 2023-07-17 ENCOUNTER — OFFICE VISIT (OUTPATIENT)
Dept: ANESTHESIOLOGY | Facility: CLINIC | Age: 73
End: 2023-07-17
Attending: ANESTHESIOLOGY
Payer: MEDICARE

## 2023-07-17 ENCOUNTER — TELEPHONE (OUTPATIENT)
Dept: UROLOGY | Facility: CLINIC | Age: 73
End: 2023-07-17

## 2023-07-17 ENCOUNTER — LAB (OUTPATIENT)
Dept: LAB | Facility: CLINIC | Age: 73
End: 2023-07-17
Payer: MEDICARE

## 2023-07-17 VITALS — OXYGEN SATURATION: 95 % | SYSTOLIC BLOOD PRESSURE: 144 MMHG | HEART RATE: 60 BPM | DIASTOLIC BLOOD PRESSURE: 78 MMHG

## 2023-07-17 DIAGNOSIS — N39.0 URINARY TRACT INFECTION WITHOUT HEMATURIA, SITE UNSPECIFIED: Primary | ICD-10-CM

## 2023-07-17 DIAGNOSIS — M79.2 NEUROPATHIC PAIN: ICD-10-CM

## 2023-07-17 LAB
ALBUMIN UR-MCNC: 10 MG/DL
APPEARANCE UR: ABNORMAL
BACTERIA #/AREA URNS HPF: ABNORMAL /HPF
BILIRUB UR QL STRIP: NEGATIVE
COLOR UR AUTO: ABNORMAL
GLUCOSE UR STRIP-MCNC: NEGATIVE MG/DL
HGB UR QL STRIP: NEGATIVE
KETONES UR STRIP-MCNC: NEGATIVE MG/DL
LEUKOCYTE ESTERASE UR QL STRIP: ABNORMAL
MUCOUS THREADS #/AREA URNS LPF: PRESENT /LPF
NITRATE UR QL: NEGATIVE
PH UR STRIP: 5.5 [PH] (ref 5–7)
RBC URINE: 16 /HPF
SP GR UR STRIP: 1.02 (ref 1–1.03)
SQUAMOUS EPITHELIAL: <1 /HPF
TRANSITIONAL EPI: <1 /HPF
UROBILINOGEN UR STRIP-MCNC: NORMAL MG/DL
WBC CLUMPS #/AREA URNS HPF: PRESENT /HPF
WBC URINE: 92 /HPF
YEAST #/AREA URNS HPF: ABNORMAL /HPF

## 2023-07-17 PROCEDURE — 87086 URINE CULTURE/COLONY COUNT: CPT | Performed by: ANESTHESIOLOGY

## 2023-07-17 PROCEDURE — 99000 SPECIMEN HANDLING OFFICE-LAB: CPT | Performed by: PATHOLOGY

## 2023-07-17 PROCEDURE — 99214 OFFICE O/P EST MOD 30 MIN: CPT | Mod: GC | Performed by: ANESTHESIOLOGY

## 2023-07-17 PROCEDURE — 81001 URINALYSIS AUTO W/SCOPE: CPT | Performed by: PATHOLOGY

## 2023-07-17 ASSESSMENT — ENCOUNTER SYMPTOMS
MEMORY LOSS: 1
SPEECH CHANGE: 1
NAUSEA: 1
HEARTBURN: 1
DIZZINESS: 1
LOSS OF CONSCIOUSNESS: 0
BACK PAIN: 1
DYSURIA: 1
TINGLING: 0
SEIZURES: 0
VOMITING: 0
DEPRESSION: 1
HEADACHES: 1
ORTHOPNEA: 0
INSOMNIA: 0
DIARRHEA: 1
TREMORS: 0
NERVOUS/ANXIOUS: 1
HEMATURIA: 1
WEAKNESS: 1
PALPITATIONS: 1
BLOOD IN STOOL: 0
FLANK PAIN: 0
ABDOMINAL PAIN: 1
MYALGIAS: 1
DOUBLE VISION: 0
CONSTIPATION: 0
EYE PAIN: 1
EYE REDNESS: 0
NECK PAIN: 0

## 2023-07-17 ASSESSMENT — PAIN SCALES - GENERAL: PAINLEVEL: MODERATE PAIN (5)

## 2023-07-17 NOTE — CONFIDENTIAL NOTE
Monica Marquez, Dulce Vallejo PA-C; Jasmine Espinoza MD; [facility] Adult [place]; [facility]; Shanta Dixon RN  I have a message out offering her 7/20/23 at 10:15.

## 2023-07-17 NOTE — TELEPHONE ENCOUNTER
----- Message from Dulce Be PA-C sent at 7/17/2023  2:36 PM CDT -----  Dr. Espinoza,  Thanks for reaching out. I'm not sure why her follow up was not scheduled appropriately.    Team - can we please expedite a cystoscopy for this patient with Dr. Robles or Dr. Chambers? She has recurrent UTIs and gross hematuria with chronic indwelling Cunningham. Needs cysto and discussion for possible SP tube. I suppose it could also be with Dr. Johnson or Dr. Butler since she has neurogenic bladder 2/2 SCI.    She should likely have UA/UC a week prior (particularly if symptomatic) to cysto given her h/o UTIs.    Thanks!  Dulce Be PA-C  ----- Message -----  From: Jasmine Espinoza MD  Sent: 7/17/2023  12:14 PM CDT  To: Dulce Be PA-C    Dear Dulce,     Thank you for the consultation on Annamarie. I met her in clinic today. She did not remember your recommendations. We reinstated the importance of following through with the urology work up.     Is it possible for you to see her sooner? Is it possible that your scheduling staff reaches out to her for the cystoscopy procedure scheduling? We provided her the number, but there seems to be inertia about following through.     Jasmine

## 2023-07-17 NOTE — TELEPHONE ENCOUNTER
"Called to clarify the purpose of the appointment added for later today    Annamarie does not understand the reason for the appointment.    She is home in [facility] now    Writer reviewed notes between Dr Espinoza and Dr Frost of this morning regarding compressions stockings to Annamarie on this call - pt still not recalling the reason for an appointment.    Updated to Annamarie that her Infectious disease appt is coming up Monday 7/24, to review her chronic UTI and that we have culture & sensitivities report of sample on 7/10 that is not being treated yet, so rather than wait another week, can we send the C  & S to her PCP in [facility] (recall she previously said \"No, don't send to [facility]\" and this afternoon she said \"probably yes, you can send it\"    Asked what are her main Sx today - Annamarie responded \"it's the pain in my low back that is getting worse\"    Writer clarified again that PMR appointment later today would not address the back pain, we do not recommend compression stockings and we are not offering any other spasticity management other than what Dr Espinoza has ordered at this time.    previously writer had offered to Annamarie to be booked with our SCI provider starting clinic in August, reminded her that she had declined.  Writer asked now if she is interested in spinal cord injury care and management in [facility] as I removed her from the scheduling list after we last talked.    Annamarie stated she doesn't know if she needs that, writer acknowledged that it is fine to focus on resoling her UTI situation and we can add a SCI specialist in at any future time, writer will check back in 2 or 3 months, she said that would be a good plan.    Pt agreed that the appointment for 5:30 this afternoon is not indicated and agreed to have the appointment cancelled and she will not have the 2 hour drive back to [facility].    Elle Lea RN on 7/17/2023 at 3:21 PM    "

## 2023-07-17 NOTE — LETTER
7/17/2023       RE: Anayeli Gagnon  [address] [facility] [address]       Dear Colleague,    Thank you for referring your patient, Anayeli Gagnon, to the [facility] [place] at [facility]. Please see a copy of my visit note below.    Pain clinic follow up note    HPI:   Anayeli Gagnon is a 72 year old year old female  who presents to the pain clinic with chronic lower extremity pain with an intrathecal pump in place.    Patient Supplied Answers To the  Pain Questionnaire      7/16/2023     8:07 PM    Pain -  Patient Entered Questionnaire/Answers   What number best describes your pain right now:  0 = No pain  to  10 = Worst pain imaginable 4   How would you describe the pain burning   Which of the following worsen your pain exercise   Which of the following improve or reduce your pain lying down   What number best describes your average pain for the past week:  0 = No pain  to  10 = Worst pain imaginable 6   What number best describes your LOWEST pain in past 24 hours:  0 = No pain  to  10 = Worst pain imaginable 4   What number best describes your WORST pain in past 24 hours:  0 = No pain  to  10 = Worst pain imaginable 8   When is your pain worst Night   What non-medicine treatments have you already had for your pain none                                                           Pain today is in her legs and vaginal area. Both legs are equally painful. Her pain is worse at night. The pain is in the back of her legs going down and from her knees to the tips of her toes. Lately the leg pain has been constant. She does have more swelling in her legs at night - she tries to elevate them to help manage this, she also sometimes uses compression stockings. She says the compression stockings are hard to put on and 'don't really work'. She also has issues with spasticity and tightness in her legs -  "this is about the same but at times it is worse. She takes baclofen 10mg in the afternoon and evening, if she takes more than that she is \"out of it\".    She has been having issues with chronic urinary tract infections for over a month and her pain has been worse during this time. Her vaginal area hurts all the time, she has been told this could be due to nerve pain or an infection. Her urine varies - sometimes it looks bad and cloudy and other times it looks normal. She has felt more fatigued. She has not had fevers or chills. She also has pelvic pain.     She uses all of her PTMs, she consciously saves some for the end of the day when she knows it is going to get worse. The boluses help \"some - maybe 25%\" and last for about an hour or so but then its back. These help with both her vaginal pain and the leg pain.     She has trouble falling asleep at night but is able to sleep and get 6-7 hours once she is asleep.     ROS:  Review of Systems   Eyes: Positive for pain. Negative for double vision and redness.   Cardiovascular: Positive for palpitations. Negative for chest pain and orthopnea.   Gastrointestinal: Positive for abdominal pain, diarrhea, heartburn and nausea. Negative for blood in stool, constipation, melena and vomiting.   Genitourinary: Positive for dysuria and hematuria. Negative for flank pain and urgency.   Musculoskeletal: Positive for back pain and myalgias. Negative for neck pain.   Neurological: Positive for dizziness, speech change, weakness and headaches. Negative for tingling, tremors, seizures and loss of consciousness.   Psychiatric/Behavioral: Positive for depression and memory loss. The patient is nervous/anxious. The patient does not have insomnia.          Significant Medical History:   Past Medical History:   Diagnosis Date    CARDIAC DYSRHYTHMIAS NEC 10/3/2007    Taking atenelol for years for this    History of skin cancer     Mitral valve prolapse     Neurogenic bladder     Sacral " decubitus ulcer, stage IV (H)     Thoracic spinal cord injury (H)           Past Surgical History:  Past Surgical History:   Procedure Laterality Date    DECOMPRESSION LUMBAR ONE LEVEL N/A 2019    Procedure: Posterior spinal decompression;  Surgeon: Alf Ritchie MD;  Location: [facility] OR    INSERT INTRATHECAL PAIN PUMP N/A 2022    Procedure: INSERTION, INTRATHECAL ANALGESIC PUMP, externalized trial;  Surgeon: Luis Orourke MD;  Location: [facility] OR    INSERT INTRATHECAL PAIN PUMP Right 2022    Procedure: INSERTION, INTRATHECAL ANALGESIC PUMP;  Surgeon: Luis Orourke MD;  Location: [facility] OR    INSERT STIMULATOR AND LEADS INTERNAL DORSAL COLUMN N/A 2021    Procedure: Posterior thoracic 12 to Lumbar 1 level for placement of spinal cord stimulator paddle and placement of implantable pulse generator/battery over right buttock;  Surgeon: Luis Orourke MD;  Location: [facility] OR    IR SPINAL ANGIOGRAM  10/16/2019    IR SPINAL ANGIOGRAM  2019    LAPAROSCOPIC TUBAL LIGATION      REPAIR SPINAL ARERIOVENOUS MALFORMATION N/A 2019    Procedure: with surgical disconnection arterial venous fistula Thoracic 5;  Surgeon: Alf Ritchie MD;  Location: [facility] OR          Family History:  Family History   Problem Relation Age of Onset    C.A.D. Father          41    Deep Vein Thrombosis (DVT) No family hx of     Anesthesia Reaction No family hx of           Social History:  Social History     Socioeconomic History    Marital status:      Spouse name: Not on file    Number of children: Not on file    Years of education: Not on file    Highest education level: Not on file   Occupational History    Occupation: [employer]     Employer: [employer]   Tobacco Use    Smoking status: Never    Smokeless tobacco: Never   Substance and Sexual Activity    Alcohol use: No    Drug use: No    Sexual activity: Yes     Partners: Male   Other Topics Concern    Parent/sibling w/ CABG, MI or angioplasty before 65F  55M? Not Asked   Social History Narrative    Lives with spouse - works in [place].     Social Determinants of Health     Financial Resource Strain: Not on file   Food Insecurity: Not on file   Transportation Needs: Not on file   Physical Activity: Not on file   Stress: Not on file   Social Connections: Not on file   Intimate Partner Violence: Not on file   Housing Stability: Not on file     Social History     Social History Narrative    Lives with spouse - works in [place].          Allergies:  Allergies   Allergen Reactions    Contrast Dye Rash     Painful rash after x-ray contrast    Nitrofurantoin Nausea and Vomiting       Current Medications:   Current Outpatient Medications   Medication Sig Dispense Refill    acetaminophen (TYLENOL) 500 MG tablet Take 500-1,000 mg by mouth every 6 hours as needed for mild pain      amoxicillin-clavulanate (AUGMENTIN) 500-125 MG tablet TAKE ONE TABLET BY MOUTH EVERY 12 HOURS FOR 5 DAYS      aspirin (ASA) 81 MG EC tablet Take 1 tablet (81 mg) by mouth daily 30 tablet 3    atorvastatin (LIPITOR) 40 MG tablet Take 1 tablet (40 mg) by mouth every evening 30 tablet 3    baclofen (LIORESAL) 10 MG tablet Take 10 mg by mouth 3 times daily as needed for muscle spasms      COMPOUND CONTAINING CONTROLLED SUBSTANCE (CMPD RX) - PHARMACY TO MIX COMPOUNDED MEDICATION Concentrations:  Dilaudid    14 mg/ml                                                          Bupivacaine 28 mg/ml  Ziconotide 14 mcg/ml     Total Volume in Refill: 20 mL      Basal:   Dilaudid   6.993 mg/day                                                       Bupivacaine 13.986 mg/day  Ziconotide 6.993 mcg/day     PTM 0.2 hydromorphone, 0.4 mg bupivacaine, 0.2 mcg ziconotide, 2 hour lockout, maximum 6 in a day.    Maximum 24 hour dose  Hydromorphone 8.134 mg/day  Bupivacaine 16.267 mg/day  Ziconitide 8.134 mcg/day    Last filled 4/20/23. Pump alarm 5/20/23  Managed by [employer] ([phone]) 20 mL 0    gabapentin  (NEURONTIN) 600 MG tablet Take 1,200 mg by mouth 3 times daily  1 tablet 0    lidocaine (LIDODERM) 5 % patch Apply 1 patch topically daily as needed       LORazepam (ATIVAN) 0.5 MG tablet Take 1 tablet by mouth daily as needed for anxiety      metoprolol succinate ER (TOPROL-XL) 25 MG 24 hr tablet Take 12.5 mg by mouth every evening       Multiple Vitamins-Minerals (WOMENS MULTI PO) Take 1 tablet by mouth daily      ondansetron (ZOFRAN) 4 MG tablet Take 1 tablet (4 mg) by mouth every 6 hours as needed for nausea 20 tablet 1    polyethylene glycol (MIRALAX) 17 GM/Dose powder Take 17 g by mouth daily as needed for constipation       melatonin 3 MG tablet Take 3 mg by mouth nightly as needed  (Patient not taking: Reported on 7/5/2023)               Current Pain Medications:  Baclofen 10 mg at noon and 10 mg at 5 pm  Gabapentin 1200 mg TID    Physical Exam:     BP (!) 144/78   Pulse 60   SpO2 95%     General Appearance: No distress, seated comfortably in her motorized wheelchare  Mood: Flat affect  HE ENT: Non constricted pupils  Respiratory: Non labored breathing  CVS: Regular rate and rhythm  Skin: No rashes over exposed skin  MS: Normal muscle bulk, swelling at the ankles up to her knee  Neuro: Cranial nerves 2-12 intact    ASSESSMENT AND PLAN:     Encounter Diagnosis:  Neuropathic pain  Incomplete paraplegia  Thoracic Spinal Cord Injury  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     She has been having issues with recurrent UTIs for the past 8 weeks. Since her last visit she saw Dr. Frost with PM&R, however at the time of this visit she was having active signs of an UTI and was recommended she go to the ER. Because of this Dr. Frost was unable to start management of Annamarie's spasticity. Her urinary symptoms seem to be better controlled, however they have not fully  resolved. She has also seen urology since her last appointment and a cystoscopy was recommended. This has not yet been scheduled.     She is a complex patient. Her ITP is helping but not eliminating her pain. At this point she truly needs multidisciplinary care. We were able to get her an appointment this afternoon with Dr. Frost to hopefully better manage her lower extremity spasticity. I provided her with the urology scheduling number to hopefully schedule the cystoscopy that was recommended at her last appointment.     At this time we do not recommend adjusting the ITP settings until her urology symptoms have stabilized. We did discuss adding a third dose of baclofen just before bedtime as this third dose tends to make her very sleepy. We also strongly encouraged her to get new compression stockings and wear these consistently to help with the swelling in her lower legs which is associated with increased pain.     RECOMMENDATIONS:     1. Medications: Continue current ITP dosing, and add a third dose of baclofen before bed so the dosing will be 10 mg at noon, 10 mg at 5 pm and 10 mg at 10 pm.    Concentrations:  Dilaudid    14 mg/ml                                                          Bupivacaine 28 mg/ml  Ziconitide 14 mcg/ml     Total Volume in Refill: 20 mL      Basal:   Dilaudid    7 mg/day                                                       Bupivacaine 14 mg/day  Ziconitide 7 mcg/day     PTM 0.2 hydromorphone, 2 hour lockout, maximum 6 in a day.     Maximum 24 hour dose  Hydromorphone 8.16 mg/day  Bupivacaine 16.3 mg/day  Ziconitide 8.16 mcg/day    Follow up: 4-6 weeks    This patient was seen and discussed with the attending physician.        Attestation signed by Jasmine Espinoza MD at 7/24/2023  1:04 PM:  ATTENDING ATTESTATION  I saw the patient along with the pain medicine fellow Dr. Samanta Barry. Please see her note above for full details. I was involved in gathering history, physical  examination and development of the plan of care. The team has concerns of ongoing urology related issues that are complicating her pain picture. I urge the patient to follow through with urology workup. I personally reached out to the urology group to try to get her an earlier appointment. Urinalysis was reviewed. Pending full work up further pump titration is not recommended.                 Again, thank you for allowing me to participate in the care of your patient.      Sincerely,    Jasmine Espinoza MD

## 2023-07-17 NOTE — PATIENT INSTRUCTIONS
Treatment planning:    Call [phone] (Urology) to schedule a cyctoscopy with Dr. Robles or Trish      Recommended Follow up:      Follow up with PM&R today at 5:30 in person.        To speak with a nurse, schedule/reschedule/cancel a clinic appointment, or request a medication refill call: [phone].    You can also reach us by [facility]: [email or web address]

## 2023-07-17 NOTE — NURSING NOTE
Patient presents with:  RECHECK: PAIN PUMP Follow up, 4-5/10 pain in vaginal area and knees down        Pain Medications     Analgesics Other Refills Start End     acetaminophen (TYLENOL) 500 MG tablet          Sig - Route: Take 500-1,000 mg by mouth every 6 hours as needed for mild pain - Oral    Class: Historical    Salicylates Refills Start End     aspirin (ASA) 81 MG EC tablet    3 5/8/2023     Sig - Route: Take 1 tablet (81 mg) by mouth daily - Oral    Class: E-Prescribe    Renewals     Renewal requests to authorizing provider (Tosin Augustine MD) <b>prohibited</b>                What medications are you using for pain? Pain pump medication and tylenol, gabapentin    (Return Patients only) What refills are you needing today? Maria Eugenia Medina, EMT

## 2023-07-17 NOTE — PROGRESS NOTES
"Pain clinic follow up note    HPI:   Anayeli Gagnon is a 72 year old year old female  who presents to the pain clinic with chronic lower extremity pain with an intrathecal pump in place.    Patient Supplied Answers To the  Pain Questionnaire      7/16/2023     8:07 PM    Pain -  Patient Entered Questionnaire/Answers   What number best describes your pain right now:  0 = No pain  to  10 = Worst pain imaginable 4   How would you describe the pain burning   Which of the following worsen your pain exercise   Which of the following improve or reduce your pain lying down   What number best describes your average pain for the past week:  0 = No pain  to  10 = Worst pain imaginable 6   What number best describes your LOWEST pain in past 24 hours:  0 = No pain  to  10 = Worst pain imaginable 4   What number best describes your WORST pain in past 24 hours:  0 = No pain  to  10 = Worst pain imaginable 8   When is your pain worst Night   What non-medicine treatments have you already had for your pain none                                                           Pain today is in her legs and vaginal area. Both legs are equally painful. Her pain is worse at night. The pain is in the back of her legs going down and from her knees to the tips of her toes. Lately the leg pain has been constant. She does have more swelling in her legs at night - she tries to elevate them to help manage this, she also sometimes uses compression stockings. She says the compression stockings are hard to put on and 'don't really work'. She also has issues with spasticity and tightness in her legs - this is about the same but at times it is worse. She takes baclofen 10mg in the afternoon and evening, if she takes more than that she is \"out of it\".    She has been having issues with chronic urinary tract infections for over a month and her pain has been worse during this time. Her vaginal area hurts all the time, she has been told this could be " "due to nerve pain or an infection. Her urine varies - sometimes it looks bad and cloudy and other times it looks normal. She has felt more fatigued. She has not had fevers or chills. She also has pelvic pain.     She uses all of her PTMs, she consciously saves some for the end of the day when she knows it is going to get worse. The boluses help \"some - maybe 25%\" and last for about an hour or so but then its back. These help with both her vaginal pain and the leg pain.     She has trouble falling asleep at night but is able to sleep and get 6-7 hours once she is asleep.     ROS:  Review of Systems   Eyes: Positive for pain. Negative for double vision and redness.   Cardiovascular: Positive for palpitations. Negative for chest pain and orthopnea.   Gastrointestinal: Positive for abdominal pain, diarrhea, heartburn and nausea. Negative for blood in stool, constipation, melena and vomiting.   Genitourinary: Positive for dysuria and hematuria. Negative for flank pain and urgency.   Musculoskeletal: Positive for back pain and myalgias. Negative for neck pain.   Neurological: Positive for dizziness, speech change, weakness and headaches. Negative for tingling, tremors, seizures and loss of consciousness.   Psychiatric/Behavioral: Positive for depression and memory loss. The patient is nervous/anxious. The patient does not have insomnia.          Significant Medical History:   Past Medical History:   Diagnosis Date     CARDIAC DYSRHYTHMIAS [facility] 10/3/2007    Taking atenelol for years for this     History of skin cancer      Mitral valve prolapse      Neurogenic bladder      Sacral decubitus ulcer, stage IV (H)      Thoracic spinal cord injury (H)           Past Surgical History:  Past Surgical History:   Procedure Laterality Date     DECOMPRESSION LUMBAR ONE LEVEL N/A 12/27/2019    Procedure: Posterior spinal decompression;  Surgeon: Alf Ritchie MD;  Location: [facility] OR     INSERT INTRATHECAL PAIN PUMP N/A 1/19/2022 "    Procedure: INSERTION, INTRATHECAL ANALGESIC PUMP, externalized trial;  Surgeon: Luis Orourke MD;  Location: [facility] OR     INSERT INTRATHECAL PAIN PUMP Right 2022    Procedure: INSERTION, INTRATHECAL ANALGESIC PUMP;  Surgeon: Luis Orourke MD;  Location: [facility] OR     INSERT STIMULATOR AND LEADS INTERNAL DORSAL COLUMN N/A 2021    Procedure: Posterior thoracic 12 to Lumbar 1 level for placement of spinal cord stimulator paddle and placement of implantable pulse generator/battery over right buttock;  Surgeon: Luis Orourke MD;  Location: [facility] OR     IR SPINAL ANGIOGRAM  10/16/2019     IR SPINAL ANGIOGRAM  2019     LAPAROSCOPIC TUBAL LIGATION       REPAIR SPINAL ARERIOVENOUS MALFORMATION N/A 2019    Procedure: with surgical disconnection arterial venous fistula Thoracic 5;  Surgeon: Alf Ritchie MD;  Location: [facility] OR          Family History:  Family History   Problem Relation Age of Onset     C.A.D. Father          41     Deep Vein Thrombosis (DVT) No family hx of      Anesthesia Reaction No family hx of           Social History:  Social History     Socioeconomic History     Marital status:      Spouse name: Not on file     Number of children: Not on file     Years of education: Not on file     Highest education level: Not on file   Occupational History     Occupation: NA     Employer: [employer]   Tobacco Use     Smoking status: Never     Smokeless tobacco: Never   Substance and Sexual Activity     Alcohol use: No     Drug use: No     Sexual activity: Yes     Partners: Male   Other Topics Concern     Parent/sibling w/ CABG, MI or angioplasty before 65F 55M? Not Asked   Social History Narrative    Lives with spouse - works in [place].     Social Determinants of Health     Financial Resource Strain: Not on file   Food Insecurity: Not on file   Transportation Needs: Not on file   Physical Activity: Not on file   Stress: Not on file   Social Connections: Not on file   Intimate  Partner Violence: Not on file   Housing Stability: Not on file     Social History     Social History Narrative    Lives with spouse - works in [place].          Allergies:  Allergies   Allergen Reactions     Contrast Dye Rash     Painful rash after x-ray contrast     Nitrofurantoin Nausea and Vomiting       Current Medications:   Current Outpatient Medications   Medication Sig Dispense Refill     acetaminophen (TYLENOL) 500 MG tablet Take 500-1,000 mg by mouth every 6 hours as needed for mild pain       amoxicillin-clavulanate (AUGMENTIN) 500-125 MG tablet TAKE ONE TABLET BY MOUTH EVERY 12 HOURS FOR 5 DAYS       aspirin (ASA) 81 MG EC tablet Take 1 tablet (81 mg) by mouth daily 30 tablet 3     atorvastatin (LIPITOR) 40 MG tablet Take 1 tablet (40 mg) by mouth every evening 30 tablet 3     baclofen (LIORESAL) 10 MG tablet Take 10 mg by mouth 3 times daily as needed for muscle spasms       COMPOUND CONTAINING CONTROLLED SUBSTANCE (CMPD RX) - PHARMACY TO MIX COMPOUNDED MEDICATION Concentrations:  Dilaudid    14 mg/ml                                                          Bupivacaine 28 mg/ml  Ziconotide 14 mcg/ml     Total Volume in Refill: 20 mL      Basal:   Dilaudid   6.993 mg/day                                                       Bupivacaine 13.986 mg/day  Ziconotide 6.993 mcg/day     PTM 0.2 hydromorphone, 0.4 mg bupivacaine, 0.2 mcg ziconotide, 2 hour lockout, maximum 6 in a day.    Maximum 24 hour dose  Hydromorphone 8.134 mg/day  Bupivacaine 16.267 mg/day  Ziconitide 8.134 mcg/day    Last filled 4/20/23. Pump alarm 5/20/23  Managed by [employer] ([phone]) 20 mL 0     gabapentin (NEURONTIN) 600 MG tablet Take 1,200 mg by mouth 3 times daily  1 tablet 0     lidocaine (LIDODERM) 5 % patch Apply 1 patch topically daily as needed        LORazepam (ATIVAN) 0.5 MG tablet Take 1 tablet by mouth daily as needed for anxiety       metoprolol succinate ER (TOPROL-XL) 25 MG 24 hr tablet Take 12.5 mg by mouth  every evening        Multiple Vitamins-Minerals (WOMENS MULTI PO) Take 1 tablet by mouth daily       ondansetron (ZOFRAN) 4 MG tablet Take 1 tablet (4 mg) by mouth every 6 hours as needed for nausea 20 tablet 1     polyethylene glycol (MIRALAX) 17 GM/Dose powder Take 17 g by mouth daily as needed for constipation        melatonin 3 MG tablet Take 3 mg by mouth nightly as needed  (Patient not taking: Reported on 7/5/2023)               Current Pain Medications:  Baclofen 10 mg at noon and 10 mg at 5 pm  Gabapentin 1200 mg TID    Physical Exam:     BP (!) 144/78   Pulse 60   SpO2 95%     General Appearance: No distress, seated comfortably in her motorized wheelchare  Mood: Flat affect  HE ENT: Non constricted pupils  Respiratory: Non labored breathing  CVS: Regular rate and rhythm  Skin: No rashes over exposed skin  MS: Normal muscle bulk, swelling at the ankles up to her knee  Neuro: Cranial nerves 2-12 intact    ASSESSMENT AND PLAN:     Encounter Diagnosis:  Neuropathic pain  Incomplete paraplegia  Thoracic Spinal Cord Injury  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     She has been having issues with recurrent UTIs for the past 8 weeks. Since her last visit she saw Dr. Frost with PM&R, however at the time of this visit she was having active signs of an UTI and was recommended she go to the ER. Because of this Dr. Frost was unable to start management of Annamarie's spasticity. Her urinary symptoms seem to be better controlled, however they have not fully resolved. She has also seen urology since her last appointment and a cystoscopy was recommended. This has not yet been scheduled.     She is a complex patient. Her ITP is helping but not eliminating her pain. At this point she truly needs multidisciplinary care. We were able to get her an appointment this afternoon with   Thai Elam to hopefully better manage her lower extremity spasticity. I provided her with the urology scheduling number to hopefully schedule the cystoscopy that was recommended at her last appointment.     At this time we do not recommend adjusting the ITP settings until her urology symptoms have stabilized. We did discuss adding a third dose of baclofen just before bedtime as this third dose tends to make her very sleepy. We also strongly encouraged her to get new compression stockings and wear these consistently to help with the swelling in her lower legs which is associated with increased pain.     RECOMMENDATIONS:     1. Medications: Continue current ITP dosing, and add a third dose of baclofen before bed so the dosing will be 10 mg at noon, 10 mg at 5 pm and 10 mg at 10 pm.    Concentrations:  Dilaudid    14 mg/ml                                                          Bupivacaine 28 mg/ml  Ziconitide 14 mcg/ml     Total Volume in Refill: 20 mL      Basal:   Dilaudid    7 mg/day                                                       Bupivacaine 14 mg/day  Ziconitide 7 mcg/day     PTM 0.2 hydromorphone, 2 hour lockout, maximum 6 in a day.     Maximum 24 hour dose  Hydromorphone 8.16 mg/day  Bupivacaine 16.3 mg/day  Ziconitide 8.16 mcg/day    Follow up: 4-6 weeks    This patient was seen and discussed with the attending physician.    Samanta Barry MD  Chronic Pain Fellow   [facility]

## 2023-07-17 NOTE — TELEPHONE ENCOUNTER
7/17 Patient rescheduled to next in-person clinic to meet with Dulce kinney Procedure   Dermatology, Surgery, Urology  [facility]

## 2023-07-17 NOTE — TELEPHONE ENCOUNTER
Dr. House soonest availability in [place] is September 25th.    Marleny Ozuna LPN on 7/17/2023 at 2:59 PM

## 2023-07-18 ENCOUNTER — PRE VISIT (OUTPATIENT)
Dept: UROLOGY | Facility: CLINIC | Age: 73
End: 2023-07-18
Payer: MEDICARE

## 2023-07-18 LAB — BACTERIA UR CULT: NORMAL

## 2023-07-18 NOTE — TELEPHONE ENCOUNTER
Faxed note to PCP:    Team Member Role and Specialty Contact Info Address Jazmin Cruz MD General (Family Medicine) Phone: [phone] Fax: [phone] [address] [facility] [address] [facility] [id number] 1/19/2022         Dear Jazmin Interiano MD    We are sending results of Mrs Gagnon's urine culture and sensitivities done on 7/3/23 at  [facility] to your attention for follow up care.    In recent months, Mrs. Gagnon has had difficulty treating ongoing UTI, hematuria, increased pain in pt with SCI, neurogenic bladder.    We reviewed care every where notes of June 27 with Elena Cadet CNP to learn that previous Rx through [facility] providers: last Abx was augmentin from 6/30 to July 5 / Also s/p cipro completed 5 day course on June 20 and Macrobid 1 week course completed June 6.    We understand Anayeli is not currently taking an antibiotic.    In progress notes included, you will see that we referred Anayeli to Urology who saw her in a virtual visit on 7/5/23.  Urology recommended cystoscopy (which she had not booked until 7/17) and entertained idea of vaginitis as cause of Sx (urine culture was still pending).    After the C & S report was completed, on 7/10/23 we assisted Anayeli to get I & D consultation and recommended that she f/u with you as PCP for f/u to get on an effective antibiotic, she declined that we send the report at that time.  The I & D appointment in the [facility] system is booked for 7/24/23.     On 7/17/23 Anayeli saw her Pain management provider at [facility] who noted increased pain and ordered another urine C & S.    I spoke to Mrs. Gagnon again on Monday afternoon 7/17/23 to reinforce that she does need the UTI treated and she then agreed this time to have the C & S report sent to you as her PCP and we suggested she set up her follow up with Urology for cystoscopy    Late afternoon on 7/17/23 Urology was contacted and Mrs. Gagnon is now booked for the  cystoscopy to be done thise week on 7/2023.      Please refer to Care Everywhere for further progress  Notes.    Elle Lea RN, CRRN  Patient Care Coordinator  Department of Physical Medicine and Rehabilitation  [facility]  [phone]    Fax completed at 10:49 am    Elle Lea RN on 7/18/2023 at 10:49 AM

## 2023-07-18 NOTE — TELEPHONE ENCOUNTER
Reason for visit: Cystoscopy and discuss SP tube     Relevant information: Huy, gross hematuria, catheter in place    Records/imaging/labs/orders: all records available    Pt called: no need for a call    At Rooming: remove catheter and prep pt in the standard manner, dip urine    Monica Marquez CMA  7/18/2023  6:46 AM

## 2023-07-20 ENCOUNTER — MEDICAL CORRESPONDENCE (OUTPATIENT)
Dept: HEALTH INFORMATION MANAGEMENT | Facility: CLINIC | Age: 73
End: 2023-07-20

## 2023-07-20 ENCOUNTER — OFFICE VISIT (OUTPATIENT)
Dept: UROLOGY | Facility: CLINIC | Age: 73
End: 2023-07-20
Payer: MEDICARE

## 2023-07-20 ENCOUNTER — TELEPHONE (OUTPATIENT)
Dept: UROLOGY | Facility: CLINIC | Age: 73
End: 2023-07-20

## 2023-07-20 VITALS
SYSTOLIC BLOOD PRESSURE: 130 MMHG | DIASTOLIC BLOOD PRESSURE: 72 MMHG | HEART RATE: 79 BPM | WEIGHT: 135 LBS | HEIGHT: 64 IN | BODY MASS INDEX: 23.05 KG/M2

## 2023-07-20 DIAGNOSIS — R33.9 URINARY RETENTION: ICD-10-CM

## 2023-07-20 DIAGNOSIS — N39.0 RECURRENT UTI: Primary | ICD-10-CM

## 2023-07-20 DIAGNOSIS — Z97.8 FOLEY CATHETER IN PLACE: ICD-10-CM

## 2023-07-20 PROCEDURE — 99213 OFFICE O/P EST LOW 20 MIN: CPT | Mod: 25 | Performed by: UROLOGY

## 2023-07-20 PROCEDURE — 52000 CYSTOURETHROSCOPY: CPT | Performed by: UROLOGY

## 2023-07-20 RX ORDER — LIDOCAINE HYDROCHLORIDE 20 MG/ML
JELLY TOPICAL ONCE
Status: DISCONTINUED | OUTPATIENT
Start: 2023-07-20 | End: 2023-09-11

## 2023-07-20 ASSESSMENT — PAIN SCALES - GENERAL: PAINLEVEL: MODERATE PAIN (4)

## 2023-07-20 NOTE — PROGRESS NOTES
"July 20, 2023    Return visit    Patient returns today for follow up.  She denies any changes in her health since last visit.    /72   Pulse 79   Ht 1.626 m (5' 4\")   Wt 61.2 kg (135 lb)   BMI 23.17 kg/m    She is comfortable, in no distress, non-labored breathing.  Abdomen is soft, non-tender, non-distended.  Normal external female genitalia.  Negative CST.  Pelvic exam is limited by her positioning on the table but unremarkable    Cystoscopy Note: After informed consent was obtained patient was prepped and draped in the standard fashion.  The flexible cystoscope was inserted into a normal appearing urethral meatus.  The urothelium was carefully examined and there was an area of posterior wall abnormalitiy most consistent with the hudson catheter in situ.  There were no tumors, masses, stones, foreign bodies, or other urothelial abnormalities noted.  Bilateral ureteral orifices were noted in the normal orthotopic position and both effluxed clear urine.  The cystoscope was retroflexed and the bladder neck was unremarkable.  The urethra was carefully examined upon removing the cystoscope and was unremarkable.  Patient tolerated the procedure without complications noted.      A/P: 72 year old F with Huy and urinary retention with hudson catheter in place    Discussed that urethral hudson not the best long term management and that she should consider SPT    Given her recent CT she does have some bowl over lying the bladder so would likely need an open SPT    She cannot use the estrogen cream so will have her obtain an estring and return to see Dulce Be PA-C to have it placed    20 minutes were spent today on the date of the encounter in reviewing the EMR, direct patient care, coordination of care and documentation in addition to the cystoscopy procedure    Karley Robles MD MPH  (she/her/hers)   of Urology  [facility]      CC  Patient Care Team:  Jazmin Interiano MD as PCP - " General (Family Medicine)  Austin Klein, RN as Specialty Care Coordinator (Neurological Surgery)  Luis Orourke MD as MD (Neurological Surgery)  Cris Miguel, RN as Specialty Care Coordinator (Neurological Surgery)  Jasmine Espinoza MD as Anesthesiologist (Pain Medicine)  Alia Reyes, RN as Registered Nurse (Pain Clinic)  Joss Martinez MD as Hospitalist (Infectious Diseases)  SHAY YEPEZ

## 2023-07-20 NOTE — NURSING NOTE
"Chief Complaint   Patient presents with     Cystoscopy       Blood pressure 130/72, pulse 79, height 1.626 m (5' 4\"), weight 61.2 kg (135 lb), not currently breastfeeding. Body mass index is 23.17 kg/m .    Patient Active Problem List   Diagnosis     Other specified cardiac dysrhythmias(427.89)     CARDIOVASCULAR SCREENING; LDL GOAL LESS THAN 160     History of skin cancer     Headache     Menopause     Anxiety reaction     Osteoarthritis     Acute incomplete paraplegia (H)     Bilateral lower extremity edema     Chronic bilateral low back pain with bilateral sciatica     Contact dermatitis     Edema of spinal cord (H)     Hematuria     History of recurrent UTIs     History of spinal surgery     Hypertension     History of urinary retention     Incomplete emptying of bladder     Incomplete injury of thoracic spinal cord in T1 to T6 region without bony injury (H)     Leg weakness, bilateral     Muscle spasticity     MVP (mitral valve prolapse)     Palpitations     Pressure ulcer of coccygeal region, stage 3 (H)     Spinal cord injury at T7-T12 level (H)     Spinal cord injury of thoracic region without bone injury (H)     Tachycardia     Pure hypercholesterolemia     Ulnar neuropathy at elbow, left     Nocturnal enuresis     Urge incontinence of urine     Arteriovenous fistula of spinal cord vessels     Dural arteriovenous fistula     Spinal cord injury of thoracic region without bone injury, subsequent encounter (H)     Thoracolumbar back pain     Neuropathic pain     Intractable neuropathic pain of lumbosacral origin     Surgery, elective     Nausea     Elevated troponin     Generalized muscle weakness     Urinary tract infection with hematuria, site unspecified     Infection due to 2019 novel coronavirus     Cerebrovascular accident (CVA), unspecified mechanism (H)     Urinary tract infection associated with catheterization of urinary tract, unspecified indwelling urinary catheter type, initial encounter (H)     " Abdominal pain, generalized     Pain in both lower legs       Allergies   Allergen Reactions     Contrast Dye Rash     Painful rash after x-ray contrast     Nitrofurantoin Nausea and Vomiting       Current Outpatient Medications   Medication Sig Dispense Refill     estradiol (ESTRING) 7.5 MCG/24HR vaginal ring Place 1 each (1 Vaginal ring) vaginally every 3 months 1 each 3     acetaminophen (TYLENOL) 500 MG tablet Take 500-1,000 mg by mouth every 6 hours as needed for mild pain       amoxicillin-clavulanate (AUGMENTIN) 500-125 MG tablet TAKE ONE TABLET BY MOUTH EVERY 12 HOURS FOR 5 DAYS       aspirin (ASA) 81 MG EC tablet Take 1 tablet (81 mg) by mouth daily 30 tablet 3     atorvastatin (LIPITOR) 40 MG tablet Take 1 tablet (40 mg) by mouth every evening 30 tablet 3     baclofen (LIORESAL) 10 MG tablet Take 10 mg by mouth 3 times daily as needed for muscle spasms       COMPOUND CONTAINING CONTROLLED SUBSTANCE (CMPD RX) - PHARMACY TO MIX COMPOUNDED MEDICATION Concentrations:  Dilaudid    14 mg/ml                                                          Bupivacaine 28 mg/ml  Ziconotide 14 mcg/ml     Total Volume in Refill: 20 mL      Basal:   Dilaudid   6.993 mg/day                                                       Bupivacaine 13.986 mg/day  Ziconotide 6.993 mcg/day     PTM 0.2 hydromorphone, 0.4 mg bupivacaine, 0.2 mcg ziconotide, 2 hour lockout, maximum 6 in a day.    Maximum 24 hour dose  Hydromorphone 8.134 mg/day  Bupivacaine 16.267 mg/day  Ziconitide 8.134 mcg/day    Last filled 4/20/23. Pump alarm 5/20/23  Managed by [employer] ([phone]) 20 mL 0     gabapentin (NEURONTIN) 600 MG tablet Take 1,200 mg by mouth 3 times daily  1 tablet 0     lidocaine (LIDODERM) 5 % patch Apply 1 patch topically daily as needed        LORazepam (ATIVAN) 0.5 MG tablet Take 1 tablet by mouth daily as needed for anxiety       melatonin 3 MG tablet Take 3 mg by mouth nightly as needed  (Patient not taking: Reported on  2023)       metoprolol succinate ER (TOPROL-XL) 25 MG 24 hr tablet Take 12.5 mg by mouth every evening        Multiple Vitamins-Minerals (WOMENS MULTI PO) Take 1 tablet by mouth daily       ondansetron (ZOFRAN) 4 MG tablet Take 1 tablet (4 mg) by mouth every 6 hours as needed for nausea 20 tablet 1     polyethylene glycol (MIRALAX) 17 GM/Dose powder Take 17 g by mouth daily as needed for constipation          Social History     Tobacco Use     Smoking status: Never     Smokeless tobacco: Never   Substance Use Topics     Alcohol use: No     Drug use: No       Invasive Procedure Safety Checklist:    Procedure: Cystoscopy    Action: Complete sections and checkboxes as appropriate.    Pre-procedure:  1. Patient ID Verified with 2 identifiers ([place] and  or MRN) : YES    2. Procedure and site verified with patient/designee (when able) : YES    3. Accurate consent documentation in medical record : YES    4. H&P (or appropriate assessment) documented in medical record : N/A  H&P must be up to 30 days prior to procedure an updated within 24 hours of                 Procedure as applicable.     5. Relevant diagnostic and radiology test results appropriately labeled and displayed as applicable : YES    6. Blood products, implants, devices, and/or special equipment available for the procedure as applicable : YES    7. Procedure site(s) marked with provider initials [Exclusions: none] : NO    8. Marking not required. Reason : Yes  Procedure does not require site marking    Time Out:     Time-Out performed immediately prior to starting procedure, including verbal and active participation of all team members addressing: YES    1. Correct patient identity.  2. Confirmed that the correct side and site are marked.  3. An accurate procedure to be done.  4. Agreement on the procedure to be done.  5. Correct patient position.  6. Relevant images and results are properly labeled and appropriately displayed.  7. The need to  administer antibiotics or fluids for irrigation purposes during the procedure as applicable.  8. Safety precautions based on patient history or medication use.    During Procedure: Verification of correct person, site, and procedure occurs any time the responsibility for care of the patient is transferred to another member of the care team.    The following medication was given:     MEDICATION:  Lidocaine without epinephrine 2% jelly  ROUTE: urethral   SITE: urethral   DOSE: 10 mL  LOT #: [id number]  : [employer]  EXPIRATION DATE: 2-25  NDC#: [address]-0   Was there drug waste? No    Prior to med admin, verified patient identity using patient's name and date of birth.  Due to med administration, patient instructed to remain in clinic for 15 minutes  afterwards, and to report any adverse reaction to me immediately.    Drug Amount Wasted:  None.  Vial/Syringe: Syringe      Brooke Shabazz  7/20/2023  11:41 AM

## 2023-07-20 NOTE — TELEPHONE ENCOUNTER
Central Prior Authorization Team   Phone: [phone]              Prior Authorization Retail Medication Request    Medication/Dose: estradiol (ESTRING) 7.5 MCG/24HR vaginal ring  ICD code (if different than what is on RX):    Recurrent UTI [N39.0]  - Primary       Cunningham catheter in place [Z97.8]           Previously Tried and Failed:    Rationale:      Insurance Name:    Insurance ID:          Pharmacy Information (if different than what is on RX)  Name:  [id number] - [place], [place] - [address] [place] [address]  Phone:  [phone]

## 2023-07-20 NOTE — PATIENT INSTRUCTIONS
"Please  the estring and bring to your appointment with Dulce    It was a pleasure meeting with you today.  Thank you for allowing me and my team the privilege of caring for you today.  YOU are the reason we are here, and I truly hope we provided you with the excellent service you deserve.  Please let us know if there is anything else we can do for you so that we can be sure you are leaving completely satisfied with your care experience.    AFTER YOUR CYSTOSCOPY        You have just completed a cystoscopy, or \"cysto\", which allowed your physician to learn more about your bladder (or to remove a stent placed after surgery). We suggest that you continue to avoid caffeine, fruit juice, and alcohol for the next 24 hours, however, you are encouraged to return to your normal activities.         A few things that are considered normal after your cystoscopy:     * Small amount of bleeding (or spotting) that clears within the next 24 hours     * Slight burning sensation with urination     * Sensation to of needing to avoid more frequently     * The feeling of \"air\" in your urine     * Mild discomfort that is relieved with Tylenol        Please contact our office promptly if you:     * Develop a fever above 101 degrees     * Are unable to urinate     * Develop bright red blood that does not stop     * Severe pain or swelling         Please contact our office with any concerns or questions @[phone]  "

## 2023-07-20 NOTE — LETTER
"7/20/2023       RE: Anayeli Gagnon  [address] [facility] [address]     Dear Colleague,    Thank you for referring your patient, Anayeli Gagnon, to the [facility] UROLOGY CLIN[facility] [place] at [facility]. Please see a copy of my visit note below.    July 20, 2023    Return visit    Patient returns today for follow up.  She denies any changes in her health since last visit.    /72   Pulse 79   Ht 1.626 m (5' 4\")   Wt 61.2 kg (135 lb)   BMI 23.17 kg/m    She is comfortable, in no distress, non-labored breathing.  Abdomen is soft, non-tender, non-distended.  Normal external female genitalia.  Negative CST.  Pelvic exam is limited by her positioning on the table but unremarkable    Cystoscopy Note: After informed consent was obtained patient was prepped and draped in the standard fashion.  The flexible cystoscope was inserted into a normal appearing urethral meatus.  The urothelium was carefully examined and there was an area of posterior wall abnormalitiy most consistent with the hudson catheter in situ.  There were no tumors, masses, stones, foreign bodies, or other urothelial abnormalities noted.  Bilateral ureteral orifices were noted in the normal orthotopic position and both effluxed clear urine.  The cystoscope was retroflexed and the bladder neck was unremarkable.  The urethra was carefully examined upon removing the cystoscope and was unremarkable.  Patient tolerated the procedure without complications noted.      A/P: 72 year old F with Huy and urinary retention with hudson catheter in place    Discussed that urethral hudson not the best long term management and that she should consider SPT    Given her recent CT she does have some bowl over lying the bladder so would likely need an open SPT    She cannot use the estrogen cream so will have her obtain an estring and return to see Dulce Be PA-C to have it placed    20 minutes were " spent today on the date of the encounter in reviewing the EMR, direct patient care, coordination of care and documentation in addition to the cystoscopy procedure    Karley Robles MD MPH  (she/her/hers)   of Urology  [facility]  Patient Care Team:  Jazmin Yepez MD as PCP - General (Family Medicine)  Austin Klein, RN as Specialty Care Coordinator (Neurological Surgery)  Luis Orourke MD as MD (Neurological Surgery)  Cris Miguel, RN as Specialty Care Coordinator (Neurological Surgery)  Jasmine Espinoza MD as Anesthesiologist (Pain Medicine)  Alia Reyes, RN as Registered Nurse (Pain Clinic)  Joss Martinez MD as Hospitalist (Infectious Diseases)  JAZMIN YEPEZ

## 2023-07-24 ENCOUNTER — PRE VISIT (OUTPATIENT)
Dept: INFECTIOUS DISEASES | Facility: CLINIC | Age: 73
End: 2023-07-24
Payer: MEDICARE

## 2023-07-24 ENCOUNTER — MEDICAL CORRESPONDENCE (OUTPATIENT)
Dept: HEALTH INFORMATION MANAGEMENT | Facility: CLINIC | Age: 73
End: 2023-07-24
Payer: MEDICARE

## 2023-07-24 ENCOUNTER — VIRTUAL VISIT (OUTPATIENT)
Dept: INFECTIOUS DISEASES | Facility: CLINIC | Age: 73
End: 2023-07-24
Attending: STUDENT IN AN ORGANIZED HEALTH CARE EDUCATION/TRAINING PROGRAM
Payer: MEDICARE

## 2023-07-24 DIAGNOSIS — A49.8 ENTEROCOCCUS FAECALIS INFECTION: ICD-10-CM

## 2023-07-24 DIAGNOSIS — A49.8 KLEBSIELLA INFECTION: ICD-10-CM

## 2023-07-24 DIAGNOSIS — N39.0 RECURRENT UTI: Primary | ICD-10-CM

## 2023-07-24 DIAGNOSIS — N31.9 NEUROGENIC BLADDER: ICD-10-CM

## 2023-07-24 PROCEDURE — 99442 PR PHYSICIAN TELEPHONE EVALUATION 11-20 MIN: CPT | Mod: 95 | Performed by: STUDENT IN AN ORGANIZED HEALTH CARE EDUCATION/TRAINING PROGRAM

## 2023-07-24 NOTE — LETTER
7/24/2023       RE: Anayeli Gagnon  [address]  [facility] [address]     Dear Colleague,    Thank you for referring your patient, Anayeli Gagnon, to the [facility] INFECTIOUS DISEASE CLINIC [place] at [facility]. Please see a copy of my visit note below.    Virtual Visit Details    Type of service:  Telephone Visit   Phone call duration: 15 minutes       [facility]  Transplant Infectious Disease Clinic Note:  New Patient     Patient:  Anayeli Gagnon, Date of birth 1950, Medical record number [id number]  Date of Visit:  07/24/2023  Consult requested by Dr. Interiano for evaluation of recurrent UTIs         Assessment and Recommendations:   Recommendations:  - Recommended Annamarie stay hydrated, aim for at least 2 L fluid daily  - Agree with Urology recs re: exchanging urethral Cunningham for SPT and estring placement  - Consider non-antibiotic supplements next, if UTIs persist even after making the switch to SPT  - If the patient decides against SPT placement, consider exchanging urethral Cunningham more frequently (every 1 or 2 weeks rather than every 4 weeks)  -  Last resort would be to consider suppressive antibiotics. 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     #Recurrent UTIs:  Annamarie with recurrent UTIs in the setting of neurogenic bladder and urinary retention after SCI in 2017 that left her paraplegic. She has had an indwelling urethral Cunningham since late 2019/early 2022, but UTIs appear to have increased in frequency over the last year. Relatively frequency of 1 every 4-6 weeks, most common organisms  E.faecalis and Klebsiella oxytoca. CT A/P 7/3 without obvious source/nidus for infection. A recent cystoscopy with no obvious urinary tract abnormalities. Urology recommends switching out indwelling Cunningham for suprapubic catheter and using estring. Agree with their recs, and this might reduce frequency of infections. If the patient decides against SPT placement, consider exchanging urethral Cunningham more frequently (every 1 or 2 weeks rather than every 4 weeks). Encouraged patient to stay hydrated. Could consider dietary/non-antibiotic supplements however limited efficacy supporting their use and likely to be even less effective in the setting of neurogenic bladder and indwelling Cunningham. 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     Briefly regarding her urologic history, she had urinary retention and urinary incontinence after the SCI and was trialed on anticholinergics and alpha blockers before ultimately having Cunningham catheter placed in late 2019 or early 2020. Urodynamics at outside institution on 4/12/21 showed hypertonic, spastic bladder with low capacity (125 mL). Bladder Botox injections were recommended, but she ultimately did not go through with this.     Annamarie reports worsening UTIs over the last year. She has had them intermittently since her SCI, but they  have increased in frequency and severity over the last year. Symptoms with infection include vaginal pain, lethargy, chills, dark urine, and intermittent gross hematuria. She denies fevers.   Has been on multiple antibiotic courses, most recently finished a course of Augmentin 7/5/23. CT from 7/3 failed to show obvious causes for UTI. Cunningham catheter was previously changed every 3-4 weeks, but they are moving to every 2 week changes starting now.     Per chart review, office visit with outside internal medicine service 6/13/2023: Genital exam -  external genitalia is pink in color there is noticeable erythema.   Wet prep ultimately returned with gardnerella vaginalis for which she was prescribed Metrogel.      After being seen by Urology, underwent cystoscopy 7/20/23 - no tumors, masses, stones, foreign bodies, or other urothelial abnormalities noted. Bladder neck and urethra unremarkable    Patient recommended to have open SPT done by Urology. Also recommended e-string placement    Review of Systems:  Remaining systems reviewed and negative    Past Medical History:   Diagnosis Date    CARDIAC DYSRHYTHMIAS NEC 10/3/2007    Taking atenelol for years for this    History of skin cancer     Mitral valve prolapse     Neurogenic bladder     Sacral decubitus ulcer, stage IV (H)     Thoracic spinal cord injury (H)        Past Surgical History:   Procedure Laterality Date    DECOMPRESSION LUMBAR ONE LEVEL N/A 12/27/2019    Procedure: Posterior spinal decompression;  Surgeon: Alf Ritchie MD;  Location: [facility] OR    INSERT INTRATHECAL PAIN PUMP N/A 1/19/2022    Procedure: INSERTION, INTRATHECAL ANALGESIC PUMP, externalized trial;  Surgeon: Luis Orourke MD;  Location: [facility] OR    INSERT INTRATHECAL PAIN PUMP Right 1/21/2022    Procedure: INSERTION, INTRATHECAL ANALGESIC PUMP;  Surgeon: Luis Orourke MD;  Location: [facility] OR    INSERT STIMULATOR AND LEADS INTERNAL DORSAL COLUMN N/A 4/7/2021    Procedure: Posterior thoracic  12 to Lumbar 1 level for placement of spinal cord stimulator paddle and placement of implantable pulse generator/battery over right buttock;  Surgeon: Luis Orourke MD;  Location: [facility] OR    IR SPINAL ANGIOGRAM  10/16/2019    IR SPINAL ANGIOGRAM  2019    LAPAROSCOPIC TUBAL LIGATION      REPAIR SPINAL ARERIOVENOUS MALFORMATION N/A 2019    Procedure: with surgical disconnection arterial venous fistula Thoracic 5;  Surgeon: Alf Ritchie MD;  Location: [facility] OR       Family History   Problem Relation Age of Onset    C.A.D. Father          41    Deep Vein Thrombosis (DVT) No family hx of     Anesthesia Reaction No family hx of        Social History     Social History Narrative    Lives with spouse - works in [employer].     Social History     Tobacco Use    Smoking status: Never    Smokeless tobacco: Never   Substance Use Topics    Alcohol use: No    Drug use: No       Immunization History   Administered Date(s) Administered    COVID-19 Monovalent 18+ (Moderna) 2021, 2021    FLU 6-35 months 2004, 10/09/2007    FLUAD(HD)65+ QUAD 2021    HepB 2010    Hepatitis B, Adult 2010    Influenza (High Dose) 3 valent vaccine 2019    Influenza (IIV3) PF 2000, 2001, 10/20/2003, 2005, 2008    Influenza Vaccine 65+ (Fluzone HD) 2019    Influenza Vaccine >6 months (Alfuria,Fluzone) 2020    Mantoux Tuberculin Skin Test 2006, 10/09/2013    Pneumo Conj 13-V (2010&after) 2017    Pneumococcal 23 valent 2020    TD,PF 7+ (Tenivac) 2005    TDAP Vaccine (Adacel) 2011    Zoster recombinant adjuvanted (SHINGRIX) 2021       Patient Active Problem List   Diagnosis    Other specified cardiac dysrhythmias(427.89)    CARDIOVASCULAR SCREENING; LDL GOAL LESS THAN 160    History of skin cancer    Headache    Menopause    Anxiety reaction    Osteoarthritis    Acute incomplete paraplegia (H)    Bilateral lower extremity  edema    Chronic bilateral low back pain with bilateral sciatica    Contact dermatitis    Edema of spinal cord (H)    Hematuria    History of recurrent UTIs    History of spinal surgery    Hypertension    History of urinary retention    Incomplete emptying of bladder    Incomplete injury of thoracic spinal cord in T1 to T6 region without bony injury (H)    Leg weakness, bilateral    Muscle spasticity    MVP (mitral valve prolapse)    Palpitations    Pressure ulcer of coccygeal region, stage 3 (H)    Spinal cord injury at T7-T12 level (H)    Spinal cord injury of thoracic region without bone injury (H)    Tachycardia    Pure hypercholesterolemia    Ulnar neuropathy at elbow, left    Nocturnal enuresis    Urge incontinence of urine    Arteriovenous fistula of spinal cord vessels    Dural arteriovenous fistula    Spinal cord injury of thoracic region without bone injury, subsequent encounter (H)    Thoracolumbar back pain    Neuropathic pain    Intractable neuropathic pain of lumbosacral origin    Surgery, elective    Nausea    Elevated troponin    Generalized muscle weakness    Urinary tract infection with hematuria, site unspecified    Infection due to 2019 novel coronavirus    Cerebrovascular accident (CVA), unspecified mechanism (H)    Urinary tract infection associated with catheterization of urinary tract, unspecified indwelling urinary catheter type, initial encounter (H)    Abdominal pain, generalized    Pain in both lower legs       No outpatient medications have been marked as taking for the 7/24/23 encounter (Appointment) with Satnam Brasher MD.     Current Facility-Administered Medications for the 7/24/23 encounter (Appointment) with Satnam Brasher MD   Medication    lidocaine (XYLOCAINE) 2 % external gel       Allergies   Allergen Reactions    Contrast Dye Rash     Painful rash after x-ray contrast    Nitrofurantoin Nausea and Vomiting              Physical Exam:     There were no vitals taken  for this visit.  Wt Readings from Last 4 Encounters:   07/20/23 61.2 kg (135 lb)   06/27/23 56.7 kg (125 lb)   05/10/23 63.5 kg (140 lb)   05/05/23 63.6 kg (140 lb 3.4 oz)       Exam: Limited exam as visit was conducted via [employer]  GENERAL: well-developed, well-nourished, alert, oriented, in no acute distress.  HEAD: Head is normocephalic, atraumatic   EYES: Eyes have anicteric sclerae.    ENT: Oropharynx is moist without exudates or ulcers.  NECK: Supple.  LUNGS: On room air, no use of accessory muscles.  NEUROLOGIC: AAO x 3         Laboratory Data:     Inflammatory Markers    Recent Labs   Lab Test 07/03/23  1827 12/31/19  0550   CRPI 6.96*  --    [id number]  --  1.7       Metabolic Studies    Recent Labs   Lab Test 05/12/23  1113 05/10/23  2230 05/10/23  2111 05/10/23  1410 05/10/23  0408 05/07/23  0739 05/06/23  1811 05/06/23  0538 05/06/23  0454 05/05/23  1724 01/21/22  0729 01/21/22  0420   NA  --   --   --   --  138 142  --  141  --   --    < > 140   POTASSIUM  --   --   --   --  4.6 4.3  --  3.8  --   --    < > 3.8   CHLORIDE  --   --   --   --  100 105  --  105  --   --    < > 109   CO2  --   --   --   --  26 27  --  24  --   --    < > 26   ANIONGAP  --   --   --   --  12 10  --  12  --   --    < > 5   BUN  --   --   --   --  14.0 11.5  --  14.3  --   --    < > 15   CR  --   --  0.60  --  0.79 0.70  --  0.63  --   --    < > 0.45*   GFRESTIMATED  --   --  >90  --  78 90  --  >90  --   --    < > >90   GLC 97   < >  --    < > 101* 153*   < > 137*   < >  --    < > 117*   ADAN  --   --   --   --  9.1 9.2  --  8.7*  --   --    < > 8.7   PHOS  --   --   --   --   --   --   --   --   --   --   --  3.1   MAG  --   --   --   --   --   --   --   --   --   --   --  2.1   LACT  --   --   --   --   --   --   --   --   --  0.9   < >  --    CKT  --   --   --   --  124  --   --   --   --   --   --   --     < > = values in this interval not displayed.       Hepatic Studies    Recent Labs   Lab Test 05/10/23  7032  05/05/23  1704 09/26/22  1101   BILITOTAL 0.4 0.3 0.4   ALKPHOS 70 76 72   PROTTOTAL 6.9 7.1 6.7   ALBUMIN 4.3 4.4 4.3   AST 31 23 25   ALT 12 9* 13     Hematology Studies   Recent Labs   Lab Test 07/03/23  1827 05/10/23  0408 05/07/23  0739 05/06/23  0538 03/31/21  1425 12/31/19  0550 12/28/19  0731   WBC 9.7 8.1 6.2 6.5   < > 6.2 9.3   ANEU  --   --   --   --   --  3.7 7.0   ANEUTAUTO 6.5 6.1  --   --    < >  --   --    ALYM  --   --   --   --   --  1.5 1.4   ALYMPAUTO 2.1 1.4  --   --    < >  --   --    MIKAYLA  --   --   --   --   --  0.5 0.8   AMONOAUTO 0.7 0.4  --   --    < >  --   --    AEOS  --   --   --   --   --  0.5 0.1   AEOSAUTO 0.3 0.1  --   --    < >  --   --    ABSBASO 0.1 0.0  --   --    < >  --   --    HGB 13.0 13.8 13.8 12.2   < > 10.6* 10.7*   HCT 40.5 42.1 42.3 38.1   < > 32.7* 34.3*    203 162 151   < > 144* 154    < > = values in this interval not displayed.       Clotting Studies    Recent Labs   Lab Test 05/05/23  1704 01/21/22  0420 01/19/22  0627 01/19/22  0627 04/07/21  0645   INR 1.00 0.99  --  1.02 0.98   PTT  --  33   < > 30 28    < > = values in this interval not displayed.        Urine Studies     Recent Labs   Lab Test 07/17/23  1051 07/03/23  1835 05/05/23  1712 09/26/22  2348 09/26/22  1211   URINEPH 5.5 5.5 6.0 6.0 5.5   NITRITE Negative Negative Negative Negative Positive*   LEUKEST Large* Negative Moderate* Negative Trace*   WBCU 92* 1 37* 2 7*   UYEAST Few*  --   --   --   --        Microbiology:    Urine Cx results: (from Care Everywhere)  6/27/23 >100k Staph epi, 50-100k E.faecalis (S - Ampicillin, Nitrofurantoin)  6/13/23 >100k E.faecalis  5/30/23 >100k E.faecalis  4/27/23 10-25k E.faecalis, <10k GNR  4/18/23 >100k Klebsiella oxytoca (R - Ampicillin, S - Augmentin, Ceftriaxone, Cipro, Nitrofurantoin, TMP/SMX)  1/17/23 >100k Klebsiella pneumoniae (R - Ampicillin, I - Nitrofurantoin)  7/17/22 >100k Klebsiella oxytoca, >100k E.faecalis    Last Culture results   Rapid Strep  A Screen   Date Value Ref Range Status   02/01/2013 neg neg Final     Culture   Date Value Ref Range Status   07/17/2023 >100,000 CFU/mL Mixture of urogenital cj  Final   07/03/2023 <10,000 CFU/mL Enterococcus faecalis (A)  Final   05/05/2023 >100,000 CFU/mL Klebsiella oxytoca (A)  Final   05/05/2023 No Growth  Final   05/05/2023 No Growth  Final   09/26/2022 >100,000 CFU/mL Mixture of urogenital jc  Final   09/26/2022 No Growth  Final     Culture Micro   Date Value Ref Range Status   05/05/2013 10 to 50,000 colonies/mL Escherichia coli  Final   07/05/2010 10 to 50,000 colonies/mL Escherichia coli  Final   09/28/2006 No growth Beta hemolytic Streptococcus group A  Final           Virology:  Coronavirus-19 testing    Recent Labs   Lab Test 05/06/23  1445 09/19/22  0824 07/20/22  1820 01/20/22  1021 01/17/22  1418 12/25/21  1036 06/18/21  0751 04/05/21  0852   [id number] Negative  --  Negative Negative Negative  --   --  Test received-See reflex to IDDL test SARS CoV2 (COVID-19) Virus RT-PCR  NEGATIVE   [id number]  --   --   --   --   --   --   --  Nasopharyngeal   [id number]  --   --   --   --   --   --   --  Nasopharyngeal   COVIDPCREXT  --  Positive*  --   --   --  Negative Negative  --        Hepatitis C Antibody   Date Value Ref Range Status   07/19/2010 Negative NEG Final       Imaging:  Results for orders placed or performed in visit on 07/03/23   CT Abdomen Pelvis w/o Contrast    Narrative    EXAMINATION: CT ABDOMEN PELVIS W/O CONTRAST, 7/3/2023 6:34 PM    INDICATION: blood in urine. r/out stones; Neurogenic bladder    COMPARISON STUDY: 9/26/2022    TECHNIQUE: CT scan of the abdomen and pelvis was performed on  multidetector CT scanner using volumetric acquisition technique and  images were reconstructed in multiple planes with variable thickness  and reviewed on dedicated workstations.     CONTRAST: None    CT scan radiation dose is optimized to minimum requisite dose using  automated dose modulation  techniques.    FINDINGS:    Lower thorax: Normal.    Liver: Similar hepatic cysts and too small to characterize  hypodensities.    Biliary System: Normal gallbladder. No extrahepatic biliary ductal  dilation.    Pancreas: No mass or pancreatic ductal dilation.    Spleen: Normal.    Adrenal glands: No mass or nodules    Kidneys: No suspicious mass, obstructing calculus or hydronephrosis.    Vasculature: Aortoiliac atherosclerotic calcifications with no  aneurysmal dilation.    Lymph nodes: No significant lymphadenopathy.    Gastrointestinal tract :Normal appendix. Normal caliber small bowel.  Stable left lower quadrant colostomy. Contrast residue in the colon.    Mesentery/peritoneum/retroperitoneum: No mass. No free fluid or air.    Pelvis: Urinary bladder is decompressed by a Cunningham catheter.    Soft tissues: Unchanged atrophic appearance of the right psoas muscle.  Similar soft tissue thickening/edema at the right gluteal region.  Spinal stimulator device in the right lower back subcutaneous tissues  with leads stable at the level of L1. Pain pump device in the right  anterior subcutaneous tissues.    Osseous structures: Multilevel degenerative changes of the visualized  thoracolumbar spine. No aggressive or acute osseous lesion.      Impression    IMPRESSION:   1.  No urinary tract stones.  2.  Stable postoperative changes left lower quadrant colostomy and  spinal stimulator placement.    I have personally reviewed the examination and initial interpretation  and I agree with the findings.    SANDEE EASON MD         SYSTEM ID:  [id number]       Satnam Brasher MD

## 2023-07-24 NOTE — NURSING NOTE
Is the patient currently in the state of [place]? YES    Visit mode:VIDEO    Will anyone else be joining the visit? NO    How would you like to obtain your AVS? MyChart    Are changes needed to the allergy or medication list? NO    Reason for visit: Consult    Denae MATHEW

## 2023-07-24 NOTE — PROGRESS NOTES
Virtual Visit Details    Type of service:  Telephone Visit   Phone call duration: 15 minutes       [facility] Infectious Disease Clinic Note:  New Patient     Patient:  Anayeli Gagnon, Date of birth 1950, Medical record number [id number]  Date of Visit:  07/24/2023  Consult requested by Dr. Interiano for evaluation of recurrent UTIs         Assessment and Recommendations:   Recommendations:  - Recommended Annamarie stay hydrated, aim for at least 2 L fluid daily  - Agree with Urology recs re: exchanging urethral Cunningham for SPT and estring placement  - Consider non-antibiotic supplements next, if UTIs persist even after making the switch to SPT  - If the patient decides against SPT placement, consider exchanging urethral Cunningham more frequently (every 1 or 2 weeks rather than every 4 weeks)  -  Last resort would be to consider suppressive antibiotics. 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     #Recurrent UTIs:  Annamarie with recurrent UTIs in the setting of neurogenic bladder and urinary retention after SCI in 2017 that left her paraplegic. She has had an indwelling urethral Cunningham since late 2019/early 2022, but UTIs appear to have increased in frequency over the last year. Relatively frequency of 1 every 4-6 weeks, most common organisms E.faecalis and Klebsiella oxytoca. CT A/P 7/3 without obvious source/nidus for infection. A recent cystoscopy with no obvious urinary tract abnormalities. Urology recommends switching out indwelling Cunningham for suprapubic catheter and using estring. Agree with their recs, and this might reduce frequency of infections. If the patient  decides against SPT placement, consider exchanging urethral Cunningham more frequently (every 1 or 2 weeks rather than every 4 weeks). Encouraged patient to stay hydrated. Could consider dietary/non-antibiotic supplements however limited efficacy supporting their use and likely to be even less effective in the setting of neurogenic bladder and indwelling Cunningham. 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     Briefly regarding her urologic history, she had urinary retention and urinary incontinence after the SCI and was trialed on anticholinergics and alpha blockers before ultimately having Cunningham catheter placed in late 2019 or early 2020. Urodynamics at outside institution on 4/12/21 showed hypertonic, spastic bladder with low capacity (125 mL). Bladder Botox injections were recommended, but she ultimately did not go through with this.     Annamarie reports worsening UTIs over the last year. She has had them intermittently since her SCI, but they have increased in frequency and severity over the last year. Symptoms with infection include vaginal pain, lethargy, chills, dark urine, and intermittent gross hematuria. She denies fevers.   Has been on multiple antibiotic courses, most recently finished a course of Augmentin 7/5/23. CT from 7/3 failed to show obvious causes for  UTI. Cunningham catheter was previously changed every 3-4 weeks, but they are moving to every 2 week changes starting now.     Per chart review, office visit with outside internal medicine service 6/13/2023: Genital exam -  external genitalia is pink in color there is noticeable erythema.   Wet prep ultimately returned with gardnerella vaginalis for which she was prescribed Metrogel.      After being seen by Urology, underwent cystoscopy 7/20/23 - no tumors, masses, stones, foreign bodies, or other urothelial abnormalities noted. Bladder neck and urethra unremarkable    Patient recommended to have open SPT done by Urology. Also recommended e-string placement    Review of Systems:  Remaining systems reviewed and negative    Past Medical History:   Diagnosis Date     CARDIAC DYSRHYTHMIAS NEC 10/3/2007    Taking atenelol for years for this     History of skin cancer      Mitral valve prolapse      Neurogenic bladder      Sacral decubitus ulcer, stage IV (H)      Thoracic spinal cord injury (H)        Past Surgical History:   Procedure Laterality Date     DECOMPRESSION LUMBAR ONE LEVEL N/A 12/27/2019    Procedure: Posterior spinal decompression;  Surgeon: Alf Ritchie MD;  Location: [facility] OR     INSERT INTRATHECAL PAIN PUMP N/A 1/19/2022    Procedure: INSERTION, INTRATHECAL ANALGESIC PUMP, externalized trial;  Surgeon: Luis Orourke MD;  Location: [facility] OR     INSERT INTRATHECAL PAIN PUMP Right 1/21/2022    Procedure: INSERTION, INTRATHECAL ANALGESIC PUMP;  Surgeon: Luis Orourke MD;  Location: [facility] OR     INSERT STIMULATOR AND LEADS INTERNAL DORSAL COLUMN N/A 4/7/2021    Procedure: Posterior thoracic 12 to Lumbar 1 level for placement of spinal cord stimulator paddle and placement of implantable pulse generator/battery over right buttock;  Surgeon: Luis Orourke MD;  Location: [facility] OR     IR SPINAL ANGIOGRAM  10/16/2019     IR SPINAL ANGIOGRAM  12/20/2019     LAPAROSCOPIC TUBAL LIGATION       REPAIR SPINAL  ARERIOVENOUS MALFORMATION N/A 2019    Procedure: with surgical disconnection arterial venous fistula Thoracic 5;  Surgeon: Alf Ritchie MD;  Location: [facility] OR       Family History   Problem Relation Age of Onset     C.A.D. Father          41     Deep Vein Thrombosis (DVT) No family hx of      Anesthesia Reaction No family hx of        Social History     Social History Narrative    Lives with spouse - works in [place].     Social History     Tobacco Use     Smoking status: Never     Smokeless tobacco: Never   Substance Use Topics     Alcohol use: No     Drug use: No       Immunization History   Administered Date(s) Administered     COVID-19 Monovalent 18+ (Moderna) 2021, 2021     FLU 6-35 months 2004, 10/09/2007     FLUAD(HD)65+ QUAD 2021     HepB 2010     Hepatitis B, Adult 2010     Influenza (High Dose) 3 valent vaccine 2019     Influenza (IIV3) PF 2000, 2001, 10/20/2003, 2005, 2008     Influenza Vaccine 65+ (Fluzone HD) 2019     Influenza Vaccine >6 months (Alfuria,Fluzone) 2020     Mantoux Tuberculin Skin Test 2006, 10/09/2013     Pneumo Conj 13-V (2010&after) 2017     Pneumococcal 23 valent 2020     TD,PF 7+ (Tenivac) 2005     TDAP Vaccine (Adacel) 2011     Zoster recombinant adjuvanted (SHINGRIX) 2021       Patient Active Problem List   Diagnosis     Other specified cardiac dysrhythmias(427.89)     CARDIOVASCULAR SCREENING; LDL GOAL LESS THAN 160     History of skin cancer     Headache     Menopause     Anxiety reaction     Osteoarthritis     Acute incomplete paraplegia (H)     Bilateral lower extremity edema     Chronic bilateral low back pain with bilateral sciatica     Contact dermatitis     Edema of spinal cord (H)     Hematuria     History of recurrent UTIs     History of spinal surgery     Hypertension     History of urinary retention     Incomplete emptying of bladder      Incomplete injury of thoracic spinal cord in T1 to T6 region without bony injury (H)     Leg weakness, bilateral     Muscle spasticity     MVP (mitral valve prolapse)     Palpitations     Pressure ulcer of coccygeal region, stage 3 (H)     Spinal cord injury at T7-T12 level (H)     Spinal cord injury of thoracic region without bone injury (H)     Tachycardia     Pure hypercholesterolemia     Ulnar neuropathy at elbow, left     Nocturnal enuresis     Urge incontinence of urine     Arteriovenous fistula of spinal cord vessels     Dural arteriovenous fistula     Spinal cord injury of thoracic region without bone injury, subsequent encounter (H)     Thoracolumbar back pain     Neuropathic pain     Intractable neuropathic pain of lumbosacral origin     Surgery, elective     Nausea     Elevated troponin     Generalized muscle weakness     Urinary tract infection with hematuria, site unspecified     Infection due to 2019 novel coronavirus     Cerebrovascular accident (CVA), unspecified mechanism (H)     Urinary tract infection associated with catheterization of urinary tract, unspecified indwelling urinary catheter type, initial encounter (H)     Abdominal pain, generalized     Pain in both lower legs       No outpatient medications have been marked as taking for the 7/24/23 encounter (Appointment) with Satnam Brasher MD.     Current Facility-Administered Medications for the 7/24/23 encounter (Appointment) with Satnam Brasher MD   Medication     lidocaine (XYLOCAINE) 2 % external gel       Allergies   Allergen Reactions     Contrast Dye Rash     Painful rash after x-ray contrast     Nitrofurantoin Nausea and Vomiting              Physical Exam:     There were no vitals taken for this visit.  Wt Readings from Last 4 Encounters:   07/20/23 61.2 kg (135 lb)   06/27/23 56.7 kg (125 lb)   05/10/23 63.5 kg (140 lb)   05/05/23 63.6 kg (140 lb 3.4 oz)       Exam: Limited exam as visit was conducted via [facility]:  well-developed, well-nourished, alert, oriented, in no acute distress.  HEAD: Head is normocephalic, atraumatic   EYES: Eyes have anicteric sclerae.    ENT: Oropharynx is moist without exudates or ulcers.  NECK: Supple.  LUNGS: On room air, no use of accessory muscles.  NEUROLOGIC: AAO x 3         Laboratory Data:     Inflammatory Markers    Recent Labs   Lab Test 07/03/23  1827 12/31/19  0550   CRPI 6.96*  --    [id number]  --  1.7       Metabolic Studies    Recent Labs   Lab Test 05/12/23  1113 05/10/23  2230 05/10/23  2111 05/10/23  1410 05/10/23  0408 05/07/23  0739 05/06/23  1811 05/06/23  0538 05/06/23  0454 05/05/23  1724 01/21/22  0729 01/21/22  0420   NA  --   --   --   --  138 142  --  141  --   --    < > 140   POTASSIUM  --   --   --   --  4.6 4.3  --  3.8  --   --    < > 3.8   CHLORIDE  --   --   --   --  100 105  --  105  --   --    < > 109   CO2  --   --   --   --  26 27  --  24  --   --    < > 26   ANIONGAP  --   --   --   --  12 10  --  12  --   --    < > 5   BUN  --   --   --   --  14.0 11.5  --  14.3  --   --    < > 15   CR  --   --  0.60  --  0.79 0.70  --  0.63  --   --    < > 0.45*   GFRESTIMATED  --   --  >90  --  78 90  --  >90  --   --    < > >90   GLC 97   < >  --    < > 101* 153*   < > 137*   < >  --    < > 117*   ADAN  --   --   --   --  9.1 9.2  --  8.7*  --   --    < > 8.7   PHOS  --   --   --   --   --   --   --   --   --   --   --  3.1   MAG  --   --   --   --   --   --   --   --   --   --   --  2.1   LACT  --   --   --   --   --   --   --   --   --  0.9   < >  --    CKT  --   --   --   --  124  --   --   --   --   --   --   --     < > = values in this interval not displayed.       Hepatic Studies    Recent Labs   Lab Test 05/10/23  0408 05/05/23  1704 09/26/22  1101   BILITOTAL 0.4 0.3 0.4   ALKPHOS 70 76 72   PROTTOTAL 6.9 7.1 6.7   ALBUMIN 4.3 4.4 4.3   AST 31 23 25   ALT 12 9* 13     Hematology Studies   Recent Labs   Lab Test 07/03/23  1827 05/10/23  0408 05/07/23  0739  05/06/23  0538 03/31/21  1425 12/31/19  0550 12/28/19  0731   WBC 9.7 8.1 6.2 6.5   < > 6.2 9.3   ANEU  --   --   --   --   --  3.7 7.0   ANEUTAUTO 6.5 6.1  --   --    < >  --   --    ALYM  --   --   --   --   --  1.5 1.4   ALYMPAUTO 2.1 1.4  --   --    < >  --   --    MIKAYLA  --   --   --   --   --  0.5 0.8   AMONOAUTO 0.7 0.4  --   --    < >  --   --    AEOS  --   --   --   --   --  0.5 0.1   AEOSAUTO 0.3 0.1  --   --    < >  --   --    ABSBASO 0.1 0.0  --   --    < >  --   --    HGB 13.0 13.8 13.8 12.2   < > 10.6* 10.7*   HCT 40.5 42.1 42.3 38.1   < > 32.7* 34.3*    203 162 151   < > 144* 154    < > = values in this interval not displayed.       Clotting Studies    Recent Labs   Lab Test 05/05/23  1704 01/21/22  0420 01/19/22  0627 01/19/22  0627 04/07/21  0645   INR 1.00 0.99  --  1.02 0.98   PTT  --  33   < > 30 28    < > = values in this interval not displayed.        Urine Studies     Recent Labs   Lab Test 07/17/23  1051 07/03/23  1835 05/05/23  1712 09/26/22  2348 09/26/22  1211   URINEPH 5.5 5.5 6.0 6.0 5.5   NITRITE Negative Negative Negative Negative Positive*   LEUKEST Large* Negative Moderate* Negative Trace*   WBCU 92* 1 37* 2 7*   UYEAST Few*  --   --   --   --        Microbiology:    Urine Cx results: (from Care Everywhere)  6/27/23 >100k Staph epi, 50-100k E.faecalis (S - Ampicillin, Nitrofurantoin)  6/13/23 >100k E.faecalis  5/30/23 >100k E.faecalis  4/27/23 10-25k E.faecalis, <10k GNR  4/18/23 >100k Klebsiella oxytoca (R - Ampicillin, S - Augmentin, Ceftriaxone, Cipro, Nitrofurantoin, TMP/SMX)  1/17/23 >100k Klebsiella pneumoniae (R - Ampicillin, I - Nitrofurantoin)  7/17/22 >100k Klebsiella oxytoca, >100k E.faecalis    Last Culture results   Rapid Strep A Screen   Date Value Ref Range Status   02/01/2013 neg neg Final     Culture   Date Value Ref Range Status   07/17/2023 >100,000 CFU/mL Mixture of urogenital cj  Final   07/03/2023 <10,000 CFU/mL Enterococcus faecalis (A)  Final    05/05/2023 >100,000 CFU/mL Klebsiella oxytoca (A)  Final   05/05/2023 No Growth  Final   05/05/2023 No Growth  Final   09/26/2022 >100,000 CFU/mL Mixture of urogenital cj  Final   09/26/2022 No Growth  Final     Culture Micro   Date Value Ref Range Status   05/05/2013 10 to 50,000 colonies/mL Escherichia coli  Final   07/05/2010 10 to 50,000 colonies/mL Escherichia coli  Final   09/28/2006 No growth Beta hemolytic Streptococcus group A  Final           Virology:  Coronavirus-19 testing    Recent Labs   Lab Test 05/06/23  1445 09/19/22  0824 07/20/22  1820 01/20/22  1021 01/17/22  1418 12/25/21  1036 06/18/21  0751 04/05/21  0852   [id number] Negative  --  Negative Negative Negative  --   --  Test received-See reflex to IDDL test SARS CoV2 (COVID-19) Virus RT-PCR  NEGATIVE   [id number]  --   --   --   --   --   --   --  Nasopharyngeal   [id number]  --   --   --   --   --   --   --  Nasopharyngeal   COVIDPCREXT  --  Positive*  --   --   --  Negative Negative  --        Hepatitis C Antibody   Date Value Ref Range Status   07/19/2010 Negative NEG Final       Imaging:  Results for orders placed or performed in visit on 07/03/23   CT Abdomen Pelvis w/o Contrast    Narrative    EXAMINATION: CT ABDOMEN PELVIS W/O CONTRAST, 7/3/2023 6:34 PM    INDICATION: blood in urine. r/out stones; Neurogenic bladder    COMPARISON STUDY: 9/26/2022    TECHNIQUE: CT scan of the abdomen and pelvis was performed on  multidetector CT scanner using volumetric acquisition technique and  images were reconstructed in multiple planes with variable thickness  and reviewed on dedicated workstations.     CONTRAST: None    CT scan radiation dose is optimized to minimum requisite dose using  automated dose modulation techniques.    FINDINGS:    Lower thorax: Normal.    Liver: Similar hepatic cysts and too small to characterize  hypodensities.    Biliary System: Normal gallbladder. No extrahepatic biliary ductal  dilation.    Pancreas: No mass or  pancreatic ductal dilation.    Spleen: Normal.    Adrenal glands: No mass or nodules    Kidneys: No suspicious mass, obstructing calculus or hydronephrosis.    Vasculature: Aortoiliac atherosclerotic calcifications with no  aneurysmal dilation.    Lymph nodes: No significant lymphadenopathy.    Gastrointestinal tract :Normal appendix. Normal caliber small bowel.  Stable left lower quadrant colostomy. Contrast residue in the colon.    Mesentery/peritoneum/retroperitoneum: No mass. No free fluid or air.    Pelvis: Urinary bladder is decompressed by a Cunningham catheter.    Soft tissues: Unchanged atrophic appearance of the right psoas muscle.  Similar soft tissue thickening/edema at the right gluteal region.  Spinal stimulator device in the right lower back subcutaneous tissues  with leads stable at the level of L1. Pain pump device in the right  anterior subcutaneous tissues.    Osseous structures: Multilevel degenerative changes of the visualized  thoracolumbar spine. No aggressive or acute osseous lesion.      Impression    IMPRESSION:   1.  No urinary tract stones.  2.  Stable postoperative changes left lower quadrant colostomy and  spinal stimulator placement.    I have personally reviewed the examination and initial interpretation  and I agree with the findings.    SANDEE EASON MD         SYSTEM ID:  [id number]

## 2023-07-25 NOTE — TELEPHONE ENCOUNTER
Central Prior Authorization Team   Phone: [phone]      PA Initiation    Medication: ESTRING 7.5 MCG/24HR VA RING  Insurance Company: [employer] - Phone [phone] Fax [phone]  Pharmacy Filling the Rx: [id number] - [place], [place] - [address] [place] [address]  Filling Pharmacy Phone: [phone]  Filling Pharmacy Fax:    Start Date: 7/25/2023

## 2023-07-26 NOTE — TELEPHONE ENCOUNTER
Prior Authorization Approval    Medication: ESTRING 7.5 MCG/24HR VA RING  Authorization Effective Date: 4/27/2023  Authorization Expiration Date: 7/26/2024  Approved Dose/Quantity:   Reference #: [id number]   Insurance Company: [employer] - Phone [phone] Fax [phone]  Expected CoPay:       CoPay Card Available:      Financial Assistance Needed:   Which Pharmacy is filling the prescription: [id number] - [address]  Pharmacy Notified: Yes  Patient Notified: No

## 2023-08-01 NOTE — TELEPHONE ENCOUNTER
Pt saw Inf disease for consult (we requested on 7/10) on 7/24/23    F/u prn, should follow all recommendations from Urology regarding neurogenic bladder management.    Additional input:    Recommendations:  - Recommended Annamarie stay hydrated, aim for at least 2 L fluid daily  - Agree with Urology recs re: exchanging urethral Cunningham for SPT and estring placement  - Consider non-antibiotic supplements next, if UTIs persist even after making the switch to SPT  - If the patient decides against SPT placement, consider exchanging urethral Cunningham more frequently (every 1 or 2 weeks rather than every 4 weeks)  -  Last resort would be to consider suppressive antibiotics. For now, potential risks (especially selection of MDROs) outweigh potential benefits  - F/u as needed         Pt has been offered appointment in [facility] R for further SCI care and management x 2 and has declined to make appt also x 2    We appreciate timely assessments from Urology and Infectious Disease providers.    Elle Lea RN on 8/1/2023 at 2:53 PM

## 2023-08-02 ENCOUNTER — DOCUMENTATION ONLY (OUTPATIENT)
Dept: HEALTH INFORMATION MANAGEMENT | Facility: CLINIC | Age: 73
End: 2023-08-02

## 2023-08-02 ENCOUNTER — TRANSFERRED RECORDS (OUTPATIENT)
Dept: HEALTH INFORMATION MANAGEMENT | Facility: CLINIC | Age: 73
End: 2023-08-02
Payer: MEDICARE

## 2023-08-07 ENCOUNTER — MEDICAL CORRESPONDENCE (OUTPATIENT)
Dept: ANESTHESIOLOGY | Facility: CLINIC | Age: 73
End: 2023-08-07
Payer: MEDICARE

## 2023-08-07 ENCOUNTER — MYC MEDICAL ADVICE (OUTPATIENT)
Dept: PHYSICAL MEDICINE AND REHAB | Facility: CLINIC | Age: 73
End: 2023-08-07
Payer: MEDICARE

## 2023-08-08 NOTE — TELEPHONE ENCOUNTER
"SITUATION:  Daughter contacting [facility] for medical advice regarding Mom's stroke follow up needs    BACKGROUND:  Pt declined to follow in Physical Medicine clinic (she is s/p SCI due to spinal hematoma of 3 years ago and a stroke in May 2023)    Most recent stroke related f/u was with:  Virtual Visit  6/27/2023  [facility] Krista Simons APRN [facility]  Neurology Grief +2 more  Dx Stroke ; Referred by Tosin Augustine MD  Reason for Visit     ASSESSMENT / ACTION:  Requested information is timely and appropriate to this pt's scenario; however, since pt declined [facility] involvement (see previous encounters written by this writer) and without family member listed as Proxy in MyChart writer cannot acknowledge or advise until a consent is in place.    REQUEST / RECOMMENDATION:  Would likely recommend to have this topic referred to the current Neurology provider.  Provided link to the \"Authorization to Share Protected Health Information\" form # [id number]  Gave direction to add daughter as MyChart Proxy  Provided phone and email info to contact medical records for further direction    Elle Lea RN on 8/8/2023 at 2:08 PM      Form link to copy and paste:                           [id number]    "

## 2023-08-10 NOTE — PROGRESS NOTES
Urology Office Visit - Follow Up    Reason for visit: E-string placement    HPI: Anayeli Gagnon is a 72 year old female who is seen today for follow up. 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 I saw Annamarie in initial consultation (virtually) on 7/5/2023 for recurrent UTIs. See my note from that date for full details. CT scan on 7/3/23 was without clear nidus for infections. Cystoscopy on 7/20/23 was also unremarkable. She was prescribed Estring on that date and presents today to have this placed.     She also follows with Infectious Disease. Last visit 7/24/2023:   Recommendations:  - Recommended Annamarie stay hydrated, aim for at least 2 L fluid daily  - Agree with Urology recs re: exchanging urethral Cunningham for SPT and estring placement  - Consider non-antibiotic supplements next, if UTIs persist even after making the switch to SPT  - If the patient decides against SPT placement, consider exchanging urethral Cunningham more frequently (every 1 or 2 weeks rather than every 4 weeks)  -  Last resort would be to consider suppressive antibiotics. For now, potential risks (especially selection of MDROs) outweigh potential benefits  - F/u as needed    TODAY   8/11/2023:  Annamarie is accompanied by her daughter, Maddy.  She did not bring the Estring as she was not aware to do so. There is documentation in her chart that the Prior Auth was approved.  Annamarie and her daughter have concerns about how Estring is going to help her vaginal pain and UTIs, as well as the risks of using estrogen.   She also has questions about the suprapubic tube which was offered at her last visit. She is concerned about having a SP tube, colostomy, and pain pump all located in her abdomen.  Her Cunningham catheter is currently being changed q2 weeks by home health nurse.  Lastly, Annamarie and her  daughter report concerns about coping with medical issues and strained relationships at home. They ask about a referral to counseling services.     PEx  There were no vitals taken for this visit.  GENERAL: Healthy, alert and no distress, resting comfortably in a wheelchair  EYES: Eyes grossly normal to inspection.  No discharge or erythema, or obvious scleral/conjunctival abnormalities.  RESP: No audible wheeze, cough, or visible cyanosis.  No visible retractions or increased work of breathing.    ABD: soft, non-tender, non-distended. Colostomy in LLQ.   PELVIC: deferred given that patient did not bring Estring  SKIN: Visible skin clear. No significant rash, abnormal pigmentation or lesions.  NEURO: Cranial nerves grossly intact.  Mentation and speech appropriate for age.  PSYCH: Mentation appears normal, affect normal/bright, judgement and insight intact, normal speech and appearance well-groomed.    LAB:  Lab Results   Component Value Date    CULTURE >100,000 CFU/mL Mixture of urogenital cj 07/17/2023    CULTURE <10,000 CFU/mL Enterococcus faecalis 07/03/2023    CULTURE >100,000 CFU/mL Klebsiella oxytoca 05/05/2023    CULTURE No Growth 05/05/2023    CULTURE No Growth 05/05/2023       Creatinine   Date Value Ref Range Status   05/10/2023 0.60 0.51 - 0.95 mg/dL Final   04/07/2021 0.58 0.52 - 1.04 mg/dL Final       IMAGING:   CT ABDOMEN PELVIS W/O CONTRAST, 7/3/2023   FINDINGS:     Lower thorax: Normal.     Liver: Similar hepatic cysts and too small to characterize  hypodensities.     Biliary System: Normal gallbladder. No extrahepatic biliary ductal  dilation.     Pancreas: No mass or pancreatic ductal dilation.     Spleen: Normal.     Adrenal glands: No mass or nodules     Kidneys: No suspicious mass, obstructing calculus or hydronephrosis.     Vasculature: Aortoiliac atherosclerotic calcifications with no  aneurysmal dilation.     Lymph nodes: No significant lymphadenopathy.     Gastrointestinal tract :Normal  appendix. Normal caliber small bowel.  Stable left lower quadrant colostomy. Contrast residue in the colon.     Mesentery/peritoneum/retroperitoneum: No mass. No free fluid or air.     Pelvis: Urinary bladder is decompressed by a Cunningham catheter.     Soft tissues: Unchanged atrophic appearance of the right psoas muscle.  Similar soft tissue thickening/edema at the right gluteal region.  Spinal stimulator device in the right lower back subcutaneous tissues  with leads stable at the level of L1. Pain pump device in the right  anterior subcutaneous tissues.     Osseous structures: Multilevel degenerative changes of the visualized  thoracolumbar spine. No aggressive or acute osseous lesion.                                                                      IMPRESSION:   1.  No urinary tract stones.  2.  Stable postoperative changes left lower quadrant colostomy and  spinal stimulator placement.      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 Having issues with recurrent UTIs and vaginal pain. Urology workup unremarkable with no nidus for infections found on CT scan or cystoscopy. Also follows with ID. Suprapubic tube has been discussed and offered but patient previously declined. We reviewed this again today and discussed further the risks and benefits. The patient and her daughter asked many insightful questions which were answered. Ultimately, after our discussion Annamarie would like to proceed with SP tube. The hope is that going away from a urethral Cunningham catheter will improve her vaginal pain. This may help if her pain is mechanical from constantly sitting on the tubing, but we cannot fully guarantee that SP tube will resolve her pain. Per Dr. Robles, will need open SPT given some bowel overlying the bladder on CT. Will send message  to Dr. Robles to place orders.     For her questions about Estring, we discussed the benefits of local estrogen in helping to reduce UTIs and improve vaginal atrophy which may also be contributing to her vaginal pain. Also discussed that risks are minimal and not the same as systemic forms of estrogen replacement. The Prior Auth for Estring was approved. She will plan to pick this up from her pharmacy and return for an office visit with me next available for placement.     Plan:  -Message sent to Dr. Robles re: open SP tube  -Office visit with me next available for Estring placement. Patient knows to bring Estring to her appointment.  -Continue q2 weeks SP tube changes at home.  -Continue to stay well hydrated.  -Referral to Behavioral Health Clinician per patient / family request to help manage coping, stress from multiple medical problems and strained relationships at home.     Dulce Be PA-C  Department of Urology    40 minutes spent on the date of the encounter doing chart review, review of test results, patient visit, documentation, discussion with other provider(s), and discussion with family

## 2023-08-11 ENCOUNTER — OFFICE VISIT (OUTPATIENT)
Dept: UROLOGY | Facility: CLINIC | Age: 73
End: 2023-08-11
Payer: MEDICARE

## 2023-08-11 DIAGNOSIS — Z97.8 FOLEY CATHETER IN PLACE: ICD-10-CM

## 2023-08-11 DIAGNOSIS — R33.9 URINARY RETENTION: Primary | ICD-10-CM

## 2023-08-11 DIAGNOSIS — R45.89 DIFFICULTY COPING: ICD-10-CM

## 2023-08-11 DIAGNOSIS — R10.2 VAGINAL PAIN: ICD-10-CM

## 2023-08-11 DIAGNOSIS — N39.0 RECURRENT UTI: ICD-10-CM

## 2023-08-11 PROCEDURE — 99215 OFFICE O/P EST HI 40 MIN: CPT | Performed by: PHYSICIAN ASSISTANT

## 2023-08-11 NOTE — NURSING NOTE
Anayeli Gagnon's goals for this visit include:   Chief Complaint   Patient presents with    RECHECK     4 week follow up per Dr Robles, pt advised to bring estring       She requests these members of her care team be copied on today's visit information:       PCP: Jazmin Interiano    Referring Provider:  Karley Robles MD  [address]  [place] [address]    There were no vitals taken for this visit.    Do you need any medication refills at today's visit?     Marleny Ozuna LPN on 8/11/2023 at 8:37 AM

## 2023-08-11 NOTE — PATIENT INSTRUCTIONS
UROLOGY CLINIC VISIT PATIENT INSTRUCTIONS    Office visit with Dulce Be PA-C next available for Estring (estrogen ring) placement. Please  the Estring from your pharmacy and bring to the appointment.     I will talk to Dr. Rboles about going forward with the suprapubic tube. Someone from her team at the [facility] will contact you schedule.     A referral to our Behavioral Health Clinician, Genoveva Cuevas, was placed. You will be contacted to schedule with her.      If you have any issues, questions or concerns in the meantime, do not hesitate to contact us at [phone] or via [employer].     It was a pleasure meeting with you today.  Thank you for allowing me and my team the privilege of caring for you today.  YOU are the reason we are here, and I truly hope we provided you with the excellent service you deserve.  Please let us know if there is anything else we can do for you so that we can be sure you are leaving completely satisfied with your care experience.

## 2023-08-14 ENCOUNTER — TELEPHONE (OUTPATIENT)
Dept: UROLOGY | Facility: CLINIC | Age: 73
End: 2023-08-14
Payer: MEDICARE

## 2023-08-14 DIAGNOSIS — Z97.8 FOLEY CATHETER IN PLACE: ICD-10-CM

## 2023-08-14 DIAGNOSIS — N39.0 RECURRENT UTI: ICD-10-CM

## 2023-08-14 DIAGNOSIS — R33.9 URINARY RETENTION: Primary | ICD-10-CM

## 2023-08-14 DIAGNOSIS — R10.2 VAGINAL PAIN: ICD-10-CM

## 2023-08-14 RX ORDER — CEFAZOLIN SODIUM 2 G/50ML
2 SOLUTION INTRAVENOUS SEE ADMIN INSTRUCTIONS
Status: CANCELLED | OUTPATIENT
Start: 2023-08-14

## 2023-08-14 RX ORDER — CEFAZOLIN SODIUM 2 G/50ML
2 SOLUTION INTRAVENOUS
Status: CANCELLED | OUTPATIENT
Start: 2023-08-14

## 2023-08-14 NOTE — TELEPHONE ENCOUNTER
Called patients daughter to let her know a message was sent to Dr. Robles team about scheduling the visit. Patients daughter thanked staff.     Marleny Ozuna LPN on 8/14/2023 at 3:14 PM

## 2023-08-14 NOTE — TELEPHONE ENCOUNTER
[facility] Call Center    Phone Message    May a detailed message be left on voicemail: no     Reason for Call: Other: Patients daughter called to schedule a catheter change. Patient was supposed to receive a call but has not heard anything back yet. Please call patient back to schedule     Action Taken: Message routed to:  Other:  Urology    Travel Screening: Not Applicable

## 2023-08-15 NOTE — TELEPHONE ENCOUNTER
Left message with misti, pt daughter and advise of meng message below. Provided direct callback [phone] incase wanting to schedule nurse visit for cath exchange; if wanting

## 2023-08-16 ENCOUNTER — VIRTUAL VISIT (OUTPATIENT)
Dept: PSYCHOLOGY | Facility: CLINIC | Age: 73
End: 2023-08-16
Payer: MEDICARE

## 2023-08-16 DIAGNOSIS — R45.89 DIFFICULTY COPING: ICD-10-CM

## 2023-08-16 DIAGNOSIS — F32.A DEPRESSION, UNSPECIFIED DEPRESSION TYPE: Primary | ICD-10-CM

## 2023-08-16 PROCEDURE — 90834 PSYTX W PT 45 MINUTES: CPT | Mod: 95 | Performed by: SOCIAL WORKER

## 2023-08-16 NOTE — PROGRESS NOTES
[facility]: Integrated Behavioral Health  2023      Behavioral Health Clinician Progress Note    Patient Name: Anayeli Gagnon           Service Type:  Consult Note      Service Location:   MyChart / Email (patient reached)     Session Start Time: 10:09am  Session End Time: 10:54      Session Length: 38 - 52      Attendees: Patient     Service Modality:  Video Visit:      Provider verified identity through the following two step process.  Patient provided:  Patient     Telemedicine Visit: The patient's condition can be safely assessed and treated via synchronous audio and visual telemedicine encounter.      Reason for Telemedicine Visit: Patient has requested telehealth visit    Originating Site (Patient Location): Patient's home    Distant Site (Provider Location): [facility] MENTAL [facility] & ADDICTION [facility]    Consent:  The patient/guardian has verbally consented to: the potential risks and benefits of telemedicine (video visit) versus in person care; bill my insurance or make self-payment for services provided; and responsibility for payment of non-covered services.     Patient would like the video invitation sent by:  My Chart    Mode of Communication:  Video Conference via [facility]    Distant Location (Provider):  On-site    As the provider I attest to compliance with applicable laws and regulations related to telemedicine.    Visit Activities (Refresh list every visit): NEW, [facility] Only, and Referral - Mental Health    Diagnostic Assessment Date: next session  Treatment Plan Review Date: next session  See Flowsheets for today's PHQ-9 and ALAYNA-7 results  Previous PHQ-9:       2011     8:30 AM 3/28/2019     7:48 AM 2/3/2022     8:41 AM   PHQ-9 SCORE   PHQ-9 Total Score 2     PHQ-9 Total Score MyChart   9 (Mild depression)   PHQ-9 Total Score  1 9     Previous ALAYNA-7:        No data to display                DONNELL LEVEL:       11/17/2011     8:00 AM   DONNELL Score (Last Two)   DONNELL Raw Score 40   Activation Score 60   DONNELL Level 3       DATA  Extended Session (60+ minutes): No  Interactive Complexity: No  Crisis: No  [facility] Patient: No    Treatment Objective(s) Addressed in This Session:  Target Behavior(s):  depression    Anxiety: will experience a reduction in anxiety, will develop more effective coping skills to manage anxiety symptoms, will develop healthy cognitive patterns and beliefs, and will increase ability to function adaptively    Current Stressors / Issues:  PT reports she had to have surgery on her back because they found 2 spinal hematomas. Ever since then patient has struggles with her MH due to having to be in a wheelchair. PT reports that she has lost her independence and that is hard for her to accept. PT reports that she had a stroke 3 months ago and her children do not trust her to take care of her health so are making appointments for her, which PT hates as she is losing even more independence. PT reports that her  does nothing to help her except help her get out of bed. PT reports he does not talk to her and will not seek help. PT wants to divorce her  but she feels she would be put in a nursing home and lose everything. Writer validated, asked questions and raised concerns about pt safety.       Progress on Treatment Objective(s) / Homework:  New Objective established this session - CONTEMPLATION (Considering change and yet undecided); Intervened by assessing the negative and positive thinking (ambivalence) about behavior change    Motivational Interviewing    MI Intervention: Expressed Empathy/Understanding, Supported Autonomy, Collaboration, Evocation, Permission to raise concern or advise, Open-ended questions, Change talk (evoked), and Reframe     Change Talk Expressed by the Patient: Desire to change Reasons to change Need to change    Provider Response to Change Talk: E - Evoked more info from patient  about behavior change and A - Affirmed patient's thoughts, decisions, or attempts at behavior change    Care Plan review completed: Yes    Medication Review:  No changes to current psychiatric medication(s)    Medication Compliance:  Yes    Changes in Health Issues:   None reported    Chemical Use Review:   Substance Use: Chemical use reviewed, no active concerns identified      Tobacco Use: No current tobacco use.      Assessment: Current Emotional / Mental Status (status of significant symptoms):  Risk status (Self / Other harm or suicidal ideation)  Patient denies a history of suicidal ideation, suicide attempts, self-injurious behavior, homicidal ideation, homicidal behavior, and and other safety concerns  Patient denies current fears or concerns for personal safety.  Patient denies current or recent suicidal ideation or behaviors.  Patient denies current or recent homicidal ideation or behaviors.  Patient denies current or recent self injurious behavior or ideation.  Patient denies other safety concerns.  A safety and risk management plan has not been developed at this time, however patient was encouraged to call [place] [phone] should there be a change in any of these risk factors.    Appearance:   Appropriate   Eye Contact:   Good   Psychomotor Behavior: Normal   Attitude:   Cooperative  Hostile  Orientation:   All  Speech   Rate / Production: Talkative   Volume:  Normal   Mood:    Depressed  Sad   Affect:    Appropriate   Thought Content:  Clear   Thought Form:  Coherent  Logical   Insight:    Fair     Diagnoses:  1. Depression, unspecified depression type    2. Difficulty coping        Collateral Reports Completed:  Not Applicable    Plan: (Homework, other):  Patient was given information about behavioral services and encouraged to schedule a follow up appointment with the clinic [facility] in 2 weeks.  She was also given information about mental health symptoms and treatment options .  CD Recommendations: No  indications of CD issues.       Genoveva Cuevas, LICSW    ______________________________________________________________________    Integrated Primary Care Behavioral Health Treatment Plan      Genoveva Cuevas, LICSW  August 16, 2023

## 2023-08-17 ENCOUNTER — TELEPHONE (OUTPATIENT)
Dept: UROLOGY | Facility: CLINIC | Age: 73
End: 2023-08-17

## 2023-08-17 ENCOUNTER — VIRTUAL VISIT (OUTPATIENT)
Dept: ANESTHESIOLOGY | Facility: CLINIC | Age: 73
End: 2023-08-17
Payer: MEDICARE

## 2023-08-17 VITALS — BODY MASS INDEX: 23.05 KG/M2 | HEIGHT: 64 IN | WEIGHT: 135 LBS

## 2023-08-17 DIAGNOSIS — M79.2 NEUROPATHIC PAIN: Primary | ICD-10-CM

## 2023-08-17 PROBLEM — R33.9 URINARY RETENTION: Status: ACTIVE | Noted: 2023-08-14

## 2023-08-17 PROCEDURE — 99213 OFFICE O/P EST LOW 20 MIN: CPT | Mod: 95 | Performed by: ANESTHESIOLOGY

## 2023-08-17 ASSESSMENT — ENCOUNTER SYMPTOMS
NERVOUS/ANXIOUS: 1
ARTHRALGIAS: 1
BACK PAIN: 0
MUSCLE WEAKNESS: 1
DECREASED CONCENTRATION: 0
PANIC: 0
STIFFNESS: 1
MUSCLE CRAMPS: 0
MYALGIAS: 1
DEPRESSION: 1
INSOMNIA: 0
NECK PAIN: 0
JOINT SWELLING: 0

## 2023-08-17 ASSESSMENT — PAIN SCALES - GENERAL: PAINLEVEL: SEVERE PAIN (7)

## 2023-08-17 NOTE — PROGRESS NOTES
Virtual Visit Details    Type of service:  Video Visit   Video Start Time: 11:45 AM  Video End Time: 1155 AM    Originating Location (pt. Location): Home    Distant Location (provider location):  On-site  Platform used for Video Visit: [facility]    Pain clinic follow up note    HPI:   Anayeli Gagnon is a 72 year old year old female  who presents to the pain clinic with spinal cord injury related neuropathic pain    Patient Supplied Answers To the  Pain Questionnaire      8/17/2023    11:02 AM    Pain -  Patient Entered Questionnaire/Answers   What number best describes your pain right now:  0 = No pain  to  10 = Worst pain imaginable 7   How would you describe the pain burning    sharp    dull, aching   Which of the following worsen your pain sitting   Which of the following improve or reduce your pain medication   What number best describes your average pain for the past week:  0 = No pain  to  10 = Worst pain imaginable 7   What number best describes your LOWEST pain in past 24 hours:  0 = No pain  to  10 = Worst pain imaginable 3   What number best describes your WORST pain in past 24 hours:  0 = No pain  to  10 = Worst pain imaginable 8   When is your pain worst Constant   What non-medicine treatments have you already had for your pain pain clinic    TENS (electrical stimulator)    spinal medicine pump             Pain is worse today 7/10. She had a rough night. The patient reports that the vaginal, rectal pain is worse. She reports severe pain in the feet as well. It is planned to undergo a suprapubic cathter to get rid of the hudson catheter. The patient is understandably upset about pain control and her experience.     She is taking baclofen 2 tablets a day.    ROS:  Review of Systems   Musculoskeletal:  Positive for myalgias. Negative for back pain and neck pain.   Psychiatric/Behavioral:  Positive for depression. The patient is nervous/anxious. The patient does not have insomnia.    All other systems  reviewed and are negative.        Significant Medical History:   Past Medical History:   Diagnosis Date    CARDIAC DYSRHYTHMIAS NEC 10/3/2007    Taking atenelol for years for this    History of skin cancer     Mitral valve prolapse     Neurogenic bladder     Sacral decubitus ulcer, stage IV (H)     Thoracic spinal cord injury (H)           Past Surgical History:  Past Surgical History:   Procedure Laterality Date    DECOMPRESSION LUMBAR ONE LEVEL N/A 2019    Procedure: Posterior spinal decompression;  Surgeon: Alf Ritchie MD;  Location: [facility] OR    INSERT INTRATHECAL PAIN PUMP N/A 2022    Procedure: INSERTION, INTRATHECAL ANALGESIC PUMP, externalized trial;  Surgeon: Luis Orourke MD;  Location: [facility] OR    INSERT INTRATHECAL PAIN PUMP Right 2022    Procedure: INSERTION, INTRATHECAL ANALGESIC PUMP;  Surgeon: Luis Orourke MD;  Location: [facility] OR    INSERT STIMULATOR AND LEADS INTERNAL DORSAL COLUMN N/A 2021    Procedure: Posterior thoracic 12 to Lumbar 1 level for placement of spinal cord stimulator paddle and placement of implantable pulse generator/battery over right buttock;  Surgeon: Luis Orourke MD;  Location: [facility] OR    IR SPINAL ANGIOGRAM  10/16/2019    IR SPINAL ANGIOGRAM  2019    LAPAROSCOPIC TUBAL LIGATION      REPAIR SPINAL ARERIOVENOUS MALFORMATION N/A 2019    Procedure: with surgical disconnection arterial venous fistula Thoracic 5;  Surgeon: Alf Ritchie MD;  Location: [facility] OR          Family History:  Family History   Problem Relation Age of Onset    C.A.D. Father          41    Deep Vein Thrombosis (DVT) No family hx of     Anesthesia Reaction No family hx of           Social History:  Social History     Socioeconomic History    Marital status:      Spouse name: Not on file    Number of children: Not on file    Years of education: Not on file    Highest education level: Not on file   Occupational History    Occupation: NA     Employer:  [employer]   Tobacco Use    Smoking status: Never    Smokeless tobacco: Never   Substance and Sexual Activity    Alcohol use: No    Drug use: No    Sexual activity: Yes     Partners: Male   Other Topics Concern    Parent/sibling w/ CABG, MI or angioplasty before 65F 55M? Not Asked   Social History Narrative    Lives with spouse - works in [place].     Social Determinants of Health     Financial Resource Strain: Not on file   Food Insecurity: Not on file   Transportation Needs: Not on file   Physical Activity: Not on file   Stress: Not on file   Social Connections: Not on file   Intimate Partner Violence: Not on file   Housing Stability: Not on file     Social History     Social History Narrative    Lives with spouse - works in [place].          Allergies:  Allergies   Allergen Reactions    Contrast Dye Rash     Painful rash after x-ray contrast    Nitrofurantoin Nausea and Vomiting       Current Medications:   Current Outpatient Medications   Medication Sig Dispense Refill    acetaminophen (TYLENOL) 500 MG tablet Take 500-1,000 mg by mouth every 6 hours as needed for mild pain      amoxicillin-clavulanate (AUGMENTIN) 500-125 MG tablet TAKE ONE TABLET BY MOUTH EVERY 12 HOURS FOR 5 DAYS      aspirin (ASA) 81 MG EC tablet Take 1 tablet (81 mg) by mouth daily 30 tablet 3    atorvastatin (LIPITOR) 40 MG tablet Take 1 tablet (40 mg) by mouth every evening 30 tablet 3    baclofen (LIORESAL) 10 MG tablet Take 10 mg by mouth 3 times daily as needed for muscle spasms      COMPOUND CONTAINING CONTROLLED SUBSTANCE (CMPD RX) - PHARMACY TO MIX COMPOUNDED MEDICATION Concentrations:  Dilaudid    14 mg/ml                                                          Bupivacaine 28 mg/ml  Ziconotide 14 mcg/ml     Total Volume in Refill: 20 mL      Basal:   Dilaudid   6.993 mg/day                                                       Bupivacaine 13.986 mg/day  Ziconotide 6.993 mcg/day     PTM 0.2 hydromorphone, 0.4 mg bupivacaine, 0.2  "mcg ziconotide, 2 hour lockout, maximum 6 in a day.    Maximum 24 hour dose  Hydromorphone 8.134 mg/day  Bupivacaine 16.267 mg/day  Ziconitide 8.134 mcg/day    Last filled 4/20/23. Pump alarm 5/20/23  Managed by [facility] ([phone]) 20 mL 0    estradiol (ESTRING) 7.5 MCG/24HR vaginal ring Place 1 each (1 Vaginal ring) vaginally every 3 months 1 each 3    gabapentin (NEURONTIN) 600 MG tablet Take 1,200 mg by mouth 3 times daily  1 tablet 0    lidocaine (LIDODERM) 5 % patch Apply 1 patch topically daily as needed       LORazepam (ATIVAN) 0.5 MG tablet Take 1 tablet by mouth daily as needed for anxiety      melatonin 3 MG tablet Take 3 mg by mouth nightly as needed      metoprolol succinate ER (TOPROL-XL) 25 MG 24 hr tablet Take 12.5 mg by mouth every evening       Multiple Vitamins-Minerals (WOMENS MULTI PO) Take 1 tablet by mouth daily      ondansetron (ZOFRAN) 4 MG tablet Take 1 tablet (4 mg) by mouth every 6 hours as needed for nausea 20 tablet 1    polyethylene glycol (MIRALAX) 17 GM/Dose powder Take 17 g by mouth daily as needed for constipation           Physical Exam:     Ht 1.626 m (5' 4\")   Wt 61.2 kg (135 lb)   BMI 23.17 kg/m      General Appearance: No distress, seated comfortably  Mood: Euthymic  HE ENT: Non constricted pupils  Respiratory: Non labored breathing    ASSESSMENT AND PLAN:     Encounter Diagnosis:  Neuropathic pain  Incomplete paraplegia  Thoracic Spinal Cord Injury  Possible autonomic dysreflexia       Anayeli Gagnon is a 72 year old year old female  who presents to the pain clinic with neuropathic pain, s/p urology consultation. She is undergoing a suprapubic catheter in 2 weeks.     RECOMMENDATIONS:     1. Medications: We are prescribing the patient to continue IDDS treatment plan as is. We will plan to revisit the dose 2 weeks after surgery if she is recovering well. Dosing, side effects, risks/benefits/alternatives were discussed with the patient in detail.        Follow " up: 4 weeks.            Answers submitted by the patient for this visit:  Symptoms you have experienced in the last 30 days (Submitted on 8/17/2023)  General Symptoms: No  Skin Symptoms: No  HENT Symptoms: No  EYE SYMPTOMS: No  HEART SYMPTOMS: No  LUNG SYMPTOMS: No  INTESTINAL SYMPTOMS: No  URINARY SYMPTOMS: No  GYNECOLOGIC SYMPTOMS: No  BREAST SYMPTOMS: No  SKELETAL SYMPTOMS: Yes  BLOOD SYMPTOMS: No  NERVOUS SYSTEM SYMPTOMS: No  MENTAL HEALTH SYMPTOMS: Yes  Please answer the questions below to tell us what condition you are experiencing: (Submitted on 8/17/2023)  Swollen joints: No  Joint pain: Yes  Bone pain: No  Muscle cramps: No  Muscle weakness: Yes  Joint stiffness: Yes  Bone fracture: No  Please answer the questions below to tell us what condition you are experiencing: (Submitted on 8/17/2023)  Trouble thinking or concentrating: No  Mood changes: No  Panic attacks: No

## 2023-08-17 NOTE — LETTER
8/17/2023       RE: Anayeli Gagnon  [address] [place] [facility] [address]       Dear Colleague,    Thank you for referring your patient, Anayeli Gagnon, to the [facility] [place] at [facility]. Please see a copy of my visit note below.    Pain clinic follow up note    HPI:   Anayeli Gagnon is a 72 year old year old female  who presents to the pain clinic with spinal cord injury related neuropathic pain    Patient Supplied Answers To the  Pain Questionnaire      8/17/2023    11:02 AM   [facility] -  Patient Entered Questionnaire/Answers   What number best describes your pain right now:  0 = No pain  to  10 = Worst pain imaginable 7   How would you describe the pain burning    sharp    dull, aching   Which of the following worsen your pain sitting   Which of the following improve or reduce your pain medication   What number best describes your average pain for the past week:  0 = No pain  to  10 = Worst pain imaginable 7   What number best describes your LOWEST pain in past 24 hours:  0 = No pain  to  10 = Worst pain imaginable 3   What number best describes your WORST pain in past 24 hours:  0 = No pain  to  10 = Worst pain imaginable 8   When is your pain worst Constant   What non-medicine treatments have you already had for your pain pain clinic    TENS (electrical stimulator)    spinal medicine pump             Pain is worse today 7/10. She had a rough night. The patient reports that the vaginal, rectal pain is worse. She reports severe pain in the feet as well. It is planned to undergo a suprapubic cathter to get rid of the hudson catheter. The patient is understandably upset about pain control and her experience.     She is taking baclofen 2 tablets a day.    ROS:  Review of Systems   Musculoskeletal:  Positive for myalgias. Negative for back pain and neck pain.   Psychiatric/Behavioral:  Positive for  depression. The patient is nervous/anxious. The patient does not have insomnia.    All other systems reviewed and are negative.        Significant Medical History:   Past Medical History:   Diagnosis Date    CARDIAC DYSRHYTHMIAS NEC 10/3/2007    Taking atenelol for years for this    History of skin cancer     Mitral valve prolapse     Neurogenic bladder     Sacral decubitus ulcer, stage IV (H)     Thoracic spinal cord injury (H)           Past Surgical History:  Past Surgical History:   Procedure Laterality Date    DECOMPRESSION LUMBAR ONE LEVEL N/A 2019    Procedure: Posterior spinal decompression;  Surgeon: Alf Ritchie MD;  Location: [facility] OR    INSERT INTRATHECAL PAIN PUMP N/A 2022    Procedure: INSERTION, INTRATHECAL ANALGESIC PUMP, externalized trial;  Surgeon: Luis Orourke MD;  Location: [facility]    INSERT INTRATHECAL PAIN PUMP Right 2022    Procedure: INSERTION, INTRATHECAL ANALGESIC PUMP;  Surgeon: Luis Orourke MD;  Location: [facility] OR    INSERT STIMULATOR AND LEADS INTERNAL DORSAL COLUMN N/A 2021    Procedure: Posterior thoracic 12 to Lumbar 1 level for placement of spinal cord stimulator paddle and placement of implantable pulse generator/battery over right buttock;  Surgeon: Luis Orourke MD;  Location: [facility] OR    IR SPINAL ANGIOGRAM  10/16/2019    IR SPINAL ANGIOGRAM  2019    LAPAROSCOPIC TUBAL LIGATION      REPAIR SPINAL ARERIOVENOUS MALFORMATION N/A 2019    Procedure: with surgical disconnection arterial venous fistula Thoracic 5;  Surgeon: Alf Ritchie MD;  Location: [facility] OR          Family History:  Family History   Problem Relation Age of Onset    C.A.D. Father          41    Deep Vein Thrombosis (DVT) No family hx of     Anesthesia Reaction No family hx of           Social History:  Social History     Socioeconomic History    Marital status:      Spouse name: Not on file    Number of children: Not on file    Years of education: Not on  file    Highest education level: Not on file   Occupational History    Occupation: NA     Employer: [employer]   Tobacco Use    Smoking status: Never    Smokeless tobacco: Never   Substance and Sexual Activity    Alcohol use: No    Drug use: No    Sexual activity: Yes     Partners: Male   Other Topics Concern    Parent/sibling w/ CABG, MI or angioplasty before 65F 55M? Not Asked   Social History Narrative    Lives with spouse - works in [place].     Social Determinants of Health     Financial Resource Strain: Not on file   Food Insecurity: Not on file   Transportation Needs: Not on file   Physical Activity: Not on file   Stress: Not on file   Social Connections: Not on file   Intimate Partner Violence: Not on file   Housing Stability: Not on file     Social History     Social History Narrative    Lives with spouse - works in [place].          Allergies:  Allergies   Allergen Reactions    Contrast Dye Rash     Painful rash after x-ray contrast    Nitrofurantoin Nausea and Vomiting       Current Medications:   Current Outpatient Medications   Medication Sig Dispense Refill    acetaminophen (TYLENOL) 500 MG tablet Take 500-1,000 mg by mouth every 6 hours as needed for mild pain      amoxicillin-clavulanate (AUGMENTIN) 500-125 MG tablet TAKE ONE TABLET BY MOUTH EVERY 12 HOURS FOR 5 DAYS      aspirin (ASA) 81 MG EC tablet Take 1 tablet (81 mg) by mouth daily 30 tablet 3    atorvastatin (LIPITOR) 40 MG tablet Take 1 tablet (40 mg) by mouth every evening 30 tablet 3    baclofen (LIORESAL) 10 MG tablet Take 10 mg by mouth 3 times daily as needed for muscle spasms      COMPOUND CONTAINING CONTROLLED SUBSTANCE (CMPD RX) - PHARMACY TO MIX COMPOUNDED MEDICATION Concentrations:  Dilaudid    14 mg/ml                                                          Bupivacaine 28 mg/ml  Ziconotide 14 mcg/ml     Total Volume in Refill: 20 mL      Basal:   Dilaudid   6.993 mg/day                                                      "  Bupivacaine 13.986 mg/day  Ziconotide 6.993 mcg/day     PTM 0.2 hydromorphone, 0.4 mg bupivacaine, 0.2 mcg ziconotide, 2 hour lockout, maximum 6 in a day.    Maximum 24 hour dose  Hydromorphone 8.134 mg/day  Bupivacaine 16.267 mg/day  Ziconitide 8.134 mcg/day    Last filled 4/20/23. Pump alarm 5/20/23  Managed by [employer] ([phone]) 20 mL 0    estradiol (ESTRING) 7.5 MCG/24HR vaginal ring Place 1 each (1 Vaginal ring) vaginally every 3 months 1 each 3    gabapentin (NEURONTIN) 600 MG tablet Take 1,200 mg by mouth 3 times daily  1 tablet 0    lidocaine (LIDODERM) 5 % patch Apply 1 patch topically daily as needed       LORazepam (ATIVAN) 0.5 MG tablet Take 1 tablet by mouth daily as needed for anxiety      melatonin 3 MG tablet Take 3 mg by mouth nightly as needed      metoprolol succinate ER (TOPROL-XL) 25 MG 24 hr tablet Take 12.5 mg by mouth every evening       Multiple Vitamins-Minerals (WOMENS MULTI PO) Take 1 tablet by mouth daily      ondansetron (ZOFRAN) 4 MG tablet Take 1 tablet (4 mg) by mouth every 6 hours as needed for nausea 20 tablet 1    polyethylene glycol (MIRALAX) 17 GM/Dose powder Take 17 g by mouth daily as needed for constipation           Physical Exam:     Ht 1.626 m (5' 4\")   Wt 61.2 kg (135 lb)   BMI 23.17 kg/m      General Appearance: No distress, seated comfortably  Mood: Euthymic  HE ENT: Non constricted pupils  Respiratory: Non labored breathing    ASSESSMENT AND PLAN:     Encounter Diagnosis:  Neuropathic pain  Incomplete paraplegia  Thoracic Spinal Cord Injury  Possible autonomic dysreflexia       Anayeli Gagnon is a 72 year old year old female  who presents to the pain clinic with neuropathic pain, s/p urology consultation. She is undergoing a suprapubic catheter in 2 weeks.     RECOMMENDATIONS:     1. Medications: We are prescribing the patient to continue IDDS treatment plan as is. We will plan to revisit the dose 2 weeks after surgery if she is recovering well. " Dosing, side effects, risks/benefits/alternatives were discussed with the patient in detail.        Follow up: 4 weeks.            Answers submitted by the patient for this visit:  Symptoms you have experienced in the last 30 days (Submitted on 8/17/2023)  General Symptoms: No  Skin Symptoms: No  HENT Symptoms: No  EYE SYMPTOMS: No  HEART SYMPTOMS: No  LUNG SYMPTOMS: No  INTESTINAL SYMPTOMS: No  URINARY SYMPTOMS: No  GYNECOLOGIC SYMPTOMS: No  BREAST SYMPTOMS: No  SKELETAL SYMPTOMS: Yes  BLOOD SYMPTOMS: No  NERVOUS SYSTEM SYMPTOMS: No  MENTAL HEALTH SYMPTOMS: Yes  Please answer the questions below to tell us what condition you are experiencing: (Submitted on 8/17/2023)  Swollen joints: No  Joint pain: Yes  Bone pain: No  Muscle cramps: No  Muscle weakness: Yes  Joint stiffness: Yes  Bone fracture: No  Please answer the questions below to tell us what condition you are experiencing: (Submitted on 8/17/2023)  Trouble thinking or concentrating: No  Mood changes: No  Panic attacks: No    Again, thank you for allowing me to participate in the care of your patient.      Sincerely,    Jasmine Espinoza MD

## 2023-08-17 NOTE — NURSING NOTE
Patient presents with:  Follow Up: Follow-up Urinary Tract Infection - Vaginal Area Pain Pump      Severe Pain (7)     Pain Medications       Analgesics Other Refills Start End     acetaminophen (TYLENOL) 500 MG tablet          Sig - Route: Take 500-1,000 mg by mouth every 6 hours as needed for mild pain - Oral    Class: Historical      Salicylates Refills Start End     aspirin (ASA) 81 MG EC tablet    3 5/8/2023     Sig - Route: Take 1 tablet (81 mg) by mouth daily - Oral    Class: E-Prescribe    Renewals       Renewal requests to authorizing provider (Tosin Augustine MD) <b>prohibited</b>                    What medications are you using for pain? Pain Pump,Tylenol, Baclofen, gabapentin, Lidocaine Patches    (New patients only) Have you been seen by another pain clinic/ provider? no    (Return Patients only) What refills are you needing today? no

## 2023-08-17 NOTE — TELEPHONE ENCOUNTER
Patient is scheduled for surgery with Dr. Robles.     Spoke with: Patient    Date of Surgery: 9/05/23    Location: [facility] OR     Pre op with Provider: RICKY     H&P: Scheduled for an in clinic appointment with PAC on 8/23/23 at 3:30 PM.     Additional imaging/appointments: Will schedule patients 6 week post op appointment.     Surgery packet: Per patients preference, will mail packet to address in chart.      Additional comments: Patient informed pre op nurse will call a few days prior to surgery to go over further details/give arrival time.         Viri Murrell on 8/17/2023 at 3:09 PM

## 2023-08-18 NOTE — TELEPHONE ENCOUNTER
FUTURE VISIT INFORMATION      SURGERY INFORMATION:  Date: 23  [facility]  Surgeon:  Karley Robles MD   Anesthesia Type:  choice  Procedure: CYSTOSCOPY, OPEN SUPRAPUBIC TUBE INSERTION   RECORDS REQUESTED FROM:       Primary Care Provider: Bernard    Pertinent Medical History: hypertension, MVP, Palpitations, tachycardia    Most recent EKG+ Tracin/10/23    Most recent ECHO: 23      
No

## 2023-08-23 ENCOUNTER — OFFICE VISIT (OUTPATIENT)
Dept: SURGERY | Facility: CLINIC | Age: 73
End: 2023-08-23
Payer: MEDICARE

## 2023-08-23 ENCOUNTER — PRE VISIT (OUTPATIENT)
Dept: SURGERY | Facility: CLINIC | Age: 73
End: 2023-08-23

## 2023-08-23 ENCOUNTER — OFFICE VISIT (OUTPATIENT)
Dept: UROLOGY | Facility: CLINIC | Age: 73
End: 2023-08-23
Payer: MEDICARE

## 2023-08-23 ENCOUNTER — ANESTHESIA EVENT (OUTPATIENT)
Dept: SURGERY | Facility: AMBULATORY SURGERY CENTER | Age: 73
End: 2023-08-23
Payer: MEDICARE

## 2023-08-23 ENCOUNTER — MYC MEDICAL ADVICE (OUTPATIENT)
Dept: UROLOGY | Facility: CLINIC | Age: 73
End: 2023-08-23

## 2023-08-23 ENCOUNTER — TRANSFERRED RECORDS (OUTPATIENT)
Dept: HEALTH INFORMATION MANAGEMENT | Facility: CLINIC | Age: 73
End: 2023-08-23

## 2023-08-23 ENCOUNTER — LAB (OUTPATIENT)
Dept: LAB | Facility: CLINIC | Age: 73
End: 2023-08-23
Payer: MEDICARE

## 2023-08-23 VITALS
BODY MASS INDEX: 23.05 KG/M2 | TEMPERATURE: 97.9 F | WEIGHT: 135 LBS | HEIGHT: 64 IN | DIASTOLIC BLOOD PRESSURE: 68 MMHG | HEART RATE: 65 BPM | SYSTOLIC BLOOD PRESSURE: 115 MMHG | OXYGEN SATURATION: 96 %

## 2023-08-23 DIAGNOSIS — Z01.818 PREOP EXAMINATION: Primary | ICD-10-CM

## 2023-08-23 DIAGNOSIS — N39.0 RECURRENT UTI: Primary | ICD-10-CM

## 2023-08-23 DIAGNOSIS — R33.9 URINARY RETENTION: ICD-10-CM

## 2023-08-23 LAB
ALBUMIN UR-MCNC: 100 MG/DL
ANION GAP SERPL CALCULATED.3IONS-SCNC: 7 MMOL/L (ref 7–15)
APPEARANCE UR: CLEAR
BILIRUB UR QL STRIP: NEGATIVE
BUN SERPL-MCNC: 13.1 MG/DL (ref 8–23)
CALCIUM SERPL-MCNC: 8.6 MG/DL (ref 8.8–10.2)
CHLORIDE SERPL-SCNC: 101 MMOL/L (ref 98–107)
COLOR UR AUTO: YELLOW
CREAT SERPL-MCNC: 0.65 MG/DL (ref 0.51–0.95)
DEPRECATED HCO3 PLAS-SCNC: 30 MMOL/L (ref 22–29)
ERYTHROCYTE [DISTWIDTH] IN BLOOD BY AUTOMATED COUNT: 13.6 % (ref 10–15)
GFR SERPL CREATININE-BSD FRML MDRD: >90 ML/MIN/1.73M2
GLUCOSE SERPL-MCNC: 89 MG/DL (ref 70–99)
GLUCOSE UR STRIP-MCNC: NEGATIVE MG/DL
HCT VFR BLD AUTO: 37.4 % (ref 35–47)
HGB BLD-MCNC: 12.1 G/DL (ref 11.7–15.7)
HGB UR QL STRIP: ABNORMAL
KETONES UR STRIP-MCNC: NEGATIVE MG/DL
LEUKOCYTE ESTERASE UR QL STRIP: ABNORMAL
MCH RBC QN AUTO: 31.1 PG (ref 26.5–33)
MCHC RBC AUTO-ENTMCNC: 32.4 G/DL (ref 31.5–36.5)
MCV RBC AUTO: 96 FL (ref 78–100)
NITRATE UR QL: POSITIVE
PH UR STRIP: 6.5 [PH] (ref 5–8)
PLATELET # BLD AUTO: 189 10E3/UL (ref 150–450)
POTASSIUM SERPL-SCNC: 4.6 MMOL/L (ref 3.4–5.3)
RBC # BLD AUTO: 3.89 10E6/UL (ref 3.8–5.2)
SODIUM SERPL-SCNC: 138 MMOL/L (ref 136–145)
SP GR UR STRIP: 1.02 (ref 1–1.03)
UROBILINOGEN UR STRIP-ACNC: 0.2 E.U./DL
WBC # BLD AUTO: 7.6 10E3/UL (ref 4–11)

## 2023-08-23 PROCEDURE — 85027 COMPLETE CBC AUTOMATED: CPT | Performed by: PATHOLOGY

## 2023-08-23 PROCEDURE — 99000 SPECIMEN HANDLING OFFICE-LAB: CPT | Performed by: PATHOLOGY

## 2023-08-23 PROCEDURE — 81003 URINALYSIS AUTO W/O SCOPE: CPT | Performed by: PATHOLOGY

## 2023-08-23 PROCEDURE — 99211 OFF/OP EST MAY X REQ PHY/QHP: CPT

## 2023-08-23 PROCEDURE — 87088 URINE BACTERIA CULTURE: CPT | Mod: 59 | Performed by: UROLOGY

## 2023-08-23 PROCEDURE — 36415 COLL VENOUS BLD VENIPUNCTURE: CPT | Performed by: PATHOLOGY

## 2023-08-23 PROCEDURE — 80048 BASIC METABOLIC PNL TOTAL CA: CPT | Performed by: PATHOLOGY

## 2023-08-23 PROCEDURE — 99215 OFFICE O/P EST HI 40 MIN: CPT | Performed by: NURSE PRACTITIONER

## 2023-08-23 ASSESSMENT — LIFESTYLE VARIABLES: TOBACCO_USE: 0

## 2023-08-23 ASSESSMENT — PAIN SCALES - GENERAL: PAINLEVEL: SEVERE PAIN (6)

## 2023-08-23 NOTE — PROGRESS NOTES
Chief Complaint   Patient presents with    Allied Health Visit     Anayeli Gagnon comes into clinic today at the request of Karley Robles MD for Urine sample pulled from catheter to send to the lab for UA/UC.    I was only able to obtain 10mL of urine. Sumanth Monte suggested I do a urine dip for the Urinalysis and send the rest the urine to the lab for a culture. I followed through with that plan.    This service provided today was under the direct supervision of ALICIA Andrea, who was available if needed.    Brooke DAS  8/23/2023  4:41 PM

## 2023-08-23 NOTE — H&P
Pre-Operative H & P     CC:  Preoperative exam to assess for increased cardiopulmonary risk while undergoing surgery and anesthesia.    Date of Encounter: 8/23/2023  Primary Care Physician:  Jazmin Interiano     Reason for visit:   Encounter Diagnoses   Name Primary?    Preop examination Yes    Urinary retention        HPI  Anayeli Gagnon is a 72 year old female who presents for pre-operative H & P in preparation for  Procedure Information       Case: [id number] Date/Time: 09/05/23 1230    Procedure: CYSTOSCOPY, OPEN SUPRAPUBIC TUBE INSERTION (Urethra)    Anesthesia type: Choice    Diagnosis: Urinary retention [R33.9]    Pre-op diagnosis: Urinary retention [R33.9]    Location: [place] [address] / [facility]    Providers: Karley Robles MD          The patient is followed by Dr. Robles in urology for recurrent UTIs and urinary retention with hudson catheter in place.   Dr. Robles counseled the patient that urethral hudson not the best long term management and that she should consider SPT.  The patient has opted to proceed. The patient has now been scheduled for the procedure as listed above.      The patient presents to the PAC in person today in preparation for the above scheduled procedure with comorbid conditions including  thoracic cord injury resulting in paraplegia, chronic pain status post intrathecal pain pump, chronic neuropathic pain in the lower extremities, neurogenic bladder status post chronic indwelling Hudson catheter, TIA in L occipital lobe (5/5/2023), HTN and HLD. Record indicates h/o MVP but most recent echo does not demonstrate this.       History is obtained from the patient and chart review    Hx of abnormal bleeding or anti-platelet use: ASA    Menstrual history: No LMP recorded. Patient is postmenopausal.:      Past Medical History  Past Medical History:   Diagnosis Date    CARDIAC DYSRHYTHMIAS NEC 10/3/2007    Taking atenelol for years for this     History of skin cancer     Mitral valve prolapse     Neurogenic bladder     Sacral decubitus ulcer, stage IV (H)     Thoracic spinal cord injury (H)        Past Surgical History  Past Surgical History:   Procedure Laterality Date    DECOMPRESSION LUMBAR ONE LEVEL N/A 12/27/2019    Procedure: Posterior spinal decompression;  Surgeon: Alf Ritchie MD;  Location: [facility] OR    INSERT INTRATHECAL PAIN PUMP N/A 1/19/2022    Procedure: INSERTION, INTRATHECAL ANALGESIC PUMP, externalized trial;  Surgeon: Luis Orourke MD;  Location: [facility]    INSERT INTRATHECAL PAIN PUMP Right 1/21/2022    Procedure: INSERTION, INTRATHECAL ANALGESIC PUMP;  Surgeon: Luis Orourke MD;  Location: [facility]    INSERT STIMULATOR AND LEADS INTERNAL DORSAL COLUMN N/A 4/7/2021    Procedure: Posterior thoracic 12 to Lumbar 1 level for placement of spinal cord stimulator paddle and placement of implantable pulse generator/battery over right buttock;  Surgeon: Luis Orourke MD;  Location: [facility] OR    IR SPINAL ANGIOGRAM  10/16/2019    IR SPINAL ANGIOGRAM  12/20/2019    LAPAROSCOPIC TUBAL LIGATION      REPAIR SPINAL ARERIOVENOUS MALFORMATION N/A 12/27/2019    Procedure: with surgical disconnection arterial venous fistula Thoracic 5;  Surgeon: Alf Ritchie MD;  Location: [facility] OR       Prior to Admission Medications  Current Outpatient Medications   Medication Sig Dispense Refill    acetaminophen (TYLENOL) 500 MG tablet Take 500-1,000 mg by mouth every 6 hours as needed for mild pain      aspirin (ASA) 81 MG EC tablet Take 1 tablet (81 mg) by mouth daily (Patient taking differently: Take 81 mg by mouth every morning) 30 tablet 3    atorvastatin (LIPITOR) 40 MG tablet Take 1 tablet (40 mg) by mouth every evening 30 tablet 3    baclofen (LIORESAL) 10 MG tablet Take 10 mg by mouth 2 times daily      gabapentin (NEURONTIN) 600 MG tablet Take 1,200 mg by mouth 3 times daily  1 tablet 0    lidocaine (LIDODERM) 5 % patch Apply 1 patch  topically daily as needed       LORazepam (ATIVAN) 0.5 MG tablet Take 1 tablet by mouth daily as needed for anxiety      melatonin 3 MG tablet Take 3 mg by mouth nightly as needed for sleep      metoprolol succinate ER (TOPROL-XL) 25 MG 24 hr tablet Take 12.5 mg by mouth every evening       ondansetron (ZOFRAN) 4 MG tablet Take 1 tablet (4 mg) by mouth every 6 hours as needed for nausea 20 tablet 1    polyethylene glycol (MIRALAX) 17 GM/Dose powder Take 17 g by mouth daily as needed for constipation       amoxicillin-clavulanate (AUGMENTIN) 500-125 MG tablet TAKE ONE TABLET BY MOUTH EVERY 12 HOURS FOR 5 DAYS (Patient not taking: Reported on 8/23/2023)      COMPOUND CONTAINING CONTROLLED SUBSTANCE (CMPD RX) - PHARMACY TO MIX COMPOUNDED MEDICATION Concentrations:  Dilaudid    14 mg/ml                                                          Bupivacaine 28 mg/ml  Ziconotide 14 mcg/ml     Total Volume in Refill: 20 mL      Basal:   Dilaudid   6.993 mg/day                                                       Bupivacaine 13.986 mg/day  Ziconotide 6.993 mcg/day     PTM 0.2 hydromorphone, 0.4 mg bupivacaine, 0.2 mcg ziconotide, 2 hour lockout, maximum 6 in a day.    Maximum 24 hour dose  Hydromorphone 8.134 mg/day  Bupivacaine 16.267 mg/day  Ziconitide 8.134 mcg/day    Last filled 4/20/23. Pump alarm 5/20/23  Managed by [employer] ([phone]) (Patient not taking: Reported on 8/23/2023) 20 mL 0    estradiol (ESTRING) 7.5 MCG/24HR vaginal ring Place 1 each (1 Vaginal ring) vaginally every 3 months (Patient not taking: Reported on 8/23/2023) 1 each 3    Multiple Vitamins-Minerals (WOMENS MULTI PO) Take 1 tablet by mouth daily (Patient not taking: Reported on 8/23/2023)         Allergies  Allergies   Allergen Reactions    Contrast Dye Rash     Painful rash after x-ray contrast    Nitrofurantoin Nausea and Vomiting       Social History  Social History     Socioeconomic History    Marital status:      Spouse name:  Not on file    Number of children: Not on file    Years of education: Not on file    Highest education level: Not on file   Occupational History    Occupation: NA     Employer: [employer]   Tobacco Use    Smoking status: Never    Smokeless tobacco: Never   Substance and Sexual Activity    Alcohol use: No    Drug use: Never    Sexual activity: Yes     Partners: Male   Other Topics Concern    Parent/sibling w/ CABG, MI or angioplasty before 65F 55M? Not Asked   Social History Narrative    Lives with spouse - works in [place].     Social Determinants of Health     Financial Resource Strain: Not on file   Food Insecurity: Not on file   Transportation Needs: Not on file   Physical Activity: Not on file   Stress: Not on file   Social Connections: Not on file   Intimate Partner Violence: Not on file   Housing Stability: Not on file       Family History  Family History   Problem Relation Age of Onset    C.A.D. Father          41    Deep Vein Thrombosis (DVT) No family hx of     Anesthesia Reaction No family hx of        Review of Systems  The complete review of systems is negative other than noted in the HPI or here.   Anesthesia Evaluation   Pt has had prior anesthetic. Type: General and MAC.    History of anesthetic complications (Difficulty waking)  - .      ROS/MED HX  ENT/Pulmonary:     (+)     BRADY risk factors,  hypertension,                             (-) tobacco use   Neurologic: Comment: H/o thoracic spinal cord injury with progressive paralysis>>no weight bearing.  Full transfer with Michaela lift.     (+)    peripheral neuropathy, - knees down. migraines,    CVA (presented with visual and speach deficiet.  Now some ongoing mild visual deficit.), date: 2023, with deficits,                    Cardiovascular: Comment: Denies cardiac symptoms including chest pain, SOB,  syncope, SEGAL, orthopnea, or PND.       (+) Dyslipidemia hypertension- -   -  - -   Taking blood thinners                               "Previous cardiac testing     METS/Exercise Tolerance:  Comment: METS<4.  Upper extremity movement.    Hematologic:  - neg hematologic  ROS     Musculoskeletal:  - neg musculoskeletal ROS     GI/Hepatic:  - neg GI/hepatic ROS     Renal/Genitourinary: Comment: Neurogenic bladder after spinal cord injury.  Currently with hudson catheter for urinary retention.  H/o UTIs.      Endo:  - neg endo ROS     Psychiatric/Substance Use:  - neg psychiatric ROS     Infectious Disease:     (+)   MRSA,         Malignancy:   (+) Malignancy, History of Skin.Skin CA Remission status post Surgery.      Other:      (+)  , ,, other significant disability Wheelchair bound (Motorized wheel chair.)         /68 (BP Location: Right arm, Patient Position: Sitting, Cuff Size: Adult Regular)   Pulse 65   Temp 97.9  F (36.6  C) (Oral)   Ht 1.626 m (5' 4\")   Wt 61.2 kg (135 lb)   SpO2 96%   Breastfeeding No   BMI 23.17 kg/m      Physical Exam modified as patient sitting in her motorized WC for exam as non-weightbearing:   Constitutional: awake, alert, cooperative, no apparent distress, sitting comfortably in motorized wheelchair and appears stated age.  Eyes: Pupils equal, round and reactive to light, extra ocular muscles intact, sclera clear, conjunctiva normal.  HENT: Normocephalic, oral pharynx with moist mucus membranes, good dentition with upper partial plate. No goiter appreciated.   Respiratory: Clear to auscultation bilaterally, no crackles or wheezing.  Cardiovascular: Regular rate and rhythm, normal S1 and S2, and no murmur noted.  Carotids +2, no bruits. No edema. Palpable pulses to radial  DP and PT arteries.   GI: Normal bowel sounds, soft, non-distended, non-tender, no masses palpated, no hepatosplenomegaly. Colostomy status.   Lymph/Hematologic: No cervical lymphadenopathy and no supraclavicular lymphadenopathy.  Genitourinary:  Hudson catheter  Skin: Warm and dry.    Musculoskeletal: Full ROM of neck. There is no redness, " "warmth, or swelling of the joints. Dependent edema (non-pitting) of LE, b/l.    Neurologic: Awake, alert, oriented to name, place and time.  Neuropsychiatric: Calm, cooperative. Normal affect.     Prior Labs/Diagnostic Studies   All labs and imaging personally reviewed    Latest Reference Range & Units 05/10/23 04:08   Sodium 136 - 145 mmol/L 138   Potassium 3.4 - 5.3 mmol/L 4.6   Chloride 98 - 107 mmol/L 100   Carbon Dioxide (CO2) 22 - 29 mmol/L 26   Urea Nitrogen 8.0 - 23.0 mg/dL 14.0   Creatinine 0.51 - 0.95 mg/dL 0.79   GFR Estimate >60 mL/min/1.73m2 78   Calcium 8.8 - 10.2 mg/dL 9.1   Anion Gap 7 - 15 mmol/L 12   Albumin 3.5 - 5.2 g/dL 4.3   Protein Total 6.4 - 8.3 g/dL 6.9   Alkaline Phosphatase 35 - 104 U/L 70   ALT 10 - 35 U/L 12   AST 10 - 35 U/L 31   Bilirubin Total <=1.2 mg/dL 0.4   CK Total 26 - 192 U/L 124   Glucose 70 - 99 mg/dL 101 (H)   Lipase 13 - 60 U/L 11 (L)   Troponin T, High Sensitivity <=14 ng/L 36 (H)   WBC 4.0 - 11.0 10e3/uL 8.1   Hemoglobin 11.7 - 15.7 g/dL 13.8   Hematocrit 35.0 - 47.0 % 42.1   Platelet Count 150 - 450 10e3/uL 203   RBC Count 3.80 - 5.20 10e6/uL 4.55   MCV 78 - 100 fL 93   MCH 26.5 - 33.0 pg 30.3   MCHC 31.5 - 36.5 g/dL 32.8   RDW 10.0 - 15.0 % 13.1   % Neutrophils % 74   % Lymphocytes % 18   % Monocytes % 5   % Eosinophils % 2   % Basophils % 1   Absolute Basophils 0.0 - 0.2 10e3/uL 0.0   Absolute Eosinophils 0.0 - 0.7 10e3/uL 0.1   Absolute Immature Granulocytes <=0.4 10e3/uL 0.0   Absolute Lymphocytes 0.8 - 5.3 10e3/uL 1.4   Absolute Monocytes 0.0 - 1.3 10e3/uL 0.4   % Immature Granulocytes % 0   Absolute Neutrophils 1.6 - 8.3 10e3/uL 6.1   Absolute NRBCs 10e3/uL 0.0   NRBCs per 100 WBC <1 /100 0   (H): Data is abnormally high  (L): Data is abnormally low      PROCEDURES:  Cardiac Event Monitor 6/9/2023  Rhythm was sinus.  \"Heart racing\" was associated with sinus and sinus tachycardia.  No atrial fibrillation.     Echo result w/o MOPS (5/6/2023): Interpretation " "SummaryGlobal and regional left ventricular function is normal with an EF of 55-60%.Global right ventricular function is normal.The inferior vena cava was normal in size with preserved respiratoryvariability.No pericardial effusion is present.        The patient's records and results personally reviewed by this provider.     Outside records reviewed from: Care Everywhere    LAB/DIAGNOSTIC STUDIES TODAY:    Latest Reference Range & Units 08/23/23 17:20   Sodium 136 - 145 mmol/L 138   Potassium 3.4 - 5.3 mmol/L 4.6   Chloride 98 - 107 mmol/L 101   Carbon Dioxide (CO2) 22 - 29 mmol/L 30 (H)   Urea Nitrogen 8.0 - 23.0 mg/dL 13.1   Creatinine 0.51 - 0.95 mg/dL 0.65   GFR Estimate >60 mL/min/1.73m2 >90   Calcium 8.8 - 10.2 mg/dL 8.6 (L)   Anion Gap 7 - 15 mmol/L 7   Glucose 70 - 99 mg/dL 89   WBC 4.0 - 11.0 10e3/uL 7.6   Hemoglobin 11.7 - 15.7 g/dL 12.1   Hematocrit 35.0 - 47.0 % 37.4   Platelet Count 150 - 450 10e3/uL 189   RBC Count 3.80 - 5.20 10e6/uL 3.89   MCV 78 - 100 fL 96   MCH 26.5 - 33.0 pg 31.1   MCHC 31.5 - 36.5 g/dL 32.4   RDW 10.0 - 15.0 % 13.6   (H): Data is abnormally high  (L): Data is abnormally low      Assessment    Anayeli Gagnon is a 72 year old female seen as a PAC referral for risk assessment and optimization for anesthesia.    Plan/Recommendations  Pt will be optimized for the proposed procedure.  See below for details on the assessment, risk, and preoperative recommendations    NEUROLOGY  - History of CVA >>>ischemic stroke in the L occipital lobe, L PCA territory.  Presenting with vision and speech deficit on 5/5/2023. Previous TIA on 4/26/2023   ~  ASA 81 mg daily.     ~ Residual mild visual deficit and mild cognitive impairment.   ~ Followed up with MIKKI Aleman, in neurology after discharged from the hospital.  ~ Per \"recommended minimum delay times for elective surgery after CVA\":    - [place] or [place]  days   -  days  Patient will be 4 months post CVA on the DOS " "(9/5/2023).  Discussed with patient and her  of the recommendations.  Notified Dr. Robles's and Ms Jimmy's office of the above recommendations.       - spinal cord injury due to hematoma resulting in lower extremity paraplegia and development of progressively worsening lower extremity pain and neurogenic bladder s/p indwelling Cunningham catheter and neurogenic bowel s/p colostomy status.  She is motorized wheelchair dependent and is non-weight bearing being a full transfer with Michaela lift.    ~ recommend fall precautions    - Chronic Pain/neuropathy of lower extremities   ~ gabapentin   ~ intrathecal Baclofen pump   ~ followed by Dr. Espinoza in the pain clinic    -Post Op delirium risk factors:  History of pre-existing cognitive dysfunction    ENT  - No current airway concerns.  Will need to be reassessed day of surgery.  Mallampati: II  TM: < 3    CARDIAC  - No history of CAD and Afib    - HTN   ~ Managed with metoprolol   ~ continue without interruption    - HLD   ~ managed with statin    ~ continue without interruption    - Palpitations   ~ recent cardiac event monitor showed sinus.  \"Heart racing\" was associated with sinus and sinus tachycardia. No atrial fibrillation.     - Record indicates h/o MVP.  Not evident on recent echo.  Please see results above       - METS (Metabolic Equivalents)  Patient CANNOT perform 4 METS exercise without symptoms            Total Score: 1    Functional Capacity: Unable to complete 4 METS      - RCRI-Low risk: Class 2 0.9% complication rate            Total Score: 1    RCRI: Cerebrovascular Disease         PULMONARY  - BRADY Low Risk            Total Score: 2    BRADY: Hypertension    BRADY: Over 50 ys old      - Denies asthma or inhaler use    - Tobacco History    History   Smoking Status    Never   Smokeless Tobacco    Never       GI  - PONV High Risk  Total Score: 3           1 AN PONV: Pt is Female    1 AN PONV: Patient is not a current smoker    1 AN PONV: Intended Post Op Opioids " "       /RENAL  - Spinal cord injury with subsequent neurogenic bladder with recurrent UTIs and urinary retention with hudson catheter in place. Evaluated by Dr. Robles>> urethral hudson not the best long term management with recommended  SPT.  Patient has opted to proceed with procedure as listed above.  ~ UA/UC per urology  ~ preoperative labs ordered    - Baseline Creatinine  see above     ENDOCRINE    - BMI: Estimated body mass index is 23.17 kg/m  as calculated from the following:    Height as of this encounter: 1.626 m (5' 4\").    Weight as of this encounter: 61.2 kg (135 lb).  Healthy Weight (BMI 18.5-24.9)    - No history of Diabetes Mellitus    HEME  VTE Low Risk 0.26%            Total Score: 1    VTE: Greater than 59 yrs old      - Platelet dysfunction second to Aspirin (Deonte, many others)   ~ hold ASA for 7 days prior to surgery    - denies h/o previous blood transfusion    ID  - MRSA (+)   ~ contact precautions     Different anesthesia methods/types have been discussed with the patient, but they are aware that the final plan will be decided by the assigned anesthesia provider on the date of service.    Patient was discussed with Dr Humphrey.  Notified both Dr. Robles's and Ms Marquez's office of the recommended minimum delay times for elective surgery after CVA and also asked them to address ASA 81 mg seven day hold prior to surgery.     The patient is optimized for their procedure. AVS with information on surgery time/arrival time, meds and NPO status given by nursing staff. No further diagnostic testing indicated.      On the day of service:     Prep time: 15 minutes  Visit time: 19 minutes  Documentation time: 20 minutes  Coordinating care:  5 minutes  ------------------------------------------  Total time: 59 minutes      EMELI Palacio CNP  Preoperative Assessment Center  [facility]  Clinic and Surgery Center  Phone: [phone]  Fax: [phone]    "

## 2023-08-23 NOTE — TELEPHONE ENCOUNTER
Unfortunately there is not a cheaper alternative to the ring, she would have to go back to using the vagina estrogen cream or suppositories    Thanks       Received the message above from Dr. Robles.     Marleny Ozuna, JONY on 8/23/2023 at 1:15 PM

## 2023-08-23 NOTE — PATIENT INSTRUCTIONS
Preparing for Your Surgery      Name:  Anayeli Gagnon   MRN:  [id number]   :  1950   Today's Date:  2023         Arriving for surgery:  Surgery date:  23  Arrival time:  11.00AM    Restrictions due to COVID 19:    Please maintain social distance.  Masking is optional.      parking is available for anyone with mobility limitations or disabilities. (Monday- Friday 7 am- 5 pm)    Please come to:    [facility]  [address] [place] [id number]    Please check in on the 5th floor at the Ambulatory Surgery Center.      What can I eat or drink?    -  You may eat and drink normally until 8 hours prior to arrival  time. (Until 3.00AM)  -  You may have clear liquids until 2 hours prior to arrival  time. (Until 9.00AM)    Examples of clear liquids:  Water  Clear broth  Juices (apple, white grape, white cranberry  and cider) without pulp  Noncarbonated, powder based beverages  (lemonade and Tushar-Aid)  Sodas (Sprite, 7-Up, ginger ale and seltzer)  Coffee or tea (without milk or cream)  Gatorade      Which medicines can I take?    Hold Aspirin for 7 days before surgery.   Hold Multivitamins for 7 days before surgery.  Hold Supplements for 7 days before surgery.  Hold Ibuprofen (Advil, Motrin) for 1 day before surgery--unless otherwise directed by surgeon.  Hold Naproxen (Aleve) for 4 days before surgery.    Take your evening/bedtime medications per usual except vitamins and/or supplements.    No alcohol or cannabis products for 24 hours prior to procedure.      -  DO NOT take the following medications the day of surgery:  All external gels, ointments or creams, Lidocaine patch, Miralax.    -  PLEASE TAKE the following medications the day of surgery:   Tylenol (as needed), Atorvastatin (Lipitor), Baclofen (Lioresal as needed),   Gabapentin, Lorazepam (Ativan as needed), Zofran (as needed).    How do I prepare myself?  - Please take 2 showers (one the night prior to  surgery and one the morning of surgery) using Scrubcare or Hibiclens soap.    Use this soap only from the neck to your toes.     Leave the soap on your skin for one minute--then rinse thoroughly.      You may use your own shampoo and conditioner. No other hair products.   - Please remove all jewelry and body piercings.  - No lotions, deodorants or fragrance.  - No makeup or fingernail polish.   - Bring your ID and insurance card.    -If you have a Deep Brain Stimulator, a Spinal Cord Stimulator, or any implanted Neuro Device, you must bring the remote to the Surgery Center.         ALL PATIENTS ARE REQUIRED TO HAVE A RESPONSIBLE ADULT TO DRIVE AND BE IN ATTENDANCE WITH THEM FOR 24 HOURS FOLLOWING SURGERY.     Covid testing policy as of 12/06/2022  Your surgeon will notify and schedule you for a COVID test if one is needed before surgery--please direct any questions or COVID symptoms to your surgeon      Questions or Concerns:    -For questions regarding the day of surgery, please contact the Ambulatory Surgery Center at [phone].    -If you have health changes between today and your surgery, please contact your surgeon.     - For questions after surgery, please contact your surgeon's office.

## 2023-08-25 ENCOUNTER — TELEPHONE (OUTPATIENT)
Dept: UROLOGY | Facility: CLINIC | Age: 73
End: 2023-08-25

## 2023-08-25 NOTE — TELEPHONE ENCOUNTER
Called to discuss with the patient plan for her procedure. Anesthesia wants the patient to wait to have her procedure done for 6 months. The patient wants to be safe and wait. We schedule the first week of November. Patient agreed with the plan    Shanta Dixon RN, BSN  Care Coordinator Urology  [facility] [place]  Urology [facility]  [phone]

## 2023-08-26 LAB
BACTERIA UR CULT: ABNORMAL
BACTERIA UR CULT: ABNORMAL

## 2023-08-28 ENCOUNTER — TELEPHONE (OUTPATIENT)
Dept: UROLOGY | Facility: CLINIC | Age: 73
End: 2023-08-28
Payer: MEDICARE

## 2023-08-28 NOTE — CONFIDENTIAL NOTE
Karley Robles MD  [facility]  Please inform patient of results.  Please find out if she is having symptoms because this was a preoperative culture and she is postponing surgery until November.  Remind her she needs to continue her monthly catheter changes.    Thanks    Spoke to pt. Pt reports that she is experiencing her ongoing pelvic discomfort, but otherwise no other symptoms noted acutely. Discussed results and need for catheter exchanges until procedure. Pt verbalized understanding.  She will call back as needed.     Tamara Laura RN MSN

## 2023-09-11 ENCOUNTER — HOSPITAL ENCOUNTER (INPATIENT)
Facility: CLINIC | Age: 73
LOS: 1 days | Discharge: HOME | DRG: 880 | End: 2023-09-11
Attending: INTERNAL MEDICINE | Admitting: INTERNAL MEDICINE
Payer: MEDICARE

## 2023-09-11 VITALS
TEMPERATURE: 97.8 F | OXYGEN SATURATION: 98 % | SYSTOLIC BLOOD PRESSURE: 136 MMHG | RESPIRATION RATE: 16 BRPM | DIASTOLIC BLOOD PRESSURE: 68 MMHG | HEART RATE: 64 BPM

## 2023-09-11 DIAGNOSIS — T83.511D URINARY TRACT INFECTION ASSOCIATED WITH INDWELLING URETHRAL CATHETER, SUBSEQUENT ENCOUNTER: Primary | ICD-10-CM

## 2023-09-11 DIAGNOSIS — N39.0 URINARY TRACT INFECTION ASSOCIATED WITH INDWELLING URETHRAL CATHETER, SUBSEQUENT ENCOUNTER: Primary | ICD-10-CM

## 2023-09-11 PROBLEM — R07.9 CHEST PAIN: Status: ACTIVE | Noted: 2023-09-11

## 2023-09-11 LAB
ANION GAP SERPL CALCULATED.3IONS-SCNC: 10 MMOL/L (ref 7–15)
BUN SERPL-MCNC: 14.2 MG/DL (ref 8–23)
CALCIUM SERPL-MCNC: 8.9 MG/DL (ref 8.8–10.2)
CHLORIDE SERPL-SCNC: 103 MMOL/L (ref 98–107)
CREAT SERPL-MCNC: 0.61 MG/DL (ref 0.51–0.95)
DEPRECATED HCO3 PLAS-SCNC: 26 MMOL/L (ref 22–29)
EGFRCR SERPLBLD CKD-EPI 2021: >90 ML/MIN/1.73M2
ERYTHROCYTE [DISTWIDTH] IN BLOOD BY AUTOMATED COUNT: 13.5 % (ref 10–15)
GLUCOSE SERPL-MCNC: 107 MG/DL (ref 70–99)
HCT VFR BLD AUTO: 37.2 % (ref 35–47)
HGB BLD-MCNC: 12.3 G/DL (ref 11.7–15.7)
MCH RBC QN AUTO: 31.1 PG (ref 26.5–33)
MCHC RBC AUTO-ENTMCNC: 33.1 G/DL (ref 31.5–36.5)
MCV RBC AUTO: 94 FL (ref 78–100)
PLATELET # BLD AUTO: 164 10E3/UL (ref 150–450)
POTASSIUM SERPL-SCNC: 5.1 MMOL/L (ref 3.4–5.3)
RBC # BLD AUTO: 3.95 10E6/UL (ref 3.8–5.2)
SARS-COV-2 RNA RESP QL NAA+PROBE: NEGATIVE
SODIUM SERPL-SCNC: 139 MMOL/L (ref 136–145)
TROPONIN T SERPL HS-MCNC: 54 NG/L
TROPONIN T SERPL HS-MCNC: 59 NG/L
WBC # BLD AUTO: 7 10E3/UL (ref 4–11)

## 2023-09-11 PROCEDURE — 80048 BASIC METABOLIC PNL TOTAL CA: CPT

## 2023-09-11 PROCEDURE — 36415 COLL VENOUS BLD VENIPUNCTURE: CPT

## 2023-09-11 PROCEDURE — 120N000003 HC R&B IMCU UMMC

## 2023-09-11 PROCEDURE — 93010 ELECTROCARDIOGRAM REPORT: CPT | Performed by: INTERNAL MEDICINE

## 2023-09-11 PROCEDURE — 93005 ELECTROCARDIOGRAM TRACING: CPT

## 2023-09-11 PROCEDURE — 250N000013 HC RX MED GY IP 250 OP 250 PS 637

## 2023-09-11 PROCEDURE — 84484 ASSAY OF TROPONIN QUANT: CPT

## 2023-09-11 PROCEDURE — 99222 1ST HOSP IP/OBS MODERATE 55: CPT | Mod: FS

## 2023-09-11 PROCEDURE — 87635 SARS-COV-2 COVID-19 AMP PRB: CPT

## 2023-09-11 PROCEDURE — 85027 COMPLETE CBC AUTOMATED: CPT

## 2023-09-11 RX ORDER — LORAZEPAM 0.5 MG/1
0.5 TABLET ORAL DAILY PRN
Status: DISCONTINUED | OUTPATIENT
Start: 2023-09-11 | End: 2023-09-11 | Stop reason: HOSPADM

## 2023-09-11 RX ORDER — ACETAMINOPHEN 325 MG/1
650 TABLET ORAL EVERY 4 HOURS PRN
Status: DISCONTINUED | OUTPATIENT
Start: 2023-09-11 | End: 2023-09-11 | Stop reason: HOSPADM

## 2023-09-11 RX ORDER — NALOXONE HYDROCHLORIDE 0.4 MG/ML
0.2 INJECTION, SOLUTION INTRAMUSCULAR; INTRAVENOUS; SUBCUTANEOUS
Status: DISCONTINUED | OUTPATIENT
Start: 2023-09-11 | End: 2023-09-11 | Stop reason: HOSPADM

## 2023-09-11 RX ORDER — GABAPENTIN 600 MG/1
1200 TABLET ORAL 3 TIMES DAILY
Status: DISCONTINUED | OUTPATIENT
Start: 2023-09-11 | End: 2023-09-11 | Stop reason: HOSPADM

## 2023-09-11 RX ORDER — MAGNESIUM HYDROXIDE/ALUMINUM HYDROXICE/SIMETHICONE 120; 1200; 1200 MG/30ML; MG/30ML; MG/30ML
30 SUSPENSION ORAL EVERY 4 HOURS PRN
Status: DISCONTINUED | OUTPATIENT
Start: 2023-09-11 | End: 2023-09-11 | Stop reason: HOSPADM

## 2023-09-11 RX ORDER — POLYETHYLENE GLYCOL 3350 17 G/17G
17 POWDER, FOR SOLUTION ORAL DAILY PRN
Status: DISCONTINUED | OUTPATIENT
Start: 2023-09-11 | End: 2023-09-11 | Stop reason: HOSPADM

## 2023-09-11 RX ORDER — CIPROFLOXACIN 500 MG/1
500 TABLET, FILM COATED ORAL 2 TIMES DAILY
Qty: 13 TABLET | Refills: 0 | Status: SHIPPED | OUTPATIENT
Start: 2023-09-11 | End: 2023-11-02

## 2023-09-11 RX ORDER — BACLOFEN 10 MG/1
10 TABLET ORAL 2 TIMES DAILY PRN
Status: DISCONTINUED | OUTPATIENT
Start: 2023-09-11 | End: 2023-09-11 | Stop reason: HOSPADM

## 2023-09-11 RX ORDER — LIDOCAINE 4 G/G
1 PATCH TOPICAL
Status: DISCONTINUED | OUTPATIENT
Start: 2023-09-11 | End: 2023-09-11 | Stop reason: HOSPADM

## 2023-09-11 RX ORDER — ASPIRIN 81 MG/1
81 TABLET ORAL DAILY
Status: DISCONTINUED | OUTPATIENT
Start: 2023-09-12 | End: 2023-09-11 | Stop reason: HOSPADM

## 2023-09-11 RX ORDER — CIPROFLOXACIN 500 MG/1
500 TABLET, FILM COATED ORAL 2 TIMES DAILY
Status: DISCONTINUED | OUTPATIENT
Start: 2023-09-11 | End: 2023-09-11 | Stop reason: HOSPADM

## 2023-09-11 RX ORDER — ONDANSETRON 4 MG/1
4 TABLET, ORALLY DISINTEGRATING ORAL EVERY 6 HOURS PRN
Status: DISCONTINUED | OUTPATIENT
Start: 2023-09-11 | End: 2023-09-11 | Stop reason: HOSPADM

## 2023-09-11 RX ORDER — LIDOCAINE 50 MG/G
1 PATCH TOPICAL DAILY PRN
Status: DISCONTINUED | OUTPATIENT
Start: 2023-09-11 | End: 2023-09-11

## 2023-09-11 RX ORDER — ASPIRIN 81 MG/1
324 TABLET, CHEWABLE ORAL ONCE
Status: COMPLETED | OUTPATIENT
Start: 2023-09-11 | End: 2023-09-11

## 2023-09-11 RX ORDER — NALOXONE HYDROCHLORIDE 0.4 MG/ML
0.4 INJECTION, SOLUTION INTRAMUSCULAR; INTRAVENOUS; SUBCUTANEOUS
Status: DISCONTINUED | OUTPATIENT
Start: 2023-09-11 | End: 2023-09-11 | Stop reason: HOSPADM

## 2023-09-11 RX ORDER — ONDANSETRON 2 MG/ML
4 INJECTION INTRAMUSCULAR; INTRAVENOUS EVERY 6 HOURS PRN
Status: DISCONTINUED | OUTPATIENT
Start: 2023-09-11 | End: 2023-09-11 | Stop reason: HOSPADM

## 2023-09-11 RX ORDER — ACETAMINOPHEN 500 MG
500-1000 TABLET ORAL EVERY 6 HOURS PRN
Status: DISCONTINUED | OUTPATIENT
Start: 2023-09-11 | End: 2023-09-11

## 2023-09-11 RX ORDER — ACETAMINOPHEN 650 MG/1
650 SUPPOSITORY RECTAL EVERY 4 HOURS PRN
Status: DISCONTINUED | OUTPATIENT
Start: 2023-09-11 | End: 2023-09-11 | Stop reason: HOSPADM

## 2023-09-11 RX ORDER — NITROGLYCERIN 0.4 MG/1
0.4 TABLET SUBLINGUAL EVERY 5 MIN PRN
Status: DISCONTINUED | OUTPATIENT
Start: 2023-09-11 | End: 2023-09-11 | Stop reason: HOSPADM

## 2023-09-11 RX ORDER — ATORVASTATIN CALCIUM 40 MG/1
40 TABLET, FILM COATED ORAL EVERY EVENING
Status: DISCONTINUED | OUTPATIENT
Start: 2023-09-11 | End: 2023-09-11 | Stop reason: HOSPADM

## 2023-09-11 RX ORDER — LIDOCAINE 40 MG/G
CREAM TOPICAL
Status: DISCONTINUED | OUTPATIENT
Start: 2023-09-11 | End: 2023-09-11 | Stop reason: HOSPADM

## 2023-09-11 RX ADMIN — ACETAMINOPHEN 650 MG: 325 TABLET, FILM COATED ORAL at 10:33

## 2023-09-11 RX ADMIN — GABAPENTIN 1200 MG: 600 TABLET, FILM COATED ORAL at 13:22

## 2023-09-11 RX ADMIN — BACLOFEN 10 MG: 10 TABLET ORAL at 10:33

## 2023-09-11 ASSESSMENT — ACTIVITIES OF DAILY LIVING (ADL)
FALL_HISTORY_WITHIN_LAST_SIX_MONTHS: NO
WALKING_OR_CLIMBING_STAIRS_DIFFICULTY: YES
WEAR_GLASSES_OR_BLIND: NO
DIFFICULTY_EATING/SWALLOWING: NO
ADLS_ACUITY_SCORE: 30
ADLS_ACUITY_SCORE: 30
TOILETING_MANAGEMENT: FOLEY IN PLACE
DRESSING/BATHING_DIFFICULTY: YES
DRESSING/BATHING: DRESSING DIFFICULTY, ASSISTANCE 1 PERSON
WALKING_OR_CLIMBING_STAIRS: OTHER (SEE COMMENTS)
ADLS_ACUITY_SCORE: 30
DRESS: 0-->ASSISTANCE NEEDED (DEVELOPMENTALLY APPROPRIATE)
BATHING: 1-->ASSISTANCE NEEDED
ADLS_ACUITY_SCORE: 31
TOILETING_ASSISTANCE: TOILETING DIFFICULTY, ASSISTANCE 1 PERSON
TRANSFERRING: 0-->ASSISTANCE NEEDED (DEVELOPMENTALLY APPROPRIATE)
DRESS: 1-->ASSISTANCE (EQUIPMENT/PERSON) NEEDED
EQUIPMENT_CURRENTLY_USED_AT_HOME: LIFT DEVICE;WHEELCHAIR, POWER
TOILETING: 1-->ASSISTANCE (EQUIPMENT/PERSON) NEEDED
TOILETING: 0-->NOT TOILET TRAINED OR ASSISTANCE NEEDED (DEVELOPMENTALLY APPROPRIATE)
TRANSFERRING: 1-->ASSISTANCE (EQUIPMENT/PERSON) NEEDED
CHANGE_IN_FUNCTIONAL_STATUS_SINCE_ONSET_OF_CURRENT_ILLNESS/INJURY: NO
TOILETING_ISSUES: YES
DOING_ERRANDS_INDEPENDENTLY_DIFFICULTY: YES
CONCENTRATING,_REMEMBERING_OR_MAKING_DECISIONS_DIFFICULTY: NO
ADLS_ACUITY_SCORE: 30

## 2023-09-11 ASSESSMENT — COLUMBIA-SUICIDE SEVERITY RATING SCALE - C-SSRS
1. IN THE PAST MONTH, HAVE YOU WISHED YOU WERE DEAD OR WISHED YOU COULD GO TO SLEEP AND NOT WAKE UP?: NO
3. HAVE YOU BEEN THINKING ABOUT HOW YOU MIGHT KILL YOURSELF?: NO
4. HAVE YOU HAD THESE THOUGHTS AND HAD SOME INTENTION OF ACTING ON THEM?: NO
2. HAVE YOU ACTUALLY HAD ANY THOUGHTS OF KILLING YOURSELF IN THE PAST MONTH?: NO
5. HAVE YOU STARTED TO WORK OUT OR WORKED OUT THE DETAILS OF HOW TO KILL YOURSELF? DO YOU INTEND TO CARRY OUT THIS PLAN?: NO
6. HAVE YOU EVER DONE ANYTHING, STARTED TO DO ANYTHING, OR PREPARED TO DO ANYTHING TO END YOUR LIFE?: NO

## 2023-09-11 NOTE — H&P
"    [facility]   Cardiology Service  History and Physical      Anayeli Gagnon MRN# [id number]   YOB: 1950 Age: 72 year old       Admission Date: 9/11/2023      Assessment and Plan:   Anayeli is a 72 year old female with a medical history significant for CVA (5/2023), spinal cord injury due to hematoma w/ subsequent lower extremity paraplegia, neurogenic bladder w/ chronic indwelling hudson, neurogenic bowel s/p colostomy. She is transferred from [place] due to chest pain and elevated Troponin I.     # Chest Pain   # Elevated Troponin   # Hypertension  Presented to [facility] ED in [place] with headache and chest pain in the setting of having a \"rough night\" with her  whom is her caretaker. Initially she felt numbness in her arms which resolved. EKG without ischemic changes. Troponin I was elevated and therefore a transfer to [facility] was requested. At the time of interview she denies headache, chest pain, dizziness, palpitations, lightheadedness, or shortness of breath. She states she does have occasional episodes where her heart races and has worn mobile telemetry in the past without concerning findings; most recently in May showing sinus rhythm and sinu tachycardia. Previous ischemic evaluation includes coronary angiogram 11/20/2017 with normal coronaries. Most recent echocardiogram 4/27/2023 with normal EF 50-55% and no WMAs or valvular disease. EKG unchanged from 5/2023 EKG.   - Telemetry  - Trend Troponin T to peak  - Continue PTA Toprol XL 12.5mg daily   - EKG with any chest pain     # Spinal Cord Injury d/t Hematoma  # Neurogenic Bladder w/ Chronic Hudson  # Neurogenic Bowel s/p Colostomy   Follows outpatient with urology. Plan for suprapubic hudson but she must wait until at least 6 moths post CVA before having elective surgery; procedure scheduled for November.  - Hudson cares per nursing  - Ostomy cares per nursing  - Physical therapy consult     # " "Chronic Pain  - Continue PTA Baclofen 10mg q AM & HS PRN   - Continue PTA Gabapenting 600mg TID   - PRN Lidocaine patches     # Cerebrovascular Accident (5/2023)  Stroke presented with vision and speech deficit.   - Ongoing outpatient follow up with neurology  - Continue Aspirin 81mg daily  - Continue PTA Lipitor 40mg q HS       Clinically Significant Risk Factors Present on Admission     # Drug Induced Platelet Defect: home medication list includes an antiplatelet medication   # Hypertension: Noted on problem list              Hemiplegia/Paraplegia: Paraplegia, unspecified    Chronic Fatigue and Other Debilities: Limitation of activities due to disability        FEN: Low Sat Fat  Code Status: Full  DVT Prophylaxis:  PTA Coumadin  Isolation: Contact; MRSA  Disposition: 0-2 days pending work up     Patient seen and discussed with Dr. Marcel Wagner, APRN, CNP  [facility]      Chief Complaint: Chest pain    HPI: Anayeli is a 72 year old female with a medical history significant for CVA (5/2023), spinal cord injury due to hematoma w/ subsequent lower extremity paraplegia, neurogenic bladder w/ chronic indwelling hudson, neurogenic bowel s/p colostomy. She initially presented to [facility] ED in [place] with headache and chest pain in the setting of having a \"rough night\" with her  whom is her caretaker. Initially she felt numbness in her arms which spontaneously resolved. CXR was unremarkable. On arrival, EKG was without ischemic changes. Troponin I was elevated at 0.12 and therefore a transfer to [facility] was requested.    Past Medical History:   Diagnosis Date    CARDIAC DYSRHYTHMIAS NEC 10/3/2007    Taking atenelol for years for this    History of skin cancer     Mitral valve prolapse     Neurogenic bladder     Sacral decubitus ulcer, stage IV (H)     Thoracic spinal cord injury (H)        Past Surgical History:   Procedure Laterality Date    DECOMPRESSION LUMBAR ONE LEVEL N/A 12/27/2019 "    Procedure: Posterior spinal decompression;  Surgeon: Alf Ritchie MD;  Location: [facility] OR    INSERT INTRATHECAL PAIN PUMP N/A 1/19/2022    Procedure: INSERTION, INTRATHECAL ANALGESIC PUMP, externalized trial;  Surgeon: Luis Orourke MD;  Location: [facility] OR    INSERT INTRATHECAL PAIN PUMP Right 1/21/2022    Procedure: INSERTION, INTRATHECAL ANALGESIC PUMP;  Surgeon: Luis Orourke MD;  Location: [facility] OR    INSERT STIMULATOR AND LEADS INTERNAL DORSAL COLUMN N/A 4/7/2021    Procedure: Posterior thoracic 12 to Lumbar 1 level for placement of spinal cord stimulator paddle and placement of implantable pulse generator/battery over right buttock;  Surgeon: Luis Orourke MD;  Location: [facility] OR    IR SPINAL ANGIOGRAM  10/16/2019    IR SPINAL ANGIOGRAM  12/20/2019    LAPAROSCOPIC TUBAL LIGATION      REPAIR SPINAL ARERIOVENOUS MALFORMATION N/A 12/27/2019    Procedure: with surgical disconnection arterial venous fistula Thoracic 5;  Surgeon: Alf Ritchie MD;  Location: [facility] OR       No current facility-administered medications on file prior to encounter.  acetaminophen (TYLENOL) 500 MG tablet, Take 500-1,000 mg by mouth every 6 hours as needed for mild pain  amoxicillin-clavulanate (AUGMENTIN) 500-125 MG tablet, TAKE ONE TABLET BY MOUTH EVERY 12 HOURS FOR 5 DAYS (Patient not taking: Reported on 8/23/2023)  aspirin (ASA) 81 MG EC tablet, Take 1 tablet (81 mg) by mouth daily (Patient taking differently: Take 81 mg by mouth every morning)  atorvastatin (LIPITOR) 40 MG tablet, Take 1 tablet (40 mg) by mouth every evening  baclofen (LIORESAL) 10 MG tablet, Take 10 mg by mouth 2 times daily  COMPOUND CONTAINING CONTROLLED SUBSTANCE (CMPD RX) - PHARMACY TO MIX COMPOUNDED MEDICATION, Concentrations:  Dilaudid    14 mg/ml                                                          Bupivacaine 28 mg/ml  Ziconotide 14 mcg/ml     Total Volume in Refill: 20 mL      Basal:   Dilaudid   6.993 mg/day                                                        Bupivacaine 13.986 mg/day  Ziconotide 6.993 mcg/day     PTM 0.2 hydromorphone, 0.4 mg bupivacaine, 0.2 mcg ziconotide, 2 hour lockout, maximum 6 in a day.    Maximum 24 hour dose  Hydromorphone 8.134 mg/day  Bupivacaine 16.267 mg/day  Ziconitide 8.134 mcg/day    Last filled 23. Pump alarm 23  Managed by [employer] ([phone]) (Patient not taking: Reported on 2023)  estradiol (ESTRING) 7.5 MCG/24HR vaginal ring, Place 1 each (1 Vaginal ring) vaginally every 3 months (Patient not taking: Reported on 2023)  gabapentin (NEURONTIN) 600 MG tablet, Take 1,200 mg by mouth 3 times daily   lidocaine (LIDODERM) 5 % patch, Apply 1 patch topically daily as needed   LORazepam (ATIVAN) 0.5 MG tablet, Take 1 tablet by mouth daily as needed for anxiety  melatonin 3 MG tablet, Take 3 mg by mouth nightly as needed for sleep  metoprolol succinate ER (TOPROL-XL) 25 MG 24 hr tablet, Take 12.5 mg by mouth every evening   Multiple Vitamins-Minerals (WOMENS MULTI PO), Take 1 tablet by mouth daily (Patient not taking: Reported on 2023)  ondansetron (ZOFRAN) 4 MG tablet, Take 1 tablet (4 mg) by mouth every 6 hours as needed for nausea  polyethylene glycol (MIRALAX) 17 GM/Dose powder, Take 17 g by mouth daily as needed for constipation         Family History   Problem Relation Age of Onset    C.A.D. Father          41    Deep Vein Thrombosis (DVT) No family hx of     Anesthesia Reaction No family hx of        Social History     Tobacco Use    Smoking status: Never    Smokeless tobacco: Never   Substance Use Topics    Alcohol use: No       Allergies   Allergen Reactions    Contrast Dye Rash     Painful rash after x-ray contrast    Nitrofurantoin Nausea and Vomiting         ROS:   CONSTITUTIONAL: No report of fevers or chills  RESPIRATORY: No cough, wheezing, SOB, or hemoptysis  CARDIOVASCULAR: see HPI  MUSCULO-SKELETAL: No joint pain/swelling, no muscle pain  NEURO: No paresthesias,  syncope, pre-syncope, lightheadness, dizziness or vertigo  ENDOCRINE: No temperature intolerance, no skin/hair changes  PSYCHIATRIC: No change in mood, feeling down/anxious, no change in sleep or appetite  GI: Ostomy in place   : Cunningham in place  HEME: No easy bruising or bleeding, no history of anemia, no history of blood clots  SKIN: No rashes or sores, no unusual itching      Physical Examination:  Vitals: /75 (BP Location: Left arm)   Pulse 63   Temp 98.3  F (36.8  C) (Oral)   Resp 11   SpO2 97%   BMI= There is no height or weight on file to calculate BMI.    GENERAL APPEARANCE: healthy, alert and no distress  HEENT: no icterus, no xanthelasmas, normal pupil size and reaction, normal palate, mucosa moist  NECK: JVP not elevated , brisk carotid upstroke bilaterally  CHEST: lungs clear to auscultation without rales, rhonchi or wheezes, no use of accessory muscles, no retractions  CARDIOVASCULAR: regular rhythm, normal S1 and S2, no S3 or S4 and no murmur, click or rub.  ABDOMEN: soft, non tender, without hepatosplenomegaly, no masses palpable, bowel sounds normal  EXTREMITIES: warm, no LE edema, DP/PT pulses 2+ bilaterally, no clubbing or cyanosis   NEURO: alert and oriented to person/place/time, normal speech, gait and affect  SKIN: no ecchymoses, no rashes      Laboratory:  CMPNo lab results found in last 7 days.  CBC  Recent Labs   Lab 09/11/23  0858   WBC 7.0   RBC 3.95   HGB 12.3   HCT 37.2   MCV 94   MCH 31.1   MCHC 33.1   RDW 13.5          Lab Results   Component Value Date    TROPI <0.012 01/06/2011 9/11/23  EKG:       OSH 4/27/23 Echocardiogram:  * The estimated ejection fraction is 50-55%.     * Normal right ventricular systolic function.     * The left atrium is normal in size.     * There is no aortic stenosis with a peak velocity of  106 cm/s.     * No pulmonary hypertension, estimated pulmonary arterial systolic   pressure   is 31 mmHg.     Medical Decision Making       60  MINUTES SPENT BY ME on the date of service doing chart review, history, exam, documentation & further activities per the note.         Rosario Wagner, EMELI CNP on 9/11/2023 at 10:32 AM

## 2023-09-11 NOTE — DISCHARGE SUMMARY
"    [address] [address]  p: [phone]    Discharge Summary: Cardiology Service    Anayeli Gagnon MRN# [id number]   YOB: 1950 Age: 72 year old       Admission Date: 9/11/2023  Discharge Date: 09/11/23    Discharge Diagnoses:  1. Chest Pain  2. Elevated Troponin  3. Hypertension  4. Spinal Cord Injury d/t Hematoma  5. Neurogenic Bladder w/ Chronic Hudson  6. Neurogenic Bowel s/p Colostomy  7. Chronic Pain  8. Cerebrovascular Accident (5/2023)  9. Urinary Tract Infection     Brief HPI:  Anayeli is a 72 year old female with a medical history significant for CVA (5/2023), spinal cord injury due to hematoma w/ subsequent lower extremity paraplegia, neurogenic bladder w/ chronic indwelling hudson, neurogenic bowel s/p colostomy. She is transferred from [place] due to chest pain and elevated Troponin I. Upon arrival to [facility] she was free of chest pain or headache. EKG was within normal limits. No arrhythmias noted on telemetry. Troponin T  59 --> 54. No further episodes. She feels the event was attributed to anxiety in the setting of her and her  having a rough 2 weeks. She declined repeat event monitor.     Hospital Course by Diagnosis:  # Chest Pain   # Elevated Troponin   # Hypertension  Presented to [facility] ED in [place] with headache and chest pain in the setting of having a \"rough night\" with her  whom is her caretaker. Initially she felt numbness in her arms which resolved. EKG without ischemic changes. Troponin I was elevated and therefore a transfer to [facility] was requested. At the time of interview she denies headache, chest pain, dizziness, palpitations, lightheadedness, or shortness of breath. She states she does have occasional episodes where her heart races and has worn mobile telemetry in the past without concerning findings; most recently in May showing sinus rhythm and sinu tachycardia. Previous " "ischemic evaluation includes coronary angiogram 11/20/2017 with normal coronaries. Most recent echocardiogram 4/27/2023 with normal EF 50-55% and no WMAs or valvular disease. EKG unchanged from 5/2023 EKG. Troponin 59 -- 54. No arrhythmias noted on telemetry.   - Continue PTA Toprol XL 12.5mg daily   - Declines repeat event monitor; was normal with ST in 5/2023  - Attributes event to tension between her  x 2 weeks as he is primary caretaker. \"It has been hell.\"      # Spinal Cord Injury d/t Hematoma  # Neurogenic Bladder w/ Chronic Hudson  # Neurogenic Bowel s/p Colostomy   Follows outpatient with urology. Plan for suprapubic hudson but she must wait until at least 6 moths post CVA before having elective surgery; procedure scheduled for November.  - Ongoing outpatient follow up with neurology, urology      # Chronic Pain  - Continue PTA Baclofen 10mg q AM & HS PRN   - Continue PTA Gabapenting 600mg TID   - PRN Lidocaine patches      # Cerebrovascular Accident (5/2023)  Stroke presented with vision and speech deficit.   - Ongoing outpatient follow up with neurology  - Continue Aspirin 81mg daily  - Continue PTA Lipitor 40mg q HS     # Urinary Alexsandra Infection  Patient endorsing burning in vaginal region. Similar to previous UTI's. UA performed at [facility] positive for UTI but patient was asymptomatic at that time and was not treated.  - Start Ciprofloxacin 500mg BID x 7 days   - Encourage adequate oral hydration     Pertinent Procedures:  1. None    Consults:  - None    Medication Changes:  - START Ciprofloxacin 500mg BID x 7 days     Discharge medications:   Current Discharge Medication List        START taking these medications    Details   ciprofloxacin (CIPRO) 500 MG tablet Take 1 tablet (500 mg) by mouth 2 times daily  Qty: 13 tablet, Refills: 0    Associated Diagnoses: Urinary tract infection associated with indwelling urethral catheter, subsequent encounter           CONTINUE these medications which " have NOT CHANGED    Details   acetaminophen (TYLENOL) 500 MG tablet Take 500-1,000 mg by mouth every 6 hours as needed for mild pain      aspirin (ASA) 81 MG EC tablet Take 1 tablet (81 mg) by mouth daily  Qty: 30 tablet, Refills: 3    Associated Diagnoses: Cerebrovascular accident (CVA), unspecified mechanism (H); Occipital stroke (H)      atorvastatin (LIPITOR) 40 MG tablet Take 1 tablet (40 mg) by mouth every evening  Qty: 30 tablet, Refills: 3    Associated Diagnoses: Cerebrovascular accident (CVA), unspecified mechanism (H); Occipital stroke (H)      baclofen (LIORESAL) 10 MG tablet Take 10 mg by mouth 2 times daily      COMPOUND CONTAINING CONTROLLED SUBSTANCE (CMPD RX) - PHARMACY TO MIX COMPOUNDED MEDICATION Concentrations:  Dilaudid    14 mg/ml                                                          Bupivacaine 28 mg/ml  Ziconotide 14 mcg/ml     Total Volume in Refill: 20 mL      Basal:   Dilaudid   6.993 mg/day                                                       Bupivacaine 13.986 mg/day  Ziconotide 6.993 mcg/day     PTM 0.2 hydromorphone, 0.4 mg bupivacaine, 0.2 mcg ziconotide, 2 hour lockout, maximum 6 in a day.    Maximum 24 hour dose  Hydromorphone 8.134 mg/day  Bupivacaine 16.267 mg/day  Ziconitide 8.134 mcg/day    Last filled 8/23/23. Alarm date: 9/22/23  Managed by [employer] ([phone])  Qty: 20 mL, Refills: 0    Associated Diagnoses: Intractable neuropathic pain of lumbosacral origin      gabapentin (NEURONTIN) 600 MG tablet Take 1,200 mg by mouth 3 times daily   Qty: 1 tablet, Refills: 0    Associated Diagnoses: Arteriovenous fistula of spinal cord vessels      lidocaine (LIDODERM) 5 % patch Apply 1 patch topically daily as needed       LORazepam (ATIVAN) 0.5 MG tablet Take 1 tablet by mouth daily as needed for anxiety      melatonin 3 MG tablet Take 3 mg by mouth nightly as needed for sleep      metoprolol succinate ER (TOPROL-XL) 25 MG 24 hr tablet Take 12.5 mg by mouth every evening        Multiple Vitamins-Minerals (WOMENS MULTI PO) Take 1 tablet by mouth daily      ondansetron (ZOFRAN) 4 MG tablet Take 1 tablet (4 mg) by mouth every 6 hours as needed for nausea  Qty: 20 tablet, Refills: 1    Associated Diagnoses: Infection due to 2019 novel coronavirus; Nausea      polyethylene glycol (MIRALAX) 17 GM/Dose powder Take 17 g by mouth daily as needed for constipation              Follow-up:  - PCP follow up in 7-10 days   - Urology follow up as previously scheduled    Labs or imaging requiring follow-up after discharge:  - None    Code status:  Full     Condition on discharge  Temp:  [97.8  F (36.6  C)-98.3  F (36.8  C)] 97.8  F (36.6  C)  Pulse:  [63-64] 64  Resp:  [11-16] 16  BP: (133-136)/(68-75) 136/68  SpO2:  [97 %-98 %] 98 %    CONSTITUTIONAL: No report of fevers or chills  RESPIRATORY: No cough, wheezing, SOB, or hemoptysis  CARDIOVASCULAR: see HPI  MUSCULO-SKELETAL: No joint pain/swelling, no muscle pain  NEURO: No paresthesias, syncope, pre-syncope, lightheadness, dizziness or vertigo  ENDOCRINE: No temperature intolerance, no skin/hair changes  PSYCHIATRIC: No change in mood, feeling down/anxious, no change in sleep or appetite  GI: Ostomy in place   : Cunningham in place. Reports burning in vaginal area.   HEME: No easy bruising or bleeding, no history of anemia, no history of blood clots  SKIN: No rashes or sores, no unusual itching    Imaging with results:  9/11/23  EKG:        OSH 4/27/23 Echocardiogram:  * The estimated ejection fraction is 50-55%.     * Normal right ventricular systolic function.     * The left atrium is normal in size.     * There is no aortic stenosis with a peak velocity of  106 cm/s.     * No pulmonary hypertension, estimated pulmonary arterial systolic   pressure   is 31 mmHg.     9/11/23 OSH UA:      Patient Care Team:  Jazmin Interiano MD as PCP - General (Family Medicine)  Austin Klein, RN as Specialty Care Coordinator (Neurological Surgery)  Luis Orourke,  MD as MD (Neurological Surgery)  Cris Miguel, RN as Specialty Care Coordinator (Neurological Surgery)  Jasmine Espinoza MD as Anesthesiologist (Pain Medicine)  Alia Reyes, RN as Registered Nurse (Pain Clinic)  Shelli Frost MD as Assigned Neuroscience Provider  Joss Martinez MD as Hospitalist (Infectious Diseases)  Satnam Brasher MD as Assigned Infectious Disease Provider  Dulce Be PA-C as Assigned Surgical Provider    Time Spent on this Encounter   I, EMELI Mcclain CNP, personally saw the patient today and spent greater than 30 minutes discharging this patient.     Patient discussed with staff cardiologist, Dr. Marcel Fu, who agrees with the above documentation and plan. Documentation represents joint decision making.     EMELI Mcclain CNP on 9/11/2023 at 2:06 PM  [facility]

## 2023-09-11 NOTE — PROGRESS NOTES
Admitted from: [facility]   Reason for admission: Chest pain, elevated troponin  2 RN skin assessment: completed by Chely FERRARA RN and Kike GARCIA RN  Result of skin assessment and interventions/actions: Small scab on right medial shin (old blister- RICHARD), small area of blanchable redness under left buttock, Colostomy bag in place, small scattered scabs on bilateral big toes and right ankle, large spinal scar from previous surgery.   Patient belongings (see Flowsheet)    ?

## 2023-09-11 NOTE — PHARMACY-ADMISSION MEDICATION HISTORY
Pharmacist Admission Medication History    Admission medication history is complete. The information provided in this note is only as accurate as the sources available at the time of the update.    Medication reconciliation/reorder completed by provider prior to medication history? Yes    Information Source(s): Patient, Clinic records, and CareEverywhere/SureScripts via in-person    Pertinent Information: Patient has intrathecal pump that she gets refilled q3 weeks, and has an appointment for refill of pump on 9/13/23.    Changes made to PTA medication list:  Added: None  Deleted: Augmentin, estradiol vaginal ring, lidocaine gel  Changed: Updated last fill for intrathecal pump       Allergies reviewed with patient and updates made in EHR: yes    Medication History Completed By: Adelita Gupta RPH 9/11/2023 12:37 PM    Prior to Admission medications    Medication Sig Last Dose Taking? Auth Provider Long Term End Date   acetaminophen (TYLENOL) 500 MG tablet Take 500-1,000 mg by mouth every 6 hours as needed for mild pain  Yes Unknown, Entered By History     aspirin (ASA) 81 MG EC tablet Take 1 tablet (81 mg) by mouth daily  Yes Tosin Augustine MD     atorvastatin (LIPITOR) 40 MG tablet Take 1 tablet (40 mg) by mouth every evening 9/10/2023 Yes Tosin Augustine MD Yes    baclofen (LIORESAL) 10 MG tablet Take 10 mg by mouth 2 times daily 9/10/2023 Yes Unknown, Entered By History No    COMPOUND CONTAINING CONTROLLED SUBSTANCE (CMPD RX) - PHARMACY TO MIX COMPOUNDED MEDICATION Concentrations:  Dilaudid    14 mg/ml                                                          Bupivacaine 28 mg/ml  Ziconotide 14 mcg/ml     Total Volume in Refill: 20 mL      Basal:   Dilaudid   6.993 mg/day                                                       Bupivacaine 13.986 mg/day  Ziconotide 6.993 mcg/day     PTM 0.2 hydromorphone, 0.4 mg bupivacaine, 0.2 mcg ziconotide, 2 hour lockout, maximum 6 in a day.    Maximum 24 hour  dose  Hydromorphone 8.134 mg/day  Bupivacaine 16.267 mg/day  Ziconitide 8.134 mcg/day    Last filled 8/23/23. Alarm date: 9/22/23  Managed by [employer] ([phone])  Yes Jasmine Espinoza MD     gabapentin (NEURONTIN) 600 MG tablet Take 1,200 mg by mouth 3 times daily   Yes Luis Orourke MD Yes    lidocaine (LIDODERM) 5 % patch Apply 1 patch topically daily as needed   Yes Reported, Patient     LORazepam (ATIVAN) 0.5 MG tablet Take 1 tablet by mouth daily as needed for anxiety  Yes Reported, Patient     melatonin 3 MG tablet Take 3 mg by mouth nightly as needed for sleep  Yes Reported, Patient     metoprolol succinate ER (TOPROL-XL) 25 MG 24 hr tablet Take 12.5 mg by mouth every evening   Yes Unknown, Entered By History Yes    Multiple Vitamins-Minerals (WOMENS MULTI PO) Take 1 tablet by mouth daily  Yes Unknown, Entered By History     ondansetron (ZOFRAN) 4 MG tablet Take 1 tablet (4 mg) by mouth every 6 hours as needed for nausea  Yes Daniel Lsat MD     polyethylene glycol (MIRALAX) 17 GM/Dose powder Take 17 g by mouth daily as needed for constipation   Yes Reported, Patient

## 2023-09-12 LAB
ATRIAL RATE - MUSE: 64 BPM
DIASTOLIC BLOOD PRESSURE - MUSE: NORMAL MMHG
INTERPRETATION ECG - MUSE: NORMAL
P AXIS - MUSE: 49 DEGREES
PR INTERVAL - MUSE: 176 MS
QRS DURATION - MUSE: 80 MS
QT - MUSE: 402 MS
QTC - MUSE: 414 MS
R AXIS - MUSE: 3 DEGREES
SYSTOLIC BLOOD PRESSURE - MUSE: NORMAL MMHG
T AXIS - MUSE: -10 DEGREES
VENTRICULAR RATE- MUSE: 64 BPM

## 2023-09-13 ENCOUNTER — MEDICAL CORRESPONDENCE (OUTPATIENT)
Dept: HEALTH INFORMATION MANAGEMENT | Facility: CLINIC | Age: 73
End: 2023-09-13
Payer: MEDICARE

## 2023-09-13 ENCOUNTER — PATIENT OUTREACH (OUTPATIENT)
Dept: CARE COORDINATION | Facility: CLINIC | Age: 73
End: 2023-09-13
Payer: MEDICARE

## 2023-09-13 NOTE — PROGRESS NOTES
Clinic Care Coordination Contact  [facility]/Voicemail       Clinical Data: Care Coordinator Outreach  Outreach attempted x 2.  Left message on patient's voicemail with call back information and requested return call.  Plan:  Care Coordinator will do no further outreaches at this time.    Jojo HERNANDEZ Community Health Worker  Clinic Care Coordination  [facility]  Phone: [phone]

## 2023-09-15 NOTE — UTILIZATION REVIEW
Admission Status; Secondary Review Determination    No action to be taken. Please contact me on my Email : [email or web address] if you have any questions.    As part of the [place] Utilization review plan, a self-audit is done on Medicare inpatient admission with less than 2 midnights stay. The 2014 IPPS Final Rule allows outpatient billing in the event that a hospital determines that an inpatient admission was not medically necessary under utilization review process.     (x) Outpatient status would be Appropriate- Short Stay- Post discharge review.    RATIONALE FOR DETERMINATION  Anayeli is a 72 year old female with a medical history significant for CVA (5/2023), spinal cord injury due to hematoma w/ subsequent lower extremity paraplegia, neurogenic bladder w/ chronic indwelling hudson, neurogenic bowel s/p colostomy. She is transferred from [place] due to chest pain and elevated Troponin I. Upon arrival to [facility] she was free of chest pain or headache. EKG was within normal limits. No arrhythmias noted on telemetry. Troponin T  59 --> 54. No further episodes. She feels the event was attributed to anxiety in the setting of her and her  having a rough 2 weeks. She declined repeat event monitor.      Patient was admitted and discharge after one night stay. Record was sent by  for a PA review. Based on the  severity of illness, intensity of service provided, expected LOS and risk for adverse outcome make the care appropriate for further outpatient/observation; however, doesn't meet criteria for hospital inpatient admission.       The information on this document is developed by the utilization review team in order for the business office to ensure compliance.  This only denotes the appropriateness of proper admission status and does not reflect the quality of care rendered.       The definitions of Inpatient Status and Observation Status used in making the determination above are those provided in the CMS  Coverage Manual, Chapter 1 and Chapter 6, section 70.4.     Please cont me if you want to discuss further about this admission episode.      Luis Davenport MD, FACP, DEACON  Medical Director - Utilization management  Staff Hospitalist  [facility]    Pager: [phone]

## 2023-09-21 ENCOUNTER — TELEPHONE (OUTPATIENT)
Dept: UROLOGY | Facility: CLINIC | Age: 73
End: 2023-09-21
Payer: MEDICARE

## 2023-09-21 NOTE — TELEPHONE ENCOUNTER
Dulce Be PA-C  [facility],  This patient's appointment with me tomorrow was scheduled for E-string placement. Per Liquid messages, the cost was going to be $400. Can we please call to see if she picked up the prescription? If not, there is no need for tomorrow's appt with me. Please ask if she'd rather do vaginal estrogen cream or suppositories which are self-administered 2 times per week. Happy to send in a prescription if she'd like to try either option.    Thanks!  Dulce Be PA-C    Called pt to discuss appointment tomorrow.  She states she does not want to pay for the E-string.  Ok to cancel appointment for tomorrow.  Pt states she will think about the vaginal estrogen cream/suppositories.  She states they would also be difficult to use as she is wheelchair bound; she said she will call us back if she decides to try them.    Appt canceled, Dulce notified.    Deanna Bello RN

## 2023-09-27 ENCOUNTER — TELEPHONE (OUTPATIENT)
Dept: ANESTHESIOLOGY | Facility: CLINIC | Age: 73
End: 2023-09-27

## 2023-09-27 NOTE — TELEPHONE ENCOUNTER
[facility] Call Center    Phone Message    May a detailed message be left on voicemail: yes     Reason for Call: Medication Refill Request    Has the patient contacted the pharmacy for the refill? Yes   Name of medication being requested:     Compounded medication, Hydromorphone 14mg per mil and Bupivacaine 28 mg per mil, prialt 14 mcg per mil    Provider who prescribed the medication: Jasmine Padron    Pharmacy: [facility], please fax to [phone]    Date medication is needed: ASAP

## 2023-09-28 ENCOUNTER — MEDICAL CORRESPONDENCE (OUTPATIENT)
Dept: HEALTH INFORMATION MANAGEMENT | Facility: CLINIC | Age: 73
End: 2023-09-28
Payer: MEDICARE

## 2023-10-04 ENCOUNTER — TELEPHONE (OUTPATIENT)
Dept: ANESTHESIOLOGY | Facility: CLINIC | Age: 73
End: 2023-10-04
Payer: MEDICARE

## 2023-10-05 ENCOUNTER — VIRTUAL VISIT (OUTPATIENT)
Dept: ANESTHESIOLOGY | Facility: CLINIC | Age: 73
End: 2023-10-05
Payer: MEDICARE

## 2023-10-05 ENCOUNTER — MEDICAL CORRESPONDENCE (OUTPATIENT)
Dept: HEALTH INFORMATION MANAGEMENT | Facility: CLINIC | Age: 73
End: 2023-10-05

## 2023-10-05 DIAGNOSIS — M79.2 NEUROPATHIC PAIN: Primary | ICD-10-CM

## 2023-10-05 PROCEDURE — 99213 OFFICE O/P EST LOW 20 MIN: CPT | Mod: 95 | Performed by: ANESTHESIOLOGY

## 2023-10-05 ASSESSMENT — ENCOUNTER SYMPTOMS
DEPRESSION: 1
STIFFNESS: 1
DECREASED CONCENTRATION: 0
HOT FLASHES: 0
MUSCLE WEAKNESS: 1
HALLUCINATIONS: 0
FATIGUE: 1
MYALGIAS: 1
ARTHRALGIAS: 1
MUSCLE CRAMPS: 0
ALTERED TEMPERATURE REGULATION: 0
POLYDIPSIA: 0
JOINT SWELLING: 1
NERVOUS/ANXIOUS: 0
PANIC: 0
FEVER: 0
WEIGHT LOSS: 0
DECREASED APPETITE: 0
NECK PAIN: 0
DECREASED LIBIDO: 1
NIGHT SWEATS: 0
WEIGHT GAIN: 0
CHILLS: 0
INCREASED ENERGY: 0
INSOMNIA: 1
BACK PAIN: 1
POLYPHAGIA: 0

## 2023-10-05 ASSESSMENT — PAIN SCALES - GENERAL: PAINLEVEL: SEVERE PAIN (6)

## 2023-10-05 NOTE — NURSING NOTE
Patient presents with:  Follow Up: Follow-up Pelvis Pain - Cancer - Pain Pump      Severe Pain (6)     Pain Medications       Analgesics Other Refills Start End     acetaminophen (TYLENOL) 500 MG tablet          Sig - Route: Take 500-1,000 mg by mouth every 6 hours as needed for mild pain - Oral    Class: Historical      Salicylates Refills Start End     aspirin (ASA) 81 MG EC tablet    3 5/8/2023     Sig - Route: Take 1 tablet (81 mg) by mouth daily - Oral    Class: E-Prescribe    Renewals       Renewal requests to authorizing provider (Tosin Augustine MD) <b>prohibited</b>                    What medications are you using for pain? Pain Pump, Tylenol, Baclofen    (New patients only) Have you been seen by another pain clinic/ provider? non    (Return Patients only) What refills are you needing today? no

## 2023-10-05 NOTE — LETTER
10/5/2023       RE: Anayeli Gagnon  [address] [facility] [address]     Dear Colleague,    Thank you for referring your patient, Anayeli Gagnon, to the [facility] [place] at [facility]. Please see a copy of my visit note below.    Virtual Visit Details    Type of service: Video visit Patient took over 25 minutes to connect to the video visit We tried multiple attempts and platforms to be able to connect.       Pain clinic follow up note    HPI:   Anayeli Gagnon is a 72 year old year old female  who presents to the pain clinic with neuropathic pain with recent hospitalization for cardiac reasons. She was discharged with antibiotics for a recurrent UTI and a diagnosis of anxiety related chest pain.     Patient Supplied Answers To the [facility] Pain Questionnaire      10/5/2023    12:17 PM   [facility] Pain -  Patient Entered Questionnaire/Answers   How would you describe the pain sharp    pressure   Which of the following worsen your pain standing   Which of the following improve or reduce your pain lying down   What number best describes your average pain for the past week:  0 = No pain  to  10 = Worst pain imaginable 6   What number best describes your LOWEST pain in past 24 hours:  0 = No pain  to  10 = Worst pain imaginable 3   What number best describes your WORST pain in past 24 hours:  0 = No pain  to  10 = Worst pain imaginable 8   When is your pain worst Night   What non-medicine treatments have you already had for your pain pain clinic             The patient continues to use lorazepam occasionally to help with anxiety.   She continues to have burning type of pain in the rectal and vaginal pain.   She feels her  does not understand her. Her outlet is the crafting and sewing.   In early November she has the suprapubic catheter placement.     ROS:  Review of Systems   Constitutional:  Negative for  chills, fever and weight loss.   Musculoskeletal:  Positive for back pain and myalgias. Negative for neck pain.   Endo/Heme/Allergies:  Negative for polydipsia.   Psychiatric/Behavioral:  Positive for depression. Negative for hallucinations. The patient has insomnia. The patient is not nervous/anxious.    All other systems reviewed and are negative.    Significant Medical History:   Past Medical History:   Diagnosis Date    CARDIAC DYSRHYTHMIAS NEC 10/3/2007    Taking atenelol for years for this    History of skin cancer     Mitral valve prolapse     Neurogenic bladder     Sacral decubitus ulcer, stage IV (H)     Thoracic spinal cord injury (H)        Past Surgical History:  Past Surgical History:   Procedure Laterality Date    DECOMPRESSION LUMBAR ONE LEVEL N/A 2019    Procedure: Posterior spinal decompression;  Surgeon: Alf Ritchie MD;  Location: [facility] OR    INSERT INTRATHECAL PAIN PUMP N/A 2022    Procedure: INSERTION, INTRATHECAL ANALGESIC PUMP, externalized trial;  Surgeon: Luis Orourke MD;  Location: [facility] OR    INSERT INTRATHECAL PAIN PUMP Right 2022    Procedure: INSERTION, INTRATHECAL ANALGESIC PUMP;  Surgeon: Luis Orourke MD;  Location: [facility] OR    INSERT STIMULATOR AND LEADS INTERNAL DORSAL COLUMN N/A 2021    Procedure: Posterior thoracic 12 to Lumbar 1 level for placement of spinal cord stimulator paddle and placement of implantable pulse generator/battery over right buttock;  Surgeon: Luis Orourke MD;  Location: [facility] OR    IR SPINAL ANGIOGRAM  10/16/2019    IR SPINAL ANGIOGRAM  2019    LAPAROSCOPIC TUBAL LIGATION      REPAIR SPINAL ARERIOVENOUS MALFORMATION N/A 2019    Procedure: with surgical disconnection arterial venous fistula Thoracic 5;  Surgeon: Alf Ritchie MD;  Location: [facility] OR          Family History:  Family History   Problem Relation Age of Onset    C.A.D. Father          41    Deep Vein Thrombosis (DVT) No family hx of     Anesthesia  Reaction No family hx of           Social History:  Social History     Socioeconomic History    Marital status:      Spouse name: Not on file    Number of children: Not on file    Years of education: Not on file    Highest education level: Not on file   Occupational History    Occupation: NA     Employer: [employer]   Tobacco Use    Smoking status: Never    Smokeless tobacco: Never   Substance and Sexual Activity    Alcohol use: No    Drug use: Never    Sexual activity: Yes     Partners: Male   Other Topics Concern    Parent/sibling w/ CABG, MI or angioplasty before 65F 55M? Not Asked   Social History Narrative    Lives with spouse - works in [place].     Social Determinants of Health     Financial Resource Strain: Not on file   Food Insecurity: Not on file   Transportation Needs: Not on file   Physical Activity: Not on file   Stress: Not on file   Social Connections: Not on file   Interpersonal Safety: Not on file   Housing Stability: Not on file     Social History     Social History Narrative    Lives with spouse - works in [place].          Allergies:  Allergies   Allergen Reactions    Contrast Dye Rash     Painful rash after x-ray contrast    Nitrofurantoin Nausea and Vomiting       Current Medications:   Current Outpatient Medications   Medication Sig Dispense Refill    acetaminophen (TYLENOL) 500 MG tablet Take 500-1,000 mg by mouth every 6 hours as needed for mild pain      aspirin (ASA) 81 MG EC tablet Take 1 tablet (81 mg) by mouth daily 30 tablet 3    atorvastatin (LIPITOR) 40 MG tablet Take 1 tablet (40 mg) by mouth every evening 30 tablet 3    baclofen (LIORESAL) 10 MG tablet Take 10 mg by mouth 2 times daily      ciprofloxacin (CIPRO) 500 MG tablet Take 1 tablet (500 mg) by mouth 2 times daily 13 tablet 0    COMPOUND CONTAINING CONTROLLED SUBSTANCE (CMPD RX) - PHARMACY TO MIX COMPOUNDED MEDICATION Concentrations:  Dilaudid    14 mg/ml                                                           Bupivacaine 28 mg/ml  Ziconotide 14 mcg/ml     Total Volume in Refill: 20 mL      Basal:   Dilaudid   6.993 mg/day                                                       Bupivacaine 13.986 mg/day  Ziconotide 6.993 mcg/day     PTM 0.2 hydromorphone, 0.4 mg bupivacaine, 0.2 mcg ziconotide, 2 hour lockout, maximum 6 in a day.    Maximum 24 hour dose  Hydromorphone 8.134 mg/day  Bupivacaine 16.267 mg/day  Ziconitide 8.134 mcg/day    Last filled 8/23/23. Alarm date: 9/22/23  Managed by [employer] ([phone]) 20 mL 0    gabapentin (NEURONTIN) 600 MG tablet Take 1,200 mg by mouth 3 times daily  1 tablet 0    lidocaine (LIDODERM) 5 % patch Apply 1 patch topically daily as needed       LORazepam (ATIVAN) 0.5 MG tablet Take 1 tablet by mouth daily as needed for anxiety      melatonin 3 MG tablet Take 3 mg by mouth nightly as needed for sleep      metoprolol succinate ER (TOPROL-XL) 25 MG 24 hr tablet Take 12.5 mg by mouth every evening       Multiple Vitamins-Minerals (WOMENS MULTI PO) Take 1 tablet by mouth daily      ondansetron (ZOFRAN) 4 MG tablet Take 1 tablet (4 mg) by mouth every 6 hours as needed for nausea 20 tablet 1    polyethylene glycol (MIRALAX) 17 GM/Dose powder Take 17 g by mouth daily as needed for constipation        Physical Exam:     There were no vitals taken for this visit.    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     RECOMMENDATIONS:     1. Medications: We are prescribing the patient to continue IDDS treatment plan as is.      2. I offered the patient psychological evaluation with Tennille Negron and  psychiatry consultation which she refused.     Follow up: 3 months.            Answers submitted by the patient for this visit:  Symptoms you have experienced in the last 30 days (Submitted on 10/5/2023)  General Symptoms: Yes  Skin Symptoms: No  HENT Symptoms: No  EYE SYMPTOMS: No  HEART SYMPTOMS: No  LUNG SYMPTOMS: No  INTESTINAL SYMPTOMS: No  URINARY SYMPTOMS: No  GYNECOLOGIC SYMPTOMS: Yes  BREAST SYMPTOMS: No  SKELETAL SYMPTOMS: Yes  BLOOD SYMPTOMS: No  NERVOUS SYSTEM SYMPTOMS: No  MENTAL HEALTH SYMPTOMS: Yes  Please answer the questions below to tell us what conditions you are experiencing: (Submitted on 10/5/2023)  Loss of appetite: No  Weight gain: No  Fatigue: Yes  Night sweats: No  Increased stress: No  Excessive hunger: No  Feeling hot or cold when others believe the temperature is normal: No  Loss of height: No  Post-operative complications: No  Surgical site pain: No  Change in or Loss of Energy: No  Hyperactivity: No  Confusion: No  Please answer the questions below to tell us what condition you are experiencing: (Submitted on 10/5/2023)  Bleeding or spotting between periods: No  Heavy or painful periods: No  Irregular periods: No  Vaginal discharge: No  Hot flashes: No  Vaginal dryness: No  Genital ulcers: No  Reduced libido: Yes  Painful intercourse: No  Difficulty with sexual arousal: No  Post-menopausal bleeding: No  Please answer the questions below to tell us what condition you are experiencing: (Submitted on 10/5/2023)  Swollen joints: Yes  Joint pain: Yes  Bone pain: No  Muscle cramps: No  Muscle weakness: Yes  Joint stiffness: Yes  Bone fracture: No  Please answer the questions below to tell us what condition you are experiencing: (Submitted on 10/5/2023)  Trouble thinking or concentrating: No  Mood changes: Yes  Panic attacks: No      Again, thank you for allowing me to participate in the care of your patient.      Sincerely,    Jasmine Espinoza MD

## 2023-10-05 NOTE — PROGRESS NOTES
Virtual Visit Details    Type of service: Video visit Patient took over 25 minutes to connect to the video visit We tried multiple attempts and platforms to be able to connect.     Video Start Time: 12:25 PM  Video End Time:12:54 PM    Originating Location (pt. Location): Home    Distant Location (provider location):  On-site  Platform used for Video Visit: [facility]      Pain clinic follow up note    HPI:   Anayeli Gagnon is a 72 year old year old female  who presents to the pain clinic with neuropathic pain with recent hospitalization for cardiac reasons. She was discharged with antibiotics for a recurrent UTI and a diagnosis of anxiety related chest pain.     Patient Supplied Answers To the [facility] Pain Questionnaire      10/5/2023    12:17 PM   [facility] Pain -  Patient Entered Questionnaire/Answers   How would you describe the pain sharp    pressure   Which of the following worsen your pain standing   Which of the following improve or reduce your pain lying down   What number best describes your average pain for the past week:  0 = No pain  to  10 = Worst pain imaginable 6   What number best describes your LOWEST pain in past 24 hours:  0 = No pain  to  10 = Worst pain imaginable 3   What number best describes your WORST pain in past 24 hours:  0 = No pain  to  10 = Worst pain imaginable 8   When is your pain worst Night   What non-medicine treatments have you already had for your pain pain clinic             The patient continues to use lorazepam occasionally to help with anxiety.   She continues to have burning type of pain in the rectal and vaginal pain.   She feels her  does not understand her. Her outlet is the crafting and sewing.   In early November she has the suprapubic catheter placement.     ROS:  Review of Systems   Constitutional:  Negative for chills, fever and weight loss.   Musculoskeletal:  Positive for back pain and myalgias. Negative for neck pain.   Endo/Heme/Allergies:  Negative for  polydipsia.   Psychiatric/Behavioral:  Positive for depression. Negative for hallucinations. The patient has insomnia. The patient is not nervous/anxious.    All other systems reviewed and are negative.    Significant Medical History:   Past Medical History:   Diagnosis Date    CARDIAC DYSRHYTHMIAS NEC 10/3/2007    Taking atenelol for years for this    History of skin cancer     Mitral valve prolapse     Neurogenic bladder     Sacral decubitus ulcer, stage IV (H)     Thoracic spinal cord injury (H)        Past Surgical History:  Past Surgical History:   Procedure Laterality Date    DECOMPRESSION LUMBAR ONE LEVEL N/A 2019    Procedure: Posterior spinal decompression;  Surgeon: Alf Ritchie MD;  Location: [facility] OR    INSERT INTRATHECAL PAIN PUMP N/A 2022    Procedure: INSERTION, INTRATHECAL ANALGESIC PUMP, externalized trial;  Surgeon: Luis Orourke MD;  Location: [facility] OR    INSERT INTRATHECAL PAIN PUMP Right 2022    Procedure: INSERTION, INTRATHECAL ANALGESIC PUMP;  Surgeon: Luis Orourke MD;  Location: [facility] OR    INSERT STIMULATOR AND LEADS INTERNAL DORSAL COLUMN N/A 2021    Procedure: Posterior thoracic 12 to Lumbar 1 level for placement of spinal cord stimulator paddle and placement of implantable pulse generator/battery over right buttock;  Surgeon: Luis Orourke MD;  Location: [facility] OR    IR SPINAL ANGIOGRAM  10/16/2019    IR SPINAL ANGIOGRAM  2019    LAPAROSCOPIC TUBAL LIGATION      REPAIR SPINAL ARERIOVENOUS MALFORMATION N/A 2019    Procedure: with surgical disconnection arterial venous fistula Thoracic 5;  Surgeon: Alf Ritchie MD;  Location: [facility] OR          Family History:  Family History   Problem Relation Age of Onset    C.A.D. Father          41    Deep Vein Thrombosis (DVT) No family hx of     Anesthesia Reaction No family hx of           Social History:  Social History     Socioeconomic History    Marital status:      Spouse name: Not on file     Number of children: Not on file    Years of education: Not on file    Highest education level: Not on file   Occupational History    Occupation: NA     Employer: [employer]   Tobacco Use    Smoking status: Never    Smokeless tobacco: Never   Substance and Sexual Activity    Alcohol use: No    Drug use: Never    Sexual activity: Yes     Partners: Male   Other Topics Concern    Parent/sibling w/ CABG, MI or angioplasty before 65F 55M? Not Asked   Social History Narrative    Lives with spouse - works in [place].     Social Determinants of Health     Financial Resource Strain: Not on file   Food Insecurity: Not on file   Transportation Needs: Not on file   Physical Activity: Not on file   Stress: Not on file   Social Connections: Not on file   Interpersonal Safety: Not on file   Housing Stability: Not on file     Social History     Social History Narrative    Lives with spouse - works in [place].          Allergies:  Allergies   Allergen Reactions    Contrast Dye Rash     Painful rash after x-ray contrast    Nitrofurantoin Nausea and Vomiting       Current Medications:   Current Outpatient Medications   Medication Sig Dispense Refill    acetaminophen (TYLENOL) 500 MG tablet Take 500-1,000 mg by mouth every 6 hours as needed for mild pain      aspirin (ASA) 81 MG EC tablet Take 1 tablet (81 mg) by mouth daily 30 tablet 3    atorvastatin (LIPITOR) 40 MG tablet Take 1 tablet (40 mg) by mouth every evening 30 tablet 3    baclofen (LIORESAL) 10 MG tablet Take 10 mg by mouth 2 times daily      ciprofloxacin (CIPRO) 500 MG tablet Take 1 tablet (500 mg) by mouth 2 times daily 13 tablet 0    COMPOUND CONTAINING CONTROLLED SUBSTANCE (CMPD RX) - PHARMACY TO MIX COMPOUNDED MEDICATION Concentrations:  Dilaudid    14 mg/ml                                                          Bupivacaine 28 mg/ml  Ziconotide 14 mcg/ml     Total Volume in Refill: 20 mL      Basal:   Dilaudid   6.993 mg/day                                                        Bupivacaine 13.986 mg/day  Ziconotide 6.993 mcg/day     PTM 0.2 hydromorphone, 0.4 mg bupivacaine, 0.2 mcg ziconotide, 2 hour lockout, maximum 6 in a day.    Maximum 24 hour dose  Hydromorphone 8.134 mg/day  Bupivacaine 16.267 mg/day  Ziconitide 8.134 mcg/day    Last filled 8/23/23. Alarm date: 9/22/23  Managed by [email or web address] ([phone]) 20 mL 0    gabapentin (NEURONTIN) 600 MG tablet Take 1,200 mg by mouth 3 times daily  1 tablet 0    lidocaine (LIDODERM) 5 % patch Apply 1 patch topically daily as needed       LORazepam (ATIVAN) 0.5 MG tablet Take 1 tablet by mouth daily as needed for anxiety      melatonin 3 MG tablet Take 3 mg by mouth nightly as needed for sleep      metoprolol succinate ER (TOPROL-XL) 25 MG 24 hr tablet Take 12.5 mg by mouth every evening       Multiple Vitamins-Minerals (WOMENS MULTI PO) Take 1 tablet by mouth daily      ondansetron (ZOFRAN) 4 MG tablet Take 1 tablet (4 mg) by mouth every 6 hours as needed for nausea 20 tablet 1    polyethylene glycol (MIRALAX) 17 GM/Dose powder Take 17 g by mouth daily as needed for constipation        Physical Exam:     There were no vitals taken for this visit.    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     RECOMMENDATIONS:     1. Medications: We are prescribing the patient to continue IDDS treatment plan as is.      2. I offered the patient psychological evaluation with Tennille Negron and psychiatry consultation which she refused.     Follow up: 3 months.            Answers submitted by the patient for this visit:  Symptoms you have  experienced in the last 30 days (Submitted on 10/5/2023)  General Symptoms: Yes  Skin Symptoms: No  HENT Symptoms: No  EYE SYMPTOMS: No  HEART SYMPTOMS: No  LUNG SYMPTOMS: No  INTESTINAL SYMPTOMS: No  URINARY SYMPTOMS: No  GYNECOLOGIC SYMPTOMS: Yes  BREAST SYMPTOMS: No  SKELETAL SYMPTOMS: Yes  BLOOD SYMPTOMS: No  NERVOUS SYSTEM SYMPTOMS: No  MENTAL HEALTH SYMPTOMS: Yes  Please answer the questions below to tell us what conditions you are experiencing: (Submitted on 10/5/2023)  Loss of appetite: No  Weight gain: No  Fatigue: Yes  Night sweats: No  Increased stress: No  Excessive hunger: No  Feeling hot or cold when others believe the temperature is normal: No  Loss of height: No  Post-operative complications: No  Surgical site pain: No  Change in or Loss of Energy: No  Hyperactivity: No  Confusion: No  Please answer the questions below to tell us what condition you are experiencing: (Submitted on 10/5/2023)  Bleeding or spotting between periods: No  Heavy or painful periods: No  Irregular periods: No  Vaginal discharge: No  Hot flashes: No  Vaginal dryness: No  Genital ulcers: No  Reduced libido: Yes  Painful intercourse: No  Difficulty with sexual arousal: No  Post-menopausal bleeding: No  Please answer the questions below to tell us what condition you are experiencing: (Submitted on 10/5/2023)  Swollen joints: Yes  Joint pain: Yes  Bone pain: No  Muscle cramps: No  Muscle weakness: Yes  Joint stiffness: Yes  Bone fracture: No  Please answer the questions below to tell us what condition you are experiencing: (Submitted on 10/5/2023)  Trouble thinking or concentrating: No  Mood changes: Yes  Panic attacks: No

## 2023-10-18 ENCOUNTER — TELEPHONE (OUTPATIENT)
Dept: ANESTHESIOLOGY | Facility: CLINIC | Age: 73
End: 2023-10-18
Payer: MEDICARE

## 2023-10-18 NOTE — TELEPHONE ENCOUNTER
[facility] Call Center    Phone Message    May a detailed message be left on voicemail: yes     Reason for Call: Medication Refill Request    Has the patient contacted the pharmacy for the refill? Yes   Name of medication being requested: COMPOUND CONTAINING CONTROLLED SUBSTANCE (CMPD RX) - PHARMACY TO MIX COMPOUNDED MEDICATION   Provider who prescribed the medication:   Pharmacy: [address] [address]  Date medication is needed: 10/23/23       Action Taken: Message routed to:  [facility] ([facility]): [facility]    Travel Screening: Not Applicable

## 2023-10-19 ENCOUNTER — MEDICAL CORRESPONDENCE (OUTPATIENT)
Dept: HEALTH INFORMATION MANAGEMENT | Facility: CLINIC | Age: 73
End: 2023-10-19
Payer: MEDICARE

## 2023-10-19 NOTE — TELEPHONE ENCOUNTER
RN pended prescription and sent to provider for signature. Will fax to [facility] once signed.    Alia Reyes RNCC

## 2023-10-26 DIAGNOSIS — N39.0 RECURRENT UTI: Primary | ICD-10-CM

## 2023-10-31 ENCOUNTER — TELEPHONE (OUTPATIENT)
Dept: UROLOGY | Facility: CLINIC | Age: 73
End: 2023-10-31
Payer: MEDICARE

## 2023-10-31 NOTE — TELEPHONE ENCOUNTER
Patient schedule to have urine culture done on Wednesday because she is unable to get to the lab until then   Patient had her pre-op physical done 10/28/23  Her spouse will provide transportation and stay the first 24 hours post procedure     Shanta Dixon, RN, BSN  Care Coordinator Urology  [facility] [place]  Urology Clinic  [phone]

## 2023-10-31 NOTE — TELEPHONE ENCOUNTER
FUTURE VISIT INFORMATION      SURGERY INFORMATION:  Date: 23  [facility]  Surgeon:  Karley Robles MD   Anesthesia Type: choice  Procedure CYSTOSCOPY, OPEN SUPRAPUBIC TUBE INSERTION     RECORDS REQUESTED FROM:       Primary Care Provider: Jazmin Interiano [facility]  [facility]    Pertinent Medical History: hypertension, MVP, Palpitations, tachycardia    Most recent EKG+ Tracin23    Most recent ECHO: 23

## 2023-11-01 ENCOUNTER — LAB (OUTPATIENT)
Dept: LAB | Facility: OTHER | Age: 73
End: 2023-11-01
Payer: MEDICARE

## 2023-11-01 DIAGNOSIS — N39.0 RECURRENT UTI: ICD-10-CM

## 2023-11-01 LAB
ALBUMIN UR-MCNC: ABNORMAL MG/DL
APPEARANCE UR: CLEAR
BACTERIA #/AREA URNS HPF: ABNORMAL /HPF
BILIRUB UR QL STRIP: NEGATIVE
COLOR UR AUTO: YELLOW
GLUCOSE UR STRIP-MCNC: NEGATIVE MG/DL
HGB UR QL STRIP: ABNORMAL
KETONES UR STRIP-MCNC: NEGATIVE MG/DL
LEUKOCYTE ESTERASE UR QL STRIP: ABNORMAL
NITRATE UR QL: NEGATIVE
PH UR STRIP: 6 [PH] (ref 5–7)
RBC #/AREA URNS AUTO: ABNORMAL /HPF
SP GR UR STRIP: 1.01 (ref 1–1.03)
UROBILINOGEN UR STRIP-ACNC: 0.2 E.U./DL
WBC #/AREA URNS AUTO: ABNORMAL /HPF

## 2023-11-01 PROCEDURE — 87186 SC STD MICRODIL/AGAR DIL: CPT

## 2023-11-01 PROCEDURE — 87086 URINE CULTURE/COLONY COUNT: CPT

## 2023-11-01 PROCEDURE — 87088 URINE BACTERIA CULTURE: CPT | Mod: 59

## 2023-11-01 PROCEDURE — 81001 URINALYSIS AUTO W/SCOPE: CPT

## 2023-11-02 ENCOUNTER — VIRTUAL VISIT (OUTPATIENT)
Dept: SURGERY | Facility: CLINIC | Age: 73
End: 2023-11-02
Payer: MEDICARE

## 2023-11-02 ENCOUNTER — PRE VISIT (OUTPATIENT)
Dept: SURGERY | Facility: CLINIC | Age: 73
End: 2023-11-02

## 2023-11-02 DIAGNOSIS — Z01.818 PREOP EXAMINATION: Primary | ICD-10-CM

## 2023-11-02 DIAGNOSIS — R33.9 URINARY RETENTION: ICD-10-CM

## 2023-11-02 PROCEDURE — 99215 OFFICE O/P EST HI 40 MIN: CPT | Mod: 95 | Performed by: CLINICAL NURSE SPECIALIST

## 2023-11-02 ASSESSMENT — LIFESTYLE VARIABLES: TOBACCO_USE: 0

## 2023-11-02 ASSESSMENT — ENCOUNTER SYMPTOMS: DYSRHYTHMIAS: 0

## 2023-11-02 NOTE — H&P
Pre-Operative H & P     CC:  Preoperative exam to assess for increased cardiopulmonary risk while undergoing surgery and anesthesia.    Date of Encounter: 11/2/2023  Primary Care Physician:  Jazmin Interiano     Reason for visit:   Encounter Diagnoses   Name Primary?    Preop examination Yes    Urinary retention        HPI  Anayeli Gagnon is a 72 year old female who presents for pre-operative H & P in preparation for  Procedure Information       Case: [id number] Date/Time: 11/06/23 0715    Procedure: CYSTOSCOPY, OPEN SUPRAPUBIC TUBE INSERTION (Urethra)    Anesthesia type: Choice    Diagnosis: Urinary retention [R33.9]    Pre-op diagnosis: Urinary retention [R33.9]    Location: [place] [address] / [facility]    Providers: Karley Robles MD          History is obtained from the patient, , and chart review    The patient is followed by Dr. Robles in urology for recurrent UTIs and urinary retention with hudson catheter in place. Dr. Robles counseled the patient that urethral catheter is not the best long term management and that she should consider SPT.  The patient has opted to proceed. The patient has now been scheduled for the procedure as listed above.      She was previously evaluated in the Preop Assessment Center on 8/23/23. A physical exam was performed on this date. An earlier procedure was delayed due to CVA in 5/2023.    Patient had admission to [facility] on 10/28/2023.  She was having dull chest pain that was intermittent and without radiation.  She had no associated symptoms.  Her pain did improve with a dose of nitroglycerin. CXR negative, labs normal including serial troponins, and EKG was without any acute ischemic changes.  She underwent myocardial perfusion stress test on 10/30/2023 which was normal with no evidence of ischemia.  The post-rest left ventricular ejection fraction was 74%    She presents again virtually to the Preop  Assessment Center for updated history and physical.     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 Record indicates h/o MVP but most recent echo does not demonstrate this.       Today patient denies fever, cough, shortness of breath, and irregular HR. She does report some chest pain since she returned home from hospital. Discussed results of her recent stress test with her. She has a hospital follow up scheduled today with her PCP.     Hx of abnormal bleeding or anti-platelet use: ASA 81 mg daily    Menstrual history: No LMP recorded. Patient is postmenopausal.     Past Medical History  Past Medical History:   Diagnosis Date    CARDIAC DYSRHYTHMIAS NEC 10/03/2007    Taking atenelol for years for this    Cerebrovascular accident (H) 05/05/2023    History of skin cancer     Mitral valve prolapse     Neurogenic bladder     Sacral decubitus ulcer, stage IV (H)     Thoracic spinal cord injury (H)     TIA (transient ischemic attack) 04/26/2023       Past Surgical History  Past Surgical History:   Procedure Laterality Date    DECOMPRESSION LUMBAR ONE LEVEL N/A 12/27/2019    Procedure: Posterior spinal decompression;  Surgeon: Alf Ritchie MD;  Location: [facility]    INSERT INTRATHECAL PAIN PUMP N/A 1/19/2022    Procedure: INSERTION, INTRATHECAL ANALGESIC PUMP, externalized trial;  Surgeon: Luis Orourke MD;  Location: [facility]    INSERT INTRATHECAL PAIN PUMP Right 1/21/2022    Procedure: INSERTION, INTRATHECAL ANALGESIC PUMP;  Surgeon: Luis Orourke MD;  [facility]: [facility]    INSERT STIMULATOR AND LEADS INTERNAL DORSAL COLUMN N/A 4/7/2021    Procedure: Posterior thoracic 12 to Lumbar 1 level for placement of spinal cord stimulator  paddle and placement of implantable pulse generator/battery over right buttock;  Surgeon: Luis Orourke MD;  Location: [facility] OR    IR SPINAL ANGIOGRAM  10/16/2019    IR SPINAL ANGIOGRAM  12/20/2019    LAPAROSCOPIC TUBAL LIGATION      REPAIR SPINAL ARERIOVENOUS MALFORMATION N/A 12/27/2019    Procedure: with surgical disconnection arterial venous fistula Thoracic 5;  Surgeon: Alf Ritchie MD;  Location: [facility] OR       Prior to Admission Medications  Current Outpatient Medications   Medication Sig Dispense Refill    acetaminophen (TYLENOL) 500 MG tablet Take 500-1,000 mg by mouth every 6 hours as needed for mild pain      aspirin (ASA) 81 MG EC tablet Take 1 tablet (81 mg) by mouth daily (Patient taking differently: Take 81 mg by mouth every morning) 30 tablet 3    atorvastatin (LIPITOR) 40 MG tablet Take 1 tablet (40 mg) by mouth every evening 30 tablet 3    baclofen (LIORESAL) 10 MG tablet Take 10 mg by mouth 2 times daily      gabapentin (NEURONTIN) 600 MG tablet Take 1,200 mg by mouth 3 times daily  1 tablet 0    lidocaine (LIDODERM) 5 % patch Apply 1 patch topically daily as needed       LORazepam (ATIVAN) 0.5 MG tablet Take 1 tablet by mouth daily as needed for anxiety      medication given by implanted intrathecal pump continuous Concentrations:  Dilaudid    14 mg/ml                                                          Bupivacaine 28 mg/ml  Ziconotide 14 mcg/ml     Total Volume in Refill: 20 mL      Basal:   Dilaudid   6.993 mg/day                                                       Bupivacaine 13.986 mg/day  Ziconotide 6.993 mcg/day     PTM 0.2 hydromorphone, 0.4 mg bupivacaine, 0.2 mcg ziconotide, 2 hour lockout, maximum 6 in a day (2 hr lockout)     Maximum 24 hour dose  Hydromorphone 8.134 mg/day  Bupivacaine 16.267 mg/day  Ziconitide 8.134 mcg/day     Managed by [facility] ([phone]) - refills approximately every 3 weeks  Provider. Dr. Maryam Espinoza      melatonin 3 MG tablet Take 3 mg by  mouth nightly as needed for sleep      metoprolol succinate ER (TOPROL-XL) 25 MG 24 hr tablet Take 12.5 mg by mouth every evening       Multiple Vitamins-Minerals (WOMENS MULTI PO) Take 1 tablet by mouth daily      ondansetron (ZOFRAN) 4 MG tablet Take 1 tablet (4 mg) by mouth every 6 hours as needed for nausea 20 tablet 1    polyethylene glycol (MIRALAX) 17 GM/Dose powder Take 17 g by mouth daily as needed for constipation          Allergies  Allergies   Allergen Reactions    Latex     Contrast Dye Rash     Painful rash after x-ray contrast    Nitrofurantoin Nausea and Vomiting       Social History  Social History     Socioeconomic History    Marital status:      Spouse name: Not on file    Number of children: Not on file    Years of education: Not on file    Highest education level: Not on file   Occupational History    Occupation: NA     Employer: [employer]   Tobacco Use    Smoking status: Never     Passive exposure: Never    Smokeless tobacco: Never   Substance and Sexual Activity    Alcohol use: No    Drug use: Never    Sexual activity: Yes     Partners: Male   Other Topics Concern    Parent/sibling w/ CABG, MI or angioplasty before 65F 55M? Not Asked   Social History Narrative    Lives with spouse - works in [employer].     Social Determinants of Health     Financial Resource Strain: Not on file   Food Insecurity: Not on file   Transportation Needs: Not on file   Physical Activity: Not on file   Stress: Not on file   Social Connections: Not on file   Interpersonal Safety: Not on file   Housing Stability: Not on file       Family History  Family History   Problem Relation Age of Onset    C.A.D. Father          41    Deep Vein Thrombosis (DVT) No family hx of     Anesthesia Reaction No family hx of        Review of Systems  The complete review of systems is negative other than noted in the HPI or here.   Anesthesia Evaluation   Pt has had prior anesthetic. Type: General and MAC.    History of  anesthetic complications (Difficulty waking)  - .      ROS/MED HX  ENT/Pulmonary: Comment: Able to cough effectively and take deep breaths    (+)     BRADY risk factors,  hypertension,                             (-) tobacco use and recent URI   Neurologic: Comment: H/o thoracic spinal cord injury with progressive paralysis>>no weight bearing.  Full transfer with Michaela lift.     (+)    peripheral neuropathy, - knees down. migraines,    CVA (presented with visual and speech deficit. Now some ongoing mild visual deficit.), date: 5/5/2023, with deficits,  TIA, date: 4/26/23,      Spinal cord injury,           Cardiovascular: Comment: Denies cardiac symptoms including chest pain, SOB,  syncope, SEGAL, orthopnea, or PND.       (+) Dyslipidemia hypertension- -   -  - -   Taking blood thinners Pt has not received instructions: Instructions Given to patient: Planned 7 day hold for surgery.                            Previous cardiac testing   Echo: Date: 5/6/23 Results:    Stress Test:  Date: 10/30/23 Results:    ECG Reviewed:  Date: 9/11/23 Results:  SR, nonspecific T wave abnormality  Cath:  Date: 2017 Results:   (-) SEGAL and arrhythmias   METS/Exercise Tolerance: 1 - Eating, dressing Comment: METS<4.  Upper extremity movement.    Hematologic:  - neg hematologic  ROS  (-) history of blood clots and history of blood transfusion   Musculoskeletal: Comment: Intrathecal pain pump  Chronic pain in groin and legs      GI/Hepatic:  - neg GI/hepatic ROS     Renal/Genitourinary: Comment: Neurogenic bladder after spinal cord injury.  Currently with hudson catheter for urinary retention.  H/o UTIs.    Unable to feel bladder sensations      Endo:  - neg endo ROS     Psychiatric/Substance Use:  - neg psychiatric ROS   (+)  1       Infectious Disease:     (+)   MRSA,         Malignancy:   (+) Malignancy, History of Skin.Skin CA Remission status post Surgery.      Other:      (+)  , H/O Chronic Pain,, other significant disability Wheelchair  "bound (Motorized wheel chair.)         Virtual visit -  No vitals were obtained    Physical Exam  Constitutional: Awake, alert, no apparent distress, and appears stated age.  nearby  HENT: Normocephalic  Respiratory: non labored breathing; no cough   Neurologic: Oriented to name, place and time.   Neuropsychiatric: Calm, cooperative. Normal affect.      Prior Labs/Diagnostic Studies   All labs and imaging personally reviewed   OSH 10/28/23   Na 141  K 4.3  Cl 106  Cr 0.66  Glu 96  LFTs normal    WBC 6.3  Hgb 13.0  Hematocrit 38.4  Platelets 139    EK23 Sinus rhythm, nonspecific T wave abnormality    Cardiac Event Monitor 23   Rhythm was sinus  \"Heart racing\" was associated with sinus and sinus tachycardia  No atrial fibrillation    23 CTA Head/Neck                                                      IMPRESSION:    1. Head CTA demonstrates absence of contrast filling in the left  posterior cerebral artery at the distal P3 segment near the junction  with P4, likely secondary to occlusion. This can alternatively be  technical.   2. Neck CTA demonstrates no stenosis of the major cervical arteries.     Echocardiogram 23  Interpretation Summary  Global and regional left ventricular function is normal with an EF of 55-60%.  Global right ventricular function is normal.  The inferior vena cava was normal in size with preserved respiratory  variability.  No pericardial effusion is present.    Myocardial perfusion stress 10/30/23  Summary    1. Myocardial perfusion imaging is normal.     2. No evidence of ischemia.     3. Post stress left ventricular ejection fraction is normal, 74 %.     4. The resting electrocardiogram demonstrated sinus and did not show   ST-segment changes consistent with myocardial ischemia.     5. Non-specific ST changes.     Angiogram 2017  CONCLUSION   1) Normal LV systolic function, ejection fraction of 50%.     2) Angiographically normal coronary arteries.     CHEST X-RAY " 10/28/23   IMPRESSION:   No gross consolidations or pulmonary edema.     CT Head 9/11/23  IMPRESSION:     1. No intracranial bleed. No midline shift.   2.  Small old left basal ganglia lacunar infarct.   3.  Moderate size area of encephalomalacia in the left occipital lobe presumably representing   an old infarct.   4.  If this patient has had previous imaging studies of the brain, and if they are submitted,   comparison could be made to confirm a stable appearance.       The patient's records and results personally reviewed by this provider.     Outside records reviewed from: Care Everywhere      Assessment    Anayeli Gagnon is a 72 year old female seen as a PAC referral for risk assessment and optimization for anesthesia.    Plan/Recommendations  Pt will be optimized for the proposed procedure.  See below for details on the assessment, risk, and preoperative recommendations    Anesthesia concern: Slow to wake    NEUROLOGY  - History of CVA >>>ischemic stroke in the L occipital lobe, L PCA territory.  Presenting with vision and speech deficit on 5/5/2023. Previous TIA on 4/26/2023              ~ ASA 81 mg daily.  Will hold for 7 days for surgery.   ~ Residual mild visual deficit and mild cognitive impairment.   ~ Followed up with Ms. Marquez, MIKKI, in neurology after discharged from the hospital.  ~ Patient will be six months post CVA on DOS. Denies recurrent symptoms  Denies seizure history  Spinal cord injury due to hematoma resulting in lower extremity paraplegia and development of progressively worsening lower extremity pain and neurogenic bladder s/p indwelling Cunningham catheter and neurogenic bowel s/p colostomy status. She is motorized wheelchair dependent and is non-weight bearing being a full transfer with Michaela lift.   - Chronic Pain of groin and legs. Has intrathecal pump followed by Dr. Espinoza. Patient will bring remote on DOS. Will take gabapentin and baclofen on DOS  - Dosage confirmed by PharmD  "today    -Post Op delirium risk factors:  History of pre-existing cognitive dysfunction    ENT  - No current airway concerns.  Will need to be reassessed day of surgery.  Mallampati: Unable to assess  TM: Unable to assess  Upper partial    CARDIAC  HLD. Atorvastatin at HS. HYPERTENSION. Patient reports up and down readings. Self monitors at home. Metoprolol at HS. Recent admission as above for chest pain with negative work up including stress test. Angiogram in 2017 normal coronaries. Patient has had chest pain a few times since being home but nothing in the last 2 days. She has hospital follow up with her primary provider later today. Encouraged her to discuss further with PCP. Wheelchair bound  - METS (Metabolic Equivalents)<4    RCRI: 0.9% risk of serious cardiac events    PULMONARY  Denies cough or shortness of breath  Able to take deep breaths and cough to clear secretions  Low risk for BRADY  - Tobacco History    History   Smoking Status    Never   Smokeless Tobacco    Never       GI: Reports intermittent GERD symptoms and some chest pain associated with this. Follow up with PCP later today. No specific meds  Colostomy status  PONV High Risk  Total Score: 3           1 AN PONV: Pt is Female    1 AN PONV: Patient is not a current smoker    1 AN PONV: Intended Post Op Opioids        /RENAL  - Baseline Creatinine  0.66  - Indwelling bladder catheter  - Frequent UTIs  - Reports unable to feel bladder sensations    ENDOCRINE   - BMI: Estimated body mass index is 23.17 kg/m  as calculated from the following:    Height as of 8/23/23: 1.626 m (5' 4\").    Weight as of 8/23/23: 61.2 kg (135 lb).  Healthy Weight (BMI 18.5-24.9)  - No history of Diabetes Mellitus    HEME  VTE Low Risk 0.26%            Total Score: 1    VTE: Greater than 59 yrs old      Denies personal or family history of blood clots  Denies history of blood transfusion  Thrombocytopenia. Platelet count in 130s range    ID: MRSA     Different anesthesia " methods/types have been discussed with the patient, but they are aware that the final plan will be decided by the assigned anesthesia provider on the date of service.  Patient was discussed with Dr. Loaiza    The patient is optimized for their procedure. AVS with information on surgery time/arrival time, meds and NPO status given by nursing staff. No further diagnostic testing indicated.    Please refer to the physical examination documented by the anesthesiologist in the anesthesia record on the day of surgery.    Video-Visit Details    Type of service:  Video Visit    Provider received verbal consent for a Video Visit from the patient? Yes   Video Start Time: 9:56am   Video End Time: 10:10am    Originating Location (pt. Location): Home    Distant Location (provider location):  Off-site  Mode of Communication:  Video Conference via [employer]  On the day of service:     Prep time: 14 minutes  Visit time: 14 minutes  Documentation time: 15 minutes  ------------------------------------------  Total time: 43 minutes      EMELI Richey CNS  Preoperative Assessment Center  [facility]  Clinic and Surgery Center  Phone: [phone]  Fax: [phone]

## 2023-11-02 NOTE — PATIENT INSTRUCTIONS
Preparing for Your Surgery      Name:  Anayeli Gagnon   MRN:  [id number]   :  1950   Today's Date:  2023         Arriving for surgery:  Surgery date:  23  Arrival time:  5:45AM    Restrictions due to COVID 19:    Please maintain social distance.  Masking is optional.      parking is available for anyone with mobility limitations or disabilities. (Monday- Friday 7 am- 5 pm)    Please come to:    [facility]  [address] [place] [phone]    Please check in on the 5th floor at the Ambulatory Surgery Center.      What can I eat or drink?    -  You may eat and drink normally until 8 hours prior to arrival  time. (Until 23, 9:45PM)  -  You may have clear liquids until 2 hours prior to arrival  time. (Until 23, 3:45AM)    Examples of clear liquids:  Water  Clear broth  Juices (apple, white grape, white cranberry  and cider) without pulp  Noncarbonated, powder based beverages  (lemonade and Tushar-Aid)  Sodas (Sprite, 7-Up, ginger ale and seltzer)  Coffee or tea (without milk or cream)  Gatorade      Which medicines can I take?    Hold Aspirin for 7 days before surgery.   Hold Multivitamins for 7 days before surgery.  Hold Supplements for 7 days before surgery.  Hold Ibuprofen (Advil, Motrin) for 1 day before surgery--unless otherwise directed by surgeon.  Hold Naproxen (Aleve) for 4 days before surgery.    No alcohol or cannabis products for 24 hours prior to procedure.      -  DO NOT take the following medications the day of surgery:    Miralax    -  PLEASE TAKE the following medications the day of surgery:     Acetaminophen(Tylenol) as needed    Baclofen(Lioresal)     Gabapentin(Neurontin)    Lidocaine(Lidoderm) patch as needed    Lorazepam(Ativan) as needed    Ondansetron(Zofran) as needed    How do I prepare myself?  - Please take 2 showers (one the night prior to surgery and one the morning of surgery) using Scrubcare or Hibiclens soap.     Use this soap only from the neck to your toes.     Leave the soap on your skin for one minute--then rinse thoroughly.      You may use your own shampoo and conditioner. No other hair products.   - Please remove all jewelry and body piercings.  - No lotions, deodorants or fragrance.  - No makeup or fingernail polish.   - Bring your ID and insurance card.    -If you have a Deep Brain Stimulator, a Spinal Cord Stimulator, or any implanted Neuro Device, you must bring the remote to the Surgery Center.         ALL PATIENTS ARE REQUIRED TO HAVE A RESPONSIBLE ADULT TO DRIVE AND BE IN ATTENDANCE WITH THEM FOR 24 HOURS FOLLOWING SURGERY.     Covid testing policy as of 12/06/2022  Your surgeon will notify and schedule you for a COVID test if one is needed before surgery--please direct any questions or COVID symptoms to your surgeon      Questions or Concerns:    -For questions regarding the day of surgery, please contact the Ambulatory Surgery Center at [phone].    -If you have health changes between today and your surgery, please contact your surgeon.     - For questions after surgery, please contact your surgeon's office.

## 2023-11-02 NOTE — PROGRESS NOTES
IT pump dosing clarification -   Discussed with patient and via review of notes (see scan from IT pump readout done 10/5 - labeled in media as scanned on 10/6/23 - pt reports no changes in dosing since that time).  Also have call out to [facility] Pharmacist, awaiting return call.  Patient reports they fill her pump every 3 weeks and this was done last week (she was not sure of the date) and is due for a refill in about 2 weeks (>1 week after her same day procedure). Will update this note if call back received from [facility].     Concentrations:  Dilaudid    14 mg/ml                                                          Bupivacaine 28 mg/ml  Ziconotide 14 mcg/ml     Total Volume in Refill: 20 mL      Basal:   Dilaudid   6.993 mg/day                                                       Bupivacaine 13.986 mg/day  Ziconotide 6.993 mcg/day     PTM 0.2 mg hydromorphone, 0.4 mg bupivacaine, 0.2 mcg ziconotide, 2 hour lockout, maximum 6 in a day (2 hr lockout)    Maximum 24 hour dose  Hydromorphone 8.134 mg/day  Bupivacaine 16.267 mg/day  Ziconitide 8.134 mcg/day    Last filled on 10/26/23 and current alarm date is: 11/25/23  Managed by [email or web address] ([phone]) - refills approximately every 3 weeks    Gilberto Wilkinson Pharm.D., BCPS

## 2023-11-02 NOTE — PROGRESS NOTES
Annamarie is a 72 year old who is being evaluated via a billable video visit.      How would you like to obtain your AVS? [place]   Annamarie is a 72 year old, presenting for the following health issues:  Pre-Op Exam (/)                         LEXX Delgado LPN

## 2023-11-03 ENCOUNTER — TELEPHONE (OUTPATIENT)
Dept: UROLOGY | Facility: CLINIC | Age: 73
End: 2023-11-03
Payer: MEDICARE

## 2023-11-03 NOTE — TELEPHONE ENCOUNTER
Notified the patient her procedure was changed to 11/13/23 due to positive urine culture not having sensitivities. Patient verbalized understanding    Shanta Dixon, RN, BSN  Care Coordinator Urology  [facility] [place]  U[facility]  [phone]

## 2023-11-04 LAB
BACTERIA UR CULT: ABNORMAL
BACTERIA UR CULT: ABNORMAL

## 2023-11-06 ENCOUNTER — TELEPHONE (OUTPATIENT)
Dept: ANESTHESIOLOGY | Facility: CLINIC | Age: 73
End: 2023-11-06
Payer: MEDICARE

## 2023-11-06 ENCOUNTER — ANESTHESIA (OUTPATIENT)
Dept: SURGERY | Facility: AMBULATORY SURGERY CENTER | Age: 73
End: 2023-11-06
Payer: MEDICARE

## 2023-11-06 NOTE — TELEPHONE ENCOUNTER
[facility] Call Center    Phone Message    May a detailed message be left on voicemail: yes     Reason for Call: Medication Refill Request    Has the patient contacted the pharmacy for the refill? Yes   Name of medication being requested: Compound medication  Provider who prescribed the medication: Dr. Espinoza  Pharmacy: [facility]   Date medication is needed: 11/10 by 3pm est    Writer unable to find medication on medication list. Please review.    Action Taken: Message routed to:  [facility] ([facility]): Pain    Travel Screening: Not Applicable

## 2023-11-07 DIAGNOSIS — N39.0 RECURRENT UTI: Primary | ICD-10-CM

## 2023-11-08 ENCOUNTER — TELEPHONE (OUTPATIENT)
Dept: SURGERY | Facility: CLINIC | Age: 73
End: 2023-11-08
Payer: MEDICARE

## 2023-11-08 RX ORDER — CIPROFLOXACIN 500 MG/1
500 TABLET, FILM COATED ORAL 2 TIMES DAILY
Qty: 10 TABLET | Refills: 0 | Status: SHIPPED | OUTPATIENT
Start: 2023-11-08 | End: 2023-11-13

## 2023-11-08 NOTE — TELEPHONE ENCOUNTER
[facility] Call Center    Phone Message    May a detailed message be left on voicemail: yes     Reason for Call: Other: Patient is requesting a call back. She was recently dx with UTI but her pharmacy has not received the RX. Please send to [id number] - [place], [place] - [address] and call her patient back to discuss     Action Taken: Message routed to:  [facility] ([facility]): pac    Travel Screening: Not Applicable

## 2023-11-08 NOTE — TELEPHONE ENCOUNTER
Cipro sent to the pharmacy    Shanta Dixon, RN, BSN  Care Coordinator Urology  [facility] [place]  [facility]  [phone]

## 2023-11-13 ENCOUNTER — HOSPITAL ENCOUNTER (OUTPATIENT)
Facility: AMBULATORY SURGERY CENTER | Age: 73
Discharge: HOME | End: 2023-11-13
Attending: UROLOGY
Payer: MEDICARE

## 2023-11-13 VITALS
HEIGHT: 64 IN | TEMPERATURE: 96.9 F | BODY MASS INDEX: 21.34 KG/M2 | OXYGEN SATURATION: 98 % | WEIGHT: 125 LBS | RESPIRATION RATE: 17 BRPM | DIASTOLIC BLOOD PRESSURE: 58 MMHG | HEART RATE: 78 BPM | SYSTOLIC BLOOD PRESSURE: 126 MMHG

## 2023-11-13 PROCEDURE — 51040 INCISE & DRAIN BLADDER: CPT

## 2023-11-13 PROCEDURE — 51040 INCISE & DRAIN BLADDER: CPT | Mod: GC | Performed by: UROLOGY

## 2023-11-13 PROCEDURE — 76942 ECHO GUIDE FOR BIOPSY: CPT | Mod: 26 | Performed by: UROLOGY

## 2023-11-13 PROCEDURE — 76942 ECHO GUIDE FOR BIOPSY: CPT | Mod: TC

## 2023-11-13 RX ORDER — OXYCODONE HYDROCHLORIDE 5 MG/1
5 TABLET ORAL
Status: COMPLETED | OUTPATIENT
Start: 2023-11-13 | End: 2023-11-13

## 2023-11-13 RX ORDER — LIDOCAINE 40 MG/G
CREAM TOPICAL
Status: DISCONTINUED | OUTPATIENT
Start: 2023-11-13 | End: 2023-11-15 | Stop reason: HOSPADM

## 2023-11-13 RX ORDER — ONDANSETRON 2 MG/ML
4 INJECTION INTRAMUSCULAR; INTRAVENOUS EVERY 30 MIN PRN
Status: DISCONTINUED | OUTPATIENT
Start: 2023-11-13 | End: 2023-11-15 | Stop reason: HOSPADM

## 2023-11-13 RX ORDER — FENTANYL CITRATE 50 UG/ML
INJECTION, SOLUTION INTRAMUSCULAR; INTRAVENOUS PRN
Status: DISCONTINUED | OUTPATIENT
Start: 2023-11-13 | End: 2023-11-13

## 2023-11-13 RX ORDER — CEFAZOLIN SODIUM 2 G/50ML
2 SOLUTION INTRAVENOUS
Status: COMPLETED | OUTPATIENT
Start: 2023-11-13 | End: 2023-11-13

## 2023-11-13 RX ORDER — SODIUM CHLORIDE, SODIUM LACTATE, POTASSIUM CHLORIDE, CALCIUM CHLORIDE 600; 310; 30; 20 MG/100ML; MG/100ML; MG/100ML; MG/100ML
INJECTION, SOLUTION INTRAVENOUS CONTINUOUS
Status: DISCONTINUED | OUTPATIENT
Start: 2023-11-13 | End: 2023-11-15 | Stop reason: HOSPADM

## 2023-11-13 RX ORDER — CEFAZOLIN SODIUM 2 G/50ML
2 SOLUTION INTRAVENOUS SEE ADMIN INSTRUCTIONS
Status: DISCONTINUED | OUTPATIENT
Start: 2023-11-13 | End: 2023-11-15 | Stop reason: HOSPADM

## 2023-11-13 RX ORDER — FENTANYL CITRATE 50 UG/ML
25 INJECTION, SOLUTION INTRAMUSCULAR; INTRAVENOUS EVERY 5 MIN PRN
Status: DISCONTINUED | OUTPATIENT
Start: 2023-11-13 | End: 2023-11-15 | Stop reason: HOSPADM

## 2023-11-13 RX ORDER — BUPIVACAINE HYDROCHLORIDE AND EPINEPHRINE 2.5; 5 MG/ML; UG/ML
INJECTION, SOLUTION INFILTRATION; PERINEURAL PRN
Status: DISCONTINUED | OUTPATIENT
Start: 2023-11-13 | End: 2023-11-13 | Stop reason: HOSPADM

## 2023-11-13 RX ORDER — HYDROMORPHONE HYDROCHLORIDE 1 MG/ML
0.4 INJECTION, SOLUTION INTRAMUSCULAR; INTRAVENOUS; SUBCUTANEOUS EVERY 5 MIN PRN
Status: DISCONTINUED | OUTPATIENT
Start: 2023-11-13 | End: 2023-11-15 | Stop reason: HOSPADM

## 2023-11-13 RX ORDER — FENTANYL CITRATE 50 UG/ML
50 INJECTION, SOLUTION INTRAMUSCULAR; INTRAVENOUS EVERY 5 MIN PRN
Status: DISCONTINUED | OUTPATIENT
Start: 2023-11-13 | End: 2023-11-15 | Stop reason: HOSPADM

## 2023-11-13 RX ORDER — ONDANSETRON 4 MG/1
4 TABLET, ORALLY DISINTEGRATING ORAL EVERY 30 MIN PRN
Status: DISCONTINUED | OUTPATIENT
Start: 2023-11-13 | End: 2023-11-15 | Stop reason: HOSPADM

## 2023-11-13 RX ORDER — PROPOFOL 10 MG/ML
INJECTION, EMULSION INTRAVENOUS PRN
Status: DISCONTINUED | OUTPATIENT
Start: 2023-11-13 | End: 2023-11-13

## 2023-11-13 RX ORDER — HYDROMORPHONE HYDROCHLORIDE 1 MG/ML
0.2 INJECTION, SOLUTION INTRAMUSCULAR; INTRAVENOUS; SUBCUTANEOUS EVERY 5 MIN PRN
Status: DISCONTINUED | OUTPATIENT
Start: 2023-11-13 | End: 2023-11-15 | Stop reason: HOSPADM

## 2023-11-13 RX ORDER — OXYCODONE HYDROCHLORIDE 5 MG/1
10 TABLET ORAL
Status: DISCONTINUED | OUTPATIENT
Start: 2023-11-13 | End: 2023-11-15 | Stop reason: HOSPADM

## 2023-11-13 RX ORDER — PROPOFOL 10 MG/ML
INJECTION, EMULSION INTRAVENOUS CONTINUOUS PRN
Status: DISCONTINUED | OUTPATIENT
Start: 2023-11-13 | End: 2023-11-13

## 2023-11-13 RX ORDER — ACETAMINOPHEN 325 MG/1
975 TABLET ORAL ONCE
Status: COMPLETED | OUTPATIENT
Start: 2023-11-13 | End: 2023-11-13

## 2023-11-13 RX ADMIN — FENTANYL CITRATE 25 MCG: 50 INJECTION, SOLUTION INTRAMUSCULAR; INTRAVENOUS at 14:53

## 2023-11-13 RX ADMIN — FENTANYL CITRATE 25 MCG: 50 INJECTION, SOLUTION INTRAMUSCULAR; INTRAVENOUS at 13:50

## 2023-11-13 RX ADMIN — CEFAZOLIN SODIUM 2 G: 2 SOLUTION INTRAVENOUS at 13:00

## 2023-11-13 RX ADMIN — FENTANYL CITRATE 50 MCG: 50 INJECTION, SOLUTION INTRAMUSCULAR; INTRAVENOUS at 13:08

## 2023-11-13 RX ADMIN — PROPOFOL 125 MCG/KG/MIN: 10 INJECTION, EMULSION INTRAVENOUS at 13:22

## 2023-11-13 RX ADMIN — CEFAZOLIN SODIUM 2 G: 2 SOLUTION INTRAVENOUS at 13:42

## 2023-11-13 RX ADMIN — ACETAMINOPHEN 975 MG: 325 TABLET ORAL at 12:22

## 2023-11-13 RX ADMIN — FENTANYL CITRATE 50 MCG: 50 INJECTION, SOLUTION INTRAMUSCULAR; INTRAVENOUS at 13:04

## 2023-11-13 RX ADMIN — PROPOFOL 75 MCG/KG/MIN: 10 INJECTION, EMULSION INTRAVENOUS at 13:41

## 2023-11-13 RX ADMIN — ONDANSETRON 4 MG: 2 INJECTION INTRAMUSCULAR; INTRAVENOUS at 14:29

## 2023-11-13 RX ADMIN — PROPOFOL 30 MG: 10 INJECTION, EMULSION INTRAVENOUS at 13:41

## 2023-11-13 RX ADMIN — OXYCODONE HYDROCHLORIDE 5 MG: 5 TABLET ORAL at 14:52

## 2023-11-13 RX ADMIN — SODIUM CHLORIDE, SODIUM LACTATE, POTASSIUM CHLORIDE, CALCIUM CHLORIDE: 600; 310; 30; 20 INJECTION, SOLUTION INTRAVENOUS at 12:36

## 2023-11-13 ASSESSMENT — ENCOUNTER SYMPTOMS: DYSRHYTHMIAS: 0

## 2023-11-13 ASSESSMENT — LIFESTYLE VARIABLES: TOBACCO_USE: 0

## 2023-11-13 NOTE — ANESTHESIA POSTPROCEDURE EVALUATION
Patient: Anayeli Gagnon    Procedure: Procedure(s):  CYSTOSCOPY, OPEN SUPRAPUBIC TUBE INSERTION       Anesthesia Type:  MAC    Note:  Disposition: Outpatient   Postop Pain Control: Uneventful            Sign Out: Well controlled pain   PONV: No   Neuro/Psych: Uneventful            Sign Out: Acceptable/Baseline neuro status   Airway/Respiratory: Uneventful            Sign Out: Acceptable/Baseline resp. status   CV/Hemodynamics: Uneventful            Sign Out: Acceptable CV status; No obvious hypovolemia; No obvious fluid overload   Other NRE: NONE   DID A NON-ROUTINE EVENT OCCUR?            Last vitals:  Vitals Value Taken Time   /67 11/13/23 1417   Temp 35.9  C (96.6  F) 11/13/23 1417   Pulse 83 11/13/23 1417   Resp 16 11/13/23 1417   SpO2 95 % 11/13/23 1417       Electronically Signed By: Ernie Durbin MD  November 13, 2023  2:31 PM

## 2023-11-13 NOTE — ANESTHESIA PREPROCEDURE EVALUATION
Anesthesia Pre-Procedure Evaluation    Patient: Anayeli Gagnon   MRN: [id number] : 1950        Procedure : Procedure(s):  CYSTOSCOPY, OPEN SUPRAPUBIC TUBE INSERTION          Past Medical History:   Diagnosis Date    CARDIAC DYSRHYTHMIAS NEC 10/03/2007    Taking atenelol for years for this    Cerebrovascular accident (H) 2023    History of skin cancer     Mitral valve prolapse     Neurogenic bladder     Sacral decubitus ulcer, stage IV (H)     Thoracic spinal cord injury (H)     TIA (transient ischemic attack) 2023      Past Surgical History:   Procedure Laterality Date    DECOMPRESSION LUMBAR ONE LEVEL N/A 2019    Procedure: Posterior spinal decompression;  Surgeon: Alf Ritchie MD;  Location: [facility] OR    INSERT INTRATHECAL PAIN PUMP N/A 2022    Procedure: INSERTION, INTRATHECAL ANALGESIC PUMP, externalized trial;  Surgeon: Luis Orourke MD;  Location: [facility] OR    INSERT INTRATHECAL PAIN PUMP Right 2022    Procedure: INSERTION, INTRATHECAL ANALGESIC PUMP;  Surgeon: Luis Orourke MD;  Location: [facility] OR    INSERT STIMULATOR AND LEADS INTERNAL DORSAL COLUMN N/A 2021    Procedure: Posterior thoracic 12 to Lumbar 1 level for placement of spinal cord stimulator paddle and placement of implantable pulse generator/battery over right buttock;  Surgeon: Luis Orourke MD;  Location: [facility] OR    IR SPINAL ANGIOGRAM  10/16/2019    IR SPINAL ANGIOGRAM  2019    LAPAROSCOPIC TUBAL LIGATION      REPAIR SPINAL ARERIOVENOUS MALFORMATION N/A 2019    Procedure: with surgical disconnection arterial venous fistula Thoracic 5;  Surgeon: Alf Ritchie MD;  Location: [facility] OR      Allergies   Allergen Reactions    Latex     Contrast Dye Rash     Painful rash after x-ray contrast    Nitrofurantoin Nausea and Vomiting      Social History     Tobacco Use    Smoking status: Never     Passive exposure: Never    Smokeless tobacco: Never   Substance Use Topics    Alcohol use:  No      Wt Readings from Last 1 Encounters:   08/23/23 61.2 kg (135 lb)        Anesthesia Evaluation   Pt has had prior anesthetic. Type: General and MAC.    History of anesthetic complications (Difficulty waking)  - .      ROS/MED HX  ENT/Pulmonary: Comment: Able to cough effectively and take deep breaths    (+)     BRADY risk factors,  hypertension,                             (-) tobacco use and recent URI   Neurologic: Comment: H/o thoracic spinal cord injury with progressive paralysis>>no weight bearing.  Full transfer with Michaela lift.     (+)    peripheral neuropathy, - knees down. migraines,    CVA (presented with visual and speech deficit. Now some ongoing mild visual deficit.), date: 5/5/2023, with deficits,  TIA, date: 4/26/23,      Spinal cord injury,           Cardiovascular: Comment: Denies cardiac symptoms including chest pain, SOB,  syncope, SEGAL, orthopnea, or PND.       (+) Dyslipidemia hypertension- -   -  - -   Taking blood thinners Pt has not received instructions: Instructions Given to patient: Planned 7 day hold for surgery.                            Previous cardiac testing   Echo: Date: 5/6/23 Results:    Stress Test:  Date: 10/30/23 Results:    ECG Reviewed:  Date: 9/11/23 Results:  SR, nonspecific T wave abnormality  Cath:  Date: 2017 Results:   (-) SEGAL and arrhythmias   METS/Exercise Tolerance: 1 - Eating, dressing Comment: METS<4.  Upper extremity movement.    Hematologic:  - neg hematologic  ROS  (-) history of blood clots and history of blood transfusion   Musculoskeletal: Comment: Intrathecal pain pump  Chronic pain in groin and legs      GI/Hepatic:  - neg GI/hepatic ROS     Renal/Genitourinary: Comment: Neurogenic bladder after spinal cord injury.  Currently with hudson catheter for urinary retention.  H/o UTIs.    Unable to feel bladder sensations      Endo:  - neg endo ROS     Psychiatric/Substance Use:  - neg psychiatric ROS   (+)  1       Infectious Disease:     (+)   MRSA,          Malignancy:   (+) Malignancy, History of Skin.Skin CA Remission status post Surgery.      Other:      (+)  , H/O Chronic Pain,, other significant disability Wheelchair bound (Motorized wheel chair.)         Physical Exam    Airway        Mallampati: II       Respiratory Devices and Support         Dental       (+) Minor Abnormalities - some fillings, tiny chips      Cardiovascular          Rhythm and rate: regular     Pulmonary           breath sounds clear to auscultation           OUTSIDE LABS:  CBC:   Lab Results   Component Value Date    WBC 7.0 09/11/2023    WBC 7.6 08/23/2023    HGB 12.3 09/11/2023    HGB 12.1 08/23/2023    HCT 37.2 09/11/2023    HCT 37.4 08/23/2023     09/11/2023     08/23/2023     BMP:   Lab Results   Component Value Date     09/11/2023     08/23/2023    POTASSIUM 5.1 09/11/2023    POTASSIUM 4.6 08/23/2023    CHLORIDE 103 09/11/2023    CHLORIDE 101 08/23/2023    CO2 26 09/11/2023    CO2 30 (H) 08/23/2023    BUN 14.2 09/11/2023    BUN 13.1 08/23/2023    CR 0.61 09/11/2023    CR 0.65 08/23/2023     (H) 09/11/2023    GLC 89 08/23/2023     COAGS:   Lab Results   Component Value Date    PTT 33 01/21/2022    INR 1.00 05/05/2023     POC:   Lab Results   Component Value Date    BGM 91 04/07/2021     HEPATIC:   Lab Results   Component Value Date    ALBUMIN 4.3 05/10/2023    PROTTOTAL 6.9 05/10/2023    ALT 12 05/10/2023    AST 31 05/10/2023    ALKPHOS 70 05/10/2023    BILITOTAL 0.4 05/10/2023     OTHER:   Lab Results   Component Value Date    PH 7.31 (L) 05/05/2023    LACT 0.9 05/05/2023    A1C 5.6 05/05/2023    ADAN 8.9 09/11/2023    PHOS 3.1 01/21/2022    MAG 2.1 01/21/2022    LIPASE 11 (L) 05/10/2023    TSH 4.53 (H) 05/05/2023    T4 1.05 05/05/2023    SED 4 11/17/2011       Anesthesia Plan    ASA Status:  3    NPO Status:  NPO Appropriate    Anesthesia Type: MAC.     - Reason for MAC: immobility needed              Consents    Anesthesia Plan(s) and associated  risks, benefits, and realistic alternatives discussed. Questions answered and patient/representative(s) expressed understanding.     - Discussed:     - Discussed with:  Patient            Postoperative Care            Comments:                Ernie Durbin MD

## 2023-11-13 NOTE — ANESTHESIA CARE TRANSFER NOTE
Patient: Anayeli Gangon    Procedure: Procedure(s):  CYSTOSCOPY, OPEN SUPRAPUBIC TUBE INSERTION       Diagnosis: Urinary retention [R33.9]  Diagnosis Additional Information: No value filed.    Anesthesia Type:   MAC     Note:    Oropharynx: spontaneously breathing  Level of Consciousness: drowsy  Oxygen Supplementation: room air    Independent Airway: airway patency satisfactory and stable  Dentition: dentition unchanged  Vital Signs Stable: post-procedure vital signs reviewed and stable  Report to RN Given: handoff report given  Patient transferred to: Phase II    Handoff Report: Identifed the Patient, Identified the Reponsible Provider, Reviewed the pertinent medical history, Discussed the surgical course, Reviewed Intra-OP anesthesia mangement and issues during anesthesia, Set expectations for post-procedure period and Allowed opportunity for questions and acknowledgement of understanding  Vitals:  Vitals Value Taken Time   BP     Temp     Pulse     Resp     SpO2         Electronically Signed By: EMELI Shaver CRNA  November 13, 2023  2:16 PM

## 2023-11-13 NOTE — OP NOTE
November 13, 2023    PREOPERATIVE DIAGNOSIS:    Urinary retention   Recurrent UTIs   Paraplegia  Neurogenic bladder secondary to thoracic spinal injury  Chronic pain with intrathecal pain pump  Mitral valve prolapse  Ostomy    POSTOPERATIVE DIAGNOSIS: same    PROCEDURES PERFORMED:   1. Cystoscopy  2. Suprapubic catheter placement  3. Intraoperative ultrasound and interpretation of images    STAFF SURGEON: Dr Margaret MD  RESIDENT(S):  Jl Jimenez MD  ANESTHESIA:  Choice    ESTIMATED BLOOD LOSS: 6 mL.     IV FLUIDS: see dictated anesthesia record    COMPLICATIONS: None.     SIGNIFICANT FINDINGS: Procedure as planned     BRIEF OPERATIVE INDICATIONS: Anayeli Gagnon is a(n) 72 year old female with urinary retention, chronic UTI, and neurogenic bladder. She presented for SP tube placement.    DESCRIPTION OF PROCEDURE:  After full informed voluntary consent was obtained, the patient was transported to the operating room and supine on the operating room table. After adequate anesthesia was induced, the patient was placed in the dorsal lithotomy on the table in supportive yellowfin stirrups with care in neural safety in positioning all four extremities.  She was then prepped and draped in the usual sterile fashion. A timeout was taken to confirm correct patient, procedure and laterality. An ultrasound probe was used to verify that no bowel was overlying the site of our incision.    The case was started by inserting a 22-Korean rigid cystoscope sheath and 30-degree angle lens. The urethra was unremarkable. Once in the urinary bladder, media was clear, and there was extensive trabeculation.  there were no appreciable tumors, stones or other abnormalities.   The bladder was filled with sterile saline and the cystoscope was removed.  Patient was placed in trendelenburg    Next, the Lowsley Tractor was lubricated and inserted into the bladder. The tip was palpable two finger breadths  above the pubic symphysis below the skin. A  1.5cm midline incision was made through the skin and subcutaneous tissue, fascia was incised and the bellies of the rectus were split at the midline and a finger sweep was done over the bladder to ensure there was nothing overlying.  At this time the Lowsely was easily palpated  and passed through to the skin surface. A 20-French silicone catheter was then lubricated and placed within the graspers of the Tractor and pulled into the bladder, and out the urethra.  The rigid cystoscope was returned into the bladder and 10mL was easily placed into the balloon under direct vision.  Next, a Nylon suture was placed on the lateral edge of the divided skin and the catheter was sewn into place with standard drain-tying technique, making sure the balloon was freely floating in the bladder just off the bladder wall.    The patient tolerated the procedure well and there were no apparent complications. She was transported to the postanesthesia care unit in stable condition.     Addendum:    I, Karley Robles, was present and scrubbed for the entire case.  I agree with the note as above with changes made as needed.  I spoke to her  and daughter in the waiting room at the end of the case    Karley Robles MD MPH  (she/her/hers)   of Urology  [facility]

## 2023-11-13 NOTE — DISCHARGE INSTRUCTIONS
"[facility]  Home Care Following Anesthesia  For 24 hours after surgery:  Get plenty of rest.  A responsible adult must stay with you for at least 24 hours after you leave the surgery center.  Do not drive or use heavy equipment.  If you have weakness or tingling, don't drive or use heavy equipment until this feeling goes away.   Do not drink alcohol.   Avoid strenuous or risky activities.  Ask for help when climbing stairs.  You may feel lightheaded.  IF so, sit for a few minutes before standing.  Have someone help you get up.   If you have nausea (feel sick to your stomach): Drink only clear liquids such as apple juice, ginger ale, broth or 7-Up.  Rest may also help.  Be sure to drink enough fluids.  Move to a regular diet as you feel able.   You may have a slight fever.  Call the doctor if your fever is over 100 F (37.7 C) (taken under the tongue) or lasts longer than 24 hours.  You may have a dry mouth, a sore throat, muscle aches or trouble sleeping. These should go away after 24 hours.  Do not make important or legal decisions.   It is recommended to avoid smoking.        Today you received a Marcaine or bupivacaine block to numb the nerves near your surgery site.  This is a block using local anesthetic or \"numbing\" medication injected around the nerves to anesthetize or \"numb\" the area supplied by those nerves.  This block is injected into the muscle layer near your surgical site.  The medication may numb the location where you had surgery for 6-18 hours, but may last up to 24 hours.  If your surgical site is an arm or leg you should be careful with your affected limb, since it is possible to injure your limb without being aware of it due to the numbing.  Until full feeling returns, you should guard against bumping or hitting your limb, and avoid extreme hot or cold temperatures on the skin.  As the block wears off, the feeling will return as a tingling or prickly " sensation near your surgical site.  You will experience more discomfort from your incision as the feeling returns.  You may want to take a pain pill (a narcotic or Tylenol if this was prescribed by your surgeon) when you start to experience mild pain before the pain beccomes more severe.  If your pain medications do not control your pain you should notifiy your surgeon.    Tips for taking pain medications  To get the best pain relief possible, remember these points:  Take pain medications as directed, before pain becomes severe.  Pain medication can upset your stomach: taking it with food may help.  Constipation is a common side effect of pain medication. Drink plenty of  fluids.  Eat foods high in fiber. Take a stool softener if recommended by your doctor or pharmacist.  Do not drink alcohol, drive or operate machinery while taking pain medications.  Ask about other ways to control pain, such as with heat, ice or relaxation.    Tylenol/Acetaminophen Consumption    If you feel your pain relief is insufficient, you may take Tylenol/Acetaminophen in addition to your narcotic pain medication.   Be careful not to exceed 4,000 mg of Tylenol/Acetaminophen in a 24 hour period from all sources.  If you are taking extra strength Tylenol/acetaminophen (500 mg), the maximum dose is 8 tablets in 24 hours.  If you are taking regular strength acetaminophen (325 mg), the maximum dose is 12 tablets in 24 hours.    Call a doctor for any of the following:  Signs of infection (fever, growing tenderness at the surgery site, a large amount of drainage or bleeding, severe pain, foul-smelling drainage, redness, swelling).  It has been over 8 to 10 hours since surgery and you are still not able to urinate (pass water).  Headache for over 24 hours.  Numbness, tingling or weakness the day after surgery (if you had spinal anesthesia).  Signs of Covid-19 infection (temperature over 100 degrees, shortness of breath, cough, loss of taste/smell,  generalized body aches, persistent headache, chills, sore throat, nausea/vomiting/diarrhea)  Your doctor is:  Dr. Karley Robles, Prostate and Urology: [phone]  Or dial [phone] and ask for the resident on call for:  Prostate Urology  For emergency care, call the:  [facility]:  [phone] (TTY for hearing impaired: [phone])  Tylenol 975 mg given at 1220 pm.  Next available dose at 620 pm.                     Caring for Your Suprapubic Catheter    What is a suprapubic catheter?  This catheter is a tube that drains urine from the bladder.  The tube enters the body through a small cut in the belly. Stitches will hold the tube in place. You will need to return to the clinic to have the tube removed.  The tube connects to tubing attached to a bag--either a large drainage bag or a smaller leg bag.  Urine flows through the tube into this bag. You will  empty the bag regularly.      Does it hurt?  You may have cramps (bladder spasms) the first few days you have the tube. You may receive medicine in case of pain.    Basic care  Prevent infections  If germs enter the body, they may cause an infection. Germs can get in:    Where the tube enters the your body.    Where the tube connects to the bag.    Through the drainage sprout on the bag.    To reduce the risk of infection, follow these steps carefully.  1. Always begin by washing your hands for 20 seconds.  2. Don t touch the spout of the bag with your fingers.  3. Be sure the spout doesn t touch the toilet.    Clean your skin each day  Do not take a tub bath while you have the tube.  You may take a shower.  If you have a bandage, you will need to change it daily and whenever it gets wet. Do not use creams, powders or ointments on the skin around the tube.    Secure the tube to your body  Keep the tube taped to your belly at all times.  This helps prevent the tube from getting pulled out.  To prevent kinks, you may also want to tape it  to  your upper thigh, leaving a little bit of slack.  Your nurse can show you how to secure the tube.  Follow these steps:  Place a long piece of tape on your skin. You may want to shave any hair from the area first.  Wrap another long piece of tape around the tube. Tape this to the first piece of tape. There should be enough slack in the tube to let you stand and walk comfortably.  Change the tape every two to three days, using a different area each time.  You may ask your nurse or pharmacist about other products to help secure the tube (such as cloth or plastic Velcro straps).     Keep your bag and its tubing lower than your bladder  Keep your bag and tubing below the bladder at all times. This will prevent urine from flowing back into the bladder.  To drain well, the bag needs to be at the lowest part of the system. Any loops of tubing must be higher than the bag.  When using a leg bag take the bag off your leg if you lie down. Hang it over the edge of the bed or couch. You may want to use a safety pin to  secure the bag. Be careful not to poke the bag or tubing with the pin.    At night,coil extra loops of tubing on the bed.  Use a clip to hold the loops. Hang the bag on the side of the bed (or place it on the floor in a bowl, bucket or clean trash can).    Keep your tubing from getting blocked  Empty the urine from your bag regularly. You may want to empty it when the bag becomes half full.  Check the tubing often for kinks that may block the flow of urine.  Do not clamp your tube unless your doctor or nurse asks you to.    Drink plenty of liquids--at least eight extra glasses of water each day (unless your doctor tells you not to).  If you have the tube for a long time, it should be changed every three to six  weeks. Your doctor will tell you how often to have your tube changed. You may need to come back to the clinic, or your home care nurse will change your tube.  Your doctor or nurse may teach you how to  flush the tube, if it often gets plugged.    Caring for your tube site  If you have a StayFix bandage or tube stablizer, your nurse will show you how to use it. Change it once a week or each time it gets wet.  If you have other bandages, change them every day. Also change them if you have drainage or the bandages get wet.    You must clean the skin around the tube every day.  Follow these steps.  Clean your work area with alcohol (or soap and water) and a paper towel.  Wash your hands with soap and water for 20 seconds.  Place these items on your clean work area:          Bag to hold any old bandages           Soap           Cotton Swab or clean washcloth          Sterile 2 x 2 or 4 x 4 gauze bandages           1- inch wide paper tape or medipore tape.  Wash your hands well with soap and water for 20 seconds.  If you have bandages, remove them and put them in the bag for the trash. Be careful not to pull on your tubing or stitches. Do not use scissors--they could cut the tube.  Look at the skin around the tube. If you have stitches, check these as well. Call your clinic (or home care nurse) if you see broken stitches or increasing redness, swelling or drainage around the tube site.  Wash your hands again before cleaning the skin around the tube.  Put the Techni-Care soap on your cotton swab or washcloth.  Clean the skin around the tube site. (You may do this in the shower.) Start at the tube and move outward about 1 to 2 inches, using a circular motion.  Rinse the skin with water. Pat or air dry.  Replace the bandages (if you have them).  When you open the package, you will find two bandages.  (You may also use guaze bandages with splits cut into them.)          A. Fold one bandage in half and place it below the tube site.                                                                                                      Tube taped to skin            B. Fold the other bandage in half and place it over the tube  site.          C. Tape the bandages in place.  Be sure that the tube is securely taped to the skin.  Leave a small amount of slack in the tubing.  This reduces stress (pulling) on the stitches. It also helps prevent the tube from being pulled out.  Tape the tube to the skin about 3 to 4 inches below the site where the tube enters the body.  Throw away the bag holding your used materials.  Clean your work area with soap, water and paper towel. Wash your hands with soap and water.    Choosing your bag  If you will only use the large bag  Some people prefer to use the large drainage bag night and day. This requires less time and effort--and fewer supplies. It is safer, too: you don t have to change the bag as often, and this lowers the risk of infection.    Change the bag once a month or when it leaks. You may keep it in a shopping bag with handles during the day. This allows you to move around  easily, and it keeps the bag and tubing lower than your bladder.    If you will use a leg bag  Some people prefer to use a leg bag during the day and switch back to the larger bag at night. A leg bag gives you more freedom to move around. You can keep the bag hidden under loose-fitting slacks or a long skirt.  Because it is a smaller bag, you will need to empty it more often. Also, each time you change bags, germs can get into your body.    To set up a new leg bag, follow the steps below. These steps are for the Conveen leg bag. If you use another brand, follow the directions on the package insert.    Wash your hands for 20 seconds. Use soap and water. Clean your work area with alcohol (or soap and water) and a paper towel.  Place these items on your clean work area:  Leg bag kit (opened)  Clean scissors  Alcohol pads  Button the leg straps through the top and bottom of the bag. Place the wide strap at the top of the bag. You may trim the straps to fit your lower leg. The bag will hang below your knee.    Close the drainage  spout at the bottom of the bag.  Decide how long the bag s tubing needs to be.  It should reach below the knee. Allow a little slack in the tubing, so you can walk and sit freely. If you decide to shorten the tubing:  Use an alcohol pad to clean your scissors well.   Use another alcohol pad to wipe the tubing.   Cut the tubing to the right size.   Do not allow the tip of the tubing to touch anything.   Insert the white spout into the bag s tubing.    Use an alcohol pad to wipe the spout.    Keep the gray cover on the large end.    Push the small end into the tubing. Once it's in, it can t be removed.    Changing bags  Wash your hands for 20 seconds. You may then put on clean gloves, if you wish.  Place a towel under the tubes to catch any drops of urine.  Use an alcohol pad to clean where the current bag connects with the tube. Wipe three times in a row.  Gently twist the tubes apart. Don t use your nails (nails often carry germs.) Squeeze the tube gently to keep urine from dripping out.  Connect the tubing from the new bag to the bladder tube. Do not touch the ends. Check that the tubes connect tightly.    Cleaning your leg bag  Before you can reuse a bag, you must wash it with soap and rinse it with cleaning solution. Clean the bag as soon as you disconnect it from the tube. Do not re-use a bag without cleaning it first. You may use the bag for one week. After that, throw it away.   If you are not using a leg bag, you must change the bag at least once a month. You don t need to clean it--just throw it away.    To clean a bag, follow these steps:  Clean your work area with alcohol (or soap and water) and a paper towel. Wash your hands with soap and water.  Place these items on your clean work area:         Clean Funnel         Liquid dish soap          Cleaning solution (choose one):                -   cup white vinegar +   cup water, or                - 15 ml (mililiters) bleach + 150 ml water.  Empty the urine  from the bag into the toilet   Fill the bag with cool tap water. A small funnel will help direct the stream of water.  Drain and fill the bag again, adding a couple drops of dish soap. Gently squeeze the bag several times to clean the inside.  Drain the water into the toilet.  Rinse the bag well with tap water.  Fill the bag with your cleaning solution.  Gently squeeze the bag several times.  For vinegar and water: Let it sit for 30 minutes.  For bleach and water: Let it sit for 30 seconds.  Empty the bag into the toilet.  Hang to air-dry with both ends pointing down.    Pull the sides of the bag apart to speed drying.    Do not hang the bag or tubing over a radiator or other source of heat. This may lead to germs and infection.  You may wish to use a  to hang the bag.  You may cover the end of the tubing with clean paper towel.  Use a rubber band to hold the paper towel in place.  After the bag and tubing have dried, store them in a clean towel or covered container. If you used a paper towel, you may remove it.  Before you re-use the bag, clean the end of the tubing with an alcohol pad.    When to call for help  If the tube comes out, go to the emergency room.  (Tape a bandage over the opening in your skin.  Bring the tube with you to the emergency room.)    Call your home care nurse or doctor s office right away if:  No urine drains through the tube, or there's less urine than normal.  Your urine looks bloody or cloudy, it has changed color, or you see large blood clots.  Your urine has a bad odor.  You have pain that gets worse, doesn't improve or cannot be controlled with medicine.  You have pain in your back or lower belly area (abdomen).  You have a fever over 101 F  (38.3 C ) taken under the tongue.  Urine leaks around the tube for more than a day or two.  The skin around your tube is swollen, red, very tender or draining pus.

## 2023-11-14 ENCOUNTER — NURSE TRIAGE (OUTPATIENT)
Dept: NURSING | Facility: CLINIC | Age: 73
End: 2023-11-14
Payer: MEDICARE

## 2023-11-14 NOTE — TELEPHONE ENCOUNTER
"Patient reporting had procedure yesterday with urinary suprapubic tube procedure.    In the bag there are blood clots in the urine and not much urine.  Patient is \"very wet\" from incontinence rather than the catheter bag, and tube not working properly or is clogged.    Disposition to see provider within four hours and will need a secondary triage.  For specialist, warm transferred patient to answering service to contact on call surgery.    Chrissy Correa RN  [facility]      Reason for Disposition   [1] Catheter is broken AND [2] is not usable    Additional Information   Negative: Shock suspected (e.g., cold/pale/clammy skin, too weak to stand, low BP, rapid pulse)   Negative: Sounds like a life-threatening emergency to the triager   Negative: [1] Catheter was accidentally pulled-out AND [2] bright red continuous bleeding   Negative: SEVERE abdominal pain   Negative: Fever > 100.4 F (38.0 C)   Negative: [1] Drinking very little AND [2] dehydration suspected (e.g., no urine > 12 hours, very dry mouth, very lightheaded)   Negative: Patient sounds very sick or weak to the triager   Negative: Catheter was accidentally pulled-out    Protocols used: Urinary Catheter Symptoms and [id number]    "

## 2023-11-15 ENCOUNTER — TRANSFERRED RECORDS (OUTPATIENT)
Dept: HEALTH INFORMATION MANAGEMENT | Facility: CLINIC | Age: 73
End: 2023-11-15
Payer: MEDICARE

## 2023-11-15 ENCOUNTER — MEDICAL CORRESPONDENCE (OUTPATIENT)
Dept: HEALTH INFORMATION MANAGEMENT | Facility: CLINIC | Age: 73
End: 2023-11-15
Payer: MEDICARE

## 2023-12-04 ENCOUNTER — TELEPHONE (OUTPATIENT)
Dept: UROLOGY | Facility: CLINIC | Age: 73
End: 2023-12-04
Payer: MEDICARE

## 2023-12-06 ENCOUNTER — MEDICAL CORRESPONDENCE (OUTPATIENT)
Dept: ANESTHESIOLOGY | Facility: CLINIC | Age: 73
End: 2023-12-06
Payer: MEDICARE

## 2023-12-11 ENCOUNTER — MEDICAL CORRESPONDENCE (OUTPATIENT)
Dept: HEALTH INFORMATION MANAGEMENT | Facility: CLINIC | Age: 73
End: 2023-12-11
Payer: MEDICARE

## 2023-12-13 ENCOUNTER — PRE VISIT (OUTPATIENT)
Dept: UROLOGY | Facility: CLINIC | Age: 73
End: 2023-12-13
Payer: MEDICARE

## 2023-12-13 NOTE — TELEPHONE ENCOUNTER
Reason for visit: Post op      Relevant information: SP tube placed 11/13/23    Records/imaging/labs/orders: all records available    Pt called: No need for a call    At Rooming: catheter kit, wire, replacement catheter - do not remove catheter    Monica Marquez CMA  12/13/2023  3:13 PM

## 2023-12-21 ENCOUNTER — OFFICE VISIT (OUTPATIENT)
Dept: UROLOGY | Facility: CLINIC | Age: 73
End: 2023-12-21
Payer: MEDICARE

## 2023-12-21 VITALS
DIASTOLIC BLOOD PRESSURE: 77 MMHG | OXYGEN SATURATION: 93 % | HEART RATE: 63 BPM | SYSTOLIC BLOOD PRESSURE: 127 MMHG | RESPIRATION RATE: 18 BRPM

## 2023-12-21 DIAGNOSIS — Z43.5 ENCOUNTER FOR CARE OR REPLACEMENT OF SUPRAPUBIC TUBE (H): Primary | ICD-10-CM

## 2023-12-21 DIAGNOSIS — N39.0 RECURRENT UTI: ICD-10-CM

## 2023-12-21 LAB
ALBUMIN UR-MCNC: 100 MG/DL
APPEARANCE UR: ABNORMAL
BACTERIA #/AREA URNS HPF: ABNORMAL /HPF
BILIRUB UR QL STRIP: NEGATIVE
COLOR UR AUTO: ABNORMAL
GLUCOSE UR STRIP-MCNC: NEGATIVE MG/DL
HGB UR QL STRIP: ABNORMAL
KETONES UR STRIP-MCNC: NEGATIVE MG/DL
LEUKOCYTE ESTERASE UR QL STRIP: ABNORMAL
NITRATE UR QL: NEGATIVE
PH UR STRIP: 7.5 [PH] (ref 5–7)
RBC URINE: >182 /HPF
SP GR UR STRIP: 1.01 (ref 1–1.03)
SQUAMOUS EPITHELIAL: 2 /HPF
TRANSITIONAL EPI: 3 /HPF
UROBILINOGEN UR STRIP-MCNC: NORMAL MG/DL
WBC CLUMPS #/AREA URNS HPF: PRESENT /HPF
WBC URINE: >182 /HPF
YEAST #/AREA URNS HPF: ABNORMAL /HPF

## 2023-12-21 PROCEDURE — 87086 URINE CULTURE/COLONY COUNT: CPT | Performed by: UROLOGY

## 2023-12-21 PROCEDURE — 51705 CHANGE OF BLADDER TUBE: CPT | Mod: 58 | Performed by: UROLOGY

## 2023-12-21 PROCEDURE — 99000 SPECIMEN HANDLING OFFICE-LAB: CPT | Performed by: PATHOLOGY

## 2023-12-21 PROCEDURE — 81001 URINALYSIS AUTO W/SCOPE: CPT | Performed by: PATHOLOGY

## 2023-12-21 RX ORDER — CIPROFLOXACIN 500 MG/1
500 TABLET, FILM COATED ORAL ONCE
Qty: 1 TABLET | Refills: 0 | Status: SHIPPED | OUTPATIENT
Start: 2023-12-21 | End: 2023-12-21

## 2023-12-21 RX ORDER — CIPROFLOXACIN 500 MG/1
500 TABLET, FILM COATED ORAL ONCE
Status: COMPLETED | OUTPATIENT
Start: 2023-12-21 | End: 2023-12-21

## 2023-12-21 RX ADMIN — CIPROFLOXACIN 500 MG: 500 TABLET, FILM COATED ORAL at 15:49

## 2023-12-21 ASSESSMENT — PAIN SCALES - GENERAL: PAINLEVEL: NO PAIN (0)

## 2023-12-21 NOTE — PATIENT INSTRUCTIONS
Monthly SP tube changes    Use the estrogen cream    See Dulce Be in about 3 months, sooner if needed    It was a pleasure meeting with you today.  Thank you for allowing me and my team the privilege of caring for you today.  YOU are the reason we are here, and I truly hope we provided you with the excellent service you deserve.  Please let us know if there is anything else we can do for you so that we can be sure you are leaving completely satisfied with your care experience.

## 2023-12-21 NOTE — PROGRESS NOTES
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    Doing well overall but endorses pain and irritation in her vaginal area. States she does not feel she can self administer topical estrogen cream. Discussed that having her  help her may be a good option.    Denies fever chills nausea vomiting. SPT site appears clean, dry, and intact. Scant mucous next to tube.    PHYSICAL EXAM  Patient is a 73 year old  female   Vitals: Blood pressure 127/77, pulse 63, resp. rate 18, SpO2 93%, not currently breastfeeding.  General Appearance Adult: There is no height or weight on file to calculate BMI.  Alert, no acute distress, oriented  HENT: throat/mouth:normal, good dentition  Lungs: no respiratory distress, or pursed lip breathing  Heart: No obvious jugular venous distension present  Abdomen: soft, nontender, no organomegaly or masses. SPT in place, cdi, scant mucous near catheter site.  Musculoskeltal: extremities normal, no peripheral edema  Skin: no suspicious lesions or rashes  Neuro: Alert, oriented, speech and mentation normal  Psych: affect and mood normal    Presents for post op check and SPT exchange, which was done in the typical sterile fashion over a wire without complication. She now has a 20fr [place] tip hudson with 10cc in the balloon. UA sent from clean bag.    ASSESSMENT and PLAN: s/p SPT for urinary retention contributing to UTIs    - will arrange for monthly SPT changes in nursing clinic at [facility]  - cipro 500mg once for  manipulation    Andre Jimenez MD   Urology  PGY2    Addendum:    The patient was seen and evaluated with the resident.  The plan was formulated in conjunction with me and I agree with the note with changes made as necessary.  I was present for the entire SPT exchange over a  wire    10 minutes were spent today on the date of the encounter in reviewing the EMR, direct patient care, coordination of care, and documentation.    Karley Robles MD MPH  (she/her/hers)   of Urology  [facility]  Patient Care Team:  Jazmin Interiano MD as PCP - General (Family Medicine)  Austin Klein, RN as Specialty Care Coordinator (Neurological Surgery)  Luis Orourke MD as MD (Neurological Surgery)  Cris Miguel, RN as Specialty Care Coordinator (Neurological Surgery)  Jasmine Espinoza MD as Anesthesiologist (Pain Medicine)  Alia Reyes, RN as Registered Nurse (Pain Clinic)  Shelli Frost MD as Assigned Neuroscience Provider  Joss Martinez MD as Hospitalist (Infectious Diseases)  Satnam Brasher MD as Assigned Infectious Disease Provider  Dulce Be PA-C as Assigned Surgical Provider

## 2023-12-21 NOTE — LETTER
12/21/2023       RE: Anayeli Gagnon  [address]  [place] [address]     Dear Colleague,    Thank you for referring your patient, Anayeli Gagnon, to the [facility] [place] at [facility]. Please see a copy of my visit note below.    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    Doing well overall but endorses pain and irritation in her vaginal area. States she does not feel she can self administer topical estrogen cream. Discussed that having her  help her may be a good option.    Denies fever chills nausea vomiting. SPT site appears clean, dry, and intact. Scant mucous next to tube.    PHYSICAL EXAM  Patient is a 73 year old  female   Vitals: Blood pressure 127/77, pulse 63, resp. rate 18, SpO2 93%, not currently breastfeeding.  General Appearance Adult: There is no height or weight on file to calculate BMI.  Alert, no acute distress, oriented  HENT: throat/mouth:normal, good dentition  Lungs: no respiratory distress, or pursed lip breathing  Heart: No obvious jugular venous distension present  Abdomen: soft, nontender, no organomegaly or masses. SPT in place, cdi, scant mucous near catheter site.  Musculoskeltal: extremities normal, no peripheral edema  Skin: no suspicious lesions or rashes  Neuro: Alert, oriented, speech and mentation normal  Psych: affect and mood normal    Presents for post op check and SPT exchange, which was done in the typical sterile fashion over a wire without complication. She now has a 20fr [place] tip hudson with 10cc in the balloon. UA sent from clean bag.    ASSESSMENT and PLAN: s/p SPT for urinary retention contributing to UTIs    - will arrange for monthly SPT changes in nursing  clinic at [facility]  - cipro 500mg once for  manipulation    Adnre Jimenez MD   Urology  PGY2    Addendum:    The patient was seen and evaluated with the resident.  The plan was formulated in conjunction with me and I agree with the note with changes made as necessary.  I was present for the entire SPT exchange over a wire    10 minutes were spent today on the date of the encounter in reviewing the EMR, direct patient care, coordination of care, and documentation.    Karley Robles MD MPH  (she/her/hers)   of Urology  [facility]  Patient Care Team:  Jazmin Interiano MD as PCP - General (Family Medicine)  Austin Klein, RN as Specialty Care Coordinator (Neurological Surgery)  Luis Orourke MD as MD (Neurological Surgery)  Cris Miguel, RN as Specialty Care Coordinator (Neurological Surgery)  Jasmine Espinoza MD as Anesthesiologist (Pain Medicine)  Alia Reyes, RN as Registered Nurse (Pain Clinic)  Shelli Frost MD as Assigned Neuroscience Provider  Joss Martinez MD as Hospitalist (Infectious Diseases)  Satnam Brasher MD as Assigned Infectious Disease Provider  Dulce Be PA-C as Assigned Surgical Provider

## 2023-12-21 NOTE — NURSING NOTE
"Chief Complaint   Patient presents with    Follow Up     \"Extreme pain, pretty much all the time, later in the day the worse it gets, terrible pain burning in vaginal area possibly to rectal area\"       Blood pressure 127/77, pulse 63, resp. rate 18, SpO2 93%, not currently breastfeeding. There is no height or weight on file to calculate BMI.    Patient Active Problem List   Diagnosis    Other specified cardiac dysrhythmias(427.89)    CARDIOVASCULAR SCREENING; LDL GOAL LESS THAN 160    History of skin cancer    Headache    Menopause    Anxiety reaction    Osteoarthritis    Acute incomplete paraplegia (H)    Bilateral lower extremity edema    Chronic bilateral low back pain with bilateral sciatica    Contact dermatitis    Edema of spinal cord (H)    Hematuria    History of recurrent UTIs    History of spinal surgery    Hypertension    History of urinary retention    Incomplete emptying of bladder    Incomplete injury of thoracic spinal cord in T1 to T6 region without bony injury (H)    Leg weakness, bilateral    Muscle spasticity    MVP (mitral valve prolapse)    Palpitations    Pressure ulcer of coccygeal region, stage 3 (H)    Spinal cord injury at T7-T12 level (H)    Spinal cord injury of thoracic region without bone injury (H)    Tachycardia    Pure hypercholesterolemia    Ulnar neuropathy at elbow, left    Nocturnal enuresis    Urge incontinence of urine    Arteriovenous fistula of spinal cord vessels    Dural arteriovenous fistula    Spinal cord injury of thoracic region without bone injury, subsequent encounter (H)    Thoracolumbar back pain    Neuropathic pain    Intractable neuropathic pain of lumbosacral origin    Surgery, elective    Nausea    Elevated troponin    Generalized muscle weakness    Urinary tract infection with hematuria, site unspecified    Infection due to 2019 novel coronavirus    Cerebrovascular accident (CVA), unspecified mechanism (H)    Urinary tract infection associated with " catheterization of urinary tract, unspecified indwelling urinary catheter type, initial encounter     Abdominal pain, generalized    Pain in both lower legs    Urinary retention    Chest pain       Allergies   Allergen Reactions    Latex     Contrast Dye Rash     Painful rash after x-ray contrast    Nitrofurantoin Nausea and Vomiting       Current Outpatient Medications   Medication Sig Dispense Refill    acetaminophen (TYLENOL) 500 MG tablet Take 500-1,000 mg by mouth every 6 hours as needed for mild pain      aspirin (ASA) 81 MG EC tablet Take 1 tablet (81 mg) by mouth daily (Patient taking differently: Take 81 mg by mouth every morning) 30 tablet 3    atorvastatin (LIPITOR) 40 MG tablet Take 1 tablet (40 mg) by mouth every evening 30 tablet 3    baclofen (LIORESAL) 10 MG tablet Take 10 mg by mouth 2 times daily      gabapentin (NEURONTIN) 600 MG tablet Take 1,200 mg by mouth 3 times daily  1 tablet 0    lidocaine (LIDODERM) 5 % patch Apply 1 patch topically daily as needed       LORazepam (ATIVAN) 0.5 MG tablet Take 1 tablet by mouth daily as needed for anxiety      medication given by implanted intrathecal pump continuous Concentrations:  Dilaudid    14 mg/ml                                                          Bupivacaine 28 mg/ml  Ziconotide 14 mcg/ml     Total Volume in Refill: 20 mL      Basal:   Dilaudid   6.993 mg/day                                                       Bupivacaine 13.986 mg/day  Ziconotide 6.993 mcg/day     PTM 0.2 mg hydromorphone, 0.4 mg bupivacaine, 0.2 mcg ziconotide, 2 hour lockout, maximum 6 in a day (2 hr lockout)     Maximum 24 hour dose  Hydromorphone 8.134 mg/day  Bupivacaine 16.267 mg/day  Ziconitide 8.134 mcg/day     Last filled on 10/26/23 and current alarm date is: 11/25/23    Managed by [employer] ([phone]) - refills approximately every 3 weeks  Provider. Dr. Maryam Espinoza      melatonin 3 MG tablet Take 3 mg by mouth nightly as needed for sleep      metoprolol  succinate ER (TOPROL-XL) 25 MG 24 hr tablet Take 12.5 mg by mouth every evening       Multiple Vitamins-Minerals (WOMENS MULTI PO) Take 1 tablet by mouth daily      ondansetron (ZOFRAN) 4 MG tablet Take 1 tablet (4 mg) by mouth every 6 hours as needed for nausea 20 tablet 1    polyethylene glycol (MIRALAX) 17 GM/Dose powder Take 17 g by mouth daily as needed for constipation          Social History     Tobacco Use    Smoking status: Never     Passive exposure: Never    Smokeless tobacco: Never   Substance Use Topics    Alcohol use: No    Drug use: Never       Indiana Rangel LPN  12/21/2023  2:48 PM

## 2023-12-21 NOTE — NURSING NOTE
The following medication was given:     MEDICATION:  Ciprofloxacin  ROUTE: PO  SITE: mouth  DOSE: 500 mg  LOT #: [id number]  : [employer]  EXPIRATION DATE: 03/2025  NDC#: 4997-2092-87   Was there drug waste? No    Prior to administration, verified patient identity using patient's name and date of birth.  Due to injection administration, patient instructed to remain in clinic for 15 minutes  afterwards, and to report any adverse reaction to me immediately.      Marlen Webster LPN  December 21, 2023  3:50 PM

## 2023-12-22 ENCOUNTER — TELEPHONE (OUTPATIENT)
Dept: UROLOGY | Facility: CLINIC | Age: 73
End: 2023-12-22
Payer: MEDICARE

## 2023-12-22 LAB — BACTERIA UR CULT: NORMAL

## 2023-12-22 NOTE — TELEPHONE ENCOUNTER
Notified patient of UA results and recommendations to do vinegar instillations.  Patient states she already does this with the bag sometimes and requested infgormation to be sent via mychart.   Will send mychart with vinegar instillation info.   Emily Townsend RN on 12/22/2023 at 1:35 PM

## 2023-12-27 ENCOUNTER — MYC MEDICAL ADVICE (OUTPATIENT)
Dept: UROLOGY | Facility: CLINIC | Age: 73
End: 2023-12-27
Payer: MEDICARE

## 2023-12-28 ENCOUNTER — TRANSFERRED RECORDS (OUTPATIENT)
Dept: HEALTH INFORMATION MANAGEMENT | Facility: CLINIC | Age: 73
End: 2023-12-28
Payer: MEDICARE

## 2023-12-28 ENCOUNTER — MEDICAL CORRESPONDENCE (OUTPATIENT)
Dept: HEALTH INFORMATION MANAGEMENT | Facility: CLINIC | Age: 73
End: 2023-12-28
Payer: MEDICARE

## 2024-01-02 ASSESSMENT — ANXIETY QUESTIONNAIRES
8. IF YOU CHECKED OFF ANY PROBLEMS, HOW DIFFICULT HAVE THESE MADE IT FOR YOU TO DO YOUR WORK, TAKE CARE OF THINGS AT HOME, OR GET ALONG WITH OTHER PEOPLE?: SOMEWHAT DIFFICULT
7. FEELING AFRAID AS IF SOMETHING AWFUL MIGHT HAPPEN: NOT AT ALL
5. BEING SO RESTLESS THAT IT IS HARD TO SIT STILL: NOT AT ALL
2. NOT BEING ABLE TO STOP OR CONTROL WORRYING: NOT AT ALL
6. BECOMING EASILY ANNOYED OR IRRITABLE: SEVERAL DAYS
IF YOU CHECKED OFF ANY PROBLEMS ON THIS QUESTIONNAIRE, HOW DIFFICULT HAVE THESE PROBLEMS MADE IT FOR YOU TO DO YOUR WORK, TAKE CARE OF THINGS AT HOME, OR GET ALONG WITH OTHER PEOPLE: SOMEWHAT DIFFICULT
4. TROUBLE RELAXING: NOT AT ALL
1. FEELING NERVOUS, ANXIOUS, OR ON EDGE: SEVERAL DAYS
7. FEELING AFRAID AS IF SOMETHING AWFUL MIGHT HAPPEN: NOT AT ALL
GAD7 TOTAL SCORE: 3
3. WORRYING TOO MUCH ABOUT DIFFERENT THINGS: SEVERAL DAYS
GAD7 TOTAL SCORE: 3

## 2024-01-02 ASSESSMENT — PAIN SCALES - PAIN ENJOYMENT GENERAL ACTIVITY SCALE (PEG)
INTERFERED_ENJOYMENT_LIFE: 6
PEG_TOTALSCORE: 6
INTERFERED_ENJOYMENT_LIFE: 6
INTERFERED_GENERAL_ACTIVITY: 6
PEG_TOTALSCORE: 6
AVG_PAIN_PASTWEEK: 6
AVG_PAIN_PASTWEEK: 6
INTERFERED_GENERAL_ACTIVITY: 6

## 2024-01-05 PROBLEM — T83.511A URINARY TRACT INFECTION ASSOCIATED WITH CATHETERIZATION OF URINARY TRACT, UNSPECIFIED INDWELLING URINARY CATHETER TYPE, INITIAL ENCOUNTER (H): Status: RESOLVED | Noted: 2023-05-05 | Resolved: 2024-01-05

## 2024-01-05 PROBLEM — L25.9 CONTACT DERMATITIS: Status: RESOLVED | Noted: 2017-12-03 | Resolved: 2024-01-05

## 2024-01-05 PROBLEM — R79.89 ELEVATED TROPONIN: Status: RESOLVED | Noted: 2022-07-20 | Resolved: 2024-01-05

## 2024-01-05 PROBLEM — N39.0 URINARY TRACT INFECTION WITH HEMATURIA, SITE UNSPECIFIED: Status: RESOLVED | Noted: 2022-09-26 | Resolved: 2024-01-05

## 2024-01-05 PROBLEM — M62.81 GENERALIZED MUSCLE WEAKNESS: Status: RESOLVED | Noted: 2022-09-26 | Resolved: 2024-01-05

## 2024-01-05 PROBLEM — R33.9 URINARY RETENTION: Status: RESOLVED | Noted: 2023-08-14 | Resolved: 2024-01-05

## 2024-01-05 PROBLEM — R00.0 TACHYCARDIA: Status: RESOLVED | Noted: 2017-12-03 | Resolved: 2024-01-05

## 2024-01-05 PROBLEM — Z87.898 HISTORY OF URINARY RETENTION: Status: RESOLVED | Noted: 2018-03-15 | Resolved: 2024-01-05

## 2024-01-05 PROBLEM — N39.0 URINARY TRACT INFECTION ASSOCIATED WITH CATHETERIZATION OF URINARY TRACT, UNSPECIFIED INDWELLING URINARY CATHETER TYPE, INITIAL ENCOUNTER (H): Status: RESOLVED | Noted: 2023-05-05 | Resolved: 2024-01-05

## 2024-01-05 PROBLEM — M79.662 PAIN IN BOTH LOWER LEGS: Status: RESOLVED | Noted: 2023-05-10 | Resolved: 2024-01-05

## 2024-01-05 PROBLEM — Z41.9 SURGERY, ELECTIVE: Status: RESOLVED | Noted: 2022-01-19 | Resolved: 2024-01-05

## 2024-01-05 PROBLEM — R11.0 NAUSEA: Status: RESOLVED | Noted: 2022-07-20 | Resolved: 2024-01-05

## 2024-01-05 PROBLEM — R31.9 URINARY TRACT INFECTION WITH HEMATURIA, SITE UNSPECIFIED: Status: RESOLVED | Noted: 2022-09-26 | Resolved: 2024-01-05

## 2024-01-05 PROBLEM — R31.9 HEMATURIA: Status: RESOLVED | Noted: 2017-12-03 | Resolved: 2024-01-05

## 2024-01-05 PROBLEM — U07.1 INFECTION DUE TO 2019 NOVEL CORONAVIRUS: Status: RESOLVED | Noted: 2022-09-26 | Resolved: 2024-01-05

## 2024-01-05 PROBLEM — R10.84 ABDOMINAL PAIN, GENERALIZED: Status: RESOLVED | Noted: 2023-05-10 | Resolved: 2024-01-05

## 2024-01-05 PROBLEM — S24.109A: Status: RESOLVED | Noted: 2017-11-22 | Resolved: 2024-01-05

## 2024-01-05 PROBLEM — Z87.440 HISTORY OF RECURRENT UTIS: Status: RESOLVED | Noted: 2017-12-03 | Resolved: 2024-01-05

## 2024-01-05 PROBLEM — R07.9 CHEST PAIN: Status: RESOLVED | Noted: 2023-09-11 | Resolved: 2024-01-05

## 2024-01-05 PROBLEM — M79.661 PAIN IN BOTH LOWER LEGS: Status: RESOLVED | Noted: 2023-05-10 | Resolved: 2024-01-05

## 2024-01-06 NOTE — PROGRESS NOTES
NEUROLOGY FOLLOW UP VISIT  NOTE       [facility] [place]  [address] [place] [address]  Tel: [phone]  Fax: [phone]  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  She was on dual antiplatelet therapy, statin and since admission to the hospital has not noticed any significant change.  She had just 6 to her right visual field cut and has learned to turn her head all the way to the right so that she can see.  I have recommended:    1.  Increase aspirin to 325 mg daily.  Patient was told to take enteric-coated aspirin to minimize GI side effects  2.  Continue Lipitor 40 mg daily.  Previously her LDL was elevated.  Check lipid panel and if LDL is more than 70, I will recommend increasing the dose of Lipitor to 80 mg daily  3.  Vascular risk factors modification: Healthy diet (fruits, vegetables, low fat dairy & reduced saturated fat), weight loss, exercise at least 30 minutes 5 days/week, BMI goal <25.  Keep systolic blood pressure goal <130.  LDL goal <70.  Hemoglobin A1c goal <7. If applicable, STOP smoking  4.  Follow-up on as needed basis    Thank you again for this referral, please feel free to contact me if you have any questions.    Waqas Tyson MD  [facility]UROLOG[facility]  (Formerly, [facility]     HISTORY OF PRESENT ILLNESS     Patient is a 73-year-old female with history of TIA, HLD, thoracic cord injury with spinal AV  fistula resulting in paraplegia, HTN, chronic pain syndrome who was admitted to the hospital on 5/5/2023 for visual symptoms.  She was noted to have right homonymous hemianopsia.  CT of the head and CTA showed absence of contrast filling in the left posterior cerebral artery echocardiogram showed normal ejection fraction.  She was started on dual antiplatelet therapy and continue to have visual symptoms..  She was last seen by nurse practitioner and was continued on aspirin, Lipitor and was told to follow-up with ophthalmology and psychiatry.  According to patient there has been no significant change in her vision.  She is paraplegic and has not noticed any change in her lower extremity strength.  She continues on baby aspirin along with a statin     PROBLEM LIST   Patient Active Problem List   Diagnosis Code    Other specified cardiac dysrhythmias(427.89) I49.8    CARDIOVASCULAR SCREENING; LDL GOAL LESS THAN 160 Z13.6    History of skin cancer Z85.828    Headache R51.9    Menopause Z78.0    Anxiety reaction F41.1    Osteoarthritis M19.90    Acute incomplete paraplegia (H) G82.22    Bilateral lower extremity edema R60.0    Chronic bilateral low back pain with bilateral sciatica M54.42, M54.41, G89.29    Edema of spinal cord (H) G95.19    History of spinal surgery Z98.890    Hypertension I10    Incomplete emptying of bladder R33.9    Incomplete injury of thoracic spinal cord in T1 to T6 region without bony injury (H) S24.151A    Leg weakness, bilateral R29.898    Muscle spasticity M62.838    MVP (mitral valve prolapse) I34.1    Palpitations R00.2    Pressure ulcer of coccygeal region, stage 3 (H) L89.153    Spinal cord injury at T7-T12 level (H) S24.103A    Pure hypercholesterolemia E78.00    Ulnar neuropathy at elbow, left G56.22    Nocturnal enuresis N39.44    Urge incontinence of urine N39.41    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    Spinal cord injury of  thoracic region without bone injury, subsequent encounter (H) S24.109D    Thoracolumbar back pain M54.50, M54.6    Neuropathic pain M79.2    Intractable neuropathic pain of lumbosacral origin M79.2    Cerebrovascular accident (CVA), unspecified mechanism (H) I63.9    Depressive disorder F32.A    Right homonymous hemianopsia H53.461         PAST MEDICAL & SURGICAL HISTORY     Past Medical History:   Patient  has a past medical history of CARDIAC DYSRHYTHMIAS NEC (10/03/2007), Cerebrovascular accident (H) (05/05/2023), History of skin cancer, Mitral valve prolapse, Neurogenic bladder, Sacral decubitus ulcer, stage IV (H), Thoracic spinal cord injury (H), and TIA (transient ischemic attack) (04/26/2023).    Surgical History:  She  has a past surgical history that includes Laparoscopic tubal ligation; IR Spinal Angiogram (10/16/2019); Decompression lumbar one level (N/A, 12/27/2019); Repair Spinal Areriovenous Malformation (N/A, 12/27/2019); IR Spinal Angiogram (12/20/2019); Insert stimulator and leads internal dorsal column (N/A, 4/7/2021); Insert Intrathecal Pain Pump (N/A, 1/19/2022); Insert Intrathecal Pain Pump (Right, 1/21/2022); and Cystostomy, insert tube suprapubic, combined (N/A, 11/13/2023).     SOCIAL HISTORY     Reviewed, and she  reports that she has never smoked. She has never been exposed to tobacco smoke. She has never used smokeless tobacco. She reports that she does not drink alcohol and does not use drugs.     FAMILY HISTORY     Reviewed, and family history includes C.A.D. in her father.     ALLERGIES     Allergies   Allergen Reactions    Latex     Contrast Dye Rash     Painful rash after x-ray contrast    Nitrofurantoin Nausea and Vomiting         REVIEW OF SYSTEMS     A 12 point review of system was performed and was negative except as outlined in the history of present illness.     HOME MEDICATIONS     Current Outpatient Rx   Medication Sig Dispense Refill    acetaminophen (TYLENOL) 500 MG tablet  Take 500-1,000 mg by mouth every 6 hours as needed for mild pain      aspirin (ASA) 325 MG EC tablet Take 1 tablet (325 mg) by mouth daily 30 tablet 11    atorvastatin (LIPITOR) 40 MG tablet Take 1 tablet (40 mg) by mouth every evening 30 tablet 3    baclofen (LIORESAL) 10 MG tablet Take 10 mg by mouth 2 times daily      gabapentin (NEURONTIN) 600 MG tablet Take 1,200 mg by mouth 3 times daily  1 tablet 0    lidocaine (LIDODERM) 5 % patch Apply 1 patch topically daily as needed       LORazepam (ATIVAN) 0.5 MG tablet Take 1 tablet by mouth daily as needed for anxiety      medication given by implanted intrathecal pump continuous Concentrations:  Dilaudid    14 mg/ml                                                          Bupivacaine 28 mg/ml  Ziconotide 14 mcg/ml     Total Volume in Refill: 20 mL      Basal:   Dilaudid   6.993 mg/day                                                       Bupivacaine 13.986 mg/day  Ziconotide 6.993 mcg/day     PTM 0.2 mg hydromorphone, 0.4 mg bupivacaine, 0.2 mcg ziconotide, 2 hour lockout, maximum 6 in a day (2 hr lockout)     Maximum 24 hour dose  Hydromorphone 8.134 mg/day  Bupivacaine 16.267 mg/day  Ziconitide 8.134 mcg/day     Last filled on 10/26/23 and current alarm date is: 11/25/23    Managed by [employer] ([phone]) - refills approximately every 3 weeks  Provider. Dr. Maryam Espinoza      melatonin 3 MG tablet Take 3 mg by mouth nightly as needed for sleep      metoprolol succinate ER (TOPROL-XL) 25 MG 24 hr tablet Take 12.5 mg by mouth every evening       Multiple Vitamins-Minerals (WOMENS MULTI PO) Take 1 tablet by mouth daily      ondansetron (ZOFRAN) 4 MG tablet Take 1 tablet (4 mg) by mouth every 6 hours as needed for nausea 20 tablet 1    polyethylene glycol (MIRALAX) 17 GM/Dose powder Take 17 g by mouth daily as needed for constipation            PHYSICAL EXAM     Vital signs  /69 (BP Location: Left arm, Patient Position: Sitting)   Pulse 64   Ht 1.651 m  "(5' 5\")   Wt 56.7 kg (125 lb)   BMI 20.80 kg/m      Weight:   125 lbs 0 oz    Elderly female who is alert and oriented vital signs are reviewed and documented in electronic medical record.  Neck supple.  Neurologically she has right homonymous hemianopsia rest of the cranial nerves are intact strength in upper extremity 5 -/5 0/5 in the lower extremity 1+ reflexes in the upper extremity absent in the lower extremity toes equivocal.  She is wheelchair-bound.  She has dysmetria bilaterally on finger-nose testing.     PERTINENT DIAGNOSTIC STUDIES     Following studies were reviewed:     CT BRAIN 9/11/2023  1.  No intracranial bleed. No midline shift.   2.  Small old left basal ganglia lacunar infarct.   3.  Moderate size area of encephalomalacia in the left occipital lobe presumably representing  an old infarct.   4.  If this patient has had previous imaging studies of the brain, and if they are submitted,  comparison could be made to confirm a stable appearance.     CTA HEAD AND NECK 5/7/2023  1. Head CTA demonstrates absence of contrast filling in the left  posterior cerebral artery at the distal P3 segment near the junction  with P4, likely secondary to occlusion. This can alternatively be  technical.   2. Neck CTA demonstrates no stenosis of the major cervical arteries.    ECHOCARDIOGRAM 5/5/2023  Global and regional left ventricular function is normal with an EF of 55-60%.  Global right ventricular function is normal.  The inferior vena cava was normal in size with preserved respiratory  variability.  No pericardial effusion is present.    30-DAY EVENT MONITOR 5/7/2023  Rhythm was sinus.  \"Heart racing\" was associated with sinus and sinus tachycardia.  No atrial fibrillation.       PERTINENT LABS  Following labs were reviewed:  Office Visit on 12/21/2023   Component Date Value Ref Range Status    Culture 12/21/2023 >100,000 CFU/mL Mixture of urogenital cj   Final    Color Urine 12/21/2023 Pink (A)  Colorless, " Straw, Light Yellow, Yellow Final    Appearance Urine 12/21/2023 Slightly Cloudy (A)  Clear Final    Glucose Urine 12/21/2023 Negative  Negative mg/dL Final    Bilirubin Urine 12/21/2023 Negative  Negative Final    Ketones Urine 12/21/2023 Negative  Negative mg/dL Final    Specific Gravity Urine 12/21/2023 1.006  1.003 - 1.035 Final    Blood Urine 12/21/2023 Large (A)  Negative Final    pH Urine 12/21/2023 7.5 (H)  5.0 - 7.0 Final    Protein Albumin Urine 12/21/2023 100 (A)  Negative mg/dL Final    Urobilinogen Urine 12/21/2023 Normal  Normal, 2.0 mg/dL Final    Nitrite Urine 12/21/2023 Negative  Negative Final    Leukocyte Esterase Urine 12/21/2023 Large (A)  Negative Final    Bacteria Urine 12/21/2023 Few (A)  None Seen /HPF Final    WBC Clumps Urine 12/21/2023 Present (A)  None Seen /HPF Final    Hyphal Yeast Urine 12/21/2023 Few (A)  None Seen /HPF Final    RBC Urine 12/21/2023 >182 (H)  <=2 /HPF Final    WBC Urine 12/21/2023 >182 (H)  <=5 /HPF Final    Squamous Epithelials Urine 12/21/2023 2 (H)  <=1 /HPF Final    Transitional Epithelials Urine 12/21/2023 3 (H)  <=1 /HPF Final   Lab on 11/01/2023   Component Date Value Ref Range Status    Color Urine 11/01/2023 Yellow  Colorless, Straw, Light Yellow, Yellow Final    Appearance Urine 11/01/2023 Clear  Clear Final    Glucose Urine 11/01/2023 Negative  Negative mg/dL Final    Bilirubin Urine 11/01/2023 Negative  Negative Final    Ketones Urine 11/01/2023 Negative  Negative mg/dL Final    Specific Gravity Urine 11/01/2023 1.010  1.003 - 1.035 Final    Blood Urine 11/01/2023 Trace (A)  Negative Final    pH Urine 11/01/2023 6.0  5.0 - 7.0 Final    Protein Albumin Urine 11/01/2023 Trace (A)  Negative mg/dL Final    Urobilinogen Urine 11/01/2023 0.2  0.2, 1.0 E.U./dL Final    Nitrite Urine 11/01/2023 Negative  Negative Final    Leukocyte Esterase Urine 11/01/2023 Small (A)  Negative Final    Culture 11/01/2023 >100,000 CFU/mL Klebsiella oxytoca (A)   Final    Culture  11/01/2023 >100,000 CFU/mL Enterococcus faecalis (A)   Final    Bacteria Urine 11/01/2023 Moderate (A)  None Seen /HPF Final    RBC Urine 11/01/2023 0-2  0-2 /HPF /HPF Final    WBC Urine 11/01/2023 10-25 (A)  0-5 /HPF /HPF Final         Total time spent for face to face visit, reviewing labs/imaging studies, counseling and coordination of care was: 45 Minutes spent on the date of the encounter doing chart review, review of outside records, review of test results, interpretation of tests, patient visit, and documentation     This note was dictated using voice recognition software.  Any grammatical or context distortions are unintentional and inherent to the software.    Orders Placed This Encounter   Procedures    Lipid Profile      New Prescriptions    ASPIRIN (ASA) 325 MG EC TABLET    Take 1 tablet (325 mg) by mouth daily     Modified Medications    No medications on file

## 2024-01-08 ENCOUNTER — OFFICE VISIT (OUTPATIENT)
Dept: ANESTHESIOLOGY | Facility: CLINIC | Age: 74
End: 2024-01-08
Attending: ANESTHESIOLOGY
Payer: MEDICARE

## 2024-01-08 ENCOUNTER — OFFICE VISIT (OUTPATIENT)
Dept: NEUROLOGY | Facility: CLINIC | Age: 74
End: 2024-01-08
Payer: MEDICARE

## 2024-01-08 ENCOUNTER — MEDICAL CORRESPONDENCE (OUTPATIENT)
Dept: HEALTH INFORMATION MANAGEMENT | Facility: CLINIC | Age: 74
End: 2024-01-08

## 2024-01-08 VITALS — OXYGEN SATURATION: 97 % | DIASTOLIC BLOOD PRESSURE: 65 MMHG | HEART RATE: 64 BPM | SYSTOLIC BLOOD PRESSURE: 147 MMHG

## 2024-01-08 VITALS
BODY MASS INDEX: 20.83 KG/M2 | DIASTOLIC BLOOD PRESSURE: 69 MMHG | HEIGHT: 65 IN | SYSTOLIC BLOOD PRESSURE: 133 MMHG | WEIGHT: 125 LBS | HEART RATE: 64 BPM

## 2024-01-08 DIAGNOSIS — Z86.73 HISTORY OF STROKE: Primary | ICD-10-CM

## 2024-01-08 DIAGNOSIS — M79.2 NEUROPATHIC PAIN: Primary | ICD-10-CM

## 2024-01-08 DIAGNOSIS — H53.461 RIGHT HOMONYMOUS HEMIANOPSIA: ICD-10-CM

## 2024-01-08 PROCEDURE — 99215 OFFICE O/P EST HI 40 MIN: CPT | Mod: 24 | Performed by: PSYCHIATRY & NEUROLOGY

## 2024-01-08 PROCEDURE — 99215 OFFICE O/P EST HI 40 MIN: CPT | Mod: 24 | Performed by: ANESTHESIOLOGY

## 2024-01-08 RX ORDER — ASPIRIN 325 MG
325 TABLET, DELAYED RELEASE (ENTERIC COATED) ORAL DAILY
Qty: 30 TABLET | Refills: 11 | Status: SHIPPED | OUTPATIENT
Start: 2024-01-08

## 2024-01-08 ASSESSMENT — PAIN SCALES - GENERAL: PAINLEVEL: MILD PAIN (3)

## 2024-01-08 NOTE — PROGRESS NOTES
Pain clinic follow up note    HPI:   Anayeli Gagnon is a 73 year old year old female  who presents to the pain clinic with her  with chronic SCI related neuropathic pain    Patient Supplied Answers To the  Pain Questionnaire      1/2/2024    12:01 PM    Pain -  Patient Entered Questionnaire/Answers   What number best describes your pain right now:  0 = No pain  to  10 = Worst pain imaginable 3   How would you describe the pain burning    dull, aching    throbbing   Which of the following worsen your pain sitting   Which of the following improve or reduce your pain lying down    medication   What number best describes your average pain for the past week:  0 = No pain  to  10 = Worst pain imaginable 6   What number best describes your LOWEST pain in past 24 hours:  0 = No pain  to  10 = Worst pain imaginable 3   What number best describes your WORST pain in past 24 hours:  0 = No pain  to  10 = Worst pain imaginable 8   When is your pain worst Night   What non-medicine treatments have you already had for your pain none             Annamarie underwent a suprapubic catheter placement for chronic UTI. She looks stable and comfortable today. The patient reports chronic pain in the lower extremities which is worse in the evening. The patient is noted to use most PTM during the evening time. The patient is complaining of vaginal discomfort. On chart review caginal erythema was noted by her pcp. Vaginal estrogen cream was recommended and the patient has not started treatment.     Significant Medical History:   Past Medical History:   Diagnosis Date    CARDIAC DYSRHYTHMIAS NEC 10/03/2007    Taking atenelol for years for this    Cerebrovascular accident (H) 05/05/2023    History of skin cancer     Mitral valve prolapse     Neurogenic bladder     Sacral decubitus ulcer, stage IV (H)     Thoracic spinal cord injury (H)     TIA (transient ischemic attack) 04/26/2023          Past Surgical History:  Past Surgical  History:   Procedure Laterality Date    CYSTOSTOMY, INSERT TUBE SUPRAPUBIC, COMBINED N/A 2023    Procedure: CYSTOSCOPY, OPEN SUPRAPUBIC TUBE INSERTION;  Surgeon: Karley Robles MD;  Location: [facility] OR    DECOMPRESSION LUMBAR ONE LEVEL N/A 2019    Procedure: Posterior spinal decompression;  Surgeon: Alf Ritchie MD;  Location: [facility] OR    INSERT INTRATHECAL PAIN PUMP N/A 2022    Procedure: INSERTION, INTRATHECAL ANALGESIC PUMP, externalized trial;  Surgeon: Luis Orourke MD;  Location: [facility] OR    INSERT INTRATHECAL PAIN PUMP Right 2022    Procedure: INSERTION, INTRATHECAL ANALGESIC PUMP;  Surgeon: Luis Oroukre MD;  Location: [facility] OR    INSERT STIMULATOR AND LEADS INTERNAL DORSAL COLUMN N/A 2021    Procedure: Posterior thoracic 12 to Lumbar 1 level for placement of spinal cord stimulator paddle and placement of implantable pulse generator/battery over right buttock;  Surgeon: Luis Orourke MD;  Location: [facility] OR    IR SPINAL ANGIOGRAM  10/16/2019    IR SPINAL ANGIOGRAM  2019    LAPAROSCOPIC TUBAL LIGATION      REPAIR SPINAL ARERIOVENOUS MALFORMATION N/A 2019    Procedure: with surgical disconnection arterial venous fistula Thoracic 5;  Surgeon: Alf Ritchie MD;  Location: [facility] OR          Family History:  Family History   Problem Relation Age of Onset    C.A.D. Father          41    Deep Vein Thrombosis (DVT) No family hx of     Anesthesia Reaction No family hx of           Social History:  Social History     Socioeconomic History    Marital status:      Spouse name: Not on file    Number of children: Not on file    Years of education: Not on file    Highest education level: Not on file   Occupational History    Occupation: NA     Employer: [employer]   Tobacco Use    Smoking status: Never     Passive exposure: Never    Smokeless tobacco: Never   Substance and Sexual Activity    Alcohol use: No    Drug use: Never    Sexual activity: Yes      Partners: Male   Other Topics Concern    Parent/sibling w/ CABG, MI or angioplasty before 65F 55M? Not Asked   Social History Narrative    Lives with spouse - works in [place].     Social Determinants of Health     Financial Resource Strain: Not on file   Food Insecurity: Not on file   Transportation Needs: Not on file   Physical Activity: Not on file   Stress: Not on file   Social Connections: Not on file   Interpersonal Safety: Not on file   Housing Stability: Not on file     Social History     Social History Narrative    Lives with spouse - works in [place].          Allergies:  Allergies   Allergen Reactions    Latex     Contrast Dye Rash     Painful rash after x-ray contrast    Nitrofurantoin Nausea and Vomiting       Current Medications:   Current Outpatient Medications   Medication Sig Dispense Refill    acetaminophen (TYLENOL) 500 MG tablet Take 500-1,000 mg by mouth every 6 hours as needed for mild pain      aspirin (ASA) 81 MG EC tablet Take 1 tablet (81 mg) by mouth daily (Patient taking differently: Take 81 mg by mouth every morning) 30 tablet 3    atorvastatin (LIPITOR) 40 MG tablet Take 1 tablet (40 mg) by mouth every evening 30 tablet 3    baclofen (LIORESAL) 10 MG tablet Take 10 mg by mouth 2 times daily      gabapentin (NEURONTIN) 600 MG tablet Take 1,200 mg by mouth 3 times daily  1 tablet 0    lidocaine (LIDODERM) 5 % patch Apply 1 patch topically daily as needed       LORazepam (ATIVAN) 0.5 MG tablet Take 1 tablet by mouth daily as needed for anxiety      medication given by implanted intrathecal pump continuous Concentrations:  Dilaudid    14 mg/ml                                                          Bupivacaine 28 mg/ml  Ziconotide 14 mcg/ml     Total Volume in Refill: 20 mL      Basal:   Dilaudid   6.993 mg/day                                                       Bupivacaine 13.986 mg/day  Ziconotide 6.993 mcg/day     PTM 0.2 mg hydromorphone, 0.4 mg bupivacaine, 0.2 mcg ziconotide, 2  hour lockout, maximum 6 in a day (2 hr lockout)     Maximum 24 hour dose  Hydromorphone 8.134 mg/day  Bupivacaine 16.267 mg/day  Ziconitide 8.134 mcg/day     Last filled on 10/26/23 and current alarm date is: 11/25/23    Managed by [employer] ([phone]) - refills approximately every 3 weeks  Provider. Dr. Maryam Espinoza      melatonin 3 MG tablet Take 3 mg by mouth nightly as needed for sleep      metoprolol succinate ER (TOPROL-XL) 25 MG 24 hr tablet Take 12.5 mg by mouth every evening       Multiple Vitamins-Minerals (WOMENS MULTI PO) Take 1 tablet by mouth daily      ondansetron (ZOFRAN) 4 MG tablet Take 1 tablet (4 mg) by mouth every 6 hours as needed for nausea 20 tablet 1    polyethylene glycol (MIRALAX) 17 GM/Dose powder Take 17 g by mouth daily as needed for constipation          Physical Exam:     BP (!) 147/65 (BP Location: Right arm, Patient Position: Chair, Cuff Size: Adult Regular)   Pulse 64   SpO2 97%     General Appearance: No distress, seated comfortably  Mood: Euthymic  HE ENT: Non constricted pupils  Respiratory: Non labored breathing    ASSESSMENT AND PLAN:     Encounter Diagnosis:  Neuropathic pain  Incomplete paraplegia  Thoracic Spinal Cord Injury  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     RECOMMENDATIONS:     1. Medications: We are recommending the patient to follow up with pcp regarding vaginal examination and treatment of possible vaginosis. The patient did not use ointments prescribed to her by pcp in June 2023.   We discussed that the IDDS is effective for neuropathic pain and not for nociceptive (inflammation) type of pain.     On PTM logs it is noted slight reduction in PTM use with suprapubic catheter placement.   On average she is using 4-6 PTM a day.     We will continue the current IDDS dosing and do not recommend  changes at this time.    Follow up: 3 months.     Answers submitted by the patient for this visit:  ALAYNA-7 (Submitted on 1/2/2024)  ALAYNA 7 TOTAL SCORE: 3    On the day of service I spent 30 minutes with the patient evaluating her, interrogating pump, reviewing ptm logs, explaining nociceptive pain versus neuropathic. Importance of using ointments in situations of vaginosis. 10 minutes were spent in documentation, chart review and placement of orders.

## 2024-01-08 NOTE — LETTER
2024         RE: Anayeli Gagnon  [address] [facility] [address]        Dear Colleague,    Thank you for referring your patient, Anayeli Gagnon, to the [facility] [place] [facility] [place]. Please see a copy of my visit note below.    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  She was on dual antiplatelet therapy, statin and since admission to the hospital has not noticed any significant change.  She had just 6 to her right visual field cut and has learned to turn her head all the way to the right so that she can see.  I have recommended:    1.  Increase aspirin to 325 mg daily.  Patient was told to take enteric-coated aspirin to minimize GI side effects  2.  Continue Lipitor 40 mg daily.  Previously her LDL was elevated.  Check lipid panel and if LDL is more than 70, I will recommend increasing the dose of Lipitor to 80 mg daily  3.  Vascular risk factors modification: Healthy diet (fruits, vegetables, low fat dairy & reduced saturated fat), weight loss, exercise at least 30 minutes 5 days/week, BMI goal <25.  Keep systolic blood pressure goal <130.  LDL goal <70.  Hemoglobin A1c goal <7. If applicable, STOP smoking  4.  Follow-up on as needed basis    Thank you again for this referral, please feel free to contact me if  you have any questions.    Waqas Tyson MD  [facility]  (Formerly, [facility]     HISTORY OF PRESENT ILLNESS     Patient is a 73-year-old female with history of TIA, HLD, thoracic cord injury with spinal AV fistula resulting in paraplegia, HTN, chronic pain syndrome who was admitted to the hospital on 5/5/2023 for visual symptoms.  She was noted to have right homonymous hemianopsia.  CT of the head and CTA showed absence of contrast filling in the left posterior cerebral artery echocardiogram showed normal ejection fraction.  She was started on dual antiplatelet therapy and continue to have visual symptoms..  She was last seen by nurse practitioner and was continued on aspirin, Lipitor and was told to follow-up with ophthalmology and psychiatry.  According to patient there has been no significant change in her vision.  She is paraplegic and has not noticed any change in her lower extremity strength.  She continues on baby aspirin along with a statin     PROBLEM LIST   Patient Active Problem List   Diagnosis Code     Other specified cardiac dysrhythmias(427.89) I49.8     CARDIOVASCULAR SCREENING; LDL GOAL LESS THAN 160 Z13.6     History of skin cancer Z85.828     Headache R51.9     Menopause Z78.0     Anxiety reaction F41.1     Osteoarthritis M19.90     Acute incomplete paraplegia (H) G82.22     Bilateral lower extremity edema R60.0     Chronic bilateral low back pain with bilateral sciatica M54.42, M54.41, G89.29     Edema of spinal cord (H) G95.19     History of spinal surgery Z98.890     Hypertension I10     Incomplete emptying of bladder R33.9     Incomplete injury of thoracic spinal cord in T1 to T6 region without bony injury (H) S24.151A     Leg weakness, bilateral R29.898     Muscle spasticity M62.838     MVP (mitral valve prolapse) I34.1     Palpitations R00.2     Pressure ulcer of coccygeal region, stage 3 (H) L89.153     Spinal cord injury at  T7-T12 level (H) S24.103A     Pure hypercholesterolemia E78.00     Ulnar neuropathy at elbow, 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     Nocturnal enuresis N39.44     Urge incontinence of urine N39.41     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     Spinal cord injury of thoracic region without bone injury, subsequent encounter (H) S24.109D     Thoracolumbar back pain M54.50, M54.6     Neuropathic pain M79.2     Intractable neuropathic pain of lumbosacral origin M79.2     Cerebrovascular accident (CVA), unspecified mechanism (H) I63.9     Depressive disorder F32.A     Right homonymous hemianopsia H53.461         PAST MEDICAL & SURGICAL HISTORY     Past Medical History:   Patient  has a past medical history of CARDIAC DYSRHYTHMIAS NEC (10/03/2007), Cerebrovascular accident (H) (05/05/2023), History of skin cancer, Mitral valve prolapse, Neurogenic bladder, Sacral decubitus ulcer, stage IV (H), Thoracic spinal cord injury (H), and TIA (transient ischemic attack) (04/26/2023).    Surgical History:  She  has a past surgical history that includes Laparoscopic tubal ligation; IR Spinal Angiogram (10/16/2019); Decompression lumbar one level (N/A, 12/27/2019); Repair Spinal Areriovenous Malformation (N/A, 12/27/2019); IR Spinal Angiogram (12/20/2019); Insert stimulator and leads internal dorsal column (N/A, 4/7/2021); Insert Intrathecal Pain Pump (N/A, 1/19/2022); Insert Intrathecal Pain Pump (Right, 1/21/2022); and Cystostomy, insert tube suprapubic, combined (N/A, 11/13/2023).     SOCIAL HISTORY     Reviewed, and she  reports that she has never smoked. She has never been exposed to tobacco smoke. She has never used smokeless tobacco. She reports that she does not drink alcohol and does not use drugs.     FAMILY HISTORY     Reviewed, and family history includes C.A.D. in her father.     ALLERGIES     Allergies   Allergen Reactions     Latex      Contrast Dye Rash     Painful rash after x-ray contrast      Nitrofurantoin Nausea and Vomiting         REVIEW OF SYSTEMS     A 12 point review of system was performed and was negative except as outlined in the history of present illness.     HOME MEDICATIONS     Current Outpatient Rx   Medication Sig Dispense Refill     acetaminophen (TYLENOL) 500 MG tablet Take 500-1,000 mg by mouth every 6 hours as needed for mild pain       aspirin (ASA) 325 MG EC tablet Take 1 tablet (325 mg) by mouth daily 30 tablet 11     atorvastatin (LIPITOR) 40 MG tablet Take 1 tablet (40 mg) by mouth every evening 30 tablet 3     baclofen (LIORESAL) 10 MG tablet Take 10 mg by mouth 2 times daily       gabapentin (NEURONTIN) 600 MG tablet Take 1,200 mg by mouth 3 times daily  1 tablet 0     lidocaine (LIDODERM) 5 % patch Apply 1 patch topically daily as needed        LORazepam (ATIVAN) 0.5 MG tablet Take 1 tablet by mouth daily as needed for anxiety       medication given by implanted intrathecal pump continuous Concentrations:  Dilaudid    14 mg/ml                                                          Bupivacaine 28 mg/ml  Ziconotide 14 mcg/ml     Total Volume in Refill: 20 mL      Basal:   Dilaudid   6.993 mg/day                                                       Bupivacaine 13.986 mg/day  Ziconotide 6.993 mcg/day     PTM 0.2 mg hydromorphone, 0.4 mg bupivacaine, 0.2 mcg ziconotide, 2 hour lockout, maximum 6 in a day (2 hr lockout)     Maximum 24 hour dose  Hydromorphone 8.134 mg/day  Bupivacaine 16.267 mg/day  Ziconitide 8.134 mcg/day     Last filled on 10/26/23 and current alarm date is: 11/25/23    Managed by [employer] ([phone]) - refills approximately every 3 weeks  Provider. Dr. Maryam Espinoza       melatonin 3 MG tablet Take 3 mg by mouth nightly as needed for sleep       metoprolol succinate ER (TOPROL-XL) 25 MG 24 hr tablet Take 12.5 mg by mouth every evening        Multiple Vitamins-Minerals (WOMENS MULTI PO) Take 1 tablet by mouth daily       ondansetron (ZOFRAN) 4 MG  "tablet Take 1 tablet (4 mg) by mouth every 6 hours as needed for nausea 20 tablet 1     polyethylene glycol (MIRALAX) 17 GM/Dose powder Take 17 g by mouth daily as needed for constipation            PHYSICAL EXAM     Vital signs  /69 (BP Location: Left arm, Patient Position: Sitting)   Pulse 64   Ht 1.651 m (5' 5\")   Wt 56.7 kg (125 lb)   BMI 20.80 kg/m      Weight:   125 lbs 0 oz    Elderly female who is alert and oriented vital signs are reviewed and documented in electronic medical record.  Neck supple.  Neurologically she has right homonymous hemianopsia rest of the cranial nerves are intact strength in upper extremity 5 -/5 0/5 in the lower extremity 1+ reflexes in the upper extremity absent in the lower extremity toes equivocal.  She is wheelchair-bound.  She has dysmetria bilaterally on finger-nose testing.     PERTINENT DIAGNOSTIC STUDIES     Following studies were reviewed:     CT BRAIN 9/11/2023  1.  No intracranial bleed. No midline shift.   2.  Small old left basal ganglia lacunar infarct.   3.  Moderate size area of encephalomalacia in the left occipital lobe presumably representing  an old infarct.   4.  If this patient has had previous imaging studies of the brain, and if they are submitted,  comparison could be made to confirm a stable appearance.     CTA HEAD AND NECK 5/7/2023  1. Head CTA demonstrates absence of contrast filling in the left  posterior cerebral artery at the distal P3 segment near the junction  with P4, likely secondary to occlusion. This can alternatively be  technical.   2. Neck CTA demonstrates no stenosis of the major cervical arteries.    ECHOCARDIOGRAM 5/5/2023  Global and regional left ventricular function is normal with an EF of 55-60%.  Global right ventricular function is normal.  The inferior vena cava was normal in size with preserved respiratory  variability.  No pericardial effusion is present.    30-DAY EVENT MONITOR 5/7/2023  Rhythm was sinus.  \"Heart " "racing\" was associated with sinus and sinus tachycardia.  No atrial fibrillation.       PERTINENT LABS  Following labs were reviewed:  Office Visit on 12/21/2023   Component Date Value Ref Range Status     Culture 12/21/2023 >100,000 CFU/mL Mixture of urogenital cj   Final     Color Urine 12/21/2023 Pink (A)  Colorless, Straw, Light Yellow, Yellow Final     Appearance Urine 12/21/2023 Slightly Cloudy (A)  Clear Final     Glucose Urine 12/21/2023 Negative  Negative mg/dL Final     Bilirubin Urine 12/21/2023 Negative  Negative Final     Ketones Urine 12/21/2023 Negative  Negative mg/dL Final     Specific Gravity Urine 12/21/2023 1.006  1.003 - 1.035 Final     Blood Urine 12/21/2023 Large (A)  Negative Final     pH Urine 12/21/2023 7.5 (H)  5.0 - 7.0 Final     Protein Albumin Urine 12/21/2023 100 (A)  Negative mg/dL Final     Urobilinogen Urine 12/21/2023 Normal  Normal, 2.0 mg/dL Final     Nitrite Urine 12/21/2023 Negative  Negative Final     Leukocyte Esterase Urine 12/21/2023 Large (A)  Negative Final     Bacteria Urine 12/21/2023 Few (A)  None Seen /HPF Final     WBC Clumps Urine 12/21/2023 Present (A)  None Seen /HPF Final     Hyphal Yeast Urine 12/21/2023 Few (A)  None Seen /HPF Final     RBC Urine 12/21/2023 >182 (H)  <=2 /HPF Final     WBC Urine 12/21/2023 >182 (H)  <=5 /HPF Final     Squamous Epithelials Urine 12/21/2023 2 (H)  <=1 /HPF Final     Transitional Epithelials Urine 12/21/2023 3 (H)  <=1 /HPF Final   Lab on 11/01/2023   Component Date Value Ref Range Status     Color Urine 11/01/2023 Yellow  Colorless, Straw, Light Yellow, Yellow Final     Appearance Urine 11/01/2023 Clear  Clear Final     Glucose Urine 11/01/2023 Negative  Negative mg/dL Final     Bilirubin Urine 11/01/2023 Negative  Negative Final     Ketones Urine 11/01/2023 Negative  Negative mg/dL Final     Specific Gravity Urine 11/01/2023 1.010  1.003 - 1.035 Final     Blood Urine 11/01/2023 Trace (A)  Negative Final     pH Urine " 11/01/2023 6.0  5.0 - 7.0 Final     Protein Albumin Urine 11/01/2023 Trace (A)  Negative mg/dL Final     Urobilinogen Urine 11/01/2023 0.2  0.2, 1.0 E.U./dL Final     Nitrite Urine 11/01/2023 Negative  Negative Final     Leukocyte Esterase Urine 11/01/2023 Small (A)  Negative Final     Culture 11/01/2023 >100,000 CFU/mL Klebsiella oxytoca (A)   Final     Culture 11/01/2023 >100,000 CFU/mL Enterococcus faecalis (A)   Final     Bacteria Urine 11/01/2023 Moderate (A)  None Seen /HPF Final     RBC Urine 11/01/2023 0-2  0-2 /HPF /HPF Final     WBC Urine 11/01/2023 10-25 (A)  0-5 /HPF /HPF Final         Total time spent for face to face visit, reviewing labs/imaging studies, counseling and coordination of care was: 45 Minutes spent on the date of the encounter doing chart review, review of outside records, review of test results, interpretation of tests, patient visit, and documentation     This note was dictated using voice recognition software.  Any grammatical or context distortions are unintentional and inherent to the software.    Orders Placed This Encounter   Procedures     Lipid Profile      New Prescriptions    ASPIRIN (ASA) 325 MG EC TABLET    Take 1 tablet (325 mg) by mouth daily     Modified Medications    No medications on file                 Again, thank you for allowing me to participate in the care of your patient.        Sincerely,        Waqas Tyson MD

## 2024-01-08 NOTE — NURSING NOTE
Patient presents with:  Follow Up: Return pain      Data Unavailable     Pain Medications       Analgesics Other Refills Start End     acetaminophen (TYLENOL) 500 MG tablet --  --    Sig - Route: Take 500-1,000 mg by mouth every 6 hours as needed for mild pain - Oral    Class: Historical       Salicylates Refills Start End     aspirin (ASA) 81 MG EC tablet 3 5/8/2023 --    Sig - Route: Take 1 tablet (81 mg) by mouth daily - Oral    Patient taking differently: Take 81 mg by mouth every morning    Class: E-Prescribe    Renewals       Renewal requests to authorizing provider (Tosin Augustine MD) <b>prohibited</b>                    What medications are you using for pain? Pain pump, tylenol, baclofen     (New patients only) Have you been seen by another pain clinic/ provider? no    (Return Patients only) What refills are you needing today? no    Expectations: brendon Sharp, EMT

## 2024-01-08 NOTE — PATIENT INSTRUCTIONS
Medications:    No adjustments made to current pump dosing.       Treatment planning:    Follow up with Urology      Recommended Follow up:      Follow up in 3 months.        Please call [phone] to schedule your clinic appointment if you don't already have an appointment scheduled.        To speak with a nurse, schedule/reschedule/cancel a clinic appointment, or request a medication refill call: [phone]    You can also reach us by [facility]: [email or web address]

## 2024-01-08 NOTE — LETTER
1/8/2024       RE: Anayeli Gagnon  [address] [facility] [place] [address]       Dear Colleague,    Thank you for referring your patient, Anayeli Gagnon, to the [facility]IN[facility] FOR COMPREHENSIVE PAIN MANAGEMENT [place] at [facility]. Please see a copy of my visit note below.    Pain clinic follow up note    HPI:   Anayeli Gagnon is a 73 year old year old female  who presents to the pain clinic with her  with chronic SCI related neuropathic pain    Patient Supplied Answers To the  Pain Questionnaire      1/2/2024    12:01 PM   [facility] -  Patient Entered Questionnaire/Answers   What number best describes your pain right now:  0 = No pain  to  10 = Worst pain imaginable 3   How would you describe the pain burning    dull, aching    throbbing   Which of the following worsen your pain sitting   Which of the following improve or reduce your pain lying down    medication   What number best describes your average pain for the past week:  0 = No pain  to  10 = Worst pain imaginable 6   What number best describes your LOWEST pain in past 24 hours:  0 = No pain  to  10 = Worst pain imaginable 3   What number best describes your WORST pain in past 24 hours:  0 = No pain  to  10 = Worst pain imaginable 8   When is your pain worst Night   What non-medicine treatments have you already had for your pain none             Annamarie underwent a suprapubic catheter placement for chronic UTI. She looks stable and comfortable today. The patient reports chronic pain in the lower extremities which is worse in the evening. The patient is noted to use most PTM during the evening time. The patient is complaining of vaginal discomfort. On chart review caginal erythema was noted by her pcp. Vaginal estrogen cream was recommended and the patient has not started treatment.     Significant Medical History:   Past Medical History:   Diagnosis Date    CARDIAC DYSRHYTHMIAS  NEC 10/03/2007    Taking atenelol for years for this    Cerebrovascular accident (H) 2023    History of skin cancer     Mitral valve prolapse     Neurogenic bladder     Sacral decubitus ulcer, stage IV (H)     Thoracic spinal cord injury (H)     TIA (transient ischemic attack) 2023          Past Surgical History:  Past Surgical History:   Procedure Laterality Date    CYSTOSTOMY, INSERT TUBE SUPRAPUBIC, COMBINED N/A 2023    Procedure: CYSTOSCOPY, OPEN SUPRAPUBIC TUBE INSERTION;  Surgeon: Karley Robles MD;  Location: [facility] OR    DECOMPRESSION LUMBAR ONE LEVEL N/A 2019    Procedure: Posterior spinal decompression;  Surgeon: Alf Ritchie MD;  Location: [facility] OR    INSERT INTRATHECAL PAIN PUMP N/A 2022    Procedure: INSERTION, INTRATHECAL ANALGESIC PUMP, externalized trial;  Surgeon: Luis Orourke MD;  Location: [facility] OR    INSERT INTRATHECAL PAIN PUMP Right 2022    Procedure: INSERTION, INTRATHECAL ANALGESIC PUMP;  Surgeon: Luis Orourke MD;  Location: [facility] OR    INSERT STIMULATOR AND LEADS INTERNAL DORSAL COLUMN N/A 2021    Procedure: Posterior thoracic 12 to Lumbar 1 level for placement of spinal cord stimulator paddle and placement of implantable pulse generator/battery over right buttock;  Surgeon: Luis Orourke MD;  Location: [facility] OR    IR SPINAL ANGIOGRAM  10/16/2019    IR SPINAL ANGIOGRAM  2019    LAPAROSCOPIC TUBAL LIGATION      REPAIR SPINAL ARERIOVENOUS MALFORMATION N/A 2019    Procedure: with surgical disconnection arterial venous fistula Thoracic 5;  Surgeon: Alf Ritchie MD;  Location: [facility] OR          Family History:  Family History   Problem Relation Age of Onset    C.A.D. Father          41    Deep Vein Thrombosis (DVT) No family hx of     Anesthesia Reaction No family hx of           Social History:  Social History     Socioeconomic History    Marital status:      Spouse name: Not on file    Number of children: Not on  file    Years of education: Not on file    Highest education level: Not on file   Occupational History    Occupation: NA     Employer: [employer]   Tobacco Use    Smoking status: Never     Passive exposure: Never    Smokeless tobacco: Never   Substance and Sexual Activity    Alcohol use: No    Drug use: Never    Sexual activity: Yes     Partners: Male   Other Topics Concern    Parent/sibling w/ CABG, MI or angioplasty before 65F 55M? Not Asked   Social History Narrative    Lives with spouse - works in [place].     Social Determinants of Health     Financial Resource Strain: Not on file   Food Insecurity: Not on file   Transportation Needs: Not on file   Physical Activity: Not on file   Stress: Not on file   Social Connections: Not on file   Interpersonal Safety: Not on file   Housing Stability: Not on file     Social History     Social History Narrative    Lives with spouse - works in [place].          Allergies:  Allergies   Allergen Reactions    Latex     Contrast Dye Rash     Painful rash after x-ray contrast    Nitrofurantoin Nausea and Vomiting       Current Medications:   Current Outpatient Medications   Medication Sig Dispense Refill    acetaminophen (TYLENOL) 500 MG tablet Take 500-1,000 mg by mouth every 6 hours as needed for mild pain      aspirin (ASA) 81 MG EC tablet Take 1 tablet (81 mg) by mouth daily (Patient taking differently: Take 81 mg by mouth every morning) 30 tablet 3    atorvastatin (LIPITOR) 40 MG tablet Take 1 tablet (40 mg) by mouth every evening 30 tablet 3    baclofen (LIORESAL) 10 MG tablet Take 10 mg by mouth 2 times daily      gabapentin (NEURONTIN) 600 MG tablet Take 1,200 mg by mouth 3 times daily  1 tablet 0    lidocaine (LIDODERM) 5 % patch Apply 1 patch topically daily as needed       LORazepam (ATIVAN) 0.5 MG tablet Take 1 tablet by mouth daily as needed for anxiety      medication given by implanted intrathecal pump continuous Concentrations:  Dilaudid    14 mg/ml                                                           Bupivacaine 28 mg/ml  Ziconotide 14 mcg/ml     Total Volume in Refill: 20 mL      Basal:   Dilaudid   6.993 mg/day                                                       Bupivacaine 13.986 mg/day  Ziconotide 6.993 mcg/day     PTM 0.2 mg hydromorphone, 0.4 mg bupivacaine, 0.2 mcg ziconotide, 2 hour lockout, maximum 6 in a day (2 hr lockout)     Maximum 24 hour dose  Hydromorphone 8.134 mg/day  Bupivacaine 16.267 mg/day  Ziconitide 8.134 mcg/day     Last filled on 10/26/23 and current alarm date is: 11/25/23    Managed by [employer] ([phone]) - refills approximately every 3 weeks  Provider. Dr. Maryam Espinoza      melatonin 3 MG tablet Take 3 mg by mouth nightly as needed for sleep      metoprolol succinate ER (TOPROL-XL) 25 MG 24 hr tablet Take 12.5 mg by mouth every evening       Multiple Vitamins-Minerals (WOMENS MULTI PO) Take 1 tablet by mouth daily      ondansetron (ZOFRAN) 4 MG tablet Take 1 tablet (4 mg) by mouth every 6 hours as needed for nausea 20 tablet 1    polyethylene glycol (MIRALAX) 17 GM/Dose powder Take 17 g by mouth daily as needed for constipation          Physical Exam:     BP (!) 147/65 (BP Location: Right arm, Patient Position: Chair, Cuff Size: Adult Regular)   Pulse 64   SpO2 97%     General Appearance: No distress, seated comfortably  Mood: Euthymic  HE ENT: Non constricted pupils  Respiratory: Non labored breathing    ASSESSMENT AND PLAN:     Encounter Diagnosis:  Neuropathic pain  Incomplete paraplegia  Thoracic Spinal Cord Injury  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     RECOMMENDATIONS:     1. Medications: We are recommending the patient to follow up with pcp regarding vaginal examination and treatment of possible vaginosis. The patient did not use ointments prescribed to  her by pcp in June 2023.   We discussed that the IDDS is effective for neuropathic pain and not for nociceptive (inflammation) type of pain.     On PTM logs it is noted slight reduction in PTM use with suprapubic catheter placement.   On average she is using 4-6 PTM a day.     We will continue the current IDDS dosing and do not recommend changes at this time.    Follow up: 3 months.     Answers submitted by the patient for this visit:  ALAYNA-7 (Submitted on 1/2/2024)  ALAYNA 7 TOTAL SCORE: 3    On the day of service I spent 30 minutes with the patient evaluating her, interrogating pump, reviewing ptm logs, explaining nociceptive pain versus neuropathic. Importance of using ointments in situations of vaginosis. 10 minutes were spent in documentation, chart review and placement of orders.         Again, thank you for allowing me to participate in the care of your patient.      Sincerely,    Jasmine Espinoza MD

## 2024-01-08 NOTE — NURSING NOTE
Chief Complaint   Patient presents with    Stroke     6 month follow up     Reema Leblanc CMA on 1/8/2024 at 11:48 AM  [facility]

## 2024-01-09 ENCOUNTER — TELEPHONE (OUTPATIENT)
Dept: ANESTHESIOLOGY | Facility: CLINIC | Age: 74
End: 2024-01-09
Payer: MEDICARE

## 2024-01-09 ENCOUNTER — TELEPHONE (OUTPATIENT)
Dept: NEUROLOGY | Facility: CLINIC | Age: 74
End: 2024-01-09
Payer: MEDICARE

## 2024-01-09 NOTE — TELEPHONE ENCOUNTER
[facility] Call Center    Phone Message    May a detailed message be left on voicemail: yes     Reason for Call: Other: Mckenzie from [facility] is requesting a call back to further discuss Pt's orders for a Lipid panel. Please contact Mckenzie to discuss at [phone]    Action Taken: Message routed to:  Other: [facility]     Travel Screening: Not Applicable

## 2024-01-10 NOTE — TELEPHONE ENCOUNTER
Spoke with lab and provided covered dx code for lipid panel of E78.00  Reema Leblanc CMA on 1/10/2024 at 12:00 PM  [facility]

## 2024-01-17 ENCOUNTER — TELEPHONE (OUTPATIENT)
Dept: UROLOGY | Facility: CLINIC | Age: 74
End: 2024-01-17
Payer: MEDICARE

## 2024-01-17 NOTE — TELEPHONE ENCOUNTER
Called pt back to let her know we have her scheduled for a catheter change tomorrow here in [place].  She states someone from [facility] named Yessy called her today and they are sending someone to her home to change the catheter.  I said that is fine, if all goes well with that, we can cancel her appt tomorrow.    I am unable to see any details or info on Stephieabimbola Jovan, or a message from Yessy.      Called pt and  back.  Discussed with Jay, he has no concerns regarding the catheter change today.  I let him know I'll call back this afternoon to see how the change went.  If we need to change tomorrow, we can.  If not, we can cancel her appt tomorrow.    Deanna Bello RN

## 2024-01-17 NOTE — TELEPHONE ENCOUNTER
Called pt to let her know we kept her appt on the schedule for tomorrow.  She states the home health care agency didn't have all that was needed to do her change today.  Pt states they were missing a piece, but she had no idea what they were missing.      Deanna Bello RN

## 2024-01-17 NOTE — TELEPHONE ENCOUNTER
[facility] Call Center    Phone Message    May a detailed message be left on voicemail: no     Reason for Call: Other: Patient called verify her appointment for tomorrow for tomorrow. However she did have a catheter appointment scheduled for today for vidal Aldana for a catheter change. Patient is wondering if she should keep that appointment or cancel and com in tomorrow. Please call back to verify.      Action Taken: Other: [facility]    Travel Screening: Not Applicable

## 2024-01-18 ENCOUNTER — ALLIED HEALTH/NURSE VISIT (OUTPATIENT)
Dept: NURSING | Facility: CLINIC | Age: 74
End: 2024-01-18
Payer: MEDICARE

## 2024-01-18 ENCOUNTER — MEDICAL CORRESPONDENCE (OUTPATIENT)
Dept: HEALTH INFORMATION MANAGEMENT | Facility: CLINIC | Age: 74
End: 2024-01-18

## 2024-01-18 DIAGNOSIS — Z46.6 URINARY CATHETER CHANGE REQUIRED: Primary | ICD-10-CM

## 2024-01-18 DIAGNOSIS — R33.9 URINARY RETENTION: ICD-10-CM

## 2024-01-18 PROCEDURE — 99207 PR NO BILLABLE SERVICE THIS VISIT: CPT

## 2024-01-18 PROCEDURE — 99024 POSTOP FOLLOW-UP VISIT: CPT

## 2024-01-18 RX ORDER — CEPHALEXIN 500 MG/1
500 CAPSULE ORAL ONCE
Status: COMPLETED | OUTPATIENT
Start: 2024-01-18 | End: 2024-01-18

## 2024-01-18 RX ADMIN — CEPHALEXIN 500 MG: 500 CAPSULE ORAL at 13:30

## 2024-01-18 NOTE — NURSING NOTE
Anayeli Gagnon comes into clinic today at the request of Dr. Robles Ordering Provider for Catheter Change.    Antibiotic administered per protocol.    7ccs NS removed from catheter balloon.  Catheter removed with ease.  Catheter insertion documentation on 1/18/2024:    Anayeli Gagnon presents to the clinic for catheter insertion.  Reason for insertion: scheduled insertion  Catheter successfully inserted into the suprapubic site in the usual sterile fashion without immediate complication.  Type of catheter placed: 20F silicone  indwelling catheter  Urine is clear in color.  50 cc's of urine output returned.  Balloon was filled with 10cc's of normal saline.  Securement device placed for the catheter.  The patient tolerated the procedure and was instructed to follow up as needed.  Pt plans to schedule next change with home health care.  She stated they did not have the correct size catheter for her last visit.  She reports they ordered some yesterday.     I let her know if they have any difficulty, call us back and we are happy to do her changes as well.    This service provided today was under the supervising provider of the day Dr. Silverman, who was available if needed.    Deanna Bello, RN

## 2024-01-22 ENCOUNTER — MEDICAL CORRESPONDENCE (OUTPATIENT)
Dept: HEALTH INFORMATION MANAGEMENT | Facility: CLINIC | Age: 74
End: 2024-01-22
Payer: MEDICARE

## 2024-02-08 ENCOUNTER — MEDICAL CORRESPONDENCE (OUTPATIENT)
Dept: HEALTH INFORMATION MANAGEMENT | Facility: CLINIC | Age: 74
End: 2024-02-08
Payer: MEDICARE

## 2024-02-12 ENCOUNTER — MEDICAL CORRESPONDENCE (OUTPATIENT)
Dept: HEALTH INFORMATION MANAGEMENT | Facility: CLINIC | Age: 74
End: 2024-02-12
Payer: MEDICARE

## 2024-02-29 ENCOUNTER — MEDICAL CORRESPONDENCE (OUTPATIENT)
Dept: HEALTH INFORMATION MANAGEMENT | Facility: CLINIC | Age: 74
End: 2024-02-29
Payer: MEDICARE

## 2024-03-11 ENCOUNTER — MEDICAL CORRESPONDENCE (OUTPATIENT)
Dept: HEALTH INFORMATION MANAGEMENT | Facility: CLINIC | Age: 74
End: 2024-03-11
Payer: MEDICARE

## 2024-03-16 ENCOUNTER — HEALTH MAINTENANCE LETTER (OUTPATIENT)
Age: 74
End: 2024-03-16

## 2024-03-21 ENCOUNTER — MEDICAL CORRESPONDENCE (OUTPATIENT)
Dept: HEALTH INFORMATION MANAGEMENT | Facility: CLINIC | Age: 74
End: 2024-03-21
Payer: MEDICARE

## 2024-03-21 NOTE — PROGRESS NOTES
Urology Office Visit - Follow Up    Reason for visit: 3 month follow up after SP tube placement    HPI: Anayeli Gagnon is a 73 year old female with neurogenic bladder and bowel secondary to thoracic spinal cord injury s/p colostomy and now suprapubic tube placement on 11/13/2023 (Dr. Robles). Prior to SP tube placement, she managed her neurogenic urinary retention with indwelling urethral Cunningham catheter x 3-4 years and was having issues with recurrent vs chronic UTI as well as pelvic and vaginal pain.     She has done well since SP tube placement. Changes have been done by home health nurse once a month. She continues to have the chronic vaginal pain. Not interested in doing vaginal estrogen therapy as previously discussed with her on multiple occasions. Reports that she is on Cipro currently for a UTI, but isn't sure that it was necessary. Her only symptom was vaginal burning which is chronic. She denies significant urinary incontinence.     PEx  There were no vitals taken for this visit.  GENERAL: alert and no distress resting comfortably in a motorized wheelchair.  EYES: Eyes grossly normal to inspection.  No discharge or erythema, or obvious scleral/conjunctival abnormalities.  RESP: No audible wheeze, cough, or visible cyanosis.    ABD: soft, NT, ND. SP tube site clean and intact with catheter indwelling. Urine in tubing appears clear yellow.   SKIN: Visible skin clear. No significant rash, abnormal pigmentation or lesions.  NEURO: Cranial nerves grossly intact.  Mentation and speech appropriate for age.  PSYCH: Appropriate affect, tone, and pace of words    LAB:  Creatinine   Date Value Ref Range Status   09/11/2023 0.61 0.51 - 0.95 mg/dL Final   04/07/2021 0.58 0.52 - 1.04 mg/dL Final       3/14/2024 - UCx: >100K Enterococcus faecalis  2/1/2024 - UCx: 50-100K Candida albicans  1/22/2024 - UCx: >100K Stenotrophomonas matlophlia    Lab Results   Component Value Date    CULTURE >100,000 CFU/mL Mixture of  urogenital cj 12/21/2023    CULTURE >100,000 CFU/mL Klebsiella oxytoca 11/01/2023    CULTURE >100,000 CFU/mL Enterococcus faecalis 11/01/2023    CULTURE >100,000 CFU/mL Klebsiella oxytoca 08/23/2023    CULTURE 50,000-100,000 CFU/mL Enterococcus faecalis 08/23/2023       IMAGING:   CT ABDOMEN PELVIS W/O CONTRAST, 7/3/2023     FINDINGS:     Lower thorax: Normal.     Liver: Similar hepatic cysts and too small to characterize  hypodensities.     Biliary System: Normal gallbladder. No extrahepatic biliary ductal  dilation.     Pancreas: No mass or pancreatic ductal dilation.     Spleen: Normal.     Adrenal glands: No mass or nodules     Kidneys: No suspicious mass, obstructing calculus or hydronephrosis.     Vasculature: Aortoiliac atherosclerotic calcifications with no  aneurysmal dilation.     Lymph nodes: No significant lymphadenopathy.     Gastrointestinal tract :Normal appendix. Normal caliber small bowel.  Stable left lower quadrant colostomy. Contrast residue in the colon.     Mesentery/peritoneum/retroperitoneum: No mass. No free fluid or air.     Pelvis: Urinary bladder is decompressed by a Cunningham catheter.     Soft tissues: Unchanged atrophic appearance of the right psoas muscle.  Similar soft tissue thickening/edema at the right gluteal region.  Spinal stimulator device in the right lower back subcutaneous tissues  with leads stable at the level of L1. Pain pump device in the right  anterior subcutaneous tissues.     Osseous structures: Multilevel degenerative changes of the visualized  thoracolumbar spine. No aggressive or acute osseous lesion.                                                                      IMPRESSION:   1.  No urinary tract stones.  2.  Stable postoperative changes left lower quadrant colostomy and  spinal stimulator placement.      ASSESSMENT/PLAN:  73 year old female with neurogenic bladder and bowel secondary to thoracic SCI s/p colostomy and SP tube placement. Doing well with the  SP tube. She has been treated for multiple UTIs over the last several months with varying pathogens isolated on urine cultures, arguing against a single nidus serving as the source for these infections. Today, Annamarie states that she doesn't really have symptoms with these UTIs other than the vaginal pain/burning which is chronic. We discussed that her urinary tract will be colonized with bacteria due to the chronic indwelling catheter. Therefore, there is no role for checking UA/UC nor treating with antibiotics in the absence of UTI symptoms. Symptoms/signs to watch for that could indicate true UTI would include fevers, chills, gross hematuria, worsening bladder pain or incontinence, or leukocytosis. Reasonable to check UA/UC and treat in these instances, but would not do so for her chronic vaginal pain/burning. We again discussed vaginal estrogen therapy which she declines.     -Continue monthly SP tube changes by home health nurse.  -Follow up with me in 1 year, sooner as needed.     Dulce Be PA-C  Department of Urology    20 minutes spent on the date of the encounter doing chart review, review of outside records, review of test results, interpretation of tests, patient visit, and documentation

## 2024-03-22 ENCOUNTER — OFFICE VISIT (OUTPATIENT)
Dept: UROLOGY | Facility: CLINIC | Age: 74
End: 2024-03-22
Payer: MEDICARE

## 2024-03-22 DIAGNOSIS — N31.9 NEUROGENIC BLADDER: ICD-10-CM

## 2024-03-22 DIAGNOSIS — R33.9 URINARY RETENTION: Primary | ICD-10-CM

## 2024-03-22 DIAGNOSIS — Z43.5 ENCOUNTER FOR CARE OR REPLACEMENT OF SUPRAPUBIC TUBE (H): ICD-10-CM

## 2024-03-22 PROCEDURE — 99213 OFFICE O/P EST LOW 20 MIN: CPT | Performed by: PHYSICIAN ASSISTANT

## 2024-03-22 RX ORDER — CIPROFLOXACIN 250 MG/1
250 TABLET, FILM COATED ORAL EVERY 12 HOURS
COMMUNITY
Start: 2024-03-18 | End: 2024-03-25

## 2024-03-22 NOTE — PATIENT INSTRUCTIONS
UROLOGY CLINIC VISIT PATIENT INSTRUCTIONS    Continue monthly suprapubic tube changes by your home health nurse.    Follow up with Dulce Be PA-C in 1 year, sooner with any issues.    Symptoms/signs of a true UTI would include things like fevers, chills, blood in the urine, worsening urinary incontinence / bladder spasms, or kidney pain.     If you have any issues, questions or concerns in the meantime, do not hesitate to contact us at [phone] or via [employer].     It was a pleasure meeting with you today.  Thank you for allowing me and my team the privilege of caring for you today.  YOU are the reason we are here, and I truly hope we provided you with the excellent service you deserve.  Please let us know if there is anything else we can do for you so that we can be sure you are leaving completely satisfied with your care experience.

## 2024-03-22 NOTE — NURSING NOTE
Anayeli Gagnon's goals for this visit include:   Chief Complaint   Patient presents with    RECHECK     3 month follow up s/p SPT for urinary retention contributing to UTIs (will arrange for monthly SPT changes in nursing clinic at )       She requests these members of her care team be copied on today's visit information:     PCP: Jazmin Interiano    Referring Provider:  Jazmin Interiano MD  [id number] [facility]  [address] [place] [id number]    There were no vitals taken for this visit.    Do you need any medication refills at today's visit?     Nora Dewitt MA on 3/22/2024 at 11:00 AM

## 2024-03-29 ENCOUNTER — MEDICAL CORRESPONDENCE (OUTPATIENT)
Dept: HEALTH INFORMATION MANAGEMENT | Facility: CLINIC | Age: 74
End: 2024-03-29
Payer: MEDICARE

## 2024-03-29 DIAGNOSIS — Z97.8 PRESENCE OF INTRATHECAL PUMP: Primary | ICD-10-CM

## 2024-03-29 NOTE — PROGRESS NOTES
Intrathecal Pump Medication Orders signed and sent to [facility] for refill.     Camille Talavera RN

## 2024-04-18 ENCOUNTER — MEDICAL CORRESPONDENCE (OUTPATIENT)
Dept: HEALTH INFORMATION MANAGEMENT | Facility: CLINIC | Age: 74
End: 2024-04-18
Payer: MEDICARE

## 2024-04-22 ENCOUNTER — MEDICAL CORRESPONDENCE (OUTPATIENT)
Dept: HEALTH INFORMATION MANAGEMENT | Facility: CLINIC | Age: 74
End: 2024-04-22
Payer: MEDICARE

## 2024-04-22 ENCOUNTER — TELEPHONE (OUTPATIENT)
Dept: ANESTHESIOLOGY | Facility: CLINIC | Age: 74
End: 2024-04-22
Payer: MEDICARE

## 2024-04-22 NOTE — TELEPHONE ENCOUNTER
[facility] Call Center    Phone Message    May a detailed message be left on voicemail: yes     Reason for Call: Medication Refill Request    Has the patient contacted the pharmacy for the refill? Yes   Name of medication being requested: COMPOUND CONTAINING CONTROLLED SUBSTANCE (CMPD RX) - PHARMACY TO MIX COMPOUNDED MEDICATION   Provider who prescribed the medication: Olga  Pharmacy: [facility] [phone] EXT: [phone]  Date medication is needed: they will need a new script for the refill by 4/24/24     Action Taken: Other: Pain    Travel Screening: Not Applicable

## 2024-04-23 NOTE — CONFIDENTIAL NOTE
Prescription signed by Dr. Espinoza and faxed to [facility] 4/23/24.     Camille Talavera RN

## 2024-05-14 ENCOUNTER — TELEPHONE (OUTPATIENT)
Dept: ANESTHESIOLOGY | Facility: CLINIC | Age: 74
End: 2024-05-14
Payer: MEDICARE

## 2024-05-14 NOTE — TELEPHONE ENCOUNTER
[facility] Call Center    Phone Message    May a detailed message be left on voicemail: yes     Reason for Call: Medication Refill Request    Has the patient contacted the pharmacy for the refill? Yes   Name of medication being requested: COMPOUND CONTAINING CONTROLLED SUBSTANCE (CMPD RX) - PHARMACY TO MIX COMPOUNDED MEDICATIO   Provider who prescribed the medication: Jasmine Espinoza  Pharmacy: [facility]  Date medication is needed: By Thursday 5/16, Pharmacy said they faxed the request to us 3 times.

## 2024-05-15 ENCOUNTER — MEDICAL CORRESPONDENCE (OUTPATIENT)
Dept: HEALTH INFORMATION MANAGEMENT | Facility: CLINIC | Age: 74
End: 2024-05-15
Payer: MEDICARE

## 2024-05-16 NOTE — CONFIDENTIAL NOTE
RN returned call to [facility] and updated that the new prescription has been signed by Dr. Espinoza and sent over today. Pharmacy reports they have now received it.     Camille Talavera RN

## 2024-05-16 NOTE — TELEPHONE ENCOUNTER
[facility] Call Center    Phone Message    May a detailed message be left on voicemail: yes     Reason for Call: Other: Pharmacy is calling on the status of the refill since it is needed by today. Please call back when available.      Action Taken: Other: Pain    Travel Screening: Not Applicable

## 2024-05-17 ENCOUNTER — MEDICAL CORRESPONDENCE (OUTPATIENT)
Dept: HEALTH INFORMATION MANAGEMENT | Facility: CLINIC | Age: 74
End: 2024-05-17
Payer: MEDICARE

## 2024-05-22 ENCOUNTER — MEDICAL CORRESPONDENCE (OUTPATIENT)
Dept: ANESTHESIOLOGY | Facility: CLINIC | Age: 74
End: 2024-05-22
Payer: MEDICARE

## 2024-05-22 ENCOUNTER — TRANSFERRED RECORDS (OUTPATIENT)
Dept: HEALTH INFORMATION MANAGEMENT | Facility: CLINIC | Age: 74
End: 2024-05-22
Payer: MEDICARE

## 2024-05-31 DIAGNOSIS — Z97.8 PRESENCE OF INTRATHECAL PUMP: ICD-10-CM

## 2024-05-31 NOTE — PROGRESS NOTES
Prescription reviewed and signed by EMELI Jerez CNP, and faxed to [facility].     Camille Talavera RN

## 2024-06-03 ENCOUNTER — MEDICAL CORRESPONDENCE (OUTPATIENT)
Dept: HEALTH INFORMATION MANAGEMENT | Facility: CLINIC | Age: 74
End: 2024-06-03
Payer: MEDICARE

## 2024-06-03 ENCOUNTER — TELEPHONE (OUTPATIENT)
Dept: ANESTHESIOLOGY | Facility: CLINIC | Age: 74
End: 2024-06-03
Payer: MEDICARE

## 2024-06-03 NOTE — TELEPHONE ENCOUNTER
Patient confirmed rescheduled appointment:     Date: 6/4  Time: 10 AM  Visit type: Return pain - Virtual visit  Provider: Santa Wallace  Location: [facility]  Additonal Notes: Rescheduled to make space for additional access. leon

## 2024-06-04 ENCOUNTER — VIRTUAL VISIT (OUTPATIENT)
Dept: ANESTHESIOLOGY | Facility: CLINIC | Age: 74
End: 2024-06-04
Payer: MEDICARE

## 2024-06-04 DIAGNOSIS — M79.2 NEUROPATHIC PAIN: ICD-10-CM

## 2024-06-04 DIAGNOSIS — M54.41 CHRONIC BILATERAL LOW BACK PAIN WITH BILATERAL SCIATICA: ICD-10-CM

## 2024-06-04 DIAGNOSIS — G89.29 CHRONIC BILATERAL LOW BACK PAIN WITH BILATERAL SCIATICA: ICD-10-CM

## 2024-06-04 DIAGNOSIS — S24.103A SPINAL CORD INJURY AT T7-T12 LEVEL (H): ICD-10-CM

## 2024-06-04 DIAGNOSIS — S24.151A: ICD-10-CM

## 2024-06-04 DIAGNOSIS — Z97.8 PRESENCE OF INTRATHECAL PUMP: Primary | ICD-10-CM

## 2024-06-04 DIAGNOSIS — M54.42 CHRONIC BILATERAL LOW BACK PAIN WITH BILATERAL SCIATICA: ICD-10-CM

## 2024-06-04 PROCEDURE — 99215 OFFICE O/P EST HI 40 MIN: CPT | Mod: 95

## 2024-06-04 PROCEDURE — 99417 PROLNG OP E/M EACH 15 MIN: CPT

## 2024-06-04 RX ORDER — PREGABALIN 150 MG/1
150 CAPSULE ORAL 3 TIMES DAILY
Qty: 90 CAPSULE | Refills: 1 | Status: SHIPPED | OUTPATIENT
Start: 2024-06-04

## 2024-06-04 ASSESSMENT — PAIN SCALES - PAIN ENJOYMENT GENERAL ACTIVITY SCALE (PEG)
INTERFERED_ENJOYMENT_LIFE: 4
INTERFERED_GENERAL_ACTIVITY: 8
INTERFERED_ENJOYMENT_LIFE: 4
AVG_PAIN_PASTWEEK: 5
AVG_PAIN_PASTWEEK: 5
INTERFERED_GENERAL_ACTIVITY: 8
PEG_TOTALSCORE: 5.67
PEG_TOTALSCORE: 5.67

## 2024-06-04 ASSESSMENT — ANXIETY QUESTIONNAIRES
2. NOT BEING ABLE TO STOP OR CONTROL WORRYING: NOT AT ALL
5. BEING SO RESTLESS THAT IT IS HARD TO SIT STILL: NOT AT ALL
GAD7 TOTAL SCORE: 2
GAD7 TOTAL SCORE: 2
4. TROUBLE RELAXING: SEVERAL DAYS
GAD7 TOTAL SCORE: 2
3. WORRYING TOO MUCH ABOUT DIFFERENT THINGS: SEVERAL DAYS
7. FEELING AFRAID AS IF SOMETHING AWFUL MIGHT HAPPEN: NOT AT ALL
8. IF YOU CHECKED OFF ANY PROBLEMS, HOW DIFFICULT HAVE THESE MADE IT FOR YOU TO DO YOUR WORK, TAKE CARE OF THINGS AT HOME, OR GET ALONG WITH OTHER PEOPLE?: NOT DIFFICULT AT ALL
1. FEELING NERVOUS, ANXIOUS, OR ON EDGE: NOT AT ALL
6. BECOMING EASILY ANNOYED OR IRRITABLE: NOT AT ALL
IF YOU CHECKED OFF ANY PROBLEMS ON THIS QUESTIONNAIRE, HOW DIFFICULT HAVE THESE PROBLEMS MADE IT FOR YOU TO DO YOUR WORK, TAKE CARE OF THINGS AT HOME, OR GET ALONG WITH OTHER PEOPLE: NOT DIFFICULT AT ALL
7. FEELING AFRAID AS IF SOMETHING AWFUL MIGHT HAPPEN: NOT AT ALL

## 2024-06-04 ASSESSMENT — PAIN SCALES - GENERAL: PAINLEVEL: MODERATE PAIN (5)

## 2024-06-04 NOTE — PATIENT INSTRUCTIONS
Pain Physical Therapy:  Continue activity as tolerated at home.     Pain Psychologist to address relaxation, behavioral change, coping style, and other factors important to improvement.  NO - not at this time.     Diagnostic Studies:  Reviewed pertinent labs/imaging in chart. No indications today for new diagnostics.     Medication Management:   IT pump - hydromorphone, bupivacaine, ziconitide. Established with [facility] for home management.   Baclofen - 10 mg twice daily, additional 10 mg as needed on days with increased pain. Managed by PCP. May consider dose adjustments in future.   Acetaminophen - 1000 mg three times daily. Appreciates some degree of benefit, no side effects.   Stop gabapentin 1200 mg three times daily. Start Lyrica 150 mg three times daily. Discussed direct transition between medications. Monitor for changes in pain level/intensity. Advised to contact clinic with concerns for significantly increased pain after transition to Lyrica. Will consider dose increase of Lyrica versus switching back to gabapentin.   Prescription for 150 mg capsules sent into pharmacy today.     Potential procedures:   None     Other Orders/Referrals:   [facility] - prescription for next refill signed on 5/31/24.     Follow up with EMELI Jerez CNP in around 6-8 weeks for in person visit.

## 2024-06-04 NOTE — LETTER
6/4/2024       RE: Anayeli Gagnon  [address]  [facility] [address]     Dear Colleague,    Thank you for referring your patient, Anayeli Gagnon, to the [facility] FOR COMPREHENSIVE PAIN MANAGEMENT [place] at [facility]. Please see a copy of my visit note below.      [facility]     Date of visit: 6/4/2024      Assessment:   Anayeli Gagnon is a 73 year old female with a past medical history significant for thoracolumbar pain, neuropathic pain, OA, headache, CVA, SCI T7-12, incomplete SCI T1-6, HTN, muscle spasticity, anxiety/depression, s/p lumbar decompression 2019, s/p ITP implant 2022, who presents with complaints of widespread pain.     Widespread pain - Etiology multifactorial, hx of SCI. Symptoms consistent with neuropathic process, underlying degenerative/postsurgical changes of spine, suspect SI mediated component, overlying myofascial component.     Assigned to [facility] nursing team.     Visit Diagnoses:  1. Presence of intrathecal pump    2. Neuropathic pain    3. 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 Chronic bilateral low back pain with bilateral sciatica    5. Spinal cord injury at T7-T12 level (H)        Plan:  Diagnosis reviewed, treatment option addressed, and risk/benifits discussed.  Self-care instructions given.  I am recommending a multidisciplinary treatment plan to help this patient better manage their pain.      Pain Physical Therapy:  Continue activity as tolerated at home.     Pain Psychologist to address relaxation, behavioral change, coping style, and other factors important to improvement.  NO - not at this time.     Diagnostic Studies:  Reviewed pertinent labs/imaging in chart. No indications today for new diagnostics.     Medication Management:   IT pump - hydromorphone, bupivacaine, ziconitide. Established with [facility] for home  management.   Baclofen - 10 mg twice daily, additional 10 mg as needed on days with increased pain. Managed by PCP. May consider dose adjustments in future.   Acetaminophen - 1000 mg three times daily. Appreciates some degree of benefit, no side effects.   Stop gabapentin 1200 mg three times daily. Start Lyrica 150 mg three times daily. Discussed direct transition between medications. Monitor for changes in pain level/intensity. Advised to contact clinic with concerns for significantly increased pain after transition to Lyrica. Will consider dose increase of Lyrica versus switching back to gabapentin.   Prescription for 150 mg capsules sent into pharmacy today.     Potential procedures:   None     Other Orders/Referrals:   [facility] - prescription for next refill signed on 5/31/24.     Follow up with EMELI Jerez CNP in around 6-8 weeks for in person visit.       Review of Electronic Chart: Today I have also reviewed available medical information in the patient's medical record at [facility] ([facility]) and Care Everywhere (if available), including relevant provider notes, laboratory work, and imaging.     Santa Wallace DNP, EMELI, AGNP-C  [facility]     -------------------------------------------------    Subjective:    Chief complaint:   Chief Complaint   Patient presents with    Consult     Consult Neuropathic Pain/Pain Pump-Pelvic       Interval history:  Anayeli Gagnon is a 73 year old female last seen on 1/8/24.  They are a former patient of Dr. Espinoza seen by me for the first time in follow up.     Recommendations/plan at the last visit included:  1. Medications: We are recommending the patient to follow up with pcp regarding vaginal examination and treatment of possible vaginosis. The patient did not use ointments prescribed to her by pcp in June 2023.   We discussed that the IDDS is effective for neuropathic pain and not for nociceptive (inflammation) type of pain.      On PTM  logs it is noted slight reduction in PTM use with suprapubic catheter placement.   On average she is using 4-6 PTM a day.      We will continue the current IDDS dosing and do not recommend changes at this time.     Follow up: 3 months.     Since her last visit, Anayeli Gagnon reports:  -She presents today in follow up and for transition of care.  -She previously followed with Dr. Espinoza, who has since left our practice.   -She reports pain is increased today, though she states that she's had worse days.   -She has ITP implant, simple continuous with PTM x 6 per day.   -She reports using max PTM doses,   -She reports pain is worse at night, primarily in groin and below knees and into feet/toes.   -Groin pain worse when sitting up, improves with lying down.   -Gabapentin dose 1200 mg TID, no major concerns for side effects.   -Baclofen 10 mg TID PRN, states she takes BID consistently. PCP currently manages this medication. Denies side effects.   -Tylenol 1000 mg TID, appreciates, no side effects.   -Amor comes to her home to for IT pump refill.   -She reports next refill in the next week or so.   Pain Information:   Pain rating: averages 5/10 on a 0-10 scale.    Patient Supplied Answers To the [facility] Pain Questionnaire      6/4/2024     9:46 AM   [facility] -  Patient Entered Questionnaire/Answers   What number best describes your pain right now:  0 = No pain  to  10 = Worst pain imaginable 5   How would you describe the pain throbbing    other   Which of the following worsen your pain sitting    relaxation   Which of the following improve or reduce your pain lying down    medication    relaxation   What number best describes your average pain for the past week:  0 = No pain  to  10 = Worst pain imaginable 5   What number best describes your LOWEST pain in past 24 hours:  0 = No pain  to  10 = Worst pain imaginable 4   What number best describes your WORST pain in past 24 hours:  0 = No pain  to  10 = Worst pain  imaginable 7   When is your pain worst Night   What non-medicine treatments have you already had for your pain pain clinic           Interval history from last visit on 1/8/24:  Annamarie underwent a suprapubic catheter placement for chronic UTI. She looks stable and comfortable today. The patient reports chronic pain in the lower extremities which is worse in the evening. The patient is noted to use most PTM during the evening time. The patient is complaining of vaginal discomfort. On chart review caginal erythema was noted by her pcp. Vaginal estrogen cream was recommended and the patient has not started treatment.     HPI from initial visit with Dr. Espinoza on 6/29/21:  Mrs. Gagnon is a 70 year old very pleasant female presenting to the clinic in her wheelchair with her  with severe pain in the back, lower abdomen, perineum, and lower extremities. She shares her experience with multiple surgeries and that the pain has been unrelenting and unresponsive. She is unable to move her lower extremities and does not have sensation yet feels severe pain. She recalls that after 1 of the surgeries she was able to walk briefly but had a set back and has been fully wheel chair bound since. She is coming to discuss pain control options. She had a paddle stimulator placed around L1 in April 2021 and states that thus far she has not experienced any pain relief in her back or feet.   Her  states that there are a few more programs that are available to them to try. They are looking forward to trial these programs.   The patient states that there is 1 position lying down where she is able to slight move her feet at home. The patient reports severe burning pain in the LE. The patient currently has an indwelling hudson and has observed increase UTI since the placement of the hudson. Her last UTI was over 4 weeks ago. She denies any decubiti currently.     Interval History:   Ms. 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  She underwent decompression in [place]. She subsequently had recurrence of the spinal subdural hematoma and underwent a lumbar evacuation in 12/2017.  She had a slow recovery and regained some ability to ambulate with assistance.  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  Therefore, she underwent a 2-staged lysis of adhesions on 10/04/2018 and 10/05/2018. Postoperatively, she had bowel incontinence, as well as numbness in her legs.  She was discharged to rehab and again regained her ability to walk with assistance.  Her neurological status again subsequently deteriorated.  Therefore, she was referred to the [facility] where she underwent repeat spinal angiography as part of a second opinion.  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  She underwent an exploration of intradural space for suspected dural AV fistula. After her surgery she had persistent pain, numbness, tingling in BLE (from the waist down) this is unchanged since surgery.  Also - c/o pain, numbness, tingling in bilat feet.         Current Pain Treatments:    Medications:     Lyrica 150 mg TID  Baclofen 10 mg BID, takes TID PRN on days with increased pain  Acetaminophen 1000 mg TID       Intrathecal Pump- 20 mL syringe     Concentrations:   Dilaudid 14 mg/ml                                                          Bupivacaine 28 mg/ml   Ziconitide 14 mcg/ml      Total Volume in Refill: 20 mL     Basal:   Dilaudid  7 mg/day                                                       Bupivacaine 14 mg/day   Ziconitide 7 mcg/day      PTM 0.2 hydromorphone, 2 hour lockout, maximum 6 in a day.       Current MME: N/A    Review of [place] Prescription Monitoring Program (): No concern for  abuse or misuse of controlled medications based on this report. Reviewed - appears appropriate.     Annual Controlled Substance Agreement/UDS due date: N/A    Past pain treatments:  Gabapentin 1200 mg TID    Medications:  Current Outpatient Medications   Medication Sig Dispense Refill    acetaminophen (TYLENOL) 500 MG tablet Take 500-1,000 mg by mouth every 6 hours as needed for mild pain      aspirin (ASA) 325 MG EC tablet Take 1 tablet (325 mg) by mouth daily 30 tablet 11    atorvastatin (LIPITOR) 40 MG tablet Take 1 tablet (40 mg) by mouth every evening 30 tablet 3    baclofen (LIORESAL) 10 MG tablet Take 10 mg by mouth 2 times daily      COMPOUND CONTAINING CONTROLLED SUBSTANCE (CMPD RX) - PHARMACY TO MIX COMPOUNDED MEDICATION Intrathecal Pump- 20 mL syringe    Concentrations:  Dilaudid  14 mg/ml                                                          Bupivacaine 28 mg/ml  Ziconitide 14 mcg/ml     Total Volume in Refill: 20 mL    Basal:   Dilaudid  7 mg/day                                                       Bupivacaine 14 mg/day  Ziconitide 7 mcg/day     PTM 0.2 hydromorphone, 2 hour lockout, maximum 6 in a day. 20 mL 0    gabapentin (NEURONTIN) 600 MG tablet Take 1,200 mg by mouth 3 times daily  1 tablet 0    lidocaine (LIDODERM) 5 % patch Apply 1 patch topically daily as needed       LORazepam (ATIVAN) 0.5 MG tablet Take 1 tablet by mouth daily as needed for anxiety      medication given by implanted intrathecal pump continuous Concentrations:  Dilaudid    14 mg/ml                                                          Bupivacaine 28 mg/ml  Ziconotide 14 mcg/ml     Total Volume in Refill: 20 mL      Basal:   Dilaudid   6.993 mg/day                                                       Bupivacaine 13.986 mg/day  Ziconotide 6.993 mcg/day     PTM 0.2 mg hydromorphone, 0.4 mg bupivacaine, 0.2 mcg ziconotide, 2 hour lockout, maximum 6 in a day (2 hr lockout)     Maximum 24 hour dose  Hydromorphone 8.134  mg/day  Bupivacaine 16.267 mg/day  Ziconitide 8.134 mcg/day     Last filled on 10/26/23 and current alarm date is: 11/25/23    Managed by [employer] ([phone]) - refills approximately every 3 weeks  Provider. Dr. Maryam Espinoza      melatonin 3 MG tablet Take 3 mg by mouth nightly as needed for sleep      metoprolol succinate ER (TOPROL-XL) 25 MG 24 hr tablet Take 12.5 mg by mouth every evening       Multiple Vitamins-Minerals (WOMENS MULTI PO) Take 1 tablet by mouth daily      ondansetron (ZOFRAN) 4 MG tablet Take 1 tablet (4 mg) by mouth every 6 hours as needed for nausea 20 tablet 1    polyethylene glycol (MIRALAX) 17 GM/Dose powder Take 17 g by mouth daily as needed for constipation       pregabalin (LYRICA) 150 MG capsule Take 1 capsule (150 mg) by mouth 3 times daily Stop gabapentin. 90 capsule 1       Medical History: any changes in medical history since they were last seen? Yes     ROS: 10 point ROS neg other than the symptoms noted above in the HPI or patient questionnaire.        Objective:    Physical Exam:  GENERAL: alert and no distress  EYES: Eyes grossly normal to inspection.  No discharge or erythema, or obvious scleral/conjunctival abnormalities.  RESP: No audible wheeze, cough, or visible cyanosis.    SKIN: Visible skin clear. No significant rash, abnormal pigmentation or lesions.  NEURO: Cranial nerves grossly intact.  Mentation and speech appropriate for age.  PSYCH: Appropriate affect, tone, and pace of words     Diagnostic Tests/Imaging/Labs:  BMP on 10/28/23 - WNL, GFR >60    BILLING TIME DOCUMENTATION:   The total TIME spent on this patient on the date of the encounter/appointment was 60 minutes.      TOTAL TIME includes:   Time spent preparing to see the patient (reviewing records and tests)   Time spent face to face (or over the phone) with the patient   Time spent ordering tests, medications, procedures and referrals   Time spent Referring and communicating with other healthcare  professionals   Time spent documenting clinical information in Epic       Annamarie is a 73 year old who is being evaluated via a billable video visit.    How would you like to obtain your AVS? MyChart  If the video visit is dropped, the invitation should be resent by: Text to cell phone: [phone]  Will anyone else be joining your video visit? No        Again, thank you for allowing me to participate in the care of your patient.      Sincerely,    EMELI Jerez CNP

## 2024-06-04 NOTE — PROGRESS NOTES
Annamarie is a 73 year old who is being evaluated via a billable video visit.    How would you like to obtain your AVS? MyChart  If the video visit is dropped, the invitation should be resent by: Text to cell phone: [phone]  Will anyone else be joining your video visit? No

## 2024-06-04 NOTE — NURSING NOTE
Patient presents with:  Consult: Consult Neuropathic Pain/Pain Pump-Pelvic      Moderate Pain (5)     Pain Medications       Analgesics Other Refills Start End     acetaminophen (TYLENOL) 500 MG tablet --  --    Sig - Route: Take 500-1,000 mg by mouth every 6 hours as needed for mild pain - Oral    Class: Historical       Salicylates Refills Start End     aspirin (ASA) 325 MG EC tablet 11 1/8/2024 --    Sig - Route: Take 1 tablet (325 mg) by mouth daily - Oral    Class: E-Prescribe            What medications are you using for pain? Tylenol,Pain Pump, Baclofen, gabapentin    (New patients only) Have you been seen by another pain clinic/ provider? no    (Return Patients only) What refills are you needing today? no

## 2024-06-04 NOTE — PROGRESS NOTES
Video-Visit Details    Type of service:  Video Visit     Originating Location (pt. Location): Home    Distant Location (provider location):  On-site  Platform used for Video Visit: [facility]     Date of visit: 6/4/2024      Assessment:   Anayeli Gagnon is a 73 year old female with a past medical history significant for thoracolumbar pain, neuropathic pain, OA, headache, CVA, SCI T7-12, incomplete SCI T1-6, HTN, muscle spasticity, anxiety/depression, s/p lumbar decompression 2019, s/p ITP implant 2022, who presents with complaints of widespread pain.     Widespread pain - Etiology multifactorial, hx of SCI. Symptoms consistent with neuropathic process, underlying degenerative/postsurgical changes of spine, suspect SI mediated component, overlying myofascial component.     [facility] [place] [facility] [place]     Visit Diagnoses:  1. Presence of intrathecal pump    2. Neuropathic pain    3. 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 Chronic bilateral low back pain with bilateral sciatica    5. Spinal cord injury at T7-T12 level (H)        Plan:  Diagnosis reviewed, treatment option addressed, and risk/benifits discussed.  Self-care instructions given.  I am recommending a multidisciplinary treatment plan to help this patient better manage their pain.      Pain Physical Therapy:  Continue activity as tolerated at home.     Pain Psychologist to address relaxation, behavioral change, coping style, and other factors important to improvement.  NO - not at this time.     Diagnostic Studies:  Reviewed pertinent labs/imaging in chart. No indications today for new diagnostics.     Medication Management:   IT pump - hydromorphone, bupivacaine, ziconitide. Established with [facility] for home management.   Baclofen - 10 mg twice daily, additional 10 mg as needed on days with increased pain. Managed by PCP. May consider dose adjustments in future.    Acetaminophen - 1000 mg three times daily. Appreciates some degree of benefit, no side effects.   Stop gabapentin 1200 mg three times daily. Start Lyrica 150 mg three times daily. Discussed direct transition between medications. Monitor for changes in pain level/intensity. Advised to contact clinic with concerns for significantly increased pain after transition to Lyrica. Will consider dose increase of Lyrica versus switching back to gabapentin.   Prescription for 150 mg capsules sent into pharmacy today.     Potential procedures:   None     Other Orders/Referrals:   [facility] - prescription for next refill signed on 5/31/24.     Follow up with EMELI Jerez CNP in around 6-8 weeks for in person visit.       Review of Electronic Chart: Today I have also reviewed available medical information in the patient's medical record at [facility] ([facility]) and Care Everywhere (if available), including relevant provider notes, laboratory work, and imaging.     Santa Wallace DNP, EMELI, AGNP-C  [facility]     -------------------------------------------------    Subjective:    Chief complaint:   Chief Complaint   Patient presents with    Consult     Consult Neuropathic Pain/Pain Pump-Pelvic       Interval history:  Anayeli Gagnon is a 73 year old female last seen on 1/8/24.  They are a former patient of Dr. Espinoza seen by me for the first time in follow up.     Recommendations/plan at the last visit included:  1. Medications: We are recommending the patient to follow up with pcp regarding vaginal examination and treatment of possible vaginosis. The patient did not use ointments prescribed to her by pcp in June 2023.   We discussed that the IDDS is effective for neuropathic pain and not for nociceptive (inflammation) type of pain.      On PTM logs it is noted slight reduction in PTM use with suprapubic catheter placement.   On average she is using 4-6 PTM a day.      We will continue the current  IDDS dosing and do not recommend changes at this time.     Follow up: 3 months.     Since her last visit, Anayeli Gagnon reports:  -She presents today in follow up and for transition of care.  -She previously followed with Dr. Espinoza, who has since left our practice.   -She reports pain is increased today, though she states that she's had worse days.   -She has ITP implant, simple continuous with PTM x 6 per day.   -She reports using max PTM doses,   -She reports pain is worse at night, primarily in groin and below knees and into feet/toes.   -Groin pain worse when sitting up, improves with lying down.   -Gabapentin dose 1200 mg TID, no major concerns for side effects.   -Baclofen 10 mg TID PRN, states she takes BID consistently. PCP currently manages this medication. Denies side effects.   -Tylenol 1000 mg TID, appreciates, no side effects.   -[facility] comes to her home to for IT pump refill.   -She reports next refill in the next week or so.   Pain Information:   Pain rating: averages 5/10 on a 0-10 scale.    Patient Supplied Answers To the  Pain Questionnaire      6/4/2024     9:46 AM   [facility] -  Patient Entered Questionnaire/Answers   What number best describes your pain right now:  0 = No pain  to  10 = Worst pain imaginable 5   How would you describe the pain throbbing    other   Which of the following worsen your pain sitting    relaxation   Which of the following improve or reduce your pain lying down    medication    relaxation   What number best describes your average pain for the past week:  0 = No pain  to  10 = Worst pain imaginable 5   What number best describes your LOWEST pain in past 24 hours:  0 = No pain  to  10 = Worst pain imaginable 4   What number best describes your WORST pain in past 24 hours:  0 = No pain  to  10 = Worst pain imaginable 7   When is your pain worst Night   What non-medicine treatments have you already had for your pain pain clinic           Interval history from last  visit on 1/8/24:  Annamarie underwent a suprapubic catheter placement for chronic UTI. She looks stable and comfortable today. The patient reports chronic pain in the lower extremities which is worse in the evening. The patient is noted to use most PTM during the evening time. The patient is complaining of vaginal discomfort. On chart review caginal erythema was noted by her pcp. Vaginal estrogen cream was recommended and the patient has not started treatment.     HPI from initial visit with Dr. Espinoza on 6/29/21:  Mrs. Gagnon is a 70 year old very pleasant female presenting to the clinic in her wheelchair with her  with severe pain in the back, lower abdomen, perineum, and lower extremities. She shares her experience with multiple surgeries and that the pain has been unrelenting and unresponsive. She is unable to move her lower extremities and does not have sensation yet feels severe pain. She recalls that after 1 of the surgeries she was able to walk briefly but had a set back and has been fully wheel chair bound since. She is coming to discuss pain control options. She had a paddle stimulator placed around L1 in April 2021 and states that thus far she has not experienced any pain relief in her back or feet.   Her  states that there are a few more programs that are available to them to try. They are looking forward to trial these programs.   The patient states that there is 1 position lying down where she is able to slight move her feet at home. The patient reports severe burning pain in the LE. The patient currently has an indwelling hudson and has observed increase UTI since the placement of the hudson. Her last UTI was over 4 weeks ago. She denies any decubiti currently.     Interval History:   Ms. 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  She underwent decompression in [facility]  [place]. She subsequently had recurrence of the spinal subdural hematoma and underwent a lumbar evacuation in 12/2017.  She had a slow recovery and regained some ability to ambulate with assistance.  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  Therefore, she underwent a 2-staged lysis of adhesions on 10/04/2018 and 10/05/2018. Postoperatively, she had bowel incontinence, as well as numbness in her legs.  She was discharged to rehab and again regained her ability to walk with assistance.  Her neurological status again subsequently deteriorated.  Therefore, she was referred to the [facility] where she underwent repeat spinal angiography as part of a second opinion.  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  She underwent an exploration of intradural space for suspected dural AV fistula. After her surgery she had persistent pain, numbness, tingling in BLE (from the waist down) this is unchanged since surgery.  Also - c/o pain, numbness, tingling in bilat feet.         Current Pain Treatments:    Medications:     Lyrica 150 mg TID  Baclofen 10 mg BID, takes TID PRN on days with increased pain  Acetaminophen 1000 mg TID       Intrathecal Pump- 20 mL syringe     Concentrations:   Dilaudid 14 mg/ml                                                          Bupivacaine 28 mg/ml   Ziconitide 14 mcg/ml      Total Volume in Refill: 20 mL     Basal:   Dilaudid  7 mg/day                                                       Bupivacaine 14 mg/day   Ziconitide 7 mcg/day      PTM 0.2 hydromorphone, 2 hour lockout, maximum 6 in a day.       Current MME: N/A    Review of [place] Prescription Monitoring Program (): No concern for abuse or misuse of controlled medications based on this report. Reviewed - appears appropriate.     Annual Controlled Substance Agreement/UDS due date:  N/A    Past pain treatments:  Gabapentin 1200 mg TID    Medications:  Current Outpatient Medications   Medication Sig Dispense Refill    acetaminophen (TYLENOL) 500 MG tablet Take 500-1,000 mg by mouth every 6 hours as needed for mild pain      aspirin (ASA) 325 MG EC tablet Take 1 tablet (325 mg) by mouth daily 30 tablet 11    atorvastatin (LIPITOR) 40 MG tablet Take 1 tablet (40 mg) by mouth every evening 30 tablet 3    baclofen (LIORESAL) 10 MG tablet Take 10 mg by mouth 2 times daily      COMPOUND CONTAINING CONTROLLED SUBSTANCE (CMPD RX) - PHARMACY TO MIX COMPOUNDED MEDICATION Intrathecal Pump- 20 mL syringe    Concentrations:  Dilaudid  14 mg/ml                                                          Bupivacaine 28 mg/ml  Ziconitide 14 mcg/ml     Total Volume in Refill: 20 mL    Basal:   Dilaudid  7 mg/day                                                       Bupivacaine 14 mg/day  Ziconitide 7 mcg/day     PTM 0.2 hydromorphone, 2 hour lockout, maximum 6 in a day. 20 mL 0    gabapentin (NEURONTIN) 600 MG tablet Take 1,200 mg by mouth 3 times daily  1 tablet 0    lidocaine (LIDODERM) 5 % patch Apply 1 patch topically daily as needed       LORazepam (ATIVAN) 0.5 MG tablet Take 1 tablet by mouth daily as needed for anxiety      medication given by implanted intrathecal pump continuous Concentrations:  Dilaudid    14 mg/ml                                                          Bupivacaine 28 mg/ml  Ziconotide 14 mcg/ml     Total Volume in Refill: 20 mL      Basal:   Dilaudid   6.993 mg/day                                                       Bupivacaine 13.986 mg/day  Ziconotide 6.993 mcg/day     PTM 0.2 mg hydromorphone, 0.4 mg bupivacaine, 0.2 mcg ziconotide, 2 hour lockout, maximum 6 in a day (2 hr lockout)     Maximum 24 hour dose  Hydromorphone 8.134 mg/day  Bupivacaine 16.267 mg/day  Ziconitide 8.134 mcg/day     Last filled on 10/26/23 and current alarm date is: 11/25/23    Managed by [employer]  ([phone]) - refills approximately every 3 weeks  Provider. Dr. Maryam Espinoza      melatonin 3 MG tablet Take 3 mg by mouth nightly as needed for sleep      metoprolol succinate ER (TOPROL-XL) 25 MG 24 hr tablet Take 12.5 mg by mouth every evening       Multiple Vitamins-Minerals (WOMENS MULTI PO) Take 1 tablet by mouth daily      ondansetron (ZOFRAN) 4 MG tablet Take 1 tablet (4 mg) by mouth every 6 hours as needed for nausea 20 tablet 1    polyethylene glycol (MIRALAX) 17 GM/Dose powder Take 17 g by mouth daily as needed for constipation       pregabalin (LYRICA) 150 MG capsule Take 1 capsule (150 mg) by mouth 3 times daily Stop gabapentin. 90 capsule 1       Medical History: any changes in medical history since they were last seen? Yes - ED visit on 5/9, see documentation in chart.      ROS: 10 point ROS neg other than the symptoms noted above in the HPI or patient questionnaire.        Objective:    Physical Exam:  GENERAL: alert and no distress  EYES: Eyes grossly normal to inspection.  No discharge or erythema, or obvious scleral/conjunctival abnormalities.  RESP: No audible wheeze, cough, or visible cyanosis.    SKIN: Visible skin clear. No significant rash, abnormal pigmentation or lesions.  NEURO: Cranial nerves grossly intact.  Mentation and speech appropriate for age.  PSYCH: Appropriate affect, tone, and pace of words     Diagnostic Tests/Imaging/Labs:  BMP on 10/28/23 - WNL, GFR >60    BILLING TIME DOCUMENTATION:   The total TIME spent on this patient on the date of the encounter/appointment was 60 minutes.      TOTAL TIME includes:   Time spent preparing to see the patient (reviewing records and tests)   Time spent face to face (or over the phone) with the patient   Time spent ordering tests, medications, procedures and referrals   Time spent Referring and communicating with other healthcare professionals   Time spent documenting clinical information in Epic

## 2024-06-05 ENCOUNTER — TELEPHONE (OUTPATIENT)
Dept: ANESTHESIOLOGY | Facility: CLINIC | Age: 74
End: 2024-06-05
Payer: MEDICARE

## 2024-06-05 NOTE — TELEPHONE ENCOUNTER
Patient confirmed scheduled appointment:     Date: 7/19  Time: 3 PM  Visit type: Return pain  Provider: Santa Wallace  Location: [facility]

## 2024-07-03 ENCOUNTER — MEDICAL CORRESPONDENCE (OUTPATIENT)
Dept: HEALTH INFORMATION MANAGEMENT | Facility: CLINIC | Age: 74
End: 2024-07-03
Payer: MEDICARE

## 2024-07-15 ENCOUNTER — TELEPHONE (OUTPATIENT)
Dept: ANESTHESIOLOGY | Facility: CLINIC | Age: 74
End: 2024-07-15
Payer: MEDICARE

## 2024-07-15 DIAGNOSIS — S24.151A: ICD-10-CM

## 2024-07-15 DIAGNOSIS — M79.2 NEUROPATHIC PAIN: Primary | ICD-10-CM

## 2024-07-15 RX ORDER — HYDROMORPHONE HYDROCHLORIDE 2 MG/1
2 TABLET ORAL 2 TIMES DAILY PRN
Qty: 10 TABLET | Refills: 0 | Status: SHIPPED | OUTPATIENT
Start: 2024-07-15 | End: 2024-09-11

## 2024-07-15 NOTE — TELEPHONE ENCOUNTER
Chart reviewed - Will plan to temporarily increase simple continuous (total dose of PTM x 3 to basal) and reduce available PTM to 3/day. She may continue PO hydromorphone 2 mg BID PRN, prescription for #8 tabs sent to pharmacy today. Calculations verified with Alia Reyes RNCC, will send updated prescription to [facility].     IT pump dose adjustments as follows:    OLD DOSE:   Sig: Intrathecal Pump- 20 mL syringe  Concentrations:  Dilaudid  14 mg/ml                                                          Bupivacaine 28 mg/ml  Ziconitide 14 mcg/ml     Total Volume in Refill: 20 mL    Basal:  Dilaudid  7 mg/day                                                      Bupivacaine 14 mg/day  Ziconitide 7 mcg/day     PTM 0.2 hydromorphone, 2 hour lockout, maximum 6 in a day.     NEW DOSE:   Sig: Intrathecal Pump- 20 mL syringe  Concentrations:  Dilaudid  14 mg/ml                                                          Bupivacaine 28 mg/ml  Ziconitide 14 mcg/ml     Total Volume in Refill: 20 mL    Basal:  Dilaudid  7.6 mg/day                                                      Bupivacaine 15.2 mg/day  Ziconitide 7.6 mcg/day     PTM 0.2 hydromorphone, 2 hour lockout, maximum 3 in a day.          Santa Wallace, DNP, APRN, AGNP-C  [facility]

## 2024-07-15 NOTE — TELEPHONE ENCOUNTER
[facility] Call Center    Phone Message    May a detailed message be left on voicemail: yes     Reason for Call: Other: Patients daughter, José Miguel is calling to discuss next steps for patient while she waits to see Santa this Friday. Patients pain pump failed and was seen at the [facility] ED on  07/13. Pt only has 2 tablets left for today that was prescribed from th ED and daughter would like to know if patient can get more until her appointment this Friday.   Daughter would like to know if patients pain pump can get replaced and be available for that to happen on Friday's appointment?.      Please call to advise.     Action Taken: Message routed to:  [facility] Pain    Travel Screening: Not Applicable     Date of Service:

## 2024-07-15 NOTE — TELEPHONE ENCOUNTER
RN spoke with patient's daughter José Miguel to confirm PTM usage and update on provider recommendations. Patient confirmed using 5-6 PTM/day. Writer informed we would connect with [facility] to see if they have availability to come make dose adjustments this week.     Writer spoke with Adelina, nurse from [facility], who confirmed she can make adjustments to the patient on Wednesday 7/17/24. Writer informed a new prescription would be sent over.     Writer spoke with patient's daughter José Miguel again to update and informed [facility] in [place] has hydromorphone available. José Miguel confirmed pharmacy location. Writer informed Santa will review over her lunch and we would update once the prescription was sent. José Miguel also confirmed availability for [facility] to come make adjustments to patients pump on 7/17.    Alia Reyes RNCC

## 2024-07-15 NOTE — TELEPHONE ENCOUNTER
Health Call Center    Phone Message    May a detailed message be left on voicemail: yes     Reason for Call: Medication Refill Request    Has the patient contacted the pharmacy for the refill? Yes   Name of medication being requested: COMPOUND CONTAINING CONTROLLED SUBSTANCE (CMPD RX) - PHARMACY TO MIX COMPOUNDED MEDICATION   Provider who prescribed the medication:     Santa Wallace APRN CNP     Pharmacy: [place]  Date medication is needed: 7/17/2024       Action Taken: Message routed to:  [facility] Pain Clinic    Travel Screening: Not Applicable     Date of Service:

## 2024-07-15 NOTE — TELEPHONE ENCOUNTER
RN spoke with patients daughter José Miguel in regard to her failed device. José Miguel informed the external device's screen is like a static broken TV. Patient is still obtaining the continuous dose, but is unable to give herself PTM boluses. Patient was seen by the [facility] who prescribed her oral hydromorphone 2 mg tablets - Take 2 tabs Q 12 hours. José Miguel reports patient has only been taking 1 tablet Q 6 hours, max 2/day. Writer sent email to [facility] to update on device status and will update the provider as well. Writer informed a return call would be made once the provider recommends a plan.    Alia Reyes RNCC

## 2024-07-15 NOTE — TELEPHONE ENCOUNTER
RN spoke with patient's daughter José Miguel to update on provider recommendations. Writer informed the PTM will be reduced to 3 PTM/day and the oral hydromorphone prescription was sent to the pharmacy in [place]. José Miguel understood and will contact pharmacy to plan for .    Alia Reyes RNCC

## 2024-07-15 NOTE — PROGRESS NOTES
On-call note: Call received from the [facility] emergency room 7/13.  Concerned that her pain pump had stopped working the day before.  Could not identify when she had refilled the pump.  She was not aware of who should be calling for management.  Had hydromorphone in the pump, Zeet.  I recommended they give hydromorphone 2 mg 4 times a day orally for 2 days and clonidine and hydroxyzine for withdrawal.    I later called the patient.  She recalled having the pain pump filled the previous week so was not likely out of medication.  She was not clear who will be following her pump as Dr. Espinoza is no longer with the [facility] pain service.  She was not having withdrawal symptoms yet, understanding the plan with the hydromorphone and clonidine.    I later received a call from her daughter who was unaware of this.  I reviewed above and she indicated understanding.

## 2024-07-17 ENCOUNTER — MEDICAL CORRESPONDENCE (OUTPATIENT)
Dept: HEALTH INFORMATION MANAGEMENT | Facility: CLINIC | Age: 74
End: 2024-07-17
Payer: MEDICARE

## 2024-07-19 ENCOUNTER — OFFICE VISIT (OUTPATIENT)
Dept: ANESTHESIOLOGY | Facility: CLINIC | Age: 74
End: 2024-07-19
Payer: MEDICARE

## 2024-07-19 VITALS — HEART RATE: 65 BPM | OXYGEN SATURATION: 96 % | SYSTOLIC BLOOD PRESSURE: 101 MMHG | DIASTOLIC BLOOD PRESSURE: 56 MMHG

## 2024-07-19 DIAGNOSIS — Z97.8 PRESENCE OF INTRATHECAL PUMP: Primary | ICD-10-CM

## 2024-07-19 DIAGNOSIS — M62.838 MUSCLE SPASM: ICD-10-CM

## 2024-07-19 DIAGNOSIS — M79.2 NEUROPATHIC PAIN: ICD-10-CM

## 2024-07-19 DIAGNOSIS — S24.103A SPINAL CORD INJURY AT T7-T12 LEVEL (H): ICD-10-CM

## 2024-07-19 PROCEDURE — 99214 OFFICE O/P EST MOD 30 MIN: CPT

## 2024-07-19 RX ORDER — BACLOFEN 15 MG/1
TABLET ORAL
Qty: 90 TABLET | Refills: 1 | Status: SHIPPED | OUTPATIENT
Start: 2024-07-19 | End: 2024-07-25

## 2024-07-19 ASSESSMENT — PAIN SCALES - PAIN ENJOYMENT GENERAL ACTIVITY SCALE (PEG): PEG_TOTALSCORE: INCOMPLETE

## 2024-07-19 ASSESSMENT — PAIN SCALES - GENERAL: PAINLEVEL: MODERATE PAIN (5)

## 2024-07-19 NOTE — LETTER
7/19/2024       RE: Anayeli Gagnon  [address]  [facility] [address]     Dear Colleague,    Thank you for referring your patient, Anayeli Gagnon, to the [facility] [place] at [facility]. Please see a copy of my visit note below.    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      Widespread pain - Etiology multifactorial, hx of SCI. Symptoms consistent with neuropathic process, underlying degenerative/postsurgical changes of spine, suspect SI mediated component, overlying myofascial component.      Assigned to [facility] nursing team.     Visit Diagnoses:  1. Presence of intrathecal pump    2. Spinal cord injury at T7-T12 level (H)    3. Neuropathic pain    4. Muscle spasm        Plan:  Diagnosis reviewed, treatment option addressed, and risk/benifits discussed.  Self-care instructions given.  I am recommending a multidisciplinary treatment plan to help this patient better manage their pain.      Pain Physical Therapy:  Continue activity as tolerated at home.      Pain Psychologist to address relaxation, behavioral change, coping style, and other factors important to improvement.  NO - not at this time.      Diagnostic Studies:  Reviewed pertinent labs/imaging in chart. No indications today for new diagnostics.      Medication Management:   IT pump - hydromorphone, bupivacaine, ziconitide. Established with [facility] for home management. No changes to pump dose recommended today.   Baclofen - current dose 10 mg twice daily, additional 10 mg as needed on days  with increased pain. Appreciates some degree of benefit, no side effects. Recommend trial increase to 15 mg 2-3 x daily and monitor for enhanced benefit. Cautioned about potential sedation effects. Updated prescription sent to pharmacy today.   Acetaminophen - 1000 mg three times daily. Appreciates some degree of benefit, no side effects.   Gabapentin 1200 mg three times daily. Recommended transition to Lyrica 150 mg TID at last visit, though she opted not to change medications. She reports increased stress in her life right now and does not want to make too many changes.      Potential procedures:   None      Other Orders/Referrals:   [facility] - RNCC will coordinate next prescription for refill.     Follow up with EMELI Jerez CNP in around 6-8 weeks , via video visit.       Review of Electronic Chart: Today I have also reviewed available medical information in the patient's medical record at [facility] ([facility]) and Care Everywhere (if available), including relevant provider notes, laboratory work, and imaging.     Santa Wallace DNP, EMELI, AGNP-C  [facility] Pain Management     -------------------------------------------------    Subjective:    Chief complaint:   Chief Complaint   Patient presents with    RECHECK       Interval history:  Anayeli Gagnon is a 73 year old female last seen on 6/4/24.  They are a patient of mine seen in follow up.     Recommendations/plan at the last visit included:  Pain Physical Therapy:  Continue activity as tolerated at home.      Pain Psychologist to address relaxation, behavioral change, coping style, and other factors important to improvement.  NO - not at this time.      Diagnostic Studies:  Reviewed pertinent labs/imaging in chart. No indications today for new diagnostics.      Medication Management:   IT pump - hydromorphone, bupivacaine, ziconitide. Established with [facility] for home management.   Baclofen - 10 mg twice daily, additional 10 mg as needed on  days with increased pain. Managed by PCP. May consider dose adjustments in future.   Acetaminophen - 1000 mg three times daily. Appreciates some degree of benefit, no side effects.   Stop gabapentin 1200 mg three times daily. Start Lyrica 150 mg three times daily. Discussed direct transition between medications. Monitor for changes in pain level/intensity. Advised to contact clinic with concerns for significantly increased pain after transition to Lyrica. Will consider dose increase of Lyrica versus switching back to gabapentin.   Prescription for 150 mg capsules sent into pharmacy today.      Potential procedures:   None      Other Orders/Referrals:   [facility] - prescription for next refill signed on 24.      Follow up with EMELI Jerez CNP in around 6-8 weeks for in person visit.     Since her last visit, Anayeli Gagnon reports:  -She had reported issues with PTM patient controller on Monday, problems started over the weekend.   -[employer] was able to get a new controller and this has been connected to the pump.   -She has resumed use of PTM boluses, generally uses 6 PTM/day (max available).   -Lyrica recommended alternatively to gabapentin at last visit. She reports not starting Lyrica due to concerns for side effects she read about. She reports dizziness is her primary concern.   -She is not interested in changing medications right now.   -She has a lot of stress right now,  asked for divorce.   -She reports baclofen dose 10 mg BID consistently, takes third dose PRN and notes enhanced benefit.   -She has next visit with [facility] on .     Pain Information:   Pain ratin/10 on a 0-10 scale.      Interval history from last visit on 24:  -She presents today in follow up and for transition of care.  -She previously followed with Dr. Espinoza, who has since left our practice.   -She reports pain is increased today, though she states that she's had worse days.   -She has ITP implant, simple  continuous with PTM x 6 per day.   -She reports using max PTM doses,   -She reports pain is worse at night, primarily in groin and below knees and into feet/toes.   -Groin pain worse when sitting up, improves with lying down.   -Gabapentin dose 1200 mg TID, no major concerns for side effects.   -Baclofen 10 mg TID PRN, states she takes BID consistently. PCP currently manages this medication. Denies side effects.   -Tylenol 1000 mg TID, appreciates, no side effects.   -[facility] comes to her home to for IT pump refill.   -She reports next refill in the next week or so.   Pain Information:              Pain rating: averages 5/10 on a 0-10 scale.        Current Pain Treatments:    Medications:      Gabapentin 1200 mg TID  Baclofen 15 mg 2-3 x daily PRN  Acetaminophen 1000 mg TID                   Intrathecal Pump- 20 mL syringe     Concentrations:   Dilaudid 14 mg/ml                                                          Bupivacaine 28 mg/ml   Ziconitide 14 mcg/ml      Total Volume in Refill: 20 mL     Basal:   Dilaudid  7 mg/day                                                       Bupivacaine 14 mg/day   Ziconitide 7 mcg/day      PTM 0.2 hydromorphone, 2 hour lockout, maximum 6 in a day.         Current MME: N/A     Review of [place] Prescription Monitoring Program (): No concern for abuse or misuse of controlled medications based on this report. Reviewed - appears appropriate.      Annual Controlled Substance Agreement/UDS due date: N/A     Past pain treatments:  Lyrica 150 mg TID    Medications:  Current Outpatient Medications   Medication Sig Dispense Refill    acetaminophen (TYLENOL) 500 MG tablet Take 500-1,000 mg by mouth every 6 hours as needed for mild pain      aspirin (ASA) 325 MG EC tablet Take 1 tablet (325 mg) by mouth daily 30 tablet 11    atorvastatin (LIPITOR) 40 MG tablet Take 1 tablet (40 mg) by mouth every evening 30 tablet 3    baclofen 15 MG TABS Take 15 mg BID. May take additional 15 mg  daily PRN. 90 tablet 1    COMPOUND CONTAINING CONTROLLED SUBSTANCE (CMPD RX) - PHARMACY TO MIX COMPOUNDED MEDICATION Intrathecal Pump- 20 mL syringe    Concentrations:  Dilaudid  14 mg/ml                                                          Bupivacaine 28 mg/ml  Ziconitide 14 mcg/ml     Total Volume in Refill: 20 mL    Basal:   Dilaudid  7 mg/day                                                       Bupivacaine 14 mg/day  Ziconitide 7 mcg/day     PTM 0.2 hydromorphone, 2 hour lockout, maximum 6 in a day. 20 mL 0    gabapentin (NEURONTIN) 600 MG tablet Take 1,200 mg by mouth 3 times daily  1 tablet 0    HYDROmorphone (DILAUDID) 2 MG tablet Take 1 tablet (2 mg) by mouth 2 times daily as needed for pain Max 2 tabs/day. #8 tablets to last through 7/19/24. May fill 7/15/24. 10 tablet 0    lidocaine (LIDODERM) 5 % patch Apply 1 patch topically daily as needed       LORazepam (ATIVAN) 0.5 MG tablet Take 1 tablet by mouth daily as needed for anxiety      medication given by implanted intrathecal pump continuous Concentrations:  Dilaudid    14 mg/ml                                                          Bupivacaine 28 mg/ml  Ziconotide 14 mcg/ml     Total Volume in Refill: 20 mL      Basal:   Dilaudid   6.993 mg/day                                                       Bupivacaine 13.986 mg/day  Ziconotide 6.993 mcg/day     PTM 0.2 mg hydromorphone, 0.4 mg bupivacaine, 0.2 mcg ziconotide, 2 hour lockout, maximum 6 in a day (2 hr lockout)     Maximum 24 hour dose  Hydromorphone 8.134 mg/day  Bupivacaine 16.267 mg/day  Ziconitide 8.134 mcg/day     Last filled on 10/26/23 and current alarm date is: 11/25/23    Managed by [facility] ([phone]) - refills approximately every 3 weeks  Provider. Dr. Maryam Espinoza      melatonin 3 MG tablet Take 3 mg by mouth nightly as needed for sleep      metoprolol succinate ER (TOPROL-XL) 25 MG 24 hr tablet Take 12.5 mg by mouth every evening       Multiple Vitamins-Minerals (WOMENS  MULTI PO) Take 1 tablet by mouth daily      ondansetron (ZOFRAN) 4 MG tablet Take 1 tablet (4 mg) by mouth every 6 hours as needed for nausea 20 tablet 1    polyethylene glycol (MIRALAX) 17 GM/Dose powder Take 17 g by mouth daily as needed for constipation       pregabalin (LYRICA) 150 MG capsule Take 1 capsule (150 mg) by mouth 3 times daily Stop gabapentin. 90 capsule 1       Medical History: any changes in medical history since they were last seen? No      Objective:    Physical Exam:  Blood pressure 101/56, pulse 65, SpO2 96%, not currently breastfeeding.  Constitutional: Well developed, well nourished, appears stated age.  Gait: Wheelchair bound   HEENT: Head atraumatic, normocephalic. Eyes without conjunctival injection or jaundice. Oropharynx clear. Neck supple. No obvious neck masses.  Skin: No rash, lesions, or petechiae of exposed skin.   Psychiatric/mental status: Alert, without lethargy or stupor. Speech fluent. Appropriate affect. Mood normal. Able to follow commands without difficulty.     Diagnostic Tests/Imaging/Labs:  BMP on 10/28/23 - WNL, GFR >60    BILLING TIME DOCUMENTATION:   The total TIME spent on this patient on the date of the encounter/appointment was 20 minutes.      TOTAL TIME includes:   Time spent preparing to see the patient (reviewing records and tests)   Time spent face to face (or over the phone) with the patient   Time spent ordering tests, medications, procedures and referrals   Time spent Referring and communicating with other healthcare professionals   Time spent documenting clinical information in Epic             Again, thank you for allowing me to participate in the care of your patient.      Sincerely,    EMELI Jerez CNP

## 2024-07-19 NOTE — PROGRESS NOTES
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      Widespread pain - Etiology multifactorial, hx of SCI. Symptoms consistent with neuropathic process, underlying degenerative/postsurgical changes of spine, suspect SI mediated component, overlying myofascial component.      Assigned to [facility] nursing team.     Visit Diagnoses:  1. Presence of intrathecal pump    2. Spinal cord injury at T7-T12 level (H)    3. Neuropathic pain    4. Muscle spasm        Plan:  Diagnosis reviewed, treatment option addressed, and risk/benifits discussed.  Self-care instructions given.  I am recommending a multidisciplinary treatment plan to help this patient better manage their pain.      Pain Physical Therapy:  Continue activity as tolerated at home.      Pain Psychologist to address relaxation, behavioral change, coping style, and other factors important to improvement.  NO - not at this time.      Diagnostic Studies:  Reviewed pertinent labs/imaging in chart. No indications today for new diagnostics.      Medication Management:   IT pump - hydromorphone, bupivacaine, ziconitide. Established with [facility] for home management. No changes to pump dose recommended today.   Baclofen - current dose 10 mg twice daily, additional 10 mg as needed on days with increased pain. Appreciates some degree of benefit, no side effects. Recommend trial increase to 15 mg 2-3 x daily and monitor for enhanced benefit. Cautioned about potential sedation effects. Updated prescription sent to pharmacy today.   Acetaminophen - 1000 mg three times daily. Appreciates some degree of benefit, no side effects.    Gabapentin 1200 mg three times daily. Recommended transition to Lyrica 150 mg TID at last visit, though she opted not to change medications. She reports increased stress in her life right now and does not want to make too many changes.      Potential procedures:   None      Other Orders/Referrals:   [facility] - RNCC will coordinate next prescription for refill.     Follow up with EMELI Jerez CNP in around 6-8 weeks , via video visit.       Review of Electronic Chart: Today I have also reviewed available medical information in the patient's medical record at [facility]) and Care Everywhere (if available), including relevant provider notes, laboratory work, and imaging.     Santa Wallace, KIRAN, EMELI, AGNP-C  [facility] Pain Management     -------------------------------------------------    Subjective:    Chief complaint:   Chief Complaint   Patient presents with    RECHECK       Interval history:  Anayeli Gagnon is a 73 year old female last seen on 6/4/24.  They are a patient of mine seen in follow up.     Recommendations/plan at the last visit included:  Pain Physical Therapy:  Continue activity as tolerated at home.      Pain Psychologist to address relaxation, behavioral change, coping style, and other factors important to improvement.  NO - not at this time.      Diagnostic Studies:  Reviewed pertinent labs/imaging in chart. No indications today for new diagnostics.      Medication Management:   IT pump - hydromorphone, bupivacaine, ziconitide. Established with [facility] for home management.   Baclofen - 10 mg twice daily, additional 10 mg as needed on days with increased pain. Managed by PCP. May consider dose adjustments in future.   Acetaminophen - 1000 mg three times daily. Appreciates some degree of benefit, no side effects.   Stop gabapentin 1200 mg three times daily. Start Lyrica 150 mg three times daily. Discussed direct transition between medications. Monitor for changes in pain  level/intensity. Advised to contact clinic with concerns for significantly increased pain after transition to Lyrica. Will consider dose increase of Lyrica versus switching back to gabapentin.   Prescription for 150 mg capsules sent into pharmacy today.      Potential procedures:   None      Other Orders/Referrals:   [facility] - prescription for next refill signed on 24.      Follow up with EMELI Jerez CNP in around 6-8 weeks for in person visit.     Since her last visit, Anayeli Gagnon reports:  -She had reported issues with PTM patient controller on Monday, problems started over the weekend.   -[employer] was able to get a new controller and this has been connected to the pump.   -She has resumed use of PTM boluses, generally uses 6 PTM/day (max available).   -Lyrica recommended alternatively to gabapentin at last visit. She reports not starting Lyrica due to concerns for side effects she read about. She reports dizziness is her primary concern.   -She is not interested in changing medications right now.   -She has a lot of stress right now,  asked for divorce.   -She reports baclofen dose 10 mg BID consistently, takes third dose PRN and notes enhanced benefit.   -She has next visit with [facility] on .     Pain Information:   Pain ratin/10 on a 0-10 scale.      Interval history from last visit on 24:  -She presents today in follow up and for transition of care.  -She previously followed with Dr. Espinoza, who has since left our practice.   -She reports pain is increased today, though she states that she's had worse days.   -She has ITP implant, simple continuous with PTM x 6 per day.   -She reports using max PTM doses,   -She reports pain is worse at night, primarily in groin and below knees and into feet/toes.   -Groin pain worse when sitting up, improves with lying down.   -Gabapentin dose 1200 mg TID, no major concerns for side effects.   -Baclofen 10 mg TID PRN, states she takes BID  consistently. PCP currently manages this medication. Denies side effects.   -Tylenol 1000 mg TID, appreciates, no side effects.   -[facility] comes to her home to for IT pump refill.   -She reports next refill in the next week or so.   Pain Information:              Pain rating: averages 5/10 on a 0-10 scale.        Current Pain Treatments:    Medications:      Gabapentin 1200 mg TID  Baclofen 15 mg 2-3 x daily PRN  Acetaminophen 1000 mg TID                   Intrathecal Pump- 20 mL syringe     Concentrations:   Dilaudid 14 mg/ml                                                          Bupivacaine 28 mg/ml   Ziconitide 14 mcg/ml      Total Volume in Refill: 20 mL     Basal:   Dilaudid  7 mg/day                                                       Bupivacaine 14 mg/day   Ziconitide 7 mcg/day      PTM 0.2 hydromorphone, 2 hour lockout, maximum 6 in a day.         Current MME: N/A     Review of [place] Prescription Monitoring Program (): No concern for abuse or misuse of controlled medications based on this report. Reviewed - appears appropriate.      Annual Controlled Substance Agreement/UDS due date: N/A     Past pain treatments:  Lyrica 150 mg TID    Medications:  Current Outpatient Medications   Medication Sig Dispense Refill    acetaminophen (TYLENOL) 500 MG tablet Take 500-1,000 mg by mouth every 6 hours as needed for mild pain      aspirin (ASA) 325 MG EC tablet Take 1 tablet (325 mg) by mouth daily 30 tablet 11    atorvastatin (LIPITOR) 40 MG tablet Take 1 tablet (40 mg) by mouth every evening 30 tablet 3    baclofen 15 MG TABS Take 15 mg BID. May take additional 15 mg daily PRN. 90 tablet 1    COMPOUND CONTAINING CONTROLLED SUBSTANCE (CMPD RX) - PHARMACY TO MIX COMPOUNDED MEDICATION Intrathecal Pump- 20 mL syringe    Concentrations:  Dilaudid  14 mg/ml                                                          Bupivacaine 28 mg/ml  Ziconitide 14 mcg/ml     Total Volume in Refill: 20 mL    Basal:   Dilaudid  7  mg/day                                                       Bupivacaine 14 mg/day  Ziconitide 7 mcg/day     PTM 0.2 hydromorphone, 2 hour lockout, maximum 6 in a day. 20 mL 0    gabapentin (NEURONTIN) 600 MG tablet Take 1,200 mg by mouth 3 times daily  1 tablet 0    HYDROmorphone (DILAUDID) 2 MG tablet Take 1 tablet (2 mg) by mouth 2 times daily as needed for pain Max 2 tabs/day. #8 tablets to last through 7/19/24. May fill 7/15/24. 10 tablet 0    lidocaine (LIDODERM) 5 % patch Apply 1 patch topically daily as needed       LORazepam (ATIVAN) 0.5 MG tablet Take 1 tablet by mouth daily as needed for anxiety      medication given by implanted intrathecal pump continuous Concentrations:  Dilaudid    14 mg/ml                                                          Bupivacaine 28 mg/ml  Ziconotide 14 mcg/ml     Total Volume in Refill: 20 mL      Basal:   Dilaudid   6.993 mg/day                                                       Bupivacaine 13.986 mg/day  Ziconotide 6.993 mcg/day     PTM 0.2 mg hydromorphone, 0.4 mg bupivacaine, 0.2 mcg ziconotide, 2 hour lockout, maximum 6 in a day (2 hr lockout)     Maximum 24 hour dose  Hydromorphone 8.134 mg/day  Bupivacaine 16.267 mg/day  Ziconitide 8.134 mcg/day     Last filled on 10/26/23 and current alarm date is: 11/25/23    Managed by [facility] [facility] ([phone]) - refills approximately every 3 weeks  Provider. Dr. Maryam Espinoza      melatonin 3 MG tablet Take 3 mg by mouth nightly as needed for sleep      metoprolol succinate ER (TOPROL-XL) 25 MG 24 hr tablet Take 12.5 mg by mouth every evening       Multiple Vitamins-Minerals (WOMENS MULTI PO) Take 1 tablet by mouth daily      ondansetron (ZOFRAN) 4 MG tablet Take 1 tablet (4 mg) by mouth every 6 hours as needed for nausea 20 tablet 1    polyethylene glycol (MIRALAX) 17 GM/Dose powder Take 17 g by mouth daily as needed for constipation       pregabalin (LYRICA) 150 MG capsule Take 1 capsule (150 mg) by mouth 3 times daily  Stop gabapentin. 90 capsule 1       Medical History: any changes in medical history since they were last seen? No      Objective:    Physical Exam:  Blood pressure 101/56, pulse 65, SpO2 96%, not currently breastfeeding.  Constitutional: Well developed, well nourished, appears stated age.  Gait: Wheelchair bound   HEENT: Head atraumatic, normocephalic. Eyes without conjunctival injection or jaundice. Oropharynx clear. Neck supple. No obvious neck masses.  Skin: No rash, lesions, or petechiae of exposed skin.   Psychiatric/mental status: Alert, without lethargy or stupor. Speech fluent. Appropriate affect. Mood normal. Able to follow commands without difficulty.     Diagnostic Tests/Imaging/Labs:  BMP on 10/28/23 - WNL, GFR >60    BILLING TIME DOCUMENTATION:   The total TIME spent on this patient on the date of the encounter/appointment was 20 minutes.      TOTAL TIME includes:   Time spent preparing to see the patient (reviewing records and tests)   Time spent face to face (or over the phone) with the patient   Time spent ordering tests, medications, procedures and referrals   Time spent Referring and communicating with other healthcare professionals   Time spent documenting clinical information in Epic

## 2024-07-19 NOTE — NURSING NOTE
Chief Complaint   Patient presents with    RECHECK       Pain medications: Pain pump, Gabapentin, Baclofen, Tylenol    Patti Marshall, EMT

## 2024-07-22 ENCOUNTER — TELEPHONE (OUTPATIENT)
Dept: PALLIATIVE MEDICINE | Facility: OTHER | Age: 74
End: 2024-07-22

## 2024-07-22 ENCOUNTER — TELEPHONE (OUTPATIENT)
Dept: ANESTHESIOLOGY | Facility: CLINIC | Age: 74
End: 2024-07-22
Payer: MEDICARE

## 2024-07-22 NOTE — TELEPHONE ENCOUNTER
Left Voicemail (1st Attempt) and Sent Mychart (1st Attempt) for the patient to call back and schedule the following:    Appointment type: Return pain  Provider: Pain Management Providers: Santa Wallace   Return date: Approx. 9/2/24  Specialty phone number: [phone]    Additonal Notes: Patient's daughter requested call back in the afternoon. leon

## 2024-07-22 NOTE — TELEPHONE ENCOUNTER
Prior Authorization Retail Medication Request    Medication/Dose: baclofen 15 MG TABS  Diagnosis and ICD code (if different than what is on RX):   Spinal cord injury at T7-T12 level (H) [S24.103A]  - Primary      Muscle spasm [M62.838]        New/renewal/insurance change PA/secondary ins. PA:  Previously Tried and Failed:    Rationale:     Spinal cord injury at T7-T12 level (H) [S24.103A]  - Primary      Muscle spasm [M62.838]

## 2024-07-24 ENCOUNTER — TRANSFERRED RECORDS (OUTPATIENT)
Dept: ANESTHESIOLOGY | Facility: CLINIC | Age: 74
End: 2024-07-24
Payer: MEDICARE

## 2024-07-24 ENCOUNTER — MEDICAL CORRESPONDENCE (OUTPATIENT)
Dept: HEALTH INFORMATION MANAGEMENT | Facility: CLINIC | Age: 74
End: 2024-07-24
Payer: MEDICARE

## 2024-07-24 NOTE — PATIENT INSTRUCTIONS
Pain Physical Therapy:  Continue activity as tolerated at home.      Pain Psychologist to address relaxation, behavioral change, coping style, and other factors important to improvement.  NO - not at this time.      Diagnostic Studies:  Reviewed pertinent labs/imaging in chart. No indications today for new diagnostics.      Medication Management:   IT pump - hydromorphone, bupivacaine, ziconitide. Established with [facility] for home management. No changes to pump dose recommended today.   Baclofen - current dose 10 mg twice daily, additional 10 mg as needed on days with increased pain. Appreciates some degree of benefit, no side effects. Recommend trial increase to 15 mg 2-3 x daily and monitor for enhanced benefit. Cautioned about potential sedation effects. Updated prescription sent to pharmacy today.   Acetaminophen - 1000 mg three times daily. Appreciates some degree of benefit, no side effects.   Gabapentin 1200 mg three times daily. Recommended transition to Lyrica 150 mg TID at last visit, though she opted not to change medications. She reports increased stress in her life right now and does not want to make too many changes.      Potential procedures:   None      Other Orders/Referrals:   [facility] - RN[facility] will coordinate next prescription for refill.     Follow up with EMELI Jerez CNP in around 6-8 weeks , via video visit.

## 2024-07-24 NOTE — TELEPHONE ENCOUNTER
Left Voicemail (2nd Attempt) for the patient to call back and schedule the following:    Appointment type: Return pain  Provider: Pain Management Providers: Santa Wallace   Return date: Approx. 9/2/24  Specialty phone number: [phone]    Additional Notes: Will call back one more time. leon

## 2024-07-24 NOTE — TELEPHONE ENCOUNTER
PA Initiation    Medication: BACLOFEN 15 MG PO TABS  Insurance Company: [employer] - Phone [phone] Fax [phone]  Pharmacy Filling the Rx: [id number] - [place] - [address] [place] [address]  Filling Pharmacy Phone: [phone]  Filling Pharmacy Fax: [phone]  Start Date: 7/24/2024

## 2024-07-25 DIAGNOSIS — M54.6 THORACOLUMBAR BACK PAIN: Primary | ICD-10-CM

## 2024-07-25 DIAGNOSIS — M54.50 THORACOLUMBAR BACK PAIN: Primary | ICD-10-CM

## 2024-07-25 RX ORDER — BACLOFEN 10 MG/1
15 TABLET ORAL 3 TIMES DAILY
Qty: 60 TABLET | Refills: 2 | Status: SHIPPED | OUTPATIENT
Start: 2024-07-25

## 2024-07-25 NOTE — TELEPHONE ENCOUNTER
PRIOR AUTHORIZATION DENIED    Medication: BACLOFEN 15 MG PO TABS  Insurance Company: [employer] - Phone [phone] Fax [phone]  Denial Date: 7/25/2024  Denial Reason(s):     Appeal Information:     Patient Notified: No, care team must notify

## 2024-07-25 NOTE — PROGRESS NOTES
Prior authorization note concerning baclofen 15 mg twice a day sent to me.    NIXON Escalante, may be out of the office.  Appears from her last note she wanted increase the baclofen  from 10 mg twice a day to 15 mg 3 times a day.        I have sent the prescription for using 10 mg tablets which had previously been approved using 1-1/2 3 times a day and we will see if that comes to insurance

## 2024-07-26 NOTE — TELEPHONE ENCOUNTER
Patient confirmed scheduled appointment:     Date: 9/3/24  Time: 11:45 AM  Visit type: Return pain - Video visit  Provider: Santa Wallace  [facility]  Additional Notes:   Pt confirmed will be in [place] for appt

## 2024-08-26 ENCOUNTER — TELEPHONE (OUTPATIENT)
Dept: ANESTHESIOLOGY | Facility: CLINIC | Age: 74
End: 2024-08-26
Payer: MEDICARE

## 2024-08-26 NOTE — TELEPHONE ENCOUNTER
RN pended prescription and sent to clinic staff to obtain signature and send to [facility].    Alia Reyes RNCC

## 2024-08-26 NOTE — TELEPHONE ENCOUNTER
[facility] Call Center    Phone Message    May a detailed message be left on voicemail: yes     Reason for Call: Medication Refill Request    Has the patient contacted the pharmacy for the refill? Yes   Name of medication being requested: COMPOUND CONTAINING CONTROLLED SUBSTANCE (CMPD RX) - PHARMACY TO MIX COMPOUNDED MEDICATION   Provider who prescribed the medication: Santa Wallace APRN CNP   Pharmacy: [facility]   Date medication is needed: 8/30/2024       Action Taken: Message routed to:  [facility]    Travel Screening: Not Applicable     Date of Service:

## 2024-09-09 ENCOUNTER — TELEPHONE (OUTPATIENT)
Dept: ANESTHESIOLOGY | Facility: CLINIC | Age: 74
End: 2024-09-09
Payer: MEDICARE

## 2024-09-09 NOTE — TELEPHONE ENCOUNTER
Left Voicemail (1st Attempt) and Sent Mychart (1st Attempt) for the patient to call back and schedule the following:    Appointment type: Return pain  Provider: Santa Wallace  Return date: First available  Specialty phone number: [phone]    Additional Notes:

## 2024-09-09 NOTE — TELEPHONE ENCOUNTER
[facility] Call Center    Phone Message    May a detailed message be left on voicemail: yes     Reason for Call: Medication Refill Request    Has the patient contacted the pharmacy for the refill? Yes   Name of medication being requested: HYDROmorphone (DILAUDID) 2 MG tablet   Provider who prescribed the medication:     Santa Wallace APRN CNP     Pharmacy: [facility] [phone]    Date medication is needed: ASAP       Action Taken: Message routed to:  [facility] Pain Clinics    Travel Screening: Not Applicable     Date of Service:

## 2024-09-10 DIAGNOSIS — M79.2 NEUROPATHIC PAIN: ICD-10-CM

## 2024-09-10 DIAGNOSIS — S24.151A: ICD-10-CM

## 2024-09-10 RX ORDER — HYDROMORPHONE HYDROCHLORIDE 2 MG/1
2 TABLET ORAL 2 TIMES DAILY PRN
Qty: 10 TABLET | Refills: 0 | OUTPATIENT
Start: 2024-09-10

## 2024-09-10 NOTE — TELEPHONE ENCOUNTER
[facility] Call Center    Phone Message    May a detailed message be left on voicemail: yes     Reason for Call: Other: Pharmacy number is [phone] and fax number is [phone] for Pt medicationBetzaida calling from Pt living facility      Action Taken: Message routed to:  [facility] Pain    Travel Screening: Not Applicable     Date of Service:

## 2024-09-10 NOTE — TELEPHONE ENCOUNTER
Chart reviewed - I am confused by this refill request for PO hydromorphone, as she was prescribed #8 tabs in July when concerns for IT pump dysfunction present.  However, it was my understanding at [facility], which her daughter attended, that the pump was fully functional and PO hydromorphone was filled but never used.  Is it possible there is confusion with assisted living facility staff who are unfamiliar/unaware of IT pump currently in use?    Santa Wallace, DNP, APRN, AGNP-C  [facility] Management

## 2024-09-10 NOTE — TELEPHONE ENCOUNTER
Refill request    Medication: HYDROmorphone (DILAUDID) 2 MG tablet    Sig: Take 1 tablet (2 mg) by mouth 2 times daily as needed for pain Max 2 tabs/day.     Dispensed: 10  Refills: 0  SOLD to the pt on: 7/15/24     Last clinic appointment: 7/19/24  Next clinic appointment: none listed, but has been advised to schedule one    Last Drug Screen Collected: 5/10/21  Controlled Substance Agreement signed: none listed      Preferred pharmacy:    [facility] - [place], [place] - [address]     Refill request routed to the provider to review.

## 2024-09-10 NOTE — TELEPHONE ENCOUNTER
RN spoke with Ashley, nurse manager, who reports patient has been residing with them since mid-August ([facility]). She reports the transition did not go well and patient has been calling herself in to the ED every 2-3 days reporting her pain is unbearable. The facility has advised the patient to contact the pain clinic, but patient has declined. Writer informed the oral oxycodone PRN was discontinued and only ordered due to a dysfunction with the pump that has since resolved. Ashley reports the patient has been uncooperative with cares and the facility expressed concerns of safety. Ashley reports they have just involved the patient's spouse as well and reports they think this is more of a mental health concern. Writer informed an update would be sent to the [facility] nurse as they would need to establish with their facility to complete refills and adjustments to the pain pump. Writer also informed an update would be sent to the provider.    Alia Reyes RNCC

## 2024-09-10 NOTE — TELEPHONE ENCOUNTER
Refill request inappropriate. See RNCC documentation in chart.     Santa Wallace, KIRAN, APRN, AGNP-C  [facility] Pain Management

## 2024-09-10 NOTE — TELEPHONE ENCOUNTER
RN left voicemail advising Ashley to return call to discuss mutual patient's medication.    Writer reviewed chart, patient was only prescribed a small quantity due to pump dysfunction. Patient has no further issues with the pain pump and no longer needing oral dilaudid.    Alia Reyes RNCC

## 2024-09-11 ENCOUNTER — TELEPHONE (OUTPATIENT)
Dept: ANESTHESIOLOGY | Facility: CLINIC | Age: 74
End: 2024-09-11
Payer: MEDICARE

## 2024-09-11 NOTE — TELEPHONE ENCOUNTER
"RN spoke with patient in regard to pain pump usage. Patient reports she uses her max 6 PTM/day and saves 3 of them due to increased pain in the evening. Writer noted patient is scheduled for a follow up on 9/24, but noted [facility] needs prescription by 9/23. Writer reviewed schedule and noted a sooner appointment on 9/13 and scheduled patient to discuss adjustments with Santa. Writer reviewed medications with patient who confirmed she is only taking Lyrica and baclofen. Patient declined taking any oral opioids. Writer acknowledged patient is in a new living situation at an assisted living and patient confirmed stating, \"Not by choice\". Writer will send an update to the provider. Patient appreciated the call to discuss.    Alia Reyes, ZACHARIAHCC    "

## 2024-09-11 NOTE — TELEPHONE ENCOUNTER
Patient confirmed scheduled appointment:     Date: 9/24/24  Time: 8:45 AM  Visit type: Return pain  Visit mode: Virtual Visit  Provider: Santa Wallace  Location[facility]  Testing/imaging: Pt confirmed will be in [place] for appt

## 2024-09-12 NOTE — PROGRESS NOTES
Video-Visit Details    Type of service:  Video Visit     Originating Location (pt. Location): Home    Distant Location (provider location):  On-site  Platform used for Video Visit: [employer]      [facility]     Date of visit: 9/13/2024      Assessment:   Anayeli Gagnon is a 73 year old female with a past medical history significant for thoracolumbar pain, neuropathic pain, OA, headache, CVA, SCI T7-12, incomplete SCI T1-6, HTN, muscle spasticity, anxiety/depression, s/p lumbar decompression 2019, s/p ITP implant 2022, who presents with complaints of widespread pain.      Widespread pain - Etiology multifactorial, hx of SCI. Symptoms consistent with neuropathic process, underlying degenerative/postsurgical changes of spine, suspect SI mediated component, overlying myofascial component.      Assigned to [facility] nursing team.     Visit Diagnoses:  1. Presence of intrathecal pump    2. Neuropathic pain    3. 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 Spinal cord injury at T7-T12 level (H)    5. Chronic bilateral low back pain with bilateral sciatica    6. Muscle spasm        Plan:  Diagnosis reviewed, treatment option addressed, and risk/benifits discussed.  Self-care instructions given.  I am recommending a multidisciplinary treatment plan to help this patient better manage their pain.      Pain Physical Therapy:  Hip flexor stretches added to AVS. Not interested in PT referral today, lack of transportation right now. Encouraged them to explore medical transportation/metro mobility through Medicare. May contact clinic in between visits for referral if needed.      Pain Psychology: Not at this time.      Diagnostic Studies:  Reviewed pertinent labs/imaging in chart. No indications today for new diagnostics.      Medication Management:   IT pump - hydromorphone, bupivacaine, ziconitide. Established with [facility] for home management. No changes to pump  dose recommended today. She uses max PTM doses (6/day), notes reserving 3 doses for evening when pain is worse.   Could consider slight increase in future, increase basal rate by 0.6 mg (hydromorphone  drug) and continue 6 PTMs/day. May contact clinic to request increase in between visits if needed.   Baclofen - current dose 10-15 mg three times daily. Appreciates some degree of benefit, no reported side effects.   Acetaminophen - 1000 mg three times daily. Appreciates some degree of benefit, no side effects.   Gabapentin 1200 mg three times daily. Appreciates benefit, no side effects reported.   Recommended Lyrica trial at prior visits, though she decided against pursuing new medication.      Potential procedures:   None      Other Orders/Referrals:   [facility] - RNCC will coordinate next prescription for refill.     Follow up with EMELI eJrez CNP in around 6-8 weeks via video visit.       Review of Electronic Chart: Today I have also reviewed available medical information in the patient's medical record at [facility] and Care Everywhere (if available), including relevant provider notes, laboratory work, and imaging.     Santa Wallace DNP, EMELI, AGNP-C  [facility] Pain Management     -------------------------------------------------    Subjective:    Chief complaint:   Chief Complaint   Patient presents with    Pain    RECHECK     Follow up visit-no changes reported       Interval history:  Anayeli Gagnon is a 73 year old female last seen on 7/19/24.  They are a patient of mine seen in follow up.     Recommendations/plan at the last visit included:  Pain Physical Therapy:  Continue activity as tolerated at home.      Pain Psychologist to address relaxation, behavioral change, coping style, and other factors important to improvement.  NO - not at this time.      Diagnostic Studies:  Reviewed pertinent labs/imaging in chart. No indications today for new diagnostics.      Medication  "Management:   IT pump - hydromorphone, bupivacaine, ziconitide. Established with [facility] for home management. No changes to pump dose recommended today.   Baclofen - current dose 10 mg twice daily, additional 10 mg as needed on days with increased pain. Appreciates some degree of benefit, no side effects. Recommend trial increase to 15 mg 2-3 x daily and monitor for enhanced benefit. Cautioned about potential sedation effects. Updated prescription sent to pharmacy today.   Acetaminophen - 1000 mg three times daily. Appreciates some degree of benefit, no side effects.   Gabapentin 1200 mg three times daily. Recommended transition to Lyrica 150 mg TID at last visit, though she opted not to change medications. She reports increased stress in her life right now and does not want to make too many changes.      Potential procedures:   None      Other Orders/Referrals:   [facility] - RNCC will coordinate next prescription for refill.     Follow up with EMELI Jerez CNP in around 6-8 weeks , via video visit.     Since her last visit, Anayeli Gagnon reports:  -She has moved into AL facility. She states this was not her idea.   -She will be looking into a new facility.   -She is in [place] currently. She has been there 6 weeks.  -She does not have a definitive date to move or specific place in mind right now.   -She reports pain is more severe right now, when \"stuff\" is bothering her, pain worsens.   -She continues to use max PTM at 6/day. She notes saving 3 PTMs for use in the evening, when pain is the worst.   -She reports pain control earlier in the day is not affected by saving half PTMs for the evening.   -She continues baclofen and gabapentin. Lyrica recommended at prior visit, she decided against trial, states she \"doesn't like\" the medication.   -She thinks that getting out of current apartment is contributing to severity of pain  -She reports groin pain is persistent, notes radiation around hips into " groin. She notes pain progressively worsens throughout the day.     PEG: A Three-Item Scale Assessing Pain Intensity and Interference    What number best describes your PAIN ON AVERAGE in the past week? 7    What number best describes how, during the past week, pain has interfered with your ENJOYMENT OF LIFE? 8    What number best describes how, during the past week, pain has interfered with your GENERAL ACTIVITY? 8    PEG Total Score: 7.67    Fredo RODRIGUEZ, Ronen KA, Samantha MJ, Clover TA, Shen J, Candida JM, Lincoln SM, Milad K. Development and initial validation of the PEG, a 3-item scale assessing pain intensity and interference. Journal of General Internal Medicine. 2009 Kamaljit;24:733-738.        Interval history from last visit on 24:  -She had reported issues with PTM patient controller on Monday, problems started over the weekend.   -[employer] was able to get a new controller and this has been connected to the pump.   -She has resumed use of PTM boluses, generally uses 6 PTM/day (max available).   -Lyrica recommended alternatively to gabapentin at last visit. She reports not starting Lyrica due to concerns for side effects she read about. She reports dizziness is her primary concern.   -She is not interested in changing medications right now.   -She has a lot of stress right now,  asked for divorce.   -She reports baclofen dose 10 mg BID consistently, takes third dose PRN and notes enhanced benefit.   -She has next visit with [facility] on .   Pain Information:              Pain ratin/10 on a 0-10 scale.        Current Pain Treatments:      Gabapentin 1200 mg TID  Baclofen 15 mg 2-3 x daily PRN  Acetaminophen 1000 mg TID                   Intrathecal Pump- 20 mL syringe     Concentrations:   Dilaudid 14 mg/ml                                                          Bupivacaine 28 mg/ml   Ziconitide 14 mcg/ml      Total Volume in Refill: 20 mL     Basal:   Dilaudid  7 mg/day                                                        Bupivacaine 14 mg/day   Ziconitide 7 mcg/day      PTM 0.2 hydromorphone, 2 hour lockout, maximum 6 in a day.         Current MME: N/A     Review of [place] Prescription Monitoring Program (): No concern for abuse or misuse of controlled medications based on this report. Reviewed - appears appropriate.      Annual Controlled Substance Agreement/UDS due date: N/A     Past pain treatments:  Lyrica 150 mg TID      Medications:  Current Outpatient Medications   Medication Sig Dispense Refill    busPIRone (BUSPAR) 10 MG tablet Take 10 mg by mouth.      acetaminophen (TYLENOL) 500 MG tablet Take 500-1,000 mg by mouth every 6 hours as needed for mild pain      aspirin (ASA) 325 MG EC tablet Take 1 tablet (325 mg) by mouth daily 30 tablet 11    atorvastatin (LIPITOR) 40 MG tablet Take 1 tablet (40 mg) by mouth every evening 30 tablet 3    baclofen (LIORESAL) 10 MG tablet Take 1.5 tablets (15 mg) by mouth 3 times daily 60 tablet 2    COMPOUND CONTAINING CONTROLLED SUBSTANCE (CMPD RX) - PHARMACY TO MIX COMPOUNDED MEDICATION Intrathecal Pump- 20 mL syringe    Concentrations:  Dilaudid  14 mg/ml                                                          Bupivacaine 28 mg/ml  Ziconitide 14 mcg/ml     Total Volume in Refill: 20 mL    Basal:   Dilaudid  7 mg/day                                                       Bupivacaine 14 mg/day  Ziconitide 7 mcg/day     PTM 0.2 hydromorphone, 2 hour lockout, maximum 6 in a day. 20 mL 0    gabapentin (NEURONTIN) 600 MG tablet Take 1,200 mg by mouth 3 times daily  1 tablet 0    lidocaine (LIDODERM) 5 % patch Apply 1 patch topically daily as needed       LORazepam (ATIVAN) 0.5 MG tablet Take 1 tablet by mouth daily as needed for anxiety      medication given by implanted intrathecal pump continuous Concentrations:  Dilaudid    14 mg/ml                                                          Bupivacaine 28 mg/ml  Ziconotide 14 mcg/ml     Total Volume in Refill: 20  mL      Basal:   Dilaudid   6.993 mg/day                                                       Bupivacaine 13.986 mg/day  Ziconotide 6.993 mcg/day     PTM 0.2 mg hydromorphone, 0.4 mg bupivacaine, 0.2 mcg ziconotide, 2 hour lockout, maximum 6 in a day (2 hr lockout)     Maximum 24 hour dose  Hydromorphone 8.134 mg/day  Bupivacaine 16.267 mg/day  Ziconitide 8.134 mcg/day     Last filled on 10/26/23 and current alarm date is: 11/25/23    Managed by [facility] ([phone]) - refills approximately every 3 weeks  Provider. Dr. Maryam Espinoza      melatonin 3 MG tablet Take 3 mg by mouth nightly as needed for sleep      metoprolol succinate ER (TOPROL-XL) 25 MG 24 hr tablet Take 12.5 mg by mouth every evening       Multiple Vitamins-Minerals (WOMENS MULTI PO) Take 1 tablet by mouth daily      ondansetron (ZOFRAN) 4 MG tablet Take 1 tablet (4 mg) by mouth every 6 hours as needed for nausea 20 tablet 1    polyethylene glycol (MIRALAX) 17 GM/Dose powder Take 17 g by mouth daily as needed for constipation       pregabalin (LYRICA) 150 MG capsule Take 1 capsule (150 mg) by mouth 3 times daily Stop gabapentin. 90 capsule 1       Medical History: any changes in medical history since they were last seen? No      Objective:    Physical Exam:  GENERAL: alert and no distress  EYES: Eyes grossly normal to inspection.  No discharge or erythema, or obvious scleral/conjunctival abnormalities.  RESP: No audible wheeze, cough, or visible cyanosis.    SKIN: Visible skin clear. No significant rash, abnormal pigmentation or lesions.  NEURO: Cranial nerves grossly intact.  Mentation and speech appropriate for age.  PSYCH: Appropriate affect, tone, and pace of words     Diagnostic Tests/Imaging/Labs:  BMP on 9/4/24 - GFR >60    BILLING TIME DOCUMENTATION:   The total TIME spent on this patient on the date of the encounter/appointment was 50 minutes.      TOTAL TIME includes:   Time spent preparing to see the patient (reviewing records and  tests)   Time spent face to face (or over the phone) with the patient   Time spent ordering tests, medications, procedures and referrals   Time spent Referring and communicating with other healthcare professionals   Time spent documenting clinical information in Epic

## 2024-09-13 ENCOUNTER — VIRTUAL VISIT (OUTPATIENT)
Dept: ANESTHESIOLOGY | Facility: CLINIC | Age: 74
End: 2024-09-13
Payer: MEDICARE

## 2024-09-13 DIAGNOSIS — S24.103A SPINAL CORD INJURY AT T7-T12 LEVEL (H): ICD-10-CM

## 2024-09-13 DIAGNOSIS — G89.29 CHRONIC BILATERAL LOW BACK PAIN WITH BILATERAL SCIATICA: ICD-10-CM

## 2024-09-13 DIAGNOSIS — M79.2 NEUROPATHIC PAIN: ICD-10-CM

## 2024-09-13 DIAGNOSIS — Z97.8 PRESENCE OF INTRATHECAL PUMP: Primary | ICD-10-CM

## 2024-09-13 DIAGNOSIS — M54.41 CHRONIC BILATERAL LOW BACK PAIN WITH BILATERAL SCIATICA: ICD-10-CM

## 2024-09-13 DIAGNOSIS — M54.42 CHRONIC BILATERAL LOW BACK PAIN WITH BILATERAL SCIATICA: ICD-10-CM

## 2024-09-13 DIAGNOSIS — M62.838 MUSCLE SPASM: ICD-10-CM

## 2024-09-13 DIAGNOSIS — S24.151A: ICD-10-CM

## 2024-09-13 PROCEDURE — 99215 OFFICE O/P EST HI 40 MIN: CPT | Mod: 95

## 2024-09-13 RX ORDER — BUSPIRONE HYDROCHLORIDE 10 MG/1
10 TABLET ORAL
COMMUNITY
Start: 2024-09-09 | End: 2025-09-09

## 2024-09-13 ASSESSMENT — PAIN SCALES - GENERAL: PAINLEVEL: EXTREME PAIN (8)

## 2024-09-13 ASSESSMENT — PAIN SCALES - PAIN ENJOYMENT GENERAL ACTIVITY SCALE (PEG)
INTERFERED_GENERAL_ACTIVITY: 8
INTERFERED_ENJOYMENT_LIFE: 8
AVG_PAIN_PASTWEEK: 7
PEG_TOTALSCORE: 7.67

## 2024-09-13 ASSESSMENT — ANXIETY QUESTIONNAIRES: GAD7 TOTAL SCORE: INCOMPLETE

## 2024-09-13 NOTE — LETTER
9/13/2024       RE: Anayeli Gagnon  [address]  [facility] [address]     Dear Colleague,    Thank you for referring your patient, Anayeli Gagnon, to the [facility] FOR COMPREHENSIVE PAIN MANAGEMENT [place] at [facility]. Please see a copy of my visit note below.    Video-Visit Details    Type of service:  Video Visit     Originating Location (pt. Location): Home  {PROVIDER LOCATION On-site should be selected for visits conducted from your clinic location or adjoining [facility] hospital, academic office, or other nearby [facility] building. Off-site should be selected for all other provider locations, including home:[id number]}  Distant Location (provider location):  On-site  Platform used for Video Visit: Lexity      [facility] Pain Management     Date of visit: 9/13/2024      Assessment:   Anayeli Gagnon is a 73 year old female with a past medical history significant for thoracolumbar pain, neuropathic pain, OA, headache, CVA, SCI T7-12, incomplete SCI T1-6, HTN, muscle spasticity, anxiety/depression, s/p lumbar decompression 2019, s/p ITP implant 2022, who presents with complaints of widespread pain.      Widespread pain - Etiology multifactorial, hx of SCI. Symptoms consistent with neuropathic process, underlying degenerative/postsurgical changes of spine, suspect SI mediated component, overlying myofascial component.      Assigned to [facility] [place] [place] nursing team.     Visit Diagnoses:  1. Presence of intrathecal pump    2. Neuropathic pain    3. 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 Spinal cord injury at T7-T12 level (H)    5. Chronic bilateral low back pain with bilateral sciatica    6. Muscle spasm        Plan:  Diagnosis reviewed, treatment option addressed, and risk/benifits discussed.  Self-care instructions given.  I am recommending a multidisciplinary treatment plan to help this patient  better manage their pain.      Pain Physical Therapy:  Hip flexor stretches added to AVS. Not interested in PT referral today, lack of transportation right now. Encouraged them to explore medical transportation/metro mobility through Medicare. May contact clinic in between visits for referral if needed.      Pain Psychology: Not at this time.      Diagnostic Studies:  Reviewed pertinent labs/imaging in chart. No indications today for new diagnostics.      Medication Management:   IT pump - hydromorphone, bupivacaine, ziconitide. Established with [facility] for home management. No changes to pump dose recommended today. She uses max PTM doses (6/day), notes reserving 3 doses for evening when pain is worse.   Could consider slight increase in future, increase basal rate by 0.6 mg (hydromorphone  drug) and continue 6 PTMs/day. May contact clinic to request increase in between visits if needed.   Baclofen - current dose 10-15 mg three times daily. Appreciates some degree of benefit, no reported side effects.   Acetaminophen - 1000 mg three times daily. Appreciates some degree of benefit, no side effects.   Gabapentin 1200 mg three times daily. Appreciates benefit, no side effects reported.   Recommended Lyrica trial at prior visits, though she decided against pursuing new medication.      Potential procedures:   None      Other Orders/Referrals:   [facility] - RNCC will coordinate next prescription for refill.     Follow up with EMELI Jerez CNP in around 6-8 weeks via video visit.       Review of Electronic Chart: Today I have also reviewed available medical information in the patient's medical record at [facility] ([facility]) and Care Everywhere (if available), including relevant provider notes, laboratory work, and imaging.     Santa Wallace DNP, APRN, AGNP-C  [facility] Pain Management     -------------------------------------------------    Subjective:    Chief complaint:   Chief Complaint   Patient  presents with     Pain     RECHECK     Follow up visit-no changes reported       Interval history:  Anayeli Gagnon is a 73 year old female last seen on 7/19/24.  They are a patient of mine seen in follow up.     Recommendations/plan at the last visit included:  Pain Physical Therapy:  Continue activity as tolerated at home.      Pain Psychologist to address relaxation, behavioral change, coping style, and other factors important to improvement.  NO - not at this time.      Diagnostic Studies:  Reviewed pertinent labs/imaging in chart. No indications today for new diagnostics.      Medication Management:   IT pump - hydromorphone, bupivacaine, ziconitide. Established with [facility] for home management. No changes to pump dose recommended today.   Baclofen - current dose 10 mg twice daily, additional 10 mg as needed on days with increased pain. Appreciates some degree of benefit, no side effects. Recommend trial increase to 15 mg 2-3 x daily and monitor for enhanced benefit. Cautioned about potential sedation effects. Updated prescription sent to pharmacy today.   Acetaminophen - 1000 mg three times daily. Appreciates some degree of benefit, no side effects.   Gabapentin 1200 mg three times daily. Recommended transition to Lyrica 150 mg TID at last visit, though she opted not to change medications. She reports increased stress in her life right now and does not want to make too many changes.      Potential procedures:   None      Other Orders/Referrals:   [facility] - RNCC will coordinate next prescription for refill.     Follow up with EMELI Jerez CNP in around 6-8 weeks , via video visit.     Since her last visit, Anayeli Gagnon reports:  -She has moved into AL facility. She states this was not her idea.   -She will be looking into a new facility.   -She is in [place] currently. She has been there 6 weeks.  -She does not have a definitive date to move or specific place in mind right now.   -She  "reports pain is more severe right now, when \"stuff\" is bothering her, pain worsens.   -She continues to use max PTM at 6/day. She notes saving 3 PTMs for use in the evening, when pain is the worst.   -She reports pain control earlier in the day is not affected by saving half PTMs for the evening.   -She continues baclofen and gabapentin. Lyrica recommended at prior visit, she decided against trial, states she \"doesn't like\" the medication.   -She thinks that getting out of current apartment is contributing to severity of pain  -She reports groin pain is persistent, notes radiation around hips into groin. She notes pain progressively worsens throughout the day.     PEG: A Three-Item Scale Assessing Pain Intensity and Interference    What number best describes your PAIN ON AVERAGE in the past week? 7    What number best describes how, during the past week, pain has interfered with your ENJOYMENT OF LIFE? 8    What number best describes how, during the past week, pain has interfered with your GENERAL ACTIVITY? 8    PEG Total Score: 7.67    Fredo EE, Ronen KA, Samantha MJ, Clover TA, Shen J, Candida JM, Lincoln SM, Milad K. Development and initial validation of the PEG, a 3-item scale assessing pain intensity and interference. Journal of General Internal Medicine. 2009 Kamaljit;24:733-738.        Interval history from last visit on 7/19/24:  -She had reported issues with PTM patient controller on Monday, problems started over the weekend.   -[employer] was able to get a new controller and this has been connected to the pump.   -She has resumed use of PTM boluses, generally uses 6 PTM/day (max available).   -Lyrica recommended alternatively to gabapentin at last visit. She reports not starting Lyrica due to concerns for side effects she read about. She reports dizziness is her primary concern.   -She is not interested in changing medications right now.   -She has a lot of stress right now,  asked for divorce.   -She " reports baclofen dose 10 mg BID consistently, takes third dose PRN and notes enhanced benefit.   -She has next visit with [facility] on .   Pain Information:              Pain ratin/10 on a 0-10 scale.        Current Pain Treatments:      Gabapentin 1200 mg TID  Baclofen 15 mg 2-3 x daily PRN  Acetaminophen 1000 mg TID                   Intrathecal Pump- 20 mL syringe     Concentrations:   Dilaudid 14 mg/ml                                                          Bupivacaine 28 mg/ml   Ziconitide 14 mcg/ml      Total Volume in Refill: 20 mL     Basal:   Dilaudid  7 mg/day                                                       Bupivacaine 14 mg/day   Ziconitide 7 mcg/day      PTM 0.2 hydromorphone, 2 hour lockout, maximum 6 in a day.         Current MME: N/A     Review of [place] Prescription Monitoring Program (): No concern for abuse or misuse of controlled medications based on this report. Reviewed - appears appropriate.      Annual Controlled Substance Agreement/UDS due date: N/A     Past pain treatments:  Lyrica 150 mg TID      Medications:  Current Outpatient Medications   Medication Sig Dispense Refill     busPIRone (BUSPAR) 10 MG tablet Take 10 mg by mouth.       acetaminophen (TYLENOL) 500 MG tablet Take 500-1,000 mg by mouth every 6 hours as needed for mild pain       aspirin (ASA) 325 MG EC tablet Take 1 tablet (325 mg) by mouth daily 30 tablet 11     atorvastatin (LIPITOR) 40 MG tablet Take 1 tablet (40 mg) by mouth every evening 30 tablet 3     baclofen (LIORESAL) 10 MG tablet Take 1.5 tablets (15 mg) by mouth 3 times daily 60 tablet 2     COMPOUND CONTAINING CONTROLLED SUBSTANCE (CMPD RX) - PHARMACY TO MIX COMPOUNDED MEDICATION Intrathecal Pump- 20 mL syringe    Concentrations:  Dilaudid  14 mg/ml                                                          Bupivacaine 28 mg/ml  Ziconitide 14 mcg/ml     Total Volume in Refill: 20 mL    Basal:   Dilaudid  7 mg/day                                                        Bupivacaine 14 mg/day  Ziconitide 7 mcg/day     PTM 0.2 hydromorphone, 2 hour lockout, maximum 6 in a day. 20 mL 0     gabapentin (NEURONTIN) 600 MG tablet Take 1,200 mg by mouth 3 times daily  1 tablet 0     lidocaine (LIDODERM) 5 % patch Apply 1 patch topically daily as needed        LORazepam (ATIVAN) 0.5 MG tablet Take 1 tablet by mouth daily as needed for anxiety       medication given by implanted intrathecal pump continuous Concentrations:  Dilaudid    14 mg/ml                                                          Bupivacaine 28 mg/ml  Ziconotide 14 mcg/ml     Total Volume in Refill: 20 mL      Basal:   Dilaudid   6.993 mg/day                                                       Bupivacaine 13.986 mg/day  Ziconotide 6.993 mcg/day     PTM 0.2 mg hydromorphone, 0.4 mg bupivacaine, 0.2 mcg ziconotide, 2 hour lockout, maximum 6 in a day (2 hr lockout)     Maximum 24 hour dose  Hydromorphone 8.134 mg/day  Bupivacaine 16.267 mg/day  Ziconitide 8.134 mcg/day     Last filled on 10/26/23 and current alarm date is: 11/25/23    Managed by [employer] ([phone]) - refills approximately every 3 weeks  Provider. Dr. Maryam Espinoza       melatonin 3 MG tablet Take 3 mg by mouth nightly as needed for sleep       metoprolol succinate ER (TOPROL-XL) 25 MG 24 hr tablet Take 12.5 mg by mouth every evening        Multiple Vitamins-Minerals (WOMENS MULTI PO) Take 1 tablet by mouth daily       ondansetron (ZOFRAN) 4 MG tablet Take 1 tablet (4 mg) by mouth every 6 hours as needed for nausea 20 tablet 1     polyethylene glycol (MIRALAX) 17 GM/Dose powder Take 17 g by mouth daily as needed for constipation        pregabalin (LYRICA) 150 MG capsule Take 1 capsule (150 mg) by mouth 3 times daily Stop gabapentin. 90 capsule 1       Medical History: any changes in medical history since they were last seen? No      Objective:    Physical Exam:  GENERAL: alert and no distress  EYES: Eyes grossly normal to  inspection.  No discharge or erythema, or obvious scleral/conjunctival abnormalities.  RESP: No audible wheeze, cough, or visible cyanosis.    SKIN: Visible skin clear. No significant rash, abnormal pigmentation or lesions.  NEURO: Cranial nerves grossly intact.  Mentation and speech appropriate for age.  PSYCH: Appropriate affect, tone, and pace of words     Diagnostic Tests/Imaging/Labs:  BMP on 9/4/24 - GFR >60    BILLING TIME DOCUMENTATION:   The total TIME spent on this patient on the date of the encounter/appointment was *** minutes.      TOTAL TIME includes:   Time spent preparing to see the patient (reviewing records and tests)   Time spent face to face (or over the phone) with the patient   Time spent ordering tests, medications, procedures and referrals   Time spent Referring and communicating with other healthcare professionals   Time spent documenting clinical information in Epic       Video-Visit Details    Type of service:  Video Visit     Originating Location (pt. Location): Home  {PROVIDER LOCATION On-site should be selected for visits conducted from your clinic location or adjoining [facility] hospital, academic office, or other nearby [facility] building. Off-site should be selected for all other provider locations, including home:[id number]}  Distant Location (provider location):  On-site  Platform used for Video Visit: [facility] Pain Management     Date of visit: 9/13/2024      Assessment:   Anayeli Gagnon is a 73 year old female with a past medical history significant for thoracolumbar pain, neuropathic pain, OA, headache, CVA, SCI T7-12, incomplete SCI T1-6, HTN, muscle spasticity, anxiety/depression, s/p lumbar decompression 2019, s/p ITP implant 2022, who presents with complaints of widespread pain.      Widespread pain - Etiology multifactorial, hx of SCI. Symptoms consistent with neuropathic process, underlying degenerative/postsurgical changes of spine, suspect SI mediated  component, overlying myofascial component.      [place] [facility] [place]     Visit Diagnoses:  1. Presence of intrathecal pump    2. Neuropathic pain    3. 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 Spinal cord injury at T7-T12 level (H)    5. Chronic bilateral low back pain with bilateral sciatica    6. Muscle spasm        Plan:  Diagnosis reviewed, treatment option addressed, and risk/benifits discussed.  Self-care instructions given.  I am recommending a multidisciplinary treatment plan to help this patient better manage their pain.      Pain Physical Therapy:  Hip flexor stretches added to AVS. Not interested in PT referral today, lack of transportation right now. Encouraged them to explore medical transportation/metro mobility through Medicare. May contact clinic in between visits for referral if needed.      Pain Psychology: Not at this time.      Diagnostic Studies:  Reviewed pertinent labs/imaging in chart. No indications today for new diagnostics.      Medication Management:   IT pump - hydromorphone, bupivacaine, ziconitide. Established with [facility] for home management. No changes to pump dose recommended today. She uses max PTM doses (6/day), notes reserving 3 doses for evening when pain is worse.   Could consider slight increase in future, increase basal rate by 0.6 mg (hydromorphone  drug) and continue 6 PTMs/day. May contact clinic to request increase in between visits if needed.   Baclofen - current dose 10-15 mg three times daily. Appreciates some degree of benefit, no reported side effects.   Acetaminophen - 1000 mg three times daily. Appreciates some degree of benefit, no side effects.   Gabapentin 1200 mg three times daily. Appreciates benefit, no side effects reported.   Recommended Lyrica trial at prior visits, though she decided against pursuing new medication.      Potential procedures:   None      Other Orders/Referrals:   [facility] -  RNCC will coordinate next prescription for refill.     Follow up with EMELI Jerez CNP in around 6-8 weeks via video visit.       Review of Electronic Chart: Today I have also reviewed available medical information in the patient's medical record at [facility] ([facility]) and Care Everywhere (if available), including relevant provider notes, laboratory work, and imaging.     Santa Wallace, KIRAN, EMELI, AGNP-C  [facility] Pain Management     -------------------------------------------------    Subjective:    Chief complaint:   Chief Complaint   Patient presents with     Pain     RECHECK     Follow up visit-no changes reported       Interval history:  Anayeli Gagnon is a 73 year old female last seen on 7/19/24.  They are a patient of mine seen in follow up.     Recommendations/plan at the last visit included:  Pain Physical Therapy:  Continue activity as tolerated at home.      Pain Psychologist to address relaxation, behavioral change, coping style, and other factors important to improvement.  NO - not at this time.      Diagnostic Studies:  Reviewed pertinent labs/imaging in chart. No indications today for new diagnostics.      Medication Management:   IT pump - hydromorphone, bupivacaine, ziconitide. Established with [facility] for home management. No changes to pump dose recommended today.   Baclofen - current dose 10 mg twice daily, additional 10 mg as needed on days with increased pain. Appreciates some degree of benefit, no side effects. Recommend trial increase to 15 mg 2-3 x daily and monitor for enhanced benefit. Cautioned about potential sedation effects. Updated prescription sent to pharmacy today.   Acetaminophen - 1000 mg three times daily. Appreciates some degree of benefit, no side effects.   Gabapentin 1200 mg three times daily. Recommended transition to Lyrica 150 mg TID at last visit, though she opted not to change medications. She reports increased stress in her life right now and does  "not want to make too many changes.      Potential procedures:   None      Other Orders/Referrals:   [facility] - RNCC will coordinate next prescription for refill.     Follow up with EMELI Jerez CNP in around 6-8 weeks , via video visit.     Since her last visit, Anayelicresencio Gagnon reports:  -She has moved into AL facility. She states this was not her idea.   -She will be looking into a new facility.   -She is in [place] currently. She has been there 6 weeks.  -She does not have a definitive date to move or specific place in mind right now.   -She reports pain is more severe right now, when \"stuff\" is bothering her, pain worsens.   -She continues to use max PTM at 6/day. She notes saving 3 PTMs for use in the evening, when pain is the worst.   -She reports pain control earlier in the day is not affected by saving half PTMs for the evening.   -She continues baclofen and gabapentin. Lyrica recommended at prior visit, she decided against trial, states she \"doesn't like\" the medication.   -She thinks that getting out of current apartment is contributing to severity of pain  -She reports groin pain is persistent, notes radiation around hips into groin. She notes pain progressively worsens throughout the day.     PEG: A Three-Item Scale Assessing Pain Intensity and Interference    What number best describes your PAIN ON AVERAGE in the past week? 7    What number best describes how, during the past week, pain has interfered with your ENJOYMENT OF LIFE? 8    What number best describes how, during the past week, pain has interfered with your GENERAL ACTIVITY? 8    PEG Total Score: 7.67    Fredo RODRIGUEZ, Ronen KA, Samantha THOMAS, Clover TA, Shen J, Candida FRASER, Lincoln SM, Milad K. Development and initial validation of the PEG, a 3-item scale assessing pain intensity and interference. Journal of General Internal Medicine. 2009 Kamaljit;24:733-738.        Interval history from last visit on 7/19/24:  -She had reported issues with " PTM patient controller on Monday, problems started over the weekend.   -[employer] was able to get a new controller and this has been connected to the pump.   -She has resumed use of PTM boluses, generally uses 6 PTM/day (max available).   -Lyrica recommended alternatively to gabapentin at last visit. She reports not starting Lyrica due to concerns for side effects she read about. She reports dizziness is her primary concern.   -She is not interested in changing medications right now.   -She has a lot of stress right now,  asked for divorce.   -She reports baclofen dose 10 mg BID consistently, takes third dose PRN and notes enhanced benefit.   -She has next visit with [facility] on .   Pain Information:              Pain ratin/10 on a 0-10 scale.        Current Pain Treatments:      Gabapentin 1200 mg TID  Baclofen 15 mg 2-3 x daily PRN  Acetaminophen 1000 mg TID                   Intrathecal Pump- 20 mL syringe     Concentrations:   Dilaudid 14 mg/ml                                                          Bupivacaine 28 mg/ml   Ziconitide 14 mcg/ml      Total Volume in Refill: 20 mL     Basal:   Dilaudid  7 mg/day                                                       Bupivacaine 14 mg/day   Ziconitide 7 mcg/day      PTM 0.2 hydromorphone, 2 hour lockout, maximum 6 in a day.         Current MME: N/A     Review of [place] Prescription Monitoring Program (): No concern for abuse or misuse of controlled medications based on this report. Reviewed - appears appropriate.      Annual Controlled Substance Agreement/UDS due date: N/A     Past pain treatments:  Lyrica 150 mg TID      Medications:  Current Outpatient Medications   Medication Sig Dispense Refill     busPIRone (BUSPAR) 10 MG tablet Take 10 mg by mouth.       acetaminophen (TYLENOL) 500 MG tablet Take 500-1,000 mg by mouth every 6 hours as needed for mild pain       aspirin (ASA) 325 MG EC tablet Take 1 tablet (325 mg) by mouth daily 30 tablet 11      atorvastatin (LIPITOR) 40 MG tablet Take 1 tablet (40 mg) by mouth every evening 30 tablet 3     baclofen (LIORESAL) 10 MG tablet Take 1.5 tablets (15 mg) by mouth 3 times daily 60 tablet 2     COMPOUND CONTAINING CONTROLLED SUBSTANCE (CMPD RX) - PHARMACY TO MIX COMPOUNDED MEDICATION Intrathecal Pump- 20 mL syringe    Concentrations:  Dilaudid  14 mg/ml                                                          Bupivacaine 28 mg/ml  Ziconitide 14 mcg/ml     Total Volume in Refill: 20 mL    Basal:   Dilaudid  7 mg/day                                                       Bupivacaine 14 mg/day  Ziconitide 7 mcg/day     PTM 0.2 hydromorphone, 2 hour lockout, maximum 6 in a day. 20 mL 0     gabapentin (NEURONTIN) 600 MG tablet Take 1,200 mg by mouth 3 times daily  1 tablet 0     lidocaine (LIDODERM) 5 % patch Apply 1 patch topically daily as needed        LORazepam (ATIVAN) 0.5 MG tablet Take 1 tablet by mouth daily as needed for anxiety       medication given by implanted intrathecal pump continuous Concentrations:  Dilaudid    14 mg/ml                                                          Bupivacaine 28 mg/ml  Ziconotide 14 mcg/ml     Total Volume in Refill: 20 mL      Basal:   Dilaudid   6.993 mg/day                                                       Bupivacaine 13.986 mg/day  Ziconotide 6.993 mcg/day     PTM 0.2 mg hydromorphone, 0.4 mg bupivacaine, 0.2 mcg ziconotide, 2 hour lockout, maximum 6 in a day (2 hr lockout)     Maximum 24 hour dose  Hydromorphone 8.134 mg/day  Bupivacaine 16.267 mg/day  Ziconitide 8.134 mcg/day     Last filled on 10/26/23 and current alarm date is: 11/25/23    Managed by [employer] ([phone]) - refills approximately every 3 weeks  Provider. Dr. Maryam Espinoza       melatonin 3 MG tablet Take 3 mg by mouth nightly as needed for sleep       metoprolol succinate ER (TOPROL-XL) 25 MG 24 hr tablet Take 12.5 mg by mouth every evening        Multiple Vitamins-Minerals (WOMENS MULTI  PO) Take 1 tablet by mouth daily       ondansetron (ZOFRAN) 4 MG tablet Take 1 tablet (4 mg) by mouth every 6 hours as needed for nausea 20 tablet 1     polyethylene glycol (MIRALAX) 17 GM/Dose powder Take 17 g by mouth daily as needed for constipation        pregabalin (LYRICA) 150 MG capsule Take 1 capsule (150 mg) by mouth 3 times daily Stop gabapentin. 90 capsule 1       Medical History: any changes in medical history since they were last seen? No      Objective:    Physical Exam:  GENERAL: alert and no distress  EYES: Eyes grossly normal to inspection.  No discharge or erythema, or obvious scleral/conjunctival abnormalities.  RESP: No audible wheeze, cough, or visible cyanosis.    SKIN: Visible skin clear. No significant rash, abnormal pigmentation or lesions.  NEURO: Cranial nerves grossly intact.  Mentation and speech appropriate for age.  PSYCH: Appropriate affect, tone, and pace of words     Diagnostic Tests/Imaging/Labs:  BMP on 9/4/24 - GFR >60    BILLING TIME DOCUMENTATION:   The total TIME spent on this patient on the date of the encounter/appointment was 50 minutes.      TOTAL TIME includes:   Time spent preparing to see the patient (reviewing records and tests)   Time spent face to face (or over the phone) with the patient   Time spent ordering tests, medications, procedures and referrals   Time spent Referring and communicating with other healthcare professionals   Time spent documenting clinical information in Epic          Again, thank you for allowing me to participate in the care of your patient.      Sincerely,    EMELI Jerez CNP

## 2024-09-13 NOTE — NURSING NOTE
How will you be connecting to the visit? MyChart  How would you like to obtain your AVS? Mail a copy  If the video visit is dropped, the invitation should be resent by: Text to cell phone: [phone]  Will anyone else be joining your video visit? Yes: Daughter will be with patient during visit. How would they like to receive their invitation? Other e-mail: none  [facility]

## 2024-09-16 NOTE — PATIENT INSTRUCTIONS
Pain Physical Therapy:  Hip flexor stretches added to AVS. Not interested in PT referral today, lack of transportation right now. Encouraged them to explore medical transportation/metro mobility through Medicare. May contact clinic in between visits for referral if needed.      Pain Psychology: Not at this time.      Diagnostic Studies:  Reviewed pertinent labs/imaging in chart. No indications today for new diagnostics.      Medication Management:   IT pump - hydromorphone, bupivacaine, ziconitide. Established with [facility] for home management. No changes to pump dose recommended today. She uses max PTM doses (6/day), notes reserving 3 doses for evening when pain is worse.   Could consider slight increase in future, increase basal rate by 0.6 mg (hydromorphone  drug) and continue 6 PTMs/day. May contact clinic to request increase in between visits if needed.   Baclofen - current dose 10-15 mg three times daily. Appreciates some degree of benefit, no reported side effects.   Acetaminophen - 1000 mg three times daily. Appreciates some degree of benefit, no side effects.   Gabapentin 1200 mg three times daily. Appreciates benefit, no side effects reported.   Recommended Lyrica trial at prior visits, though she decided against pursuing new medication.      Potential procedures:   None      Other Orders/Referrals:   [facility] - [facility] will coordinate next prescription for refill.     Follow up with EMELI Jerez CNP in around 6-8 weeks via video visit.

## 2024-10-02 ENCOUNTER — MEDICAL CORRESPONDENCE (OUTPATIENT)
Dept: HEALTH INFORMATION MANAGEMENT | Facility: CLINIC | Age: 74
End: 2024-10-02
Payer: MEDICARE

## 2024-10-04 ENCOUNTER — MEDICAL CORRESPONDENCE (OUTPATIENT)
Dept: ANESTHESIOLOGY | Facility: CLINIC | Age: 74
End: 2024-10-04
Payer: MEDICARE

## 2024-10-07 ENCOUNTER — TELEPHONE (OUTPATIENT)
Dept: ANESTHESIOLOGY | Facility: CLINIC | Age: 74
End: 2024-10-07
Payer: MEDICARE

## 2024-10-07 NOTE — PROGRESS NOTES
Patient Contacted for the patient to call back and schedule the following:    Appointment type: Return pain  Provider: Santa Wallace  Return date: Approx. 11/1/24  Specialty phone number: [phone]    Additional Notes: Patient declined to schedule and reported that she would follow up to schedule when she was interested in scheduling an appointment. leon

## 2024-10-14 ENCOUNTER — TELEPHONE (OUTPATIENT)
Dept: ANESTHESIOLOGY | Facility: CLINIC | Age: 74
End: 2024-10-14
Payer: MEDICARE

## 2024-10-14 NOTE — TELEPHONE ENCOUNTER
[facility] Call Center    Phone Message    May a detailed message be left on voicemail: yes     Reason for Call: Medication Refill Request    Has the patient contacted the pharmacy for the refill? Yes   Name of medication being requested: COMPOUND CONTAINING CONTROLLED SUBSTANCE (CMPD RX) - PHARMACY TO MIX COMPOUNDED MEDICATION   Provider who prescribed the medication: Santa Wallace APRN CNP   Pharmacy: [facility]   Date medication is needed: 10/18/2024 3pm EST    Action Taken: Message routed to:  [facility]    Travel Screening: Not Applicable     Date of Service:

## 2024-10-14 NOTE — TELEPHONE ENCOUNTER
RN pended prescription for provider to review and sign. Writer will fax to [facility] once complete.    Alia Reyes RNCC

## 2024-10-23 ENCOUNTER — TRANSFERRED RECORDS (OUTPATIENT)
Dept: HEALTH INFORMATION MANAGEMENT | Facility: CLINIC | Age: 74
End: 2024-10-23
Payer: MEDICARE

## 2024-10-23 ENCOUNTER — TELEPHONE (OUTPATIENT)
Dept: ANESTHESIOLOGY | Facility: CLINIC | Age: 74
End: 2024-10-23
Payer: MEDICARE

## 2024-10-23 NOTE — TELEPHONE ENCOUNTER
RN spoke with patient in regard to pain pump usage. Patient reports she has been using all 6 PTM/day. Writer informed a follow up for video visit would be needed to discuss these changes with the provider. Writer offered next available on 10/29, but patient reports she is moving out of her current apartment and into a new apartment in [place] that day. Writer offered 11/1 for a video visit and patient accepted. Writer informed we could see if a sooner appointment would be an option, but patient stated 11/1 is soon enough. Writer scheduled patient and will send an update to the provider.    Alia Reyes RNCC

## 2024-10-23 NOTE — TELEPHONE ENCOUNTER
[facility] Call Center    Phone Message    May a detailed message be left on voicemail: yes     Reason for Call: Other: Patient is calling she would like to increase her medication on her pain pump. Please review and call patient back to discuss.      Action Taken: Message routed to:  [facility] Pain Clinic    Travel Screening: Not Applicable     Date of Service:

## 2024-10-24 ENCOUNTER — MEDICAL CORRESPONDENCE (OUTPATIENT)
Dept: HEALTH INFORMATION MANAGEMENT | Facility: CLINIC | Age: 74
End: 2024-10-24
Payer: MEDICARE

## 2024-10-25 ENCOUNTER — MEDICAL CORRESPONDENCE (OUTPATIENT)
Dept: HEALTH INFORMATION MANAGEMENT | Facility: CLINIC | Age: 74
End: 2024-10-25
Payer: MEDICARE

## 2024-10-26 NOTE — TELEPHONE ENCOUNTER
Information noted.     Santa Wallace, KIRAN, APRN, AGNP-C  [facility]

## 2024-10-31 NOTE — PROGRESS NOTES
Video-Visit Details    Type of service:  Video Visit     Originating Location (pt. Location): Home    Distant Location (provider location):  On-site  Platform used for Video Visit: [facility]     Date of visit: 11/1/2024      Assessment:   Anayeli Gagnon is a 73 year old female with a past medical history significant for thoracolumbar pain, neuropathic pain, OA, headache, CVA, SCI T7-12, incomplete SCI T1-6, HTN, muscle spasticity, anxiety/depression, s/p lumbar decompression 2019, s/p ITP implant 2022, who presents with complaints of widespread pain.      Widespread pain - Etiology multifactorial, hx of SCI. Symptoms consistent with neuropathic process, underlying degenerative/postsurgical changes of spine, suspect SI mediated component, overlying myofascial component.      Assigned to [facility] nursing team.    Visit Diagnoses:  1. Presence of intrathecal pump    2. Neuropathic pain    3. 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 Spinal cord injury at T7-T12 level (H)    5. Chronic bilateral low back pain with bilateral sciatica    6. Muscle spasm        Plan:  Diagnosis reviewed, treatment option addressed, and risk/benifits discussed.  Self-care instructions given.  I am recommending a multidisciplinary treatment plan to help this patient better manage their pain.      Physical Therapy:  Discussed PT referral at prior visit, not interested.      Pain Psychology: Not at this time.      Diagnostic Studies:  No new orders today.      Medication Management:   IT pump - hydromorphone, bupivacaine, ziconitide. Established with [facility] for home management. Dose adjustments recommended today, will increase basal rate by hydromorphone 0.6 mg ( drug), no changes to PTM dose.   Baclofen - current dose 10-15 mg three times daily. Appreciates some degree of benefit, no reported side effects.   Acetaminophen - 1000 mg three times daily.  Appreciates some degree of benefit, no side effects.   Gabapentin 1200 mg three times daily. Appreciates benefit, no side effects reported.   Recommended Lyrica trial at prior visits, though she decided against pursuing new medication.      Potential procedures:   None      Other Orders/Referrals:   [facility] - [facility] will coordinate dose adjustments.      Follow up with EMELI Jerez CNP in 6-8 weeks (around 2 weeks after pump dose adjustments).       Review of Electronic Chart: Today I have also reviewed available medical information in the patient's medical record at [facility] and Care Everywhere (if available), including relevant provider notes, laboratory work, and imaging.     Santa Wallace, KIRAN, EMELI, AGNP-C  [facility]     -------------------------------------------------    Subjective:    Chief complaint:   Chief Complaint   Patient presents with    Pain    RECHECK     Follow up- pain       Interval history:  Anayeli Gagnon is a 73 year old female last seen on 9/13/24.      Recommendations/plan at the last visit included:  Pain Physical Therapy:  Hip flexor stretches added to AVS. Not interested in PT referral today, lack of transportation right now. Encouraged them to explore medical transportation/metro mobility through Medicare. May contact clinic in between visits for referral if needed.      Pain Psychology: Not at this time.      Diagnostic Studies:  Reviewed pertinent labs/imaging in chart. No indications today for new diagnostics.      Medication Management:   IT pump - hydromorphone, bupivacaine, ziconitide. Established with [facility] for home management. No changes to pump dose recommended today. She uses max PTM doses (6/day), notes reserving 3 doses for evening when pain is worse.   Could consider slight increase in future, increase basal rate by 0.6 mg (hydromorphone  drug) and continue 6 PTMs/day. May contact clinic to request increase in between  visits if needed.   Baclofen - current dose 10-15 mg three times daily. Appreciates some degree of benefit, no reported side effects.   Acetaminophen - 1000 mg three times daily. Appreciates some degree of benefit, no side effects.   Gabapentin 1200 mg three times daily. Appreciates benefit, no side effects reported.   Recommended Lyrica trial at prior visits, though she decided against pursuing new medication.      Potential procedures:   None      Other Orders/Referrals:   [facility] - RN[facility] will coordinate next prescription for refill.     Follow up with EMELI Jerez CNP in around 6-8 weeks via video visit.     Since her last visit, Anayeli E Chelsi reports:  -She reports pain is the same and not any better. Overall not well controlled.   -She is interested in slight increase in the pump basal rate. She verifies she will continue to get 6 PTM doses.   -She has moved AL facilities. She is now in [facility].  -She has been at this new facility for 1 week, things are going well so far.     PEG: A Three-Item Scale Assessing Pain Intensity and Interference    What number best describes your PAIN ON AVERAGE in the past week? (Patient-Rptd) 5    What number best describes how, during the past week, pain has interfered with your ENJOYMENT OF LIFE? (Patient-Rptd) 4    What number best describes how, during the past week, pain has interfered with your GENERAL ACTIVITY? (Patient-Rptd) 4    PEG Total Score: (Patient-Rptd) 4.33    Fredo RODRIGUEZ, Ronen KA, Samantha MJ, Clover TA, Shen J, Candida JM, Lincoln SM, Milad K. Development and initial validation of the PEG, a 3-item scale assessing pain intensity and interference. Journal of General Internal Medicine. 2009 Kamaljit;24:733-738.        HPI/Interval history from last visit:  -She has moved into AL facility. She states this was not her idea.   -She will be looking into a new facility.   -She is in [place] currently. She has been there 6 weeks.  -She does  "not have a definitive date to move or specific place in mind right now.   -She reports pain is more severe right now, when \"stuff\" is bothering her, pain worsens.   -She continues to use max PTM at 6/day. She notes saving 3 PTMs for use in the evening, when pain is the worst.   -She reports pain control earlier in the day is not affected by saving half PTMs for the evening.   -She continues baclofen and gabapentin. Lyrica recommended at prior visit, she decided against trial, states she \"doesn't like\" the medication.   -She thinks that getting out of current apartment is contributing to severity of pain  -She reports groin pain is persistent, notes radiation around hips into groin. She notes pain progressively worsens throughout the day.         Current Pain Treatments:    Gabapentin 1200 mg TID  Baclofen 15 mg 2-3 x daily PRN  Acetaminophen 1000 mg TID                   Intrathecal Pump- 20 mL syringe     Concentrations:   Dilaudid 14 mg/ml                                                          Bupivacaine 28 mg/ml   Ziconitide 14 mcg/ml      Total Volume in Refill: 20 mL     Basal:   Dilaudid  7.6 mg/day                                                       Bupivacaine 15.2 mg/day   Ziconitide 7.6 mcg/day      PTM 0.2 hydromorphone, 2 hour lockout, maximum 6 in a day.         Current MME: N/A     Review of [place] Prescription Monitoring Program (): No concern for abuse or misuse of controlled medications based on this report. Reviewed - appears appropriate.      Annual Controlled Substance Agreement/UDS due date: N/A     Past pain treatments:  Lyrica 150 mg TID      Medications:  Current Outpatient Medications   Medication Sig Dispense Refill    acetaminophen (TYLENOL) 500 MG tablet Take 500-1,000 mg by mouth every 6 hours as needed for mild pain      aspirin (ASA) 325 MG EC tablet Take 1 tablet (325 mg) by mouth daily 30 tablet 11    atorvastatin (LIPITOR) 40 MG tablet Take 1 tablet (40 mg) by mouth every " evening 30 tablet 3    baclofen (LIORESAL) 10 MG tablet Take 1.5 tablets (15 mg) by mouth 3 times daily 60 tablet 2    busPIRone (BUSPAR) 10 MG tablet Take 10 mg by mouth.      COMPOUND CONTAINING CONTROLLED SUBSTANCE (CMPD RX) - PHARMACY TO MIX COMPOUNDED MEDICATION Intrathecal Pump- 20 mL syringe    Concentrations:  Dilaudid  14 mg/ml                                                          Bupivacaine 28 mg/ml  Ziconitide 14 mcg/ml     Total Volume in Refill: 20 mL    Basal:   Dilaudid  7 mg/day                                                       Bupivacaine 14 mg/day  Ziconitide 7 mcg/day     PTM 0.2 hydromorphone, 2 hour lockout, maximum 6 in a day. 20 mL 0    gabapentin (NEURONTIN) 600 MG tablet Take 1,200 mg by mouth 3 times daily  1 tablet 0    lidocaine (LIDODERM) 5 % patch Apply 1 patch topically daily as needed       LORazepam (ATIVAN) 0.5 MG tablet Take 1 tablet by mouth daily as needed for anxiety      medication given by implanted intrathecal pump continuous Concentrations:  Dilaudid    14 mg/ml                                                          Bupivacaine 28 mg/ml  Ziconotide 14 mcg/ml     Total Volume in Refill: 20 mL      Basal:   Dilaudid   6.993 mg/day                                                       Bupivacaine 13.986 mg/day  Ziconotide 6.993 mcg/day     PTM 0.2 mg hydromorphone, 0.4 mg bupivacaine, 0.2 mcg ziconotide, 2 hour lockout, maximum 6 in a day (2 hr lockout)     Maximum 24 hour dose  Hydromorphone 8.134 mg/day  Bupivacaine 16.267 mg/day  Ziconitide 8.134 mcg/day     Last filled on 10/26/23 and current alarm date is: 11/25/23    Managed by [facility] ([phone]) - refills approximately every 3 weeks  Provider. Dr. Maryam Espinoza      melatonin 3 MG tablet Take 3 mg by mouth nightly as needed for sleep      metoprolol succinate ER (TOPROL-XL) 25 MG 24 hr tablet Take 12.5 mg by mouth every evening       Multiple Vitamins-Minerals (WOMENS MULTI PO) Take 1 tablet by mouth  daily      ondansetron (ZOFRAN) 4 MG tablet Take 1 tablet (4 mg) by mouth every 6 hours as needed for nausea 20 tablet 1    polyethylene glycol (MIRALAX) 17 GM/Dose powder Take 17 g by mouth daily as needed for constipation       pregabalin (LYRICA) 150 MG capsule Take 1 capsule (150 mg) by mouth 3 times daily Stop gabapentin. 90 capsule 1       Medical History: any changes in medical history since they were last seen? Yes - ED visit x 2, see chart       Objective:    Physical Exam:  GENERAL: alert and no distress  EYES: Eyes grossly normal to inspection.  No discharge or erythema, or obvious scleral/conjunctival abnormalities.  RESP: No audible wheeze, cough, or visible cyanosis.    SKIN: Visible skin clear. No significant rash, abnormal pigmentation or lesions.  NEURO: Cranial nerves grossly intact.  Mentation and speech appropriate for age.  PSYCH: Appropriate affect, tone, and pace of words     Diagnostic Tests/Imaging/Labs:  Last CMP on 10/13/24 reviewed.     BILLING TIME DOCUMENTATION:   The total TIME spent on this patient on the date of the encounter/appointment was 22 minutes.      TOTAL TIME includes:   Time spent preparing to see the patient (reviewing records and tests)   Time spent face to face (or over the phone) with the patient   Time spent ordering tests, medications, procedures and referrals   Time spent Referring and communicating with other healthcare professionals   Time spent documenting clinical information in Epic

## 2024-11-01 ENCOUNTER — VIRTUAL VISIT (OUTPATIENT)
Dept: ANESTHESIOLOGY | Facility: CLINIC | Age: 74
End: 2024-11-01
Payer: MEDICARE

## 2024-11-01 DIAGNOSIS — M79.2 NEUROPATHIC PAIN: ICD-10-CM

## 2024-11-01 DIAGNOSIS — S24.151A: ICD-10-CM

## 2024-11-01 DIAGNOSIS — Z97.8 PRESENCE OF INTRATHECAL PUMP: Primary | ICD-10-CM

## 2024-11-01 DIAGNOSIS — M54.42 CHRONIC BILATERAL LOW BACK PAIN WITH BILATERAL SCIATICA: ICD-10-CM

## 2024-11-01 DIAGNOSIS — M62.838 MUSCLE SPASM: ICD-10-CM

## 2024-11-01 DIAGNOSIS — G89.29 CHRONIC BILATERAL LOW BACK PAIN WITH BILATERAL SCIATICA: ICD-10-CM

## 2024-11-01 DIAGNOSIS — S24.103A SPINAL CORD INJURY AT T7-T12 LEVEL (H): ICD-10-CM

## 2024-11-01 DIAGNOSIS — M54.41 CHRONIC BILATERAL LOW BACK PAIN WITH BILATERAL SCIATICA: ICD-10-CM

## 2024-11-01 PROCEDURE — 99214 OFFICE O/P EST MOD 30 MIN: CPT | Mod: 95

## 2024-11-01 ASSESSMENT — PAIN SCALES - PAIN ENJOYMENT GENERAL ACTIVITY SCALE (PEG)
INTERFERED_ENJOYMENT_LIFE: 4
INTERFERED_GENERAL_ACTIVITY: 4
PEG_TOTALSCORE: 4.33
INTERFERED_ENJOYMENT_LIFE: 4
PEG_TOTALSCORE: 4.33
AVG_PAIN_PASTWEEK: 5
AVG_PAIN_PASTWEEK: 5
INTERFERED_GENERAL_ACTIVITY: 4

## 2024-11-01 ASSESSMENT — ANXIETY QUESTIONNAIRES
8. IF YOU CHECKED OFF ANY PROBLEMS, HOW DIFFICULT HAVE THESE MADE IT FOR YOU TO DO YOUR WORK, TAKE CARE OF THINGS AT HOME, OR GET ALONG WITH OTHER PEOPLE?: NOT DIFFICULT AT ALL
6. BECOMING EASILY ANNOYED OR IRRITABLE: NOT AT ALL
3. WORRYING TOO MUCH ABOUT DIFFERENT THINGS: SEVERAL DAYS
IF YOU CHECKED OFF ANY PROBLEMS ON THIS QUESTIONNAIRE, HOW DIFFICULT HAVE THESE PROBLEMS MADE IT FOR YOU TO DO YOUR WORK, TAKE CARE OF THINGS AT HOME, OR GET ALONG WITH OTHER PEOPLE: NOT DIFFICULT AT ALL
2. NOT BEING ABLE TO STOP OR CONTROL WORRYING: NOT AT ALL
GAD7 TOTAL SCORE: 3
7. FEELING AFRAID AS IF SOMETHING AWFUL MIGHT HAPPEN: NOT AT ALL
7. FEELING AFRAID AS IF SOMETHING AWFUL MIGHT HAPPEN: NOT AT ALL
1. FEELING NERVOUS, ANXIOUS, OR ON EDGE: SEVERAL DAYS
4. TROUBLE RELAXING: SEVERAL DAYS
5. BEING SO RESTLESS THAT IT IS HARD TO SIT STILL: NOT AT ALL

## 2024-11-01 ASSESSMENT — PAIN SCALES - GENERAL: PAINLEVEL_OUTOF10: MODERATE PAIN (4)

## 2024-11-01 NOTE — PATIENT INSTRUCTIONS
Treatment planning:    Plan to add 3 PTM into continuous basal dose.       Recommended Follow up:      Follow up in 6-8 weeks.    To speak with a nurse, schedule/reschedule/cancel a clinic appointment, or request a medication refill call: [phone].    You can also reach us by [email or web address]: [email or web address]

## 2024-11-01 NOTE — LETTER
11/1/2024       RE: Anayeli Gagnon  [address]  [facility] [address]     Dear Colleague,    Thank you for referring your patient, Anayeli Gagnon, to the [facility]HENSIVE [facility]IN MANAGEMENT [place] at [facility]. Please see a copy of my visit note below.    Video-Visit Details    Type of service:  Video Visit     Originating Location (pt. 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      Widespread pain - Etiology multifactorial, hx of SCI. Symptoms consistent with neuropathic process, underlying degenerative/postsurgical changes of spine, suspect SI mediated component, overlying myofascial component.      Assigned to [facility] nursing team.    Visit Diagnoses:  1. Presence of intrathecal pump    2. Neuropathic pain    3. 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 Spinal cord injury at T7-T12 level (H)    5. Chronic bilateral low back pain with bilateral sciatica    6. Muscle spasm        Plan:  Diagnosis reviewed, treatment option addressed, and risk/benifits discussed.  Self-care instructions given.  I am recommending a multidisciplinary treatment plan to help this patient better manage their pain.      Physical Therapy:  Discussed PT referral at prior visit, not interested.      Pain Psychology: Not at this time.      Diagnostic Studies:  No new orders today.      Medication Management:   IT pump - hydromorphone,  bupivacaine, ziconitide. Established with [facility] for home management. Dose adjustments recommended today, will increase basal rate by hydromorphone 0.6 mg ( drug), no changes to PTM dose.   Baclofen - current dose 10-15 mg three times daily. Appreciates some degree of benefit, no reported side effects.   Acetaminophen - 1000 mg three times daily. Appreciates some degree of benefit, no side effects.   Gabapentin 1200 mg three times daily. Appreciates benefit, no side effects reported.   Recommended Lyrica trial at prior visits, though she decided against pursuing new medication.      Potential procedures:   None      Other Orders/Referrals:   [facility] - RNCC will coordinate dose adjustments.      Follow up with EMELI Jerez CNP in 6-8 weeks (around 2 weeks after pump dose adjustments).       Review of Electronic Chart: Today I have also reviewed available medical information in the patient's medical record at [facility] and Care Everywhere (if available), including relevant provider notes, laboratory work, and imaging.     Santa Wallace DNP, APRN, AGNP-C  [facility] Pain Management     -------------------------------------------------    Subjective:    Chief complaint:   Chief Complaint   Patient presents with     Pain     RECHECK     Follow up- pain       Interval history:  Anayeli Gagnon is a 73 year old female last seen on 9/13/24.      Recommendations/plan at the last visit included:  Pain Physical Therapy:  Hip flexor stretches added to AVS. Not interested in PT referral today, lack of transportation right now. Encouraged them to explore medical transportation/metro mobility through Medicare. May contact clinic in between visits for referral if needed.      Pain Psychology: Not at this time.      Diagnostic Studies:  Reviewed pertinent labs/imaging in chart. No indications today for new diagnostics.      Medication Management:   IT pump - hydromorphone, bupivacaine, ziconitide.  Established with [facility] for home management. No changes to pump dose recommended today. She uses max PTM doses (6/day), notes reserving 3 doses for evening when pain is worse.   Could consider slight increase in future, increase basal rate by 0.6 mg (hydromorphone  drug) and continue 6 PTMs/day. May contact clinic to request increase in between visits if needed.   Baclofen - current dose 10-15 mg three times daily. Appreciates some degree of benefit, no reported side effects.   Acetaminophen - 1000 mg three times daily. Appreciates some degree of benefit, no side effects.   Gabapentin 1200 mg three times daily. Appreciates benefit, no side effects reported.   Recommended Lyrica trial at prior visits, though she decided against pursuing new medication.      Potential procedures:   None      Other Orders/Referrals:   [facility] - RNCC will coordinate next prescription for refill.     Follow up with EMELI Jerez CNP in around 6-8 weeks via video visit.     Since her last visit, Anayeli E Chelsi reports:  -She reports pain is the same and not any better. Overall not well controlled.   -She is interested in slight increase in the pump basal rate. She verifies she will continue to get 6 PTM doses.   -She has moved AL facilities. She is now in [facility] in [place].  -She has been at this new facility for 1 week, things are going well so far.     PEG: A Three-Item Scale Assessing Pain Intensity and Interference    What number best describes your PAIN ON AVERAGE in the past week? (Patient-Rptd) 5    What number best describes how, during the past week, pain has interfered with your ENJOYMENT OF LIFE? (Patient-Rptd) 4    What number best describes how, during the past week, pain has interfered with your GENERAL ACTIVITY? (Patient-Rptd) 4    PEG Total Score: (Patient-Rptd) 4.33    Fredo RODRIGUEZ, Ronen KA, Samantha MJ, Clover TA, Shen J, Candida JM, Lincoln SM, Milad K. Development and initial validation of the PEG, a  "3-item scale assessing pain intensity and interference. Journal of General Internal Medicine. 2009 Kamaljit;24:733-738.        HPI/Interval history from last visit:  -She has moved into AL facility. She states this was not her idea.   -She will be looking into a new facility.   -She is in [place] currently. She has been there 6 weeks.  -She does not have a definitive date to move or specific place in mind right now.   -She reports pain is more severe right now, when \"stuff\" is bothering her, pain worsens.   -She continues to use max PTM at 6/day. She notes saving 3 PTMs for use in the evening, when pain is the worst.   -She reports pain control earlier in the day is not affected by saving half PTMs for the evening.   -She continues baclofen and gabapentin. Lyrica recommended at prior visit, she decided against trial, states she \"doesn't like\" the medication.   -She thinks that getting out of current apartment is contributing to severity of pain  -She reports groin pain is persistent, notes radiation around hips into groin. She notes pain progressively worsens throughout the day.         Current Pain Treatments:    Gabapentin 1200 mg TID  Baclofen 15 mg 2-3 x daily PRN  Acetaminophen 1000 mg TID                   Intrathecal Pump- 20 mL syringe     Concentrations:   Dilaudid 14 mg/ml                                                          Bupivacaine 28 mg/ml   Ziconitide 14 mcg/ml      Total Volume in Refill: 20 mL     Basal:   Dilaudid  7.6 mg/day                                                       Bupivacaine 15.2 mg/day   Ziconitide 7.6 mcg/day      PTM 0.2 hydromorphone, 2 hour lockout, maximum 6 in a day.         Current MME: N/A     Review of [place] Prescription Monitoring Program (): No concern for abuse or misuse of controlled medications based on this report. Reviewed - appears appropriate.      Annual Controlled Substance Agreement/UDS due date: N/A     Past pain treatments:  Lyrica 150 mg " TID      Medications:  Current Outpatient Medications   Medication Sig Dispense Refill     acetaminophen (TYLENOL) 500 MG tablet Take 500-1,000 mg by mouth every 6 hours as needed for mild pain       aspirin (ASA) 325 MG EC tablet Take 1 tablet (325 mg) by mouth daily 30 tablet 11     atorvastatin (LIPITOR) 40 MG tablet Take 1 tablet (40 mg) by mouth every evening 30 tablet 3     baclofen (LIORESAL) 10 MG tablet Take 1.5 tablets (15 mg) by mouth 3 times daily 60 tablet 2     busPIRone (BUSPAR) 10 MG tablet Take 10 mg by mouth.       COMPOUND CONTAINING CONTROLLED SUBSTANCE (CMPD RX) - PHARMACY TO MIX COMPOUNDED MEDICATION Intrathecal Pump- 20 mL syringe    Concentrations:  Dilaudid  14 mg/ml                                                          Bupivacaine 28 mg/ml  Ziconitide 14 mcg/ml     Total Volume in Refill: 20 mL    Basal:   Dilaudid  7 mg/day                                                       Bupivacaine 14 mg/day  Ziconitide 7 mcg/day     PTM 0.2 hydromorphone, 2 hour lockout, maximum 6 in a day. 20 mL 0     gabapentin (NEURONTIN) 600 MG tablet Take 1,200 mg by mouth 3 times daily  1 tablet 0     lidocaine (LIDODERM) 5 % patch Apply 1 patch topically daily as needed        LORazepam (ATIVAN) 0.5 MG tablet Take 1 tablet by mouth daily as needed for anxiety       medication given by implanted intrathecal pump continuous Concentrations:  Dilaudid    14 mg/ml                                                          Bupivacaine 28 mg/ml  Ziconotide 14 mcg/ml     Total Volume in Refill: 20 mL      Basal:   Dilaudid   6.993 mg/day                                                       Bupivacaine 13.986 mg/day  Ziconotide 6.993 mcg/day     PTM 0.2 mg hydromorphone, 0.4 mg bupivacaine, 0.2 mcg ziconotide, 2 hour lockout, maximum 6 in a day (2 hr lockout)     Maximum 24 hour dose  Hydromorphone 8.134 mg/day  Bupivacaine 16.267 mg/day  Ziconitide 8.134 mcg/day     Last filled on 10/26/23 and current alarm date  is: 11/25/23    Managed by [facility] ([phone]) - refills approximately every 3 weeks  Provider. Dr. Maryam Espinoza       melatonin 3 MG tablet Take 3 mg by mouth nightly as needed for sleep       metoprolol succinate ER (TOPROL-XL) 25 MG 24 hr tablet Take 12.5 mg by mouth every evening        Multiple Vitamins-Minerals (WOMENS MULTI PO) Take 1 tablet by mouth daily       ondansetron (ZOFRAN) 4 MG tablet Take 1 tablet (4 mg) by mouth every 6 hours as needed for nausea 20 tablet 1     polyethylene glycol (MIRALAX) 17 GM/Dose powder Take 17 g by mouth daily as needed for constipation        pregabalin (LYRICA) 150 MG capsule Take 1 capsule (150 mg) by mouth 3 times daily Stop gabapentin. 90 capsule 1       Medical History: any changes in medical history since they were last seen? Yes - ED visit x 2, see chart       Objective:    Physical Exam:  GENERAL: alert and no distress  EYES: Eyes grossly normal to inspection.  No discharge or erythema, or obvious scleral/conjunctival abnormalities.  RESP: No audible wheeze, cough, or visible cyanosis.    SKIN: Visible skin clear. No significant rash, abnormal pigmentation or lesions.  NEURO: Cranial nerves grossly intact.  Mentation and speech appropriate for age.  PSYCH: Appropriate affect, tone, and pace of words     Diagnostic Tests/Imaging/Labs:  Last CMP on 10/13/24 reviewed.     BILLING TIME DOCUMENTATION:   The total TIME spent on this patient on the date of the encounter/appointment was 22 minutes.      TOTAL TIME includes:   Time spent preparing to see the patient (reviewing records and tests)   Time spent face to face (or over the phone) with the patient   Time spent ordering tests, medications, procedures and referrals   Time spent Referring and communicating with other healthcare professionals   Time spent documenting clinical information in Epic       Again, thank you for allowing me to participate in the care of your patient.      Sincerely,    Santa Wallace,  APRN CNP

## 2024-11-01 NOTE — NURSING NOTE
Patient presents with:  Pain  RECHECK: Follow up- pain      Moderate Pain (4)     Pain Medications       Analgesics Other Refills Start End     acetaminophen (TYLENOL) 500 MG tablet --  --    Sig - Route: Take 500-1,000 mg by mouth every 6 hours as needed for mild pain - Oral    Class: Historical       Salicylates Refills Start End     aspirin (ASA) 325 MG EC tablet 11 1/8/2024 --    Sig - Route: Take 1 tablet (325 mg) by mouth daily - Oral    Class: E-Prescribe            What medications are you using for pain? Pain pump, Tylenol      What refills are you needing today? none    Expectations: to discuss pain        How will you be connecting to the visit? Mychart  How would you like to obtain your AVS? MyChart  If the video visit is dropped, the invitation should be resent by: Text to cell phone: [phone]  Will anyone else be joining your video visit? Pt daughter is there with her  Are you currently in the [facility] yes   If patient encounters technical issues they should call [phone]      Cristina Mahan CMA

## 2024-11-04 ENCOUNTER — TELEPHONE (OUTPATIENT)
Dept: ANESTHESIOLOGY | Facility: CLINIC | Age: 74
End: 2024-11-04
Payer: MEDICARE

## 2024-11-04 NOTE — TELEPHONE ENCOUNTER
[facility] Call Center    Phone Message    May a detailed message be left on voicemail: yes     Reason for Call: Medication Refill Request    Has the patient contacted the pharmacy for the refill? Yes   Name of medication being requested: Concentrations:   Dilaudid 14 mg/ml                                                          Bupivacaine 28 mg/ml   Ziconitide 14 mcg/ml       Provider who prescribed the medication:     Santa Wallace APRN CNP     Pharmacy:    [facility]  [phone]   Fax     Date medication is needed: 11/8/24       Action Taken: Message routed to:  [facility]    Travel Screening: Not Applicable     Date of Service:

## 2024-11-05 NOTE — TELEPHONE ENCOUNTER
RN pended prescription. Prescription was reviewed and signed by the provider. Prescription will be faxed to [facility].    Alia Reyes RNCC

## 2024-11-06 ENCOUNTER — TELEPHONE (OUTPATIENT)
Dept: ANESTHESIOLOGY | Facility: CLINIC | Age: 74
End: 2024-11-06
Payer: MEDICARE

## 2024-11-06 NOTE — TELEPHONE ENCOUNTER
Sent [facility] (1st Attempt) and Patient Contacted for the patient to call back and schedule the following:    Appointment type: Return Pain  Provider: Santa Wallace  Visit mode: In Person or Virtual Visit  Return date: Approx. 12/5/24  Specialty phone number: [phone]    Additional Notes: Patient requested a call back at another time.

## 2024-11-10 ENCOUNTER — APPOINTMENT (OUTPATIENT)
Dept: CT IMAGING | Facility: CLINIC | Age: 74
DRG: 069 | End: 2024-11-10
Attending: PHYSICIAN ASSISTANT
Payer: MEDICARE

## 2024-11-10 ENCOUNTER — HOSPITAL ENCOUNTER (INPATIENT)
Facility: CLINIC | Age: 74
LOS: 4 days | Discharge: HOME HEALTH | DRG: 069 | End: 2024-11-14
Attending: STUDENT IN AN ORGANIZED HEALTH CARE EDUCATION/TRAINING PROGRAM | Admitting: INTERNAL MEDICINE
Payer: MEDICARE

## 2024-11-10 DIAGNOSIS — M54.50 THORACOLUMBAR BACK PAIN: ICD-10-CM

## 2024-11-10 DIAGNOSIS — G47.34 NOCTURNAL HYPOXIA: ICD-10-CM

## 2024-11-10 DIAGNOSIS — G45.9 TIA (TRANSIENT ISCHEMIC ATTACK): ICD-10-CM

## 2024-11-10 DIAGNOSIS — R29.818 TRANSIENT NEUROLOGICAL SYMPTOMS: ICD-10-CM

## 2024-11-10 DIAGNOSIS — T83.510A URINARY TRACT INFECTION ASSOCIATED WITH CYSTOSTOMY CATHETER, INITIAL ENCOUNTER (H): ICD-10-CM

## 2024-11-10 DIAGNOSIS — N39.0 URINARY TRACT INFECTION ASSOCIATED WITH CYSTOSTOMY CATHETER, INITIAL ENCOUNTER (H): ICD-10-CM

## 2024-11-10 DIAGNOSIS — L97.321 SKIN ULCER OF LEFT ANKLE, LIMITED TO BREAKDOWN OF SKIN (H): Primary | ICD-10-CM

## 2024-11-10 DIAGNOSIS — M54.6 THORACOLUMBAR BACK PAIN: ICD-10-CM

## 2024-11-10 LAB
ANION GAP SERPL CALCULATED.3IONS-SCNC: 12 MMOL/L (ref 7–15)
APTT PPP: 26 SECONDS (ref 22–38)
BASOPHILS # BLD AUTO: 0.1 10E3/UL (ref 0–0.2)
BASOPHILS NFR BLD AUTO: 1 %
BUN SERPL-MCNC: 16.5 MG/DL (ref 8–23)
CALCIUM SERPL-MCNC: 8.9 MG/DL (ref 8.8–10.4)
CHLORIDE SERPL-SCNC: 103 MMOL/L (ref 98–107)
CHOLEST SERPL-MCNC: 118 MG/DL
CREAT SERPL-MCNC: 0.77 MG/DL (ref 0.51–0.95)
EGFRCR SERPLBLD CKD-EPI 2021: 80 ML/MIN/1.73M2
EOSINOPHIL # BLD AUTO: 0.2 10E3/UL (ref 0–0.7)
EOSINOPHIL NFR BLD AUTO: 3 %
ERYTHROCYTE [DISTWIDTH] IN BLOOD BY AUTOMATED COUNT: 13.8 % (ref 10–15)
GLUCOSE BLDC GLUCOMTR-MCNC: 131 MG/DL (ref 70–99)
GLUCOSE SERPL-MCNC: 142 MG/DL (ref 70–99)
HCO3 SERPL-SCNC: 27 MMOL/L (ref 22–29)
HCT VFR BLD AUTO: 35.9 % (ref 35–47)
HDLC SERPL-MCNC: 50 MG/DL
HGB BLD-MCNC: 11.9 G/DL (ref 11.7–15.7)
IMM GRANULOCYTES # BLD: 0 10E3/UL
IMM GRANULOCYTES NFR BLD: 0 %
INR PPP: 0.99 (ref 0.85–1.15)
LDLC SERPL CALC-MCNC: 41 MG/DL
LYMPHOCYTES # BLD AUTO: 1.9 10E3/UL (ref 0.8–5.3)
LYMPHOCYTES NFR BLD AUTO: 26 %
MCH RBC QN AUTO: 32 PG (ref 26.5–33)
MCHC RBC AUTO-ENTMCNC: 33.1 G/DL (ref 31.5–36.5)
MCV RBC AUTO: 97 FL (ref 78–100)
MONOCYTES # BLD AUTO: 0.6 10E3/UL (ref 0–1.3)
MONOCYTES NFR BLD AUTO: 8 %
NEUTROPHILS # BLD AUTO: 4.5 10E3/UL (ref 1.6–8.3)
NEUTROPHILS NFR BLD AUTO: 63 %
NONHDLC SERPL-MCNC: 68 MG/DL
NRBC # BLD AUTO: 0 10E3/UL
NRBC BLD AUTO-RTO: 0 /100
PLATELET # BLD AUTO: 180 10E3/UL (ref 150–450)
POTASSIUM SERPL-SCNC: 3.6 MMOL/L (ref 3.4–5.3)
RBC # BLD AUTO: 3.72 10E6/UL (ref 3.8–5.2)
SODIUM SERPL-SCNC: 142 MMOL/L (ref 135–145)
TRIGL SERPL-MCNC: 134 MG/DL
TROPONIN T SERPL HS-MCNC: 40 NG/L
WBC # BLD AUTO: 7.3 10E3/UL (ref 4–11)

## 2024-11-10 PROCEDURE — 96374 THER/PROPH/DIAG INJ IV PUSH: CPT | Mod: 59 | Performed by: STUDENT IN AN ORGANIZED HEALTH CARE EDUCATION/TRAINING PROGRAM

## 2024-11-10 PROCEDURE — 84484 ASSAY OF TROPONIN QUANT: CPT | Performed by: PHYSICIAN ASSISTANT

## 2024-11-10 PROCEDURE — 93005 ELECTROCARDIOGRAM TRACING: CPT | Performed by: STUDENT IN AN ORGANIZED HEALTH CARE EDUCATION/TRAINING PROGRAM

## 2024-11-10 PROCEDURE — 120N000001 HC R&B MED SURG/OB

## 2024-11-10 PROCEDURE — 85730 THROMBOPLASTIN TIME PARTIAL: CPT | Performed by: PHYSICIAN ASSISTANT

## 2024-11-10 PROCEDURE — 36415 COLL VENOUS BLD VENIPUNCTURE: CPT | Performed by: PHYSICIAN ASSISTANT

## 2024-11-10 PROCEDURE — 250N000013 HC RX MED GY IP 250 OP 250 PS 637: Performed by: INTERNAL MEDICINE

## 2024-11-10 PROCEDURE — 70496 CT ANGIOGRAPHY HEAD: CPT | Mod: MA

## 2024-11-10 PROCEDURE — 80061 LIPID PANEL: CPT | Performed by: INTERNAL MEDICINE

## 2024-11-10 PROCEDURE — 250N000009 HC RX 250: Performed by: PHYSICIAN ASSISTANT

## 2024-11-10 PROCEDURE — 70450 CT HEAD/BRAIN W/O DYE: CPT | Mod: MA

## 2024-11-10 PROCEDURE — 250N000013 HC RX MED GY IP 250 OP 250 PS 637: Performed by: STUDENT IN AN ORGANIZED HEALTH CARE EDUCATION/TRAINING PROGRAM

## 2024-11-10 PROCEDURE — 80048 BASIC METABOLIC PNL TOTAL CA: CPT | Performed by: PHYSICIAN ASSISTANT

## 2024-11-10 PROCEDURE — 85610 PROTHROMBIN TIME: CPT | Performed by: PHYSICIAN ASSISTANT

## 2024-11-10 PROCEDURE — 99285 EMERGENCY DEPT VISIT HI MDM: CPT | Mod: 25 | Performed by: STUDENT IN AN ORGANIZED HEALTH CARE EDUCATION/TRAINING PROGRAM

## 2024-11-10 PROCEDURE — 82962 GLUCOSE BLOOD TEST: CPT

## 2024-11-10 PROCEDURE — 250N000011 HC RX IP 250 OP 636: Performed by: PHYSICIAN ASSISTANT

## 2024-11-10 PROCEDURE — G0378 HOSPITAL OBSERVATION PER HR: HCPCS

## 2024-11-10 PROCEDURE — 96375 TX/PRO/DX INJ NEW DRUG ADDON: CPT | Performed by: STUDENT IN AN ORGANIZED HEALTH CARE EDUCATION/TRAINING PROGRAM

## 2024-11-10 PROCEDURE — 99222 1ST HOSP IP/OBS MODERATE 55: CPT | Mod: 95 | Performed by: INTERNAL MEDICINE

## 2024-11-10 PROCEDURE — 250N000011 HC RX IP 250 OP 636: Performed by: STUDENT IN AN ORGANIZED HEALTH CARE EDUCATION/TRAINING PROGRAM

## 2024-11-10 PROCEDURE — 85025 COMPLETE CBC W/AUTO DIFF WBC: CPT | Performed by: PHYSICIAN ASSISTANT

## 2024-11-10 PROCEDURE — 93010 ELECTROCARDIOGRAM REPORT: CPT | Performed by: STUDENT IN AN ORGANIZED HEALTH CARE EDUCATION/TRAINING PROGRAM

## 2024-11-10 RX ORDER — LORAZEPAM 0.5 MG/1
0.5 TABLET ORAL DAILY PRN
Status: DISCONTINUED | OUTPATIENT
Start: 2024-11-10 | End: 2024-11-14 | Stop reason: HOSPADM

## 2024-11-10 RX ORDER — LIDOCAINE 4 G/G
1 PATCH TOPICAL DAILY PRN
Status: DISCONTINUED | OUTPATIENT
Start: 2024-11-10 | End: 2024-11-14 | Stop reason: HOSPADM

## 2024-11-10 RX ORDER — LIDOCAINE 40 MG/G
CREAM TOPICAL
Status: DISCONTINUED | OUTPATIENT
Start: 2024-11-10 | End: 2024-11-14 | Stop reason: HOSPADM

## 2024-11-10 RX ORDER — ASPIRIN 81 MG/1
81 TABLET ORAL DAILY
Status: DISCONTINUED | OUTPATIENT
Start: 2024-11-11 | End: 2024-11-12

## 2024-11-10 RX ORDER — ACETAMINOPHEN 325 MG/10.15ML
650 LIQUID ORAL EVERY 4 HOURS PRN
Status: DISCONTINUED | OUTPATIENT
Start: 2024-11-10 | End: 2024-11-14 | Stop reason: HOSPADM

## 2024-11-10 RX ORDER — DIPHENHYDRAMINE HYDROCHLORIDE 50 MG/ML
25 INJECTION INTRAMUSCULAR; INTRAVENOUS ONCE
Status: COMPLETED | OUTPATIENT
Start: 2024-11-10 | End: 2024-11-10

## 2024-11-10 RX ORDER — ONDANSETRON 2 MG/ML
4 INJECTION INTRAMUSCULAR; INTRAVENOUS EVERY 6 HOURS PRN
Status: DISCONTINUED | OUTPATIENT
Start: 2024-11-10 | End: 2024-11-14 | Stop reason: HOSPADM

## 2024-11-10 RX ORDER — CLOPIDOGREL BISULFATE 75 MG/1
300 TABLET ORAL ONCE
Status: COMPLETED | OUTPATIENT
Start: 2024-11-10 | End: 2024-11-10

## 2024-11-10 RX ORDER — ACETAMINOPHEN 650 MG/1
650 SUPPOSITORY RECTAL EVERY 4 HOURS PRN
Status: DISCONTINUED | OUTPATIENT
Start: 2024-11-10 | End: 2024-11-14 | Stop reason: HOSPADM

## 2024-11-10 RX ORDER — ONDANSETRON 4 MG/1
4 TABLET, ORALLY DISINTEGRATING ORAL EVERY 6 HOURS PRN
Status: DISCONTINUED | OUTPATIENT
Start: 2024-11-10 | End: 2024-11-14 | Stop reason: HOSPADM

## 2024-11-10 RX ORDER — ACETAMINOPHEN 325 MG/1
650 TABLET ORAL EVERY 4 HOURS PRN
Status: DISCONTINUED | OUTPATIENT
Start: 2024-11-10 | End: 2024-11-14 | Stop reason: HOSPADM

## 2024-11-10 RX ORDER — ATORVASTATIN CALCIUM 40 MG/1
40 TABLET, FILM COATED ORAL EVERY EVENING
Status: DISCONTINUED | OUTPATIENT
Start: 2024-11-10 | End: 2024-11-14 | Stop reason: HOSPADM

## 2024-11-10 RX ORDER — ASPIRIN 81 MG/1
81 TABLET, CHEWABLE ORAL DAILY
Status: DISCONTINUED | OUTPATIENT
Start: 2024-11-11 | End: 2024-11-12

## 2024-11-10 RX ORDER — CLOPIDOGREL BISULFATE 75 MG/1
75 TABLET ORAL DAILY
Status: DISCONTINUED | OUTPATIENT
Start: 2024-11-11 | End: 2024-11-11

## 2024-11-10 RX ORDER — BACLOFEN 10 MG/1
10 TABLET ORAL 3 TIMES DAILY
Status: DISCONTINUED | OUTPATIENT
Start: 2024-11-10 | End: 2024-11-14 | Stop reason: HOSPADM

## 2024-11-10 RX ORDER — METHYLPREDNISOLONE SODIUM SUCCINATE 125 MG/2ML
125 INJECTION INTRAMUSCULAR; INTRAVENOUS ONCE
Status: COMPLETED | OUTPATIENT
Start: 2024-11-10 | End: 2024-11-10

## 2024-11-10 RX ORDER — GABAPENTIN 400 MG/1
1200 CAPSULE ORAL 3 TIMES DAILY
Status: DISCONTINUED | OUTPATIENT
Start: 2024-11-10 | End: 2024-11-14 | Stop reason: HOSPADM

## 2024-11-10 RX ORDER — POLYETHYLENE GLYCOL 3350 17 G/17G
17 POWDER, FOR SOLUTION ORAL DAILY PRN
Status: DISCONTINUED | OUTPATIENT
Start: 2024-11-10 | End: 2024-11-14 | Stop reason: HOSPADM

## 2024-11-10 RX ORDER — FAMOTIDINE 20 MG/1
20 TABLET, FILM COATED ORAL 2 TIMES DAILY
Status: DISCONTINUED | OUTPATIENT
Start: 2024-11-10 | End: 2024-11-14 | Stop reason: HOSPADM

## 2024-11-10 RX ORDER — MULTIPLE VITAMINS W/ MINERALS TAB 9MG-400MCG
1 TAB ORAL DAILY
Status: DISCONTINUED | OUTPATIENT
Start: 2024-11-11 | End: 2024-11-14 | Stop reason: HOSPADM

## 2024-11-10 RX ORDER — IOPAMIDOL 755 MG/ML
67 INJECTION, SOLUTION INTRAVASCULAR ONCE
Status: COMPLETED | OUTPATIENT
Start: 2024-11-10 | End: 2024-11-10

## 2024-11-10 RX ORDER — CALCIUM CARBONATE 500 MG/1
1000 TABLET, CHEWABLE ORAL 4 TIMES DAILY PRN
Status: DISCONTINUED | OUTPATIENT
Start: 2024-11-10 | End: 2024-11-14 | Stop reason: HOSPADM

## 2024-11-10 RX ORDER — BUSPIRONE HYDROCHLORIDE 5 MG/1
15 TABLET ORAL 2 TIMES DAILY
Status: DISCONTINUED | OUTPATIENT
Start: 2024-11-10 | End: 2024-11-14 | Stop reason: HOSPADM

## 2024-11-10 RX ORDER — ASPIRIN 81 MG/1
81 TABLET, CHEWABLE ORAL ONCE
Status: COMPLETED | OUTPATIENT
Start: 2024-11-10 | End: 2024-11-10

## 2024-11-10 RX ADMIN — CLOPIDOGREL 300 MG: 75 TABLET ORAL at 19:44

## 2024-11-10 RX ADMIN — ASPIRIN 81 MG: 81 TABLET, CHEWABLE ORAL at 19:44

## 2024-11-10 RX ADMIN — FAMOTIDINE 20 MG: 20 TABLET, FILM COATED ORAL at 22:53

## 2024-11-10 RX ADMIN — GABAPENTIN 1200 MG: 400 CAPSULE ORAL at 22:51

## 2024-11-10 RX ADMIN — BACLOFEN 10 MG: 10 TABLET ORAL at 23:00

## 2024-11-10 RX ADMIN — SODIUM CHLORIDE 65 ML: 9 INJECTION, SOLUTION INTRAVENOUS at 19:27

## 2024-11-10 RX ADMIN — DIPHENHYDRAMINE HYDROCHLORIDE 25 MG: 50 INJECTION, SOLUTION INTRAMUSCULAR; INTRAVENOUS at 19:09

## 2024-11-10 RX ADMIN — ATORVASTATIN CALCIUM 40 MG: 40 TABLET, FILM COATED ORAL at 22:53

## 2024-11-10 RX ADMIN — METHYLPREDNISOLONE SODIUM SUCCINATE 125 MG: 125 INJECTION, POWDER, FOR SOLUTION INTRAMUSCULAR; INTRAVENOUS at 19:09

## 2024-11-10 RX ADMIN — IOPAMIDOL 67 ML: 755 INJECTION, SOLUTION INTRAVENOUS at 19:27

## 2024-11-10 RX ADMIN — BUSPIRONE HYDROCHLORIDE 15 MG: 5 TABLET ORAL at 22:53

## 2024-11-10 ASSESSMENT — ACTIVITIES OF DAILY LIVING (ADL)
ADLS_ACUITY_SCORE: 0

## 2024-11-10 ASSESSMENT — COLUMBIA-SUICIDE SEVERITY RATING SCALE - C-SSRS
6. HAVE YOU EVER DONE ANYTHING, STARTED TO DO ANYTHING, OR PREPARED TO DO ANYTHING TO END YOUR LIFE?: NO
2. HAVE YOU ACTUALLY HAD ANY THOUGHTS OF KILLING YOURSELF IN THE PAST MONTH?: NO
1. IN THE PAST MONTH, HAVE YOU WISHED YOU WERE DEAD OR WISHED YOU COULD GO TO SLEEP AND NOT WAKE UP?: NO

## 2024-11-10 NOTE — LETTER
Transition Communication Hand-off for Care Transitions to Next Level of Care Provider    Name: Anayeli Gagnon  : 1950  MRN #: [id number]  Primary Care Provider: SHAY YEPEZ     Primary Clinic: [address]  [facility] [address]     Reason for Hospitalization:  TIA (transient ischemic attack) [G45.9]  Admit Date/Time: 11/10/2024  6:17 PM  Discharge Date:   Payor Source: Payor: MEDICARE / Plan: MEDICARE / Product Type: Medicare /            Reason for Communication Hand-off Referral: Fragility    Discharge Plan: Return to [facility] with Willie FERNANDES RN       Concern for non-adherence with plan of care:   Y/N N  Discharge Needs Assessment:  Needs      Flowsheet Row Most Recent Value   Equipment Currently Used at Home lift device, wheelchair, power, shower chair, grab bar, tub/shower, colostomy/ostomy supplies   # of Referrals Placed by CM External Care Coordination, Post Acute Facilities, Homecare              Follow-up plan:    Future Appointments   Date Time Provider Department Center   11/15/2024  8:30 AM Santa Wallace APRN CNP [facility]   3/27/2025  1:30 PM Kerrie Paul, [facility]       Any outstanding tests or procedures:              Key Recommendations:      Dawna Maldonado RN    AVS/Discharge Summary is the source of truth; this is a helpful guide for improved communication of patient story

## 2024-11-11 ENCOUNTER — APPOINTMENT (OUTPATIENT)
Dept: CARDIOLOGY | Facility: CLINIC | Age: 74
DRG: 069 | End: 2024-11-11
Attending: INTERNAL MEDICINE
Payer: MEDICARE

## 2024-11-11 PROBLEM — D69.1 PLATELET DYSFUNCTION DUE TO ASPIRIN (H): Status: ACTIVE | Noted: 2024-11-11

## 2024-11-11 PROBLEM — Z93.3 COLOSTOMY PRESENT (H): Status: ACTIVE | Noted: 2024-11-11

## 2024-11-11 PROBLEM — L97.321 SKIN ULCER OF LEFT ANKLE, LIMITED TO BREAKDOWN OF SKIN (H): Status: ACTIVE | Noted: 2024-11-11

## 2024-11-11 PROBLEM — G82.20 PARAPLEGIA (H): Status: ACTIVE | Noted: 2017-12-03

## 2024-11-11 PROBLEM — Z93.59 SUPRAPUBIC CATHETER (H): Status: ACTIVE | Noted: 2024-11-11

## 2024-11-11 PROBLEM — E78.5 HYPERLIPIDEMIA: Status: ACTIVE | Noted: 2024-11-11

## 2024-11-11 PROBLEM — T39.015A PLATELET DYSFUNCTION DUE TO ASPIRIN (H): Status: ACTIVE | Noted: 2024-11-11

## 2024-11-11 PROBLEM — Z86.73 HISTORY OF STROKE: Status: ACTIVE | Noted: 2024-11-11

## 2024-11-11 PROBLEM — Z22.322 MRSA CARRIER: Status: ACTIVE | Noted: 2024-11-11

## 2024-11-11 PROBLEM — Z97.8 PRESENCE OF INTRATHECAL PUMP: Status: ACTIVE | Noted: 2024-11-11

## 2024-11-11 LAB
ANION GAP SERPL CALCULATED.3IONS-SCNC: 13 MMOL/L (ref 7–15)
BUN SERPL-MCNC: 14 MG/DL (ref 8–23)
CALCIUM SERPL-MCNC: 9 MG/DL (ref 8.8–10.4)
CHLORIDE SERPL-SCNC: 106 MMOL/L (ref 98–107)
CREAT SERPL-MCNC: 0.53 MG/DL (ref 0.51–0.95)
EGFRCR SERPLBLD CKD-EPI 2021: >90 ML/MIN/1.73M2
ERYTHROCYTE [DISTWIDTH] IN BLOOD BY AUTOMATED COUNT: 13.5 % (ref 10–15)
EST. AVERAGE GLUCOSE BLD GHB EST-MCNC: 108 MG/DL
GLUCOSE BLDC GLUCOMTR-MCNC: 122 MG/DL (ref 70–99)
GLUCOSE BLDC GLUCOMTR-MCNC: 135 MG/DL (ref 70–99)
GLUCOSE BLDC GLUCOMTR-MCNC: 177 MG/DL (ref 70–99)
GLUCOSE BLDC GLUCOMTR-MCNC: 181 MG/DL (ref 70–99)
GLUCOSE BLDC GLUCOMTR-MCNC: 182 MG/DL (ref 70–99)
GLUCOSE SERPL-MCNC: 209 MG/DL (ref 70–99)
HBA1C MFR BLD: 5.4 %
HCO3 SERPL-SCNC: 24 MMOL/L (ref 22–29)
HCT VFR BLD AUTO: 36.8 % (ref 35–47)
HGB BLD-MCNC: 12.4 G/DL (ref 11.7–15.7)
LVEF ECHO: NORMAL
MAGNESIUM SERPL-MCNC: 2.1 MG/DL (ref 1.7–2.3)
MCH RBC QN AUTO: 32 PG (ref 26.5–33)
MCHC RBC AUTO-ENTMCNC: 33.7 G/DL (ref 31.5–36.5)
MCV RBC AUTO: 95 FL (ref 78–100)
PLATELET # BLD AUTO: 168 10E3/UL (ref 150–450)
POTASSIUM SERPL-SCNC: 4.1 MMOL/L (ref 3.4–5.3)
RBC # BLD AUTO: 3.88 10E6/UL (ref 3.8–5.2)
SODIUM SERPL-SCNC: 143 MMOL/L (ref 135–145)
TROPONIN T SERPL HS-MCNC: 23 NG/L
WBC # BLD AUTO: 5.9 10E3/UL (ref 4–11)

## 2024-11-11 PROCEDURE — 85014 HEMATOCRIT: CPT | Performed by: INTERNAL MEDICINE

## 2024-11-11 PROCEDURE — 84132 ASSAY OF SERUM POTASSIUM: CPT | Performed by: INTERNAL MEDICINE

## 2024-11-11 PROCEDURE — 82962 GLUCOSE BLOOD TEST: CPT

## 2024-11-11 PROCEDURE — 36415 COLL VENOUS BLD VENIPUNCTURE: CPT | Performed by: INTERNAL MEDICINE

## 2024-11-11 PROCEDURE — 82565 ASSAY OF CREATININE: CPT | Performed by: INTERNAL MEDICINE

## 2024-11-11 PROCEDURE — 83735 ASSAY OF MAGNESIUM: CPT | Performed by: PEDIATRICS

## 2024-11-11 PROCEDURE — G0463 HOSPITAL OUTPT CLINIC VISIT: HCPCS

## 2024-11-11 PROCEDURE — G0378 HOSPITAL OBSERVATION PER HR: HCPCS

## 2024-11-11 PROCEDURE — 99232 SBSQ HOSP IP/OBS MODERATE 35: CPT | Performed by: PEDIATRICS

## 2024-11-11 PROCEDURE — 250N000013 HC RX MED GY IP 250 OP 250 PS 637: Performed by: INTERNAL MEDICINE

## 2024-11-11 PROCEDURE — 999N000147 HC STATISTIC PT IP EVAL DEFER: Performed by: PHYSICAL THERAPIST

## 2024-11-11 PROCEDURE — 84484 ASSAY OF TROPONIN QUANT: CPT | Performed by: PEDIATRICS

## 2024-11-11 PROCEDURE — 80048 BASIC METABOLIC PNL TOTAL CA: CPT | Performed by: INTERNAL MEDICINE

## 2024-11-11 PROCEDURE — 93306 TTE W/DOPPLER COMPLETE: CPT | Mod: 26 | Performed by: INTERNAL MEDICINE

## 2024-11-11 PROCEDURE — 83036 HEMOGLOBIN GLYCOSYLATED A1C: CPT | Performed by: PHYSICIAN ASSISTANT

## 2024-11-11 PROCEDURE — G0427 INPT/ED TELECONSULT70: HCPCS | Mod: G0 | Performed by: PHYSICIAN ASSISTANT

## 2024-11-11 PROCEDURE — 93306 TTE W/DOPPLER COMPLETE: CPT

## 2024-11-11 PROCEDURE — 120N000001 HC R&B MED SURG/OB

## 2024-11-11 RX ORDER — NALOXONE HYDROCHLORIDE 0.4 MG/ML
0.4 INJECTION, SOLUTION INTRAMUSCULAR; INTRAVENOUS; SUBCUTANEOUS
Status: DISCONTINUED | OUTPATIENT
Start: 2024-11-11 | End: 2024-11-14 | Stop reason: HOSPADM

## 2024-11-11 RX ORDER — NALOXONE HYDROCHLORIDE 0.4 MG/ML
0.2 INJECTION, SOLUTION INTRAMUSCULAR; INTRAVENOUS; SUBCUTANEOUS
Status: DISCONTINUED | OUTPATIENT
Start: 2024-11-11 | End: 2024-11-14 | Stop reason: HOSPADM

## 2024-11-11 RX ADMIN — Medication 1 TABLET: at 09:17

## 2024-11-11 RX ADMIN — ASPIRIN 81 MG: 81 TABLET, COATED ORAL at 09:17

## 2024-11-11 RX ADMIN — BUSPIRONE HYDROCHLORIDE 15 MG: 5 TABLET ORAL at 09:17

## 2024-11-11 RX ADMIN — CLOPIDOGREL 75 MG: 75 TABLET ORAL at 09:17

## 2024-11-11 RX ADMIN — GABAPENTIN 1200 MG: 400 CAPSULE ORAL at 09:16

## 2024-11-11 RX ADMIN — BACLOFEN 10 MG: 10 TABLET ORAL at 20:44

## 2024-11-11 RX ADMIN — BACLOFEN 10 MG: 10 TABLET ORAL at 13:37

## 2024-11-11 RX ADMIN — FAMOTIDINE 20 MG: 20 TABLET, FILM COATED ORAL at 09:17

## 2024-11-11 RX ADMIN — GABAPENTIN 1200 MG: 400 CAPSULE ORAL at 13:37

## 2024-11-11 RX ADMIN — ATORVASTATIN CALCIUM 40 MG: 40 TABLET, FILM COATED ORAL at 20:44

## 2024-11-11 RX ADMIN — FAMOTIDINE 20 MG: 20 TABLET, FILM COATED ORAL at 20:44

## 2024-11-11 RX ADMIN — METOPROLOL SUCCINATE 12.5 MG: 25 TABLET, EXTENDED RELEASE ORAL at 20:45

## 2024-11-11 RX ADMIN — BACLOFEN 10 MG: 10 TABLET ORAL at 09:17

## 2024-11-11 RX ADMIN — GABAPENTIN 1200 MG: 400 CAPSULE ORAL at 20:43

## 2024-11-11 RX ADMIN — BUSPIRONE HYDROCHLORIDE 15 MG: 5 TABLET ORAL at 20:52

## 2024-11-11 ASSESSMENT — ACTIVITIES OF DAILY LIVING (ADL)
ADLS_ACUITY_SCORE: 0
DEPENDENT_IADLS:: CLEANING;COOKING;LAUNDRY;SHOPPING;MEDICATION MANAGEMENT;TRANSPORTATION
ADLS_ACUITY_SCORE: 0

## 2024-11-11 NOTE — DISCHARGE INSTRUCTIONS
- Call 911 with any new stroke symptoms: 'BE FAST'  B - Balance issues or room spinning  E - Eyes (vision loss or double vision)  F - Facial droop  A - Arm or leg weakness  S - Speech (slurred or difficulty finding words or understanding language)  T - Time is brain!       Hospital follow up appointment:  Thursday November 21st   Lab work at 9:45am   Check in appt time 10:30 for 11:00 appt.    Dr Cameron Cadet   [phone]

## 2024-11-11 NOTE — PROGRESS NOTES
[facility]    Medicine Progress Note - Hospitalist Service    Date of Admission:  11/10/2024    Assessment & Plan      73-year-old woman with previous history of thoracic spinal cord injury and consequent paraplegia and neuropathic pain and spasticity treated chronically with implanted intrathecal pump, suprapubic catheter and colostomy, remote history of ischemic stroke with residual chronic right homonymous hemianopsia, hypertension, hyperlipidemia, chronic skin ulcer left ankle, chronic lower extremity edema, and previous MRSA in June 2019 who is a chronic resident of [facility] presented with new onset intermittent drooling from her right mouth and dysarthria.  Due to concerns for possible stroke, she was hospitalized.    Principal Problem:    TIA (transient ischemic attack)  Active Problems:    Paraplegia (H)    Hypertension    Spinal cord injury of thoracic region without bone injury, subsequent encounter (H)    Skin ulcer of left ankle, limited to breakdown of skin (H)    Presence of intrathecal pump    Suprapubic catheter (H)    Colostomy present (H)    MRSA carrier    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)    Bilateral lower extremity edema    History of spinal surgery    Muscle spasticity    Intractable neuropathic pain of lumbosacral origin    Depressive disorder    Right homonymous hemianopsia    History of stroke    Hyperlipidemia    Suspect TIA with dysarthria and drooling  History of left occipital stroke with residual chronic right homonymous hemianopsia   Presented with intermittent brief but recurring episodes of dysarthria and drooling from the right side of her mouth which was new along with concern for new right facial droop at those times.  Head CT and CTA of the head and neck were negative for acute abnormalities including stroke or vascular occlusion.  Clinical symptoms have resolved lowering suspicion for acute stroke.  She has  not had any arrhythmias on EKG or cardiac monitoring.  However, she does have previous history of left occipital ischemic stroke with residual chronic right homonymous hemianopsia.  Neurology has raised concern for the possibility of seizure activity.  -Brain MRI ordered due to be completed this afternoon  -Stroke neurology has consulted and advised continuing chronic dose aspirin 325 mg daily for now but no additional Plavix treatment  -If MRI is negative for acute stroke, neurology advised starting Keppra 500 mg twice daily with outpatient general neurology follow-up  -In absence of strong suspicion for acute stroke and ongoing outpatient status in the hospital, deferring therapy evaluations at this time but would reconsider therapy evaluations depending upon MRI results    Elevated troponin  Presented with mildly elevated troponin of 40 that has decreased to 23.  She is not known to have CAD.  She has not had anginal pain and EKG was negative for any acute ischemic changes.  -No additional evaluation for elevated troponin recommended during hospitalization unless her clinical status changes    Hypertension  Chronic hypertension treated with Toprol-XL, stable  -Continue chronic dose Toprol-XL    Hyperlipidemia  Chronic hyperlipidemia treated with atorvastatin, stable with reassuring lipid panel results at admission  -Continue chronic dose atorvastatin    Chronic skin ulcer left ankle  Chronic leg edema  Clinically, suspect chronic pressure ulcer of the left ankle which appears to be partial-thickness ulcer at this time although clinical evaluation is limited by the current presence of eschar.  She does have chronic bilateral lower extremity edema probably due to paraplegia but this appears minimal currently and is not thought to be the primary cause for her skin ulcer.  -[facility] nurse consulted for treatment recommendations  -Recommend offloading pressure to the left ankle using device    Chronic paraplegia after  spinal cord injury  Chronic neuropathic pain and muscle spasticity after spinal cord injury with implanted intrathecal pump  Suprapubic catheter status  Colostomy status  Chronic paraplegia with neuropathic pain and muscle spasticity, chronic suprapubic catheter, and chronic colostomy all appear to be stable at this time.    -Continue chronic treatments including intrathecal pump, oral Lyrica and baclofen, and Lidoderm patch    Aspirin induced platelet function defect  Chronic aspirin therapy causes platelet function defect that increases risk for bleeding.  Active bleeding not suspected and hemoglobin is stable.  -Continuing aspirin  -Monitor clinically for bleeding    Anxiety  Chronic anxiety treated with BuSpar and lorazepam as needed, stable  -Continue chronic doses of BuSpar and lorazepam        Diet: Regular Diet Adult    DVT Prophylaxis: Pneumatic Compression Devices  Cunningham Catheter: Not present  Lines: None     Cardiac Monitoring: ACTIVE order. Indication: Stroke, acute (48 hours)  Code Status: No CPR- Do NOT Intubate      Clinically Significant Risk Factors Present on Admission                 # Drug Induced Platelet Defect: home medication list includes an antiplatelet medication   # Hypertension: Noted on problem list                      Disposition Plan     Medically Ready for Discharge: Anticipated Tomorrow             Ernie Golden MD  Hospitalist Service  [facility]  Securely message with Vocera (more info)  Text page via [facility] Paging/Directory   ______________________________________________________________________    Interval History   There were no significant overnight events.  Patient says she has had no further symptoms of dysarthria or drooling during hospitalization.  She is not having any difficulty swallowing.  She otherwise feels fine.  She is been afebrile and hemodynamically stable.  Oxygenation has been normal.  She feels like she is functioning at her  "baseline.    Physical Exam   Vital Signs: Temp: 98  F (36.7  C) Temp src: Oral BP: (!) 143/68 Pulse: 79   Resp: 20 SpO2: 97 % O2 Device: None (Room air)    Patient Vitals for the past 24 hrs:   BP Temp Temp src Pulse Resp SpO2 Height Weight   11/11/24 1146 (!) 143/68 98  F (36.7  C) Oral 79 20 97 % -- --   11/11/24 0732 (!) 172/80 97.4  F (36.3  C) Oral 87 18 97 % -- --   11/11/24 0253 134/64 97.3  F (36.3  C) Oral 87 -- 100 % -- --   11/11/24 0229 (!) 153/71 97.3  F (36.3  C) Oral 90 16 97 % -- --   11/10/24 2155 (!) 140/59 97.9  F (36.6  C) Oral 73 14 93 % 1.626 m (5' 4\") 56.9 kg (125 lb 7.1 oz)   11/10/24 2132 -- -- -- -- (!) 2 -- -- --   11/10/24 2130 (!) 141/84 -- -- 68 18 95 % -- --   11/10/24 2045 -- -- -- -- -- 96 % -- --   11/10/24 2030 -- -- -- -- -- 96 % -- --   11/10/24 2015 -- -- -- -- -- 96 % -- --   11/10/24 1945 (!) 134/101 -- -- 77 18 95 % -- --   11/10/24 1900 129/67 -- -- 68 -- 96 % -- --   11/10/24 1845 132/73 -- -- 72 -- 95 % -- --   11/10/24 1830 (!) 152/63 -- -- 73 -- 94 % -- --   11/10/24 1829 (!) 145/68 98.2  F (36.8  C) Temporal 93 18 94 % -- 60.3 kg (133 lb)   11/10/24 1827 -- -- -- -- -- 92 % -- --   11/10/24 1825 -- -- -- 93 -- -- -- --     Weight: 125 lbs 7.07 oz  Wt Readings from Last 4 Encounters:   11/10/24 56.9 kg (125 lb 7.1 oz)   01/08/24 56.7 kg (125 lb)   11/13/23 56.7 kg (125 lb)   08/23/23 61.2 kg (135 lb)       Intake/Output Summary (Last 24 hours) at 11/11/2024 1506  Last data filed at 11/11/2024 1300  Gross per 24 hour   Intake 240 ml   Output 1125 ml   Net -885 ml         General Appearance: Pale elderly woman without signs of acute distress resting in bed  Respiratory: Normal respiratory effort, clear lungs  Cardiovascular: Regular rate and rhythm  GI: Nondistended abdomen, soft without tenderness  Skin: Approximately 1 cm at least partial-thickness clean appearing skin ulceration overlying the left lateral malleolus without bleeding or drainage or any surrounding skin " erythema but currently covered with dry eschar  Neuro: Alert and maintains wakefulness and attention, no confusion, normal speech, purposefully and symmetrically moves upper extremities, obvious paraplegia of the lower extremities    Medical Decision Making             Data     I have personally reviewed the following data over the past 24 hrs:    5.9  \   12.4   / 168     143 106 14.0 /  182 (H)   4.1 24 0.53 \     Trop: 40 (H) BNP: N/A     TSH: N/A T4: N/A A1C: 5.4     INR:  0.99 PTT:  26   D-dimer:  N/A Fibrinogen:  N/A       Blood sugars range 142-209    Recent Labs   Lab Test 11/10/24  1842 05/05/23  1925   CHOL 118 195   HDL 50 60   LDL 41 116*   TRIG 134 95     EKG and telemetry monitoring reviewed over the past 24 hours: EKG demonstrated normal sinus rhythm with normal ST segments and flat versus biphasic T waves in leads III, aVF, and V3 to V6 which are unchanged compared with September 2023, telemetry has demonstrated normal sinus rhythm without arrhythmias    Imaging results reviewed over the past 24 hrs:   Recent Results (from the past 24 hours)   CT Head w/o Contrast    Narrative    EXAM: CT HEAD W/O CONTRAST  LOCATION: [facility]  DATE: 11/10/2024    INDICATION: Code Stroke to evaluate for potential thrombolysis and thrombectomy. PLEASE READ IMMEDIATELY.  COMPARISON: September 11, 2023  TECHNIQUE: Routine CT Head without IV contrast. Multiplanar reformats. Dose reduction techniques were used.    FINDINGS:  INTRACRANIAL CONTENTS: No intracranial hemorrhage, extraaxial collection, or mass effect.  No CT evidence of acute infarct. Remote infarct at the left parietal occipital junction. Remote lacunar infarct left basal ganglia. Mild presumed chronic small   vessel ischemic changes. Mild generalized volume loss. No hydrocephalus.     VISUALIZED ORBITS/SINUSES/MASTOIDS: Prior bilateral cataract surgery. Visualized portions of the orbits are otherwise unremarkable. No  paranasal sinus mucosal disease. No middle ear or mastoid effusion.    BONES/SOFT TISSUES: No acute abnormality.      Impression    IMPRESSION:  1.  No acute intracranial process.  2.  Stable chronic changes as above.   CTA Head Neck with Contrast    Narrative    EXAM: CTA HEAD NECK W CONTRAST  LOCATION: [facility]  DATE: 11/10/2024    INDICATION: Neurological deficit  COMPARISON: None.  CONTRAST: 67 mL Isovue 370  TECHNIQUE: Head and neck CT angiogram with IV contrast. Axial helical CT images of the head and neck vessels obtained during the arterial phase of intravenous contrast administration. Axial 2D reconstructed images and multiplanar 3D MIP reconstructed   images of the head and neck vessels were performed by the technologist. Dose reduction techniques were used. All stenosis measurements made according to NASCET criteria unless otherwise specified.    FINDINGS:   HEAD CTA:  ANTERIOR CIRCULATION: No stenosis/occlusion, aneurysm, or high flow vascular malformation. Standard [employer] [place] anatomy.    POSTERIOR CIRCULATION: No stenosis/occlusion, aneurysm, or high flow vascular malformation. Dominant left and smaller right vertebral artery contribute to a normal basilar artery.     DURAL VENOUS SINUSES: Expected enhancement of the major dural venous sinuses.    NECK CTA:  RIGHT CAROTID: No measurable stenosis or dissection.    LEFT CAROTID: No measurable stenosis or dissection.    VERTEBRAL ARTERIES: No focal stenosis or dissection. Dominant left and smaller right vertebral arteries.    AORTIC ARCH: Classic aortic arch anatomy with no significant stenosis at the origin of the great vessels.    NONVASCULAR STRUCTURES: Unremarkable.      Impression    IMPRESSION:     HEAD CTA:   1.  Normal CTA [employer].    NECK CTA:  1.  Normal neck CTA.         Recent Labs   Lab 11/11/24  1243 11/11/24  1222 11/11/24  0730 11/11/24  0603 11/10/24  1842   WBC  --   --   --  5.9 7.3   HGB   --   --   --  12.4 11.9   MCV  --   --   --  95 97   PLT  --   --   --  168 180   INR  --   --   --   --  0.99   NA  --   --   --  143 142   POTASSIUM  --   --   --  4.1 3.6   CHLORIDE  --   --   --  106 103   CO2  --   --   --  24 27   BUN  --   --   --  14.0 16.5   CR  --   --   --  0.53 0.77   ANIONGAP  --   --   --  13 12   ADAN  --   --   --  9.0 8.9   * 177* 181* 209* 142*

## 2024-11-11 NOTE — PROGRESS NOTES
Room 270 does not have a ceiling lift Patient moved to room 248 at 1000H due to ceiling lift requirement. Patient has Hx of T1-12 spinal cord injury (T7-12 complete), she is lift dependent and wheelchair bound from a care facility

## 2024-11-11 NOTE — ED NOTES
ED Nursing criteria listed below was addressed during verbal handoff:     Abnormal vitals: No  Abnormal results: Yes  Med Reconciliation completed: No  Meds given in ED: Yes  Any Overdue Meds: No  Core Measures: Yes  Isolation: No  Special needs: Yes  Skin assessment: No    Observation Patient  Education provided: N/A

## 2024-11-11 NOTE — PLAN OF CARE
Physical Therapy: Orders received. Chart reviewed and discussed with care team.? Physical Therapy not indicated due to patient has a history of T1-12 spinal cord injury (T7-12 complete), she is lift dependent and wheelchair bound from a care facility. Inpatient PT consultation will not change or alter patient's disposition.? Defer discharge recommendations to medical team.? Will complete orders.     Thank you for your referral.  Chica Dao, PT, DPT, ATC, [facility]  O: [phone]  E: [email or web address]

## 2024-11-11 NOTE — PROGRESS NOTES
S-(situation): Patient registered to Observation. Patient arrived to room 270 via cart from ED.    B-(background): Pt has 3 day history of having short moments of facial droop, lasting less than 1 min. Pt daughter brought her to ED. CT showed no acute abnormalities. Admitted for continued monitoring and MRI and echo in AM.    A-(assessment): Pt A&O x4. VSS on RA. Pt neuros intact. Unable to assess lower half of body due to baseline paralysis (paraplegic). Pt has suprapubic cath and colostomy, both intact and functioning well.    R-(recommendations): Orders and observation goals reviewed with pt.    Nursing Observation criteria listed below was met:    Skin issues/needs documented:Yes  Isolation needs addressed and Signage up: NA  Fall Prevention: Education given and documented: Yes  Education Assessment documented:Yes  Admission Education Documented: Yes  New medication patient education completed and documented (Possible Side Effects of Common Medications handout): Yes  OBS video/handout Reviewed & Documented: Yes  Allergies Reviewed: Yes  Medication Reconciliation Complete: Yes  Home medications if not able to send immediately home with family stored here: NA  Reminder note placed in discharge instructions of home meds: NA  Patient has discharge needs (If yes, please explain): No  Patient discharge preferences addressed and charted on white board:  Yes  Provider notified that patient has arrived to the unit: Yes

## 2024-11-11 NOTE — PROGRESS NOTES
"MRI staff told pt has 2 implants (pain pump and stimulator). The stimulator is not working as per patient. MRI staff told that they will send info to their  and doubt they would be approved and be done tonight. Writer informed Dr. Golden and replied  \"noted. please pass on outcome to overnight Nursing team to share with AM provider  \"  "

## 2024-11-11 NOTE — PROGRESS NOTES
PRIMARY DIAGNOSIS: SYNCOPE/TIA  OUTPATIENT/OBSERVATION GOALS TO BE MET BEFORE DISCHARGE:  1. Orthostatic performed: No    2. Diagnostic testing complete & at baseline neurologic testing: No - echo and MRI in AM    3. Cleared by consultants (if involved): No    4. Interpretation of cardiac rhythm per telemetry tech: NSR    5. Tolerating adequate PO diet and medications:  Has tolerated meds, has not had an food yet    6. Return to near baseline physical activity or neurologic status: Yes    Pt A&O x4. VSS. Neuros intact. Pt did have some intermittent confusion upon waking overnight, but this did resolve with reminders. No other concerns.

## 2024-11-11 NOTE — PROGRESS NOTES
PRIMARY DIAGNOSIS: TIA  OUTPATIENT/OBSERVATION GOALS TO BE MET BEFORE DISCHARGE:  1. Orthostatic performed: No    2. Diagnostic testing complete & at baseline neurologic testing: No    3. Cleared by consultants (if involved): No    4. Interpretation of cardiac rhythm per telemetry tech: NSR    5. Tolerating adequate PO diet and medications: Yes    6. Return to near baseline physical activity or neurologic status: Yes    Discharge Planner Nurse   Safe discharge environment identified: Yes  Barriers to discharge: Yes       Entered by: Jorge Pearson RN 11/11/2024 5:24 PM     Please review provider order for any additional goals.   Nurse to notify provider when observation goals have been met and patient is ready for discharge.

## 2024-11-11 NOTE — ED PROVIDER NOTES
History     Chief Complaint   Patient presents with    Slurred Speech     HPI  Anayeli Gagnon is a 73 year old female who presents to the emergency department with her daughter for concerns of right-sided facial droop and difficulty finding words.  She has had 1 episode per day the past 3 days.  She mentions they all are short-lived and go away within 1 to 2 minutes.  Around 5:20 PM tonight she noticed water dripping out of the right side of her mouth, having a hard time finding words, staff at her living facility also saw facial droop thus called the daughter.  By the time the daughter got to the house around 6 PM the deficits were gone, patient mentions that the symptoms went away within 1 to 2 minutes.  There was no pain.  She mentions chronic inability to feel or use her legs.    Allergies:  Allergies   Allergen Reactions    Latex     Contrast Dye Rash     Painful rash after x-ray contrast    Nitrofurantoin Nausea and Vomiting       Problem List:    Patient Active Problem List    Diagnosis Date Noted    TIA (transient ischemic attack) 11/10/2024     Priority: Medium    Right homonymous hemianopsia 01/08/2024     Priority: Medium    Cerebrovascular accident (CVA), unspecified mechanism (H) 05/05/2023     Priority: Medium    Spinal cord injury of thoracic region without bone injury, subsequent encounter (H) 03/04/2021     Priority: Medium     Added automatically from request for surgery [id number]      Thoracolumbar back pain 03/04/2021     Priority: Medium     Added automatically from request for surgery [id number]      Neuropathic pain 03/04/2021     Priority: Medium     Added automatically from request for surgery [id number]      Intractable neuropathic pain of lumbosacral origin 03/04/2021     Priority: Medium     Added automatically from request for surgery [id number]      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 lipitor started fall 2017 when in hospital, she chose to stop taking and does not want to restart      Incomplete emptying of bladder 03/15/2018     Priority: Medium    Urge incontinence of urine 03/15/2018     Priority: Medium    Incomplete injury of thoracic spinal cord in T1 to T6 region without bony injury (H) 12/06/2017     Priority: Medium    Acute incomplete paraplegia (H) 12/03/2017     Priority: Medium    History of spinal surgery 12/03/2017     Priority: Medium     Overview:   Thoracolumbar laminectomy for intradural lysis of griselda-medullary adhesions in October 2018      Hypertension 12/03/2017     Priority: Medium    Muscle spasticity 12/03/2017     Priority: Medium    MVP (mitral valve prolapse) 10/18/2017     Priority: Medium    Palpitations 10/18/2017     Priority: Medium    Anxiety reaction 12/06/2011     Priority: Medium    Osteoarthritis 12/06/2011     Priority: Medium    Headache 11/17/2011     Priority: Medium     Problem list name updated by automated process. Provider to review      Menopause 11/17/2011     Priority: Medium    History of skin  cancer      Priority: Medium    Depressive disorder 08/29/2011     Priority: Medium    CARDIOVASCULAR SCREENING; LDL GOAL LESS THAN 160 10/31/2010     Priority: Medium    Other specified cardiac dysrhythmias(427.89) 10/03/2007     Priority: Medium     Taking atenelol for years for this          Past Medical History:    Past Medical History:   Diagnosis Date    CARDIAC DYSRHYTHMIAS NEC 10/03/2007    Cerebrovascular accident (H) 05/05/2023    History of skin cancer     Mitral valve prolapse     Neurogenic bladder     Sacral decubitus ulcer, stage IV (H)     Thoracic spinal cord injury (H)     TIA (transient ischemic attack) 04/26/2023       Past Surgical History:    Past Surgical History:   Procedure Laterality Date    CYSTOSTOMY, INSERT TUBE SUPRAPUBIC, COMBINED N/A 11/13/2023    Procedure: CYSTOSCOPY, OPEN SUPRAPUBIC TUBE INSERTION;  Surgeon: Karley Robles MD;  Location: [facility] OR    DECOMPRESSION LUMBAR ONE LEVEL N/A 12/27/2019    Procedure: Posterior spinal decompression;  Surgeon: Alf Ritchie MD;  Location: [facility] OR    INSERT INTRATHECAL PAIN PUMP N/A 1/19/2022    Procedure: INSERTION, INTRATHECAL ANALGESIC PUMP, externalized trial;  Surgeon: Luis Orourke MD;  Location: [facility] OR    INSERT INTRATHECAL PAIN PUMP Right 1/21/2022    Procedure: INSERTION, INTRATHECAL ANALGESIC PUMP;  Surgeon: Luis Orourke MD;  Location: [facility] OR    INSERT STIMULATOR AND LEADS INTERNAL DORSAL COLUMN N/A 4/7/2021    Procedure: Posterior thoracic 12 to Lumbar 1 level for placement of spinal cord stimulator paddle and placement of implantable pulse generator/battery over right buttock;  Surgeon: Luis Orourke MD;  Location: [facility] OR    IR SPINAL ANGIOGRAM  10/16/2019    IR SPINAL ANGIOGRAM  12/20/2019    LAPAROSCOPIC TUBAL LIGATION      REPAIR SPINAL ARERIOVENOUS MALFORMATION N/A 12/27/2019    Procedure: with surgical disconnection arterial venous fistula Thoracic 5;  Surgeon: Alf Ritchie MD;  Location: [facility] OR        Family History:    Family History   Problem Relation Age of Onset    C.A.D. Father          41    Deep Vein Thrombosis (DVT) No family hx of     Anesthesia Reaction No family hx of        Social History:  Marital Status:   [2]  Social History     Tobacco Use    Smoking status: Never     Passive exposure: Never    Smokeless tobacco: Never   Substance Use Topics    Alcohol use: No    Drug use: Never        Medications:    acetaminophen (TYLENOL) 500 MG tablet  aspirin (ASA) 325 MG EC tablet  atorvastatin (LIPITOR) 40 MG tablet  baclofen (LIORESAL) 10 MG tablet  busPIRone (BUSPAR) 10 MG tablet  COMPOUND CONTAINING CONTROLLED SUBSTANCE (CMPD RX) - PHARMACY TO MIX COMPOUNDED MEDICATION  gabapentin (NEURONTIN) 600 MG tablet  lidocaine (LIDODERM) 5 % patch  LORazepam (ATIVAN) 0.5 MG tablet  medication given by implanted intrathecal pump  melatonin 3 MG tablet  metoprolol succinate ER (TOPROL-XL) 25 MG 24 hr tablet  Multiple Vitamins-Minerals (WOMENS MULTI PO)  ondansetron (ZOFRAN) 4 MG tablet  polyethylene glycol (MIRALAX) 17 GM/Dose powder  pregabalin (LYRICA) 150 MG capsule          Review of Systems  Gen: No fevers, no unintentional weight changes  Head and neck: No headache, no neck pain  Eye: No eye pain, no vision changes  Respiratory: No shortness of breath, no cough  Cardiovascular: No chest pain, no palpitations  Abdominal: No vomiting, no constipation, no diarrhea  Urinary: No dysuria, no hematuria  Musculoskeletal: No joint redness, no trauma  Neurologic: No confusion, no weakness, no sensation changes  Psychiatric: No suicidal ideation, no homicidal ideation    Physical Exam   BP: (!) 145/68  Pulse: 93  Temp: 98.2  F (36.8  C)  Resp: 18  Weight: 60.3 kg (133 lb)  SpO2: 92 %      Physical Exam  General: Alert, awake, no distress  Head: Normocephalic, atraumatic  Eyes: Grossly intact extraocular movements, conjunctiva clear, pupils equal   Mouth: Mucous membranes moist  Neck: Supple, grossly full  range of motion, no observable masses  Respiratory: No distress, speaks in full sentences, clear to auscultation bilaterally  Cardiac: S1 and S2 cardiac sounds appreciated, normal rate, no edema  Abdominal: Soft, not distended, no tenderness appreciated  Neurologic: Oriented x 4, cranial nerves II through XII grossly intact, cerebellar signs are intact, normal level of consciousness, speech is clear and appropriate, chronic complete lack of sensation and motor function of the bilateral lower extremities, intact strength and sensation of the bilateral upper extremities, NIH 0  Skin: No gross rashes or lesions  Psych: Normal mood and appropriate for situation        ED Course        Procedures                  Results for orders placed or performed during the hospital encounter of 11/10/24 (from the past 24 hours)   Glucose by meter   Result Value Ref Range    GLUCOSE BY METER POCT 131 (H) 70 - 99 mg/dL   CBC with Platelets & Differential    Narrative    The following orders were created for panel order CBC with Platelets & Differential.  Procedure                               Abnormality         Status                     ---------                               -----------         ------                     CBC with platelets and d...[[id number]]  Abnormal            Final result                 Please view results for these tests on the individual orders.   Basic metabolic panel   Result Value Ref Range    Sodium 142 135 - 145 mmol/L    Potassium 3.6 3.4 - 5.3 mmol/L    Chloride 103 98 - 107 mmol/L    Carbon Dioxide (CO2) 27 22 - 29 mmol/L    Anion Gap 12 7 - 15 mmol/L    Urea Nitrogen 16.5 8.0 - 23.0 mg/dL    Creatinine 0.77 0.51 - 0.95 mg/dL    GFR Estimate 80 >60 mL/min/1.73m2    Calcium 8.9 8.8 - 10.4 mg/dL    Glucose 142 (H) 70 - 99 mg/dL   INR   Result Value Ref Range    INR 0.99 0.85 - 1.15   Partial thromboplastin time   Result Value Ref Range    aPTT 26 22 - 38 Seconds   Troponin T, High Sensitivity    Result Value Ref Range    Troponin T, High Sensitivity 40 (H) <=14 ng/L   CBC with platelets and differential   Result Value Ref Range    WBC Count 7.3 4.0 - 11.0 10e3/uL    RBC Count 3.72 (L) 3.80 - 5.20 10e6/uL    Hemoglobin 11.9 11.7 - 15.7 g/dL    Hematocrit 35.9 35.0 - 47.0 %    MCV 97 78 - 100 fL    MCH 32.0 26.5 - 33.0 pg    MCHC 33.1 31.5 - 36.5 g/dL    RDW 13.8 10.0 - 15.0 %    Platelet Count 180 150 - 450 10e3/uL    % Neutrophils 63 %    % Lymphocytes 26 %    % Monocytes 8 %    % Eosinophils 3 %    % Basophils 1 %    % Immature Granulocytes 0 %    NRBCs per 100 WBC 0 <1 /100    Absolute Neutrophils 4.5 1.6 - 8.3 10e3/uL    Absolute Lymphocytes 1.9 0.8 - 5.3 10e3/uL    Absolute Monocytes 0.6 0.0 - 1.3 10e3/uL    Absolute Eosinophils 0.2 0.0 - 0.7 10e3/uL    Absolute Basophils 0.1 0.0 - 0.2 10e3/uL    Absolute Immature Granulocytes 0.0 <=0.4 10e3/uL    Absolute NRBCs 0.0 10e3/uL   CT Head w/o Contrast    Narrative    EXAM: CT HEAD W/O CONTRAST  LOCATION: [facility]  DATE: 11/10/2024    INDICATION: Code Stroke to evaluate for potential thrombolysis and thrombectomy. PLEASE READ IMMEDIATELY.  COMPARISON: September 11, 2023  TECHNIQUE: Routine CT Head without IV contrast. Multiplanar reformats. Dose reduction techniques were used.    FINDINGS:  INTRACRANIAL CONTENTS: No intracranial hemorrhage, extraaxial collection, or mass effect.  No CT evidence of acute infarct. Remote infarct at the left parietal occipital junction. Remote lacunar infarct left basal ganglia. Mild presumed chronic small   vessel ischemic changes. Mild generalized volume loss. No hydrocephalus.     VISUALIZED ORBITS/SINUSES/MASTOIDS: Prior bilateral cataract surgery. Visualized portions of the orbits are otherwise unremarkable. No paranasal sinus mucosal disease. No middle ear or mastoid effusion.    BONES/SOFT TISSUES: No acute abnormality.      Impression    IMPRESSION:  1.  No acute intracranial  process.  2.  Stable chronic changes as above.   CTA Head Neck with Contrast    Narrative    EXAM: CTA HEAD NECK W CONTRAST  LOCATION: [facility]  DATE: 11/10/2024    INDICATION: Neurological deficit  COMPARISON: None.  CONTRAST: 67 mL Isovue 370  TECHNIQUE: Head and neck CT angiogram with IV contrast. Axial helical CT images of the head and neck vessels obtained during the arterial phase of intravenous contrast administration. Axial 2D reconstructed images and multiplanar 3D MIP reconstructed   images of the head and neck vessels were performed by the technologist. Dose reduction techniques were used. All stenosis measurements made according to NASCET criteria unless otherwise specified.    FINDINGS:   HEAD CTA:  ANTERIOR CIRCULATION: No stenosis/occlusion, aneurysm, or high flow vascular malformation. Standard [place] anatomy.    POSTERIOR CIRCULATION: No stenosis/occlusion, aneurysm, or high flow vascular malformation. Dominant left and smaller right vertebral artery contribute to a normal basilar artery.     DURAL VENOUS SINUSES: Expected enhancement of the major dural venous sinuses.    NECK CTA:  RIGHT CAROTID: No measurable stenosis or dissection.    LEFT CAROTID: No measurable stenosis or dissection.    VERTEBRAL ARTERIES: No focal stenosis or dissection. Dominant left and smaller right vertebral arteries.    AORTIC ARCH: Classic aortic arch anatomy with no significant stenosis at the origin of the great vessels.    NONVASCULAR STRUCTURES: Unremarkable.      Impression    IMPRESSION:     HEAD CTA:   1.  Normal CTA [place].    NECK CTA:  1.  Normal neck CTA.           Medications   calcium carbonate (TUMS) chewable tablet 1,000 mg (has no administration in time range)   iopamidol (ISOVUE-370) solution 67 mL (67 mLs Intravenous $Given 11/10/24 1927)     And   sodium chloride 0.9 % bag for CT scan flush use (65 mLs As instructed $Given 11/10/24 1927)    methylPREDNISolone Na Suc (solu-MEDROL) injection 125 mg (125 mg Intravenous $Given 11/10/24 1909)   diphenhydrAMINE (BENADRYL) injection 25 mg (25 mg Intravenous $Given 11/10/24 1909)   aspirin (ASA) chewable tablet 81 mg (81 mg Oral $Given 11/10/24 1944)   clopidogrel (PLAVIX) tablet 300 mg (300 mg Oral $Given 11/10/24 1944)       Assessments & Plan (with Medical Decision Making)     Blood pressure 145/68, rest of vitals are reassuring.  Patient has an NIH score upon arrival of 0.  Code stroke was activated out of triage.  Blood sugar checked and noted to be reassuring.  She does not qualify for thrombolytic therapy upon arrival.    CT head does not show any acute abnormalities, previous nonemergent findings reviewed.  Discussed this case with stroke neurology, loaded the patient with aspirin and Plavix.  Imaging of the vasculature does not show any acute embolic or thrombotic etiologies or findings indicating surgical intervention.  She remains with a NIH scale of 0.  Mild elevation of troponin however this is baseline for her.Rest of blood work is reassuring.  We did perform an EKG that was interpreted by myself that shows a sinus rhythm with a rate of 70, reassuring intervals, no gross ischemic changes.  History and physical and workup most consistent with TIA.  Patient will be admitted to the hospitalist team for further more complete TIA workup.    Discussed todays ER visit and current agreed upon treatment plan. Education provided and all questions answered. Instructed to follow up with pcp to discuss ER visit, make sure they are doing well, and to follow up on any incidental findings. Encouraged to come back to the ER for re evaluation if worsening or any other concerns.     I have reviewed the nursing notes.    I have reviewed the findings, diagnosis, plan and need for follow up with the patient.          New Prescriptions    No medications on file       Final diagnoses:   TIA (transient ischemic attack)        11/10/2024   [facility] EMERGENCY DEPT       Jose Watson MD  11/10/24 2035

## 2024-11-11 NOTE — CONSULTS
"  [facility]    Stroke Telephone Note    I was called by Jose Watson on 11/10/24 regarding patient Anayeli Gagnon. The patient is a 73 year old female with past medical history of ischemic stroke in the left occipital lobe with encephalomalacia presented with 3 episodes of transient right facial droop and speech difficulty that lasted less than 2 minutes, the first episode started 3 days ago and had 1 episode each day and last episode was today around 5 PM so came into the ED for stroke/TIA evaluation.    Vitals  BP: (!) 145/68   Pulse: 93   Resp: 18   Temp: 98.2  F (36.8  C)   Weight: 60.3 kg (133 lb)    Imaging Findings  CT head: Left occipital encephalomalacia, otherwise no acute finding    Impression  3 episodes of intermittent speech difficulty and right facial droop (leading to water coming out of the right side of the mouth), that lasted less than 2 minutes, likely TIA    Recommendations  Due to contrast allergy the patient has difficulty getting CT angiography of head and neck however if the patient can get premedicated and obtain CT angiography of head and neck would be great, if not obtain MR angiography of the brain without contrast, and MR angiography of the neck with and without contrast  Obtain MRI of the brain with and without contrast  Admit the patient to the hospital for stroke/TIA workup  Load with aspirin 325 mg and Plavix 300 mg  Place inpatient stroke neurology consult    My recommendations are based on the information provided over the phone by Anayeli Gagnon's in-person providers. They are not intended to replace the clinical judgment of her in-person providers. I was not requested to personally see or examine the patient at this time.     The Stroke Staff is Dr. Valenzuela.    Cassandra Cavazos MD  Vascular Neurology Fellow    To page me or covering stroke neurology team member, click here: [email or web address]  Choose \"On Call\" tab at top, then select \"NEUROLOGY/ALL " "SITES\" from middle drop-down box, press Enter, then look for \"stroke\" or \"telestroke\" for your site.    "

## 2024-11-11 NOTE — CONSULTS
Reason For Visit: Anaylei Gagnon, 73 year old female, seen for a WOC consultation to evaluate and treat left ankle. Patient was admitted on 11/10/24 for TIA concerns.  Pt's nurse Grant SONI, Dr Chico BATES and LSW Corazon BRITO were present separately during brief portions of my visit today with patient in the room.  Patient is A&Ox4, pleasant upon interaction.     Start of Care in [facility]omy services: 11/11/2024   Referring Provider: Dr Ernie Golden MD dated 11/11/24 as inpatient woc nurse consult.  Primary Care Provider: Jazmin Interiano   Wound Location:   - Right dorsal great toe  - Left lateral ankle  - Left lateral distal foot     Reason for Visit:  Evaluate and treat left lateral ankle wound, and small abrasion sites of concern to the left lateral distal foot and right dorsal great toe.     Subjective:  On arrival to the pt's hospital room today for my initial woc nurse consult, she adds to the Wound History below.      Wound History: Pt presented to the ED here at [facility] with her daughter yesterday 11/10 for concerns of potential CVA.  Admitting nurse took pictures of wounds to the Left lateral distal foot, Left lateral ankle and Right great toe dorsal aspect.    [facility] did chart review and found progress notes from [place] [facility] starting 3/6/24 with Dr Zackary Sharp MD for an unstageable pressure injury to the Left lateral ankle.  Per the 3/6 initial appointment - Annamarie presents to wound clinic for evaluation of Left lateral ankle ulcer; duration 5 to 6 months.  Right second toe ulcer; recent onset.  History of paraplegia 4 to 5 years.  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  Has had previous sacral coccygeal ulcers with reconstruction, flap.  Has SPC and ostomy diversion.  Not ambulatory ; wheelchair only.  No current sacrococcygeal ulcerations.     Pt was making follow up visits in [place] for wound cares approximately weekly, last being  10/17/24.  Per the 1/17 follow up MD Progress note - The patient returns to wound clinic for evaluation of her left ankle ulcer. The patient actually had an ablation procedure done by Dr. Nevarez yesterday. they placed her in an Ace bandage wrap from the toes to the thigh. The patient believes the procedure went well. She does not think that she has any follow-up appointments with them. The patient has not had a fever and has no new ulcer related complaints. Ulcer measurements as noted below and/or as in the nursing notes and flowsheets. Ulcer on the lateral left ankle appears somewhat smaller than in the past. It continues to have epibole. The base is granulated. There is no sign of infection. Wound Length 0.5 cm , Width  0.5 cm, Depth 0.4 cm.   Procedure: I treated the epibole with silver nitrate. Treatment of epibole with silver nitrate is medically necessary for appropriate wound healing.  Dressings were applied by the RN. Continue with Hydrofera Blue and a bordered foam dressing with felt padding. Ace bandage reapplied per the RN.  Nonpressure ulcer of the left ankle with a fat layer exposed related to venous insufficiency. Recent vein ablation.  Wound care MARIA TERESA in March, 2024 was 1.04 on the right and 0.98 on the left. Plain film x-ray of the left ankle was negative for osteomyelitis in April, 2024. May, 2024 arterial ultrasound shows no flow in the left peroneal artery, referred to vascular. Venous insufficiency ultrasound shows insufficiency of the right great and small saphenous veins as well as the left great and small saphenous vein as well as the posterior accessory saphenous vein. There are also insufficient tributaries. Ablation procedures performed October 16, 2024.  Plan Dressing change every 2 to 3 days with compression as per Dr. Nevarez. Return to the wound clinic in about a week for dressing change and physician reevaluation.   Per pt at my initial inpatient consult visit today 11/11, add' the  following to the wound history.  Confirms the left lateral ankle wound has been present for many months, since around the end of 2023.  She was making weekly to every other week visits to the Wound clinic at [facility] to see provider and wo nurse.  She had regular debriding of the left ankle wound in clinic.  Topically per pt and her chart review, primary dressings in use have included Hydrofera Blue Ready, Thera honey, Exufiber Ag and Iodoflex.  She feels the current Hydrofera and the Iodoflex seem to cause increased pain.  She also wears a foam off loading boot on the left foot at bed time as she tends to lay directly on the left lateral ankle wound when sleeping and does not reposition.  She is able to reposition herself when awake.  She has home care with [facility], they come to her current residence at [facility] here in [place] twice weekly and she would make once weekly visits to the [place] wound clinic.   Note during chart review the left lateral ankle wound was listed as an unstagable pressure injury until recently when pt underwent a venous ablation   procedure to the LE's, after that was referred to as a venous ulcer.    Pt also reports concerning Right great toe, has had the small abrasion for a few weeks.  The left lateral distal foot small scab she was not aware was present till noted here at [facility].      Side note - Pt has a Colostomy, discussed with her at initial [facility] nurse inpatient consult today 11/11.  She reports no problems with her ostomy appliance and will seek [facility] nurse assessment or assist with products while inpatient.      Past Medical History:  Patient Active Problem List   Diagnosis    Other specified cardiac dysrhythmias(427.89)    CARDIOVASCULAR SCREENING; LDL GOAL LESS THAN 160    History of skin cancer    Headache    Menopause    Anxiety reaction    Osteoarthritis    Acute incomplete paraplegia (H)    Bilateral lower extremity edema    Chronic bilateral low back  pain with bilateral sciatica    Edema of spinal cord (H)    History of spinal surgery    Hypertension    Incomplete emptying of bladder    Incomplete injury of thoracic spinal cord in T1 to T6 region without bony injury (H)    Leg weakness, bilateral    Muscle spasticity    MVP (mitral valve prolapse)    Palpitations    Pressure ulcer of coccygeal region, stage 3 (H)    Spinal cord injury at T7-T12 level (H)    Pure hypercholesterolemia    Ulnar neuropathy at elbow, left    Nocturnal enuresis    Urge incontinence of urine    Arteriovenous fistula of spinal cord vessels    Dural arteriovenous fistula    Spinal cord injury of thoracic region without bone injury, subsequent encounter (H)    Thoracolumbar back pain    Neuropathic pain    Intractable neuropathic pain of lumbosacral origin    Cerebrovascular accident (CVA), unspecified mechanism (H)    Depressive disorder    Right homonymous hemianopsia    TIA (transient ischemic attack)      Tobacco Use:     Tobacco Use      Smoking status: Never        Passive exposure: Never      Smokeless tobacco: Never     Diabetic: No  HgbA1C:   Hemoglobin A1C   Date Value Ref Range Status   11/11/2024 5.4 <5.7 % Final     Comment:     Normal <5.7%   Prediabetes 5.7-6.4%    Diabetes 6.5% or higher     Note: Adopted from ADA consensus guidelines.   11/17/2011 5.3 4.3 - 6.0 % Final   Checks Blood Glucose?:  NA     Personal/social history:  Pt lives at [facility] here in [place].  She has home care services twice weekly with [facility].    Objective:   Current treatment plan: For the Left lateral ankle wound, as of last visit to [facility] MD order 10/17/24 - Continue with Hydrofera Blue and a bordered foam dressing with felt padding. Ace bandage. Changed every 2 - 3 days.  While inpatient here at [facility] - Covered with Mepilex gentle adhesive foam dressing.    Left lateral distal foot and Right Dorsal great toe - RICHARD  Last changed: Yesterday at time  of admission    Wound #1 Right dorsal great toe.  Appears to be an abrasion, pt notes no other reported diagnosis.  Stage/tissue depth: partial thickness  0.3 cm L x 0.2 cm W x 0 cm D  Tunneling: none  Undermining: none  Wound bed type/amount: area measures is 100 % covered with thin scab of well adhered drainage; Not fluctuant  Wound Edges: NA covered with scab  Periwound: dry skin and pale blanchable erythema 1.3 cm L x 1.1 cm W  Drainage: none noted  Odor: no  Pain: denies pain at the site today  Photo from today's initial [facility] nurse inpatient consult 11/11/24.    Photo from day of admission to [facility] 11/10/24.    Wound #2 Left lateral ankle, pressure ulcer vs venous stasis ulcer, see Assessment below.  Stage/tissue depth: full thickness, if pressure would be considered unstageable  0.9 cm L x 0.7 cm W x 0.5 cm D  Tunneling: none noted  Undermining: none noted  Wound bed type/amount: approximately 60 % yellow white slough semi dry and 40 % dry red tissue with no granulation present; Not fluctuant  Wound Edges: open at this time, recent history of epibole and need for silver nitrate application by provider in clinic  Periwound: dry skin, blanchable erythema with no increased warmth to touch  Drainage: small amount serous during cares  Odor: no  Pain: mild pain, pain is difficult to assess per pt, hurts but can be a generalized pain that does not present directly     Photo's from today's initial [facility] nurse inpatient consult 11/11/24.    Photo from day of admission to [facility] 11/10/24.    Wound #3 Left lateral distal foot, unclear etiology, appears small reabsorbing blood blister, per pt she was not aware it was present till see at time of admission by Med/Surg nurse.  Stage/tissue depth: appears partial thickness  0.2 cm L x 0.2 cm W x 0 cm D  Tunneling: none  Undermining: none  Wound bed type/amount: 100 % flat scab; Not fluctuant  Wound Edges: NA  Periwound: edema,  Drainage: none  Odor: no  Pain: denies any  pain at this site    Photo's from today's initial [facility] nurse inpatient consult 11/11/24.    Photo from day of admission to [facility] 11/10/24.    Dorsalis Pedal Pulse: is palpable: NA doppler: NA phasic  Posterior Tibial Pulse: is difficult to locate but is palpable: NA doppler: NA phasic  Hair growth: diminished knee distally  Capillary Refill: less than 3 seconds  Feet/toes color: pale pink  Nails: wnl  R Leg: Edema plus 2 pitting. Ankle circumference NA cm. Calf circumference NA cm.  L Leg: Edema plus 2 pitting. Ankle circumference NA cm. Calf circumference NA cm.    Mobility: wheelchair bound, shawna lift, can do some repositioning when in bed.  Current offloading/footwear: pt at home wears off loading foam boot at bed time on the left foot.  Sensation: altered due to spinal cord injury    Other callusing/areas of concern: None noted    Diet: Regular    Goals of Wound Healing: Left lateral ankle  - Transition from full thickness wound with nonviable tissue present to granulating stage of healing  - Maintain moist environment, with saline dampened Mesalt gauze  - Control exudate, with saline dampened Mesalt gauze and Mepilex gentle adhesive foam dressing.  - Autolytic debridement of sloughy tissue, with saline dampened Mesalt gauze  - Protect wound from outside environment, with saline dampened Mesalt gauze and Mepilex gentle adhesive foam dressing,  - Antimicrobial, with saline dampened Mesalt gauze  - Pack open space, with saline dampened Mesalt gauze  - Promote healing by secondary intention for complete closure of wound, , with saline dampened Mesalt gauze  - Offloading/pressure reduction, will request Prevalon off loading boot for while in bed at bed time    Assessment:  On arrival the pt has the right great toe and left lateral distal foot open to air, dry and clean.  The left lateral ankle is covered with Mepilex gentle adhesive foam dressing, removed and scant serosanguinous drainage noted staining the  inner foam, dry.  All wound sites were cleansed with saline and dried well.  Right dorsal great toe has small scabby abrasion present with small amount of periwound pale blanchable erythema, no signs of infection.  Left lateral ankle wound has the appearance of an arterial ulcer or pressure injury.   Her most recent MARIA TERESA's indicated adequate arterial blood flow.  Most recent records from [facility] describe this wound now as a venous ulcer but it does not appear it has improved since ablation surgery.  Wound bed is too dry.  Reviewed products used per [facility] wound clinic charting and will try an alternative while hospitalized since daily changes are possible while inpatient.  Left lateral distal foot has dry flat discoloration consistent with petechia or little blood blister, no periwound localized edema or erythema of note     Barriers to wound healing:   Poor nutrition: inadequate supply of protein, carbohydrates, fatty acids, and trace elements essential for all phases of wound healing.  Pt reports very poor appetite, stress in her life and pain has limited her oral intake.    Reduced Blood Supply: inadequate perfusion to heal wound. Past MARIA TERESA information in Wound History above from outside facility, arterial flow appeared wnl.  Medication: NA  Chemotherapy: suppresses the immune system and inflammatory response, NA  Radiotherapy: increases production of free radical which damage cells, NA  Psychological stress: Related to chronic and progressive illness and family issues  Obesity: decreases tissue perfusion  Infection: prolongs inflammatory phase, uses vital nutrients, impairs epithelialization and releases toxins, none noted at the wound sites currently, did start new antimicrobial dressing on Left lateral ankle wound today.  Underlying Disease: LE edema, LE paralysis, venous flow issues s/p venous ablation   Maceration: reduces wound tensile strength and inhibits epithelial migration, none noted  Patient  compliance appears motivated to heal  Unrelieved pressure, addressing with Prevalon off loading boot to be worn at bed time, same as she does at home.  Immobility, pt has LE paralysis  Substance abuse: NA    Plan:  Will start the use of Mesalt gauze, dampened with saline, cut to fit to fill Left Lateral Ankle wound, to promote debriding and reduce any bio load.  Will recommend daily wound cares to the Left lateral ankle to see if we can spur some healing prior to discharge.  Also obtained a Prevalon boot for the pt to use in hospital to the left foot when sleeping.  The left lateral distal foot and Right dorsal great toe wounds can be left open to air, will monitor at each assessment for changes.    Interventions to Barriers:  As listed below    Topical care to Right dorsal great toe and Left lateral distal foot: Cleanse with Microklenz and gauze, pat dry. Leave sites open to air, monitor daily.  Topical care to Left lateral ankle: Cleanse with Microklenz and gauze, pat dry. Fill wound base with saline dampened Mesalt gauze cut to fit to fill wound bed. Cover/Secure with Mepilex gentle adhesive foam dressing, change daily.  Pt to wear Prevalon boot while sleeping to left foot/lower leg    Offloading: Elevate heels, use of Prevalon boot  Additional recommendations:  \\Cares provided as listed above.    Discussed plan of care with patient and her nurse Grant RN. Teaching done with patient and her nurse Grant RN for dressing changes; pt is unable, will have home care and nursing assist.  Discharge instructions updated to include:  If pt chooses to change to [facility]ound [facility] Ostomy clinic we will be able to see her, we would have Dr Mcfadden consult with pt for provider to manage left lateral ankle wound.  She will discuss with family.      Supplies:  Left in pt's room today extra Mesalt gauze pads (in zip locked baggie), remains of the wound cleanser opened today, all other supplies will be hospital  stock    WOC Return visit: Tomorrow woc will follow up with 9 am appointment  Nursing to notify Provider and WOC Nurse if wound deteriorates.    Verbal, written, & demonstrative education provided.  Face to face time (excluding procedure): approximately 40 minutes.  Procedure: NA  Care plan was changed for the Left lateral ankle wound.    Lul Pena RN cwocn  [phone]

## 2024-11-11 NOTE — CONSULTS
[facility]    Stroke Consult Note    Reason for Consult: Possible stroke    Chief Complaint: Slurred Speech       HPI  Anayeli Gagnon is a 73 year old female with pertinent past medical history of prior CVA (left occipital lobe), prior spinal cord hematomas with chronic lower extremity paralysis (wheelchair-bound), lives at [facility], HTN, peripheral neuropathy.  On PTA  mg and atorvastatin 40 mg daily.    She presented to the [facility] emergency department 11/10/2024 with 3 episodes (once daily x 3 days) of transient right facial droop and speech difficulty x <2 minutes. BP on presentation 152/63. CT/CTA negative for acute pathology.  She was started on DAPT with ASA/Plavix and admitted for stroke versus TIA workup.      She reports that 3 days in a row she had an approximate 1 minute episode of right facial droop.  On one of the occasions she had been speaking with her daughter and daughter also noticed that there was slowness to her speech as though she could not get her words out.  Patient lives at [facility] states that for most of the day she doesn't have too many people to speak with so is not sure if speech changes may have lasted longer or occurred on the other episodes.  She denies any shaking or tremors.  No loss of consciousness or memory lapse.  She uses a catheter at baseline.  Has not noticed any sores to her tongue to indicate that she would have bitten it.  She denies any associated arm weakness or room spinning sensation.  All she noticed while she was drinking water was that it was dripping out of the side of her mouth.  She did eat and drink at other times during those 3 days and did not had issues other than during these episodes.    She denies any history of smoking.  Occasionally will drink a glass of red wine or have a wine cooler.  Denies any nonprescription medications/drug use/herbal supplements.  She has never been told in the past that she snores,  denies any excessive daytime sleepiness however sometimes after she uses her pain pump she will have some drowsiness.  She denies fevers or night sweats.  She has lost some weight but she states this is due to normally eating 1 meal at the [facility] as she is adjusting to the different meals there and has felt down with family issues as her  indicated he could no longer care for her any longer on his own.  Family history significant for her father who  of a heart attack at 41 years old however he was a heavy smoker.    TIA Evaluation Summarized    MRI and/or Head CT MRI: pending  CT head: Negative for acute pathology, chronic left occipital lobe encephalomalacia   Intracranial Vasculature CTA head: Normal   Cervical Vasculature CTA neck: Normal     Echocardiogram TTE:pending   EKG/Telemetry Sinus Rhythm   -Nonspecific T-abnormality.   Other Testing Not Applicable      LDL 11/10/2024: 41 mg/dL   A1C 2024: 5.4 %       ABCD2 Patients Score   Age >= 60 years 1 point 1   Blood Pressure    SBP >= 140 or DBP >=  90    1 point 1   Clinical Features    - Unilateral weakness    - Speech disturbance w/o weakness    - Other    2 points  1 point    0 points 2   Duration of symptoms    >= 60 minutes    10-59 minutes    < 10 minutes   2 points  1 point  0 points 0   Diabetes  1 point 0   Patient s ABCD2 Score (0-7) = 4       Impression/Recommendations   3 episodes (once daily x 3 days) of transient right facial droop and speech difficulty x <2 minutes, MRI pending, would have lower suspicion of TIA given the recurrent episodes despite no significant associated vessel pathology  Chronic L occipital stroke  -Neuro checks and vitals every 4 hours  -Continue PTA  mg daily  -no need for further plavix   -telemetry, based on MRI will consider 14 day Zio Patch to be mailed out at discharge if no atrial fibrillation found on telemetry (not yet ordered)  -Await MRI, if negative for stroke then would suspect less likely  "TIA and recommend starting Keppra 500 mg twice daily then outpatient general neurology follow-up  -Await TTE  -PT/OT/SPT, Dysphagia screen  -Euthermia, euglycemia, eunatremia  -Stroke Education, reviewed [facility] stroke symptoms     3. Hypertension  -appreciate management per primary team  -Inpatient SBP goal <180   -Outpatient BP goal <130/80, with tighter control if tolerated  -Recommend home blood pressure monitoring twice daily in AM and PM, keep log and bring to medical visits    4. Hyperlipidemia, well-controlled  -Continue PTA atorvastatin 40 mg daily  -Recommend LDL goal 40-70, <40 increases risk of intracranial hemorrhage  -Recommend Mediterranean diet     5. Prediabetes  -appreciate management per primary team  -blood glucose monitoring, long term A1c goal <7%      Discussed with vascular neurology attending, Dr. Castellanos    Patient Follow-up    - final recommendation pending work-up    Thank you for this consult.  We will follow peripherally for results of MRI/TTE before making further recommendations.    Jodie Lerma PA-C  Vascular Neurology    To page me or covering stroke neurology team member, click here: [facility]  Choose \"On Call\" tab at top, then select \"NEUROLOGY/ALL SITES\" from middle drop-down box, press Enter, then look for \"stroke\" or \"telestroke\" for your site.  _____________________________________________________    Clinically Significant Risk Factors Present on Admission                 # Drug Induced Platelet Defect: home medication list includes an antiplatelet medication   # Hypertension: Noted on problem list                         Past Medical History    Past Medical History:   Diagnosis Date    CARDIAC DYSRHYTHMIAS NEC 10/03/2007    Taking atenelol for years for this    Cerebrovascular accident (H) 05/05/2023    History of skin cancer     Mitral valve prolapse     Neurogenic bladder     Sacral decubitus ulcer, stage IV (H)     Thoracic spinal cord injury (H)     TIA (transient ischemic " attack) 04/26/2023     Medications   Home Meds  Prior to Admission medications    Medication Sig Start Date End Date Taking? Authorizing Provider   aspirin (ASA) 325 MG EC tablet Take 1 tablet (325 mg) by mouth daily 1/8/24  Yes Waqas Tyson MD   atorvastatin (LIPITOR) 40 MG tablet Take 1 tablet (40 mg) by mouth every evening 5/7/23  Yes Tosin Augustine MD   busPIRone (BUSPAR) 10 MG tablet Take 15 mg by mouth 2 times daily. 9/9/24 9/9/25 Yes Reported, Patient   gabapentin (NEURONTIN) 600 MG tablet Take 1,200 mg by mouth 3 times daily  3/4/21  Yes Luis Orourke MD   melatonin 3 MG tablet Take 3 mg by mouth nightly as needed for sleep   Yes Reported, Patient   metoprolol succinate ER (TOPROL-XL) 25 MG 24 hr tablet Take 12.5 mg by mouth every evening    Yes Unknown, Entered By History   Multiple Vitamins-Minerals (WOMENS MULTI PO) Take 1 tablet by mouth daily   Yes Unknown, Entered By History   acetaminophen (TYLENOL) 500 MG tablet Take 500-1,000 mg by mouth every 6 hours as needed for mild pain    Unknown, Entered By History   baclofen (LIORESAL) 10 MG tablet Take 1.5 tablets (15 mg) by mouth 3 times daily 7/25/24   Bruce Garcia MD   COMPOUND CONTAINING CONTROLLED SUBSTANCE (CMPD RX) - PHARMACY TO MIX COMPOUNDED MEDICATION Intrathecal Pump- 20 mL syringe    Concentrations:  Dilaudid  14 mg/ml                                                          Bupivacaine 28 mg/ml  Ziconitide 14 mcg/ml     Total Volume in Refill: 20 mL    Basal:   Dilaudid  7 mg/day                                                       Bupivacaine 14 mg/day  Ziconitide 7 mcg/day     PTM 0.2 hydromorphone, 2 hour lockout, maximum 6 in a day. 5/31/24   Santa Wallace APRN CNP   lidocaine (LIDODERM) 5 % patch Apply 1 patch topically daily as needed  9/29/21   Reported, Patient   LORazepam (ATIVAN) 0.5 MG tablet Take 1 tablet by mouth daily as needed for anxiety 9/16/22   Reported, Patient   medication given by implanted intrathecal pump  continuous Concentrations:  Dilaudid    14 mg/ml                                                          Bupivacaine 28 mg/ml  Ziconotide 14 mcg/ml     Total Volume in Refill: 20 mL      Basal:   Dilaudid   6.993 mg/day                                                       Bupivacaine 13.986 mg/day  Ziconotide 6.993 mcg/day     PTM 0.2 mg hydromorphone, 0.4 mg bupivacaine, 0.2 mcg ziconotide, 2 hour lockout, maximum 6 in a day (2 hr lockout)     Maximum 24 hour dose  Hydromorphone 8.134 mg/day  Bupivacaine 16.267 mg/day  Ziconitide 8.134 mcg/day     Last filled on 10/26/23 and current alarm date is: 11/25/23    Managed by [facility] ([phone]) - refills approximately every 3 weeks  Provider. Dr. Maryam Espinoza    Unknown, Entered By History   ondansetron (ZOFRAN) 4 MG tablet Take 1 tablet (4 mg) by mouth every 6 hours as needed for nausea 9/27/22   Daniel Last MD   polyethylene glycol (MIRALAX) 17 GM/Dose powder Take 17 g by mouth daily as needed for constipation     Reported, Patient   pregabalin (LYRICA) 150 MG capsule Take 1 capsule (150 mg) by mouth 3 times daily Stop gabapentin. 6/4/24   Santa Wallace APRN CNP       Scheduled Meds  Current Facility-Administered Medications   Medication Dose Route Frequency Provider Last Rate Last Admin    aspirin EC tablet 81 mg  81 mg Oral Daily Kulwinder Capellan MD   81 mg at 11/11/24 0917    Or    aspirin (ASA) chewable tablet 81 mg  81 mg Oral or NG Tube Daily Kulwinder Capellan MD        atorvastatin (LIPITOR) tablet 40 mg  40 mg Oral QPM Kulwinder Capellan MD   40 mg at 11/10/24 2253    baclofen (LIORESAL) tablet 10 mg  10 mg Oral TID Kulwinder Capellan MD   10 mg at 11/11/24 0917    busPIRone (BUSPAR) tablet 15 mg  15 mg Oral BID Kulwinder Capellan MD   15 mg at 11/11/24 0917    famotidine (PEPCID) injection 20 mg  20 mg Intravenous BID Kulwinder Capellan MD        Or    famotidine (PEPCID) tablet 20 mg  20 mg Oral or NG Tube BID Timi  MD Kulwinder   20 mg at 11/11/24 0917    gabapentin (NEURONTIN) capsule 1,200 mg  1,200 mg Oral TID Kulwinder Capellan MD   1,200 mg at 11/11/24 0916    metoprolol succinate ER (TOPROL-XL) 24 hr half-tab 12.5 mg  12.5 mg Oral QPM Kulwinder Capellan MD        multivitamin w/minerals (THERA-VIT-M) tablet 1 tablet  1 tablet Oral Daily Kulwinder Capellan MD   1 tablet at 11/11/24 0917    sodium chloride (PF) 0.9% PF flush 3 mL  3 mL Intracatheter Q8H Kulwinder Capellan MD   3 mL at 11/11/24 0916       Infusion Meds  Current Facility-Administered Medications   Medication Dose Route Frequency Provider Last Rate Last Admin    medication given by implanted intrathecal pump   Does not apply Continuous PRN Ernie Golden MD           Allergies   Allergies   Allergen Reactions    Latex     Contrast Dye Rash     Painful rash after x-ray contrast    Nitrofurantoin Nausea and Vomiting          PHYSICAL EXAMINATION   Temp:  [97.3  F (36.3  C)-98.2  F (36.8  C)] 98  F (36.7  C)  Pulse:  [68-93] 79  Resp:  [2-20] 20  BP: (129-172)/() 143/68  SpO2:  [92 %-100 %] 97 %    General Exam  General:  patient lying in bed without any acute distress    HEENT:  normocephalic/atraumatic  Pulmonary:  no respiratory distress      Neuro Exam  Mental Status:  alert, oriented x 3, follows commands, speech clear and fluent, naming and repetition normal  Cranial Nerves: Baseline right complete homonymous hemianopsia from prior stroke (tested by nurse), EOMI with normal smooth pursuit, facial sensation intact and symmetric (tested by nurse), facial movements symmetric, hearing not formally tested but intact to conversation, no dysarthria, tongue protrusion midline  Motor:   No movement to bilateral lower extremities at baseline, no drift to bilateral upper extremities (symmetric  strength per RN)  Reflexes:  unable to test (telestroke)  Sensory:  no extinction on double simultaneous stimulation (assessed by nurse), severe  "decrease sensation to bilateral lower extremities, no decrease sensation to bilateral face or arms  Coordination: No dysmetria with finger-to-nose testing, unable to perform heel-to-shin testing due to baseline weakness   Station/Gait:  unable to test due to telestroke    Stroke Scales    NIHSS  1a. Level of Consciousness 0-->Alert, keenly responsive   1b. LOC Questions 0-->Answers both questions correctly   1c. LOC Commands 0-->Performs both tasks correctly   2.   Best Gaze 0-->Normal   3.   Visual (S) 2-->Complete hemianopia (R)   4.   Facial Palsy 0-->Normal symmetrical movements   5a. Motor Arm, Left 0-->No drift, limb holds 90 (or 45) degrees for full 10 secs   5b. Motor Arm, Right 0-->No drift, limb holds 90 (or 45) degrees for full 10 secs   6a. Motor Leg, Left 4-->No movement   6b. Motor Leg, right 4-->No movement   7.   Limb Ataxia 0-->Absent   8.   Sensory (S) 2-->Severe to total sensory loss, patient is not aware of being touched in the face, arm, and leg (baseline BLE weakness/sensory loss)   9.   Best Language 0-->No aphasia, normal   10. Dysarthria 0-->Normal   11. Extinction and Inattention  0-->No abnormality   Total 12 (11/11/24 1135)       Imaging  I personally reviewed all imaging; relevant findings per HPI.    Labs Data   CBC  Recent Labs   Lab 11/11/24  0603 11/10/24  1842   WBC 5.9 7.3   RBC 3.88 3.72*   HGB 12.4 11.9   HCT 36.8 35.9    180     Basic Metabolic Panel   Recent Labs   Lab 11/11/24  0730 11/11/24  0603 11/10/24  1842   NA  --  143 142   POTASSIUM  --  4.1 3.6   CHLORIDE  --  106 103   CO2  --  24 27   BUN  --  14.0 16.5   CR  --  0.53 0.77   * 209* 142*   ADAN  --  9.0 8.9     Liver Panel  No results for input(s): \"PROTTOTAL\", \"ALBUMIN\", \"BILITOTAL\", \"ALKPHOS\", \"AST\", \"ALT\", \"BILIDIRECT\" in the last 168 hours.  INR    Recent Labs   Lab Test 11/10/24  1842 05/05/23  1704 01/21/22  0420   INR 0.99 1.00 0.99           Stroke Consult Data Data   Telestroke Service Details  " (for non-emergent stroke consult with tele)  Video start time 11/11/24   1133   Video end time 11/11/24   1205   Type of service telemedicine diagnostic assessment of acute neurological changes   Reason telemedicine is appropriate patient requires assessment with a specialist for diagnosis and treatment of neurological symptoms   Mode of transmission secure interactive audio and video communication per Kimberly   Originating site (patient location) [facility]    Distant site (provider location) [facility]       I have personally spent a total of 70 minutes providing care today, time spent in reviewing medical records and devising the plan as recorded above.     *All or a portion of this note was generated using voice recognition software and may contain transcription errors.

## 2024-11-11 NOTE — H&P
HOSPITALIST TELEMED ADMISSION H&P    Service Date : 11/10/2024  Dr. Timi VALDEZ, am located in [place].   Anayeli Gagnon is located in [place] at [facility].   The RN or tech on duty in the floor is assisting me today with this patient.    chief complaint: right facial droop, speech difficulty    History of Present Illness:  Anayeli Gagnon is a 73 year old female presenting to ED today With complaints of right sided facial droop and speech difficulty.  Patient came to the ER with her daughter.  Apparently she had 1 episode daily for the past 3 days all of which last approximately 1 to 2 minutes.  Tonight around 5:20 PM she was noticed to have water dripping on the right side of her mouth and had a hard time finding words.  This was noticed by the staff at the assisted living facility she lives at.  They called her daughter.  Her daughter arrived around 6 PM and her deficits had resolved.  She brought her to the ER for evaluation.  Apparently she has a history of a previous CVA and also with history of previous spinal cord hematomas resulting in chronic lower extremity paralysis and she is wheelchair-bound.  She lives in assisted living facility.  She also has a history of anemia, hypertension, depression, peripheral neuropathy, anxiety.  Patient was seen by stroke teleneurology and felt this likely represented TIA.  Patient was loaded with aspirin and Plavix and we are consult for admission.  Imaging workup in the ER did include CTA of the head and neck which were normal as well as CT of the head with no acute abnormalities.    Patient seen on arrival to the floor.  Patient seen resting in bed.  She reports she was doing okay at this point.  She does confirm that she has had multiple episodes of the last 3 days that all last approximately 1 to 2 minutes.  It is usually associated with right-sided facial drooping and difficulties or speech.  She did not come to the ER for evaluation earlier which  she is not certain why.  She denies any current symptoms.  She denies any numbness or tingling.  No difficulty swallowing.  No changes in her vision.  No fevers, chills, chest pain, shortness of breath.  She does have chronic paralysis in her lower extremities from her previous injury.    Patient does have a living will.  She is a DNR.  Her daughter is her surrogate decision-maker    Emergency Room Course - SEE ED NOTES    Past Medical History  Past Medical History:   Diagnosis Date    CARDIAC DYSRHYTHMIAS NEC 10/03/2007    Taking atenelol for years for this    Cerebrovascular accident (H) 05/05/2023    History of skin cancer     Mitral valve prolapse     Neurogenic bladder     Sacral decubitus ulcer, stage IV (H)     Thoracic spinal cord injury (H)     TIA (transient ischemic attack) 04/26/2023      Patient Active Problem List   Diagnosis    Other specified cardiac dysrhythmias(427.89)    CARDIOVASCULAR SCREENING; LDL GOAL LESS THAN 160    History of skin cancer    Headache    Menopause    Anxiety reaction    Osteoarthritis    Acute incomplete paraplegia (H)    Bilateral lower extremity edema    Chronic bilateral low back pain with bilateral sciatica    Edema of spinal cord (H)    History of spinal surgery    Hypertension    Incomplete emptying of bladder    Incomplete injury of thoracic spinal cord in T1 to T6 region without bony injury (H)    Leg weakness, bilateral    Muscle spasticity    MVP (mitral valve prolapse)    Palpitations    Pressure ulcer of coccygeal region, stage 3 (H)    Spinal cord injury at T7-T12 level (H)    Pure hypercholesterolemia    Ulnar neuropathy at elbow, left    Nocturnal enuresis    Urge incontinence of urine    Arteriovenous fistula of spinal cord vessels    Dural arteriovenous fistula    Spinal cord injury of thoracic region without bone injury, subsequent encounter (H)    Thoracolumbar back pain    Neuropathic pain    Intractable neuropathic pain of lumbosacral origin     Cerebrovascular accident (CVA), unspecified mechanism (H)    Depressive disorder    Right homonymous hemianopsia    TIA (transient ischemic attack)         Past Surgical History  Past Surgical History:   Procedure Laterality Date    CYSTOSTOMY, INSERT TUBE SUPRAPUBIC, COMBINED N/A 11/13/2023    Procedure: CYSTOSCOPY, OPEN SUPRAPUBIC TUBE INSERTION;  Surgeon: Karley Robles MD;  Location: [facility] OR    DECOMPRESSION LUMBAR ONE LEVEL N/A 12/27/2019    Procedure: Posterior spinal decompression;  Surgeon: Alf Ritchie MD;  Location: [facility] OR    INSERT INTRATHECAL PAIN PUMP N/A 1/19/2022    Procedure: INSERTION, INTRATHECAL ANALGESIC PUMP, externalized trial;  Surgeon: Luis Orourke MD;  Location: [facility] OR    INSERT INTRATHECAL PAIN PUMP Right 1/21/2022    Procedure: INSERTION, INTRATHECAL ANALGESIC PUMP;  Surgeon: Luis Orourke MD;  Location: [facility] OR    INSERT STIMULATOR AND LEADS INTERNAL DORSAL COLUMN N/A 4/7/2021    Procedure: Posterior thoracic 12 to Lumbar 1 level for placement of spinal cord stimulator paddle and placement of implantable pulse generator/battery over right buttock;  Surgeon: Luis Orourke MD;  Location: [facility] OR    IR SPINAL ANGIOGRAM  10/16/2019    IR SPINAL ANGIOGRAM  12/20/2019    LAPAROSCOPIC TUBAL LIGATION      REPAIR SPINAL ARERIOVENOUS MALFORMATION N/A 12/27/2019    Procedure: with surgical disconnection arterial venous fistula Thoracic 5;  Surgeon: Alf Ritchie MD;  Location: [facility] OR      Social History  Anayeli  reports that she has never smoked. She has never been exposed to tobacco smoke. She has never used smokeless tobacco. She reports that she does not drink alcohol and does not use drugs.    Family History  Anayeli's family history includes C.A.D. in her father.    Home Medications  Current Outpatient Medications   Medication Instructions    acetaminophen (TYLENOL) 500-1,000 mg, EVERY 6 HOURS PRN    aspirin (ASA) 325 mg, Oral, DAILY    atorvastatin (LIPITOR) 40  mg, Oral, EVERY EVENING    baclofen (LIORESAL) 15 mg, Oral, 3 TIMES DAILY    busPIRone (BUSPAR) 10 mg    COMPOUND CONTAINING CONTROLLED SUBSTANCE (CMPD RX) - PHARMACY TO MIX COMPOUNDED MEDICATION Intrathecal Pump- 20 mL syringe    Concentrations:  Dilaudid  14 mg/ml                                                          Bupivacaine 28 mg/ml  Ziconitide 14 mcg/ml     Total Volume in Refill: 20 mL    Basal:   Dilaudid  7 mg/day                                                       Bupivacaine 14 mg/day  Ziconitide 7 mcg/day     PTM 0.2 hydromorphone, 2 hour lockout, maximum 6 in a day.    gabapentin (NEURONTIN) 1,200 mg, 3 TIMES DAILY    lidocaine (LIDODERM) 5 % patch 1 patch, DAILY PRN    LORazepam (ATIVAN) 0.5 MG tablet 1 tablet, DAILY PRN    medication given by implanted intrathecal pump CONTINUOUS    melatonin 3 mg, AT BEDTIME PRN    metoprolol succinate ER (TOPROL XL) 12.5 mg, EVERY EVENING    Multiple Vitamins-Minerals (WOMENS MULTI PO) 1 tablet, DAILY    ondansetron (ZOFRAN) 4 mg, Oral, EVERY 6 HOURS PRN    polyethylene glycol (MIRALAX) 17 g, DAILY PRN    pregabalin (LYRICA) 150 mg, Oral, 3 TIMES DAILY, Stop gabapentin.       Allergies  Allergies   Allergen Reactions    Latex     Contrast Dye Rash     Painful rash after x-ray contrast    Nitrofurantoin Nausea and Vomiting        10 pt ROS neg except as noted in HPI    Physical Exam:  I performed all aspects of the physical examination via Telemedicine associated with two way audio and video communication.    Vital Signs: Blood pressure (!) 134/101, pulse 77, temperature 98.2  F (36.8  C), temperature source Temporal, resp. rate 18, weight 60.3 kg (133 lb), SpO2 95%, not currently breastfeeding.    Physical Exam    Gen:  Well-developed, well-nourished, in no acute distress, lying semi-supine in hospital stretcher  HEENT:  Anicteric sclera, PER, hearing intact to voice  Resp:  No accessory muscle use, breath sounds clear; no wheezes no rales no rhonchi  Card:   No murmur, normal S1, S2   Abd:  Soft per RN exam, no TTP, non-distended, normoactive bowel sounds are present.  Ostomy left lower quadrant  : Cunningham  Neuro: Cranial nerves II through XII intact with no focal deficits.  Bilateral upper extremities with good strength and sensation.  Chronic bilateral lower extremity paralysis  DATA  Labs:  I have personally reviewed the following studies:  Recent Labs   Lab 11/10/24  1842   POTASSIUM 3.6   CHLORIDE 103   CO2 27   BUN 16.5   PTT 26   INR 0.99      Recent Labs   Lab 11/10/24  1842   WBC 7.3   HGB 11.9   HCT 35.9          Imaging: ER imaging reviewed    ASSESSMENT/PLAN:   TIA.  Patient presents with right sided facial drooping and difficulty with her speech.  This has happened 3 times in the past 3 days each episode lasting approximately 1 to 2 minutes.  Has been seen by teleneurology.  She was loaded aspirin and Plavix.  Will continue with aspirin 81 mg daily and Plavix 75 mg daily.  Continue statin.  Monitor on telemetry.  Can allow some permissive hypertension.  Her initial NIH score on arrival to the ED was 0.  This remains unchanged.  Will obtain brain MRI in the morning with and without contrast.  Consult telestroke.  Check echo.    Hypertension.  Will continue baseline medications.    Hyperlipidemia.  Statin.    History of previous spinal cord injury with resultant paralysis.    Depression anxiety.  Continue baseline meds.    Peripheral neuropathy.  Continue gabapentin and Lyrica.    Disposition.  Patient will be placed in observation for TIA workup.    Misc  DVT prophylaxis: SCDs  Code Status: DNR    The plan was discussed with - Patient

## 2024-11-11 NOTE — ED TRIAGE NOTES
Pt has been having intermittent episodes of slurred speech/aphasia and staff at care facility this evening noted a facial droop. Sx currently have subsided. Hx stroke.     CODE STROKE called in triage.      Triage Assessment (Adult)       Row Name 11/10/24 1828          Triage Assessment    Airway WDL WDL        Respiratory WDL    Respiratory WDL WDL        Cardiac WDL    Cardiac WDL WDL

## 2024-11-11 NOTE — PROGRESS NOTES
"Blood sugar always been above 150 pre-meal. Informed Dr. Golden. \"not yet. She's OBS status and A1c was only 5.4 \" replied.  "

## 2024-11-11 NOTE — PROGRESS NOTES
Pressure wound on left ankle noted upon admission. Pt also has notable scab on left foot and right toe.

## 2024-11-11 NOTE — PROGRESS NOTES
PRIMARY DIAGNOSIS: SYNCOPE/TIA  OUTPATIENT/OBSERVATION GOALS TO BE MET BEFORE DISCHARGE:  1. Orthostatic performed: No    2. Diagnostic testing complete & at baseline neurologic testing: No - echo and MRI in AM    3. Cleared by consultants (if involved): No    4. Interpretation of cardiac rhythm per telemetry tech: NSR    5. Tolerating adequate PO diet and medications:  Has tolerated meds, has not had an food yet    6. Return to near baseline physical activity or neurologic status: Yes

## 2024-11-11 NOTE — CONSULTS
Care Management Initial Consult    General Information  Assessment completed with: Patient, Caregiver, VM-chart review,    Type of CM/SW Visit: Initial Assessment    Primary Care Provider verified and updated as needed: Yes   Readmission within the last 30 days: no previous admission in last 30 days      Reason for Consult: discharge planning  Advance Care Planning: Advance Care Planning Reviewed: no concerns identified          Communication Assessment  Patient's communication style: spoken language (English or Bilingual)    Hearing Difficulty or Deaf: no   Wear Glasses or Blind: no    Cognitive  Cognitive/Neuro/Behavioral: WDL  Level of Consciousness: alert  Arousal Level: opens eyes spontaneously  Orientation:  (confused when she first woke and was disoriented to place and situation. Became more oriented as she talked.)             Living Environment:   People in home: facility resident     Current living Arrangements: assisted living  Name of Facility: [facility]   Able to return to prior arrangements: yes       Family/Social Support:  Care provided by: self, homecare agency, other (see comments) (Staff at [facility])  Provides care for: no one  Marital Status:   Support system: Children, Facility resident(s)/Staff          Description of Support System: Supportive, Involved    Support Assessment: Adequate family and caregiver support, Adequate social supports    Current Resources:   Patient receiving home care services: Yes  Skilled Home Care Services: Skilled Nursing (RN for wound care)     Community Resources: Other (see comment) (Outpatient wound care)  Equipment currently used at home: lift device, wheelchair, power, shower chair, grab bar, tub/shower, colostomy/ostomy supplies  Supplies currently used at home:      Employment/Financial:  Employment Status: disabled, retired        Financial Concerns: none           Does the patient's insurance plan have a 3 day qualifying  hospital stay waiver?  No    Lifestyle & Psychosocial Needs:  Social Drivers of Health     Food Insecurity: Low Risk  (11/10/2024)    Food Insecurity     Within the past 12 months, did you worry that your food would run out before you got money to buy more?: No     Within the past 12 months, did the food you bought just not last and you didn t have money to get more?: No   Depression: Not at risk (7/24/2023)    PHQ-2     PHQ-2 Score: 2   Housing Stability: Low Risk  (11/10/2024)    Housing Stability     Do you have housing? : Yes     Are you worried about losing your housing?: No   Tobacco Use: Low Risk  (11/10/2024)    Patient History     Smoking Tobacco Use: Never     Smokeless Tobacco Use: Never     Passive Exposure: Never   Financial Resource Strain: Low Risk  (11/10/2024)    Financial Resource Strain     Within the past 12 months, have you or your family members you live with been unable to get utilities (heat, electricity) when it was really needed?: No   Alcohol Use: Not At Risk (7/22/2024)    Received from [facility]    AUDIT-C     Frequency of Alcohol Consumption: Monthly or less     Average Number of Drinks: 1 or 2     Frequency of Binge Drinking: Never   Transportation Needs: Low Risk  (11/10/2024)    Transportation Needs     Within the past 12 months, has lack of transportation kept you from medical appointments, getting your medicines, non-medical meetings or appointments, work, or from getting things that you need?: No   Physical Activity: Patient Declined (7/22/2024)    Received from [facility]    Exercise Vital Sign     Days of Exercise per Week: Patient declined     Minutes of Exercise per Session: Patient declined   Interpersonal Safety: Low Risk  (11/10/2024)    Interpersonal Safety     Do you feel physically and emotionally safe where you currently live?: Yes     Within the past 12 months, have you been hit, slapped, kicked or otherwise physically hurt  by someone?: No     Within the past 12 months, have you been humiliated or emotionally abused in other ways by your partner or ex-partner?: No   Stress: No Stress Concern Present (7/22/2024)    Received from [facility]    [employer] [place] [employer] - Occupational Stress Questionnaire     Feeling of Stress : Only a little   Social Connections: Unknown (7/22/2024)    [facility]    Social Connection and Isolation Panel [NHANES]     Frequency of Communication with Friends and Family: Twice a week     Frequency of Social Gatherings with Friends and Family: Never     Attends Episcopalian Services: Never     Active Member of Clubs or Organizations: No     Attends Club or Organization Meetings: Never     Marital Status: Patient declined   Health Literacy: Inadequate Health Literacy (7/22/2024)    [facility]     Health Literacy     Frequency of need for help with medical instructions: Sometimes       Functional Status:  Prior to admission patient needed assistance:   Dependent ADLs:: Wheelchair-independent, Transfers, Bathing  Dependent IADLs:: Cleaning, Cooking, Laundry, Shopping, Medication Management, Transportation  Assesssment of Functional Status: At functional baseline    Mental Health Status:  Mental Health Status: No Current Concerns       Chemical Dependency Status:  Chemical Dependency Status: No Current Concerns             Values/Beliefs:  Spiritual, Cultural Beliefs, Episcopalian Practices, Values that affect care: no  Description of Beliefs that Will Affect Care: Elise            Discussed  Partnership in Safe Discharge Planning  document with patient/family: No    Additional Information:  Care Management consulted due to stroke order set.      Writer visited with patient, introduced self and role of Care Management.      Patient is awaiting an MRI.  Discussed writer has spoken with Radha Edwards  "[facility] and they request patient return by 4pm today.  MRI will not be completed by 4pm.  Patient ok with staying the night and awaiting results from MRI after it is completed.      Patient is paraplegic. She has a power wheelchair she uses for mobility on her own.  Patient lives at [facility].  Patient reports that staff assist with medication management, some dressing tasks, and lift transfers.  Patient reported that she takes care of her own ostomy care and catheter care.    Patient stated that she has RN services though [facility] for wound care on her foot 2x per week.  She has also been going to outpatient wound care clinic in [place] 1x per week.  She stated that the transportation to and from there is getting very expensive.  Patient plans to talk with her daughter, José Miguel, about maybe switching to wound care here at [facility] in [place].  Patient not interested in writer talking with José Miguel today and stated she will be talking with her this evening.  Discussed transportation options back to [facility].  Patient usually uses [employer], but has interest in checking into using the [employer] bus.  Writer provided patient with information regarding the bus.      Patient is currently , but .  Patient reported that \"my  wants a divorce. He sold our wheelchair van.\"  Patient reports good support from her daughters and that daughter, José Miguel, is very involved.      Patient plans to return to her [facility] apartment.  Writer has spoken with Radha again at [facility] and discussed that patient will be staying the night in the hospital and plan for return tomorrow, pending MRI results.      Next Steps: Care Management will continue to follow and assist with discharge planning.      AYESHA Mathias  [facility]   [phone]      "

## 2024-11-11 NOTE — PROGRESS NOTES
PRIMARY DIAGNOSIS: TIA  OUTPATIENT/OBSERVATION GOALS TO BE MET BEFORE DISCHARGE:  1. Orthostatic performed: No    2. Diagnostic testing complete & at baseline neurologic testing: Echocardiogram done. No, waiting for MRI    3. Cleared by consultants (if involved): No    4. Interpretation of cardiac rhythm per telemetry tech: NSR    5. Tolerating adequate PO diet and medications: Yes    6. Return to near baseline physical activity or neurologic status: Yes    Discharge Planner Nurse   Safe discharge environment identified: Yes  Barriers to discharge: Yes, waiting for neuro consult and MRI       Entered by: Jorge Pearson RN 11/11/2024 11:59 AM     Please review provider order for any additional goals.   Nurse to notify provider when observation goals have been met and patient is ready for discharge.

## 2024-11-12 ENCOUNTER — ORDERS ONLY (AUTO-RELEASED) (OUTPATIENT)
Dept: MEDSURG UNIT | Facility: CLINIC | Age: 74
End: 2024-11-12

## 2024-11-12 ENCOUNTER — APPOINTMENT (OUTPATIENT)
Dept: CT IMAGING | Facility: CLINIC | Age: 74
DRG: 069 | End: 2024-11-12
Attending: NURSE PRACTITIONER
Payer: MEDICARE

## 2024-11-12 DIAGNOSIS — R29.818 TRANSIENT NEUROLOGICAL SYMPTOMS: ICD-10-CM

## 2024-11-12 LAB
GLUCOSE BLDC GLUCOMTR-MCNC: 104 MG/DL (ref 70–99)
HOLD SPECIMEN: NORMAL
MAGNESIUM SERPL-MCNC: 2 MG/DL (ref 1.7–2.3)
POTASSIUM SERPL-SCNC: 3.5 MMOL/L (ref 3.4–5.3)

## 2024-11-12 PROCEDURE — 36415 COLL VENOUS BLD VENIPUNCTURE: CPT | Performed by: PEDIATRICS

## 2024-11-12 PROCEDURE — 84132 ASSAY OF SERUM POTASSIUM: CPT | Performed by: PEDIATRICS

## 2024-11-12 PROCEDURE — 99207 PR NO BILLABLE SERVICE THIS VISIT: CPT | Performed by: NURSE PRACTITIONER

## 2024-11-12 PROCEDURE — 120N000001 HC R&B MED SURG/OB

## 2024-11-12 PROCEDURE — G0378 HOSPITAL OBSERVATION PER HR: HCPCS

## 2024-11-12 PROCEDURE — A7035 POS AIRWAY PRESS HEADGEAR: HCPCS

## 2024-11-12 PROCEDURE — 70450 CT HEAD/BRAIN W/O DYE: CPT | Mod: MG

## 2024-11-12 PROCEDURE — 250N000013 HC RX MED GY IP 250 OP 250 PS 637: Performed by: INTERNAL MEDICINE

## 2024-11-12 PROCEDURE — G0463 HOSPITAL OUTPT CLINIC VISIT: HCPCS

## 2024-11-12 PROCEDURE — 83735 ASSAY OF MAGNESIUM: CPT | Performed by: PEDIATRICS

## 2024-11-12 PROCEDURE — 82962 GLUCOSE BLOOD TEST: CPT

## 2024-11-12 PROCEDURE — 99232 SBSQ HOSP IP/OBS MODERATE 35: CPT | Performed by: NURSE PRACTITIONER

## 2024-11-12 PROCEDURE — G1010 CDSM STANSON: HCPCS

## 2024-11-12 PROCEDURE — 999N000157 HC STATISTIC RCP TIME EA 10 MIN

## 2024-11-12 PROCEDURE — 94660 CPAP INITIATION&MGMT: CPT

## 2024-11-12 RX ORDER — BUSPIRONE HYDROCHLORIDE 15 MG/1
15 TABLET ORAL 2 TIMES DAILY
COMMUNITY
Start: 2024-10-28

## 2024-11-12 RX ORDER — ASPIRIN 325 MG
325 TABLET, DELAYED RELEASE (ENTERIC COATED) ORAL DAILY
Status: DISCONTINUED | OUTPATIENT
Start: 2024-11-13 | End: 2024-11-14 | Stop reason: HOSPADM

## 2024-11-12 RX ORDER — HYDROCORTISONE 25 MG/G
1 CREAM TOPICAL DAILY PRN
COMMUNITY
Start: 2024-09-03

## 2024-11-12 RX ADMIN — BUSPIRONE HYDROCHLORIDE 15 MG: 5 TABLET ORAL at 08:57

## 2024-11-12 RX ADMIN — BACLOFEN 10 MG: 10 TABLET ORAL at 21:46

## 2024-11-12 RX ADMIN — FAMOTIDINE 20 MG: 20 TABLET, FILM COATED ORAL at 08:58

## 2024-11-12 RX ADMIN — ASPIRIN 81 MG: 81 TABLET, COATED ORAL at 08:55

## 2024-11-12 RX ADMIN — BACLOFEN 10 MG: 10 TABLET ORAL at 14:14

## 2024-11-12 RX ADMIN — Medication 1 TABLET: at 09:32

## 2024-11-12 RX ADMIN — BUSPIRONE HYDROCHLORIDE 15 MG: 5 TABLET ORAL at 21:46

## 2024-11-12 RX ADMIN — LORAZEPAM 0.5 MG: 0.5 TABLET ORAL at 22:33

## 2024-11-12 RX ADMIN — GABAPENTIN 1200 MG: 400 CAPSULE ORAL at 21:47

## 2024-11-12 RX ADMIN — GABAPENTIN 1200 MG: 400 CAPSULE ORAL at 14:14

## 2024-11-12 RX ADMIN — BACLOFEN 10 MG: 10 TABLET ORAL at 08:56

## 2024-11-12 RX ADMIN — FAMOTIDINE 20 MG: 20 TABLET, FILM COATED ORAL at 21:46

## 2024-11-12 RX ADMIN — METOPROLOL SUCCINATE 12.5 MG: 25 TABLET, EXTENDED RELEASE ORAL at 21:46

## 2024-11-12 RX ADMIN — ATORVASTATIN CALCIUM 40 MG: 40 TABLET, FILM COATED ORAL at 21:45

## 2024-11-12 RX ADMIN — GABAPENTIN 1200 MG: 400 CAPSULE ORAL at 08:59

## 2024-11-12 NOTE — PROGRESS NOTES
PRIMARY DIAGNOSIS: TIA  OUTPATIENT/OBSERVATION GOALS TO BE MET BEFORE DISCHARGE:  1. Orthostatic performed: No    2. Diagnostic testing complete & at baseline neurologic testing: Yes, CT scan done due to MRI safety approval is pending    3. Cleared by consultants (if involved): No    4. Interpretation of cardiac rhythm per telemetry tech: NSR    5. Tolerating adequate PO diet and medications: Yes    6. Return to near baseline physical activity or neurologic status: Yes    Discharge Planner Nurse   Safe discharge environment identified: Yes  Barriers to discharge: Yes       Entered by: Jorge Pearson RN 11/12/2024 2:34 PM     Please review provider order for any additional goals.   Nurse to notify provider when observation goals have been met and patient is ready for discharge.

## 2024-11-12 NOTE — CONSULTS
"SPIRITUAL HEALTH SERVICES Consult Note    Medical Surgical Unit at [facility].      REFERRAL SOURCE/REASON FOR VISIT - Saw patient per her request.    SUMMARY - I visited Annamarie, who was open to my introduction and to emotional and spiritual support.  She is feeling better and anticipates returning to her assisted living after an MRI this afternoon.  Annamarie told me what she described as \"a sad story.\"  Her  of 50+ years asked for a divorce 3 months ago due to Annamarie's medical issues.  As she recounted this, her sadness, sense of lack of understanding about her , and a bit of hopelessness were evident.  She has moved into an assisted living that she doesn't like, and gets no emotional or spiritual support from.  She plans to move from there.  Annamarie has 2 daughters, only one who is supportive of her plight.  Annamarie has stopped going to a [employer] that she no longer likes.  I listened actively to Annamarie, reflecting on her emotions; pointing out potential resources for emotional and spiritual support, including grief resources; offered encouragement to her; said a prayer for her; and gave her a blessing.         Plan - No plan to follow.     Kishore Hanson, Ph.D,   Spiritual Health Services  [address] [place] [address] [phone]  [email or web address]    Assessment -     Patient/Family Understanding of Illness and Goals of Care - Patient understands her illness and goals.    Distress and Loss - Patient shared some of the recent painful stressors she has been dealing with.    Strengths, Coping, and Resources - I invited patient to tell me about the challenges she has been encountering and how she is coping.    Meaning, Beliefs, and Spirituality - Patient is [place] Kevyn Olivares.          "

## 2024-11-12 NOTE — PROGRESS NOTES
Care Management Follow Up    Length of Stay (days): 1    Expected Discharge Date: 11/12/2024     Concerns to be Addressed: discharge planning     Patient plan of care discussed at interdisciplinary rounds: Yes    Anticipated Discharge Disposition: Assisted Living, Home Care  Disposition Comments: Return to [facility] with Willie Aldana RN  Anticipated Discharge Services: Transportation Services  Anticipated Discharge DME: None    Patient/family educated on Medicare website which has current facility and service quality ratings: yes (for transportation)  Education Provided on the Discharge Plan:    Patient/Family in Agreement with the Plan: yes    Referrals Placed by CM/SW: External Care Coordination, Post Acute Facilities, Homecare  Private pay costs discussed: Not applicable    Discussed  Partnership in Safe Discharge Planning  document with patient/family: No     Handoff Completed: Yes, non-[facility] PCP: External handoff communication completed    Additional Information:  Per NP at IDT rounds pt is NOT medically stable for discharge today. Patient lives at [facility]. Anticipating pt will discharge tomorrow morning.    CM met bedside with patient to discuss discharge planning, pt stated her daughter can transport her back to her [facility]. Per Patient her daughter has a wheelchair van, pt has own wheelchair at hospital. Pt current with Willie FERNANDES RN services which she will resume upon discharge.    Cm spoke with intake at [facility] to confirm services, pt is with [facility]. CM updated in epic destination tab. Intake aware orders will be sent on discharge.    LISBETH spoke with Radha at [facility] to update regarding discharge, per Radha Facility uses [facility] in [place] for medications. Any new medications to be sent there upon discharge. Radha aware pt likely will return tomorrow. Per Radha discharge orders to be faxed to [phone].    LISBETH to continue to follow for discharge  planning    Plan: Return to [facility] with Willie FERNANDES RN    Transport: Daughter-pt has own wheelchair at hospital    Radha Maldonado RNCM  Care Transitions Registered Nurse  Tele: [phone]

## 2024-11-12 NOTE — PROGRESS NOTES
PRIMARY DIAGNOSIS: TIA  OUTPATIENT/OBSERVATION GOALS TO BE MET BEFORE DISCHARGE:  1. Orthostatic performed: No     2. Diagnostic testing complete & at baseline neurologic testing: No     3. Cleared by consultants (if involved): No     4. Interpretation of cardiac rhythm per telemetry tech: NSR     5. Tolerating adequate PO diet and medications: Yes     6. Return to near baseline physical activity or neurologic status: Yes    Pt noted to have apneic breathing while sleeping, RT notified.

## 2024-11-12 NOTE — PROGRESS NOTES
Reason For Visit: Anayeli Gagnon, 73 year old female, seen for a WOC consultation to evaluate and treat left ankle wound. Patient was admitted on 11/10/24 for TIA concerns.  Pt's nurse Grant RN was present during portion of visit today.  Nursing student Priti was present with woc for the entire visit today and assisted some with cares with pt in there room.  Patient is A&Ox4, pleasant upon interaction with some noted confusion.      Start of Care in [facility] Wound and Ostomy services: 11/11/2024   Referring Provider: Dr Ernie Golden MD dated 11/11/24 as inpatient woc nurse consult.  Primary Care Provider: Jazmin Interiano   Wound Location:   - Right dorsal great toe  - Left lateral ankle  - Left lateral distal foot     Reason for Visit:  Evaluate and treat left lateral ankle wound, and small abrasion sites of concern to the left lateral distal foot and right dorsal great toe.   Evaluated new plan of care started in for the left lateral ankle wound yesterday.       Subjective:  On arrival to the pt's hospital room today for a scheduled follow up woc nurse assessment 11/12, the pt reports the following:  She has noted no increased pain or concerns with the left lateral ankle wound since starting the new primary dressing, Mesalt, yesterday.  She is wearing the Prevalon foam boot while in bed on the left foot, right heel is floated/elevated above bed surface on pillows.  We discussed her colostomy more today and I did brief assessment.   Added to the Ostomy History below for reference if needed for product use while inpatient.     Wound History: Pt presented to the ED here at [facility] with her daughter yesterday 11/10 for concerns of potential CVA.  Admitting nurse took pictures of wounds to the Left lateral distal foot, Left lateral ankle and Right great toe dorsal aspect.    [facility] did chart review and found progress notes from [place] [facility] starting 3/6/24 with Dr Zackary Sharp MD for an  unstageable pressure injury to the Left lateral ankle.  Per the 3/6 initial appointment - Annamarie presents to wound clinic for evaluation of Left lateral ankle ulcer; duration 5 to 6 months.  Right second toe ulcer; recent onset.  History of paraplegia 4 to 5 years.  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  Has had previous sacral coccygeal ulcers with reconstruction, flap.  Has SPC and ostomy diversion.  Not ambulatory ; wheelchair only.  No current sacrococcygeal ulcerations.     Pt was making follow up visits in [place] for wound cares approximately weekly, last being 10/17/24.  Per the 1/17 follow up MD Progress note - The patient returns to wound clinic for evaluation of her left ankle ulcer. The patient actually had an ablation procedure done by Dr. Nevarez yesterday. they placed her in an Ace bandage wrap from the toes to the thigh. The patient believes the procedure went well. She does not think that she has any follow-up appointments with them. The patient has not had a fever and has no new ulcer related complaints. Ulcer measurements as noted below and/or as in the nursing notes and flowsheets. Ulcer on the lateral left ankle appears somewhat smaller than in the past. It continues to have epibole. The base is granulated. There is no sign of infection. Wound Length 0.5 cm , Width  0.5 cm, Depth 0.4 cm.   Procedure: I treated the epibole with silver nitrate. Treatment of epibole with silver nitrate is medically necessary for appropriate wound healing.  Dressings were applied by the RN. Continue with Hydrofera Blue and a bordered foam dressing with felt padding. Ace bandage reapplied per the RN.  Nonpressure ulcer of the left ankle with a fat layer exposed related to venous insufficiency. Recent vein ablation.  Wound care MARIA TERESA in March, 2024 was 1.04 on the right and 0.98 on the left. Plain film x-ray of the left ankle was negative for osteomyelitis in April, 2024. May, 2024 arterial  ultrasound shows no flow in the left peroneal artery, referred to vascular. Venous insufficiency ultrasound shows insufficiency of the right great and small saphenous veins as well as the left great and small saphenous vein as well as the posterior accessory saphenous vein. There are also insufficient tributaries. Ablation procedures performed October 16, 2024.  Plan Dressing change every 2 to 3 days with compression as per Dr. Nevarez. Return to the wound clinic in about a week for dressing change and physician reevaluation.   Per pt at my initial inpatient consult visit today 11/11, add' the following to the wound history.  Confirms the left lateral ankle wound has been present for many months, since around the end of 2023.  She was making weekly to every other week visits to the Wound clinic at [facility] to see provider and [facility] nurse.  She had regular debriding of the left ankle wound in clinic.  Topically per pt and her chart review, primary dressings in use have included Hydrofera Blue Ready, Thera honey, Exufiber Ag and Iodoflex.  She feels the current Hydrofera and the Iodoflex seem to cause increased pain.  She also wears a foam off loading boot on the left foot at bed time as she tends to lay directly on the left lateral ankle wound when sleeping and does not reposition.  She is able to reposition herself when awake.  She has home care with [facility], they come to her current residence at [facility] here in [place] twice weekly and she would make once weekly visits to the [place] wound clinic.   Note during chart review the left lateral ankle wound was listed as an unstagable pressure injury until recently when pt underwent a venous ablation   procedure to the LE's, after that was referred to as a venous ulcer.    Pt also reports concerning Right great toe, has had the small abrasion for a few weeks.  The left lateral distal foot small scab she was not aware was present till noted here at  [facility].       Colostomy, discussed with her at initial [facility] nurse inpatient consult 11/11.  She reported no problems with her ostomy appliance and will seek [facility] nurse assessment or assist with products while inpatient.  Today 11/12 at [facility] follow, up did brief assessment of the site without removing the appliance in place.  Pt states she has had for approximately 4 yours as best she can recall.  She knows she uses Shiva products, two piece, believes the skin barrier wafer is flat, she cuts her own hole in the skin barrier wafer.  She is not certain but feels she changes her appliance every few days more likely than just twice weekly.        Past Medical History:  Patient Active Problem List   Diagnosis    Other specified cardiac dysrhythmias(427.89)    CARDIOVASCULAR SCREENING; LDL GOAL LESS THAN 160    History of skin cancer    Headache    Menopause    Anxiety reaction    Osteoarthritis    Paraplegia (H)    Bilateral lower extremity edema    Chronic bilateral low back pain with bilateral sciatica    Edema of spinal cord (H)    History of spinal surgery    Hypertension    Incomplete emptying of bladder    Incomplete injury of thoracic spinal cord in T1 to T6 region without bony injury (H)    Leg weakness, bilateral    Muscle spasticity    MVP (mitral valve prolapse)    Palpitations    Pressure ulcer of coccygeal region, stage 3 (H)    Spinal cord injury at T7-T12 level (H)    Pure hypercholesterolemia    Ulnar neuropathy at elbow, left    Nocturnal enuresis    Urge incontinence of urine    Arteriovenous fistula of spinal cord vessels    Dural arteriovenous fistula    Spinal cord injury of thoracic region without bone injury, subsequent encounter (H)    Thoracolumbar back pain    Neuropathic pain    Intractable neuropathic pain of lumbosacral origin    Cerebrovascular accident (CVA), unspecified mechanism (H)    Depressive disorder    Right homonymous hemianopsia    TIA (transient ischemic attack)    History of  stroke    Skin ulcer of left ankle, limited to breakdown of skin (H)    Presence of intrathecal pump    Suprapubic catheter (H)    Colostomy present (H)    MRSA carrier    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)    Hyperlipidemia                  Tobacco Use:     Tobacco Use      Smoking status: Never        Passive exposure: Never      Smokeless tobacco: Never     Diabetic: No  HgbA1C:   Hemoglobin A1C   Date Value Ref Range Status   11/11/2024 5.4 <5.7 % Final     Comment:     Normal <5.7%   Prediabetes 5.7-6.4%    Diabetes 6.5% or higher     Note: Adopted from ADA consensus guidelines.   11/17/2011 5.3 4.3 - 6.0 % Final   Checks Blood Glucose?:  NA      Personal/social history:  Pt lives at [facility] here in [place].  She has home care services twice weekly with [facility].     Objective:   Current treatment plan:   - Left lateral ankle wound:  While inpatient here at [facility] - Started use of sn dampened Mesalt gauze cut to fit wound bed, covered with Mepilex gentle adhesive foam dressing and changing daily.  (Note as of last visit to [place] wound clinic MD order 10/17/24 - Continue with Hydrofera Blue and a bordered foam dressing with felt padding. Ace bandage. Changed every 2 - 3 days).  - Left lateral distal foot and Right Dorsal great toe - RICHARD  Last changed: Left lateral ankle wound yesterday by wo nurse.       Wound #1 Right dorsal great toe.  Appears to be an abrasion, pt notes no other reported diagnosis.  Stage/tissue depth: partial thickness  0.3 cm L x 0.2 cm W x 0 cm D  Tunneling: none  Undermining: none  Wound bed type/amount: area measures is 100 % covered with thin scab of well adhered drainage; Not fluctuant  Wound Edges: NA covered with scab  Periwound: scant dry skin and pale blanchable erythema 1.3 cm L x 1.1 cm W  Drainage: none noted  Odor: no  Pain: denies pain at the site today  Photo from today's follow up wo nurse visit 11/12/24.    Photo from initial woc  nurse inpatient consult 11/11/24.     Photo from day of admission to [facility] 11/10/24.     Wound #2 Left lateral ankle, pressure ulcer vs venous stasis ulcer, see Assessment below.  Stage/tissue depth: full thickness, if pressure would be considered unstageable  0.9 cm L x 0.7 cm W x 0.5 cm D  Tunneling: none noted  Undermining: none noted  Wound bed type/amount: approximately 50 % yellow white semi moist slough and 50 % moist red tissue with no granulation present; Not fluctuant  Wound Edges: open at this time, recent history of epibole and need for silver nitrate application by provider in clinic  Periwound: dry skin, blanchable erythema with no increased warmth to touch  Drainage: small amount serous during cares  Odor: no  Pain: mild pain, pain is difficult to assess per pt, hurts but can be a generalized pain that does not present directly     Photo's from today's follow up [facility] nurse visit 11/12/24.      Photo's from initial [facility] nurse inpatient consult 11/11/24.     Photo from day of admission to [facility] 11/10/24.     Wound #3 Left lateral distal foot, unclear etiology, appears small reabsorbing blood blister, per pt she was not aware it was present till see at time of admission by Med/Surg nurse.  Stage/tissue depth: appears partial thickness  0.2 cm L x 0.2 cm W x 0 cm D  Tunneling: none  Undermining: none  Wound bed type/amount: 100 % flat scab; Not fluctuant  Wound Edges: NA  Periwound: edema,  Drainage: none  Odor: no  Pain: denies any pain at this site  Photo from today's follow up [facility] nurse visit 11/12/24.      Photo's from initial [facility] nurse inpatient consult 11/11/24.     Photo from day of admission to [facility] 11/10/24.    OSTOMY EXAM  - Abdomen, intact ostomy appliance and stoma assessed without removing the appliance today 11/12/24.  Pouch wear time: per pt about 3 days     Current pouching system: I do not have product numbers.  Appliance in place is a two piece Shiva, green/44 mm flange  connection between skin barrier wafer and pouch.  Clear pouch with lock and roll closure.  Per pt does not use any rings, paste or additional products.  Cuts hole she thinks a little larger than 1 inch.     Who changes the pouch? Pt does self cares     Participants in cares today: NA appliance not changed    Any limitations (dexterity, vision, hearing)? Appears to do without difficulty     Location: Left abdomen  Color/Moisture: red to pink, moist  Viable: yes  Size: as able to assess appears to be about 1 inch  Shape: circular  MCJ: FLACO, appliance not removed  Os: appears center with skin barrier wafer in place  Protrusion: approximately 1.5 cm with skin barrier wafer in place  Peristomal skin: FLACO skin under skin barrier wafer, abdominal skin further out is intact  Output: mush consistency stool  Abdominal profile/plane: No noted folds.    Dorsalis Pedal Pulse: is palpable: NA doppler: NA phasic  Posterior Tibial Pulse: is difficult to locate but is palpable: NA doppler: NA phasic  Hair growth: diminished knee distally  Capillary Refill: less than 3 seconds  Feet/toes color: pale pink  Nails: wnl  R Leg: Edema plus 2 pitting. Ankle circumference NA cm. Calf circumference NA cm.  L Leg: Edema plus 2 pitting. Ankle circumference NA cm. Calf circumference NA cm.     Mobility: wheelchair bound, shawna lift, can do some repositioning when in bed.  Current offloading/footwear: pt at home wears off loading foam boot at bed time on the left foot.  Sensation: altered due to spinal cord injury     Other callusing/areas of concern: None noted     Diet: Regular     Goals of Wound Healing: Left lateral ankle  - Transition from full thickness wound with nonviable tissue present to granulating stage of healing  - Maintain moist environment, with saline dampened Mesalt gauze  - Control exudate, with saline dampened Mesalt gauze and Mepilex gentle adhesive foam dressing.  - Autolytic debridement of sloughy tissue, with saline  dampened Mesalt gauze  - Protect wound from outside environment, with saline dampened Mesalt gauze and Mepilex gentle adhesive foam dressing,  - Antimicrobial, with saline dampened Mesalt gauze  - Pack open space, with saline dampened Mesalt gauze  - Promote healing by secondary intention for complete closure of wound, , with saline dampened Mesalt gauze  - Offloading/pressure reduction, will request Prevalon off loading boot for while in bed at bed time     Assessment:  Wound Assessments:  On arrival the pt has the right great toe and left lateral distal foot open to air, dry and clean.  The left lateral ankle is covered with Mepilex gentle adhesive foam dressing, removed and inner packing of Mesalt gauze was noted to be moist with small amount of serosanguinous drainage present.    All wound sites were cleansed with wound cleanser with assist from nursing student Priti, and dried well.  Right dorsal great toe small scabby abrasion present with small amount of periwound pale blanchable erythema, no signs of infection, remains present and unchanged.  Left lateral ankle wound has the appearance of an arterial ulcer or pressure injury.   Her most recent MARIA TERESA's indicated adequate arterial blood flow.  Most recent records from [place] describe this wound now as a venous ulcer but it does not appear it has improved since ablation surgery.  Wound bed is more moist today, no granular tissue noted but slightly less slough.  At initial visit I reviewed products used per [place] wound clinic charting and made a change in the plan of care for the wound to Mesalt.  No pain noted with the Mesalt, no deterioration and more moist so will continue with the current plan as listed below.    Left lateral distal foot has dry flat discoloration consistent with petechia or little blood blister, no periwound localized edema or erythema of note, no change.    For Ostomy assessment see Above:     Barriers to wound healing:   Poor  nutrition: inadequate supply of protein, carbohydrates, fatty acids, and trace elements essential for all phases of wound healing.  Pt reports very poor appetite, stress in her life and pain has limited her oral intake.    Reduced Blood Supply: inadequate perfusion to heal wound. Past MARIA TERESA information in Wound History above from outside facility, arterial flow appeared wnl.  Medication: NA  Chemotherapy: suppresses the immune system and inflammatory response, NA  Radiotherapy: increases production of free radical which damage cells, NA  Psychological stress: Related to chronic and progressive illness and family issues  Obesity: decreases tissue perfusion  Infection: prolongs inflammatory phase, uses vital nutrients, impairs epithelialization and releases toxins, none noted at the wound sites currently, did start new antimicrobial dressing on Left lateral ankle wound today.  Underlying Disease: LE edema, LE paralysis, venous flow issues s/p venous ablation   Maceration: reduces wound tensile strength and inhibits epithelial migration, none noted  Patient compliance appears motivated to heal  Unrelieved pressure, addressing with Prevalon off loading boot to be worn at bed time, same as she does at home.  Immobility, pt has LE paralysis  Substance abuse: NA     Plan:  While inpatient we will continue with daily dressing changes to the left lateral ankle wound with the Mesalt gauze saline damp.    We will leave the left lateral distal foot and right dorsal great toe wound sites open to air and monitor.  Will continue with the Prevalon foam boot for protection of the left foot when pt is in bed.    'Upon discharge' I will leave it up to the pt on what she would like to do ongoing with the left lateral ankle wound.    - I assume the current plan of care for the left lateral at [facility] and with home care is a recommended last in the [place] wound clinic with Hydrofera Blue Ready as primary dressing and a gentle  adhesive foam dressing as secondary, changed three times weekly.  - She will discuss with her daughter possibly changing her clinic for wound cares to the Wound and Ostomy clinic here at [facility].  If she does choose to change wound clinic's, she is aware that she would also need to see our DPM Dr Mcfadden so we had an in house provider over seeing the wound cares and any needed imaging or prescription medications to treat/assess.   - If she would like to discharge back to [facility] and change to use of the Mesalt as primary dressing for the left lateral ankle, that would be fine.  We will need order from discharging Hospitalist provider for Ns damp Mesalt gauze to fill left lateral ankle wound, covered/secured with Mepilex gentle adhesive foam dressing, changed MWF for the AL and Home Care to address.    For her Colostomy, if she discharges today she will likely not need any ostomy products or assist.  If she stays longer this week and can not get supplies of her's sent over for ostomy cares from [facility], woc services can assist in providing products that will work.       Interventions to Barriers:  As listed below     Topical care to Right dorsal great toe and Left lateral distal foot: Cleanse with Microklenz and gauze, pat dry. Leave sites open to air, monitor daily.  Topical care to Left lateral ankle: Cleanse with Microklenz and gauze, pat dry. Fill wound base with saline dampened Mesalt gauze cut to fit to fill wound bed. Cover/Secure with Mepilex gentle adhesive foam dressing, change daily.  Pt to wear Prevalon boot while sleeping to left foot/lower leg     Offloading: Elevate heels, use of Prevalon boot  Additional recommendations:  Cares provided as listed above.     Discussed plan of care with patient and her nurse Grant SONI. Teaching done with patient and her nurse Grant RN for dressing changes; pt is unable, will have home care and nursing assist.  Discharge instructions updated to include: As  above listed in Plan under 'Upon discharge'.     Supplies:  Left in pt's room today extra Mesalt gauze pads (in zip locked baggie), remains of the wound cleanser opened today, all other supplies will be hospital stock     WOC Return visit: TBD  Nursing to notify Provider and WOC Nurse if wound deteriorates.     Verbal, written, & demonstrative education provided.  Face to face time (excluding procedure): approximately 20 minutes.  Procedure: NA  Care plan was not changed for the Left lateral ankle wound.     Lul Pena RN cwocn  [phone]

## 2024-11-12 NOTE — PROGRESS NOTES
PRIMARY DIAGNOSIS: TIA  OUTPATIENT/OBSERVATION GOALS TO BE MET BEFORE DISCHARGE:  1. Orthostatic performed: No     2. Diagnostic testing complete & at baseline neurologic testing: No     3. Cleared by consultants (if involved): No     4. Interpretation of cardiac rhythm per telemetry tech: NSR     5. Tolerating adequate PO diet and medications: Yes     6. Return to near baseline physical activity or neurologic status: Yes

## 2024-11-12 NOTE — PROGRESS NOTES
[facility]    Medicine Progress Note - Hospitalist Service    Date of Admission:  11/10/2024    Assessment & Plan    73-year-old woman with previous history of thoracic spinal cord injury and consequent paraplegia and neuropathic pain and spasticity treated chronically with implanted intrathecal pump, suprapubic catheter and colostomy, remote history of ischemic stroke with residual chronic right homonymous hemianopsia, hypertension, hyperlipidemia, chronic skin ulcer left ankle, chronic lower extremity edema, and previous MRSA in June 2019 who is a chronic resident of [facility] presented with new onset intermittent drooling from her right mouth and dysarthria.  Due to concerns for possible stroke, she was hospitalized.        Suspect TIA with dysarthria and drooling  History of left occipital stroke with residual chronic right homonymous hemianopsia   Presented with intermittent brief but recurring episodes of dysarthria and drooling from the right side of her mouth which was new along with concern for new right facial droop at those times.  Head CT and CTA of the head and neck were negative for acute abnormalities including stroke or vascular occlusion.  Clinical symptoms have resolved lowering suspicion for acute stroke.  She has not had any arrhythmias on EKG or cardiac monitoring.  However, she does have previous history of left occipital ischemic stroke with residual chronic right homonymous hemianopsia.  Neurology has raised concern for the possibility of seizure activity.  MRI unable to be completed at this facility due to patient having intrathecal pump and bladder stimulator.  Discussed with neurology and they commended a repeat head CT.  Repeat head CT appeared stable.  Patient has had no recurrence of symptoms since admission.  -Stroke neurology team recommending:   - aspirin 325 mg daily indefinitely   - outpatient MRI w/ SWI sequence  (ordered)  - normotension, long term goal 130/80  - atorvastatin 40 mg daily; LDL goal 40-70  - outpatient sleep referral for hypoxia and apneic episodes noted during admission (ordered)  - general neurology follow up after MRI (ordered)  Consider discharging this afternoon, although assisted living unable to accept patient after 4 PM.  Will plan to discharge tomorrow.    Elevated troponin  Presented with mildly elevated troponin of 40 that has decreased to 23.  She is not known to have CAD.  She has not had anginal pain and EKG was negative for any acute ischemic changes.  -No additional evaluation for elevated troponin recommended during hospitalization unless her clinical status changes     Hypertension  Chronic hypertension treated with Toprol-XL, stable  -Continue chronic dose Toprol-XL     Hyperlipidemia  Chronic hyperlipidemia treated with atorvastatin, stable with reassuring lipid panel results at admission  -Continue chronic dose atorvastatin     Chronic skin ulcer left ankle  Chronic leg edema  Clinically, suspect chronic pressure ulcer of the left ankle which appears to be partial-thickness ulcer at this time although clinical evaluation is limited by the current presence of eschar.  She does have chronic bilateral lower extremity edema probably due to paraplegia but this appears minimal currently and is not thought to be the primary cause for her skin ulcer.  -[facility] nurse consulted for treatment recommendations  -Recommend offloading pressure to the left ankle using device     Chronic paraplegia after spinal cord injury  Chronic neuropathic pain and muscle spasticity after spinal cord injury with implanted intrathecal pump  Suprapubic catheter status  Colostomy status  Chronic paraplegia with neuropathic pain and muscle spasticity, chronic suprapubic catheter, and chronic colostomy all appear to be stable at this time.    -Continue chronic treatments including intrathecal pump, oral Lyrica and baclofen,  and Lidoderm patch     Aspirin induced platelet function defect  Chronic aspirin therapy causes platelet function defect that increases risk for bleeding.  Active bleeding not suspected and hemoglobin is stable.  -Continuing aspirin  -Monitor clinically for bleeding     Anxiety  Chronic anxiety treated with BuSpar and lorazepam as needed, stable  -Continue chronic doses of BuSpar and lorazepam    Nocturnal hypoxia  Overnight patient's oxygen saturations decreased into the 80s, appears respiratory therapy tried CPAP but patient was unable to tolerate.  Therefore she was given supplemental oxygen at 2 L overnight.  She has not needed any oxygen during the day.  Patient denies any formal diagnosis of sleep apnea, although she does report sometimes she will wake abruptly trying to catch her breath.  -Outpatient follow-up for sleep apnea, sleep study             Diet: Regular Diet Adult    DVT Prophylaxis: Pneumatic Compression Devices  Cunningham Catheter: Not present  Lines: None     Cardiac Monitoring: ACTIVE order. Indication: Stroke, acute (48 hours)  Code Status: No CPR- Do NOT Intubate      Clinically Significant Risk Factors Present on Admission                 # Drug Induced Platelet Defect: home medication list includes an antiplatelet medication   # Hypertension: Noted on problem list               # Financial/Environmental Concerns: none         Disposition Plan     Medically Ready for Discharge: Anticipated Tomorrow           The patient's care was discussed with the Patient and vascular neurology team Team.    Elena Canas CNP  Hospitalist Service  Formerly [facility]  Securely message with [employer] (more info)  Text page via [employer] Paging/Directory   ______________________________________________________________________    Interval History   No acute events overnight.  Patient denies any further symptoms of drooling or difficulty speaking.  She denies any difficulty swallowing, able to eat  breakfast well.  Denies any shortness of breath.    Physical Exam   Vital Signs: Temp: 97.7  F (36.5  C) Temp src: Oral BP: (!) 156/63 Pulse: 66   Resp: 16 SpO2: 93 % O2 Device: None (Room air) Oxygen Delivery: 2 LPM  Weight: 125 lbs 7.07 oz    General Appearance: Sitting up in chair, alert and oriented.  No acute distress  Respiratory: Respiratory effort easy, lung sounds clear  Cardiovascular: Regular rate and rhythm  GI: Abdomen soft and nontender with active bowel sounds  Neuro: Patient is alert, she is oriented although forgetful.  She is able to move upper extremities without difficulty, chronic bilateral paraplegia of lower extremities    Medical Decision Making       45 MINUTES SPENT BY ME on the date of service doing chart review, history, exam, documentation & further activities per the note.      Data     I have personally reviewed the following data over the past 24 hrs:    N/A  \   N/A   / N/A     N/A N/A N/A /  135 (H)   3.5 N/A N/A \       Imaging results reviewed over the past 24 hrs:   Recent Results (from the past 24 hours)   CT Head w/o contrast*    Narrative    CT SCAN OF THE HEAD WITHOUT CONTRAST   11/12/2024 1:51 PM     HISTORY: Stroke-like symptoms.    TECHNIQUE:  Axial images of the head and coronal reformations without  IV contrast material. Radiation dose for this scan was reduced using  automated exposure control, adjustment of the mA and/or kV according  to patient size, or iterative reconstruction technique.    COMPARISON: Head CT 11/10/2024.    FINDINGS: Chronic infarct centered in the left occipital lobe. Chronic  lacunar infarct in the anterior left basal ganglia. No acute  intracranial hemorrhage. No mass effect or midline shift. Patchy  periventricular white matter hypodensities are nonspecific, but most  likely related to chronic microvascular ischemic disease. Ventricular  size is within normal limits without evidence of hydrocephalus.    The visualized portions of the sinuses and  mastoids appear normal. The  bony calvarium and bones of the skull base appear intact.       Impression    IMPRESSION:     1. No evidence of acute intracranial hemorrhage, mass, or herniation.  2. Chronic infarct in the left occipital lobe.  3. Chronic infarct in the anterior left basal ganglia.  4. Nonspecific white matter changes which are most likely due to  chronic microvascular ischemic disease.    KATLYN BREWER MD         SYSTEM ID:  [id number]

## 2024-11-12 NOTE — MEDICATION SCRIBE - ADMISSION MEDICATION HISTORY
Medication Scribe Admission Medication History    Admission medication history is complete. The information provided in this note is only as accurate as the sources available at the time of the update.    Information Source(s): Facility ([facility]/) medication list/MAR via  NA    Pertinent Information: Med scribe review post RN review using MAR from [facility]     Changes made to PTA medication list:  Added: Procto Med perneal cream per MAR   Deleted: None  Changed: Baclofen 1.5 tabs (15 mg) changed to 1 tab (10 mg) TID per MAR   Buspirone changed to 15 mg BID per MAR     Allergies reviewed with patient and updates made in EHR: no    Medication History Completed By: DEANGELO DEL VALLE 11/12/2024 2:18 PM    PTA Med List   Medication Sig Last Dose/Taking    aspirin (ASA) 325 MG EC tablet Take 1 tablet (325 mg) by mouth daily 11/10/2024 Morning    atorvastatin (LIPITOR) 40 MG tablet Take 1 tablet (40 mg) by mouth every evening 11/9/2024 Bedtime    busPIRone (BUSPAR) 15 MG tablet Take 15 mg by mouth 2 times daily. Taking    gabapentin (NEURONTIN) 600 MG tablet Take 1,200 mg by mouth 3 times daily  11/10/2024 Morning    medication given by implanted intrathecal pump continuously. Medications in Pump:   Hydromorphone 14 mg/mL  Bupivacaine 28 mg/mL  Ziconotide 14 mcg/mL  Clinic Responsible for pump medications: [facility] [phone]  Rate:   Hydromorphone 0.291 mg/hr  Bupivacaine 0.583 mg/hr  Ziconotide 0.291 mcg/hr  Bolus Dose: (max 6 boluses/day, 2 hr lockout)  Hydromorphone 0.2 mg  Bupivacaine 0.4 mg  Ziconotide 0.2 mcg  Pump Last Fill Date:  unknown  Pump Refill Date: 11/22/24  Pump Reservoir Volume: 20 mL  Low Reservoir Alarm Date: unknown  Pump : [employer]. Model: SynchroMed II Taking    melatonin 3 MG tablet Take 3 mg by mouth nightly as needed for sleep 11/9/2024 Bedtime    metoprolol succinate ER (TOPROL-XL) 25 MG 24 hr tablet Take 12.5 mg by mouth every evening  11/10/2024 Morning    Multiple  Vitamins-Minerals (WOMENS MULTI PO) Take 1 tablet by mouth daily 11/9/2024 Evening    PROCTO-MED HC 2.5 % cream Place 1 Application rectally daily as needed for hemorrhoids or other (itch). Taking As Needed

## 2024-11-12 NOTE — PROGRESS NOTES
PRIMARY DIAGNOSIS: TIA  OUTPATIENT/OBSERVATION GOALS TO BE MET BEFORE DISCHARGE:  1. Orthostatic performed: No     2. Diagnostic testing complete & at baseline neurologic testing: No - MRI still to be completed     3. Cleared by consultants (if involved): No     4. Interpretation of cardiac rhythm per telemetry tech: NSR     5. Tolerating adequate PO diet and medications: Yes     6. Return to near baseline physical activity or neurologic status: Yes     VSS except RT noted pt to be apneic and desatting. Attempted to place on CPAP. Pt lasted about 5 min before stating she was unable to tolerate. Nurse placed on 2 L O2 NC instead. Pt also placed on cont oximetry and maintained above 90.

## 2024-11-12 NOTE — PROGRESS NOTES
Interval Vascular Neurology Note     Unable to get MRI at [facility] due to implanted devices. Repeat CT appears stable. It is still uncertain what these episodes represent. Recurrent episodes of the same symptoms would be very atypical for TIA particularly given that she doesn't have any areas of focal vessel stenosis. There is no obvious acute pathology on her CTH x 2. TTE shows EF 60%, mildly dilated left atrium, no atrial shunt.     Recommendations:   - aspirin 325 mg daily indefinitely   - outpatient MRI w/ SWI sequence (ordered)  - normotension, long term goal 130/80  - atorvastatin 40 mg daily; LDL goal 40-70  - outpatient sleep referral for hypoxia and apneic episodes noted during admission (ordered)  - general neurology follow up after MRI (ordered)    No further stroke evaluation required; we will sign off.     Rocio Astudillo NP

## 2024-11-13 ENCOUNTER — APPOINTMENT (OUTPATIENT)
Dept: CT IMAGING | Facility: CLINIC | Age: 74
DRG: 069 | End: 2024-11-13
Attending: NURSE PRACTITIONER
Payer: MEDICARE

## 2024-11-13 LAB
ALBUMIN SERPL BCG-MCNC: 3.6 G/DL (ref 3.5–5.2)
ALBUMIN UR-MCNC: NEGATIVE MG/DL
ALP SERPL-CCNC: 52 U/L (ref 40–150)
ALT SERPL W P-5'-P-CCNC: 12 U/L (ref 0–50)
ANION GAP SERPL CALCULATED.3IONS-SCNC: 11 MMOL/L (ref 7–15)
ANION GAP SERPL CALCULATED.3IONS-SCNC: 7 MMOL/L (ref 7–15)
APPEARANCE UR: ABNORMAL
AST SERPL W P-5'-P-CCNC: 20 U/L (ref 0–45)
BASE EXCESS BLDV CALC-SCNC: 6.5 MMOL/L (ref -3–3)
BILIRUB SERPL-MCNC: 0.3 MG/DL
BILIRUB UR QL STRIP: NEGATIVE
BUN SERPL-MCNC: 15.4 MG/DL (ref 8–23)
BUN SERPL-MCNC: 15.6 MG/DL (ref 8–23)
CALCIUM SERPL-MCNC: 8.3 MG/DL (ref 8.8–10.4)
CALCIUM SERPL-MCNC: 8.5 MG/DL (ref 8.8–10.4)
CHLORIDE SERPL-SCNC: 108 MMOL/L (ref 98–107)
CHLORIDE SERPL-SCNC: 108 MMOL/L (ref 98–107)
COLOR UR AUTO: YELLOW
CREAT SERPL-MCNC: 0.54 MG/DL (ref 0.51–0.95)
CREAT SERPL-MCNC: 0.6 MG/DL (ref 0.51–0.95)
EGFRCR SERPLBLD CKD-EPI 2021: >90 ML/MIN/1.73M2
EGFRCR SERPLBLD CKD-EPI 2021: >90 ML/MIN/1.73M2
ERYTHROCYTE [DISTWIDTH] IN BLOOD BY AUTOMATED COUNT: 14 % (ref 10–15)
GLUCOSE SERPL-MCNC: 105 MG/DL (ref 70–99)
GLUCOSE SERPL-MCNC: 114 MG/DL (ref 70–99)
GLUCOSE UR STRIP-MCNC: NEGATIVE MG/DL
HCO3 BLDV-SCNC: 33 MMOL/L (ref 21–28)
HCO3 SERPL-SCNC: 26 MMOL/L (ref 22–29)
HCO3 SERPL-SCNC: 28 MMOL/L (ref 22–29)
HCT VFR BLD AUTO: 33.1 % (ref 35–47)
HGB BLD-MCNC: 10.7 G/DL (ref 11.7–15.7)
HGB BLD-MCNC: 11.1 G/DL (ref 11.7–15.7)
HGB UR QL STRIP: ABNORMAL
HOLD SPECIMEN: NORMAL
KETONES UR STRIP-MCNC: NEGATIVE MG/DL
LEUKOCYTE ESTERASE UR QL STRIP: ABNORMAL
MAGNESIUM SERPL-MCNC: 2.1 MG/DL (ref 1.7–2.3)
MCH RBC QN AUTO: 31.9 PG (ref 26.5–33)
MCHC RBC AUTO-ENTMCNC: 32.3 G/DL (ref 31.5–36.5)
MCV RBC AUTO: 99 FL (ref 78–100)
MUCOUS THREADS #/AREA URNS LPF: PRESENT /LPF
NITRATE UR QL: NEGATIVE
O2/TOTAL GAS SETTING VFR VENT: 21 %
OXYHGB MFR BLDV: 36 % (ref 70–75)
PCO2 BLDV: 54 MM HG (ref 40–50)
PH BLDV: 7.39 [PH] (ref 7.32–7.43)
PH UR STRIP: 6 [PH] (ref 5–7)
PLATELET # BLD AUTO: 152 10E3/UL (ref 150–450)
PO2 BLDV: 24 MM HG (ref 25–47)
POTASSIUM SERPL-SCNC: 3.6 MMOL/L (ref 3.4–5.3)
POTASSIUM SERPL-SCNC: 3.7 MMOL/L (ref 3.4–5.3)
PROT SERPL-MCNC: 5.4 G/DL (ref 6.4–8.3)
RBC # BLD AUTO: 3.35 10E6/UL (ref 3.8–5.2)
RBC URINE: 8 /HPF
SAO2 % BLDV: 36.9 % (ref 70–75)
SODIUM SERPL-SCNC: 143 MMOL/L (ref 135–145)
SODIUM SERPL-SCNC: 145 MMOL/L (ref 135–145)
SP GR UR STRIP: 1.01 (ref 1–1.03)
TROPONIN T SERPL HS-MCNC: 34 NG/L
TROPONIN T SERPL HS-MCNC: 35 NG/L
TROPONIN T SERPL HS-MCNC: 35 NG/L
TROPONIN T SERPL HS-MCNC: 39 NG/L
UROBILINOGEN UR STRIP-MCNC: NORMAL MG/DL
WBC # BLD AUTO: 8.8 10E3/UL (ref 4–11)
WBC CLUMPS #/AREA URNS HPF: PRESENT /HPF
WBC URINE: 75 /HPF
YEAST #/AREA URNS HPF: ABNORMAL /HPF

## 2024-11-13 PROCEDURE — 250N000013 HC RX MED GY IP 250 OP 250 PS 637: Performed by: INTERNAL MEDICINE

## 2024-11-13 PROCEDURE — 85048 AUTOMATED LEUKOCYTE COUNT: CPT | Performed by: NURSE PRACTITIONER

## 2024-11-13 PROCEDURE — 81003 URINALYSIS AUTO W/O SCOPE: CPT | Performed by: NURSE PRACTITIONER

## 2024-11-13 PROCEDURE — 82805 BLOOD GASES W/O2 SATURATION: CPT | Performed by: NURSE PRACTITIONER

## 2024-11-13 PROCEDURE — 120N000001 HC R&B MED SURG/OB

## 2024-11-13 PROCEDURE — G0378 HOSPITAL OBSERVATION PER HR: HCPCS

## 2024-11-13 PROCEDURE — 85018 HEMOGLOBIN: CPT | Performed by: NURSE PRACTITIONER

## 2024-11-13 PROCEDURE — 272N000555 HC SENSOR NIRS OXIMETER, ADULT

## 2024-11-13 PROCEDURE — 84155 ASSAY OF PROTEIN SERUM: CPT | Performed by: NURSE PRACTITIONER

## 2024-11-13 PROCEDURE — 85014 HEMATOCRIT: CPT | Performed by: NURSE PRACTITIONER

## 2024-11-13 PROCEDURE — 36415 COLL VENOUS BLD VENIPUNCTURE: CPT | Performed by: NURSE PRACTITIONER

## 2024-11-13 PROCEDURE — 70450 CT HEAD/BRAIN W/O DYE: CPT | Mod: MG

## 2024-11-13 PROCEDURE — 82310 ASSAY OF CALCIUM: CPT | Performed by: NURSE PRACTITIONER

## 2024-11-13 PROCEDURE — 84484 ASSAY OF TROPONIN QUANT: CPT | Performed by: NURSE PRACTITIONER

## 2024-11-13 PROCEDURE — 94660 CPAP INITIATION&MGMT: CPT

## 2024-11-13 PROCEDURE — 87086 URINE CULTURE/COLONY COUNT: CPT | Performed by: NURSE PRACTITIONER

## 2024-11-13 PROCEDURE — 82247 BILIRUBIN TOTAL: CPT | Performed by: NURSE PRACTITIONER

## 2024-11-13 PROCEDURE — 999N000157 HC STATISTIC RCP TIME EA 10 MIN

## 2024-11-13 PROCEDURE — 99232 SBSQ HOSP IP/OBS MODERATE 35: CPT | Performed by: NURSE PRACTITIONER

## 2024-11-13 PROCEDURE — G1010 CDSM STANSON: HCPCS

## 2024-11-13 PROCEDURE — 250N000013 HC RX MED GY IP 250 OP 250 PS 637: Performed by: NURSE PRACTITIONER

## 2024-11-13 PROCEDURE — 258N000003 HC RX IP 258 OP 636: Performed by: INTERNAL MEDICINE

## 2024-11-13 PROCEDURE — 80053 COMPREHEN METABOLIC PANEL: CPT | Performed by: NURSE PRACTITIONER

## 2024-11-13 PROCEDURE — 83735 ASSAY OF MAGNESIUM: CPT | Performed by: PEDIATRICS

## 2024-11-13 RX ADMIN — SODIUM CHLORIDE 500 ML: 9 INJECTION, SOLUTION INTRAVENOUS at 05:26

## 2024-11-13 RX ADMIN — METOPROLOL SUCCINATE 12.5 MG: 25 TABLET, EXTENDED RELEASE ORAL at 20:48

## 2024-11-13 RX ADMIN — GABAPENTIN 1200 MG: 400 CAPSULE ORAL at 15:16

## 2024-11-13 RX ADMIN — FAMOTIDINE 20 MG: 20 TABLET, FILM COATED ORAL at 20:48

## 2024-11-13 RX ADMIN — ATORVASTATIN CALCIUM 40 MG: 40 TABLET, FILM COATED ORAL at 20:48

## 2024-11-13 RX ADMIN — BACLOFEN 10 MG: 10 TABLET ORAL at 15:16

## 2024-11-13 RX ADMIN — ACETAMINOPHEN 650 MG: 325 TABLET, FILM COATED ORAL at 12:30

## 2024-11-13 RX ADMIN — ACETAMINOPHEN 650 MG: 325 TABLET, FILM COATED ORAL at 22:10

## 2024-11-13 RX ADMIN — BUSPIRONE HYDROCHLORIDE 15 MG: 5 TABLET ORAL at 20:47

## 2024-11-13 RX ADMIN — GABAPENTIN 1200 MG: 400 CAPSULE ORAL at 20:48

## 2024-11-13 RX ADMIN — BUSPIRONE HYDROCHLORIDE 15 MG: 5 TABLET ORAL at 10:48

## 2024-11-13 RX ADMIN — BACLOFEN 10 MG: 10 TABLET ORAL at 20:47

## 2024-11-13 RX ADMIN — BACLOFEN 10 MG: 10 TABLET ORAL at 10:48

## 2024-11-13 RX ADMIN — ASPIRIN 325 MG: 325 TABLET, COATED ORAL at 10:48

## 2024-11-13 RX ADMIN — Medication 1 TABLET: at 10:48

## 2024-11-13 RX ADMIN — GABAPENTIN 1200 MG: 400 CAPSULE ORAL at 10:48

## 2024-11-13 RX ADMIN — FAMOTIDINE 20 MG: 20 TABLET, FILM COATED ORAL at 10:48

## 2024-11-13 NOTE — PROGRESS NOTES
[facility]    Brief Stroke Note    I was called by Elena Canas CNP on 11/13/24 regarding patient Anayeli Gagnon. Annamarie attempted using CPAP overnight due to apneic episodes.  Primary team reports that Annamarie was newly confused on a.m. rounds. On examination, no facial droop, no dysarthria or extremity weakness, but speech is nonsensical. Discussed plan as below.    Vitals  BP: 126/68   Pulse: 83   Resp: 16   Temp: 99.3  F (37.4  C)   Weight: 56.9 kg (125 lb 7.1 oz)    Impression  New acute confusion, concern for hospital delirium versus other.  Will obtain repeat CT head to evaluate for stroke.    Recommendations  - Recommend repeat CT head; please let stroke team know when completed for review  - Recommend metabolic/infectious/toxic workup per primary team   - Delirium precautions: frequent re-orientation, Optimize sleep wake cycle: up/awake/active during the day with the blinds open, minimize disruptions overnight and sedating medications (schedule at night to promote sleep if needed).  Please ensure patient has glasses, hearing aids, if necessary.      Addendum:   - Repeat CT Head reveals questionable area of subtle hypodensity and loss of gray-white differentiation involving the left anterior insula, which may reflect an evolving subacute infarct   -This finding may explanatory for some of her fluctuating neurosymptoms but suspect a component of acute hospital delirium as well  Updated Recommendations:   - Continue aspirin 325 mg daily indefinitely   - Will obtain Outpatient MRI w/ SWI sequence (ordered)  - SBP goal normotension, long term goal <130/80  - Continue atorvastatin 40 mg daily; LDL goal 40-70  - Outpatient sleep referral for hypoxia and apneic episodes noted during admission (ordered)  - General neurology follow up after MRI (ordered)  - 14 day cardiac event monitor (Zio Patch) to be mailed to patient, ordered   - Stroke education - [facility] stroke warning signs  "attached on discharge instructions      No further stroke evaluation required; we will sign off.     Case discussed with vascular neurology attending Dr. Castellanos.    My recommendations are based on the information provided over the phone by Anayeli Gagnon's in-person providers. They are not intended to replace the clinical judgment of her in-person providers. I was not requested to personally see or examine the patient at this time.     Amita Walker PA-C  Vascular Neurology    To page me or covering stroke neurology team member, click here: [facility]COM  Choose \"On Call\" tab at top, then select \"NEUROLOGY/ALL SITES\" from middle drop-down box, press Enter, then look for \"stroke\" or \"telestroke\" for your site.   "

## 2024-11-13 NOTE — PROGRESS NOTES
PRIMARY DIAGNOSIS: SYNCOPE/TIA  OUTPATIENT/OBSERVATION GOALS TO BE MET BEFORE DISCHARGE:  1. Orthostatic performed: No    2. Diagnostic testing complete & at baseline neurologic testing: Yes    3. Cleared by consultants (if involved): Yes    4. Interpretation of cardiac rhythm per telemetry tech: tele discontinued. Was NS throughout stay.    5. Tolerating adequate PO diet and medications: Yes    6. Return to near baseline physical activity or neurologic status: Yes      500 mL bolus given and pt continued sleeping with CPAP with 1-2 L O2 all night with short break during lab draw.

## 2024-11-13 NOTE — PROGRESS NOTES
PRIMARY DIAGNOSIS: SYNCOPE/TIA  OUTPATIENT/OBSERVATION GOALS TO BE MET BEFORE DISCHARGE:  1. Orthostatic performed: No    2. Diagnostic testing complete & at baseline neurologic testing: Yes    3. Cleared by consultants (if involved): Yes    4. Interpretation of cardiac rhythm per telemetry tech: tele discontinued. Was NS throughout stay.    5. Tolerating adequate PO diet and medications: Yes    6. Return to near baseline physical activity or neurologic status: Yes      Pt tolerated CPAP well. Nurse noted pt had low urinary output (only 350 mL over 12 hours) and was darker yellow and more cloudy than previous nights. Provider notified and ordered 500 mL bolus, BMP and UA. Discussed obtaining UA with charge. Pt has chronic suprapubic hudson so unable to get UA at this time.

## 2024-11-13 NOTE — PROGRESS NOTES
Care Management Follow Up    Length of Stay (days): 1    Expected Discharge Date: 11/15/2024     Concerns to be Addressed: discharge planning     Patient plan of care discussed at interdisciplinary rounds: Yes    Anticipated Discharge Disposition: Assisted Living, Home Care  Disposition Comments: Return to [facility] with Willie Aldana RN     Anticipated Discharge Services: Transportation Services    Anticipated Discharge DME: None    Patient/family educated on Medicare website which has current facility and service quality ratings: yes (for transportation)    Education Provided on the Discharge Plan: yes     Patient/Family in Agreement with the Plan: yes    Referrals Placed by CM/SW: External Care Coordination, Post Acute Facilities, Homecare    Private pay costs discussed: Not applicable    Discussed  Partnership in Safe Discharge Planning  document with patient/family: No     Handoff Completed: Yes, non-[facility] PCP: External handoff communication completed    Additional Information:  Per provider, patient is not medically ready for discharge today.  Patient is having increased confusion today and is needing to have an overnight oximetry test done.      Writer has called and spoken with patient's daughter, José Miguel.  Discussed transport for day of discharge.  José Miguel stated that family does not have a wheelchair transport van.  José Miguel stated that the plan is that José Miguel will walk along with patient (patient will be in her power wheelchair that is here at the hospital).  José Miguel stated that she walked with patient to the hospital from [facility] on 11/10/24 and she will walk with her back to [facility] on day of discharge.  José Miguel stated she does not work on Thursday and only works until 1 pm on Friday.      Reviewed patient's current home care service from Willie Aldana and going to outpatient wound care 1x per week.  Daughter stated that she has talked to [facility] and the wound care clinic in [place].  It  has been decided that the [facility] nurse can do patient's wound care 1x per week in place of patient going to the clinic in [place].  Willie Jovan will continue with their wound care services 2x per week.      Next Steps: Care Management will continue to follow and assist with discharge planning.      AYESHA Mathias  [facility]   [phone]

## 2024-11-13 NOTE — PROGRESS NOTES
PRIMARY DIAGNOSIS: SYNCOPE/TIA  OUTPATIENT/OBSERVATION GOALS TO BE MET BEFORE DISCHARGE:  1. Orthostatic performed: No    2. Diagnostic testing complete & at baseline neurologic testing: Yes    3. Cleared by consultants (if involved): Yes    4. Interpretation of cardiac rhythm per telemetry tech: tele discontinued. Was NS throughout stay.    5. Tolerating adequate PO diet and medications: Yes    6. Return to near baseline physical activity or neurologic status: Yes      Nurse discussed with patient how she had been breathing during sleep prior night (apneic with desats to 70s) and discussed benefits of CPAP. Pt agreed to try again tonight. Requested PRN Ativan which was given. Pt fell asleep with CPAP on.

## 2024-11-13 NOTE — PROGRESS NOTES
RN informed me at approx 0400 on 11/12 that she observed pt having prolonged apneic periods. Assessed pt and also observed prolonged apneic periods >20 seconds with sats dipping into the low 70's. Pt trialed on hospital CPAP at 0429 with RN at bedside to observe. Pt was on for approx 5-10 minutes and was agitated and refused to wear. Pt placed on 2L NC. Will attempt CPAP with pt again if willing.

## 2024-11-13 NOTE — PROGRESS NOTES
PRIMARY DIAGNOSIS: TIA  OUTPATIENT/OBSERVATION GOALS TO BE MET BEFORE DISCHARGE:  1. Orthostatic performed: No    2. Diagnostic testing complete & at baseline neurologic testing: Yes    3. Cleared by consultants (if involved): Yes    4. Interpretation of cardiac rhythm per telemetry tech: SR    5. Tolerating adequate PO diet and medications: Yes    6. Return to near baseline physical activity or neurologic status: Yes    Discharge Planner Nurse   Safe discharge environment identified: Yes  Barriers to discharge: No       Entered by: Chica Phillip RN 11/12/2024 6:36 PM     Please review provider order for any additional goals.   Nurse to notify provider when observation goals have been met and patient is ready for discharge.

## 2024-11-13 NOTE — PROGRESS NOTES
[facility]    Medicine Progress Note - Hospitalist Service    Date of Admission:  11/10/2024    Assessment & Plan   73-year-old woman with previous history of thoracic spinal cord injury and consequent paraplegia and neuropathic pain and spasticity treated chronically with implanted intrathecal pump, suprapubic catheter and colostomy, remote history of ischemic stroke with residual chronic right homonymous hemianopsia, hypertension, hyperlipidemia, chronic skin ulcer left ankle, chronic lower extremity edema, and previous MRSA in June 2019 who is a chronic resident of [facility] presented with new onset intermittent drooling from her right mouth and dysarthria.  Due to concerns for possible stroke, she was hospitalized.     Suspect TIA with dysarthria and drooling  History of left occipital stroke with residual chronic right homonymous hemianopsia   Possible acute encephalopathy, delirium  Presented with intermittent brief but recurring episodes of dysarthria and drooling from the right side of her mouth which was new along with concern for new right facial droop at those times.  Head CT and CTA of the head and neck were negative for acute abnormalities including stroke or vascular occlusion.  Clinical symptoms have resolved lowering suspicion for acute stroke.  She has not had any arrhythmias on EKG or cardiac monitoring.  However, she does have previous history of left occipital ischemic stroke with residual chronic right homonymous hemianopsia.  Neurology has raised concern for the possibility of seizure activity.  MRI unable to be completed at this facility due to patient having intrathecal pump and bladder stimulator.  Discussed with neurology and they commended a repeat head CT.  Repeat head CT appeared stable.  Patient has had no recurrence of symptoms since admission.  -Stroke neurology team recommending:   - aspirin 325 mg daily indefinitely   -  outpatient MRI w/ SWI sequence (ordered)  - normotension, long term goal 130/80  - atorvastatin 40 mg daily; LDL goal 40-70  - outpatient sleep referral for hypoxia and apneic episodes noted during admission (ordered)  - general neurology follow up after MRI (ordered)  Was planning to discharge on 11/12 but as it was too late in the day to discharge to assisted living she stayed another night.  11/13 patient more confused today, having increased difficulty with word finding.  She is not having any difficulty with facial droop, drooling or slurred speech.  She is asking repetitive questions.  Call out to stroke neurology team to discuss if reimaging would be necessary.  I spoke with Amita Olguin in the telestroke attending who suspect this is more likely delirium versus an acute stroke.  They recommend workup for delirium and repeat head CT this afternoon  -Will do workup including repeat head CT, VBG, CBC, CMP, UA/UC, troponin.  -Patient's medications could be causing increased confusion although no recent med changes.    Elevated troponin  Presented with mildly elevated troponin of 40 that has decreased to 23.  She is not known to have CAD.  She has not had anginal pain and EKG was negative for any acute ischemic changes.  -Given increased confusion today will recheck troponin       Hypertension  Chronic hypertension treated with Toprol-XL, stable  -Continue chronic dose Toprol-XL     Hyperlipidemia  Chronic hyperlipidemia treated with atorvastatin, stable with reassuring lipid panel results at admission  -Continue chronic dose atorvastatin     Chronic skin ulcer left ankle  Chronic leg edema  Clinically, suspect chronic pressure ulcer of the left ankle which appears to be partial-thickness ulcer at this time although clinical evaluation is limited by the current presence of eschar.  She does have chronic bilateral lower extremity edema probably due to paraplegia but this appears minimal currently and is not  thought to be the primary cause for her skin ulcer.  -[facility] nurse consulted for treatment recommendations  -Recommend offloading pressure to the left ankle using device     Chronic paraplegia after spinal cord injury  Chronic neuropathic pain and muscle spasticity after spinal cord injury with implanted intrathecal pump  Suprapubic catheter status  Colostomy status  Chronic paraplegia with neuropathic pain and muscle spasticity, chronic suprapubic catheter, and chronic colostomy all appear to be stable at this time.    -Continue chronic treatments including intrathecal pump, oral Lyrica and baclofen, and Lidoderm patch     Aspirin induced platelet function defect  Chronic aspirin therapy causes platelet function defect that increases risk for bleeding.  Active bleeding not suspected and hemoglobin is stable.  -Continuing aspirin  -Monitor clinically for bleeding     Anxiety  Chronic anxiety treated with BuSpar and lorazepam as needed, stable  -Continue chronic doses of BuSpar and lorazepam    Nocturnal hypoxia  Overnight patient's oxygen saturations decreased into the 70s, patient appears to have periods of apnea.  Last night was trialed on CPAP which she tolerated.  RT is recommending patient be discharged with nocturnal oxygen.  -Will do overnight oximetry  -Will check a VBG in the morning to ensure no CO2 retention  -Outpatient follow-up for sleep apnea, sleep study          Diet: Regular Diet Adult    DVT Prophylaxis: Pneumatic Compression Devices  Cunningham Catheter: Not present  Lines: None     Cardiac Monitoring: None  Code Status: No CPR- Do NOT Intubate      Clinically Significant Risk Factors Present on Admission          # Hyperchloremia: Highest Cl = 108 mmol/L in last 2 days, will monitor as appropriate            # Drug Induced Platelet Defect: home medication list includes an antiplatelet medication   # Hypertension: Noted on problem list               # Financial/Environmental Concerns: none          Disposition Plan     Medically Ready for Discharge: Anticipated in 2-4 Days         The patient's care was discussed with the Patient and Patient's Family.  Patient's daughter José Miguel at bedside today during visit.  Is able to attest that her mom does not seem like her usual self.  Increased difficulty word finding and some confusion.  Reviewed workup and plan of care and verbalizes understanding    Elena Canas CNP  Hospitalist Service  [facility]  Securely message with [employer] (more info)  Text page via [facility] Paging/Directory   ______________________________________________________________________    Interval History   Increased confusion, patient having difficulty word finding, asking repetitive questions.  Patient's daughter present during visit today reports this is not patient's baseline.    Physical Exam   Vital Signs: Temp: 99.3  F (37.4  C) Temp src: Oral BP: 126/68 Pulse: 83   Resp: 16 SpO2: 93 % O2 Device: None (Room air) Oxygen Delivery: 1 LPM (Trialed on 0L 02 but sats down to 89% with CPAP on.)  Weight: 125 lbs 7.07 oz    General Appearance: Patient sitting up in chair, no acute distress.  Respiratory: Respiratory effort easy at rest, lung sounds clear  Cardiovascular: Regular rate and rhythm, no murmur  GI: Abdomen soft and nontender, active bowel sounds  Skin: Warm and dry  Neuro: Patient is alert, confused.  Asking repetitive questions and having difficulty with word finding.  She is able to follow commands.  Speech is clear.  Facial movements symmetric and face symmetric at rest, no dysarthria or tongue deviation.  No drift to bilateral upper extremities, equal hand grasp.    Medical Decision Making       45 MINUTES SPENT BY ME on the date of service doing chart review, history, exam, documentation & further activities per the note.      Data     I have personally reviewed the following data over the past 24 hrs:    8.8  \   10.7 (L)   / 152     145 108 (H) 15.4 /  105  (H)   3.6 26 0.54 \       Imaging results reviewed over the past 24 hrs:   No results found for this or any previous visit (from the past 24 hours).

## 2024-11-13 NOTE — PROGRESS NOTES
Rounded with morning Drs and providers. Pt will receive an overnight O2 study to evaluate for O2 needs.   Thank you

## 2024-11-13 NOTE — PROGRESS NOTES
Pt willing to trial CPAP tonight and placed on hospital unit of 6-16 Auto Titrate. Pt slept well with CPAP needing 1-2L 02 with sats in the low 90's. Achieving CPAP levels of 10-12 with HOB elevated and on her left side. If discharging, I would absolutely recommend she goes home with oxygen to use while sleeping until a sleep study can be performed. Respiratory can perform an overnight oximetry study if required.

## 2024-11-13 NOTE — PHARMACY-CONSULT NOTE
Pharmacy Consult to evaluate for medication related stroke core measures    Anayeli Gagnon, 73 year old female admitted for possible TIA on 11/10/2024.    Thrombolytic was not given because of Clinical contraindications.  No occlusion was seen on head and neck CT.    VTE Prophylaxis SCDs /PCDs placed on 11/12, as appropriate prior to end of hospital day 2.    Antithrombotic: aspirin started on 11/13, as appropriate by end of hospital day 2. Continue antithrombotic therapy on discharge to meet quality measures, unless contraindicated.    Anticoagulation if history of A-fib/flutter: Patient does not have history of A-fib/flutter - anticoagulation not required for medication related stroke core measures.     LDL Cholesterol Calculated   Date Value Ref Range Status   11/10/2024 41 <100 mg/dL Final   11/17/2011 132 (H) 0 - 129 mg/dL Final     Comment:     LDL Cholesterol is the primary guide to therapy: LDL-cholesterol goal in high   risk patients is <100 mg/dL and in very high risk patients is <70 mg/dL.       Patient currently receiving Lipitor (atorvastatin) continue statin on discharge to meet quality measures, unless contraindicated.    Recommendations: None at this time    Thank you for the consult.    Nora Agosto [facility] 11/13/2024 8:10 AM

## 2024-11-14 ENCOUNTER — ORDERS ONLY (AUTO-RELEASED) (OUTPATIENT)
Dept: MEDSURG UNIT | Facility: CLINIC | Age: 74
End: 2024-11-14
Payer: MEDICARE

## 2024-11-14 VITALS
OXYGEN SATURATION: 97 % | RESPIRATION RATE: 16 BRPM | BODY MASS INDEX: 21.42 KG/M2 | DIASTOLIC BLOOD PRESSURE: 80 MMHG | WEIGHT: 125.44 LBS | HEART RATE: 81 BPM | HEIGHT: 64 IN | SYSTOLIC BLOOD PRESSURE: 176 MMHG | TEMPERATURE: 97.5 F

## 2024-11-14 DIAGNOSIS — R29.818 TRANSIENT NEUROLOGICAL SYMPTOMS: ICD-10-CM

## 2024-11-14 LAB
ANION GAP SERPL CALCULATED.3IONS-SCNC: 7 MMOL/L (ref 7–15)
BASE EXCESS BLDV CALC-SCNC: 5 MMOL/L (ref -3–3)
BUN SERPL-MCNC: 11.2 MG/DL (ref 8–23)
CALCIUM SERPL-MCNC: 8.4 MG/DL (ref 8.8–10.4)
CHLORIDE SERPL-SCNC: 110 MMOL/L (ref 98–107)
CREAT SERPL-MCNC: 0.56 MG/DL (ref 0.51–0.95)
EGFRCR SERPLBLD CKD-EPI 2021: >90 ML/MIN/1.73M2
GLUCOSE SERPL-MCNC: 101 MG/DL (ref 70–99)
HCO3 BLDV-SCNC: 31 MMOL/L (ref 21–28)
HCO3 SERPL-SCNC: 26 MMOL/L (ref 22–29)
HGB BLD-MCNC: 10.8 G/DL (ref 11.7–15.7)
MAGNESIUM SERPL-MCNC: 1.9 MG/DL (ref 1.7–2.3)
O2/TOTAL GAS SETTING VFR VENT: 28 %
OXYHGB MFR BLDV: 81 % (ref 70–75)
PCO2 BLDV: 53 MM HG (ref 40–50)
PH BLDV: 7.38 [PH] (ref 7.32–7.43)
PO2 BLDV: 49 MM HG (ref 25–47)
POTASSIUM SERPL-SCNC: 3.6 MMOL/L (ref 3.4–5.3)
SAO2 % BLDV: 82.8 % (ref 70–75)
SODIUM SERPL-SCNC: 143 MMOL/L (ref 135–145)
TROPONIN T SERPL HS-MCNC: 40 NG/L
TROPONIN T SERPL HS-MCNC: 40 NG/L

## 2024-11-14 PROCEDURE — 36415 COLL VENOUS BLD VENIPUNCTURE: CPT | Performed by: NURSE PRACTITIONER

## 2024-11-14 PROCEDURE — 94762 N-INVAS EAR/PLS OXIMTRY CONT: CPT

## 2024-11-14 PROCEDURE — 80048 BASIC METABOLIC PNL TOTAL CA: CPT | Performed by: NURSE PRACTITIONER

## 2024-11-14 PROCEDURE — 82805 BLOOD GASES W/O2 SATURATION: CPT | Performed by: NURSE PRACTITIONER

## 2024-11-14 PROCEDURE — 250N000013 HC RX MED GY IP 250 OP 250 PS 637: Performed by: NURSE PRACTITIONER

## 2024-11-14 PROCEDURE — 84484 ASSAY OF TROPONIN QUANT: CPT | Performed by: NURSE PRACTITIONER

## 2024-11-14 PROCEDURE — 99239 HOSP IP/OBS DSCHRG MGMT >30: CPT | Performed by: NURSE PRACTITIONER

## 2024-11-14 PROCEDURE — 250N000013 HC RX MED GY IP 250 OP 250 PS 637: Performed by: INTERNAL MEDICINE

## 2024-11-14 PROCEDURE — 85018 HEMOGLOBIN: CPT | Performed by: NURSE PRACTITIONER

## 2024-11-14 PROCEDURE — 999N000157 HC STATISTIC RCP TIME EA 10 MIN

## 2024-11-14 PROCEDURE — 250N000011 HC RX IP 250 OP 636: Performed by: FAMILY MEDICINE

## 2024-11-14 PROCEDURE — 83735 ASSAY OF MAGNESIUM: CPT | Performed by: NURSE PRACTITIONER

## 2024-11-14 RX ORDER — BACLOFEN 10 MG/1
10 TABLET ORAL 3 TIMES DAILY
Status: SHIPPED
Start: 2024-11-14

## 2024-11-14 RX ORDER — CIPROFLOXACIN 250 MG/1
250 TABLET, FILM COATED ORAL EVERY 12 HOURS SCHEDULED
Status: DISCONTINUED | OUTPATIENT
Start: 2024-11-14 | End: 2024-11-14 | Stop reason: HOSPADM

## 2024-11-14 RX ORDER — CIPROFLOXACIN 250 MG/1
250 TABLET, FILM COATED ORAL EVERY 12 HOURS
Qty: 9 TABLET | Refills: 0 | Status: SHIPPED | OUTPATIENT
Start: 2024-11-14 | End: 2024-11-19

## 2024-11-14 RX ORDER — CEFTRIAXONE 1 G/1
1 INJECTION, POWDER, FOR SOLUTION INTRAMUSCULAR; INTRAVENOUS EVERY 24 HOURS
Status: DISCONTINUED | OUTPATIENT
Start: 2024-11-14 | End: 2024-11-14 | Stop reason: HOSPADM

## 2024-11-14 RX ADMIN — CIPROFLOXACIN HYDROCHLORIDE 250 MG: 250 TABLET, FILM COATED ORAL at 10:18

## 2024-11-14 RX ADMIN — BUSPIRONE HYDROCHLORIDE 15 MG: 5 TABLET ORAL at 08:02

## 2024-11-14 RX ADMIN — GABAPENTIN 1200 MG: 400 CAPSULE ORAL at 08:02

## 2024-11-14 RX ADMIN — ASPIRIN 325 MG: 325 TABLET, COATED ORAL at 08:02

## 2024-11-14 RX ADMIN — ACETAMINOPHEN 650 MG: 325 TABLET, FILM COATED ORAL at 09:25

## 2024-11-14 RX ADMIN — CEFTRIAXONE SODIUM 1 G: 1 INJECTION, POWDER, FOR SOLUTION INTRAMUSCULAR; INTRAVENOUS at 01:13

## 2024-11-14 RX ADMIN — Medication 1 TABLET: at 08:03

## 2024-11-14 RX ADMIN — BACLOFEN 10 MG: 10 TABLET ORAL at 08:02

## 2024-11-14 RX ADMIN — FAMOTIDINE 20 MG: 20 TABLET, FILM COATED ORAL at 08:02

## 2024-11-14 ASSESSMENT — ACTIVITIES OF DAILY LIVING (ADL)
ADLS_ACUITY_SCORE: 0

## 2024-11-14 NOTE — PROGRESS NOTES
S-(situation): Patient discharged to [facility] via wheelchair with daughter    B-(background): TIA    A-(assessment): Alert and oriented x 4, forgetful. Some difficulty finding words early this AM. Self administering pain medication via pain pump. Some nausea after breakfast, resolved without intervention. Suprapubic cath with good output. Colostomy intact. Electrolyes in range.    R-(recommendations): Discharge instructions reviewed with patient. Listed belongings gathered and returned to patient.          Discharge Nursing Criteria:   Patient Education given and documented: (STROKE, CHF, Diabetes):  {Yes   Care Plan and Patient education resolved: Yes    New Medications- pt has been educated about purpose and side effects: Yes      MISC  Prescriptions if needed, hard copies sent with patient  Yes  Home medications returned to patient: Yes  Medication Bin checked and emptied on discharge Yes  Patient reports post-discharge pain management plan is effective: Yes

## 2024-11-14 NOTE — PROGRESS NOTES
Video-Visit Details    Type of service:  Video Visit     Originating Location (pt. Location): Home    Distant Location (provider location):  On-site  Platform used for Video Visit: [facility]     Date of visit: 11/15/2024      Assessment:   Anayeli Gagnon is a 73 year old female with a past medical history significant for thoracolumbar pain, neuropathic pain, OA, headache, CVA, SCI T7-12, incomplete SCI T1-6, HTN, muscle spasticity, anxiety/depression, s/p lumbar decompression 2019, s/p ITP implant 2022, who presents with complaints of widespread pain.      Widespread pain - Etiology multifactorial, hx of SCI. Symptoms consistent with neuropathic process, underlying degenerative/postsurgical changes of spine, suspect SI mediated component, overlying myofascial component.      Assigned to [facility] nursing team.    Visit Diagnoses:  1. Presence of intrathecal pump    2. Neuropathic pain    3. 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 Spinal cord injury at T7-T12 level (H)        Plan:  Diagnosis reviewed, treatment option addressed, and risk/benifits discussed.  Self-care instructions given.  I am recommending a multidisciplinary treatment plan to help this patient better manage their pain.    \F2  Physical Therapy:  Discussed PT referral at prior visit, not interested.      Pain Psychology: Not at this time.      Diagnostic Studies:  No new orders today.      Medication Management:   IT pump - hydromorphone, bupivacaine, ziconitide. Established with [facility] for home management. Dose adjustments recommended today, will increase basal rate by hydromorphone 0.6 mg ( drug), no changes to PTM dose.   Baclofen - current dose 10-15 mg three times daily. Appreciates some degree of benefit, no reported side effects.   Acetaminophen - 1000 mg three times daily. Appreciates some degree of benefit, no side effects.   Gabapentin 1200 mg  three times daily. Appreciates benefit, no side effects reported.   Recommended Lyrica trial at prior visits, though she decided against pursuing new medication.      Potential procedures:   None      Other Orders/Referrals:   [facility] - RNCC will coordinate dose adjustments.      Follow up with EMELI Jerez CNP around 3-4 weeks, about 2 weeks after pump dose adjustment       Review of Electronic Chart: Today I have also reviewed available medical information in the patient's medical record at [facility] ([facility]) and Care Everywhere (if available), including relevant provider notes, laboratory work, and imaging.     Santa Wallace, KIRAN, EMELI, AGNP-C  [facility] Pain Management     -------------------------------------------------    Subjective:    Chief complaint:   Chief Complaint   Patient presents with    Pain    RECHECK     Follow up with pain       Interval history:  Anayeli Gagnon is a 73 year old female last seen on 11/1/24.      Recommendations/plan at the last visit included:  Physical Therapy:  Discussed PT referral at prior visit, not interested.      Pain Psychology: Not at this time.      Diagnostic Studies:  No new orders today.      Medication Management:   IT pump - hydromorphone, bupivacaine, ziconitide. Established with [facility] for home management. Dose adjustments recommended today, will increase basal rate by hydromorphone 0.6 mg ( drug), no changes to PTM dose.   Baclofen - current dose 10-15 mg three times daily. Appreciates some degree of benefit, no reported side effects.   Acetaminophen - 1000 mg three times daily. Appreciates some degree of benefit, no side effects.   Gabapentin 1200 mg three times daily. Appreciates benefit, no side effects reported.   Recommended Lyrica trial at prior visits, though she decided against pursuing new medication.      Potential procedures:   None      Other Orders/Referrals:   [facility] - RNCC will coordinate dose adjustments.      Follow  up with EMELI Jerez CNP in 6-8 weeks (around 2 weeks after pump dose adjustments).     Since her last visit, Anayeli KARYN Gagnon reports:  -Recent hospital admission due to TIA.   -She did not get pump dose adjustments yet.   -[facility] will be coming out to see her next week.   -She is having more pain since hospitalization.   -She confirms she can still have pump dose increase.   -Continues gabapentin, baclofen, APAP.       Pain Information:   Pain rating: Severe Pain (6)      HPI/Interval history from last visit:  -She reports pain is the same and not any better. Overall not well controlled.   -She is interested in slight increase in the pump basal rate. She verifies she will continue to get 6 PTM doses.   -She has moved AL facilities. She is now in [facility] in [place].  -She has been at this new facility for 1 week, things are going well so far.         Current Pain Treatments:    Gabapentin 1200 mg TID  Baclofen 15 mg 2-3 x daily PRN  Acetaminophen 1000 mg TID                   Intrathecal Pump- 20 mL syringe     Concentrations:   Dilaudid 14 mg/ml                                                          Bupivacaine 28 mg/ml   Ziconitide 14 mcg/ml      Total Volume in Refill: 20 mL     Basal:   Dilaudid  7.6 mg/day                                                       Bupivacaine 15.2 mg/day   Ziconitide 7.6 mcg/day      PTM 0.2 hydromorphone, 2 hour lockout, maximum 6 in a day.         Current MME: N/A     Review of [place] Prescription Monitoring Program (): No concern for abuse or misuse of controlled medications based on this report. Reviewed - appears appropriate.      Annual Controlled Substance Agreement/UDS due date: N/A     Past pain treatments:  Lyrica 150 mg TID       Medications:  Current Outpatient Medications   Medication Sig Dispense Refill    acetaminophen (TYLENOL) 500 MG tablet Take 1,000 mg by mouth every 6 hours as needed for mild pain.      aspirin (ASA) 325 MG EC tablet  Take 1 tablet (325 mg) by mouth daily 30 tablet 11    atorvastatin (LIPITOR) 40 MG tablet Take 1 tablet (40 mg) by mouth every evening 30 tablet 3    baclofen (LIORESAL) 10 MG tablet Take 1 tablet (10 mg) by mouth 3 times daily.      busPIRone (BUSPAR) 15 MG tablet Take 15 mg by mouth 2 times daily.      ciprofloxacin (CIPRO) 250 MG tablet Take 1 tablet (250 mg) by mouth every 12 hours for 9 doses. 9 tablet 0    gabapentin (NEURONTIN) 600 MG tablet Take 1,200 mg by mouth 3 times daily  1 tablet 0    lidocaine (LIDODERM) 5 % patch Apply 1 patch topically daily as needed       LORazepam (ATIVAN) 0.5 MG tablet Take 1 tablet by mouth daily as needed for anxiety      medication given by implanted intrathecal pump continuously. Medications in Pump:   Hydromorphone 14 mg/mL  Bupivacaine 28 mg/mL  Ziconotide 14 mcg/mL  Clinic Responsible for pump medications: [employer] [phone]  Rate:   Hydromorphone 0.291 mg/hr  Bupivacaine 0.583 mg/hr  Ziconotide 0.291 mcg/hr  Bolus Dose: (max 6 boluses/day, 2 hr lockout)  Hydromorphone 0.2 mg  Bupivacaine 0.4 mg  Ziconotide 0.2 mcg  Pump Last Fill Date:  unknown  Pump Refill Date: 11/22/24  Pump Reservoir Volume: 20 mL  Low Reservoir Alarm Date: unknown  Pump : Venyo. Model: BlockSpring II      melatonin 3 MG tablet Take 3 mg by mouth nightly as needed for sleep      metoprolol succinate ER (TOPROL-XL) 25 MG 24 hr tablet Take 12.5 mg by mouth every evening       Multiple Vitamins-Minerals (WOMENS MULTI PO) Take 1 tablet by mouth daily      ondansetron (ZOFRAN) 4 MG tablet Take 1 tablet (4 mg) by mouth every 6 hours as needed for nausea 20 tablet 1    polyethylene glycol (MIRALAX) 17 GM/Dose powder Take 17 g by mouth daily as needed for constipation       pregabalin (LYRICA) 150 MG capsule Take 1 capsule (150 mg) by mouth 3 times daily Stop gabapentin. 90 capsule 1    PROCTO-MED HC 2.5 % cream Place 1 Application rectally daily as needed for hemorrhoids or other  (itch).         Medical History: any changes in medical history since they were last seen? Yes - hospital admission r/t TIA      Objective:    Physical Exam:  GENERAL: alert and no distress  EYES: Eyes grossly normal to inspection.  No discharge or erythema, or obvious scleral/conjunctival abnormalities.  RESP: No audible wheeze, cough, or visible cyanosis.    SKIN: Visible skin clear. No significant rash, abnormal pigmentation or lesions.  NEURO: Cranial nerves grossly intact.  Mentation and speech appropriate for age.  PSYCH: Appropriate affect, tone, and pace of words     Diagnostic Tests/Imaging/Labs:  Personally reviewed last BMP from 11/14/24.     BILLING TIME DOCUMENTATION:   The total TIME spent on this patient on the date of the encounter/appointment was 15 minutes.      TOTAL TIME includes:   Time spent preparing to see the patient (reviewing records and tests)   Time spent face to face (or over the phone) with the patient   Time spent ordering tests, medications, procedures and referrals   Time spent Referring and communicating with other healthcare professionals   Time spent documenting clinical information in Epic

## 2024-11-14 NOTE — PROGRESS NOTES
4 hour shirt note-   Patient alert to self and situation. Using pain pump and able to give bolus. Nocturnal sleep study in progress. PRN tylenol. Clear urine. Ostomy and suprapubic catheter.

## 2024-11-14 NOTE — PROGRESS NOTES
Time spent with home O2 download which is currently unsuccessful due to technical issues. Pt did drop SPO2 below 88% over night requiring up to 3L NC to maintain SPO2 greater than 90%. Provider has been updated of complications with download but the results of the testing.   RT will continue to work with IT and getting test into the pt chart for [facility] to approve and set up home O2.  Thank you

## 2024-11-14 NOTE — PLAN OF CARE
Goal Outcome Evaluation:      Plan of Care Reviewed With: patient    Overall Patient Progress: no changeOverall Patient Progress: no change    Outcome Evaluation: Pt. alert to self only and situation, forgetful. Had repeat head CT, see results and note for plan. Neuros intact. Poor food intake, pushing fluids. Catheter changed to obtain urine culture, patent, adequate UOP. Colostomy bag intact, no output this shift. Daughters at bedside, supportive. Plan for overnight oximetry test and D/C to [facility] tomorrow.

## 2024-11-14 NOTE — PROGRESS NOTES
Care Management Discharge Note    Discharge Date: 11/14/2024       Discharge Disposition: Assisted Living, Home Care    Discharge Services: Transportation Services    Discharge DME: None    Discharge Transportation: agency, public transportation (Patient interested in possibly using the [employer] bus for transport)    Private pay costs discussed: Not applicable    Does the patient's insurance plan have a 3 day qualifying hospital stay waiver?  No    PAS Confirmation Code:    Patient/family educated on Medicare website which has current facility and service quality ratings: yes (for transportation)    Education Provided on the Discharge Plan:    Persons Notified of Discharge Plans: patient, daughter merlin  Patient/Family in Agreement with the Plan: yes    Handoff Referral Completed: Yes, non-[facility] PCP: External handoff communication completed    Additional Information:  Per NP at IDT rounds pt is medically stable for discharge today. Per NP pt will discharge with new antibiotic and nighttime O2. O2 to be delivered to pts apartment at [facility].    LISBETH called and spoke with Lisa SONI at [facility] and informed of discharge and plan.  denied concerns regarding pts return, new meds to be sent to [facility] pharmacy.    LISBETH spoke with pts daughter José Miguel who is planning to walk patient back to her [facility] at 11am.    CM updated HUC and charge RN and met bedside with pt to confirm plan.    LISBETH called and spoke with intake at [facility] to provide update on discharge. Intake requesting orders be faxed to [phone]    Plan: Return to [facility] with [facility] RN    Transport: Daughter-pt has own wheelchair at hospital    Radha Maldonado, MUSA  Care Transitions Registered Nurse

## 2024-11-14 NOTE — DISCHARGE SUMMARY
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  Follow up with primary care provider, SHAY YEPEZ, within 7 days for hospital follow- up.  The following labs/tests are recommended: Hemoglobin.    2.  Follow-up with neurology, referral placed  3.  Sleep study recommended, referral placed  4.  Follow-up MRI, referral placed                Unresulted Labs Ordered in the Past 30 Days of this Admission       Date and Time Order Name Status Description    11/13/2024  6:49 PM Urine Culture In process         These results will be followed up by hospitalist    Discharge Disposition   Discharged to assisted living  Condition at discharge: Stable    Hospital Course   73-year-old woman with previous history of thoracic spinal cord injury and consequent paraplegia and neuropathic pain and spasticity treated chronically with implanted intrathecal pump, suprapubic catheter and colostomy, remote history of ischemic stroke with residual chronic right homonymous hemianopsia, hypertension, hyperlipidemia, chronic skin ulcer left ankle, chronic lower  extremity edema, and previous MRSA in June 2019 who is a chronic resident of [facility] living [facility] presented with new onset intermittent drooling from her right mouth and dysarthria.  Due to concerns for possible stroke, she was hospitalized.     Suspect TIA with dysarthria and drooling  History of left occipital stroke with residual chronic right homonymous hemianopsia   Possible acute encephalopathy, delirium  Presented with intermittent brief but recurring episodes of dysarthria and drooling from the right side of her mouth which was new along with concern for new right facial droop at those times.  Head CT and CTA of the head and neck were negative for acute abnormalities including stroke or vascular occlusion.  Clinical symptoms have resolved lowering suspicion for acute stroke.  She has not had any arrhythmias on EKG or cardiac monitoring.  However, she does have previous history of left occipital ischemic stroke with residual chronic right homonymous hemianopsia.  Neurology has raised concern for the possibility of seizure activity.  MRI unable to be completed at this facility due to patient having intrathecal pump and bladder stimulator.  Discussed with neurology and they commended a repeat head CT.  11/12 repeat head CT appeared stable.   Was planning to discharge on 11/12 but as it was too late in the day to discharge to assisted living she stayed another night.  11/13 patient more confused today, having increased difficulty with word finding.  She is not having any difficulty with facial droop, drooling or slurred speech.  She is asking repetitive questions.  Call out to stroke neurology team to discuss if reimaging would be necessary.  I spoke with Amita Olguin in the telestroke attending who suspect this is more likely delirium versus an acute stroke.  They recommend workup for delirium and repeat head CT.  Repeat head CT reveals questional area of subtle hypodensity and loss of  gray-white differentiation involving the left anterior insula, which may reflect an evolving subacute stroke.  Spoke with vascular neurology team again, recommendations remain the same as before with the exception of adding on a 14-day cardiac event monitor.  Also noted to have a decrease in hemoglobin that was stable overnight.  Urine appeared positive for possible urinary tract infection and patient was started on Rocephin overnight.  Nursing reports patient cognitively and neurologically stable overnight.  Today on assessment patient continues to have some word finding difficulty, seems worse when she is uncomfortable as at this time we are trying to get her pain pump to work correctly.  Stroke neurology team recommending:  --Continue aspirin 325 mg daily indefinitely   - Will obtain Outpatient MRI w/ SWI sequence (ordered)  - SBP goal normotension, long term goal <130/80  - Continue atorvastatin 40 mg daily; LDL goal 40-70  - Outpatient sleep referral for hypoxia and apneic episodes noted during admission (ordered)  - General neurology follow up after MRI (ordered)  - 14 day cardiac event monitor (Zio Patch) to be mailed to patient, ordered   - Stroke education - [facility] stroke warning signs attached on discharge instructions      Elevated troponin  Presented with mildly elevated troponin of 40 that has decreased to 23.  She is not known to have CAD.  She has not had anginal pain and EKG was negative for any acute ischemic changes.  Follow-up troponins due to change in cognitive status showed elevated troponin although stable overnight.  Not highly suspicious for acute coronary syndrome.   patient not having any further symptoms, no further workup indicated      Probable urinary tract infection  2 to acute confusion urine sample obtained from clean catheter.  Urinalysis concerning for infection therefore was given 1 dose of Rocephin overnight.  Patient has remained afebrile.  -Reviewed previous urine culture from  November 2023 and discussed with pharmacy.  Will discharge on oral Cipro 250 mg twice daily x 5 days  -Will monitor for urine culture results and make changes if needed    Anemia  Repeated CBC due to acute confusion.  Patient noted to have a decrease in her hemoglobin from a baseline of 12, to 10.7 on 11/13.  Patient has no signs of active bleeding.  Patient's hemoglobin stable overnight  -Follow-up hemoglobin at primary care provider visit     Hypertension  Chronic hypertension treated with Toprol-XL.  Mostly stable with some episodes of increased blood pressure.  Goal blood pressure in context of acute stroke is less than 130/80  -Continue chronic dose Toprol-XL  -Follow-up with primary care provider     Hyperlipidemia  Chronic hyperlipidemia treated with atorvastatin, stable with reassuring lipid panel results at admission  -Continue chronic dose atorvastatin     Chronic skin ulcer left ankle  Chronic leg edema  Clinically, suspect chronic pressure ulcer of the left ankle which appears to be partial-thickness ulcer at this time although clinical evaluation is limited by the current presence of eschar.  She does have chronic bilateral lower extremity edema probably due to paraplegia but this appears minimal currently and is not thought to be the primary cause for her skin ulcer.  Patient was seen by wound care nurse while inpatient and wound care recommendations made.  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    -Continue chronic treatments including intrathecal pump, oral Lyrica and baclofen, and Lidoderm patch     Aspirin induced platelet function defect  Chronic aspirin therapy causes platelet function defect that increases risk for bleeding.  Noted to have a decrease in her hemoglobin, stable overnight.  No signs of active bleeding  -Continuing aspirin  -Monitor clinically for bleeding     Anxiety  Chronic anxiety treated with BuSpar and lorazepam as needed, stable  -Continue chronic doses of BuSpar and lorazepam    Nocturnal hypoxia  Overnight patient's oxygen saturations decreased into the 70s, patient appears to have periods of apnea.  RT is recommending patient be discharged with nocturnal oxygen.  Patient completed overnight oximetry last night.  Needing 3 L of oxygen.    -Will discharge with oxygen 3 L overnight  -Outpatient follow-up for sleep apnea, sleep study    Consultations This Hospital Stay   NEUROLOGY IP STROKE CONSULT  SPEECH LANGUAGE PATH ADULT IP CONSULT  PHARMACY IP CONSULT  PHARMACY IP CONSULT  PHARMACY IP CONSULT  PHYSICAL THERAPY ADULT IP CONSULT  OCCUPATIONAL THERAPY ADULT IP CONSULT  REHAB ADMISSIONS LIAISON IP CONSULT  CARE MANAGEMENT / SOCIAL WORK IP CONSULT  WOUND OSTOMY CONTINENCE NURSE  IP CONSULT    Code Status   No CPR- Do NOT Intubate    Time Spent on this Encounter   I, Elena Canas CNP, personally saw the patient today and spent greater than 30 minutes discharging this patient.       Elena Canas CNP  [facility] 2A MEDICAL SURGICAL  [address] [place] [address]  Phone: [phone]  ______________________________________________________________________    Physical Exam   Vital Signs: Temp: 97.5  F (36.4  C) Temp src: Oral BP: (!) 176/80 Pulse: 81   Resp: 16 SpO2: 97 % O2 Device: None (Room air) Oxygen Delivery: 3 LPM  Weight: 125 lbs 7.07 oz    General Appearance:  Patient sitting up in chair, appears mildly uncomfortable.  Reports her  pain pump battery has depleted.  Respiratory: Respiratory effort easy at rest, lung sounds clear  Cardiovascular: Regular rate and rhythm, no murmur  GI: Abdomen soft and nontender, active bowel sounds  Skin: Warm and dry  Neuro: Patient is alert, able to answer questions appropriately.  Oriented x 3.  Is having some difficulty with word finding.  She is able to follow commands.  Speech is clear.  Facial movements symmetric and face symmetric at rest, no dysarthria.       Primary Care Physician   SHAY YEPEZ    Discharge Orders      MR Brain w/o & w Contrast    Please include SWI sequence     Adult Neurology  Referral      Adult Sleep Eval & Management Referral      Reason for your hospital stay    You were hospitalized due to symptoms concerning for stroke.     Follow-up and recommended labs and tests     1.  Follow up with primary care provider, SHAY YEPEZ, within 7 days for hospital follow- up.  The following labs/tests are recommended: Hemoglobin.    2.  Follow-up with neurology, referral placed  3.  Sleep study recommended, referral placed  4.  Follow-up MRI, referral placed     Activity    Your activity upon discharge: activity as tolerated     Resume Home Care Services    [facility], RN     Wound care and dressings    Topical care to Right dorsal great toe and Left lateral distal foot: Cleanse with Microklenz and gauze, pat dry. Leave sites open to air, monitor daily.  Topical care to Left lateral ankle: Cleanse with Microklenz and gauze, pat dry. Fill wound base with saline dampened Mesalt gauze cut to fit to fill wound bed. Cover/Secure with Mepilex gentle adhesive foam dressing, change daily.  Pt to wear Prevalon boot while sleeping to left foot/lower leg     Offloading: Elevate heels, use of Prevalon boot     Oxygen Adult/Peds    Oxygen Documentation  I certify that this patient, Anayeli Gagnon has been under my care (or a nurse practitioner or physican's assistant  working with me). This is the face-to-face encounter for oxygen medical necessity.      At the time of this encounter, I have reviewed the qualifying testing and have determined that supplemental oxygen is reasonable and necessary and is expected to improve the patient's condition in a home setting.         Patient has continued oxygen desaturation due to nocturnal hypoxia.    If portability is ordered, is the patient mobile within the home? yes    Was this visit performed as a telehealth visit: No     Diet    Follow this diet upon discharge: Regular     ZIO PATCH MAIL OUT     ZIO PATCH MAIL OUT       Significant Results and Procedures   Most Recent 3 CBC's:  Recent Labs   Lab Test 11/14/24  0543 11/13/24  2209 11/13/24  1433 11/11/24  0603 11/10/24  1842   WBC  --   --  8.8 5.9 7.3   HGB 10.8* 11.1* 10.7* 12.4 11.9   MCV  --   --  99 95 97   PLT  --   --  152 168 180     Most Recent 3 BMP's:  Recent Labs   Lab Test 11/14/24  0543 11/13/24  1433 11/13/24  0529    143 145   POTASSIUM 3.6 3.7 3.6   CHLORIDE 110* 108* 108*   CO2 26 28 26   BUN 11.2 15.6 15.4   CR 0.56 0.60 0.54   ANIONGAP 7 7 11   ADAN 8.4* 8.3* 8.5*   * 114* 105*   ,   Results for orders placed or performed during the hospital encounter of 11/10/24   CT Head w/o Contrast    Narrative    EXAM: CT HEAD W/O CONTRAST  LOCATION: [facility]  DATE: 11/10/2024    INDICATION: Code Stroke to evaluate for potential thrombolysis and thrombectomy. PLEASE READ IMMEDIATELY.  COMPARISON: September 11, 2023  TECHNIQUE: Routine CT Head without IV contrast. Multiplanar reformats. Dose reduction techniques were used.    FINDINGS:  INTRACRANIAL CONTENTS: No intracranial hemorrhage, extraaxial collection, or mass effect.  No CT evidence of acute infarct. Remote infarct at the left parietal occipital junction. Remote lacunar infarct left basal ganglia. Mild presumed chronic small   vessel ischemic changes. Mild generalized volume  loss. No hydrocephalus.     VISUALIZED ORBITS/SINUSES/MASTOIDS: Prior bilateral cataract surgery. Visualized portions of the orbits are otherwise unremarkable. No paranasal sinus mucosal disease. No middle ear or mastoid effusion.    BONES/SOFT TISSUES: No acute abnormality.      Impression    IMPRESSION:  1.  No acute intracranial process.  2.  Stable chronic changes as above.   CTA Head Neck with Contrast    Narrative    EXAM: CTA HEAD NECK W CONTRAST  LOCATION: [facility]  DATE: 11/10/2024    INDICATION: Neurological deficit  COMPARISON: None.  CONTRAST: 67 mL Isovue 370  TECHNIQUE: Head and neck CT angiogram with IV contrast. Axial helical CT images of the head and neck vessels obtained during the arterial phase of intravenous contrast administration. Axial 2D reconstructed images and multiplanar 3D MIP reconstructed   images of the head and neck vessels were performed by the technologist. Dose reduction techniques were used. All stenosis measurements made according to NASCET criteria unless otherwise specified.    FINDINGS:   HEAD CTA:  ANTERIOR CIRCULATION: No stenosis/occlusion, aneurysm, or high flow vascular malformation. Standard [place] anatomy.    POSTERIOR CIRCULATION: No stenosis/occlusion, aneurysm, or high flow vascular malformation. Dominant left and smaller right vertebral artery contribute to a normal basilar artery.     DURAL VENOUS SINUSES: Expected enhancement of the major dural venous sinuses.    NECK CTA:  RIGHT CAROTID: No measurable stenosis or dissection.    LEFT CAROTID: No measurable stenosis or dissection.    VERTEBRAL ARTERIES: No focal stenosis or dissection. Dominant left and smaller right vertebral arteries.    AORTIC ARCH: Classic aortic arch anatomy with no significant stenosis at the origin of the great vessels.    NONVASCULAR STRUCTURES: Unremarkable.      Impression    IMPRESSION:     HEAD CTA:   1.  Normal CTA [place].    NECK  CTA:  1.  Normal neck CTA.       CT Head w/o contrast*    Narrative    CT SCAN OF THE HEAD WITHOUT CONTRAST   11/12/2024 1:51 PM     HISTORY: Stroke-like symptoms.    TECHNIQUE:  Axial images of the head and coronal reformations without  IV contrast material. Radiation dose for this scan was reduced using  automated exposure control, adjustment of the mA and/or kV according  to patient size, or iterative reconstruction technique.    COMPARISON: Head CT 11/10/2024.    FINDINGS: Chronic infarct centered in the left occipital lobe. Chronic  lacunar infarct in the anterior left basal ganglia. No acute  intracranial hemorrhage. No mass effect or midline shift. Patchy  periventricular white matter hypodensities are nonspecific, but most  likely related to chronic microvascular ischemic disease. Ventricular  size is within normal limits without evidence of hydrocephalus.    The visualized portions of the sinuses and mastoids appear normal. The  bony calvarium and bones of the skull base appear intact.       Impression    IMPRESSION:     1. No evidence of acute intracranial hemorrhage, mass, or herniation.  2. Chronic infarct in the left occipital lobe.  3. Chronic infarct in the anterior left basal ganglia.  4. Nonspecific white matter changes which are most likely due to  chronic microvascular ischemic disease.    KATLYN BREWER MD         SYSTEM ID:  [id number]   CT Head w/o contrast*    Narrative    CT SCAN OF THE HEAD WITHOUT CONTRAST   11/13/2024 4:13 PM     HISTORY: increased confusion, difficulty word finding    TECHNIQUE:  Axial images of the head and coronal reformations without  IV contrast material. Radiation dose for this scan was reduced using  automated exposure control, adjustment of the mA and/or kV according  to patient size, or iterative reconstruction technique.    COMPARISON: 11/12/2024, 11/10/2024.    FINDINGS: There is no evidence of intracranial hemorrhage or mass.  Redemonstrated chronic left  parieto-occipital infarct and chronic left  caudate head infarct. Questionable subtle hypodensity and loss of  gray-white differentiation involving the left anterior insula,  although this appears unchanged since 11/10/2024. The ventricles are  normal in size, shape and configuration. Mild patchy periventricular  white matter hypodensities which are nonspecific, but likely related  to chronic microvascular ischemic disease.     The visualized portions of the sinuses and mastoids appear normal. The  bony calvarium and bones of the skull base appear intact.       Impression    IMPRESSION: Questionable subtle hypodensity and loss of gray-white  differentiation involving the left anterior insula, which may reflect  an evolving subacute infarct. MRI can be performed for further  evaluation.      CHASITY TREVINO MD         SYSTEM ID:  [id number]   Echocardiogram Complete - For age > 60 yrs     Value    LVEF  60%    Narrative    [id number]  [id number]  [id number]  [id number]^UMER^ИРИНА     [facility]  Echocardiography Laboratory  [address] [address]     Name: JOVAN MADRID  MRN: [id number]  : 1950  Study Date: 2024 08:40 AM  Age: 73 yrs  Gender: Female  Patient Location: [facility]  Reason For Study: Cerebrovascular Incident  History: none  Ordering Physician: ИРИНА OWUSU  Referring Physician: Radha Interiano  Performed By: Viri Bell     BSA: 1.6 m2  Height: 64 in  Weight: 126 lb  HR: 77  BP: 141/84 mmHg  ______________________________________________________________________________  Procedure  Complete Portable Echo Adult.  ______________________________________________________________________________  Interpretation Summary     1. The left ventricle is normal in structure, function and size. The visual  ejection fraction is estimated at 60%.  2. The right ventricle is normal in structure, function and size.  3. No valve disease.     No changes from  echo 2023.  ______________________________________________________________________________  Left Ventricle  The left ventricle is normal in structure, function and size. There is normal  left ventricular wall thickness. The visual ejection fraction is estimated at  60%. Grade I or early diastolic dysfunction. Normal left ventricular wall  motion.     Right Ventricle  The right ventricle is normal in structure, function and size.     Atria  The left atrium is mildly dilated. Right atrial size is normal. There is no  atrial shunt seen.     Mitral Valve  There is mild (1+) mitral regurgitation.     Tricuspid Valve  There is mild (1+) tricuspid regurgitation.     Aortic Valve  There is mild (1+) aortic regurgitation.     Pulmonic Valve  The pulmonic valve is normal in structure and function.     Vessels  Normal ascending, transverse (arch), and descending aorta. The inferior vena  cava was normal in size with preserved respiratory variability.     Pericardium  There is no pericardial effusion.     Rhythm  Sinus rhythm was noted.  ______________________________________________________________________________  MMode/2D Measurements & Calculations  IVSd: 0.98 cm     LVIDd: 4.8 cm  LVIDs: 3.0 cm  LVPWd: 0.71 cm  FS: 37.7 %  LV mass(C)d: 138.4 grams  LV mass(C)dI: 86.1 grams/m2  Ao root diam: 3.4 cm  LA dimension: 3.5 cm  asc Aorta Diam: 3.1 cm  LA/Ao: 1.0  Ao root diam index Ht(cm/m): 2.1  Ao root diam index BSA (cm/m2): 2.1  Asc Ao diam index BSA (cm/m2): 1.9  Asc Ao diam index Ht(cm/m): 1.9  EF Biplane: 58.7 %  RWT: 0.30     Doppler Measurements & Calculations  MV E max danish: 53.3 cm/sec  MV A max danish: 96.5 cm/sec  MV E/A: 0.55  MV dec time: 0.43 sec     Ao V2 max: 111.0 cm/sec  Ao max P.9 mmHg  LV V1 max PG: 3.6 mmHg  LV V1 max: 95.5 cm/sec  PA V2 max: 67.5 cm/sec  PA max P.8 mmHg  PA acc time: 0.16 sec  PI end-d danish: 105.7 cm/sec  TR max danish: 315.4 cm/sec  TR max P.8 mmHg  AV Danish Ratio (DI): 0.86  Medial  E/e': 12.4     ______________________________________________________________________________  Report approved by: Lenard Gutierrez 11/11/2024 03:54 PM             Discharge Medications   Current Discharge Medication List        START taking these medications    Details   ciprofloxacin (CIPRO) 250 MG tablet Take 1 tablet (250 mg) by mouth every 12 hours for 9 doses.  Qty: 9 tablet, Refills: 0    Associated Diagnoses: Urinary tract infection associated with cystostomy catheter, initial encounter (H)           CONTINUE these medications which have CHANGED    Details   baclofen (LIORESAL) 10 MG tablet Take 1 tablet (10 mg) by mouth 3 times daily.    Associated Diagnoses: Thoracolumbar back pain           CONTINUE these medications which have NOT CHANGED    Details   aspirin (ASA) 325 MG EC tablet Take 1 tablet (325 mg) by mouth daily  Qty: 30 tablet, Refills: 11    Associated Diagnoses: History of stroke      atorvastatin (LIPITOR) 40 MG tablet Take 1 tablet (40 mg) by mouth every evening  Qty: 30 tablet, Refills: 3    Associated Diagnoses: Cerebrovascular accident (CVA), unspecified mechanism (H); Occipital stroke (H)      busPIRone (BUSPAR) 15 MG tablet Take 15 mg by mouth 2 times daily.      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 Medications in Pump:   Hydromorphone 14 mg/mL  Bupivacaine 28 mg/mL  Ziconotide 14 mcg/mL  Clinic Responsible for pump medications: Samanta Shoes [phone]  Rate:   Hydromorphone 0.291 mg/hr  Bupivacaine 0.583 mg/hr  Ziconotide 0.291 mcg/hr  Bolus Dose: (max 6 boluses/day, 2 hr lockout)  Hydromorphone 0.2 mg  Bupivacaine 0.4 mg  Ziconotide 0.2 mcg  Pump Last Fill Date:  unknown  Pump Refill Date: 11/22/24  Pump Reservoir Volume: 20 mL  Low Reservoir Alarm Date: unknown  Pump  : MT DIGITAL MEDIA. Model: SynchroMed II      melatonin 3 MG tablet Take 3 mg by mouth nightly as needed for sleep      metoprolol succinate ER (TOPROL-XL) 25 MG 24 hr tablet Take 12.5 mg by mouth every evening       Multiple Vitamins-Minerals (WOMENS MULTI PO) Take 1 tablet by mouth daily      PROCTO-MED HC 2.5 % cream Place 1 Application rectally daily as needed for hemorrhoids or other (itch).      acetaminophen (TYLENOL) 500 MG tablet Take 1,000 mg by mouth every 6 hours as needed for mild pain.      lidocaine (LIDODERM) 5 % patch Apply 1 patch topically daily as needed       LORazepam (ATIVAN) 0.5 MG tablet Take 1 tablet by mouth daily as needed for anxiety      ondansetron (ZOFRAN) 4 MG tablet Take 1 tablet (4 mg) by mouth every 6 hours as needed for nausea  Qty: 20 tablet, Refills: 1    Associated Diagnoses: Infection due to 2019 novel coronavirus; Nausea      polyethylene glycol (MIRALAX) 17 GM/Dose powder Take 17 g by mouth daily as needed for constipation       pregabalin (LYRICA) 150 MG capsule Take 1 capsule (150 mg) by mouth 3 times daily Stop gabapentin.  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

## 2024-11-14 NOTE — PROGRESS NOTES
Overnight was completed and downloaded.  You will find results in other order tab in chart review.      [facility] called at [phone] for setup.    RT Maci on 11/14/2024 at 5:22 PM

## 2024-11-14 NOTE — PROGRESS NOTES
Pt placed on overnight pulse oximetry study at 2221 with pt awake and on RA. Pt checked throughout the night and at 0324 assessment observed apneic periods >30 seconds with sats of 69%. Placed pt on 3L NC at 0326. Study completed at 0532 with pt still on 3L NC. Oximetry study in process of being uploaded to system.

## 2024-11-15 ENCOUNTER — PATIENT OUTREACH (OUTPATIENT)
Dept: CARE COORDINATION | Facility: CLINIC | Age: 74
End: 2024-11-15

## 2024-11-15 ENCOUNTER — VIRTUAL VISIT (OUTPATIENT)
Dept: ANESTHESIOLOGY | Facility: CLINIC | Age: 74
End: 2024-11-15
Payer: MEDICARE

## 2024-11-15 DIAGNOSIS — Z97.8 PRESENCE OF INTRATHECAL PUMP: Primary | ICD-10-CM

## 2024-11-15 DIAGNOSIS — S24.151A: ICD-10-CM

## 2024-11-15 DIAGNOSIS — M79.2 NEUROPATHIC PAIN: ICD-10-CM

## 2024-11-15 DIAGNOSIS — S24.103A SPINAL CORD INJURY AT T7-T12 LEVEL (H): ICD-10-CM

## 2024-11-15 LAB — BACTERIA UR CULT: NORMAL

## 2024-11-15 ASSESSMENT — PAIN SCALES - GENERAL: PAINLEVEL_OUTOF10: SEVERE PAIN (6)

## 2024-11-15 ASSESSMENT — PAIN SCALES - PAIN ENJOYMENT GENERAL ACTIVITY SCALE (PEG): PEG_TOTALSCORE: INCOMPLETE

## 2024-11-15 NOTE — PROGRESS NOTES
[facility]    Background: Transitional Care Management program identified per system criteria and reviewed by [facility] [place] team for possible outreach.    Assessment: Upon chart review, [facility] Team member will not proceed with patient outreach related to this episode of Transitional Care Management program due to reason below:    Patient has a follow up appointment with an appropriate provider today for hospital discharge    Plan: Transitional Care Management episode addressed appropriately per reason noted above.      AYESHA Espinoza  [facility]    *Connected Care Resource Team does NOT follow patient ongoing. Referrals are identified based on internal discharge reports and the outreach is to ensure patient has an understanding of their discharge instructions.

## 2024-11-15 NOTE — PROGRESS NOTES
Updated patient and daughter tonight that we were not able to setup patient with NOC oxygen due to diagnosis that was used on prescription.  I informed them, that there was an urgent message for the provider to review the chart to see if there was a diagnosis we can use.    Devon Salgado, RT on 11/14/2024 at 7:08 PM

## 2024-11-15 NOTE — NURSING NOTE
How will you be connecting to the visit? MyChart   How would you like to obtain your AVS? MyChart  If the video visit is dropped, the invitation should be resent by: Text to cell phone: [phone]  Will anyone else be joining your video visit? Yes: Daughter. How would they like to receive their invitation? Other e-mail: N/A  Are you currently in the [facility] yes    Patient presents with:  Pain  RECHECK: Follow up with pain      Severe Pain (6)     Pain Medications       Analgesics Other Refills Start End     acetaminophen (TYLENOL) 500 MG tablet --  --    Sig - Route: Take 1,000 mg by mouth every 6 hours as needed for mild pain. - Oral    Class: Historical       Salicylates Refills Start End     aspirin (ASA) 325 MG EC tablet 11 1/8/2024 --    Sig - Route: Take 1 tablet (325 mg) by mouth daily - Oral    Class: E-Prescribe            What medications are you using for pain? Pump, tylenol    What refills are you needing today? none    Expectations: follow up    Jannette Metzger LPN

## 2024-11-15 NOTE — PROGRESS NOTES
Video-Visit Details    Type of service:  Video Visit     Originating Location (pt. Location): Home    Distant Location (provider location):  On-site  Platform used for Video Visit: [facility]     Date of visit: 11/15/2024      Assessment:   Anayeli Gagnon is a 73 year old female with a past medical history significant for thoracolumbar pain, neuropathic pain, OA, headache, CVA, SCI T7-12, incomplete SCI T1-6, HTN, muscle spasticity, anxiety/depression, s/p lumbar decompression 2019, s/p ITP implant 2022, who presents with complaints of widespread pain.      Widespread pain - Etiology multifactorial, hx of SCI. Symptoms consistent with neuropathic process, underlying degenerative/postsurgical changes of spine, suspect SI mediated component, overlying myofascial component.      Ass[facility] to [facility] team.    Visit Diagnoses:  1. Presence of intrathecal pump    2. Neuropathic pain    3. 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 Spinal cord injury at T7-T12 level (H)        Plan:  Diagnosis reviewed, treatment option addressed, and risk/benifits discussed.  Self-care instructions given.  I am recommending a multidisciplinary treatment plan to help this patient better manage their pain.      Physical Therapy:  Discussed PT referral at prior visit, not interested.      Pain Psychology: Not at this time.      Diagnostic Studies:  No new orders today.      Medication Management:   IT pump - hydromorphone, bupivacaine, ziconitide. Established with [facility] for home management. Dose adjustments recommended today, will increase basal rate by hydromorphone 0.6 mg ( drug), no changes to PTM dose.   Baclofen - current dose 10-15 mg three times daily. Appreciates some degree of benefit, no reported side effects.   Acetaminophen - 1000 mg three times daily. Appreciates some degree of benefit, no side effects.   Gabapentin 1200 mg three  times daily. Appreciates benefit, no side effects reported.   Recommended Lyrica trial at prior visits, though she decided against pursuing new medication.      Potential procedures:   None      Other Orders/Referrals:   [facility] - RNCC will coordinate dose adjustments.      Follow up with EMELI Jerez CNP in 6-8 weeks (around 2 weeks after pump dose adjustments).       Review of Electronic Chart: Today I have also reviewed available medical information in the patient's medical record at [facility] ([facility]) and Care Everywhere (if available), including relevant provider notes, laboratory work, and imaging.     Santa Wallace, KIRAN, EMELI, AGNP-C  [facility]     -------------------------------------------------    Subjective:    Chief complaint:   Chief Complaint   Patient presents with    Pain    RECHECK     Follow up with pain       Interval history:  Anayeli Gagnon is a 73 year old female last seen on 11/1/24.      Recommendations/plan at the last visit included:  Physical Therapy:  Discussed PT referral at prior visit, not interested.      Pain Psychology: Not at this time.      Diagnostic Studies:  No new orders today.      Medication Management:   IT pump - hydromorphone, bupivacaine, ziconitide. Established with [facility] for home management. Dose adjustments recommended today, will increase basal rate by hydromorphone 0.6 mg ( drug), no changes to PTM dose.   Baclofen - current dose 10-15 mg three times daily. Appreciates some degree of benefit, no reported side effects.   Acetaminophen - 1000 mg three times daily. Appreciates some degree of benefit, no side effects.   Gabapentin 1200 mg three times daily. Appreciates benefit, no side effects reported.   Recommended Lyrica trial at prior visits, though she decided against pursuing new medication.      Potential procedures:   None      Other Orders/Referrals:   [facility] - RN[facility] will coordinate dose adjustments.      Follow up  with EMELI Jerez CNP in 6-8 weeks (around 2 weeks after pump dose adjustments).     Since her last visit, Anayeli BOSS Chelsi reports:  -Annamarie recently had hospitalization r/t TIA.  -She continues to have poorly controlled pain.   -She was not able to get pump dose adjustments in between visits.   -She is scheduled for refill/adjustments through [facility] next week.   -She continues gabapentin, acetaminophen, and baclofen.   -She expressed concerns for being able to get pump adjustments after recent hospitalization.     Pain Information:   Pain rating: Severe Pain (6)      HPI/Interval history from last visit:  -She reports pain is the same and not any better. Overall not well controlled.   -She is interested in slight increase in the pump basal rate. She verifies she will continue to get 6 PTM doses.   -She has moved AL facilities. She is now in [facility] in [place].  -She has been at this new facility for 1 week, things are going well so far.         Current Pain Treatments:    Gabapentin 1200 mg TID  Baclofen 15 mg 2-3 x daily PRN  Acetaminophen 1000 mg TID                   Intrathecal Pump- 20 mL syringe     Concentrations:   Dilaudid 14 mg/ml                                                          Bupivacaine 28 mg/ml   Ziconitide 14 mcg/ml      Total Volume in Refill: 20 mL     Basal:   Dilaudid  7.6 mg/day                                                       Bupivacaine 15.2 mg/day   Ziconitide 7.6 mcg/day      PTM 0.2 hydromorphone, 2 hour lockout, maximum 6 in a day.         Current MME: N/A     Review of [place] Prescription Monitoring Program (): No concern for abuse or misuse of controlled medications based on this report. Reviewed - appears appropriate.      Annual Controlled Substance Agreement/UDS due date: N/A     Past pain treatments:  Lyrica 150 mg TID      Medications:  Current Outpatient Medications   Medication Sig Dispense Refill    acetaminophen (TYLENOL) 500 MG tablet Take  1,000 mg by mouth every 6 hours as needed for mild pain.      aspirin (ASA) 325 MG EC tablet Take 1 tablet (325 mg) by mouth daily 30 tablet 11    atorvastatin (LIPITOR) 40 MG tablet Take 1 tablet (40 mg) by mouth every evening 30 tablet 3    baclofen (LIORESAL) 10 MG tablet Take 1 tablet (10 mg) by mouth 3 times daily.      busPIRone (BUSPAR) 15 MG tablet Take 15 mg by mouth 2 times daily.      ciprofloxacin (CIPRO) 250 MG tablet Take 1 tablet (250 mg) by mouth every 12 hours for 9 doses. 9 tablet 0    gabapentin (NEURONTIN) 600 MG tablet Take 1,200 mg by mouth 3 times daily  1 tablet 0    lidocaine (LIDODERM) 5 % patch Apply 1 patch topically daily as needed       LORazepam (ATIVAN) 0.5 MG tablet Take 1 tablet by mouth daily as needed for anxiety      medication given by implanted intrathecal pump continuously. Medications in Pump:   Hydromorphone 14 mg/mL  Bupivacaine 28 mg/mL  Ziconotide 14 mcg/mL  Clinic Responsible for pump medications: [facility] [phone]  Rate:   Hydromorphone 0.291 mg/hr  Bupivacaine 0.583 mg/hr  Ziconotide 0.291 mcg/hr  Bolus Dose: (max 6 boluses/day, 2 hr lockout)  Hydromorphone 0.2 mg  Bupivacaine 0.4 mg  Ziconotide 0.2 mcg  Pump Last Fill Date:  unknown  Pump Refill Date: 11/22/24  Pump Reservoir Volume: 20 mL  Low Reservoir Alarm Date: unknown  Pump : "[employer]. Model: [employer] II      melatonin 3 MG tablet Take 3 mg by mouth nightly as needed for sleep      metoprolol succinate ER (TOPROL-XL) 25 MG 24 hr tablet Take 12.5 mg by mouth every evening       Multiple Vitamins-Minerals (WOMENS MULTI PO) Take 1 tablet by mouth daily      ondansetron (ZOFRAN) 4 MG tablet Take 1 tablet (4 mg) by mouth every 6 hours as needed for nausea 20 tablet 1    polyethylene glycol (MIRALAX) 17 GM/Dose powder Take 17 g by mouth daily as needed for constipation       pregabalin (LYRICA) 150 MG capsule Take 1 capsule (150 mg) by mouth 3 times daily Stop gabapentin. 90 capsule 1     PROCTO-MED HC 2.5 % cream Place 1 Application rectally daily as needed for hemorrhoids or other (itch).         Medical History: any changes in medical history since they were last seen? Yes - hospital admission      Objective:    Physical Exam:  GENERAL: alert and no distress  EYES: Eyes grossly normal to inspection.  No discharge or erythema, or obvious scleral/conjunctival abnormalities.  RESP: No audible wheeze, cough, or visible cyanosis.    SKIN: Visible skin clear. No significant rash, abnormal pigmentation or lesions.  NEURO: Cranial nerves grossly intact.  Mentation and speech appropriate for age.  PSYCH: Appropriate affect, tone, and pace of words     Diagnostic Tests/Imaging/Labs:  Personally reviewed BMP from 11/15/24.     BILLING TIME DOCUMENTATION:   The total TIME spent on this patient on the date of the encounter/appointment was 15 minutes.      TOTAL TIME includes:   Time spent preparing to see the patient (reviewing records and tests)   Time spent face to face (or over the phone) with the patient   Time spent ordering tests, medications, procedures and referrals   Time spent Referring and communicating with other healthcare professionals   Time spent documenting clinical information in Epic

## 2024-11-15 NOTE — LETTER
11/15/2024       RE: [facility]  [address]  [place] [address]     Dear Colleague,    Thank you for referring your patient, Anayeli Gagnon, to the [facility] FOR COMPREHENSIVE PAIN MANAGEMENT [place] at [facility]. Please see a copy of my visit note below.    Video-Visit Details    Type of service:  Video Visit     Originating Location (pt. Location): Home  {PROVIDER LOCATION On-site should be selected for visits conducted from your clinic location or adjoining [facility] hospital, academic office, or other nearby [facility] building. 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      Widespread pain - Etiology multifactorial, hx of SCI. Symptoms consistent with neuropathic process, underlying degenerative/postsurgical changes of spine, suspect SI mediated component, overlying myofascial component.      Assigned to [facility] nursing team.    Visit Diagnoses:  1. Presence of intrathecal pump    2. Neuropathic pain    3. 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 Spinal cord injury at T7-T12 level (H)        Plan:  Diagnosis reviewed, treatment option addressed, and risk/benifits discussed.  Self-care instructions given.  I am recommending a multidisciplinary treatment plan to help this patient better manage their pain.     \F2  Physical Therapy:  Discussed PT referral at prior visit, not interested.      Pain Psychology: Not at this time.      Diagnostic Studies:  No new orders today.      Medication Management:   IT pump - hydromorphone, bupivacaine, ziconitide. Established with [facility] for home management. Dose adjustments recommended today, will increase basal rate by hydromorphone 0.6 mg ( drug), no changes to PTM dose.   Baclofen - current dose 10-15 mg three times daily. Appreciates some degree of benefit, no reported side effects.   Acetaminophen - 1000 mg three times daily. Appreciates some degree of benefit, no side effects.   Gabapentin 1200 mg three times daily. Appreciates benefit, no side effects reported.   Recommended Lyrica trial at prior visits, though she decided against pursuing new medication.      Potential procedures:   None      Other Orders/Referrals:   [facility] - RNCC will coordinate dose adjustments.      Follow up with EMELI Jerez CNP around 3-4 weeks, about 2 weeks       Review of Electronic Chart: Today I have also reviewed available medical information in the patient's medical record at [facility] [place] [facility] and Care Everywhere (if available), including relevant provider notes, laboratory work, and imaging.     Santa Wallace DNP, EMELI, AGNP-C  [facility] Pain Management     -------------------------------------------------    Subjective:    Chief complaint:   Chief Complaint   Patient presents with     Pain     RECHECK     Follow up with pain       Interval history:  Anayeli Gagnon is a 73 year old female last seen on 11/1/24.      Recommendations/plan at the last visit included:  Physical Therapy:  Discussed PT referral at prior visit, not interested.      Pain Psychology: Not at this time.      Diagnostic Studies:  No new orders today.      Medication Management:   IT pump - hydromorphone, bupivacaine, ziconitide. Established with [facility] for home management. Dose adjustments  recommended today, will increase basal rate by hydromorphone 0.6 mg ( drug), no changes to PTM dose.   Baclofen - current dose 10-15 mg three times daily. Appreciates some degree of benefit, no reported side effects.   Acetaminophen - 1000 mg three times daily. Appreciates some degree of benefit, no side effects.   Gabapentin 1200 mg three times daily. Appreciates benefit, no side effects reported.   Recommended Lyrica trial at prior visits, though she decided against pursuing new medication.      Potential procedures:   None      Other Orders/Referrals:   [facility] - RNCC will coordinate dose adjustments.      Follow up with EMELI Jerez CNP in 6-8 weeks (around 2 weeks after pump dose adjustments).     Since her last visit, Anayeli Gagnon reports:  -her pain is *** as it was at last visit.       Pain Information:   Pain rating: averages {PAIN SCALE:823231} on a 0-10 scale.      HPI/Interval history from last visit:  -She reports pain is the same and not any better. Overall not well controlled.   -She is interested in slight increase in the pump basal rate. She verifies she will continue to get 6 PTM doses.   -She has moved AL facilities. She is now in [facility] in [place].  -She has been at this new facility for 1 week, things are going well so far.         Current Pain Treatments:    Gabapentin 1200 mg TID  Baclofen 15 mg 2-3 x daily PRN  Acetaminophen 1000 mg TID                   Intrathecal Pump- 20 mL syringe     Concentrations:   Dilaudid 14 mg/ml                                                          Bupivacaine 28 mg/ml   Ziconitide 14 mcg/ml      Total Volume in Refill: 20 mL     Basal:   Dilaudid  7.6 mg/day                                                       Bupivacaine 15.2 mg/day   Ziconitide 7.6 mcg/day      PTM 0.2 hydromorphone, 2 hour lockout, maximum 6 in a day.         Current MME: N/A     Review of [place] Prescription Monitoring Program (): No concern for abuse or  misuse of controlled medications based on this report. Reviewed - appears appropriate. ***     Annual Controlled Substance Agreement/UDS due date: N/A     Past pain treatments:  Lyrica 150 mg TID  ***    Medications:  Current Outpatient Medications   Medication Sig Dispense Refill     acetaminophen (TYLENOL) 500 MG tablet Take 1,000 mg by mouth every 6 hours as needed for mild pain.       aspirin (ASA) 325 MG EC tablet Take 1 tablet (325 mg) by mouth daily 30 tablet 11     atorvastatin (LIPITOR) 40 MG tablet Take 1 tablet (40 mg) by mouth every evening 30 tablet 3     baclofen (LIORESAL) 10 MG tablet Take 1 tablet (10 mg) by mouth 3 times daily.       busPIRone (BUSPAR) 15 MG tablet Take 15 mg by mouth 2 times daily.       ciprofloxacin (CIPRO) 250 MG tablet Take 1 tablet (250 mg) by mouth every 12 hours for 9 doses. 9 tablet 0     gabapentin (NEURONTIN) 600 MG tablet Take 1,200 mg by mouth 3 times daily  1 tablet 0     lidocaine (LIDODERM) 5 % patch Apply 1 patch topically daily as needed        LORazepam (ATIVAN) 0.5 MG tablet Take 1 tablet by mouth daily as needed for anxiety       medication given by implanted intrathecal pump continuously. Medications in Pump:   Hydromorphone 14 mg/mL  Bupivacaine 28 mg/mL  Ziconotide 14 mcg/mL  [facility] [phone]  Rate:   Hydromorphone 0.291 mg/hr  Bupivacaine 0.583 mg/hr  Ziconotide 0.291 mcg/hr  Bolus Dose: (max 6 boluses/day, 2 hr lockout)  Hydromorphone 0.2 mg  Bupivacaine 0.4 mg  Ziconotide 0.2 mcg  Pump Last Fill Date:  unknown  Pump Refill Date: 11/22/24  Pump Reservoir Volume: 20 mL  Low Reservoir Alarm Date: unknown  Pump : Videum. Model: SynchroMed II       melatonin 3 MG tablet Take 3 mg by mouth nightly as needed for sleep       metoprolol succinate ER (TOPROL-XL) 25 MG 24 hr tablet Take 12.5 mg by mouth every evening        Multiple Vitamins-Minerals (WOMENS MULTI PO) Take 1 tablet by mouth daily    "    ondansetron (ZOFRAN) 4 MG tablet Take 1 tablet (4 mg) by mouth every 6 hours as needed for nausea 20 tablet 1     polyethylene glycol (MIRALAX) 17 GM/Dose powder Take 17 g by mouth daily as needed for constipation        pregabalin (LYRICA) 150 MG capsule Take 1 capsule (150 mg) by mouth 3 times daily Stop gabapentin. 90 capsule 1     PROCTO-MED HC 2.5 % cream Place 1 Application rectally daily as needed for hemorrhoids or other (itch).         Medical History: any changes in medical history since they were last seen? { :[id number]::\"No\"}      Objective:    Physical Exam:  {video visit exam brief selected:831231}     Diagnostic Tests/Imaging/Labs:  ***    BILLING TIME DOCUMENTATION:   The total TIME spent on this patient on the date of the encounter/appointment was *** minutes.      TOTAL TIME includes:   Time spent preparing to see the patient (reviewing records and tests)   Time spent face to face (or over the phone) with the patient   Time spent ordering tests, medications, procedures and referrals   Time spent Referring and communicating with other healthcare professionals   Time spent documenting clinical information in Epic       Video-Visit Details    Type of service:  Video Visit     Originating Location (pt. Location): Home  {PROVIDER LOCATION On-site should be selected for visits conducted from your clinic location or adjoining [facility] hospital, academic office, or other nearby [facility] building. 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      Widespread pain - Etiology multifactorial, hx of SCI. Symptoms consistent with neuropathic process, underlying degenerative/postsurgical changes of spine, suspect SI mediated component, overlying myofascial component.      Assigned to [facility] nursing team.    Visit Diagnoses:  1. Presence of intrathecal pump    2. Neuropathic pain    3. 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 Spinal cord injury at T7-T12 level (H)        Plan:  Diagnosis reviewed, treatment option addressed, and risk/benifits discussed.  Self-care instructions given.  I am recommending a multidisciplinary treatment plan to help this patient better manage their pain.      Physical Therapy:  Discussed PT referral at prior visit, not interested.      Pain Psychology: Not at this time.      Diagnostic Studies:  No new orders today.      Medication Management:   IT pump - hydromorphone, bupivacaine, ziconitide. Established with [facility] for home management. Dose adjustments recommended today, will increase basal rate by hydromorphone 0.6 mg ( drug), no changes to PTM dose.   Baclofen - current dose 10-15 mg three times daily. Appreciates some degree of benefit, no reported side effects.   Acetaminophen - 1000 mg three times daily. Appreciates some degree of benefit, no side effects.   Gabapentin 1200 mg three times daily. Appreciates benefit, no side effects reported.   Recommended Lyrica trial at prior visits, though she decided against pursuing new medication.      Potential procedures:   None      Other Orders/Referrals:   [facility] - RNCC will coordinate dose adjustments.      Follow up with EMELI Jerez CNP in 6-8 weeks (around 2 weeks after pump dose adjustments).       Review of Electronic Chart: Today I have also reviewed available medical information in the patient's medical record at [facility] and [facility]  Everywhere (if available), including relevant provider notes, laboratory work, and imaging.     Santa Wallace DNP, APRN, [facility] Pain Management     -------------------------------------------------    Subjective:    Chief complaint:   Chief Complaint   Patient presents with     Pain     RECHECK     Follow up with pain       Interval history:  Anayeli Gagnon is a 73 year old female last seen on 11/1/24.      Recommendations/plan at the last visit included:  Physical Therapy:  Discussed PT referral at prior visit, not interested.      Pain Psychology: Not at this time.      Diagnostic Studies:  No new orders today.      Medication Management:   IT pump - hydromorphone, bupivacaine, ziconitide. Established with [facility] for home management. Dose adjustments recommended today, will increase basal rate by hydromorphone 0.6 mg ( drug), no changes to PTM dose.   Baclofen - current dose 10-15 mg three times daily. Appreciates some degree of benefit, no reported side effects.   Acetaminophen - 1000 mg three times daily. Appreciates some degree of benefit, no side effects.   Gabapentin 1200 mg three times daily. Appreciates benefit, no side effects reported.   Recommended Lyrica trial at prior visits, though she decided against pursuing new medication.      Potential procedures:   None      Other Orders/Referrals:   [facility] - RNCC will coordinate dose adjustments.      Follow up with EMELI Jerez CNP in 6-8 weeks (around 2 weeks after pump dose adjustments).     Since her last visit, Anayeli Gagnon reports:  -Annamarie recently had hospitalization r/t TIA.  -She continues to have poorly controlled pain.   -She was not able to get pump dose adjustments in between visits.   -She is scheduled for refill/adjustments through [facility] next week.   -She continues gabapentin, acetaminophen, and baclofen.   -She expressed concerns for being able to get pump adjustments after recent hospitalization.      Pain Information:   Pain rating: Severe Pain (6)      HPI/Interval history from last visit:  -She reports pain is the same and not any better. Overall not well controlled.   -She is interested in slight increase in the pump basal rate. She verifies she will continue to get 6 PTM doses.   -She has moved AL facilities. She is now in [facility] in [place].  -She has been at this new facility for 1 week, things are going well so far.         Current Pain Treatments:    Gabapentin 1200 mg TID  Baclofen 15 mg 2-3 x daily PRN  Acetaminophen 1000 mg TID                   Intrathecal Pump- 20 mL syringe     Concentrations:   Dilaudid 14 mg/ml                                                          Bupivacaine 28 mg/ml   Ziconitide 14 mcg/ml      Total Volume in Refill: 20 mL     Basal:   Dilaudid  7.6 mg/day                                                       Bupivacaine 15.2 mg/day   Ziconitide 7.6 mcg/day      PTM 0.2 hydromorphone, 2 hour lockout, maximum 6 in a day.         Current MME: N/A     Review of [place] Prescription Monitoring Program (): No concern for abuse or misuse of controlled medications based on this report. Reviewed - appears appropriate.      Annual Controlled Substance Agreement/UDS due date: N/A     Past pain treatments:  Lyrica 150 mg TID      Medications:  Current Outpatient Medications   Medication Sig Dispense Refill     acetaminophen (TYLENOL) 500 MG tablet Take 1,000 mg by mouth every 6 hours as needed for mild pain.       aspirin (ASA) 325 MG EC tablet Take 1 tablet (325 mg) by mouth daily 30 tablet 11     atorvastatin (LIPITOR) 40 MG tablet Take 1 tablet (40 mg) by mouth every evening 30 tablet 3     baclofen (LIORESAL) 10 MG tablet Take 1 tablet (10 mg) by mouth 3 times daily.       busPIRone (BUSPAR) 15 MG tablet Take 15 mg by mouth 2 times daily.       ciprofloxacin (CIPRO) 250 MG tablet Take 1 tablet (250 mg) by mouth every 12 hours for 9 doses. 9 tablet 0     gabapentin  (NEURONTIN) 600 MG tablet Take 1,200 mg by mouth 3 times daily  1 tablet 0     lidocaine (LIDODERM) 5 % patch Apply 1 patch topically daily as needed        LORazepam (ATIVAN) 0.5 MG tablet Take 1 tablet by mouth daily as needed for anxiety       medication given by implanted intrathecal pump continuously. Medications in Pump:   Hydromorphone 14 mg/mL  Bupivacaine 28 mg/mL  Ziconotide 14 mcg/mL  Clinic Responsible for pump medications: [facility] [phone]  Rate:   Hydromorphone 0.291 mg/hr  Bupivacaine 0.583 mg/hr  Ziconotide 0.291 mcg/hr  Bolus Dose: (max 6 boluses/day, 2 hr lockout)  Hydromorphone 0.2 mg  Bupivacaine 0.4 mg  Ziconotide 0.2 mcg  Pump Last Fill Date:  unknown  Pump Refill Date: 11/22/24  Pump Reservoir Volume: 20 mL  Low Reservoir Alarm Date: unknown  Pump : [email or web address]. Model: LoadSpring SolutionsMed II       melatonin 3 MG tablet Take 3 mg by mouth nightly as needed for sleep       metoprolol succinate ER (TOPROL-XL) 25 MG 24 hr tablet Take 12.5 mg by mouth every evening        Multiple Vitamins-Minerals (WOMENS MULTI PO) Take 1 tablet by mouth daily       ondansetron (ZOFRAN) 4 MG tablet Take 1 tablet (4 mg) by mouth every 6 hours as needed for nausea 20 tablet 1     polyethylene glycol (MIRALAX) 17 GM/Dose powder Take 17 g by mouth daily as needed for constipation        pregabalin (LYRICA) 150 MG capsule Take 1 capsule (150 mg) by mouth 3 times daily Stop gabapentin. 90 capsule 1     PROCTO-MED HC 2.5 % cream Place 1 Application rectally daily as needed for hemorrhoids or other (itch).         Medical History: any changes in medical history since they were last seen? Yes - hospital admission      Objective:    Physical Exam:  GENERAL: alert and no distress  EYES: Eyes grossly normal to inspection.  No discharge or erythema, or obvious scleral/conjunctival abnormalities.  RESP: No audible wheeze, cough, or visible cyanosis.    SKIN: Visible skin clear. No significant rash, abnormal  pigmentation or lesions.  NEURO: Cranial nerves grossly intact.  Mentation and speech appropriate for age.  PSYCH: Appropriate affect, tone, and pace of words     Diagnostic Tests/Imaging/Labs:  Personally reviewed BMP from 11/15/24.     BILLING TIME DOCUMENTATION:   The total TIME spent on this patient on the date of the encounter/appointment was 15 minutes.      TOTAL TIME includes:   Time spent preparing to see the patient (reviewing records and tests)   Time spent face to face (or over the phone) with the patient   Time spent ordering tests, medications, procedures and referrals   Time spent Referring and communicating with other healthcare professionals   Time spent documenting clinical information in Epic       Again, thank you for allowing me to participate in the care of your patient.      Sincerely,    EMELI Jerez CNP

## 2024-12-09 ENCOUNTER — TELEPHONE (OUTPATIENT)
Dept: ANESTHESIOLOGY | Facility: CLINIC | Age: 74
End: 2024-12-09
Payer: MEDICARE

## 2024-12-09 NOTE — TELEPHONE ENCOUNTER
[facility] Call Center    Phone Message    May a detailed message be left on voicemail: yes     Reason for Call: Medication Refill Request    Has the patient contacted the pharmacy for the refill? Yes   Name of medication being requested: Concentrate of Bupivacaine / Ziconitide / Dilaudid  Provider who prescribed the medication: EMELI Jerez CP  Pharmacy: [facility]  Date medication is needed: 12/10/2024       Action Taken: Message routed to:  [facility] Pain     Travel Screening: Not Applicable     Date of Service:

## 2024-12-10 NOTE — TELEPHONE ENCOUNTER
IT Pump Prescription reviewed and signed by EMELI Jerez CNP. Writer faxed order to [facility] 12/10/24.     Camille Talavera RN

## 2024-12-16 ENCOUNTER — NURSE TRIAGE (OUTPATIENT)
Dept: FAMILY MEDICINE | Facility: CLINIC | Age: 74
End: 2024-12-16

## 2024-12-16 ENCOUNTER — OFFICE VISIT (OUTPATIENT)
Dept: INTERNAL MEDICINE | Facility: CLINIC | Age: 74
End: 2024-12-16
Payer: MEDICARE

## 2024-12-16 VITALS
DIASTOLIC BLOOD PRESSURE: 76 MMHG | OXYGEN SATURATION: 95 % | RESPIRATION RATE: 16 BRPM | SYSTOLIC BLOOD PRESSURE: 138 MMHG | TEMPERATURE: 97.9 F | HEART RATE: 68 BPM

## 2024-12-16 DIAGNOSIS — T39.015A PLATELET DYSFUNCTION DUE TO ASPIRIN (H): ICD-10-CM

## 2024-12-16 DIAGNOSIS — L08.9 LOCAL INFECTION OF SKIN AND SUBCUTANEOUS TISSUE: Primary | ICD-10-CM

## 2024-12-16 DIAGNOSIS — Z93.59 SUPRAPUBIC CATHETER (H): ICD-10-CM

## 2024-12-16 DIAGNOSIS — S24.103A SPINAL CORD INJURY AT T7-T12 LEVEL (H): ICD-10-CM

## 2024-12-16 DIAGNOSIS — G82.20 PARAPLEGIA (H): ICD-10-CM

## 2024-12-16 DIAGNOSIS — D69.1 PLATELET DYSFUNCTION DUE TO ASPIRIN (H): ICD-10-CM

## 2024-12-16 DIAGNOSIS — I63.9 CEREBROVASCULAR ACCIDENT (CVA), UNSPECIFIED MECHANISM (H): ICD-10-CM

## 2024-12-16 PROCEDURE — G2211 COMPLEX E/M VISIT ADD ON: HCPCS | Performed by: INTERNAL MEDICINE

## 2024-12-16 PROCEDURE — 99203 OFFICE O/P NEW LOW 30 MIN: CPT | Performed by: INTERNAL MEDICINE

## 2024-12-16 RX ORDER — SULFAMETHOXAZOLE AND TRIMETHOPRIM 800; 160 MG/1; MG/1
1 TABLET ORAL 2 TIMES DAILY
Qty: 14 TABLET | Refills: 0 | Status: SHIPPED | OUTPATIENT
Start: 2024-12-16 | End: 2024-12-16

## 2024-12-16 RX ORDER — SULFAMETHOXAZOLE AND TRIMETHOPRIM 800; 160 MG/1; MG/1
1 TABLET ORAL 2 TIMES DAILY
Qty: 14 TABLET | Refills: 0 | Status: SHIPPED | OUTPATIENT
Start: 2024-12-16

## 2024-12-16 ASSESSMENT — ANXIETY QUESTIONNAIRES
7. FEELING AFRAID AS IF SOMETHING AWFUL MIGHT HAPPEN: NOT AT ALL
7. FEELING AFRAID AS IF SOMETHING AWFUL MIGHT HAPPEN: NOT AT ALL
8. IF YOU CHECKED OFF ANY PROBLEMS, HOW DIFFICULT HAVE THESE MADE IT FOR YOU TO DO YOUR WORK, TAKE CARE OF THINGS AT HOME, OR GET ALONG WITH OTHER PEOPLE?: NOT DIFFICULT AT ALL
4. TROUBLE RELAXING: SEVERAL DAYS
GAD7 TOTAL SCORE: 1
GAD7 TOTAL SCORE: 1
2. NOT BEING ABLE TO STOP OR CONTROL WORRYING: NOT AT ALL
3. WORRYING TOO MUCH ABOUT DIFFERENT THINGS: NOT AT ALL
6. BECOMING EASILY ANNOYED OR IRRITABLE: NOT AT ALL
GAD7 TOTAL SCORE: 1
1. FEELING NERVOUS, ANXIOUS, OR ON EDGE: NOT AT ALL
5. BEING SO RESTLESS THAT IT IS HARD TO SIT STILL: NOT AT ALL
IF YOU CHECKED OFF ANY PROBLEMS ON THIS QUESTIONNAIRE, HOW DIFFICULT HAVE THESE PROBLEMS MADE IT FOR YOU TO DO YOUR WORK, TAKE CARE OF THINGS AT HOME, OR GET ALONG WITH OTHER PEOPLE: NOT DIFFICULT AT ALL

## 2024-12-16 ASSESSMENT — PAIN SCALES - GENERAL: PAINLEVEL_OUTOF10: NO PAIN (0)

## 2024-12-16 NOTE — TELEPHONE ENCOUNTER
Nurse Triage SBAR    Is this a 2nd Level Triage? NO    Situation:   Patient calling with concerns about suprapubic catheter.     Background:   Suprapubic catheter - has had for about 4 years   Has not seen urologist for awhile - unsure of where - home health RN takes care of this     Assessment:   Catheter insertion site - has a small bump, drainage - white, no redness or swelling   Urine in bag - normal, denies sediment in bag, no rash   Denies pain or itching   No fever   Denies bleeding     Protocol Recommended Disposition:   Home Care    Recommendation:   Patient is not an established patient in [place]  Recommended an in person visit so a provider and assess area and decide best next steps - patient agreed and scheduled for appointment this afternoon.      Reason for Disposition   Urinary catheter care, questions about    Additional Information   Negative: Shock suspected (e.g., cold/pale/clammy skin, too weak to stand, low BP, rapid pulse)   Negative: Sounds like a life-threatening emergency to the triager   Negative: SEVERE abdominal pain   Negative: Catheter was accidentally pulled-out and bright red continuous bleeding   Negative: Fever > 100.4 F (38.0 C)   Negative: Drinking very little and dehydration suspected (e.g., no urine > 12 hours, very dry mouth, very lightheaded)   Negative: Patient sounds very sick or weak to the triager   Negative: Catheter was accidentally pulled-out   Negative: Catheter is broken and is not working (does not function normally)   Negative: Bleeding around catheter (e.g., from penis or female urethra)   Negative: New-onset MILD - MODERATE lower abdominal pain or swelling (distention)   Negative: Pink, slightly red, or tea-colored urine lasts > 24 hours that is not cleared by increased fluid intake, and no recent prostate or bladder surgery   Negative: Cloudy urine lasts > 24 hours and not cleared by increased fluid intake   Negative: Bloody or red-colored urine and no recent  prostate or bladder surgery  (Exception: Brief episode and urine now clear.)   Negative: Bloody or red-colored urine and prostate or bladder surgery > 3 days (72 hours) ago   Negative: No urine in bag for > 4 hours and catheter is not kinked   Negative: Foul-smelling urine (urine smells bad)   Negative: Leakage of urine around catheter   Negative: Catheter is broken or cracked and still works (functioning normally)   Negative: Patient wants to be seen    Protocols used: Urinary Catheter (e.g., Cunningham) Symptoms and [id number]      AMELIA OlguinN, RN  [facility]

## 2024-12-16 NOTE — PROGRESS NOTES
"      Maine De Luna is a 74 year old, presenting for the following health issues:  Catheter Problem and Follow Up        12/16/2024     3:01 PM   Additional Questions   Roomed by Luna     History of Present Illness       Mental Health Follow-up:  Patient presents to follow-up on Depression & Anxiety.Patient's depression since last visit has been:  Better  The patient is not having other symptoms associated with depression.  Patient's anxiety since last visit has been:  Better  The patient is not having other symptoms associated with anxiety.  Any significant life events: relationship concerns  Patient is not feeling anxious or having panic attacks.  Patient has no concerns about alcohol or drug use.    Headaches:   Since the patient's last clinic visit, headaches are: improved  The patient is getting headaches:  Hardly ever  She is not able to do normal daily activities when she has a migraine.  The patient is taking the following rescue/relief medications:  Tylenol and Amerge   Patient states \"The relief is inconsistent\" from the rescue/relief medications.   The patient is taking the following medications to prevent migraines:  No medications to prevent migraines  In the past 4 weeks, the patient has gone to an Urgent Care or Emergency Room 0 times times due to headaches.   She is taking medications regularly.                  EMR reviewed including:             Complaint, History of Chief Complaint, Corresponding Review of Systems, and Complaint Specific Physical Examination.    #1   Redness and drainage from the suprapubic catheter insertion site in the anterior lower abdomen.  No odor.  Patient concerned regarding some \"growth\" around the area.  Slightly increased redness.  Extent of erythema is less than a centimeter in radius from the catheter.  Discharge is somewhat mildly purulent and yellow.  Denies fevers or chills.  Abdomen is soft without rebound or rigidity.  Colostomy functioning well.      #2  "  Follow-up on paraplegia.  History of dural AVM.  Minimal sensation no motor function of lower extremities.  No bowel or bladder control.  Previous records reviewed.          Exam:   LUNGS: clear bilaterally, airflow is brisk, no intercostal retraction or stridor is noted. No coughing is noted during visit.   HEART:  regular without rubs, clicks, gallops, or murmurs. PMI is nondisplaced. Upstrokes are brisk. S1,S2 are heard.   GI: Abdomen is soft, without rebound, guarding or tenderness. Bowel sounds are appropriate. No renal bruits are heard.      #3   Follow-up on recent TIA/CVA  No residual symptomatology.  Taking medication compliantly including aspirin and statin.  No residual facial asymmetry or upper extremity involvement.  Speech is clear and concise.    Patient has been interviewed, applicable history and applied review of systems have been performed.    Vital Signs:   /76 (BP Location: Right arm, Patient Position: Sitting, Cuff Size: Adult Regular)   Pulse 68   Temp 97.9  F (36.6  C) (Temporal)   Resp 16   SpO2 95%       Decision Making    Problem and Complexity     1. Local infection of skin and subcutaneous tissue (Primary)  Will place patient on Bactrim for skin infection.  Reassured her that the granulation tissue which is minimal is insignificant and nonconcerning.  Will not check urinary analysis as patient has chronic colonization.  - sulfamethoxazole-trimethoprim (BACTRIM DS) 800-160 MG tablet; Take 1 tablet by mouth 2 times daily.  Dispense: 14 tablet; Refill: 0    2. Suprapubic catheter (H)  As above      3. Paraplegia (H)  Secondary to spinal cord injury    4. Spinal cord injury at T7-T12 level (H)  Secondary to AVM    5. 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)  Modest and controlled.  No hematuria noted      6. Cerebrovascular accident (CVA), unspecified mechanism (H)  Complete resolution of symptoms including facial asymmetry and dysarthria.                            FOLLOW UP   I  have asked the patient to make an appointment for followup with myself or primary care provider of choice    Regarding routine vaccinations:  I have reviewed the patient's vaccination schedule and discussed the benefits of prophylactic vaccination in detail.  I recommend the patient contact their pharmacist for vaccinations.  Discussed that most insurance companies now favor reimbursement to the pharmacies and it will financially behoove the patient to have vaccinations performed at their pharmacy.        I have carefully explained the diagnosis and treatment options to the patient.  The patient has displayed an understanding of the above, and all subsequent questions were answered.      DO JAIR Stokes    Portions of this note were produced using [employer]  Although every attempt at real-time proof reading has been made, occasional grammar/syntax errors may have been missed.

## 2024-12-18 DIAGNOSIS — L08.9 LOCAL INFECTION OF SKIN AND SUBCUTANEOUS TISSUE: ICD-10-CM

## 2024-12-18 RX ORDER — SULFAMETHOXAZOLE AND TRIMETHOPRIM 800; 160 MG/1; MG/1
1 TABLET ORAL 2 TIMES DAILY
Qty: 14 TABLET | Refills: 0 | OUTPATIENT
Start: 2024-12-18

## 2024-12-18 NOTE — TELEPHONE ENCOUNTER
Pending Prescriptions:                       Disp   Refills    sulfamethoxazole-trimethoprim (BACTRIM DS*14 tab*0            Sig: Take 1 tablet by mouth 2 times daily.    Change this Rx to be sent to [facility] in [place] so it can get delivered to the home care facility.

## 2024-12-18 NOTE — TELEPHONE ENCOUNTER
Requesting change in pharmacy. Pended and routing to pool.     Elan Tovar RN on 12/18/2024 at 2:17 PM

## 2024-12-19 ENCOUNTER — TRANSFERRED RECORDS (OUTPATIENT)
Dept: HEALTH INFORMATION MANAGEMENT | Facility: CLINIC | Age: 74
End: 2024-12-19
Payer: MEDICARE

## 2025-01-03 ENCOUNTER — TELEPHONE (OUTPATIENT)
Dept: ANESTHESIOLOGY | Facility: CLINIC | Age: 75
End: 2025-01-03

## 2025-01-16 ENCOUNTER — LAB REQUISITION (OUTPATIENT)
Dept: LAB | Facility: CLINIC | Age: 75
End: 2025-01-16
Payer: MEDICARE

## 2025-01-16 DIAGNOSIS — R31.9 HEMATURIA, UNSPECIFIED: ICD-10-CM

## 2025-01-16 LAB
ALBUMIN UR-MCNC: 50 MG/DL
APPEARANCE UR: ABNORMAL
BACTERIA #/AREA URNS HPF: ABNORMAL /HPF
BILIRUB UR QL STRIP: NEGATIVE
COLOR UR AUTO: YELLOW
GLUCOSE UR STRIP-MCNC: NEGATIVE MG/DL
HGB UR QL STRIP: ABNORMAL
KETONES UR STRIP-MCNC: NEGATIVE MG/DL
LEUKOCYTE ESTERASE UR QL STRIP: ABNORMAL
MUCOUS THREADS #/AREA URNS LPF: PRESENT /LPF
NITRATE UR QL: POSITIVE
PH UR STRIP: 7 [PH] (ref 5–7)
RBC URINE: 33 /HPF
SP GR UR STRIP: 1.03 (ref 1–1.03)
SQUAMOUS EPITHELIAL: <1 /HPF
UROBILINOGEN UR STRIP-MCNC: NORMAL MG/DL
WBC URINE: >182 /HPF
YEAST #/AREA URNS HPF: ABNORMAL /HPF

## 2025-01-16 PROCEDURE — 81001 URINALYSIS AUTO W/SCOPE: CPT | Mod: ORL | Performed by: INTERNAL MEDICINE

## 2025-01-16 PROCEDURE — 87186 SC STD MICRODIL/AGAR DIL: CPT | Mod: ORL | Performed by: INTERNAL MEDICINE

## 2025-01-18 ENCOUNTER — TELEPHONE (OUTPATIENT)
Dept: NURSING | Facility: CLINIC | Age: 75
End: 2025-01-18
Payer: MEDICARE

## 2025-01-18 NOTE — TELEPHONE ENCOUNTER
Patient Home Care RN, Anabel, called on patient behalf looking for UC lab results and requesting potential anbx order. Pt is not established through  [facility] . Was seen 2 days ago at [facility] for UA drop off, but no visit. Home Care RN transferred to [facility] to see if lab results can be faxed. Advised that patient should contact [facility] for any potential orders following UA/UC results.

## 2025-01-19 LAB — BACTERIA UR CULT: ABNORMAL

## 2025-01-27 ENCOUNTER — TELEPHONE (OUTPATIENT)
Dept: INTERNAL MEDICINE | Facility: CLINIC | Age: 75
End: 2025-01-27

## 2025-01-27 NOTE — TELEPHONE ENCOUNTER
Reason for Call:  Appointment Request    Patient requesting this type of appt:  est care     Requested provider:  Isidoro Anthony DO ([facility])    Reason patient unable to be scheduled: Needs to be scheduled by clinic    When does patient want to be seen/preferred time:  asap within the month     Comments: pt state Isidoro Anthony DO had okayed her to be a est pt. However when booking callie, Isidoro Anthony DO ([facility]) was not accepting pt per [employer] website.  Please call her back if okayed to est care with pt.     Could we send this information to you in [employer] or would you prefer to receive a phone call?:   Patient would prefer a phone call   Okay to leave a detailed message?: Yes at Cell number on file:    Telephone Information:   Mobile [phone]       Call taken on 1/27/2025 at 9:26 AM by Ashley Zimmerman MA

## 2025-02-06 ENCOUNTER — MEDICAL CORRESPONDENCE (OUTPATIENT)
Dept: HEALTH INFORMATION MANAGEMENT | Facility: CLINIC | Age: 75
End: 2025-02-06
Payer: MEDICARE

## 2025-02-10 NOTE — PROGRESS NOTES
Video-Visit Details    Type of service:  Video Visit     Originating Location (pt. Location): Home    Distant Location (provider location):  Off-site  Platform used for Video Visit: [facility]     Date of visit: 2/11/2025      Assessment:   Anayeli Gagnon is a 74 year old female with a past medical history significant for thoracolumbar pain, neuropathic pain, OA, headache, CVA, SCI T7-12, incomplete SCI T1-6, HTN, muscle spasticity, anxiety/depression, s/p lumbar decompression 2019, s/p ITP implant 2022, who presents in follow up for chronic pain syndrome and IT pump management.      Widespread pain - Etiology multifactorial, hx of SCI. Symptoms consistent with neuropathic process, underlying degenerative/postsurgical changes of spine, suspect SI mediated component, overlying myofascial component.     Today, she identifies groin and ankles/feet are most painful areas (chronic). She reports acute vaginal pain x 3 days, [facility] facility PCP told her no UTI, and encouraged her to follow up again with provider given persistent acute symptoms.      Assigned to [facility] nursing team.    Visit Diagnoses:  1. Presence of intrathecal pump    2. Neuropathic pain    3. 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 Spinal cord injury at T7-T12 level (H)    5. Chronic bilateral low back pain with bilateral sciatica    6. Muscle spasm    7. Dystonia        Plan:  Diagnosis reviewed, treatment option addressed, and risk/benifits discussed.  Self-care instructions given.  I am recommending a multidisciplinary treatment plan to help this patient better manage their pain.      Physical Therapy:  Discussed at prior visits, declined interest.      Diagnostic Studies:  No new orders today.      Medication Management:   IT pump - hydromorphone, bupivacaine, ziconotide, 1:2:1. Established with [facility] for home management. Notable benefit with 8.5% increase on  11/21/24. No dose adjustments today.   Baclofen - current dose 10-15 mg three times daily. Appreciates some degree of benefit, no reported side effects.   Acetaminophen - 1000 mg three times daily. Appreciates some degree of benefit, no side effects.   Gabapentin 1200 mg three times daily. Appreciates benefit, no side effects reported.   Recommended Lyrica trial at prior visits, though she decided against pursuing new medication.   Could consider QID frequency in future, discussed today and not interested at this point.      Procedures:   IT pump 20 ml implanted 1/19/22 (Dr. Orourke), right abdomen. Catheter tip at upper T10   Visualized at mid T11 on thoracic xray 7/20/22.      Other Orders/Referrals:   [facility] - [facility] will coordinate next refill.   Will discuss case with my colleague, consider pump optimization strategies to enhance therapeutic efficacy.      Follow up with EMELI Jerez CNP within 2-3 months     Review of Electronic Chart: Today I have also reviewed available medical information in the patient's medical record at [facility] ([facility]) and Care Everywhere (if available), including relevant provider notes, laboratory work, and imaging.     Santa Wallace, KIRAN, APRN, AGNP-C  [facility]     -------------------------------------------------    Subjective:    Chief complaint:   Chief Complaint   Patient presents with    Pain    Pain Management    RECHECK     Follow up-no changes reported       Interval history:  Anayeli Gagnon is a 74 year old female last seen on 11/15/24.      Recommendations/plan at the last visit included:  Physical Therapy:  Discussed PT referral at prior visit, not interested.      Pain Psychology: Not at this time.      Diagnostic Studies:  No new orders today.      Medication Management:   IT pump - hydromorphone, bupivacaine, ziconitide. Established with [facility] for home management. Dose adjustments recommended today, will increase basal rate by  "hydromorphone 0.6 mg ( drug), no changes to PTM dose.   Baclofen - current dose 10-15 mg three times daily. Appreciates some degree of benefit, no reported side effects.   Acetaminophen - 1000 mg three times daily. Appreciates some degree of benefit, no side effects.   Gabapentin 1200 mg three times daily. Appreciates benefit, no side effects reported.   Recommended Lyrica trial at prior visits, though she decided against pursuing new medication.      Potential procedures:   None      Other Orders/Referrals:   [facility] - [facility] will coordinate dose adjustments.      Follow up with EMELI Jerez CNP in 6-8 weeks (around 2 weeks after pump dose adjustments).     Since her last visit, Anayeli BOSS Chelsi reports:  -She reports chronic pain is the same, no significant changes.   -She is having vaginal pain, was told she does not have UTI.   -AL facility provider evaluated her.   -Her chronic pain is most intense in groin, BLE and feet/ankles.   -She is agreeable to me discussing case with Dr. Wharton and would be open to consult with him.   -Pump dose was slightly increased since last visit. [facility] visit on 11/21/24. She thinks this helped \"some\" but overall pain control still not optimized.   -She continues to struggle with daytime drowsiness, presumably medication related, but does not notice specific correlations with increased CNS side effects after taking gabapentin or baclofen.   -She is not interested in adjusting gabapentin to QID dosing (she is at max 3600 mg/day).     PEG: A Three-Item Scale Assessing Pain Intensity and Interference    What number best describes your PAIN ON AVERAGE in the past week? (Patient-Rptd) 5    What number best describes how, during the past week, pain has interfered with your ENJOYMENT OF LIFE? (Patient-Rptd) 5    What number best describes how, during the past week, pain has interfered with your GENERAL ACTIVITY? (Patient-Rptd) 3    PEG Total Score: (Patient-Rptd) " 4.33    Fredo RODRIGUEZ, Ronen KA, Samantha MJ, Clover TA, Gotti J, Candida JM, Lincoln SM, Milad K. Development and initial validation of the PEG, a 3-item scale assessing pain intensity and interference. Journal of General Internal Medicine. 2009 Kamaljit;24:733-738.        HPI/Interval history from last visit:  -Annamarie recently had hospitalization r/t TIA.  -She continues to have poorly controlled pain.   -She was not able to get pump dose adjustments in between visits.   -She is scheduled for refill/adjustments through [facility] next week.   -She continues gabapentin, acetaminophen, and baclofen.   -She expressed concerns for being able to get pump adjustments after recent hospitalization.        Current Pain Treatments:      Gabapentin 1200 mg TID  Baclofen 15 mg 2-3 x daily PRN  Acetaminophen 1000 mg TID                   Intrathecal Pump- 20 mL syringe     Concentrations:   Dilaudid 14 mg/ml                                                          Bupivacaine 28 mg/ml   Ziconotide 14 mcg/ml      Total Volume in Refill: 20 mL     Basal:   Dilaudid  7.6 mg/day                                                       Bupivacaine 15.2 mg/day   Ziconotide 7.6 mcg/day      PTM 0.2 hydromorphone, 2 hour lockout, maximum 6 in a day.         Current MME: N/A     Review of [place] Prescription Monitoring Program (): YES     Annual Controlled Substance Agreement/UDS due date: N/A     Past pain treatments:  Lyrica 150 mg TID      Medications:  Current Outpatient Medications   Medication Sig Dispense Refill    acetaminophen (TYLENOL) 500 MG tablet Take 1,000 mg by mouth every 6 hours as needed for mild pain.      aspirin (ASA) 325 MG EC tablet Take 1 tablet (325 mg) by mouth daily 30 tablet 11    atorvastatin (LIPITOR) 40 MG tablet Take 1 tablet (40 mg) by mouth every evening 30 tablet 3    baclofen (LIORESAL) 10 MG tablet Take 1 tablet (10 mg) by mouth 3 times daily.      busPIRone (BUSPAR) 15 MG tablet Take 15 mg by mouth 2 times daily.       gabapentin (NEURONTIN) 600 MG tablet Take 1,200 mg by mouth 3 times daily  1 tablet 0    lidocaine (LIDODERM) 5 % patch Apply 1 patch topically daily as needed       LORazepam (ATIVAN) 0.5 MG tablet Take 1 tablet by mouth daily as needed for anxiety      medication given by implanted intrathecal pump continuously. Concentrations:   Dilaudid 14 mg/ml                                                          Bupivacaine 28 mg/ml   Ziconitide 14 mcg/ml      Total Volume in Refill: 20 mL     Basal:   Dilaudid  7.6 mg/day                                                       Bupivacaine 15.2 mg/day   Ziconitide 7.6 mcg/day      PTM:  0.2 hydromorphone, 2 hour lockout, maximum 6 in a day.     Pump Reservoir Volume: 20 mL  Low Reservoir Alarm Date: unknown  Pump : GuidePal. Model: [employer] II  [facility] Responsible for pump medications: [place] [phone]      melatonin 3 MG tablet Take 3 mg by mouth nightly as needed for sleep      metoprolol succinate ER (TOPROL-XL) 25 MG 24 hr tablet Take 12.5 mg by mouth every evening       Multiple Vitamins-Minerals (WOMENS MULTI PO) Take 1 tablet by mouth daily      ondansetron (ZOFRAN) 4 MG tablet Take 1 tablet (4 mg) by mouth every 6 hours as needed for nausea 20 tablet 1    polyethylene glycol (MIRALAX) 17 GM/Dose powder Take 17 g by mouth daily as needed for constipation       pregabalin (LYRICA) 150 MG capsule Take 1 capsule (150 mg) by mouth 3 times daily Stop gabapentin. 90 capsule 1    PROCTO-MED HC 2.5 % cream Place 1 Application rectally daily as needed for hemorrhoids or other (itch).      sulfamethoxazole-trimethoprim (BACTRIM DS) 800-160 MG tablet Take 1 tablet by mouth 2 times daily. 14 tablet 0       Medical History: any changes in medical history since they were last seen? No      Objective:    Physical Exam:  GENERAL: alert and no distress  EYES: Eyes grossly normal to inspection.  No discharge or erythema, or obvious scleral/conjunctival  abnormalities.  RESP: No audible wheeze, cough, or visible cyanosis.    SKIN: Visible skin clear. No significant rash, abnormal pigmentation or lesions.  NEURO: Cranial nerves grossly intact.  Mentation and speech appropriate for age.  PSYCH: Appropriate affect, tone, and pace of words     Diagnostic Tests/Imaging/Labs:  Reviewed last BMP 11/14/24.     BILLING TIME DOCUMENTATION:   The total TIME spent on this patient on the date of the encounter/appointment was 40 minutes.      TOTAL TIME includes:   Time spent preparing to see the patient (reviewing records and tests)   Time spent face to face (or over the phone) with the patient   Time spent ordering tests, medications, procedures and referrals   Time spent Referring and communicating with other healthcare professionals   Time spent documenting clinical information in Epic

## 2025-02-10 NOTE — CONFIDENTIAL NOTE
Reason for visit: Transient neurological symptoms   Additional Information:   Stereotyped episodes of right facial weakness and speech difficulty    Referring Provider: Rocio Astudillo NP   [facility]   Office Visit Notes: 11/10/2024 - 11/14/2024       All IMAGING   STATUS/LOCATION   DATE   MRI/MRA N/A Internal External -PACS    CT/CTA Internal       External- PACS  Requested  [facility]   11/13/2024, 11/12/2024   11/10/2024,  5/10/2023   5/7/2023     09/04/2024, 9/11/2023    LABS Internal, External    EEG N/A    EMG N/A    NEUOROPSYCH   TEST: N/A

## 2025-02-11 ENCOUNTER — VIRTUAL VISIT (OUTPATIENT)
Dept: ANESTHESIOLOGY | Facility: CLINIC | Age: 75
End: 2025-02-11
Payer: MEDICARE

## 2025-02-11 DIAGNOSIS — Z97.8 PRESENCE OF INTRATHECAL PUMP: Primary | ICD-10-CM

## 2025-02-11 DIAGNOSIS — M54.41 CHRONIC BILATERAL LOW BACK PAIN WITH BILATERAL SCIATICA: ICD-10-CM

## 2025-02-11 DIAGNOSIS — G89.29 CHRONIC BILATERAL LOW BACK PAIN WITH BILATERAL SCIATICA: ICD-10-CM

## 2025-02-11 DIAGNOSIS — M54.42 CHRONIC BILATERAL LOW BACK PAIN WITH BILATERAL SCIATICA: ICD-10-CM

## 2025-02-11 DIAGNOSIS — S24.103A SPINAL CORD INJURY AT T7-T12 LEVEL (H): ICD-10-CM

## 2025-02-11 DIAGNOSIS — S24.151A: ICD-10-CM

## 2025-02-11 DIAGNOSIS — M79.2 NEUROPATHIC PAIN: ICD-10-CM

## 2025-02-11 DIAGNOSIS — G24.9 DYSTONIA: ICD-10-CM

## 2025-02-11 DIAGNOSIS — M62.838 MUSCLE SPASM: ICD-10-CM

## 2025-02-11 PROCEDURE — 98007 SYNCH AUDIO-VIDEO EST HI 40: CPT

## 2025-02-11 ASSESSMENT — PAIN SCALES - GENERAL: PAINLEVEL_OUTOF10: MODERATE PAIN (6)

## 2025-02-11 ASSESSMENT — PAIN SCALES - PAIN ENJOYMENT GENERAL ACTIVITY SCALE (PEG)
AVG_PAIN_PASTWEEK: 5
PEG_TOTALSCORE: 4.33
INTERFERED_ENJOYMENT_LIFE: 5
INTERFERED_GENERAL_ACTIVITY: 3

## 2025-02-11 NOTE — NURSING NOTE
How will you be connecting to the visit? MyChart   How would you like to obtain your AVS? MyChart  If the video visit is dropped, the invitation should be resent by: Text to cell phone: [phone]  Will anyone else be joining your video visit? Yes: John. How would they like to receive their invitation? Other e-mail: there with patient  Are you currently in the [facility] yes        Patient presents with:  Pain  Pain Management  RECHECK: Follow up-no changes reported      Moderate Pain (6)     Pain Medications       Analgesics Other Refills Start End     acetaminophen (TYLENOL) 500 MG tablet --  --    Sig - Route: Take 1,000 mg by mouth every 6 hours as needed for mild pain. - Oral    Class: Historical       Salicylates Refills Start End     aspirin (ASA) 325 MG EC tablet 11 1/8/2024 --    Sig - Route: Take 1 tablet (325 mg) by mouth daily - Oral    Class: E-Prescribe            What medications are you using for pain? Tylenol, pain pump     What refills are you needing today? none    Expectations: follow up    Jannette Metzger LPN

## 2025-02-11 NOTE — Clinical Note
Hey - this is the patient with non-cancer associated pain. Pain remains poorly controlled (multifactorial, psych factors too). Concerns for catheter tip migration? Med change?  Thanks, A

## 2025-02-12 ENCOUNTER — TELEPHONE (OUTPATIENT)
Dept: ANESTHESIOLOGY | Facility: CLINIC | Age: 75
End: 2025-02-12
Payer: MEDICARE

## 2025-02-12 NOTE — PROGRESS NOTES
Sent New Screenst (1st Attempt) and Patient Contacted for the patient to call back and schedule the following:    Appointment type: Return Pain  Provider: Santa Wallace  Visit mode: In Person or Virtual Visit  Return date: Approx. 4/11/25  Specialty phone number: [phone]    Additional Notes: Patient requested call back in afternoon. leon

## 2025-02-12 NOTE — TELEPHONE ENCOUNTER
Patient confirmed scheduled appointment:     Date: 5/13/25  Time: 11:15 AM  Visit type: Return Pain  Visit mode: Virtual Visit  Provider: Santa Wallace  [place] [facility]    Additional Notes:

## 2025-02-13 ENCOUNTER — OFFICE VISIT (OUTPATIENT)
Dept: INTERNAL MEDICINE | Facility: CLINIC | Age: 75
End: 2025-02-13
Payer: MEDICARE

## 2025-02-13 VITALS
HEIGHT: 64 IN | RESPIRATION RATE: 16 BRPM | WEIGHT: 125.44 LBS | SYSTOLIC BLOOD PRESSURE: 126 MMHG | TEMPERATURE: 97.2 F | HEART RATE: 77 BPM | OXYGEN SATURATION: 96 % | DIASTOLIC BLOOD PRESSURE: 72 MMHG | BODY MASS INDEX: 21.42 KG/M2

## 2025-02-13 DIAGNOSIS — Z93.59 SUPRAPUBIC CATHETER (H): ICD-10-CM

## 2025-02-13 DIAGNOSIS — M62.838 MUSCLE SPASTICITY: ICD-10-CM

## 2025-02-13 DIAGNOSIS — G82.20 PARAPLEGIA (H): ICD-10-CM

## 2025-02-13 DIAGNOSIS — S24.103A SPINAL CORD INJURY AT T7-T12 LEVEL (H): ICD-10-CM

## 2025-02-13 DIAGNOSIS — Z93.3 COLOSTOMY PRESENT (H): ICD-10-CM

## 2025-02-13 DIAGNOSIS — I67.1 DURAL ARTERIOVENOUS FISTULA: ICD-10-CM

## 2025-02-13 DIAGNOSIS — Z00.00 MEDICARE ANNUAL WELLNESS VISIT, SUBSEQUENT: Primary | ICD-10-CM

## 2025-02-13 DIAGNOSIS — L97.321 SKIN ULCER OF LEFT ANKLE, LIMITED TO BREAKDOWN OF SKIN (H): ICD-10-CM

## 2025-02-13 DIAGNOSIS — I63.9 CEREBROVASCULAR ACCIDENT (CVA), UNSPECIFIED MECHANISM (H): ICD-10-CM

## 2025-02-13 DIAGNOSIS — R33.9 INCOMPLETE EMPTYING OF BLADDER: ICD-10-CM

## 2025-02-13 DIAGNOSIS — B37.2 YEAST DERMATITIS: ICD-10-CM

## 2025-02-13 DIAGNOSIS — I10 PRIMARY HYPERTENSION: ICD-10-CM

## 2025-02-13 DIAGNOSIS — E78.2 MIXED HYPERLIPIDEMIA: ICD-10-CM

## 2025-02-13 DIAGNOSIS — T39.015A PLATELET DYSFUNCTION DUE TO ASPIRIN (H): ICD-10-CM

## 2025-02-13 DIAGNOSIS — D69.1 PLATELET DYSFUNCTION DUE TO ASPIRIN (H): ICD-10-CM

## 2025-02-13 SDOH — HEALTH STABILITY: PHYSICAL HEALTH: ON AVERAGE, HOW MANY DAYS PER WEEK DO YOU ENGAGE IN MODERATE TO STRENUOUS EXERCISE (LIKE A BRISK WALK)?: 0 DAYS

## 2025-02-13 ASSESSMENT — PATIENT HEALTH QUESTIONNAIRE - PHQ9
SUM OF ALL RESPONSES TO PHQ QUESTIONS 1-9: 2
SUM OF ALL RESPONSES TO PHQ QUESTIONS 1-9: 2
10. IF YOU CHECKED OFF ANY PROBLEMS, HOW DIFFICULT HAVE THESE PROBLEMS MADE IT FOR YOU TO DO YOUR WORK, TAKE CARE OF THINGS AT HOME, OR GET ALONG WITH OTHER PEOPLE: NOT DIFFICULT AT ALL

## 2025-02-13 ASSESSMENT — SOCIAL DETERMINANTS OF HEALTH (SDOH): HOW OFTEN DO YOU GET TOGETHER WITH FRIENDS OR RELATIVES?: TWICE A WEEK

## 2025-02-13 ASSESSMENT — PAIN SCALES - GENERAL: PAINLEVEL_OUTOF10: MODERATE PAIN (4)

## 2025-02-13 NOTE — PROGRESS NOTES
Preventive Care Visit  [facility]  Isidoro Anthony DO, Internal Medicine  Feb 13, 2025      Assessment & Plan     Medicare annual wellness visit, subsequent      Dural arteriovenous fistula      Spinal cord injury at T7-T12 level (H)      Paraplegia (H)  Minimal function of the lower extremities.  Patient seems to have some mild flexion of the hips.    Muscle spasticity  Intermittent.    Cerebrovascular accident (CVA), unspecified mechanism (H)  No reported residual effect.    Incomplete emptying of bladder  Suprapubic catheter in place    Colostomy present (H)  Functioning well.    Suprapubic catheter (H)  Functioning well.  No signs of bacterial infection.  Patient reassured that the small amount of granulation tissue is harmless and to be expected.    Yeast dermatitis  Recommend over-the-counter antifungal cream/GYN Lotrimin or Monistat to be used twice daily topically.  This is to be applied around the catheter entrance    Mixed hyperlipidemia  Stable.  Continue statin therapy.  Recent laboratory work reviewed    Primary hypertension  Blood pressure controlled.  Continue current medications    Skin ulcer of left ankle, limited to breakdown of skin (H)  Healing well.  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  Checklist reviewing preventive services available has been given to the patient.  Reviewed patient's diet, addressing concerns and/or questions.   The patient was instructed to see the dentist every 6 months.   Updated plan of care.  Patient reported difficulty with activities of  daily living were addressed today.Information on urinary incontinence and treatment options given to patient.   I have reviewed Opioid Use Disorder and Substance Use Disorder risk factors and made any needed referrals.       MEDICATIONS:  Continue current medications without change  Patient will discuss physical therapy with her nursing home staff.  If orders are required or requested, I will comply.    Maine De Luna is a 74 year old, presenting for the following:  Wellness Visit        2/13/2025    10:30 AM   Additional Questions   Roomed by Luna   Accompanied by Daughter           HPI          Health Care Directive  Patient does not have a Health Care Directive: Advance Directive received and scanned. Click on Code in the patient header to view.      2/13/2025   General Health   How would you rate your overall physical health? (!) FAIR   Feel stress (tense, anxious, or unable to sleep) Only a little   (!) STRESS CONCERN      2/13/2025   Nutrition   Diet: Regular (no restrictions)         2/13/2025   Exercise   Days per week of moderate/strenous exercise 0 days   (!) EXERCISE CONCERN      2/13/2025   Social Factors   Frequency of gathering with friends or relatives Twice a week   Worry food won't last until get money to buy more No   Food not last or not have enough money for food? No   Do you have housing? (Housing is defined as stable permanent housing and does not include staying ouside in a car, in a tent, in an abandoned building, in an overnight shelter, or couch-surfing.) Yes   Are you worried about losing your housing? No   Lack of transportation? No   Unable to get utilities (heat,electricity)? No         2/13/2025   Fall Risk   Fallen 2 or more times in the past year? No   Trouble with walking or balance? No          2/13/2025   Activities of Daily Living- Home Safety   Needs help with the following daily activites Transportation    Laundry    Medication administration   Safety concerns in the  home None of the above       Multiple values from one day are sorted in reverse-chronological order         2/13/2025   Dental   Dentist two times every year? (!) NO         2/13/2025   Hearing Screening   Hearing concerns? None of the above         2/13/2025   Driving Risk Screening   Patient/family members have concerns about driving (!) DECLINE         2/13/2025   General Alertness/Fatigue Screening   Have you been more tired than usual lately? No         2/13/2025   Urinary Incontinence Screening   Bothered by leaking urine in past 6 months Yes          Today's PHQ-9 Score:       2/13/2025    10:16 AM   PHQ-9 SCORE   PHQ-9 Total Score MyChart 2 (Minimal depression)   PHQ-9 Total Score 2        Patient-reported         2/13/2025   Substance Use   Alcohol more than 3/day or more than 7/wk No   Do you have a current opioid prescription? (!) YES   How severe/bad is pain from 1 to 10? 8/10   Do you use any other substances recreationally? No          No data to display              Low Risk (0-3)  Moderate Risk (4-7)  High Risk (>8)  Social History     Tobacco Use    Smoking status: Never     Passive exposure: Never    Smokeless tobacco: Never   Vaping Use    Vaping status: Never Used   Substance Use Topics    Alcohol use: No    Drug use: Never          Mammogram Screening - After age 74- determine frequency with patient based on health status, life expectancy and patient goals    ASCVD Risk   The ASCVD Risk score (Stacy THORNTON, et al., 2019) failed to calculate for the following reasons:    Risk score cannot be calculated because patient has a medical history suggesting prior/existing ASCVD            Reviewed and updated as needed this visit by Provider                    Past Medical History:   Diagnosis Date    CARDIAC DYSRHYTHMIAS NEC 10/03/2007    Taking atenelol for years for this    Cerebrovascular accident (H) 05/05/2023    History of skin cancer     Mitral valve prolapse     Neurogenic bladder      Sacral decubitus ulcer, stage IV (H)     Thoracic spinal cord injury (H)     TIA (transient ischemic attack) 2023     Past Surgical History:   Procedure Laterality Date    CYSTOSTOMY, INSERT TUBE SUPRAPUBIC, COMBINED N/A 2023    Procedure: CYSTOSCOPY, OPEN SUPRAPUBIC TUBE INSERTION;  Surgeon: Karley Robles MD;  Location: [facility] OR    DECOMPRESSION LUMBAR ONE LEVEL N/A 2019    Procedure: Posterior spinal decompression;  Surgeon: Alf Ritchie MD;  Location: [facility] OR    INSERT INTRATHECAL PAIN PUMP N/A 2022    Procedure: INSERTION, INTRATHECAL ANALGESIC PUMP, externalized trial;  Surgeon: Luis Orourke MD;  Location: [facility]    INSERT INTRATHECAL PAIN PUMP Right 2022    Procedure: INSERTION, INTRATHECAL ANALGESIC PUMP;  Surgeon: Luis Orourke MD;  Location: [facility] OR    INSERT STIMULATOR AND LEADS INTERNAL DORSAL COLUMN N/A 2021    Procedure: Posterior thoracic 12 to Lumbar 1 level for placement of spinal cord stimulator paddle and placement of implantable pulse generator/battery over right buttock;  Surgeon: Luis Orourke MD;  Location: [facility] OR    IR SPINAL ANGIOGRAM  10/16/2019    IR SPINAL ANGIOGRAM  2019    LAPAROSCOPIC TUBAL LIGATION      REPAIR SPINAL ARERIOVENOUS MALFORMATION N/A 2019    Procedure: with surgical disconnection arterial venous fistula Thoracic 5;  Surgeon: Alf Ritchie MD;  Location: [facility] OR     OB History    Para Term  AB Living   2 2 0 0 0 0   SAB IAB Ectopic Multiple Live Births   0 0 0 0 0      # Outcome Date GA Lbr David/2nd Weight Sex Type Anes PTL Lv   2 Para            1 Para              Lab work is in process  Labs reviewed in EPIC  BP Readings from Last 3 Encounters:   25 126/72   24 138/76   24 (!) 176/80    Wt Readings from Last 3 Encounters:   25 56.9 kg (125 lb 7.1 oz)   11/10/24 56.9 kg (125 lb 7.1 oz)   24 56.7 kg (125 lb)                  Patient Active Problem List    Diagnosis    Other specified cardiac dysrhythmias(427.89)    CARDIOVASCULAR SCREENING; LDL GOAL LESS THAN 160    History of skin cancer    Headache    Menopause    Anxiety reaction    Osteoarthritis    Paraplegia (H)    Bilateral lower extremity edema    Chronic bilateral low back pain with bilateral sciatica    Edema of spinal cord (H)    History of spinal surgery    Hypertension    Incomplete emptying of bladder    Incomplete injury of thoracic spinal cord in T1 to T6 region without bony injury (H)    Leg weakness, bilateral    Muscle spasticity    MVP (mitral valve prolapse)    Palpitations    Pressure ulcer of coccygeal region, stage 3 (H)    Spinal cord injury at T7-T12 level (H)    Pure hypercholesterolemia    Ulnar neuropathy at elbow, left    Nocturnal enuresis    Urge incontinence of urine    Arteriovenous fistula of spinal cord vessels    Dural arteriovenous fistula    Spinal cord injury of thoracic region without bone injury, subsequent encounter (H)    Thoracolumbar back pain    Neuropathic pain    Intractable neuropathic pain of lumbosacral origin    Cerebrovascular accident (CVA), unspecified mechanism (H)    Depressive disorder    Right homonymous hemianopsia    TIA (transient ischemic attack)    History of stroke    Skin ulcer of left ankle, limited to breakdown of skin (H)    Presence of intrathecal pump    Suprapubic catheter (H)    Colostomy present (H)    MRSA carrier    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)    Hyperlipidemia    Transient neurological symptoms     Past Surgical History:   Procedure Laterality Date    CYSTOSTOMY, INSERT TUBE SUPRAPUBIC, COMBINED N/A 11/13/2023    Procedure: CYSTOSCOPY, OPEN SUPRAPUBIC TUBE INSERTION;  Surgeon: Karley Robles MD;  Location: [facility] OR    DECOMPRESSION LUMBAR ONE LEVEL N/A 12/27/2019    Procedure: Posterior spinal decompression;  Surgeon: Alf Ritchie MD;  [facility]    INSERT INTRATHECAL PAIN PUMP N/A 01/19/2022     Procedure: INSERTION, INTRATHECAL ANALGESIC PUMP, externalized trial;  Surgeon: Luis Orourke MD;  [facility]    INSERT INTRATHECAL PAIN PUMP Right 2022    Procedure: INSERTION, INTRATHECAL ANALGESIC PUMP;  Surgeon: Luis Orourke MD;  [facility]: [facility]    INSERT STIMULATOR AND LEADS INTERNAL DORSAL COLUMN N/A 2021    Procedure: Posterior thoracic 12 to Lumbar 1 level for placement of spinal cord stimulator paddle and placement of implantable pulse generator/battery over right buttock;  Surgeon: Luis Orourke MD;  Location: [facility]    IR SPINAL ANGIOGRAM  10/16/2019    IR SPINAL ANGIOGRAM  2019    LAPAROSCOPIC TUBAL LIGATION      REPAIR SPINAL ARERIOVENOUS MALFORMATION N/A 2019    Procedure: with surgical disconnection arterial venous fistula Thoracic 5;  Surgeon: Alf Ritchie MD;  Location: [facility] OR       Social History     Tobacco Use    Smoking status: Never     Passive exposure: Never    Smokeless tobacco: Never   Substance Use Topics    Alcohol use: No     Family History   Problem Relation Age of Onset    C.A.D. Father          41    Deep Vein Thrombosis (DVT) No family hx of     Anesthesia Reaction No family hx of          Current Outpatient Medications   Medication Sig Dispense Refill    acetaminophen (TYLENOL) 500 MG tablet Take 1,000 mg by mouth every 6 hours as needed for mild pain.      aspirin (ASA) 325 MG EC tablet Take 1 tablet (325 mg) by mouth daily 30 tablet 11    atorvastatin (LIPITOR) 40 MG tablet Take 1 tablet (40 mg) by mouth every evening 30 tablet 3    baclofen (LIORESAL) 10 MG tablet Take 1 tablet (10 mg) by mouth 3 times daily.      busPIRone (BUSPAR) 15 MG tablet Take 15 mg by mouth 2 times daily.      gabapentin (NEURONTIN) 600 MG tablet Take 1,200 mg by mouth 3 times daily  1 tablet 0    lidocaine (LIDODERM) 5 % patch Apply 1 patch topically daily as needed       LORazepam (ATIVAN) 0.5 MG tablet Take 1 tablet by mouth daily as needed for anxiety       medication given by implanted intrathecal pump continuously. Concentrations:   Dilaudid 14 mg/ml                                                          Bupivacaine 28 mg/ml   Ziconitide 14 mcg/ml      Total Volume in Refill: 20 mL     Basal:   Dilaudid  7.6 mg/day                                                       Bupivacaine 15.2 mg/day   Ziconitide 7.6 mcg/day      PTM:  0.2 hydromorphone, 2 hour lockout, maximum 6 in a day.     Pump Reservoir Volume: 20 mL  Low Reservoir Alarm Date: unknown  Pump : RateItAll. Model: IdentifyMed II  Clinic Responsible for pump medications: [facility] [phone]      melatonin 3 MG tablet Take 3 mg by mouth nightly as needed for sleep      metoprolol succinate ER (TOPROL-XL) 25 MG 24 hr tablet Take 12.5 mg by mouth every evening       Multiple Vitamins-Minerals (WOMENS MULTI PO) Take 1 tablet by mouth daily      ondansetron (ZOFRAN) 4 MG tablet Take 1 tablet (4 mg) by mouth every 6 hours as needed for nausea 20 tablet 1    polyethylene glycol (MIRALAX) 17 GM/Dose powder Take 17 g by mouth daily as needed for constipation       PROCTO-MED HC 2.5 % cream Place 1 Application rectally daily as needed for hemorrhoids or other (itch).       Allergies   Allergen Reactions    Latex     Contrast Dye Rash     Painful rash after x-ray contrast    Nitrofurantoin Nausea and Vomiting     Current providers sharing in care for this patient include:  Patient Care Team:  Jazmin Interiano MD as PCP - General (Family Medicine)  Luis Orourke MD as MD (Neurological Surgery)  Cris Miguel, RN as Specialty Care Coordinator (Neurological Surgery)  Jasmine Espinoza MD as Anesthesiologist (Pain Medicine)  Alia Reyes, RN as Registered Nurse (Pain Clinic)  Joss Martinez MD as Hospitalist (Infectious Diseases)  Satnam Brasher MD as Assigned Infectious Disease Provider  Waqas Tyson MD as Assigned Neuroscience Provider  Dulce Be PA-C as  Assigned Surgical Provider  Genoveva Cuevas, [facility] as Assigned Behavioral Health Provider  Xavier Roa MD as MD (Neurology)  Isidoro Anthony DO as Assigned PCP    The following health maintenance items are reviewed in Epic and correct as of today:  Health Maintenance   Topic Date Due    URINE DRUG SCREEN  Never done    ADVANCE CARE PLANNING  Never done    DTAP/TDAP/TD IMMUNIZATION (2 - Td or Tdap) 11/17/2021    ZOSTER IMMUNIZATION (2 of 2) 12/29/2021    MEDICARE ANNUAL WELLNESS VISIT  10/27/2022    RSV VACCINE (1 - 1-dose 75+ series) Never done    INFLUENZA VACCINE (1) 09/01/2024    COVID-19 Vaccine (3 - 2024-25 season) 09/01/2024    LIPID  11/10/2025    BMP  11/14/2025    ANNUAL REVIEW OF HM ORDERS  12/16/2025    FALL RISK ASSESSMENT  02/13/2026    DEXA  12/01/2026    GLUCOSE  11/14/2027    HEPATITIS C SCREENING  Completed    PHQ-2 (once per calendar year)  Completed    Pneumococcal Vaccine: 50+ Years  Completed    HPV IMMUNIZATION  Aged Out    MENINGITIS IMMUNIZATION  Aged Out    MAMMO SCREENING  Discontinued    COLORECTAL CANCER SCREENING  Discontinued         Review of Systems  CONSTITUTIONAL: NEGATIVE for fever, chills, change in weight  INTEGUMENTARY/SKIN: A small amount of granulation tissue is forming around the suprapubic catheter site.  Additionally she has a slight rash there which is consistent with mild candidal dermatitis.  EYES: NEGATIVE for vision changes or irritation  ENT/MOUTH: NEGATIVE for ear, mouth and throat problems  RESP: NEGATIVE for significant cough or SOB  BREAST: NEGATIVE for masses, tenderness or discharge  CV: NEGATIVE for chest pain, palpitations or peripheral edema  GI: Colostomy is reportedly functioning without problem.   male : Suprapubic catheter in place.  Functioning well.  MUSCULOSKELETAL: NEGATIVE for significant arthralgias or myalgia  NEURO: Spinal cord injury with near complete paraplegia of lower extremities.  Also loss of bowel and bladder control.  " Suprapubic catheter and colostomy functioning well  ENDOCRINE: NEGATIVE for temperature intolerance, skin/hair changes  HEME: NEGATIVE for bleeding problems  PSYCHIATRIC: Mild depression.  Patient notes her anxiety is markedly better with use of the BuSpar.     Objective    Exam  /72 (BP Location: Right arm, Patient Position: Sitting, Cuff Size: Adult Regular)   Pulse 77   Temp 97.2  F (36.2  C) (Temporal)   Resp 16   Ht 1.626 m (5' 4\")   Wt 56.9 kg (125 lb 7.1 oz)   SpO2 96%   BMI 21.53 kg/m     Estimated body mass index is 21.53 kg/m  as calculated from the following:    Height as of this encounter: 1.626 m (5' 4\").    Weight as of this encounter: 56.9 kg (125 lb 7.1 oz).    Physical Exam  GENERAL: alert and no distress  EYES: Eyes grossly normal to inspection, PERRL and conjunctivae and sclerae normal  HENT: ear canals and TM's normal, nose and mouth without ulcers or lesions  NECK: no adenopathy, no asymmetry, masses, or scars  RESP: lungs clear to auscultation - no rales, rhonchi or wheezes  CV: regular rate and rhythm, normal S1 S2, no S3 or S4, no murmur, click or rub, no peripheral edema  ABDOMEN: soft, nontender, without hepatosplenomegaly or masses, bowel sounds normal, and colostomy and suprapubic catheter sites clear.  Small amount of candidiasis at the catheter site is noted.  A small pea-sized area of granulation tissue present on the left of the catheter insertion site.  Patient reassured this is harmless.  MS: no gross musculoskeletal defects noted, no edema  SKIN: The residual pressure ulcer on her lateral left ankle is now approximately 1 cm in total diameter with adequate granulation and no signs of infection.  NEURO: Patient is alert and appropriate.  Near complete lower extremity paraplegia is noted.  Minimal contractures present at this time.  Passive range of motion decreased.  PSYCH: mentation appears normal and she is much less anxious than previously.  Minimal depression is " noted.        2/13/2025   Mini Cog   Clock Draw Score 2 Normal   3 Item Recall 1 object recalled   Mini Cog Total Score 3              I have reviewed the patient's vaccination schedule and discussed the benefits of prophylactic vaccination in detail.  I recommend the patient contact their pharmacist for vaccinations.  Discussed that most insurance companies now favor reimbursement to the pharmacies and it will financially behoove the patient to have vaccinations performed at their pharmacy.        Signed Electronically by: Isidoro Anthony DO

## 2025-02-14 ENCOUNTER — TELEPHONE (OUTPATIENT)
Dept: INTERNAL MEDICINE | Facility: CLINIC | Age: 75
End: 2025-02-14
Payer: MEDICARE

## 2025-02-14 DIAGNOSIS — L97.321 SKIN ULCER OF LEFT ANKLE, LIMITED TO BREAKDOWN OF SKIN (H): Primary | ICD-10-CM

## 2025-02-14 NOTE — TELEPHONE ENCOUNTER
I agree with the addition of a regular protein supplement.  I would like the patient to have 1 can twice daily of what ever available house protein supplement is used.  As her kidney function is excellent, protein limitation is not a concern.    Raj

## 2025-02-14 NOTE — TELEPHONE ENCOUNTER
Talked to Aric and informed her of CM note below. She is not sure what the nursing home has or if they need a note/order to do this. She will call and find out if we have to do anymore and let us know by Monday 2/17/25.

## 2025-02-14 NOTE — TELEPHONE ENCOUNTER
Order/Referral Request    Who is requesting: Home Care Nurse    Orders being requested: Some type of nutritional supplement such as Arginaid    Reason service is needed/diagnosis: Patient has had an ongoing wound to her left ankle for the past year    When are orders needed by:     Has this been discussed with Provider: No    Does patient have a preference on a Group/Provider/Facility? Please send order to [facility] via Fax    Where to send orders: Place orders within Epic    Johny Velázquez RN on 2/14/2025 at 11:05 AM

## 2025-02-21 RX ORDER — NUTRITIONAL SUPPLEMENT
1 PACKET (EA) ORAL DAILY
COMMUNITY
Start: 2025-02-21

## 2025-02-21 NOTE — TELEPHONE ENCOUNTER
"I have placed a prescription for Arginaid as requested.  However, I am not there.  Therefore printing it and signing it is not an option at this time.  Apparently, the patient's caregiver has requested be \"faxed\" to them.  If they are happy to accept an unsigned version, please send them a fax copy.  If his signature is what they require, they will have to wait until Monday.    I apologize for this inconvenience and my absence.    Raj"

## 2025-02-21 NOTE — TELEPHONE ENCOUNTER
FYI - Status Update    Who is Calling: Aric    Update: Pt needs a RX of her supplements faxed to [facility] fax#: [phone]. They will fax it to her pharmacy    Does caller want a call/response back: No

## 2025-02-23 ENCOUNTER — HOSPITAL ENCOUNTER (EMERGENCY)
Facility: CLINIC | Age: 75
Discharge: HOME | End: 2025-02-24
Attending: EMERGENCY MEDICINE | Admitting: EMERGENCY MEDICINE
Payer: MEDICARE

## 2025-02-23 VITALS
WEIGHT: 130 LBS | HEIGHT: 61 IN | SYSTOLIC BLOOD PRESSURE: 157 MMHG | RESPIRATION RATE: 22 BRPM | BODY MASS INDEX: 24.55 KG/M2 | DIASTOLIC BLOOD PRESSURE: 75 MMHG | HEART RATE: 91 BPM | OXYGEN SATURATION: 95 % | TEMPERATURE: 98.8 F

## 2025-02-23 DIAGNOSIS — R11.10 VOMITING, UNSPECIFIED VOMITING TYPE, UNSPECIFIED WHETHER NAUSEA PRESENT: ICD-10-CM

## 2025-02-23 LAB
ANION GAP SERPL CALCULATED.3IONS-SCNC: 14 MMOL/L (ref 7–15)
BASOPHILS # BLD AUTO: 0 10E3/UL (ref 0–0.2)
BASOPHILS NFR BLD AUTO: 0 %
BUN SERPL-MCNC: 17.5 MG/DL (ref 8–23)
CALCIUM SERPL-MCNC: 8.8 MG/DL (ref 8.8–10.4)
CHLORIDE SERPL-SCNC: 100 MMOL/L (ref 98–107)
CREAT SERPL-MCNC: 0.54 MG/DL (ref 0.51–0.95)
EGFRCR SERPLBLD CKD-EPI 2021: >90 ML/MIN/1.73M2
EOSINOPHIL # BLD AUTO: 0.1 10E3/UL (ref 0–0.7)
EOSINOPHIL NFR BLD AUTO: 1 %
ERYTHROCYTE [DISTWIDTH] IN BLOOD BY AUTOMATED COUNT: 13.2 % (ref 10–15)
GLUCOSE SERPL-MCNC: 129 MG/DL (ref 70–99)
HCO3 SERPL-SCNC: 25 MMOL/L (ref 22–29)
HCT VFR BLD AUTO: 40.3 % (ref 35–47)
HGB BLD-MCNC: 13.4 G/DL (ref 11.7–15.7)
IMM GRANULOCYTES # BLD: 0.1 10E3/UL
IMM GRANULOCYTES NFR BLD: 0 %
LYMPHOCYTES # BLD AUTO: 0.2 10E3/UL (ref 0.8–5.3)
LYMPHOCYTES NFR BLD AUTO: 2 %
MCH RBC QN AUTO: 30.5 PG (ref 26.5–33)
MCHC RBC AUTO-ENTMCNC: 33.3 G/DL (ref 31.5–36.5)
MCV RBC AUTO: 92 FL (ref 78–100)
MONOCYTES # BLD AUTO: 0.4 10E3/UL (ref 0–1.3)
MONOCYTES NFR BLD AUTO: 4 %
NEUTROPHILS # BLD AUTO: 10.6 10E3/UL (ref 1.6–8.3)
NEUTROPHILS NFR BLD AUTO: 93 %
NRBC # BLD AUTO: 0 10E3/UL
NRBC BLD AUTO-RTO: 0 /100
PLATELET # BLD AUTO: 155 10E3/UL (ref 150–450)
POTASSIUM SERPL-SCNC: 4.2 MMOL/L (ref 3.4–5.3)
RBC # BLD AUTO: 4.4 10E6/UL (ref 3.8–5.2)
SODIUM SERPL-SCNC: 139 MMOL/L (ref 135–145)
WBC # BLD AUTO: 11.4 10E3/UL (ref 4–11)

## 2025-02-23 PROCEDURE — 250N000011 HC RX IP 250 OP 636: Performed by: EMERGENCY MEDICINE

## 2025-02-23 PROCEDURE — 80048 BASIC METABOLIC PNL TOTAL CA: CPT | Performed by: EMERGENCY MEDICINE

## 2025-02-23 PROCEDURE — 36415 COLL VENOUS BLD VENIPUNCTURE: CPT | Performed by: EMERGENCY MEDICINE

## 2025-02-23 PROCEDURE — 96374 THER/PROPH/DIAG INJ IV PUSH: CPT | Performed by: EMERGENCY MEDICINE

## 2025-02-23 PROCEDURE — 99283 EMERGENCY DEPT VISIT LOW MDM: CPT | Performed by: EMERGENCY MEDICINE

## 2025-02-23 PROCEDURE — 258N000003 HC RX IP 258 OP 636: Performed by: EMERGENCY MEDICINE

## 2025-02-23 PROCEDURE — 250N000013 HC RX MED GY IP 250 OP 250 PS 637: Performed by: EMERGENCY MEDICINE

## 2025-02-23 PROCEDURE — 99284 EMERGENCY DEPT VISIT MOD MDM: CPT | Mod: 25 | Performed by: EMERGENCY MEDICINE

## 2025-02-23 PROCEDURE — 96376 TX/PRO/DX INJ SAME DRUG ADON: CPT | Performed by: EMERGENCY MEDICINE

## 2025-02-23 PROCEDURE — 85025 COMPLETE CBC W/AUTO DIFF WBC: CPT | Performed by: EMERGENCY MEDICINE

## 2025-02-23 PROCEDURE — 96361 HYDRATE IV INFUSION ADD-ON: CPT | Performed by: EMERGENCY MEDICINE

## 2025-02-23 RX ORDER — ONDANSETRON 2 MG/ML
4 INJECTION INTRAMUSCULAR; INTRAVENOUS EVERY 30 MIN PRN
Status: DISCONTINUED | OUTPATIENT
Start: 2025-02-23 | End: 2025-02-24 | Stop reason: HOSPADM

## 2025-02-23 RX ORDER — ACETAMINOPHEN 325 MG/1
650 TABLET ORAL ONCE
Status: COMPLETED | OUTPATIENT
Start: 2025-02-23 | End: 2025-02-23

## 2025-02-23 RX ADMIN — ACETAMINOPHEN 650 MG: 325 TABLET, FILM COATED ORAL at 22:05

## 2025-02-23 RX ADMIN — SODIUM CHLORIDE 500 ML: 0.9 INJECTION, SOLUTION INTRAVENOUS at 21:19

## 2025-02-23 RX ADMIN — ONDANSETRON 4 MG: 2 INJECTION, SOLUTION INTRAMUSCULAR; INTRAVENOUS at 22:30

## 2025-02-23 RX ADMIN — ONDANSETRON 4 MG: 2 INJECTION, SOLUTION INTRAMUSCULAR; INTRAVENOUS at 21:27

## 2025-02-23 ASSESSMENT — ACTIVITIES OF DAILY LIVING (ADL)
ADLS_ACUITY_SCORE: 61

## 2025-02-24 DIAGNOSIS — M62.838 MUSCLE SPASTICITY: Primary | ICD-10-CM

## 2025-02-24 NOTE — ED PROVIDER NOTES
History     Chief Complaint   Patient presents with    Nausea & Vomiting    Headache     HPI  Anayeli Gagnon is a 74 year old female who presents for evaluation of some vomiting.  This began 4 hours ago.  She endorses a headache.  Also with a lot of stress as her  of 60 years just asked for divorce 4 weeks ago.  She is living at an apartment at [facility].  She has a colostomy so some chronic loose stools but no change in her  with stool output.  No fever.  No cough.  No nasal congestion.  Not sleeping well.  Lots of stress.      Allergies:  Allergies   Allergen Reactions    Latex     Contrast Dye Rash     Painful rash after x-ray contrast    Nitrofurantoin Nausea and Vomiting       Problem List:    Patient Active Problem List    Diagnosis Date Noted    Transient neurological symptoms 11/12/2024     Priority: Medium    History of stroke 11/11/2024     Priority: Medium    Skin ulcer of left ankle, limited to breakdown of skin (H) 11/11/2024     Priority: Medium    Presence of intrathecal pump 11/11/2024     Priority: Medium    Suprapubic catheter (H) 11/11/2024     Priority: Medium    Colostomy present (H) 11/11/2024     Priority: Medium    MRSA carrier 11/11/2024     Priority: Medium    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) 11/11/2024     Priority: Medium    Hyperlipidemia 11/11/2024     Priority: Medium    TIA (transient ischemic attack) 11/10/2024     Priority: Medium    Right homonymous hemianopsia 01/08/2024     Priority: Medium    Cerebrovascular accident (CVA), unspecified mechanism (H) 05/05/2023     Priority: Medium    Spinal cord injury of thoracic region without bone injury, subsequent encounter (H) 03/04/2021     Priority: Medium     Added automatically from request for surgery [id number]      Thoracolumbar back pain 03/04/2021     Priority: Medium     Added automatically from request for surgery [id number]      Neuropathic pain 03/04/2021     Priority: Medium     Added automatically from  request for surgery [id number]      Intractable neuropathic pain of lumbosacral origin 03/04/2021     Priority: Medium     Added automatically from request for surgery [id number]      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 lipitor started fall 2017 when in hospital, she chose to stop taking and does not want to restart      Incomplete emptying of bladder 03/15/2018     Priority: Medium    Urge incontinence of urine 03/15/2018     Priority: Medium    Incomplete injury of thoracic spinal cord in T1 to T6 region without bony injury (H) 12/06/2017     Priority: Medium    Paraplegia (H) 12/03/2017     Priority: Medium    History of spinal surgery 12/03/2017     Priority: Medium     Overview:   Thoracolumbar laminectomy for intradural lysis of griselda-medullary adhesions in October 2018      Hypertension 12/03/2017     Priority: Medium    Muscle spasticity 12/03/2017     Priority: Medium    MVP (mitral valve prolapse) 10/18/2017     Priority: Medium    Palpitations 10/18/2017      Priority: Medium    Anxiety reaction 12/06/2011     Priority: Medium    Osteoarthritis 12/06/2011     Priority: Medium    Headache 11/17/2011     Priority: Medium     Problem list name updated by automated process. Provider to review      Menopause 11/17/2011     Priority: Medium    History of skin cancer      Priority: Medium    Depressive disorder 08/29/2011     Priority: Medium    CARDIOVASCULAR SCREENING; LDL GOAL LESS THAN 160 10/31/2010     Priority: Medium    Other specified cardiac dysrhythmias(427.89) 10/03/2007     Priority: Medium     Taking atenelol for years for this          Past Medical History:    Past Medical History:   Diagnosis Date    CARDIAC DYSRHYTHMIAS NEC 10/03/2007    Cerebrovascular accident (H) 05/05/2023    History of skin cancer     Mitral valve prolapse     Neurogenic bladder     Sacral decubitus ulcer, stage IV (H)     Thoracic spinal cord injury (H)     TIA (transient ischemic attack) 04/26/2023       Past Surgical History:    Past Surgical History:   Procedure Laterality Date    CYSTOSTOMY, INSERT TUBE SUPRAPUBIC, COMBINED N/A 11/13/2023    Procedure: CYSTOSCOPY, OPEN SUPRAPUBIC TUBE INSERTION;  Surgeon: Karley Robles MD;  Location: [facility] OR    DECOMPRESSION LUMBAR ONE LEVEL N/A 12/27/2019    Procedure: Posterior spinal decompression;  Surgeon: Alf Ritchie MD;  Location: [facility] OR    INSERT INTRATHECAL PAIN PUMP N/A 01/19/2022    Procedure: INSERTION, INTRATHECAL ANALGESIC PUMP, externalized trial;  Surgeon: Luis Orourke MD;  Location: [facility] OR    INSERT INTRATHECAL PAIN PUMP Right 01/21/2022    Procedure: INSERTION, INTRATHECAL ANALGESIC PUMP;  Surgeon: Luis Orourke MD;  Location: [facility] OR    INSERT STIMULATOR AND LEADS INTERNAL DORSAL COLUMN N/A 04/07/2021    Procedure: Posterior thoracic 12 to Lumbar 1 level for placement of spinal cord stimulator paddle and placement of implantable pulse generator/battery over right buttock;  Surgeon: Luis Orourke MD;   "Location: [facility] OR    IR SPINAL ANGIOGRAM  10/16/2019    IR SPINAL ANGIOGRAM  2019    LAPAROSCOPIC TUBAL LIGATION      REPAIR SPINAL ARERIOVENOUS MALFORMATION N/A 2019    Procedure: with surgical disconnection arterial venous fistula Thoracic 5;  Surgeon: Alf Ritchie MD;  Location: [facility] OR       Family History:    Family History   Problem Relation Age of Onset    C.A.D. Father          41    Deep Vein Thrombosis (DVT) No family hx of     Anesthesia Reaction No family hx of        Social History:  Marital Status:   [2]  Social History     Tobacco Use    Smoking status: Never     Passive exposure: Never    Smokeless tobacco: Never   Vaping Use    Vaping status: Never Used   Substance Use Topics    Alcohol use: No    Drug use: Never        Medications:    acetaminophen (TYLENOL) 500 MG tablet  arginine (ARGINAID) PACK  aspirin (ASA) 325 MG EC tablet  atorvastatin (LIPITOR) 40 MG tablet  baclofen (LIORESAL) 10 MG tablet  busPIRone (BUSPAR) 15 MG tablet  gabapentin (NEURONTIN) 600 MG tablet  lidocaine (LIDODERM) 5 % patch  LORazepam (ATIVAN) 0.5 MG tablet  medication given by implanted intrathecal pump  melatonin 3 MG tablet  metoprolol succinate ER (TOPROL-XL) 25 MG 24 hr tablet  Multiple Vitamins-Minerals (WOMENS MULTI PO)  ondansetron (ZOFRAN) 4 MG tablet  polyethylene glycol (MIRALAX) 17 GM/Dose powder  PROCTO-MED HC 2.5 % cream          Review of Systems  All other systems are reviewed and are negative    Physical Exam   BP: (!) 191/104  Pulse: 91  Temp: 98.8  F (37.1  C)  Resp: 22  Height: 154.9 cm (5' 1\")  Weight: 59 kg (130 lb)  SpO2: 95 %      Physical Exam  Vitals and nursing note reviewed.   Constitutional:       General: She is not in acute distress.     Appearance: She is well-developed. She is not diaphoretic.   HENT:      Head: Normocephalic and atraumatic.      Nose: Nose normal.      Mouth/Throat:      Mouth: Mucous membranes are moist.      Pharynx: Oropharynx is clear. "   Eyes:      General: No scleral icterus.     Conjunctiva/sclera: Conjunctivae normal.   Cardiovascular:      Rate and Rhythm: Normal rate and regular rhythm.      Heart sounds: Normal heart sounds. No murmur heard.  Pulmonary:      Effort: Pulmonary effort is normal. No respiratory distress.      Breath sounds: No stridor. No wheezing or rales.   Abdominal:      General: Abdomen is flat. There is no distension.      Palpations: Abdomen is soft. There is no mass.      Tenderness: There is no abdominal tenderness. There is no guarding or rebound.      Comments: Abdomen is soft.  No tenderness.  Colostomy in place in left lower quadrant with moderate stool noted.  No surrounding signs of infection or other concern.   Musculoskeletal:         General: No tenderness.      Cervical back: Normal range of motion and neck supple.   Skin:     General: Skin is warm and dry.      Coloration: Skin is pale.      Findings: No erythema or rash.   Neurological:      General: No focal deficit present.      Mental Status: She is alert.   Psychiatric:         Mood and Affect: Mood normal.         ED Course        Procedures           Results for orders placed or performed during the hospital encounter of 02/23/25 (from the past 24 hours)   CBC with platelets differential    Narrative    The following orders were created for panel order CBC with platelets differential.  Procedure                               Abnormality         Status                     ---------                               -----------         ------                     CBC with platelets and d...[[id number]]  Abnormal            Final result                 Please view results for these tests on the individual orders.   Basic metabolic panel   Result Value Ref Range    Sodium 139 135 - 145 mmol/L    Potassium 4.2 3.4 - 5.3 mmol/L    Chloride 100 98 - 107 mmol/L    Carbon Dioxide (CO2) 25 22 - 29 mmol/L    Anion Gap 14 7 - 15 mmol/L    Urea Nitrogen 17.5 8.0 - 23.0  mg/dL    Creatinine 0.54 0.51 - 0.95 mg/dL    GFR Estimate >90 >60 mL/min/1.73m2    Calcium 8.8 8.8 - 10.4 mg/dL    Glucose 129 (H) 70 - 99 mg/dL   CBC with platelets and differential   Result Value Ref Range    WBC Count 11.4 (H) 4.0 - 11.0 10e3/uL    RBC Count 4.40 3.80 - 5.20 10e6/uL    Hemoglobin 13.4 11.7 - 15.7 g/dL    Hematocrit 40.3 35.0 - 47.0 %    MCV 92 78 - 100 fL    MCH 30.5 26.5 - 33.0 pg    MCHC 33.3 31.5 - 36.5 g/dL    RDW 13.2 10.0 - 15.0 %    Platelet Count 155 150 - 450 10e3/uL    % Neutrophils 93 %    % Lymphocytes 2 %    % Monocytes 4 %    % Eosinophils 1 %    % Basophils 0 %    % Immature Granulocytes 0 %    NRBCs per 100 WBC 0 <1 /100    Absolute Neutrophils 10.6 (H) 1.6 - 8.3 10e3/uL    Absolute Lymphocytes 0.2 (L) 0.8 - 5.3 10e3/uL    Absolute Monocytes 0.4 0.0 - 1.3 10e3/uL    Absolute Eosinophils 0.1 0.0 - 0.7 10e3/uL    Absolute Basophils 0.0 0.0 - 0.2 10e3/uL    Absolute Immature Granulocytes 0.1 <=0.4 10e3/uL    Absolute NRBCs 0.0 10e3/uL       Medications   ondansetron (ZOFRAN) injection 4 mg (4 mg Intravenous $Given 2/23/25 2230)   sodium chloride 0.9% BOLUS 500 mL (500 mLs Intravenous $New Bag 2/23/25 2119)   acetaminophen (TYLENOL) tablet 650 mg (650 mg Oral $Given 2/23/25 2205)       Assessments & Plan (with Medical Decision Making)  74-year-old female presented after 4 hours of vomiting.  Appears to have some anxiety component as well, lots of recent stressors.  Given Zofran and fluids with improvement.  Observed for 2 hours.  At this point she was more focused on needing her pain medications from her pump.  Appears stable for return to her care to her department.  Follow-up for any concerns     I have reviewed the nursing notes.    I have reviewed the findings, diagnosis, plan and need for follow up with the patient.        New Prescriptions    No medications on file       Final diagnoses:   Vomiting, unspecified vomiting type, unspecified whether nausea present       2/23/2025    [facility] EMERGENCY DEPT       Jm Silver MD  02/23/25 2920

## 2025-02-24 NOTE — ED NOTES
Talked to provider about taking her own medications and provider stated he will go and talk with the patient.

## 2025-02-25 NOTE — TELEPHONE ENCOUNTER
Called and spoke with patient. Patient does want to go back on medication. She is not sure why it was discontinued.  Luna Kerr, VF

## 2025-02-25 NOTE — PLAN OF CARE
Goal Outcome Evaluation:    Ongoing sleep study. Patient appears comfortable. Started Rocephin due to positive UTI. Placed on 3L of O2 by RT.      Lab Results -     Vitamin D labs shows Vitamin D deficiency, we will start prescription strength Vitamin D 73404 I.U. weekly for 12 weeks then redraw labs.      CMP, A1C, fasting insulin, and CBC are normal.      Continue plan of care from medical weight management, follow up appointment is 3/12/2025.

## 2025-02-26 RX ORDER — LORAZEPAM 0.5 MG/1
0.5 TABLET ORAL DAILY PRN
Qty: 30 TABLET | Refills: 0 | Status: SHIPPED | OUTPATIENT
Start: 2025-02-26

## 2025-03-04 ENCOUNTER — OFFICE VISIT (OUTPATIENT)
Dept: INTERNAL MEDICINE | Facility: CLINIC | Age: 75
End: 2025-03-04
Payer: MEDICARE

## 2025-03-04 VITALS
OXYGEN SATURATION: 94 % | TEMPERATURE: 98.4 F | WEIGHT: 127 LBS | HEART RATE: 94 BPM | HEIGHT: 61 IN | DIASTOLIC BLOOD PRESSURE: 70 MMHG | BODY MASS INDEX: 23.98 KG/M2 | RESPIRATION RATE: 19 BRPM | SYSTOLIC BLOOD PRESSURE: 110 MMHG

## 2025-03-04 DIAGNOSIS — F41.1 ANXIETY REACTION: Primary | ICD-10-CM

## 2025-03-04 DIAGNOSIS — S24.103A SPINAL CORD INJURY AT T7-T12 LEVEL (H): ICD-10-CM

## 2025-03-04 DIAGNOSIS — R00.0 TACHYCARDIA: ICD-10-CM

## 2025-03-04 DIAGNOSIS — I10 PRIMARY HYPERTENSION: ICD-10-CM

## 2025-03-04 DIAGNOSIS — Z93.59 SUPRAPUBIC CATHETER (H): ICD-10-CM

## 2025-03-04 DIAGNOSIS — G82.20 PARAPLEGIA (H): ICD-10-CM

## 2025-03-04 PROCEDURE — 3078F DIAST BP <80 MM HG: CPT | Performed by: INTERNAL MEDICINE

## 2025-03-04 PROCEDURE — G2211 COMPLEX E/M VISIT ADD ON: HCPCS | Performed by: INTERNAL MEDICINE

## 2025-03-04 PROCEDURE — 99214 OFFICE O/P EST MOD 30 MIN: CPT | Performed by: INTERNAL MEDICINE

## 2025-03-04 PROCEDURE — 1125F AMNT PAIN NOTED PAIN PRSNT: CPT | Performed by: INTERNAL MEDICINE

## 2025-03-04 PROCEDURE — 3074F SYST BP LT 130 MM HG: CPT | Performed by: INTERNAL MEDICINE

## 2025-03-04 RX ORDER — BUSPIRONE HYDROCHLORIDE 15 MG/1
15 TABLET ORAL 3 TIMES DAILY
Qty: 90 TABLET | Refills: 0 | Status: SHIPPED | OUTPATIENT
Start: 2025-03-04

## 2025-03-04 ASSESSMENT — ANXIETY QUESTIONNAIRES
6. BECOMING EASILY ANNOYED OR IRRITABLE: NOT AT ALL
GAD7 TOTAL SCORE: 6
GAD7 TOTAL SCORE: 6
1. FEELING NERVOUS, ANXIOUS, OR ON EDGE: NEARLY EVERY DAY
IF YOU CHECKED OFF ANY PROBLEMS ON THIS QUESTIONNAIRE, HOW DIFFICULT HAVE THESE PROBLEMS MADE IT FOR YOU TO DO YOUR WORK, TAKE CARE OF THINGS AT HOME, OR GET ALONG WITH OTHER PEOPLE: NOT DIFFICULT AT ALL
GAD7 TOTAL SCORE: 6
2. NOT BEING ABLE TO STOP OR CONTROL WORRYING: SEVERAL DAYS
8. IF YOU CHECKED OFF ANY PROBLEMS, HOW DIFFICULT HAVE THESE MADE IT FOR YOU TO DO YOUR WORK, TAKE CARE OF THINGS AT HOME, OR GET ALONG WITH OTHER PEOPLE?: NOT DIFFICULT AT ALL
3. WORRYING TOO MUCH ABOUT DIFFERENT THINGS: SEVERAL DAYS
5. BEING SO RESTLESS THAT IT IS HARD TO SIT STILL: NOT AT ALL
7. FEELING AFRAID AS IF SOMETHING AWFUL MIGHT HAPPEN: NOT AT ALL
4. TROUBLE RELAXING: SEVERAL DAYS
7. FEELING AFRAID AS IF SOMETHING AWFUL MIGHT HAPPEN: NOT AT ALL

## 2025-03-04 ASSESSMENT — PAIN SCALES - GENERAL: PAINLEVEL_OUTOF10: SEVERE PAIN (7)

## 2025-03-04 NOTE — PROGRESS NOTES
"      Maine De Luna is a 74 year old, presenting for the following health issues:  Hypertension, Depression, Anxiety, and Headache      3/4/2025    10:27 AM   Additional Questions   Roomed by Iman         3/4/2025    10:27 AM   Patient Reported Additional Medications   Patient reports taking the following new medications none     History of Present Illness       Mental Health Follow-up:  Patient presents to follow-up on Depression & Anxiety.Patient's depression since last visit has been:  Bad  The patient is not having other symptoms associated with depression.  Patient's anxiety since last visit has been:  Bad  The patient is having other symptoms associated with anxiety.  Any significant life events: grief or loss  Patient is feeling anxious or having panic attacks.  Patient has no concerns about alcohol or drug use.    Hypertension: She presents for follow up of hypertension.  She does check blood pressure  regularly outside of the clinic. Outpatient blood pressures have not been over 140/90. She follows a low salt diet.     Headaches:   Since the patient's last clinic visit, headaches are: worsened  The patient is getting headaches:  Twice a day  She is able to do normal daily activities when she has a migraine.  The patient is taking the following rescue/relief medications:  Ibuprofen (Advil, Motrin) and Tylenol   Patient states \"I get some relief\" from the rescue/relief medications.   The patient is taking the following medications to prevent migraines:  Other  In the past 4 weeks, the patient has gone to an Urgent Care or Emergency Room 0 times times due to headaches.    She eats 0-1 servings of fruits and vegetables daily.She consumes 2 sweetened beverage(s) daily.She exercises with enough effort to increase her heart rate 9 or less minutes per day.  She exercises with enough effort to increase her heart rate 3 or less days per week.   She is taking medications regularly.               EMR reviewed " "including:             Complaint, History of Chief Complaint, Corresponding Review of Systems, and Complaint Specific Physical Examination.    #1   Patient notes that she was still somewhat tachycardic.  Patient then notes that she did not actually get the increased dose of the beta-blocker until yesterday.  Patient then notes that the tachycardia is better today and she feels fine.          Exam:   LUNGS: clear bilaterally, airflow is brisk, no intercostal retraction or stridor is noted. No coughing is noted during visit.   HEART:  regular without rubs, clicks, gallops, or murmurs. PMI is nondisplaced. Upstrokes are brisk. S1,S2 are heard.  Heart rate is still slightly increased at 94 but during course of the examination did drop down to low 80s.  Blood pressure maintained at 110/70.      #2   Increased anxiety  Patient notified that her  is seeking divorce after 60+ years.  Apparently he has wandered most of their life savings.  Reconciliation is not an option.  Patient notes that she does not tolerate \"any antidepressants\".  Has been getting good results with the BuSpar, discussed increasing the dosage/frequency slightly.   Constitutional: The patient appears to be in no acute distress. The patient appears to be adequately hydrated. No acute respiratory or hemodynamic distress is noted at this time.   PSYCH: The patient appears grossly appropriate. Maintains good eye contact, does not have any jittery or atypical motion. Displays tearful affect.  No evidence of slurring of speech or oversedation noted.  She is quite cognizant and pleasant.      #3   Irritation around suprapubic catheter site.  Small area of granulation tissue.  No infection.  Minimal blood tinge on the catheter as result of minor motion and irritation.  Reassured patient that this is not only normal but actually exemplary and that it is healing well.  She will continue to keep it clean and watch for possible signs of " "infection.        Patient has been interviewed, applicable history and applied review of systems have been performed.    Vital Signs:   /70 (BP Location: Right arm, Patient Position: Sitting, Cuff Size: Adult Regular)   Pulse 94   Temp 98.4  F (36.9  C) (Temporal)   Resp 19   Ht 1.549 m (5' 1\")   Wt 57.6 kg (127 lb)   SpO2 94%   BMI 24.00 kg/m        Decision Making    Problem and Complexity     1. Anxiety reaction (Primary)  Increase BuSpar to 50 mg 3 times daily.  Consideration toward increasing dosage if necessary.  - busPIRone (BUSPAR) 15 MG tablet; Take 1 tablet (15 mg) by mouth 3 times daily.  Dispense: 90 tablet; Refill: 0    2. Tachycardia  Markedly improved with the beta-blocker at the proper dosage.  Paperwork and instructions/orders to the assisted living facility have been clarified.    3. Primary hypertension  Well-controlled.  Watch for hypoglycemia    4. Spinal cord injury at T7-T12 level (H)  Chronic ongoing    5. Paraplegia (H)  Secondary to above    6. Suprapubic catheter (H)  Recommend continued care and maintenance of his current                                FOLLOW UP   I have asked the patient to make an appointment for followup with me in 3 months or as needed for her ongoing and longitudinal medical care.    Regarding routine vaccinations:  I have reviewed the patient's vaccination schedule and discussed the benefits of prophylactic vaccination in detail.  I recommend the patient contact their pharmacist for vaccinations.  Discussed that most insurance companies now favor reimbursement to the pharmacies and it will financially behoove the patient to have vaccinations performed at their pharmacy.        I have carefully explained the diagnosis and treatment options to the patient.  The patient has displayed an understanding of the above, and all subsequent questions were answered.      DO JAIR Stokes    Portions of this note were produced using [employer] " 360  Although every attempt at real-time proof reading has been made, occasional grammar/syntax errors may have been missed.    ,

## 2025-03-12 ENCOUNTER — TELEPHONE (OUTPATIENT)
Dept: ANESTHESIOLOGY | Facility: CLINIC | Age: 75
End: 2025-03-12
Payer: MEDICARE

## 2025-03-12 NOTE — TELEPHONE ENCOUNTER
Patient Contacted to alert the patient that their appointment [facility] has been moved by 15 minutes at the provider's request. It is now scheduled for:    Date: 5/13/25  Time: 11:15 AM check-in / 11:30 AM appointment  Provider: Santa Wallace  Mode: Virtual Visit

## 2025-03-18 ENCOUNTER — PRE VISIT (OUTPATIENT)
Dept: UROLOGY | Facility: CLINIC | Age: 75
End: 2025-03-18
Payer: MEDICARE

## 2025-03-18 NOTE — TELEPHONE ENCOUNTER
Reason for visit: Follow-up     Relevant information: SPT    Records/imaging/labs/orders: Impressions Sent without Images.    At Rooming: Pedro Hopper EMT-P  3/18/2025  11:55 AM

## 2025-03-24 ENCOUNTER — TELEPHONE (OUTPATIENT)
Dept: INTERNAL MEDICINE | Facility: CLINIC | Age: 75
End: 2025-03-24
Payer: MEDICARE

## 2025-03-24 DIAGNOSIS — G82.20 PARAPLEGIA (H): Primary | ICD-10-CM

## 2025-03-24 DIAGNOSIS — L08.9 LOCAL INFECTION OF SKIN AND SUBCUTANEOUS TISSUE: ICD-10-CM

## 2025-03-24 NOTE — TELEPHONE ENCOUNTER
I approve of requested home care orders.  I have placed a wound care referral.  The nurse may use Triad paste on the wound until further recommendations by the wound care specialist are made.    Isidoro Anthony, DO

## 2025-03-24 NOTE — TELEPHONE ENCOUNTER
Home Care is calling regarding an established patient with [facility].  Requesting orders from: Jazmin Interiano  PROVIDER AUTHORIZATION REQUIRED: RN unable to provide verbal approval for all orders.  See below for additional information.  RN will contact Home care with information after provider review.  Is this a request for a temporary pause in the home care episode?  No    Orders Requested    Patient reporting bowel irregularity. Either constipated or loose, no regularity. Takes miralax PRN. HC nurse asking for senna-s daily or something to help with regularity.    Increased redness to wound around ankle. HC nurse asking for okay to use Triad paste on griselda wound for redness.    Patient used to see wound care at [facility] but stopped going because of distance. HC nurse asking if clinic had a wound specialist at [facility] patient could start seeing. Did advise to writers knowledge, wound care not available in primary clinic specifically, unsure if specialty has this. Did speak with other staff. Wound care nurse available within specialty clinic. Asking for PCP to place referral to wound care in specialty clinic.       Contacts       Contact Date/Time Type Contact Phone/Fax    03/24/2025 08:32 AM CDT Phone (Incoming) ZACHARIHA Patel - [facility] (Home Care) [phone]     can [facility]            Karla Lobato RN

## 2025-03-26 ENCOUNTER — TELEPHONE (OUTPATIENT)
Dept: PALLIATIVE MEDICINE | Facility: CLINIC | Age: 75
End: 2025-03-26
Payer: MEDICARE

## 2025-03-26 NOTE — TELEPHONE ENCOUNTER
Health Call Center    Phone Message    May a detailed message be left on voicemail: yes     Reason for Call: Symptoms or Concerns     If patient has red-flag symptoms, warm transfer to triage line    Current symptom or concern: Patient called and stated that she is having unbearable stinging and nerve pain. Patient stated it is so bad she cannot wait until her appt to get some relief. Please call patient to discuss.    Symptoms have been present for:  1-2 week(s)    Has patient previously been seen for this? Yes    By : Santa Wallace    Date: NA    Are there any new or worsening symptoms? Yes: Worsening    Action Taken: Message routed to:  Other: Pain    Travel Screening: Not Applicable     Date of Service:

## 2025-03-26 NOTE — TELEPHONE ENCOUNTER
RN returned call and spoke with patient. She is scheduled for follow up with EMELI Jerez CNP, on 4/15/25. Patient is asking to add an additional bolus or for an increase in her pain pump medications. She has been having worsening groin pain, along with both knees down to her ankles for about a month now. She states [facility] refilled her yesterday. She is using all 6 bolus per day which seem to help some for a little while. Her pain is mostly worse in the morning. Writer informed we will send an update to EMELI Jerez CNP, to have her advise and we will update as soon as able.       Camille Talavera RN

## 2025-03-26 NOTE — TELEPHONE ENCOUNTER
RN called to update patient per Santa Wallace's advisement of discussing any dose adjustments at next scheduled appointment. Writer added patient's appointment to the wait list. Patient verbalized understanding.     Camille Talavera RN

## 2025-03-28 ENCOUNTER — PRE VISIT (OUTPATIENT)
Dept: NEUROLOGY | Facility: CLINIC | Age: 75
End: 2025-03-28

## 2025-04-02 ENCOUNTER — TELEPHONE (OUTPATIENT)
Dept: ANESTHESIOLOGY | Facility: CLINIC | Age: 75
End: 2025-04-02
Payer: MEDICARE

## 2025-04-02 NOTE — TELEPHONE ENCOUNTER
Patient Contacted to alert the patient that their appointment scheduled at the [facility] has been moved by 15 minutes at the provider's request. It is now scheduled for:    Date: 4/15/25  Time: 10:45 AM check-in / 11:00 AM appointment  Provider: Santa Wallace  Mode: Virtual Visit    If the patient has scheduling questions, please message Nan Avila via Teams or refer patient to Nan directly at [phone].

## 2025-04-09 ENCOUNTER — HOSPITAL ENCOUNTER (OUTPATIENT)
Dept: WOUND CARE | Facility: CLINIC | Age: 75
Discharge: HOME | End: 2025-04-09
Attending: INTERNAL MEDICINE | Admitting: INTERNAL MEDICINE
Payer: MEDICARE

## 2025-04-09 DIAGNOSIS — L08.9 LOCAL INFECTION OF SKIN AND SUBCUTANEOUS TISSUE: ICD-10-CM

## 2025-04-09 DIAGNOSIS — G82.20 PARAPLEGIA (H): ICD-10-CM

## 2025-04-09 PROCEDURE — G0463 HOSPITAL OUTPT CLINIC VISIT: HCPCS

## 2025-04-09 NOTE — PATIENT INSTRUCTIONS
Today we are seeing you in the Wound and Ostomy clinic per the orders of Dr RIYA Anthony.  The wound on your left great toe appears stable, if at any time it starts to drain any fluid or shows signs of not being stable contact the clinic for a sooner follow up.     The wound on your left outer ankle is improved from when I saw you here in the Hospital last November.  We will try a new product I do not believe you have used called UrgoClean Ag     1 Remove the old bandage, cleanse the wound with soap and water, saline or a no rinse wound cleanser and dry well.  2 With the clear adhesive backing in place cut a small piece of the UrgoClean Ag the size and shape of the wound.  3 Remove the adhesive backing from the small piece, the adhesive side is the side that goes into the wound to make contact with the wound bed.  4 Cover with a gentle adhesive foam dressing like Mepilex and change twice a week.    I will see you again in clinic in two weeks on Wed April the 23 rd at 2 pm.  Call with any concerns or questions [phone]    Lul Pena RN [email or web address]

## 2025-04-09 NOTE — PROGRESS NOTES
[facility] Wound and Ostomy Clinic    Start of Care in [facility] Wound Clinic: 4/9/2025   Referring Provider: Dr RIYA Anthony MD dated 3/24/25.  Primary Care Provider: Jazmin Interiano   Wound Location: Left lateral ankle and left distal great toe.     Wound Clinic Visit: Initial visit today 4/9/25    Reason for Visit:  Evaluate and treat left lateral ankle wound.     Subjective:  On arrival alone today 4/9/25 the pt adds to the Wound History below     Wound History:   Pt who is familiar to the Wound and Ostomy services here at [facility] from inpatient wo nurse consult back in November of 2024 for same left lateral ankle wound.  Pt was receiving cares for her wounds at the [facility] outpatient wound clinic from March till October 2024 for an unstageable pressure injury to the Left lateral ankle.    - Per the 3/6/24 initial appointment at [facility] - Left lateral ankle ulcer; duration 5 to 6 months.  History of paraplegia 4 to 5 years.  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  Has had previous sacral coccygeal ulcers with reconstruction, flap.  Has SPC and ostomy diversion.  Not ambulatory; wheelchair only.  No current sacrococcygeal ulcerations.     - Per the 10/17 follow up MD Progress note - Returns for evaluation of her left ankle ulcer.  Had an ablation procedure done by Dr. Nevarez yesterday.  Ulcer on the lateral left ankle appears somewhat smaller than in the past. It continues to have epibole. The base is granulated. There is no sign of infection. Wound Length 0.5 cm , Width  0.5 cm, Depth 0.4 cm.  I treated the epibole with silver nitrate.    - MARIA TERESA in March 2024 was 1.04 on the right and 0.98 on the left.   - X-ray of the left ankle was negative for osteomyelitis in April 2024.   - May 2024 arterial ultrasound shows no flow in the left peroneal artery, referred to vascular. Venous insufficiency ultrasound shows insufficiency of the right great and  small saphenous veins as well as the left great and small saphenous vein as well as the posterior accessory saphenous vein. There are also insufficient tributaries.   - At my initial inpatient consult the pt 11/11/24 the pt also added:  She had regular debriding of the left ankle wound in clinic.  Topically per pt and her chart review, primary dressings in use have included Hydrofera Blue Ready, Thera honey, Exufiber Ag and Iodoflex.  She feels the current Hydrofera and the Iodoflex seem to cause increased pain.  She also wears a foam off loading boot on the left foot at bed time as she tends to lay directly on the left lateral ankle wound when sleeping and does not reposition.  She is able to reposition herself when awake.  She has home care with [facility], they come to her current residence at [facility] here in [place] twice weekly, M/F.    - Note with chart review noted the left lateral ankle wound was listed as an unstagable pressure injury until 10/16/24 when she underwent a venous ablation procedure to the LE's, after that was referred to as a venous ulcer.    - At here initial Wound and Ostomy clinic visit today 4/9/25 pt add's: The wound on the left lateral ankle seems about the same as in the past, some pain but most pain is all over her body chronic pain.  She also has a dry wound to her left great toe.  She denies any other wounds at this time.  When asked about compression socks the pt report she was instructed by Vascular that she should not wear compression to the LE.     Colostomy, discussed with her today.  She has had no issues with wear times, leakage or peristomal skin concerns.  Not assessing current in clinic but can do so if needed.     Past Medical History:  Patient Active Problem List   Diagnosis    Other specified cardiac dysrhythmias(427.89)    CARDIOVASCULAR SCREENING; LDL GOAL LESS THAN 160    History of skin cancer    Headache    Menopause    Anxiety reaction    Osteoarthritis     Paraplegia (H)    Bilateral lower extremity edema    Chronic bilateral low back pain with bilateral sciatica    Edema of spinal cord (H)    History of spinal surgery    Hypertension    Incomplete emptying of bladder    Incomplete injury of thoracic spinal cord in T1 to T6 region without bony injury (H)    Leg weakness, bilateral    Muscle spasticity    MVP (mitral valve prolapse)    Palpitations    Pressure ulcer of coccygeal region, stage 3 (H)    Spinal cord injury at T7-T12 level (H)    Pure hypercholesterolemia    Ulnar neuropathy at elbow, left    Nocturnal enuresis    Urge incontinence of urine    Arteriovenous fistula of spinal cord vessels    Dural arteriovenous fistula    Spinal cord injury of thoracic region without bone injury, subsequent encounter (H)    Thoracolumbar back pain    Neuropathic pain    Intractable neuropathic pain of lumbosacral origin    Cerebrovascular accident (CVA), unspecified mechanism (H)    Depressive disorder    Right homonymous hemianopsia    TIA (transient ischemic attack)    History of stroke    Skin ulcer of left ankle, limited to breakdown of skin (H)    Presence of intrathecal pump    Suprapubic catheter (H)    Colostomy present (H)    MRSA carrier    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)    Hyperlipidemia    Transient neurological symptoms                 Tobacco Use:     Tobacco Use      Smoking status: Never        Passive exposure: Never      Smokeless tobacco: Never     Diabetic: No  HgbA1C:   Hemoglobin A1C   Date Value Ref Range Status   11/11/2024 5.4 <5.7 % Final     Comment:     Normal <5.7%   Prediabetes 5.7-6.4%    Diabetes 6.5% or higher     Note: Adopted from ADA consensus guidelines.   11/17/2011 5.3 4.3 - 6.0 % Final   Checks Blood Glucose?:  NA    Personal/social history:  Pt lives at [facility] here in [place], she has [facility] home care Mondays and Fridays for wound cares.    Objective:   Current treatment plan: Cares being performed by  home care.  Left lateral ankle:  - Cleansing, applying TheraHoney, covering with Mepilex dressing, changing Mondays and Fridays.  Left great toe distal tip:  - Open to air.  Last changed: Monday this week    Wound #1 Left lateral ankle,  Stage/tissue depth: Unstageable pressure injury, see Assessment for details on origin.  0.6 cm L x 0.6 cm W x 0.5 cm D  Tunneling: none noted  Undermining: none noted  Wound bed type/amount: approximately 30 % yellow gray slough and 70 % granular tissue; Not fluctuant  Wound Edges: open  Periwound: localized edema 3x3 cm, blanchable erythema with no increased warmth to touch  Drainage: small amounts seropurulent and sanguinous on dressing removed, was moist on removal  Odor: no  Pain: at wound moderate pain, pt has chronic full body pain and is in considerable pain today.    Photo's from today's initial visit to the Wound and Ostomy clinic 4/9/25.      Photo's from Inpatient [facility] nurse assessment here at [facility] 11/12/24.    Wound #2 Left great toe, distal tip.  Pt suspects was caused by trauma from bumping into something with her wheelchair.  Stage/tissue depth: Unable to determine currently  1.2 cm L x 1.4 cm W x 0 cm D  Tunneling: none  Undermining: none  Wound bed type/amount: 100 % dried out remains of what appears to be a blood blister; Not fluctuant  Wound Edges: NA no open wound  Periwound: dependent rubor to the plantar side, dry skin and edema  Drainage: none  Odor: no  Pain: at wound moderate pain, pt has chronic full body pain and is in considerable pain today.    Photo's from today's initial visit to the Wound and Ostomy clinic 4/9/25.    Dorsalis Pedal Pulse: weak but palpable: NA doppler: NA phasic  Posterior Tibial Pulse: Not assessed this visit  Hair growth: scant scattered hair on the lower leg  Capillary Refill: 2 - 3 seconds on each of the left toes  Feet/toes color: pale pink with some dependent rubor noted in the left, right not assessed  Nails: Left great toe  nail is thick and dystrophic, other toe nails on the left are wnl  R Leg: Edema Not assessed this visit. Ankle circumference NA cm. Calf circumference NA cm.  L Leg: Edema plus 1 pitting soft edema of the pretibial, nonpitting throughout the ankle and foot. Ankle circumference NA cm. Calf circumference NA cm.    Mobility: Wheelchair bound  Current offloading/footwear: regular shoes  Sensation: pt denies peripheral neuropathy     Other callusing/areas of concern:  None noted but no head to toe assessment completed.    Diet: Regular    Assessment:  On arrival today the pt has her left lateral ankle with Mepilex dressing in place, removed and cleansed with saline and cotton tipped applicator.  The pt believes the last dressing change the home care nurse went back to the use of the honey.    There was no visible honey or other type of primary wound dressing noted, small amount of what appeared to be a barrier paste present on the periwound skin.  The wound has slough at it's base still but is improved since when I saw her last November.  Smaller in outer size, depth remains at 0.5 cm but when seen in Nov the wound had a distinct 'cookie cutter' appearance with the wound wall at 90 degree angle with the base.  Today the center of the wound has depth of 0.5 but it is shallower in depth around the entire perimeter of the wound edge.  No tunneling or undermining noted.  No signs of infection.    The left great toe is open to air on arrival.  Cleansed with saline and dried well with gauze.  Wound is all dry appears stable and no drainage, no signs of infection.    Factors impacting wound healing:   Poor nutrition: inadequate supply of protein, carbohydrates, fatty acids, and trace elements essential for all phases of wound healing.  Pt reports fair to poor appetite.  Delayed healing as part of normal aging process  Reduced Blood Supply: inadequate perfusion to heal wound, appears adequate but some what  diminished  Medication: NA  Chemotherapy: suppresses the immune system and inflammatory response, NA  Radiotherapy: increases production of free radical which damage cells, NA  Psychological stress: due to chronic pain  Obesity: decreases tissue perfusion  Infection: prolongs inflammatory phase, uses vital nutrients, impairs epithelialization and releases toxins, none noted at this time  Underlying Disease: LE edema, LE paralysis, venous flow issues s/p venous ablation   Maceration: reduces wound tensile strength and inhibits epithelial migration, none noted  Patient compliance appears motivated to heal  Unrelieved pressure, wears foam boot when in bed for pressure relieve as she lays on that side when sleeping.  Immobility, pt has LE paralysis  Substance abuse: NA    Plan:  We will change up the plan of care for the left lateral ankle wound today.  Pt has used products containing silver in the past per [facility] clinic records but not the Urgoclean.  The current use of Medihoney is causing some increased pain over base line for the wound so we will make the change.  Can still be done twice weekly and may provide less of a wet wound bed environment.  Will follow up with clinic visit in two weeks.  Also instructed the pt today that with any concern with her ostomy she does not need a referral to be seen here in clinic and can just schedule an appointment.    Discussed/Education provided: plan of care with rationale;     Topical care:   Left great toe distal tip:  - Cleanse with ADL's leave open to air, if draining notify the Wound and Ostomy clinic.  Left lateral ankle:   - Remove the old bandage, cleanse the wound with soap and water, saline or a no rinse wound cleanser and dry well.  - Cut to fit piece of UrgoClean applied to the wound bed.  - Cover with a gentle adhesive foam dressing like Mepilex and change twice a week.    Pt is unable to care for her wound, home care nursing performs cares twice  weekly.    Additional recommendations: None at this time    The following discharge instructions were reviewed with and sent home with the patient:  See AVS    The following supplies were sent home with the patient:  Remains of the UrgoClean Ag open today plus one additional sheet.    Return visit: 4/23/25    Verbal, written, & demonstrative education provided.  Face to face time: approximately 35 minutes  Procedure: RICKY Pena RN, CWOCN  [phone]

## 2025-04-11 ENCOUNTER — TELEPHONE (OUTPATIENT)
Dept: INTERNAL MEDICINE | Facility: CLINIC | Age: 75
End: 2025-04-11
Payer: MEDICARE

## 2025-04-11 NOTE — TELEPHONE ENCOUNTER
Home Care is calling regarding an established patient with [facility].  Requesting orders from: Jazmin Interiano  OTHER ACTION: Wound Care order changes  Is this a request for a temporary pause in the home care episode?  No    Orders Requested    Wound Care Supplies    Wound packing iodoform, mepilex  RN gave verbal order: No: New WOC orders requested. Sending to PCP    Wound Care nurse calling to request new orders for the following:  Increase visits to three times per week. Pack wound with iodoform packing and cover with Mepilex.    Wound has gotten worse to tailbone area. According to RN, wound is now 1/2 inch wide and 2 inches deep. NO odor or drainage at this time.     Phone number Home Care can be reached at:     Okay to leave a detailed message?: Yes      Please fax orders to [phone]    Johny Velázquez RN

## 2025-04-15 ENCOUNTER — VIRTUAL VISIT (OUTPATIENT)
Dept: ANESTHESIOLOGY | Facility: CLINIC | Age: 75
End: 2025-04-15
Payer: MEDICARE

## 2025-04-15 DIAGNOSIS — M54.41 CHRONIC BILATERAL LOW BACK PAIN WITH BILATERAL SCIATICA: ICD-10-CM

## 2025-04-15 DIAGNOSIS — G24.9 DYSTONIA: ICD-10-CM

## 2025-04-15 DIAGNOSIS — S24.103A SPINAL CORD INJURY AT T7-T12 LEVEL (H): ICD-10-CM

## 2025-04-15 DIAGNOSIS — G89.29 CHRONIC BILATERAL LOW BACK PAIN WITH BILATERAL SCIATICA: ICD-10-CM

## 2025-04-15 DIAGNOSIS — M79.2 NEUROPATHIC PAIN: ICD-10-CM

## 2025-04-15 DIAGNOSIS — M62.838 MUSCLE SPASM: ICD-10-CM

## 2025-04-15 DIAGNOSIS — S24.151A: ICD-10-CM

## 2025-04-15 DIAGNOSIS — M54.42 CHRONIC BILATERAL LOW BACK PAIN WITH BILATERAL SCIATICA: ICD-10-CM

## 2025-04-15 DIAGNOSIS — Z97.8 PRESENCE OF INTRATHECAL PUMP: Primary | ICD-10-CM

## 2025-04-15 ASSESSMENT — PAIN SCALES - GENERAL: PAINLEVEL_OUTOF10: SEVERE PAIN (7)

## 2025-04-15 NOTE — NURSING NOTE
How will you be connecting to the visit? Send a link  How would you like to obtain your AVS? Mail a copy  If the video visit is dropped, the invitation should be resent by: Text to cell phone: [phone]  Will anyone else be joining your video visit? No  Are you currently in the [facility] yes        Patient presents with:  Pain  Pain Management  RECHECK: Follow up-no changes reported- increased pain, stinging and burning      Severe Pain (7)     Pain Medications       Analgesics Other Refills Start End     acetaminophen (TYLENOL) 500 MG tablet --  --    Sig - Route: Take 1,000 mg by mouth every 6 hours as needed for mild pain. - Oral    Class: Historical       Salicylates Refills Start End     aspirin (ASA) 325 MG EC tablet 11 1/8/2024 --    Sig - Route: Take 1 tablet (325 mg) by mouth daily - Oral    Class: E-Prescribe            What medications are you using for pain? Pain pump, gabapentin, tylenol        What refills are you needing today? unsure    Expectations: follow up    Jannette Metzger LPN

## 2025-04-15 NOTE — PROGRESS NOTES
Video-Visit Details    Type of service:  Video Visit     Originating Location (pt. 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      Widespread pain - Etiology multifactorial, hx of SCI. Symptoms consistent with neuropathic process, underlying degenerative/postsurgical changes of spine, suspect SI mediated component, overlying myofascial component.      Today, Annamarie reports concerns for burning pain around vagina and rectum, unable to identify urinary symptoms due to suprapubic catheter. She endorses addressing concerns with PCP and was advised she does not have UTI (LOV with PCP on 3/4/25). Of note, she has home health services and currently following with wound care RN for treatment of sacral wound. We discussed possibility that burning pain symptoms are r/t sacral wound, infection versus reaction from supplies/topicals used for wound care versus other factors. She is agreeable for me to send a message to wound care RN (Lul Pena), scheduled with them on 4/23/25. Also, I reviewed her case with my colleague to explore potential options to optimize IT pump therapy, which we discussed today. She identifies chronic widespread pain is poorly controlled and would likely not benefit from catheter revisions (nor is she interested), and there are already 3 drugs compounded into pump medication at fairly substantial doses.      Assigned to [facility] nursing team.     Visit Diagnoses:  1. Presence of intrathecal pump    2. Neuropathic  pain    3. 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 Spinal cord injury at T7-T12 level (H)    5. Chronic bilateral low back pain with bilateral sciatica    6. Muscle spasm    7. Dystonia        Plan:  Diagnosis reviewed, treatment option addressed, and risk/benifits discussed.  Self-care instructions given.  I am recommending a multidisciplinary treatment plan to help this patient better manage their pain.      Physical Therapy:  Discussed at prior visits, declined interest.      Diagnostic Studies:  No new orders today.      Medication Management:   IT pump - hydromorphone, bupivacaine, ziconotide, 1:2:1. Established with [facility] for home management. Notable benefit with 8.5% increase on 11/21/24. No dose adjustments today.   Baclofen - current dose 10-15 mg three times daily. Appreciates some degree of benefit, no reported side effects.   Acetaminophen - 1000 mg three times daily. Appreciates some degree of benefit, no side effects.   Gabapentin 1200 mg three times daily. Appreciates benefit, no side effects reported.   Recommended Lyrica trial at prior visits, though she decided against pursuing new medication.   Could consider QID frequency in future, discussed today and not interested at this point.      Procedures:   IT pump 20 ml implanted 1/19/22 (Dr. Orourke), right abdomen. Catheter tip at upper T10   Visualized at mid T11 on thoracic xray 7/20/22.      Other Orders/Referrals:   [facility] will coordinate next refill.   Message sent to wound care RN (Lul Pena) regarding burning symptoms around vagina and rectum, next wound care visit on 4/23/25.      Follow up with EMELI Jerez CNP within 2-3 months       Review of Electronic Chart: Today I have also reviewed available medical information in the patient's medical record at [facility] ([facility]) and Care Everywhere (if available), including relevant provider notes, laboratory work, and  imaging.     Santa Wallace DNP, APRN, AGNP-C  [facility] Pain Management     -------------------------------------------------    Subjective:    Chief complaint:   Chief Complaint   Patient presents with    Pain    Pain Management    RECHECK     Follow up-no changes reported- increased pain, stinging and burning       Interval history:  Anayeli Gagnon is a 74 year old female last seen on 2/11/25.      Recommendations/plan at the last visit included:  Physical Therapy:  Discussed at prior visits, declined interest.      Diagnostic Studies:  No new orders today.      Medication Management:   IT pump - hydromorphone, bupivacaine, ziconotide, 1:2:1. Established with [facility] for home management. Notable benefit with 8.5% increase on 11/21/24. No dose adjustments today.   Baclofen - current dose 10-15 mg three times daily. Appreciates some degree of benefit, no reported side effects.   Acetaminophen - 1000 mg three times daily. Appreciates some degree of benefit, no side effects.   Gabapentin 1200 mg three times daily. Appreciates benefit, no side effects reported.   Recommended Lyrica trial at prior visits, though she decided against pursuing new medication.   Could consider QID frequency in future, discussed today and not interested at this point.      Procedures:   IT pump 20 ml implanted 1/19/22 (Dr. Orourke), right abdomen. Catheter tip at upper T10   Visualized at mid T11 on thoracic xray 7/20/22.      Other Orders/Referrals:   [facility] will coordinate next refill.   Will discuss case with my colleague, consider pump optimization strategies to enhance therapeutic efficacy.      Follow up with EMELI Jerez CNP within 2-3 months     Since her last visit, Anayeli Gagnon reports:  -She reports pain is more manageable today.   -The pain was very bad for 3 days prior to this.   -She wonders if she has a UTI, burning and stinging in vaginal area.   -She reports pain pump PTM dose helps for a  "little while.   -She has not had follow up for UTI.   -She has suprapubic catheter, so hard to assess for urinary symptoms.   -She notes that pain is in vaginal and rectal area.  -She has been following with wound care for sacral pressure ulcer. She has an appt on 4/23/25.   -She reports uncontrolled pain symptoms more widespread. She is aware I spoke with Dr. Wharton in between visits about ways to optimize IT therapy. She is not interested in surgical procedures at this time.   -Pump last filled about 2 weeks ago with [facility].   -We had discussed Lyrica alternatively to gabapentin.     Pain Information:   Pain rating: Severe Pain (7)      HPI/Interval history from last visit:  -She reports chronic pain is the same, no significant changes.   -She is having vaginal pain, was told she does not have UTI.   -AL facility provider evaluated her.   -Her chronic pain is most intense in groin, BLE and feet/ankles.   -She is agreeable to me discussing case with Dr. Wharton and would be open to consult with him.   -Pump dose was slightly increased since last visit. [facility] visit on 11/21/24. She thinks this helped \"some\" but overall pain control still not optimized.   -She continues to struggle with daytime drowsiness, presumably medication related, but does not notice specific correlations with increased CNS side effects after taking gabapentin or baclofen.   -She is not interested in adjusting gabapentin to QID dosing (she is at max 3600 mg/day).         Current Pain Treatments:    Gabapentin 1200 mg TID  Baclofen 15 mg 2-3 x daily PRN  Acetaminophen 1000 mg TID                   Intrathecal Pump- 20 mL syringe     Concentrations:   Dilaudid 14 mg/ml                                                          Bupivacaine 28 mg/ml   Ziconotide 14 mcg/ml      Total Volume in Refill: 20 mL     Basal:   Dilaudid  7.6 mg/day                                                       Bupivacaine 15.2 mg/day   Ziconotide 7.6 mcg/day    "   PTM 0.2 hydromorphone, 2 hour lockout, maximum 6 in a day.         Current MME: N/A     Review of [place] Prescription Monitoring Program (): YES     Annual Controlled Substance Agreement/UDS due date: N/A     Past pain treatments:  Lyrica 150 mg TID      Medications:  Current Outpatient Medications   Medication Sig Dispense Refill    acetaminophen (TYLENOL) 500 MG tablet Take 1,000 mg by mouth every 6 hours as needed for mild pain.      arginine (ARGINAID) PACK Take 1 packet by mouth daily.      aspirin (ASA) 325 MG EC tablet Take 1 tablet (325 mg) by mouth daily 30 tablet 11    atorvastatin (LIPITOR) 40 MG tablet Take 1 tablet (40 mg) by mouth every evening 30 tablet 3    baclofen (LIORESAL) 10 MG tablet Take 1 tablet (10 mg) by mouth 3 times daily.      busPIRone (BUSPAR) 15 MG tablet Take 1 tablet (15 mg) by mouth 3 times daily. 90 tablet 0    gabapentin (NEURONTIN) 600 MG tablet Take 1,200 mg by mouth 3 times daily  1 tablet 0    lidocaine (LIDODERM) 5 % patch Apply 1 patch topically daily as needed       LORazepam (ATIVAN) 0.5 MG tablet Take 1 tablet (0.5 mg) by mouth daily as needed for anxiety. 30 tablet 0    medication given by implanted intrathecal pump continuously. Concentrations:   Dilaudid 14 mg/ml                                                          Bupivacaine 28 mg/ml   Ziconitide 14 mcg/ml      Total Volume in Refill: 20 mL     Basal:   Dilaudid  7.6 mg/day                                                       Bupivacaine 15.2 mg/day   Ziconitide 7.6 mcg/day      PTM:  0.2 hydromorphone, 2 hour lockout, maximum 6 in a day.     Pump Reservoir Volume: 20 mL  Low Reservoir Alarm Date: unknown  Pump : [employer]. Model: StockRadarMed II  Clinic Responsible for pump medications: [employer] [phone]      melatonin 3 MG tablet Take 3 mg by mouth nightly as needed for sleep      Multiple Vitamins-Minerals (WOMENS MULTI PO) Take 1 tablet by mouth daily      ondansetron (ZOFRAN) 4  MG tablet Take 1 tablet (4 mg) by mouth every 6 hours as needed for nausea 20 tablet 1    polyethylene glycol (MIRALAX) 17 GM/Dose powder Take 17 g by mouth daily as needed for constipation       PROCTO-MED HC 2.5 % cream Place 1 Application rectally daily as needed for hemorrhoids or other (itch).      metoprolol succinate ER (TOPROL XL) 25 MG 24 hr tablet Take 1 tablet (25 mg) by mouth every evening for 4 days. 4 tablet 0       Medical History: any changes in medical history since they were last seen?  Sacral wound, current tx.       Objective:    Physical Exam:  GENERAL: alert and no distress  EYES: Eyes grossly normal to inspection.  No discharge or erythema, or obvious scleral/conjunctival abnormalities.  RESP: No audible wheeze, cough, or visible cyanosis.    SKIN: Visible skin clear. No significant rash, abnormal pigmentation or lesions.  NEURO: Cranial nerves grossly intact.  Mentation and speech appropriate for age.  PSYCH: Appropriate affect, tone, and pace of words     Diagnostic Tests/Imaging/Labs:  Reviewed last BMP from 2/23/25    BILLING TIME DOCUMENTATION:   The total TIME spent on this patient on the date of the encounter/appointment was 30 minutes.      TOTAL TIME includes:   Time spent preparing to see the patient (reviewing records and tests)   Time spent face to face (or over the phone) with the patient   Time spent ordering tests, medications, procedures and referrals   Time spent Referring and communicating with other healthcare professionals   Time spent documenting clinical information in Epic

## 2025-04-15 NOTE — LETTER
4/15/2025       RE: Anayeli Gagnon  [facility]  [address]  [place] [address]     Dear Colleague,    Thank you for referring your patient, Anayeli Gagnon, to the [facility] [place] at [facility]. Please see a copy of my visit note below.    Video-Visit Details    Type of service:  Video Visit     Originating Location (pt. Location): Home    Distant Location (provider location):  On-site  Platform used for Video Visit: ZENT      [facility] Pain Management     Date of visit: 4/15/2025      Assessment:   Anayeli Gagnon is a 74 year old female with a past medical history significant for thoracolumbar pain, neuropathic pain, OA, headache, CVA, SCI T7-12, incomplete SCI T1-6, HTN, muscle spasticity, anxiety/depression, s/p lumbar decompression 2019, s/p ITP implant 2022, who presents in follow up for chronic pain syndrome and IT pump management.      Widespread pain - Etiology multifactorial, hx of SCI. Symptoms consistent with neuropathic process, underlying degenerative/postsurgical changes of spine, suspect SI mediated component, overlying myofascial component.      Today, Annamarie reports concerns for burning pain around vagina and rectum, unable to identify urinary symptoms due to suprapubic catheter. She endorses addressing concerns with PCP and was advised she does not have UTI (LOV with PCP on 3/4/25). Of note, she has home health services and currently following with wound care RN for treatment of sacral wound. We discussed possibility that burning pain symptoms are r/t sacral wound, infection versus reaction from supplies/topicals used for wound care versus other factors. She is agreeable for me to send a message to wound care RN (Lul Pena), scheduled with them on 4/23/25. Also, I reviewed her case with my colleague to explore potential options to  optimize IT pump therapy, which we discussed today. She identifies chronic widespread pain is poorly controlled and would likely not benefit from catheter revisions (nor is she interested), and there are already 3 drugs compounded into pump medication at fairly substantial doses.      Assigned to [facility] nursing team.     Visit Diagnoses:  1. Presence of intrathecal pump    2. Neuropathic pain    3. 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 Spinal cord injury at T7-T12 level (H)    5. Chronic bilateral low back pain with bilateral sciatica    6. Muscle spasm    7. Dystonia        Plan:  Diagnosis reviewed, treatment option addressed, and risk/benifits discussed.  Self-care instructions given.  I am recommending a multidisciplinary treatment plan to help this patient better manage their pain.      Physical Therapy:  Discussed at prior visits, declined interest.      Diagnostic Studies:  No new orders today.      Medication Management:   IT pump - hydromorphone, bupivacaine, ziconotide, 1:2:1. Established with [facility] for home management. Notable benefit with 8.5% increase on 11/21/24. No dose adjustments today.   Baclofen - current dose 10-15 mg three times daily. Appreciates some degree of benefit, no reported side effects.   Acetaminophen - 1000 mg three times daily. Appreciates some degree of benefit, no side effects.   Gabapentin 1200 mg three times daily. Appreciates benefit, no side effects reported.   Recommended Lyrica trial at prior visits, though she decided against pursuing new medication.   Could consider QID frequency in future, discussed today and not interested at this point.      Procedures:   IT pump 20 ml implanted 1/19/22 (Dr. Orourke), right abdomen. Catheter tip at upper T10   Visualized at mid T11 on thoracic xray 7/20/22.      Other Orders/Referrals:   [facility] will coordinate next refill.   Message sent to wound care RN (Lul Pena) regarding  burning symptoms around vagina and rectum, next wound care visit on 4/23/25.      Follow up with EMELI Jerez CNP within 2-3 months       Review of Electronic Chart: Today I have also reviewed available medical information in the patient's medical record at [facility] ([facility]) and Care Everywhere (if available), including relevant provider notes, laboratory work, and imaging.     Santa Wallace, KIRAN, EMELI, AGNP-C  [facility] Pain Management     -------------------------------------------------    Subjective:    Chief complaint:   Chief Complaint   Patient presents with     Pain     Pain Management     RECHECK     Follow up-no changes reported- increased pain, stinging and burning       Interval history:  Anayeli Gagnon is a 74 year old female last seen on 2/11/25.      Recommendations/plan at the last visit included:  Physical Therapy:  Discussed at prior visits, declined interest.      Diagnostic Studies:  No new orders today.      Medication Management:   IT pump - hydromorphone, bupivacaine, ziconotide, 1:2:1. Established with [facility] for home management. Notable benefit with 8.5% increase on 11/21/24. No dose adjustments today.   Baclofen - current dose 10-15 mg three times daily. Appreciates some degree of benefit, no reported side effects.   Acetaminophen - 1000 mg three times daily. Appreciates some degree of benefit, no side effects.   Gabapentin 1200 mg three times daily. Appreciates benefit, no side effects reported.   Recommended Lyrica trial at prior visits, though she decided against pursuing new medication.   Could consider QID frequency in future, discussed today and not interested at this point.      Procedures:   IT pump 20 ml implanted 1/19/22 (Dr. Orourke), right abdomen. Catheter tip at upper T10   Visualized at mid T11 on thoracic xray 7/20/22.      Other Orders/Referrals:   [facility] - [facility] will coordinate next refill.   Will discuss case with my colleague, consider pump  "optimization strategies to enhance therapeutic efficacy.      Follow up with EMELI Jerez CNP within 2-3 months     Since her last visit, Anayeli E Chelsi reports:  -She reports pain is more manageable today.   -The pain was very bad for 3 days prior to this.   -She wonders if she has a UTI, burning and stinging in vaginal area.   -She reports pain pump PTM dose helps for a little while.   -She has not had follow up for UTI.   -She has suprapubic catheter, so hard to assess for urinary symptoms.   -She notes that pain is in vaginal and rectal area.  -She has been following with wound care for sacral pressure ulcer. She has an appt on 4/23/25.   -She reports uncontrolled pain symptoms more widespread. She is aware I spoke with Dr. Wharton in between visits about ways to optimize IT therapy. She is not interested in surgical procedures at this time.   -Pump last filled about 2 weeks ago with [facility].   -We had discussed Lyrica alternatively to gabapentin.     Pain Information:   Pain rating: Severe Pain (7)      HPI/Interval history from last visit:  -She reports chronic pain is the same, no significant changes.   -She is having vaginal pain, was told she does not have UTI.   -AL facility provider evaluated her.   -Her chronic pain is most intense in groin, BLE and feet/ankles.   -She is agreeable to me discussing case with Dr. Wharton and would be open to consult with him.   -Pump dose was slightly increased since last visit. [facility] visit on 11/21/24. She thinks this helped \"some\" but overall pain control still not optimized.   -She continues to struggle with daytime drowsiness, presumably medication related, but does not notice specific correlations with increased CNS side effects after taking gabapentin or baclofen.   -She is not interested in adjusting gabapentin to QID dosing (she is at max 3600 mg/day).         Current Pain Treatments:    Gabapentin 1200 mg TID  Baclofen 15 mg 2-3 x daily " PRN  Acetaminophen 1000 mg TID                   Intrathecal Pump- 20 mL syringe     Concentrations:   Dilaudid 14 mg/ml                                                          Bupivacaine 28 mg/ml   Ziconotide 14 mcg/ml      Total Volume in Refill: 20 mL     Basal:   Dilaudid  7.6 mg/day                                                       Bupivacaine 15.2 mg/day   Ziconotide 7.6 mcg/day      PTM 0.2 hydromorphone, 2 hour lockout, maximum 6 in a day.         Current MME: N/A     Review of [place] Prescription Monitoring Program (): YES     Annual Controlled Substance Agreement/UDS due date: N/A     Past pain treatments:  Lyrica 150 mg TID      Medications:  Current Outpatient Medications   Medication Sig Dispense Refill     acetaminophen (TYLENOL) 500 MG tablet Take 1,000 mg by mouth every 6 hours as needed for mild pain.       arginine (ARGINAID) PACK Take 1 packet by mouth daily.       aspirin (ASA) 325 MG EC tablet Take 1 tablet (325 mg) by mouth daily 30 tablet 11     atorvastatin (LIPITOR) 40 MG tablet Take 1 tablet (40 mg) by mouth every evening 30 tablet 3     baclofen (LIORESAL) 10 MG tablet Take 1 tablet (10 mg) by mouth 3 times daily.       busPIRone (BUSPAR) 15 MG tablet Take 1 tablet (15 mg) by mouth 3 times daily. 90 tablet 0     gabapentin (NEURONTIN) 600 MG tablet Take 1,200 mg by mouth 3 times daily  1 tablet 0     lidocaine (LIDODERM) 5 % patch Apply 1 patch topically daily as needed        LORazepam (ATIVAN) 0.5 MG tablet Take 1 tablet (0.5 mg) by mouth daily as needed for anxiety. 30 tablet 0     medication given by implanted intrathecal pump continuously. Concentrations:   Dilaudid 14 mg/ml                                                          Bupivacaine 28 mg/ml   Ziconitide 14 mcg/ml      Total Volume in Refill: 20 mL     Basal:   Dilaudid  7.6 mg/day                                                       Bupivacaine 15.2 mg/day   Ziconitide 7.6 mcg/day      PTM:  0.2 hydromorphone,  2 hour lockout, maximum 6 in a day.     Pump Reservoir Volume: 20 mL  Low Reservoir Alarm Date: unknown  Pump : [employer]. Model: SynchroMed II  Clinic Responsible for pump medications: [place] [phone]       melatonin 3 MG tablet Take 3 mg by mouth nightly as needed for sleep       Multiple Vitamins-Minerals (WOMENS MULTI PO) Take 1 tablet by mouth daily       ondansetron (ZOFRAN) 4 MG tablet Take 1 tablet (4 mg) by mouth every 6 hours as needed for nausea 20 tablet 1     polyethylene glycol (MIRALAX) 17 GM/Dose powder Take 17 g by mouth daily as needed for constipation        PROCTO-MED HC 2.5 % cream Place 1 Application rectally daily as needed for hemorrhoids or other (itch).       metoprolol succinate ER (TOPROL XL) 25 MG 24 hr tablet Take 1 tablet (25 mg) by mouth every evening for 4 days. 4 tablet 0       Medical History: any changes in medical history since they were last seen?  Sacral wound, current tx.       Objective:    Physical Exam:  GENERAL: alert and no distress  EYES: Eyes grossly normal to inspection.  No discharge or erythema, or obvious scleral/conjunctival abnormalities.  RESP: No audible wheeze, cough, or visible cyanosis.    SKIN: Visible skin clear. No significant rash, abnormal pigmentation or lesions.  NEURO: Cranial nerves grossly intact.  Mentation and speech appropriate for age.  PSYCH: Appropriate affect, tone, and pace of words     Diagnostic Tests/Imaging/Labs:  Reviewed last BMP from 2/23/25    BILLING TIME DOCUMENTATION:   The total TIME spent on this patient on the date of the encounter/appointment was 30 minutes.      TOTAL TIME includes:   Time spent preparing to see the patient (reviewing records and tests)   Time spent face to face (or over the phone) with the patient   Time spent ordering tests, medications, procedures and referrals   Time spent Referring and communicating with other healthcare professionals   Time spent documenting clinical information  in Epic       Again, thank you for allowing me to participate in the care of your patient.      Sincerely,    EMELI Jerez CNP

## 2025-04-15 NOTE — Clinical Note
Glenn Mccarty,  I manage Annamarie's pain pump and had a visit with her today. She reported burning pain around her vagina and rectum that has recently worsened. I know she has a sacral wound and wonder if symptom exacerbation is somehow associated (irritation from wound treatment, infection, skin breakdown?). She seems to be under the impression that the sacral wound has gotten better (I saw documentation in the chart that suggested otherwise). I told Annamarie I would send a message, since you will see her again on 4/23. I would recommend she follow up with PCP if you identify abnormal findings.   Thanks, Santa Wallace, DNP, APRN, AGNP-C [facility]

## 2025-04-16 ENCOUNTER — NURSE TRIAGE (OUTPATIENT)
Dept: INTERNAL MEDICINE | Facility: CLINIC | Age: 75
End: 2025-04-16
Payer: MEDICARE

## 2025-04-16 ENCOUNTER — TELEPHONE (OUTPATIENT)
Dept: ANESTHESIOLOGY | Facility: CLINIC | Age: 75
End: 2025-04-16
Payer: MEDICARE

## 2025-04-16 NOTE — TELEPHONE ENCOUNTER
S-(situation): Caitlin with [facility] requesting order for UA.     B-(background): Patient wheelchair bound, hx of urostomy and spinal cord injury.     A-(assessment): Patient complaining of vaginal burning. Denies vaginal itching, and discharge. Her ostomy bag was last changed on 03/28. The urine bag was cloudy, and tubing had sediment. Stoma site looks good, and bag was changed today. Patient denies fever. Wondering if possible UTI.     R-(recommendations): Please advise if able to do UA. [facility] does not have medical transport, and unsure if patient would be able to get self to clinic for appointment for possible UTI. If able to have order, requesting order to be faxed to: [phone] attention: Nursing.     Elan Tovar, RN on 4/16/2025 at 3:42 PM

## 2025-04-16 NOTE — TELEPHONE ENCOUNTER
"I am confused.    The patient has vaginal burning but not itching.  Does she have a rash?  Is the discharge \"cheesy\" is in a yeast infection?    Doing a urinary analysis off of her Cunningham bag would be pointless as this would simply demonstrate colonization of the bag.    I am hesitant to start an antibiotic because, if she does have yeast the antibiotic would only make it worse.    Is it possible to get a urine sample with a new catheter before it is connected to her Cunningham bag?    Please let me know.    Raj"

## 2025-04-16 NOTE — TELEPHONE ENCOUNTER
Patient confirmed scheduled appointment:     Date: 7/18/25  Time: 11:00 AM  Visit type: Return Pain  Visit mode: Virtual Visit  Provider: Santa Wallace  Location: [facility]    Additional Notes:

## 2025-04-17 ENCOUNTER — OFFICE VISIT (OUTPATIENT)
Dept: INTERNAL MEDICINE | Facility: CLINIC | Age: 75
End: 2025-04-17
Payer: MEDICARE

## 2025-04-17 VITALS
TEMPERATURE: 97.3 F | OXYGEN SATURATION: 95 % | DIASTOLIC BLOOD PRESSURE: 50 MMHG | HEART RATE: 68 BPM | SYSTOLIC BLOOD PRESSURE: 116 MMHG

## 2025-04-17 DIAGNOSIS — G82.20 PARAPLEGIA (H): ICD-10-CM

## 2025-04-17 DIAGNOSIS — B37.31 YEAST INFECTION OF THE VAGINA: ICD-10-CM

## 2025-04-17 DIAGNOSIS — N30.00 ACUTE CYSTITIS WITHOUT HEMATURIA: Primary | ICD-10-CM

## 2025-04-17 LAB
ANION GAP SERPL CALCULATED.3IONS-SCNC: 9 MMOL/L (ref 7–15)
BUN SERPL-MCNC: 17.9 MG/DL (ref 8–23)
CALCIUM SERPL-MCNC: 8.6 MG/DL (ref 8.8–10.4)
CHLORIDE SERPL-SCNC: 103 MMOL/L (ref 98–107)
CREAT SERPL-MCNC: 0.77 MG/DL (ref 0.51–0.95)
EGFRCR SERPLBLD CKD-EPI 2021: 80 ML/MIN/1.73M2
ERYTHROCYTE [DISTWIDTH] IN BLOOD BY AUTOMATED COUNT: 14.4 % (ref 10–15)
GLUCOSE SERPL-MCNC: 109 MG/DL (ref 70–99)
HCO3 SERPL-SCNC: 30 MMOL/L (ref 22–29)
HCT VFR BLD AUTO: 33.1 % (ref 35–47)
HGB BLD-MCNC: 10.7 G/DL (ref 11.7–15.7)
MCH RBC QN AUTO: 30.2 PG (ref 26.5–33)
MCHC RBC AUTO-ENTMCNC: 32.3 G/DL (ref 31.5–36.5)
MCV RBC AUTO: 94 FL (ref 78–100)
PLATELET # BLD AUTO: 159 10E3/UL (ref 150–450)
POTASSIUM SERPL-SCNC: 4.2 MMOL/L (ref 3.4–5.3)
RBC # BLD AUTO: 3.54 10E6/UL (ref 3.8–5.2)
SODIUM SERPL-SCNC: 142 MMOL/L (ref 135–145)
WBC # BLD AUTO: 6.7 10E3/UL (ref 4–11)

## 2025-04-17 RX ORDER — LIDOCAINE 40 MG/G
CREAM TOPICAL
Qty: 60 G | Refills: 0 | Status: SHIPPED | OUTPATIENT
Start: 2025-04-17

## 2025-04-17 RX ORDER — CIPROFLOXACIN 500 MG/1
TABLET, FILM COATED ORAL
Qty: 10 TABLET | Refills: 0 | Status: SHIPPED | OUTPATIENT
Start: 2025-04-17

## 2025-04-17 RX ORDER — FLUCONAZOLE 150 MG/1
TABLET ORAL
Qty: 5 TABLET | Refills: 0 | Status: SHIPPED | OUTPATIENT
Start: 2025-04-17

## 2025-04-17 ASSESSMENT — PAIN SCALES - GENERAL: PAINLEVEL_OUTOF10: MODERATE PAIN (5)

## 2025-04-17 NOTE — TELEPHONE ENCOUNTER
Nurse calling back, following up on call from yesterday. Care giver reports patient is having vaginal itching with no discharge. Care giver denies any rash to the area. They are requesting UA as well as possible yeast infection work up. Discussed need for adequate urine sample from a new catheter to best assess. RN is agreeable to this. Requesting work in today. Routing to PCP to advise on    Please call [phone] ZACHARIAH Stanford and patient with appt time.    Bharti Salinas RN, BSN    Reason for Disposition   Cloudy urine lasts > 24 hours and not cleared by increased fluid intake    Additional Information   Negative: Shock suspected (e.g., cold/pale/clammy skin, too weak to stand, low BP, rapid pulse)   Negative: Sounds like a life-threatening emergency to the triager   Negative: SEVERE abdominal pain   Negative: Catheter was accidentally pulled-out and bright red continuous bleeding   Negative: Fever > 100.4 F (38.0 C)   Negative: Drinking very little and dehydration suspected (e.g., no urine > 12 hours, very dry mouth, very lightheaded)   Negative: Patient sounds very sick or weak to the triager   Negative: Catheter was accidentally pulled-out   Negative: Catheter is broken and is not working (does not function normally)   Negative: Bleeding around catheter (e.g., from penis or female urethra)   Negative: New-onset MILD - MODERATE lower abdominal pain or swelling (distention)    Protocols used: Urinary Catheter (e.g., Cunningham) Symptoms and Xhnccadda-N-CU

## 2025-04-17 NOTE — PROGRESS NOTES
Maine De Luna is a 74 year old, presenting for the following health issues:  UTI (Vaginal itching, no discharge, discuss possible UTI/yeast infection)      4/17/2025     2:01 PM   Additional Questions   Roomed by Marianela     History of Present Illness       Reason for visit:  Vaginal pain  Symptom onset:  3-7 days ago  Symptom intensity:  Moderate  Symptom progression:  Staying the same  Had these symptoms before:  Yes  Has tried/received treatment for these symptoms:  Yes  Previous treatment was successful:  Yes  Prior treatment description:  Medication  What makes it worse:  No  What makes it better:  Medication   She is taking medications regularly.                 EMR reviewed including:             Complaint, History of Chief Complaint, Corresponding Review of Systems, and Complaint Specific Physical Examination.    #1   Genital pain.  Patient notes no discharge or drainage.  Denies significant rash.  Cunningham catheter in place.  Patient notes that her urine has been foul-smelling and cloudy.  States that she has been more tired lately.  Denies fevers or chills.  Denies any other systemic findings.  Denies flank pain or suprapubic fullness.  Patient is a paraplegic and has decreased sensation of the lower body.          Exam:   Constitutional: The patient appears to be in no acute distress. The patient appears to be adequately hydrated. No acute respiratory or hemodynamic distress is noted at this time.   LUNGS: clear bilaterally, airflow is brisk, no intercostal retraction or stridor is noted. No coughing is noted during visit.   HEART:  regular without rubs, clicks, gallops, or murmurs. PMI is nondisplaced. Upstrokes are brisk. S1,S2 are heard.   GI: Abdomen is soft, without rebound, guarding or tenderness. Bowel sounds are appropriate. No renal bruits are heard.  Colostomy is functioning well.  No signs of infection noted.   URINARY: Chris's punch is negative. No suprapubic tenderness is noted with  palpation.        Patient has been interviewed, applicable history and applied review of systems have been performed.    Vital Signs:   /50   Pulse 68   Temp 97.3  F (36.3  C) (Temporal)   SpO2 95%       Decision Making    Problem and Complexity     1. Acute cystitis without hematuria (Primary)  Symptoms quite similar to previous urinary tract infections.  Will not obtain urinary analysis at this time as her chronic catheterization would lead to false positives due to colonization.  Started on antibiotic.  Check white count for systemic involvement.  Check renal function.  - ciprofloxacin (CIPRO) 500 MG tablet; Use first  Dispense: 10 tablet; Refill: 0  - CBC with platelets; Future  - Basic metabolic panel  (Ca, Cl, CO2, Creat, Gluc, K, Na, BUN); Future  - CBC with platelets  - Basic metabolic panel  (Ca, Cl, CO2, Creat, Gluc, K, Na, BUN)    2. Yeast infection of the vagina  Anticipate post antibiotic yeast infection.  Will start Diflucan upon completion of the antibiotic.  Will use lidocaine external cream as needed for genital discomfort temporarily.  - fluconazole (DIFLUCAN) 150 MG tablet; Use second  Dispense: 5 tablet; Refill: 0  - lidocaine (LMX4) 4 % external cream; Apply topically once as needed for mild pain.  Dispense: 60 g; Refill: 0    3. Paraplegia (H)  Continue current support                                FOLLOW UP   I have asked the patient to make an appointment for followup with me virtually in 1 week with her progress and needs    Regarding routine vaccinations:  I have reviewed the patient's vaccination schedule and discussed the benefits of prophylactic vaccination in detail.  I recommend the patient contact their pharmacist for vaccinations.  Discussed that most insurance companies now favor reimbursement to the pharmacies and it will financially behoove the patient to have vaccinations performed at their pharmacy.        I have carefully explained the diagnosis and treatment options  to the patient.  The patient has displayed an understanding of the above, and all subsequent questions were answered.      DO JAIR Stokes    Portions of this note were produced using [employer]  Although every attempt at real-time proof reading has been made, occasional grammar/syntax errors may have been missed.

## 2025-04-17 NOTE — TELEPHONE ENCOUNTER
RN Triage    Patient Contact    Attempt # 1    Was call answered?  No.  Unable to leave message. No voicemail for [facility] RN, no answer, will try to call back later.    Johny Velázquez RN on 4/17/2025 at 8:15 AM

## 2025-04-17 NOTE — TELEPHONE ENCOUNTER
Opening on PCP schedule available for today. Patient scheduled and informed and agreeable to plan.    Bharti Salinas, RN, BSN

## 2025-04-23 ENCOUNTER — DOCUMENTATION ONLY (OUTPATIENT)
Dept: GERIATRICS | Facility: CLINIC | Age: 75
End: 2025-04-23
Payer: MEDICARE

## 2025-05-01 ENCOUNTER — ASSISTED LIVING VISIT (OUTPATIENT)
Dept: GERIATRICS | Facility: CLINIC | Age: 75
End: 2025-05-01
Payer: MEDICARE

## 2025-05-01 VITALS
HEART RATE: 85 BPM | DIASTOLIC BLOOD PRESSURE: 61 MMHG | WEIGHT: 123 LBS | OXYGEN SATURATION: 95 % | SYSTOLIC BLOOD PRESSURE: 116 MMHG | BODY MASS INDEX: 23.24 KG/M2 | TEMPERATURE: 97.2 F

## 2025-05-01 NOTE — PROGRESS NOTES
[facility]  INITIAL VISIT NOTE  May 1, 2025      PRIMARY CARE PROVIDER AND CLINIC:  Karley Godoy [address] [address]    [facility] Medical Record Number:  [id number]  Place of Service where encounter took place:  [facility] [[id number]]    Chief Complaint   Patient presents with    [facility]       HPI:    Anayeli Gagnon is a 74 year old  (1950) female was admitted to the above facility from   Home . Hospital stay *** through *** where they were admitted for ***  Now admitted to this facility for  {FGS ADMISSION REASONS:412295}.      History obtained from: {FGS HPI:511038}.      Brief Hospital Course: ***    TCU Course: ***    CODE STATUS/ADVANCE DIRECTIVES: DNR only    ALLERGIES:  Allergies   Allergen Reactions    Latex     Contrast Dye Rash     Painful rash after x-ray contrast    Nitrofurantoin Nausea and Vomiting       PAST MEDICAL HISTORY:   Past Medical History:   Diagnosis Date    CARDIAC DYSRHYTHMIAS NEC 10/03/2007    Taking atenelol for years for this    Cerebrovascular accident (H) 05/05/2023    History of skin cancer     Mitral valve prolapse     Neurogenic bladder     Sacral decubitus ulcer, stage IV (H)     Thoracic spinal cord injury (H)     TIA (transient ischemic attack) 04/26/2023       PAST SURGICAL HISTORY:   Past Surgical History:   Procedure Laterality Date    CYSTOSTOMY, INSERT TUBE SUPRAPUBIC, COMBINED N/A 11/13/2023    Procedure: CYSTOSCOPY, OPEN SUPRAPUBIC TUBE INSERTION;  Surgeon: Karley Robles MD;  Location: [facility] OR    DECOMPRESSION LUMBAR ONE LEVEL N/A 12/27/2019    Procedure: Posterior spinal decompression;  Surgeon: Alf Ritchie MD;  Location: [facility] OR    INSERT INTRATHECAL PAIN PUMP N/A 01/19/2022    Procedure: INSERTION, INTRATHECAL ANALGESIC PUMP, externalized trial;  Surgeon: Luis Orourke MD;  Location: [facility] OR    INSERT INTRATHECAL PAIN PUMP Right  2022    Procedure: INSERTION, INTRATHECAL ANALGESIC PUMP;  Surgeon: Luis Orourke MD;  Location: [facility] OR    INSERT STIMULATOR AND LEADS INTERNAL DORSAL COLUMN N/A 2021    Procedure: Posterior thoracic 12 to Lumbar 1 level for placement of spinal cord stimulator paddle and placement of implantable pulse generator/battery over right buttock;  Surgeon: Luis Orourke MD;  Location: [facility] OR    IR SPINAL ANGIOGRAM  10/16/2019    IR SPINAL ANGIOGRAM  2019    LAPAROSCOPIC TUBAL LIGATION      REPAIR SPINAL ARERIOVENOUS MALFORMATION N/A 2019    Procedure: with surgical disconnection arterial venous fistula Thoracic 5;  Surgeon: Alf Ritchie MD;  Location: [facility] OR       FAMILY HISTORY:   Family History   Problem Relation Age of Onset    C.A.D. Father          41    Deep Vein Thrombosis (DVT) No family hx of     Anesthesia Reaction No family hx of      Unable to review due to cognitive impairment***    SOCIAL HISTORY:   Patient's living condition: {LIVES WITH (NURSING HOME):255455}    MEDICATIONS  Post Discharge Medication Reconciliation Status: {ACO Med Rec (Provider):907091}.  Current Outpatient Medications   Medication Sig Dispense Refill    acetaminophen (TYLENOL) 500 MG tablet Take 1,000 mg by mouth every 6 hours as needed for mild pain.      arginine (ARGINAID) PACK Take 1 packet by mouth daily.      aspirin (ASA) 325 MG EC tablet Take 1 tablet (325 mg) by mouth daily 30 tablet 11    atorvastatin (LIPITOR) 40 MG tablet Take 1 tablet (40 mg) by mouth every evening 30 tablet 3    baclofen (LIORESAL) 10 MG tablet Take 1 tablet (10 mg) by mouth 3 times daily.      busPIRone (BUSPAR) 15 MG tablet Take 1 tablet (15 mg) by mouth 3 times daily. 90 tablet 0    ciprofloxacin (CIPRO) 500 MG tablet Use first 10 tablet 0    fluconazole (DIFLUCAN) 150 MG tablet Use second 5 tablet 0    gabapentin (NEURONTIN) 600 MG tablet Take 1,200 mg by mouth 3 times daily  1 tablet 0    lidocaine (LIDODERM) 5 %  patch Apply 1 patch topically daily as needed       lidocaine (LMX4) 4 % external cream Apply topically once as needed for mild pain. 60 g 0    LORazepam (ATIVAN) 0.5 MG tablet Take 1 tablet (0.5 mg) by mouth daily as needed for anxiety. 30 tablet 0    medication given by implanted intrathecal pump continuously. Concentrations:   Dilaudid 14 mg/ml                                                          Bupivacaine 28 mg/ml   Ziconitide 14 mcg/ml      Total Volume in Refill: 20 mL     Basal:   Dilaudid  7.6 mg/day                                                       Bupivacaine 15.2 mg/day   Ziconitide 7.6 mcg/day      PTM:  0.2 hydromorphone, 2 hour lockout, maximum 6 in a day.     Pump Reservoir Volume: 20 mL  Low Reservoir Alarm Date: unknown  Pump : Iconfinder. Model: Casabu II  Clinic Responsible for pump medications: [employer] [phone]      melatonin 3 MG tablet Take 3 mg by mouth nightly as needed for sleep      metoprolol succinate ER (TOPROL XL) 25 MG 24 hr tablet Take 1 tablet (25 mg) by mouth every evening for 4 days. 4 tablet 0    Multiple Vitamins-Minerals (WOMENS MULTI PO) Take 1 tablet by mouth daily      ondansetron (ZOFRAN) 4 MG tablet Take 1 tablet (4 mg) by mouth every 6 hours as needed for nausea 20 tablet 1    polyethylene glycol (MIRALAX) 17 GM/Dose powder Take 17 g by mouth daily as needed for constipation       PROCTO-MED HC 2.5 % cream Place 1 Application rectally daily as needed for hemorrhoids or other (itch).         ROS:  10 point ROS neg other than the symptoms noted above in the HPI.***  Unable to obtain due to cognitive impairment or aphasia  ROS    PHYSICAL EXAM:  /61   Pulse 85   Temp 97.2  F (36.2  C)   Wt 55.8 kg (123 lb)   SpO2 95%   BMI 23.24 kg/m    Physical Exam     LABORATORY/IMAGING DATA:  Reviewed as per [facility] and/or [facility]    ASSESSMENT/PLAN:  {FGS DX INITIAL:558434}    Orders:   No new orders    {FGS TIME  SPENT:222051}    Electronically signed by:  EMELI Garcia CNP

## 2025-05-01 NOTE — LETTER
5/1/2025      Anayeli Gagnon  [facility]  [address]  [place] [address]        No notes on file      Sincerely,        EMELI Garcia CNP    Electronically signed

## 2025-05-06 ENCOUNTER — APPOINTMENT (OUTPATIENT)
Dept: CT IMAGING | Facility: CLINIC | Age: 75
End: 2025-05-06
Attending: FAMILY MEDICINE
Payer: MEDICARE

## 2025-05-06 ENCOUNTER — HOSPITAL ENCOUNTER (EMERGENCY)
Facility: CLINIC | Age: 75
Discharge: HOME | End: 2025-05-06
Attending: FAMILY MEDICINE | Admitting: FAMILY MEDICINE
Payer: MEDICARE

## 2025-05-06 VITALS
DIASTOLIC BLOOD PRESSURE: 72 MMHG | HEIGHT: 64 IN | SYSTOLIC BLOOD PRESSURE: 150 MMHG | WEIGHT: 125 LBS | RESPIRATION RATE: 13 BRPM | OXYGEN SATURATION: 94 % | TEMPERATURE: 98.6 F | HEART RATE: 67 BPM | BODY MASS INDEX: 21.34 KG/M2

## 2025-05-06 DIAGNOSIS — F41.9 ANXIETY: ICD-10-CM

## 2025-05-06 DIAGNOSIS — R44.3 HALLUCINATIONS: ICD-10-CM

## 2025-05-06 LAB
ANION GAP SERPL CALCULATED.3IONS-SCNC: 12 MMOL/L (ref 7–15)
BASOPHILS # BLD AUTO: 0.1 10E3/UL (ref 0–0.2)
BASOPHILS NFR BLD AUTO: 1 %
BUN SERPL-MCNC: 15.8 MG/DL (ref 8–23)
CALCIUM SERPL-MCNC: 9.2 MG/DL (ref 8.8–10.4)
CHLORIDE SERPL-SCNC: 102 MMOL/L (ref 98–107)
CREAT SERPL-MCNC: 0.55 MG/DL (ref 0.51–0.95)
CRP SERPL-MCNC: 29.09 MG/L
EGFRCR SERPLBLD CKD-EPI 2021: >90 ML/MIN/1.73M2
EOSINOPHIL # BLD AUTO: 0.2 10E3/UL (ref 0–0.7)
EOSINOPHIL NFR BLD AUTO: 2 %
ERYTHROCYTE [DISTWIDTH] IN BLOOD BY AUTOMATED COUNT: 13.4 % (ref 10–15)
GLUCOSE BLDC GLUCOMTR-MCNC: 99 MG/DL (ref 70–99)
GLUCOSE SERPL-MCNC: 95 MG/DL (ref 70–99)
HCO3 SERPL-SCNC: 27 MMOL/L (ref 22–29)
HCT VFR BLD AUTO: 37.9 % (ref 35–47)
HGB BLD-MCNC: 12.4 G/DL (ref 11.7–15.7)
IMM GRANULOCYTES # BLD: 0 10E3/UL
IMM GRANULOCYTES NFR BLD: 1 %
LYMPHOCYTES # BLD AUTO: 0.8 10E3/UL (ref 0.8–5.3)
LYMPHOCYTES NFR BLD AUTO: 10 %
MCH RBC QN AUTO: 30.5 PG (ref 26.5–33)
MCHC RBC AUTO-ENTMCNC: 32.7 G/DL (ref 31.5–36.5)
MCV RBC AUTO: 93 FL (ref 78–100)
MONOCYTES # BLD AUTO: 0.3 10E3/UL (ref 0–1.3)
MONOCYTES NFR BLD AUTO: 4 %
NEUTROPHILS # BLD AUTO: 6.3 10E3/UL (ref 1.6–8.3)
NEUTROPHILS NFR BLD AUTO: 83 %
NRBC # BLD AUTO: 0 10E3/UL
NRBC BLD AUTO-RTO: 0 /100
PLATELET # BLD AUTO: 151 10E3/UL (ref 150–450)
POTASSIUM SERPL-SCNC: 4.2 MMOL/L (ref 3.4–5.3)
RBC # BLD AUTO: 4.07 10E6/UL (ref 3.8–5.2)
SODIUM SERPL-SCNC: 141 MMOL/L (ref 135–145)
WBC # BLD AUTO: 7.6 10E3/UL (ref 4–11)

## 2025-05-06 PROCEDURE — 36415 COLL VENOUS BLD VENIPUNCTURE: CPT | Performed by: FAMILY MEDICINE

## 2025-05-06 PROCEDURE — 250N000011 HC RX IP 250 OP 636: Performed by: FAMILY MEDICINE

## 2025-05-06 PROCEDURE — 70450 CT HEAD/BRAIN W/O DYE: CPT

## 2025-05-06 PROCEDURE — 85025 COMPLETE CBC W/AUTO DIFF WBC: CPT | Performed by: FAMILY MEDICINE

## 2025-05-06 PROCEDURE — 99284 EMERGENCY DEPT VISIT MOD MDM: CPT | Performed by: FAMILY MEDICINE

## 2025-05-06 PROCEDURE — 82962 GLUCOSE BLOOD TEST: CPT

## 2025-05-06 PROCEDURE — 86140 C-REACTIVE PROTEIN: CPT | Performed by: FAMILY MEDICINE

## 2025-05-06 PROCEDURE — 99285 EMERGENCY DEPT VISIT HI MDM: CPT | Mod: 25 | Performed by: FAMILY MEDICINE

## 2025-05-06 PROCEDURE — 250N000013 HC RX MED GY IP 250 OP 250 PS 637: Performed by: FAMILY MEDICINE

## 2025-05-06 PROCEDURE — 80048 BASIC METABOLIC PNL TOTAL CA: CPT | Performed by: FAMILY MEDICINE

## 2025-05-06 PROCEDURE — 96374 THER/PROPH/DIAG INJ IV PUSH: CPT | Performed by: FAMILY MEDICINE

## 2025-05-06 RX ORDER — ACETAMINOPHEN 500 MG
1000 TABLET ORAL ONCE
Status: COMPLETED | OUTPATIENT
Start: 2025-05-06 | End: 2025-05-06

## 2025-05-06 RX ORDER — LORAZEPAM 1 MG/1
1 TABLET ORAL ONCE
Status: COMPLETED | OUTPATIENT
Start: 2025-05-06 | End: 2025-05-06

## 2025-05-06 RX ORDER — ONDANSETRON 4 MG/1
4 TABLET, ORALLY DISINTEGRATING ORAL ONCE
Status: COMPLETED | OUTPATIENT
Start: 2025-05-06 | End: 2025-05-06

## 2025-05-06 RX ORDER — ONDANSETRON 2 MG/ML
4 INJECTION INTRAMUSCULAR; INTRAVENOUS
Status: COMPLETED | OUTPATIENT
Start: 2025-05-06 | End: 2025-05-06

## 2025-05-06 RX ADMIN — LORAZEPAM 1 MG: 1 TABLET ORAL at 14:47

## 2025-05-06 RX ADMIN — ACETAMINOPHEN 1000 MG: 500 TABLET ORAL at 12:25

## 2025-05-06 RX ADMIN — ONDANSETRON 4 MG: 4 TABLET, ORALLY DISINTEGRATING ORAL at 14:47

## 2025-05-06 RX ADMIN — ONDANSETRON 4 MG: 2 INJECTION INTRAMUSCULAR; INTRAVENOUS at 11:56

## 2025-05-06 ASSESSMENT — ACTIVITIES OF DAILY LIVING (ADL)
ADLS_ACUITY_SCORE: 61

## 2025-05-06 NOTE — ED NOTES
Pt's daughter to RN desk to report pt is currently nauseated and dry heaving; concerned pt is having medication withdrawals, meds reviewed and nothing to cause withdrawal symptoms. MD updated, will review chart and meds.

## 2025-05-06 NOTE — ED PROVIDER NOTES
History     Chief Complaint   Patient presents with    Hypertension     HPI  Anayeli Gagnon is a 74 year old female who presents via EMS with some complaints of some altered mental status last night.  Patient was yelling out a lot during the night which is not like her.  Patient kept thinking she was seen red stuff in her urine bag.  Patient has a chronic suprapubic catheter.  The bag has not been changed yet this morning.  Patient is complaining of a headache which she does not like the patient.  Denies feeling nauseous, denies any current visual changes.  Denies any chest pain or shortness of breath.    Allergies:  Allergies   Allergen Reactions    Latex     Contrast Dye Rash     Painful rash after x-ray contrast    Nitrofurantoin Nausea and Vomiting       Problem List:    Patient Active Problem List    Diagnosis Date Noted    Transient neurological symptoms 11/12/2024     Priority: Medium    History of stroke 11/11/2024     Priority: Medium    Skin ulcer of left ankle, limited to breakdown of skin (H) 11/11/2024     Priority: Medium    Presence of intrathecal pump 11/11/2024     Priority: Medium    Suprapubic catheter (H) 11/11/2024     Priority: Medium    Colostomy present (H) 11/11/2024     Priority: Medium    MRSA carrier 11/11/2024     Priority: Medium    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) 11/11/2024     Priority: Medium    Hyperlipidemia 11/11/2024     Priority: Medium    TIA (transient ischemic attack) 11/10/2024     Priority: Medium    Right homonymous hemianopsia 01/08/2024     Priority: Medium    Cerebrovascular accident (CVA), unspecified mechanism (H) 05/05/2023     Priority: Medium    Spinal cord injury of thoracic region without bone injury, subsequent encounter (H) 03/04/2021     Priority: Medium     Added automatically from request for surgery [id number]      Thoracolumbar back pain 03/04/2021     Priority: Medium     Added automatically from request for surgery [id number]      Neuropathic  pain 03/04/2021     Priority: Medium     Added automatically from request for surgery [id number]      Intractable neuropathic pain of lumbosacral origin 03/04/2021     Priority: Medium     Added automatically from request for surgery [id number]      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 lipitor started fall 2017 when in hospital, she chose to stop taking and does not want to restart      Incomplete emptying of bladder 03/15/2018     Priority: Medium    Urge incontinence of urine 03/15/2018     Priority: Medium    Incomplete injury of thoracic spinal cord in T1 to T6 region without bony injury (H) 12/06/2017     Priority: Medium    Paraplegia (H) 12/03/2017     Priority: Medium    History of spinal surgery 12/03/2017     Priority: Medium     Overview:   Thoracolumbar laminectomy for intradural lysis of griselda-medullary adhesions in October 2018      Hypertension 12/03/2017     Priority: Medium    Muscle spasticity 12/03/2017     Priority: Medium    MVP (mitral valve  prolapse) 10/18/2017     Priority: Medium    Palpitations 10/18/2017     Priority: Medium    Anxiety reaction 12/06/2011     Priority: Medium    Osteoarthritis 12/06/2011     Priority: Medium    Headache 11/17/2011     Priority: Medium     Problem list name updated by automated process. Provider to review      Menopause 11/17/2011     Priority: Medium    History of skin cancer      Priority: Medium    Depressive disorder 08/29/2011     Priority: Medium    CARDIOVASCULAR SCREENING; LDL GOAL LESS THAN 160 10/31/2010     Priority: Medium    Other specified cardiac dysrhythmias(427.89) 10/03/2007     Priority: Medium     Taking atenelol for years for this          Past Medical History:    Past Medical History:   Diagnosis Date    CARDIAC DYSRHYTHMIAS NEC 10/03/2007    Cerebrovascular accident (H) 05/05/2023    History of skin cancer     Mitral valve prolapse     Neurogenic bladder     Sacral decubitus ulcer, stage IV (H)     Thoracic spinal cord injury (H)     TIA (transient ischemic attack) 04/26/2023       Past Surgical History:    Past Surgical History:   Procedure Laterality Date    CYSTOSTOMY, INSERT TUBE SUPRAPUBIC, COMBINED N/A 11/13/2023    Procedure: CYSTOSCOPY, OPEN SUPRAPUBIC TUBE INSERTION;  Surgeon: Karley Robles MD;  Location: [facility] OR    DECOMPRESSION LUMBAR ONE LEVEL N/A 12/27/2019    Procedure: Posterior spinal decompression;  Surgeon: Alf Ritchie MD;  Location: [facility] OR    INSERT INTRATHECAL PAIN PUMP N/A 01/19/2022    Procedure: INSERTION, INTRATHECAL ANALGESIC PUMP, externalized trial;  Surgeon: Luis Orourke MD;  Location: [facility] OR    INSERT INTRATHECAL PAIN PUMP Right 01/21/2022    Procedure: INSERTION, INTRATHECAL ANALGESIC PUMP;  Surgeon: Luis Orourke MD;  Location: [facility] OR    INSERT STIMULATOR AND LEADS INTERNAL DORSAL COLUMN N/A 04/07/2021    Procedure: Posterior thoracic 12 to Lumbar 1 level for placement of spinal cord stimulator paddle and placement of implantable pulse  "generator/battery over right buttock;  Surgeon: Luis Orourke MD;  Location: [facility] OR    IR SPINAL ANGIOGRAM  10/16/2019    IR SPINAL ANGIOGRAM  2019    LAPAROSCOPIC TUBAL LIGATION      REPAIR SPINAL ARERIOVENOUS MALFORMATION N/A 2019    Procedure: with surgical disconnection arterial venous fistula Thoracic 5;  Surgeon: Alf Ritchie MD;  Location: [facility] OR       Family History:    Family History   Problem Relation Age of Onset    C.A.D. Father          41    Deep Vein Thrombosis (DVT) No family hx of     Anesthesia Reaction No family hx of        Social History:  Marital Status:   [2]  Social History     Tobacco Use    Smoking status: Never     Passive exposure: Never    Smokeless tobacco: Never   Vaping Use    Vaping status: Never Used   Substance Use Topics    Alcohol use: No    Drug use: Never        Medications:    acetaminophen (TYLENOL) 500 MG tablet  arginine (ARGINAID) PACK  aspirin (ASA) 325 MG EC tablet  atorvastatin (LIPITOR) 40 MG tablet  baclofen (LIORESAL) 10 MG tablet  busPIRone (BUSPAR) 15 MG tablet  ciprofloxacin (CIPRO) 500 MG tablet  fluconazole (DIFLUCAN) 150 MG tablet  gabapentin (NEURONTIN) 600 MG tablet  lidocaine (LIDODERM) 5 % patch  lidocaine (LMX4) 4 % external cream  LORazepam (ATIVAN) 0.5 MG tablet  medication given by implanted intrathecal pump  melatonin 3 MG tablet  metoprolol succinate ER (TOPROL XL) 25 MG 24 hr tablet  Multiple Vitamins-Minerals (WOMENS MULTI PO)  ondansetron (ZOFRAN) 4 MG tablet  polyethylene glycol (MIRALAX) 17 GM/Dose powder  PROCTO-MED HC 2.5 % cream          Review of Systems   All other systems reviewed and are negative.      Physical Exam   BP: (!) 158/85  Pulse: 72  Temp: 98.6  F (37  C)  Resp: 20  Height: 162.6 cm (5' 4\")  Weight: 56.7 kg (125 lb)  SpO2: 96 %      Physical Exam  Vitals and nursing note reviewed.   Constitutional:       General: She is not in acute distress.     Appearance: She is well-developed. She is not " diaphoretic.   HENT:      Head: Normocephalic and atraumatic.      Nose: Nose normal.      Mouth/Throat:      Pharynx: No oropharyngeal exudate.   Eyes:      Conjunctiva/sclera: Conjunctivae normal.   Cardiovascular:      Rate and Rhythm: Normal rate and regular rhythm.      Heart sounds: Normal heart sounds. No murmur heard.     No friction rub.   Pulmonary:      Effort: Pulmonary effort is normal. No respiratory distress.      Breath sounds: Normal breath sounds. No stridor. No wheezing or rales.   Abdominal:      General: Bowel sounds are normal. There is no distension.      Palpations: Abdomen is soft. There is no mass.      Tenderness: There is no abdominal tenderness. There is no guarding.      Comments: There is a suprapubic catheter in place and an ostomy in place, both appear normal.   Musculoskeletal:         General: No tenderness. Normal range of motion.      Cervical back: Normal range of motion and neck supple.   Skin:     General: Skin is warm and dry.      Capillary Refill: Capillary refill takes less than 2 seconds.      Findings: No erythema.   Neurological:      Mental Status: She is alert and oriented to person, place, and time.   Psychiatric:         Judgment: Judgment normal.         ED Course        Procedures        Results for orders placed or performed during the hospital encounter of 05/06/25 (from the past 24 hours)   Glucose by meter   Result Value Ref Range    GLUCOSE BY METER POCT 99 70 - 99 mg/dL   CBC with platelets differential    Narrative    The following orders were created for panel order CBC with platelets differential.  Procedure                               Abnormality         Status                     ---------                               -----------         ------                     CBC with platelets and ...[[id number]]                      Final result                 Please view results for these tests on the individual orders.   Basic metabolic panel   Result Value  Ref Range    Sodium 141 135 - 145 mmol/L    Potassium 4.2 3.4 - 5.3 mmol/L    Chloride 102 98 - 107 mmol/L    Carbon Dioxide (CO2) 27 22 - 29 mmol/L    Anion Gap 12 7 - 15 mmol/L    Urea Nitrogen 15.8 8.0 - 23.0 mg/dL    Creatinine 0.55 0.51 - 0.95 mg/dL    GFR Estimate >90 >60 mL/min/1.73m2    Calcium 9.2 8.8 - 10.4 mg/dL    Glucose 95 70 - 99 mg/dL   CRP inflammation   Result Value Ref Range    CRP Inflammation 29.09 (H) <5.00 mg/L   CBC with platelets and differential   Result Value Ref Range    WBC Count 7.6 4.0 - 11.0 10e3/uL    RBC Count 4.07 3.80 - 5.20 10e6/uL    Hemoglobin 12.4 11.7 - 15.7 g/dL    Hematocrit 37.9 35.0 - 47.0 %    MCV 93 78 - 100 fL    MCH 30.5 26.5 - 33.0 pg    MCHC 32.7 31.5 - 36.5 g/dL    RDW 13.4 10.0 - 15.0 %    Platelet Count 151 150 - 450 10e3/uL    % Neutrophils 83 %    % Lymphocytes 10 %    % Monocytes 4 %    % Eosinophils 2 %    % Basophils 1 %    % Immature Granulocytes 1 %    NRBCs per 100 WBC 0 <1 /100    Absolute Neutrophils 6.3 1.6 - 8.3 10e3/uL    Absolute Lymphocytes 0.8 0.8 - 5.3 10e3/uL    Absolute Monocytes 0.3 0.0 - 1.3 10e3/uL    Absolute Eosinophils 0.2 0.0 - 0.7 10e3/uL    Absolute Basophils 0.1 0.0 - 0.2 10e3/uL    Absolute Immature Granulocytes 0.0 <=0.4 10e3/uL    Absolute NRBCs 0.0 10e3/uL   Head CT w/o contrast    Narrative    EXAM: CT HEAD WITHOUT CONTRAST  LOCATION: [facility]  DATE: 05/06/2025    INDICATION: Headache, altered mental status.  COMPARISON: Head CT 11/13/2024.  TECHNIQUE: Routine CT Head without IV contrast. Multiplanar reformats. Dose reduction techniques were used.    FINDINGS:  INTRACRANIAL CONTENTS: No intracranial hemorrhage, extra-axial collection, or mass effect.  No CT evidence of acute infarct. Presumed chronic ischemic infarct in the left posterior cerebral artery distribution involving portions of the left occipital and   left parietal lobes again noted without change. Possible chronic lacunar infarct in  the left basal ganglia again noted. Gray-white differentiation otherwise normal. Normal ventricles and sulci.     VISUALIZED ORBITS/SINUSES/MASTOIDS: No intraorbital abnormality. No paranasal sinus mucosal disease. No middle ear or mastoid effusion.    BONES/SOFT TISSUES: No acute abnormality.      Impression    IMPRESSION:  1.  Presumed chronic ischemic infarcts in the left basal ganglia and left PCA distribution again noted.  2.  Otherwise, normal head CT. No evidence for acute intracranial pathology.         Medications   acetaminophen (TYLENOL) tablet 1,000 mg (1,000 mg Oral $Given 5/6/25 1225)   ondansetron (ZOFRAN) injection 4 mg (4 mg Intravenous $Given 5/6/25 1156)     Labs have come back and were unremarkable, CRP was slightly elevated but this could be related to the resolving urine infection.  Patient has no fever and normal white count so I do not suspect an active or worsening infection.  CT scan of the head appears to be stable.  At this point not sure exactly what caused the symptoms, I do think some of the red that she was seen in the urine was may be some hallucinations from lack of sleep and may be from her anxiety.  I am going to have her take a full milligram of Ativan at bedtime to see if this helps her sleep better and her symptoms otherwise.  Recommend follow-up with her doctor if there is no improvement over the next few days.    Assessments & Plan (with Medical Decision Making)  Hallucinations, anxiety     I have reviewed the nursing notes.    I have reviewed the findings, diagnosis, plan and need for follow up with the patient.      New Prescriptions    No medications on file       Final diagnoses:   Hallucinations   Anxiety       5/6/2025   [facility] EMERGENCY DEPT       Guillermo Murrell MD  05/06/25 [id number]

## 2025-05-06 NOTE — ED TRIAGE NOTES
"Patient brought to ED by EMS from [facility] after being concerned about high blood pressure, pain and anxiety. She reports yelling for staff during the night. Staff checked on her and \"was ok\". Ashley Parada RN          "

## 2025-05-06 NOTE — DISCHARGE INSTRUCTIONS
1.  Please see your doctor if your symptoms are persisting or not improving.  2.  Take 1 mg of Ativan at bedtime the next 3 nights to see if it helps with the symptoms at night and if it does talk to with geriatric nurse practitioner about continuing this dose.  You can continue taking the 0.5 mg in the morning and afternoon as before.

## 2025-05-07 ENCOUNTER — ASSISTED LIVING VISIT (OUTPATIENT)
Dept: GERIATRICS | Facility: CLINIC | Age: 75
End: 2025-05-07
Payer: MEDICARE

## 2025-05-07 VITALS
WEIGHT: 125 LBS | BODY MASS INDEX: 21.46 KG/M2 | DIASTOLIC BLOOD PRESSURE: 72 MMHG | TEMPERATURE: 98.6 F | HEART RATE: 67 BPM | SYSTOLIC BLOOD PRESSURE: 150 MMHG

## 2025-05-07 DIAGNOSIS — F41.1 ANXIETY REACTION: ICD-10-CM

## 2025-05-07 DIAGNOSIS — R11.0 NAUSEA: ICD-10-CM

## 2025-05-07 DIAGNOSIS — Z93.3 COLOSTOMY PRESENT (H): ICD-10-CM

## 2025-05-07 DIAGNOSIS — R33.9 INCOMPLETE EMPTYING OF BLADDER: ICD-10-CM

## 2025-05-07 DIAGNOSIS — R19.5 LOOSE STOOLS: Primary | ICD-10-CM

## 2025-05-07 DIAGNOSIS — Z93.59 SUPRAPUBIC CATHETER (H): ICD-10-CM

## 2025-05-07 NOTE — LETTER
5/7/2025      Anayeli Auguste Living  [address]  [place] [address]        [facility] GERIATRICS  ACUTE/EPISODIC VISIT    [facility] Medical Record Number:  [id number]  Place of Service where encounter took place:  [facility] ([facility] [[id number]]    Chief Complaint   Patient presents with     RECHECK       HPI:    Anayeli Gagnon is a 74 year old  (1950), who is being seen today for an episodic care visit.  HPI information obtained from: facility chart records, facility staff, patient report, and [facility] chart review.    Today's concern is:    Diagnoses         Codes Comments      Loose stools    -  Primary R19.5       Colostomy present (H)     Z93.3       Nausea     R11.0       Suprapubic catheter (H)     Z93.59       Incomplete emptying of bladder     R33.9       Anxiety reaction     F41.1           Came into the building today and the director of health services stopped this NP to ask if Annamarie is being seen today.  Was not planning on seeing her but more issues than an anxiety episode occurring.      Staff working with Annamarie this morning and having a lot of loose stool in colostomy and coming from her rectum which is abnormal.      Staff also gave her 1mg of ativan last evening per direction from ER visit.  Way to much for her as she stopped talking and stiff looking.      Before seeing Annamarie and discussion with the [facility] decided to get an abdominal flat plate x-ray to see if blockage and any hint of why stool coming out of rectum.      Went up to to find Annamarie before lunch and she was up in her electric wheelchair in her apartment.  She did not look well nor did she feel well.  Complained of nausea which has gone on for 2-3 months now.  This NP saw her last week for an initial visit and did not mention this.  May have been that her sister was visiting as well and so in a good mood enjoying what was assumed Wine  "out on her deck.      Annamarie denied vomiting.  Looked very tired in her facial expression.  Did have her put on her pendant as she knew she had an order for Zofran and so wanted her to utilize the medication.  Meanwhile she asked for this NP to heat up the water in a cup for her.  She also had what looked like soup or broth in a cup on her stove ready to be heat up as well.  Full bowl and so assumed she did not touch that yet.      Explained to Annamarie what orders were written as well as labs.  Annamarie states she is usually constipated.  Did not ask her if she sees the bowel provider for her colostomy at all but did want to ask the daughter as called her after the visit.    Did find an aide about 10 minutes later and asked if she was working with Annamarie today and if she answered the call light to give a Zofran.  Wanted to know if Annamarie really put the pendant on or thought she did.  The aide stated she went in there and Annamarie wanted her to deal with her  for the phone.  \"Well I did that for her too\".  Asked if she gave her a Zofran and the aide stated she did not ask for that.  Informed the aide that if she could give her a Zofran that would be nice.  The aide stated when she brings her broth for lunch then she will do the Zofran.      Placed a call to the daughter and she called this NP back as leaving the building.  Explained what was found today with loose stool coming from her rectum that has been non-functional.  Asked if she see any provider for her ostomy and daughter stated usually yearly and thinking it may be soon she should see that person.  Explained to her that an x-ray will be done to see if any blockage noted to cause the loose stools.  Pending on the results, may have daughter call them to see if visit can be made or this NP can call the clinic as well.  Daughter confirmed that the nausea has been going on a few months now.          ALLERGIES:    Allergies   Allergen Reactions     Latex      Contrast " Dye Rash     Painful rash after x-ray contrast     Nitrofurantoin Nausea and Vomiting        MEDICATIONS:  Post Discharge Medication Reconciliation Status: patient was not discharged from an inpatient facility or TCU. No medications changes.    Current Outpatient Medications   Medication Sig Dispense Refill     acetaminophen (TYLENOL) 500 MG tablet Take 1,000 mg by mouth every 6 hours as needed for mild pain.       arginine (ARGINAID) PACK Take 1 packet by mouth daily.       aspirin (ASA) 325 MG EC tablet Take 1 tablet (325 mg) by mouth daily 30 tablet 11     atorvastatin (LIPITOR) 40 MG tablet Take 1 tablet (40 mg) by mouth every evening 30 tablet 3     baclofen (LIORESAL) 10 MG tablet Take 1 tablet (10 mg) by mouth 3 times daily.       busPIRone (BUSPAR) 15 MG tablet Take 1 tablet (15 mg) by mouth 3 times daily. 90 tablet 0     ciprofloxacin (CIPRO) 500 MG tablet Use first 10 tablet 0     fluconazole (DIFLUCAN) 150 MG tablet Use second 5 tablet 0     gabapentin (NEURONTIN) 600 MG tablet Take 1,200 mg by mouth 3 times daily  1 tablet 0     lidocaine (LIDODERM) 5 % patch Apply 1 patch topically daily as needed        lidocaine (LMX4) 4 % external cream Apply topically once as needed for mild pain. 60 g 0     LORazepam (ATIVAN) 0.5 MG tablet Take 1 tablet (0.5 mg) by mouth daily as needed for anxiety. 30 tablet 0     medication given by implanted intrathecal pump continuously. Concentrations:   Dilaudid 14 mg/ml                                                          Bupivacaine 28 mg/ml   Ziconitide 14 mcg/ml      Total Volume in Refill: 20 mL     Basal:   Dilaudid  7.6 mg/day                                                       Bupivacaine 15.2 mg/day   Ziconitide 7.6 mcg/day      PTM:  0.2 hydromorphone, 2 hour lockout, maximum 6 in a day.     Pump Reservoir Volume: 20 mL  Low Reservoir Alarm Date: unknown  Pump : [employer]. Model: [facility] II  Clinic Responsible for pump medications: [employer]  [phone]       melatonin 3 MG tablet Take 3 mg by mouth nightly as needed for sleep       metoprolol succinate ER (TOPROL XL) 25 MG 24 hr tablet Take 1 tablet (25 mg) by mouth every evening for 4 days. 4 tablet 0     Multiple Vitamins-Minerals (WOMENS MULTI PO) Take 1 tablet by mouth daily       ondansetron (ZOFRAN) 4 MG tablet Take 1 tablet (4 mg) by mouth every 6 hours as needed for nausea 20 tablet 1     polyethylene glycol (MIRALAX) 17 GM/Dose powder Take 17 g by mouth daily as needed for constipation        PROCTO-MED HC 2.5 % cream Place 1 Application rectally daily as needed for hemorrhoids or other (itch).         REVIEW OF SYSTEMS:  4 point ROS neg other than the symptoms noted above in the HPI.  Complains of nausea.        PHYSICAL EXAM:  BP (!) 150/72   Pulse 67   Temp 98.6  F (37  C)   Wt 56.7 kg (125 lb)   BMI 21.46 kg/m    Answered to name right away when knocking and opening her apartment door.  Reintroduced self as she did not look good - very tired, eye closed periodically to seem like she was trying to control from not vomiting which she admits that is how she felt.    Able to tip self back in electric wheelchair to elevate her legs.  She asked for a pillow behind her head.  Looked more comfortable when tipped back.    No respiratory distress.  No coughing.  Skin is pale, pink, warm and dry.    Heart rate regular and strong.    Trace edema in legs.    Hospital labs:  Component      Latest Ref [facility] 5/6/2025  11:30 AM   WBC      4.0 - 11.0 10e3/uL 7.6    RBC Count      3.80 - 5.20 10e6/uL 4.07    Hemoglobin      11.7 - 15.7 g/dL 12.4    Hematocrit      35.0 - 47.0 % 37.9    MCV      78 - 100 fL 93    MCH      26.5 - 33.0 pg 30.5    MCHC      31.5 - 36.5 g/dL 32.7    RDW      10.0 - 15.0 % 13.4    Platelet Count      150 - 450 10e3/uL 151        Component      Latest Ref Rn 5/6/2025  11:30 AM   Sodium      135 - 145 mmol/L 141    Potassium      3.4 - 5.3 mmol/L 4.2    Chloride      98 - 107  mmol/L 102    Carbon Dioxide (CO2)      22 - 29 mmol/L 27    Anion Gap      7 - 15 mmol/L 12    Urea Nitrogen      8.0 - 23.0 mg/dL 15.8    Creatinine      0.51 - 0.95 mg/dL 0.55    GFR Estimate      >60 mL/min/1.73m2 >90    Calcium      8.8 - 10.4 mg/dL 9.2    Glucose      70 - 99 mg/dL 95    CRP Inflammation      <5.00 mg/L 29.09 (H)        ASSESSMENT / PLAN:  (R19.5) Loose stools  (primary encounter diagnosis)  (Z93.3) Colostomy present (H)  (R11.0) Nausea  Comment: unusual for Annamarie to have loose stools per her words, she typically has constipation.  Stool coming out of her rectum is highly unusual as well.  Will start with a abdominal flat plate.  Worry if blocked somewhere with the constant nausea and now loose stools.  Will update the daughter with the x-ray and may have her see the provider as Annamarie has the nausea and now the leakage from her rectum.    Asked staff to utilize the Zofran today.  Will get labs on Friday despite her labs were perfect over that the ED.    (Z93.59) Suprapubic catheter (H)  (R33.9) Incomplete emptying of bladder  Comment: suggested by the DHS for a UA/UC to be done.  Typically [facility] has a company that they do a swab of the urine and get results that way.  Ok to send a sample over the hospital lab for processing as this NP will be gone the next few days and on call can see results.    Annamarie is pretty new to everyone and so trying to help figure out her behaviors in relation to her illnesses.    (F41.1) Anxiety reaction  Comment: has an order for Lorazepam 0.5mg twice a day.  Looking back at the history of use, it is used almost daily and sometimes twice a day.  It was suggested to try 1mg to see if that helped her be calmer as figured it was an anxiety attack that let her to the ED.  By description of staff, the higher dose froze her.    Will give an order not to give the bigger dose anymore and stay with the plan of the PRN dosing.    After the current problem, may work  with her to find something new or with current medications to control the anxiety better.       Orders:   Abdominal flat plate xray due to nausea and loose stools  Friday CBC, CMP due to malaise, nausea, loose stools  Do not give 1mg of Lorazepam anymore due to over sedation.  Keep with her PRN dose of Lorazepam.  Please collect a UA/UC properly from her suprapubic catheter and send to the hospital lab for processing - nausea and back pain    Electronically signed by  EMELI Garcia CNP            Sincerely,        EMELI Garcia CNP    Electronically signed

## 2025-05-07 NOTE — PROGRESS NOTES
[facility] GERIATRICS  ACUTE/EPISODIC VISIT    [facility] Medical Record Number:  [id number]  Place of Service where encounter took place:  [facility] [[id number]]    Chief Complaint   Patient presents with    RECHECK       HPI:    Anayeli Gagnon is a 74 year old  (1950), who is being seen today for an episodic care visit.  HPI information obtained from: facility chart records, facility staff, patient report, and [facility] chart review.    Today's concern is:    Diagnoses         Codes Comments      Loose stools    -  Primary R19.5       Colostomy present (H)     Z93.3       Nausea     R11.0       Suprapubic catheter (H)     Z93.59       Incomplete emptying of bladder     R33.9       Anxiety reaction     F41.1           Came into the building today and the director of health services stopped this NP to ask if Annamarie is being seen today.  Was not planning on seeing her but more issues than an anxiety episode occurring.      Staff working with Annamarie this morning and having a lot of loose stool in colostomy and coming from her rectum which is abnormal.      Staff also gave her 1mg of ativan last evening per direction from ER visit.  Way to much for her as she stopped talking and stiff looking.      Before seeing Annamarie and discussion with the [facility] decided to get an abdominal flat plate x-ray to see if blockage and any hint of why stool coming out of rectum.      Went up to to find Annamarie before lunch and she was up in her electric wheelchair in her apartment.  She did not look well nor did she feel well.  Complained of nausea which has gone on for 2-3 months now.  This NP saw her last week for an initial visit and did not mention this.  May have been that her sister was visiting as well and so in a good mood enjoying what was assumed Wine out on her deck.      Annamarie denied vomiting.  Looked very tired in her facial expression.  Did have her put on her pendant as  "she knew she had an order for Zofran and so wanted her to utilize the medication.  Meanwhile she asked for this NP to heat up the water in a cup for her.  She also had what looked like soup or broth in a cup on her stove ready to be heat up as well.  Full bowl and so assumed she did not touch that yet.      Explained to Annamarie what orders were written as well as labs.  Annamarie states she is usually constipated.  Did not ask her if she sees the bowel provider for her colostomy at all but did want to ask the daughter as called her after the visit.    Did find an aide about 10 minutes later and asked if she was working with Annamarie today and if she answered the call light to give a Zofran.  Wanted to know if Annamarie really put the pendant on or thought she did.  The aide stated she went in there and Annamarie wanted her to deal with her  for the phone.  \"Well I did that for her too\".  Asked if she gave her a Zofran and the aide stated she did not ask for that.  Informed the aide that if she could give her a Zofran that would be nice.  The aide stated when she brings her broth for lunch then she will do the Zofran.      Placed a call to the daughter and she called this NP back as leaving the building.  Explained what was found today with loose stool coming from her rectum that has been non-functional.  Asked if she see any provider for her ostomy and daughter stated usually yearly and thinking it may be soon she should see that person.  Explained to her that an x-ray will be done to see if any blockage noted to cause the loose stools.  Pending on the results, may have daughter call them to see if visit can be made or this NP can call the clinic as well.  Daughter confirmed that the nausea has been going on a few months now.          ALLERGIES:    Allergies   Allergen Reactions    Latex     Contrast Dye Rash     Painful rash after x-ray contrast    Nitrofurantoin Nausea and Vomiting        MEDICATIONS:  Post Discharge " Medication Reconciliation Status: patient was not discharged from an inpatient facility or TCU. No medications changes.    Current Outpatient Medications   Medication Sig Dispense Refill    acetaminophen (TYLENOL) 500 MG tablet Take 1,000 mg by mouth every 6 hours as needed for mild pain.      arginine (ARGINAID) PACK Take 1 packet by mouth daily.      aspirin (ASA) 325 MG EC tablet Take 1 tablet (325 mg) by mouth daily 30 tablet 11    atorvastatin (LIPITOR) 40 MG tablet Take 1 tablet (40 mg) by mouth every evening 30 tablet 3    baclofen (LIORESAL) 10 MG tablet Take 1 tablet (10 mg) by mouth 3 times daily.      busPIRone (BUSPAR) 15 MG tablet Take 1 tablet (15 mg) by mouth 3 times daily. 90 tablet 0    ciprofloxacin (CIPRO) 500 MG tablet Use first 10 tablet 0    fluconazole (DIFLUCAN) 150 MG tablet Use second 5 tablet 0    gabapentin (NEURONTIN) 600 MG tablet Take 1,200 mg by mouth 3 times daily  1 tablet 0    lidocaine (LIDODERM) 5 % patch Apply 1 patch topically daily as needed       lidocaine (LMX4) 4 % external cream Apply topically once as needed for mild pain. 60 g 0    LORazepam (ATIVAN) 0.5 MG tablet Take 1 tablet (0.5 mg) by mouth daily as needed for anxiety. 30 tablet 0    medication given by implanted intrathecal pump continuously. Concentrations:   Dilaudid 14 mg/ml                                                          Bupivacaine 28 mg/ml   Ziconitide 14 mcg/ml      Total Volume in Refill: 20 mL     Basal:   Dilaudid  7.6 mg/day                                                       Bupivacaine 15.2 mg/day   Ziconitide 7.6 mcg/day      PTM:  0.2 hydromorphone, 2 hour lockout, maximum 6 in a day.     Pump Reservoir Volume: 20 mL  Low Reservoir Alarm Date: unknown  Pump : [employer]. [facility]  Clinic Responsible for pump medications: [employer] [phone]      melatonin 3 MG tablet Take 3 mg by mouth nightly as needed for sleep      metoprolol succinate ER (TOPROL XL) 25 MG  24 hr tablet Take 1 tablet (25 mg) by mouth every evening for 4 days. 4 tablet 0    Multiple Vitamins-Minerals (WOMENS MULTI PO) Take 1 tablet by mouth daily      ondansetron (ZOFRAN) 4 MG tablet Take 1 tablet (4 mg) by mouth every 6 hours as needed for nausea 20 tablet 1    polyethylene glycol (MIRALAX) 17 GM/Dose powder Take 17 g by mouth daily as needed for constipation       PROCTO-MED HC 2.5 % cream Place 1 Application rectally daily as needed for hemorrhoids or other (itch).         REVIEW OF SYSTEMS:  4 point ROS neg other than the symptoms noted above in the HPI.  Complains of nausea.        PHYSICAL EXAM:  BP (!) 150/72   Pulse 67   Temp 98.6  F (37  C)   Wt 56.7 kg (125 lb)   BMI 21.46 kg/m    Answered to name right away when knocking and opening her apartment door.  Reintroduced self as she did not look good - very tired, eye closed periodically to seem like she was trying to control from not vomiting which she admits that is how she felt.    Able to tip self back in electric wheelchair to elevate her legs.  She asked for a pillow behind her head.  Looked more comfortable when tipped back.    No respiratory distress.  No coughing.  Skin is pale, pink, warm and dry.    Heart rate regular and strong.    Trace edema in legs.    Hospital labs:  Component      Latest Ref [facility] 5/6/2025  11:30 AM   WBC      4.0 - 11.0 10e3/uL 7.6    RBC Count      3.80 - 5.20 10e6/uL 4.07    Hemoglobin      11.7 - 15.7 g/dL 12.4    Hematocrit      35.0 - 47.0 % 37.9    MCV      78 - 100 fL 93    MCH      26.5 - 33.0 pg 30.5    MCHC      31.5 - 36.5 g/dL 32.7    RDW      10.0 - 15.0 % 13.4    Platelet Count      150 - 450 10e3/uL 151        Component      Latest Ref [facility] 5/6/2025  11:30 AM   Sodium      135 - 145 mmol/L 141    Potassium      3.4 - 5.3 mmol/L 4.2    Chloride      98 - 107 mmol/L 102    Carbon Dioxide (CO2)      22 - 29 mmol/L 27    Anion Gap      7 - 15 mmol/L 12    Urea Nitrogen      8.0 - 23.0 mg/dL 15.8     Creatinine      0.51 - 0.95 mg/dL 0.55    GFR Estimate      >60 mL/min/1.73m2 >90    Calcium      8.8 - 10.4 mg/dL 9.2    Glucose      70 - 99 mg/dL 95    CRP Inflammation      <5.00 mg/L 29.09 (H)        ASSESSMENT / PLAN:  (R19.5) Loose stools  (primary encounter diagnosis)  (Z93.3) Colostomy present (H)  (R11.0) Nausea  Comment: unusual for Annamarie to have loose stools per her words, she typically has constipation.  Stool coming out of her rectum is highly unusual as well.  Will start with a abdominal flat plate.  Worry if blocked somewhere with the constant nausea and now loose stools.  Will update the daughter with the x-ray and may have her see the provider as Annamarie has the nausea and now the leakage from her rectum.    Asked staff to utilize the Zofran today.  Will get labs on Friday despite her labs were perfect over that the ED.    (Z93.59) Suprapubic catheter (H)  (R33.9) Incomplete emptying of bladder  Comment: suggested by the DHS for a UA/UC to be done.  Typically [facility] has a company that they do a swab of the urine and get results that way.  Ok to send a sample over the hospital lab for processing as this NP will be gone the next few days and on call can see results.    Annamarie is pretty new to everyone and so trying to help figure out her behaviors in relation to her illnesses.    (F41.1) Anxiety reaction  Comment: has an order for Lorazepam 0.5mg twice a day.  Looking back at the history of use, it is used almost daily and sometimes twice a day.  It was suggested to try 1mg to see if that helped her be calmer as figured it was an anxiety attack that let her to the ED.  By description of staff, the higher dose froze her.    Will give an order not to give the bigger dose anymore and stay with the plan of the PRN dosing.    After the current problem, may work with her to find something new or with current medications to control the anxiety better.       Orders:   Abdominal flat plate xray due to  nausea and loose stools  Friday CBC, CMP due to malaise, nausea, loose stools  Do not give 1mg of Lorazepam anymore due to over sedation.  Keep with her PRN dose of Lorazepam.  Please collect a UA/UC properly from her suprapubic catheter and send to the hospital lab for processing - nausea and back pain    Electronically signed by  EMELI Garcia CNP

## 2025-05-08 ENCOUNTER — LAB REQUISITION (OUTPATIENT)
Dept: LAB | Facility: CLINIC | Age: 75
End: 2025-05-08
Payer: MEDICARE

## 2025-05-08 DIAGNOSIS — R11.0 NAUSEA: ICD-10-CM

## 2025-05-08 DIAGNOSIS — M54.9 DORSALGIA, UNSPECIFIED: ICD-10-CM

## 2025-05-08 DIAGNOSIS — R53.81 OTHER MALAISE: ICD-10-CM

## 2025-05-08 DIAGNOSIS — R19.7 DIARRHEA, UNSPECIFIED: ICD-10-CM

## 2025-05-08 LAB
ALBUMIN UR-MCNC: 100 MG/DL
APPEARANCE UR: ABNORMAL
BILIRUB UR QL STRIP: NEGATIVE
COLOR UR AUTO: ABNORMAL
GLUCOSE UR STRIP-MCNC: NEGATIVE MG/DL
HGB UR QL STRIP: ABNORMAL
KETONES UR STRIP-MCNC: ABNORMAL MG/DL
LEUKOCYTE ESTERASE UR QL STRIP: ABNORMAL
NITRATE UR QL: NEGATIVE
PH UR STRIP: 8.5 [PH] (ref 5–7)
SP GR UR STRIP: 1.02 (ref 1–1.03)
UROBILINOGEN UR STRIP-MCNC: NORMAL MG/DL

## 2025-05-08 PROCEDURE — 87086 URINE CULTURE/COLONY COUNT: CPT | Mod: ORL | Performed by: NURSE PRACTITIONER

## 2025-05-08 PROCEDURE — 81003 URINALYSIS AUTO W/O SCOPE: CPT | Mod: ORL | Performed by: NURSE PRACTITIONER

## 2025-05-09 ENCOUNTER — RESULTS FOLLOW-UP (OUTPATIENT)
Dept: GERIATRICS | Facility: CLINIC | Age: 75
End: 2025-05-09

## 2025-05-09 LAB
ALBUMIN SERPL BCG-MCNC: 3.5 G/DL (ref 3.5–5.2)
ALP SERPL-CCNC: 57 U/L (ref 40–150)
ALT SERPL W P-5'-P-CCNC: 10 U/L (ref 0–50)
ANION GAP SERPL CALCULATED.3IONS-SCNC: 5 MMOL/L (ref 7–15)
AST SERPL W P-5'-P-CCNC: 18 U/L (ref 0–45)
BASOPHILS # BLD AUTO: 0.1 10E3/UL (ref 0–0.2)
BASOPHILS NFR BLD AUTO: 1 %
BILIRUB SERPL-MCNC: 0.2 MG/DL
BUN SERPL-MCNC: 15.5 MG/DL (ref 8–23)
CALCIUM SERPL-MCNC: 8.9 MG/DL (ref 8.8–10.4)
CHLORIDE SERPL-SCNC: 105 MMOL/L (ref 98–107)
CREAT SERPL-MCNC: 0.62 MG/DL (ref 0.51–0.95)
EGFRCR SERPLBLD CKD-EPI 2021: >90 ML/MIN/1.73M2
EOSINOPHIL # BLD AUTO: 0.4 10E3/UL (ref 0–0.7)
EOSINOPHIL NFR BLD AUTO: 8 %
ERYTHROCYTE [DISTWIDTH] IN BLOOD BY AUTOMATED COUNT: 13.4 % (ref 10–15)
GLUCOSE SERPL-MCNC: 101 MG/DL (ref 70–99)
HCO3 SERPL-SCNC: 33 MMOL/L (ref 22–29)
HCT VFR BLD AUTO: 32.3 % (ref 35–47)
HGB BLD-MCNC: 10.7 G/DL (ref 11.7–15.7)
IMM GRANULOCYTES # BLD: 0 10E3/UL
IMM GRANULOCYTES NFR BLD: 0 %
LYMPHOCYTES # BLD AUTO: 1.5 10E3/UL (ref 0.8–5.3)
LYMPHOCYTES NFR BLD AUTO: 29 %
MCH RBC QN AUTO: 30.9 PG (ref 26.5–33)
MCHC RBC AUTO-ENTMCNC: 33.1 G/DL (ref 31.5–36.5)
MCV RBC AUTO: 93 FL (ref 78–100)
MONOCYTES # BLD AUTO: 0.5 10E3/UL (ref 0–1.3)
MONOCYTES NFR BLD AUTO: 9 %
NEUTROPHILS # BLD AUTO: 2.8 10E3/UL (ref 1.6–8.3)
NEUTROPHILS NFR BLD AUTO: 53 %
NRBC # BLD AUTO: 0 10E3/UL
NRBC BLD AUTO-RTO: 0 /100
PLATELET # BLD AUTO: 153 10E3/UL (ref 150–450)
POTASSIUM SERPL-SCNC: 3.6 MMOL/L (ref 3.4–5.3)
PROT SERPL-MCNC: 5.5 G/DL (ref 6.4–8.3)
RBC # BLD AUTO: 3.46 10E6/UL (ref 3.8–5.2)
SODIUM SERPL-SCNC: 143 MMOL/L (ref 135–145)
WBC # BLD AUTO: 5.3 10E3/UL (ref 4–11)

## 2025-05-09 PROCEDURE — 36415 COLL VENOUS BLD VENIPUNCTURE: CPT | Mod: ORL | Performed by: NURSE PRACTITIONER

## 2025-05-09 PROCEDURE — 80053 COMPREHEN METABOLIC PANEL: CPT | Mod: ORL | Performed by: NURSE PRACTITIONER

## 2025-05-09 PROCEDURE — 85025 COMPLETE CBC W/AUTO DIFF WBC: CPT | Mod: ORL | Performed by: NURSE PRACTITIONER

## 2025-05-09 PROCEDURE — P9604 ONE-WAY ALLOW PRORATED TRIP: HCPCS | Mod: ORL | Performed by: NURSE PRACTITIONER

## 2025-05-10 LAB — BACTERIA UR CULT: NORMAL

## 2025-05-14 ENCOUNTER — ASSISTED LIVING VISIT (OUTPATIENT)
Dept: GERIATRICS | Facility: CLINIC | Age: 75
End: 2025-05-14
Payer: MEDICARE

## 2025-05-14 VITALS
DIASTOLIC BLOOD PRESSURE: 51 MMHG | HEART RATE: 65 BPM | SYSTOLIC BLOOD PRESSURE: 121 MMHG | TEMPERATURE: 97.9 F | RESPIRATION RATE: 16 BRPM | WEIGHT: 130.5 LBS | BODY MASS INDEX: 22.4 KG/M2

## 2025-05-14 NOTE — LETTER
5/14/2025      Anayeli Gagnon  [facility]  [address]  [facility] [place] [facility] [address]        No notes on file      Sincerely,        EMELI Garcia CNP    Electronically signed

## 2025-05-14 NOTE — PROGRESS NOTES
[facility] GERIATRICS  ACUTE/EPISODIC VISIT    [facility] Medical Record Number:  [id number]  Place of Service where encounter took place:  [facility] ([facility] [[id number]]    Chief Complaint   Patient presents with    RECHECK       HPI:    Anayeli Gagnon is a 74 year old  (1950), who is being seen today for an episodic care visit.  HPI information obtained from: {[facility] HPI:094278}.    Today's concern is:      ALLERGIES:    Allergies   Allergen Reactions    Latex     Contrast Dye Rash     Painful rash after x-ray contrast    Nitrofurantoin Nausea and Vomiting        MEDICATIONS:  Post Discharge Medication Reconciliation Status: {ACO Med Rec (Provider):765402}. ***    Current Outpatient Medications   Medication Sig Dispense Refill    acetaminophen (TYLENOL) 500 MG tablet Take 1,000 mg by mouth every 6 hours as needed for mild pain.      arginine (ARGINAID) PACK Take 1 packet by mouth daily.      aspirin (ASA) 325 MG EC tablet Take 1 tablet (325 mg) by mouth daily 30 tablet 11    atorvastatin (LIPITOR) 40 MG tablet Take 1 tablet (40 mg) by mouth every evening 30 tablet 3    baclofen (LIORESAL) 10 MG tablet Take 1 tablet (10 mg) by mouth 3 times daily.      busPIRone (BUSPAR) 15 MG tablet Take 1 tablet (15 mg) by mouth 3 times daily. 90 tablet 0    ciprofloxacin (CIPRO) 500 MG tablet Use first 10 tablet 0    fluconazole (DIFLUCAN) 150 MG tablet Use second 5 tablet 0    gabapentin (NEURONTIN) 600 MG tablet Take 1,200 mg by mouth 3 times daily  1 tablet 0    lidocaine (LIDODERM) 5 % patch Apply 1 patch topically daily as needed       lidocaine (LMX4) 4 % external cream Apply topically once as needed for mild pain. 60 g 0    LORazepam (ATIVAN) 0.5 MG tablet Take 1 tablet (0.5 mg) by mouth daily as needed for anxiety. 30 tablet 0    medication given by implanted intrathecal pump continuously. Concentrations:   Dilaudid 14 mg/ml                                                           Bupivacaine 28 mg/ml   Ziconitide 14 mcg/ml      Total Volume in Refill: 20 mL     Basal:   Dilaudid  7.6 mg/day                                                       Bupivacaine 15.2 mg/day   Ziconitide 7.6 mcg/day      PTM:  0.2 hydromorphone, 2 hour lockout, maximum 6 in a day.     Pump Reservoir Volume: 20 mL  Low Reservoir Alarm Date: unknown  Pump : InvenQuery. Model: SynchroMed II  Clinic Responsible for pump medications: [facility] [phone]      melatonin 3 MG tablet Take 3 mg by mouth nightly as needed for sleep      metoprolol succinate ER (TOPROL XL) 25 MG 24 hr tablet Take 1 tablet (25 mg) by mouth every evening for 4 days. 4 tablet 0    Multiple Vitamins-Minerals (WOMENS MULTI PO) Take 1 tablet by mouth daily      ondansetron (ZOFRAN) 4 MG tablet Take 1 tablet (4 mg) by mouth every 6 hours as needed for nausea 20 tablet 1    polyethylene glycol (MIRALAX) 17 GM/Dose powder Take 17 g by mouth daily as needed for constipation       PROCTO-MED HC 2.5 % cream Place 1 Application rectally daily as needed for hemorrhoids or other (itch).       Medications reviewed:  Medications reconciled to facility chart and changes were made to reflect current medications as identified as above med list. Below are the changes that were made:   Medications stopped since last EPIC medication reconciliation:   There are no discontinued medications.    Medications started since last [facility] medication reconciliation:  No orders of the defined types were placed in this encounter.    ***    REVIEW OF SYSTEMS:  4 point ROS neg other than the symptoms noted above in the HPI.***  Unable to be obtained due to cognitive impairment or aphasia.   ROS    PHYSICAL EXAM:  /51   Pulse 65   Temp 97.9  F (36.6  C)   Resp 16   Wt 59.2 kg (130 lb 8 oz)   BMI 22.40 kg/m    Physical Exam      ASSESSMENT / PLAN:  {[facility] DX:759102}    Orders:  ***    Electronically signed by  Cata Monteiro

## 2025-05-21 ENCOUNTER — ASSISTED LIVING VISIT (OUTPATIENT)
Dept: GERIATRICS | Facility: CLINIC | Age: 75
End: 2025-05-21
Payer: MEDICARE

## 2025-05-21 DIAGNOSIS — S24.103A SPINAL CORD INJURY AT T7-T12 LEVEL (H): ICD-10-CM

## 2025-05-21 DIAGNOSIS — S24.151A: Primary | ICD-10-CM

## 2025-05-21 DIAGNOSIS — Z79.899 ON HIGH DOSE OPIOID DRUG THERAPY: ICD-10-CM

## 2025-05-21 DIAGNOSIS — M79.2 NEUROPATHIC PAIN: ICD-10-CM

## 2025-05-21 PROCEDURE — 99349 HOME/RES VST EST MOD MDM 40: CPT | Performed by: NURSE PRACTITIONER

## 2025-05-21 RX ORDER — TRAMADOL HYDROCHLORIDE 50 MG/1
25 TABLET ORAL EVERY 4 HOURS PRN
Qty: 20 TABLET | Refills: 0 | Status: SHIPPED | OUTPATIENT
Start: 2025-05-21

## 2025-05-21 NOTE — LETTER
5/21/2025      [facility]  [address]  [place] [address]        [facility] GERIATRICS  ACUTE/EPISODIC VISIT    [facility] Medical Record Number:  [id number]  Place of Service where encounter took place:  [facility] [[id number]]    Chief Complaint   Patient presents with     Pain       HPI:    Anayeli Gagnon is a 74 year old  (1950), who is being seen today for an episodic care visit.  HPI information obtained from: facility chart records, facility staff, patient report, and [facility] chart review.    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  Annamarie does have a pain pump that is managed through the pain clinic out of [facility].  She is on Tylenol that she manages herself and states that she takes it 3-4 times a day, receives gabapentin regularly as well    Found Annamarie in her apartment after lunch.  She was up in her electric wheelchair and moving around trying to do some sewing projects as she is working on a quilt.  Complemented her on the [employer] project and glad that she was doing something that could distract her from her pain.  She agrees that it can be a distraction but sometimes her pain is overwhelming she just cannot do any projects.    Discussed with her her current pain regimen.  Asked if she knew from the pain clinic if there was room to move with any doses of her medication that she receives via the pain pump.  She stated that she was told there is room to move with an increase.  Try to get a feel from her how often  she goes to the clinic or how does she managed to get there.  Sounds like she does most of the management herself for making arrangements of getting there and making appointments.    The  of health services did offer today to call the pain clinic and discussed with them what is going on.  Offered this to Annamarie and she was accepting of staff advocating for her on her behalf to the pain clinic.  Nursing should be able to get that phone number and then reach out to them.    Meanwhile discussed an alternative for Annamarie.  Asked her if she is ever heard of tramadol.  Annamarie asked about more information in regards to this medication.  Explained to her that it is like a step above Tylenol but not as strong as oxycodone, Dilaudid etc.  This could be something she could certainly ask for during times of high pain levels.  Would start her out on a 25 mg dose every 4 hours as needed as this would allow staff and this nurse practitioner to monitor how well she tolerates it.  She is a smaller person and so figure 50 mg to be a little too much for her to start out.  Annamarie was very interested in having this started for her so at least she has an alternative and not feel like she going crazy without something extra to take.    ALLERGIES:    Allergies   Allergen Reactions     Latex      Contrast Dye Rash     Painful rash after x-ray contrast     Nitrofurantoin Nausea and Vomiting        MEDICATIONS:  Post Discharge Medication Reconciliation Status: patient was not discharged from an inpatient facility or TCU.       Current Outpatient Medications   Medication Sig Dispense Refill     naloxone (NARCAN) 4 MG/0.1ML nasal spray Spray 1 spray (4 mg) into one nostril alternating nostrils as needed for opioid reversal. every 2-3 minutes until assistance arrives 2 each 1     traMADol (ULTRAM) 50 MG tablet Take 0.5 tablets (25 mg) by mouth every 4 hours as needed for severe pain. 20 tablet 0     acetaminophen (TYLENOL) 500 MG  tablet Take 1,000 mg by mouth every 6 hours as needed for mild pain.       arginine (ARGINAID) PACK Take 1 packet by mouth daily.       aspirin (ASA) 325 MG EC tablet Take 1 tablet (325 mg) by mouth daily 30 tablet 11     atorvastatin (LIPITOR) 40 MG tablet Take 1 tablet (40 mg) by mouth every evening 30 tablet 3     baclofen (LIORESAL) 10 MG tablet Take 1 tablet (10 mg) by mouth 3 times daily.       busPIRone (BUSPAR) 15 MG tablet Take 1 tablet (15 mg) by mouth 3 times daily. 90 tablet 0     gabapentin (NEURONTIN) 600 MG tablet Take 1,200 mg by mouth 3 times daily  1 tablet 0     lidocaine (LIDODERM) 5 % patch Apply 1 patch topically daily as needed        lidocaine (LMX4) 4 % external cream Apply topically once as needed for mild pain. 60 g 0     LORazepam (ATIVAN) 0.5 MG tablet Take 1 tablet (0.5 mg) by mouth daily as needed for anxiety. 30 tablet 0     medication given by implanted intrathecal pump continuously. Concentrations:   Dilaudid 14 mg/ml                                                          Bupivacaine 28 mg/ml   Ziconitide 14 mcg/ml      Total Volume in Refill: 20 mL     Basal:   Dilaudid  7.6 mg/day                                                       Bupivacaine 15.2 mg/day   Ziconitide 7.6 mcg/day      PTM:  0.2 hydromorphone, 2 hour lockout, maximum 6 in a day.     Pump Reservoir Volume: 20 mL  Low Reservoir Alarm Date: unknown  Pump : SqueezeCMM. Model: SynchroMed II  Clinic Responsible for pump medications: [facility] [phone]       melatonin 3 MG tablet Take 3 mg by mouth nightly as needed for sleep       metoprolol succinate ER (TOPROL XL) 25 MG 24 hr tablet Take 1 tablet (25 mg) by mouth every evening for 4 days. 4 tablet 0     Multiple Vitamins-Minerals (WOMENS MULTI PO) Take 1 tablet by mouth daily       ondansetron (ZOFRAN) 4 MG tablet Take 1 tablet (4 mg) by mouth every 6 hours as needed for nausea 20 tablet 1     polyethylene glycol (MIRALAX) 17 GM/Dose powder Take  17 g by mouth daily as needed for constipation        PROCTO-MED HC 2.5 % cream Place 1 Application rectally daily as needed for hemorrhoids or other (itch).       sennosides (SENOKOT) 8.6 MG tablet Take 1 tablet by mouth daily.       Medications reviewed:  Medications reconciled to facility chart and changes were made to reflect current medications as identified as above med list. Below are the changes that were made:   Medications stopped since last EPIC medication reconciliation:   There are no discontinued medications.    Medications started since last [facility] medication reconciliation:  Orders Placed This Encounter   Medications     traMADol (ULTRAM) 50 MG tablet     Sig: Take 0.5 tablets (25 mg) by mouth every 4 hours as needed for severe pain.     Dispense:  20 tablet     Refill:  0     naloxone (NARCAN) 4 MG/0.1ML nasal spray     Sig: Spray 1 spray (4 mg) into one nostril alternating nostrils as needed for opioid reversal. every 2-3 minutes until assistance arrives     Dispense:  2 each     Refill:  1         REVIEW OF SYSTEMS:  4 point ROS neg other than the symptoms noted above in the HPI.  Denied any chest pain or shortness of breath.  No stomach problems either      PHYSICAL EXAM:  /51   Pulse 65   Temp 97.9  F (36.6  C)   Resp 16   Wt 59.2 kg (130 lb 8 oz)   BMI 22.40 kg/m    Annamarie is up in her electric wheelchair that she is able to maneuver herself.  Had seen her prior to coming out of the dining room and going down the hallway as discussed that she will be seen in her apartment.    Do notice that her demeanor is calmer each time this nurse practitioner sees her as she is recognizing myself as her provider.  The first time meeting her she was anxious and was jumping to conclusions without listening to details..    Skin is pink, warm, and dry  She is alert and oriented to person and place.  She does know time but that can be vague  Heart rate is regular and strong.  No edema in her lower  extremities  Lungs are clear to auscultation with good expansion  Do not see any tremors in her extremities especially as she is handling objects in order to clearway a seat for this nurse practitioner to sit down.    Annamarie is dependent on staff to assist with dressing, full mechanical lift is used for transfers, assist with bathing, medication management      ASSESSMENT / PLAN:  1. 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 Spinal cord injury at T7-T12 level (H)    3. Neuropathic pain    4. On high dose opioid drug therapy      Since He is newer to this nurse practitioners care, cautious about her pain management.  Reviewed her medication list that this nurse practitioner printed out today.  Feel pretty comfortable with ordering as needed tramadol for her.  Sent a prescription over to [facility] pharmacy for them to deliver the tramadol    Spoke wit the  of health services and also wrote it as a order for nursing to reach out to the clinic to talk to them about Annamarie's current level of discomfort and if they would want to see her sometime soon.  They can also let the clinic know that this nurse practitioner started her on as needed tramadol with watching for side effects.    Orders:  Tramadol 50mg - give 1/2 tab (25mg) po every 4 hours PRN for pain  Please reach out to [facility] in [place] to discuss or update them on her pain 8/10.    Electronically signed by  EMELI Garcia CNP             Sincerely,        EMELI Garcia CNP    Electronically signed

## 2025-05-22 ENCOUNTER — TELEPHONE (OUTPATIENT)
Dept: INTERNAL MEDICINE | Facility: CLINIC | Age: 75
End: 2025-05-22
Payer: MEDICARE

## 2025-05-22 VITALS
SYSTOLIC BLOOD PRESSURE: 121 MMHG | RESPIRATION RATE: 16 BRPM | TEMPERATURE: 97.9 F | WEIGHT: 130.5 LBS | DIASTOLIC BLOOD PRESSURE: 51 MMHG | HEART RATE: 65 BPM | BODY MASS INDEX: 22.4 KG/M2

## 2025-05-22 DIAGNOSIS — Z53.9 DIAGNOSIS NOT YET DEFINED: Primary | ICD-10-CM

## 2025-05-22 PROCEDURE — G0179 MD RECERTIFICATION HHA PT: HCPCS | Performed by: INTERNAL MEDICINE

## 2025-05-22 NOTE — PROGRESS NOTES
[facility] GERIATRICS  ACUTE/EPISODIC VISIT    [facility] Medical Record Number:  [id number]  Place of Service where encounter took place:  [facility] ([facility] [[id number]]    Chief Complaint   Patient presents with    Pain       HPI:    Anayeli Gagnon is a 74 year old  (1950), who is being seen today for an episodic care visit.  HPI information obtained from: facility chart records, facility staff, patient report, and [facility] chart review.    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  Annamarie does have a pain pump that is managed through the pain clinic out of [facility].  She is on Tylenol that she manages herself and states that she takes it 3-4 times a day, receives gabapentin regularly as well    Found Annamarie in her apartment after lunch.  She was up in her electric wheelchair and moving around trying to do some sewing projects as she is working on a quilt.  Complemented her on the [employer] project and glad that she was doing something that could distract her from her pain.  She agrees that it can be a distraction but sometimes her pain is overwhelming she just cannot do any projects.    Discussed with her her current pain regimen.  Asked if she knew from the pain clinic if there was room to move with any doses of her medication that she receives via the pain pump.  She stated that she was told there is room to move with an increase.  Try to get a feel from her how often she goes to the clinic or how does she managed to get there.  Sounds like she does most of the management herself for  making arrangements of getting there and making appointments.    The  of health services did offer today to call the pain clinic and discussed with them what is going on.  Offered this to Annamarie and she was accepting of staff advocating for her on her behalf to the pain clinic.  Nursing should be able to get that phone number and then reach out to them.    Meanwhile discussed an alternative for Annamarie.  Asked her if she is ever heard of tramadol.  Annamarie asked about more information in regards to this medication.  Explained to her that it is like a step above Tylenol but not as strong as oxycodone, Dilaudid etc.  This could be something she could certainly ask for during times of high pain levels.  Would start her out on a 25 mg dose every 4 hours as needed as this would allow staff and this nurse practitioner to monitor how well she tolerates it.  She is a smaller person and so figure 50 mg to be a little too much for her to start out.  Annamarie was very interested in having this started for her so at least she has an alternative and not feel like she going crazy without something extra to take.    ALLERGIES:    Allergies   Allergen Reactions    Latex     Contrast Dye Rash     Painful rash after x-ray contrast    Nitrofurantoin Nausea and Vomiting        MEDICATIONS:  Post Discharge Medication Reconciliation Status: patient was not discharged from an inpatient facility or TCU.       Current Outpatient Medications   Medication Sig Dispense Refill    naloxone (NARCAN) 4 MG/0.1ML nasal spray Spray 1 spray (4 mg) into one nostril alternating nostrils as needed for opioid reversal. every 2-3 minutes until assistance arrives 2 each 1    traMADol (ULTRAM) 50 MG tablet Take 0.5 tablets (25 mg) by mouth every 4 hours as needed for severe pain. 20 tablet 0    acetaminophen (TYLENOL) 500 MG tablet Take 1,000 mg by mouth every 6 hours as needed for mild pain.      arginine (ARGINAID) PACK Take 1 packet by mouth  daily.      aspirin (ASA) 325 MG EC tablet Take 1 tablet (325 mg) by mouth daily 30 tablet 11    atorvastatin (LIPITOR) 40 MG tablet Take 1 tablet (40 mg) by mouth every evening 30 tablet 3    baclofen (LIORESAL) 10 MG tablet Take 1 tablet (10 mg) by mouth 3 times daily.      busPIRone (BUSPAR) 15 MG tablet Take 1 tablet (15 mg) by mouth 3 times daily. 90 tablet 0    gabapentin (NEURONTIN) 600 MG tablet Take 1,200 mg by mouth 3 times daily  1 tablet 0    lidocaine (LIDODERM) 5 % patch Apply 1 patch topically daily as needed       lidocaine (LMX4) 4 % external cream Apply topically once as needed for mild pain. 60 g 0    LORazepam (ATIVAN) 0.5 MG tablet Take 1 tablet (0.5 mg) by mouth daily as needed for anxiety. 30 tablet 0    medication given by implanted intrathecal pump continuously. Concentrations:   Dilaudid 14 mg/ml                                                          Bupivacaine 28 mg/ml   Ziconitide 14 mcg/ml      Total Volume in Refill: 20 mL     Basal:   Dilaudid  7.6 mg/day                                                       Bupivacaine 15.2 mg/day   Ziconitide 7.6 mcg/day      PTM:  0.2 hydromorphone, 2 hour lockout, maximum 6 in a day.     Pump Reservoir Volume: 20 mL  Low Reservoir Alarm Date: unknown  Pump : OnForce. Model: [employer] II  Clinic Responsible for pump medications: [facility] [phone]      melatonin 3 MG tablet Take 3 mg by mouth nightly as needed for sleep      metoprolol succinate ER (TOPROL XL) 25 MG 24 hr tablet Take 1 tablet (25 mg) by mouth every evening for 4 days. 4 tablet 0    Multiple Vitamins-Minerals (WOMENS MULTI PO) Take 1 tablet by mouth daily      ondansetron (ZOFRAN) 4 MG tablet Take 1 tablet (4 mg) by mouth every 6 hours as needed for nausea 20 tablet 1    polyethylene glycol (MIRALAX) 17 GM/Dose powder Take 17 g by mouth daily as needed for constipation       PROCTO-MED HC 2.5 % cream Place 1 Application rectally daily as needed for  hemorrhoids or other (itch).      sennosides (SENOKOT) 8.6 MG tablet Take 1 tablet by mouth daily.       Medications reviewed:  Medications reconciled to facility chart and changes were made to reflect current medications as identified as above med list. Below are the changes that were made:   Medications stopped since last EPIC medication reconciliation:   There are no discontinued medications.    Medications started since last [facility] medication reconciliation:  Orders Placed This Encounter   Medications    traMADol (ULTRAM) 50 MG tablet     Sig: Take 0.5 tablets (25 mg) by mouth every 4 hours as needed for severe pain.     Dispense:  20 tablet     Refill:  0    naloxone (NARCAN) 4 MG/0.1ML nasal spray     Sig: Spray 1 spray (4 mg) into one nostril alternating nostrils as needed for opioid reversal. every 2-3 minutes until assistance arrives     Dispense:  2 each     Refill:  1         REVIEW OF SYSTEMS:  4 point ROS neg other than the symptoms noted above in the HPI.  Denied any chest pain or shortness of breath.  No stomach problems either      PHYSICAL EXAM:  /51   Pulse 65   Temp 97.9  F (36.6  C)   Resp 16   Wt 59.2 kg (130 lb 8 oz)   BMI 22.40 kg/m    Annamarie is up in her electric wheelchair that she is able to maneuver herself.  Had seen her prior to coming out of the dining room and going down the hallway as discussed that she will be seen in her apartment.    Do notice that her demeanor is calmer each time this nurse practitioner sees her as she is recognizing myself as her provider.  The first time meeting her she was anxious and was jumping to conclusions without listening to details..    Skin is pink, warm, and dry  She is alert and oriented to person and place.  She does know time but that can be vague  Heart rate is regular and strong.  No edema in her lower extremities  Lungs are clear to auscultation with good expansion  Do not see any tremors in her extremities especially as she is handling  objects in order to clearway a seat for this nurse practitioner to sit down.    Annamarie is dependent on staff to assist with dressing, full mechanical lift is used for transfers, assist with bathing, medication management      ASSESSMENT / PLAN:  1. 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 Spinal cord injury at T7-T12 level (H)    3. Neuropathic pain    4. On high dose opioid drug therapy      Since He is newer to this nurse practitioners care, cautious about her pain management.  Reviewed her medication list that this nurse practitioner printed out today.  Feel pretty comfortable with ordering as needed tramadol for her.  Sent a prescription over to [facility] pharmacy for them to deliver the tramadol    Spoke wit the  of health services and also wrote it as a order for nursing to reach out to the clinic to talk to them about Annamarie's current level of discomfort and if they would want to see her sometime soon.  They can also let the clinic know that this nurse practitioner started her on as needed tramadol with watching for side effects.    Orders:  Tramadol 50mg - give 1/2 tab (25mg) po every 4 hours PRN for pain  Please reach out to [facility] in [place] to discuss or update them on her pain 8/10.    Electronically signed by  EMELI Garcia CNP

## 2025-05-22 NOTE — TELEPHONE ENCOUNTER
Reason for Call:  Form, our goal is to have forms completed with 72 hours, however, some forms may require a visit or additional information.    Type of letter, form or note:  Home Health Certification    Who is the form from?: Home care    Where did the form come from: form was faxed in    What clinic location was the form placed at?: [facility]    Where the form was placed: Given to physician    What number is listed as a contact on the form?: [phone]       Additional comments: Tr[place]i    Call taken on 5/22/2025 at 3:51 PM by Ayanna Garcia

## 2025-05-27 ENCOUNTER — VIRTUAL VISIT (OUTPATIENT)
Dept: ANESTHESIOLOGY | Facility: CLINIC | Age: 75
End: 2025-05-27
Payer: MEDICARE

## 2025-05-27 ENCOUNTER — TELEPHONE (OUTPATIENT)
Dept: GERIATRICS | Facility: CLINIC | Age: 75
End: 2025-05-27

## 2025-05-27 ENCOUNTER — TELEPHONE (OUTPATIENT)
Dept: PALLIATIVE MEDICINE | Facility: CLINIC | Age: 75
End: 2025-05-27

## 2025-05-27 DIAGNOSIS — Z97.8 PRESENCE OF INTRATHECAL PUMP: Primary | ICD-10-CM

## 2025-05-27 NOTE — LETTER
5/27/2025       RE: Anayeli Gagnon  [facility]  [address]  [place] [address]     Dear Colleague,    Thank you for referring your patient, Anayeli Gagnon, to the [facility] FOR [facility]NA[facility] [place] at [facility]. Please see a copy of my visit note below.    Patient unable to connect to video visit and was advised by clinic staff to reschedule the appointment.     Santa Wallace DNP, APRN, AGNP-C  [facility]       Again, thank you for allowing me to participate in the care of your patient.      Sincerely,    EMELI Jerez CNP

## 2025-05-27 NOTE — TELEPHONE ENCOUNTER
Information noted. We will discuss at appointment later today.     Santa Wallace DNP, APRN, AGNP-C  [facility]

## 2025-05-27 NOTE — TELEPHONE ENCOUNTER
RN spoke with patient to discuss pain. Patient is requesting pain pump adjustments. She reports using 5-6 of her PTM daily with only 1 hour of relief. She reports her pain is an 8/10. Writer was able to schedule a follow up today at 4pm for a video visit. Patient confirmed and appreciated the call back.    Alia Reyes RNCC

## 2025-05-27 NOTE — TELEPHONE ENCOUNTER
RN calling from [facility]. Requesting that pain pump be increased as patient's pain is rating as 8/10 consistently. Inquiring about who manages pain pump. Transferred call to [facility] [place], but will route encounter to team to reach out if needed as well.     Deborah Delgado, RN, BSN

## 2025-05-27 NOTE — PROGRESS NOTES
Patient unable to connect to video visit and was advised by clinic staff to reschedule the appointment.     Santa Wallace DNP, APRN, AGNP-C  [facility]

## 2025-05-27 NOTE — PROGRESS NOTES
"Video-Visit Details    Type of service:  Video Visit     Originating Location (pt. Location): {video visit patient location:341710::\"Home\"}  {PROVIDER LOCATION On-site should be selected for visits conducted from your clinic location or adjoining [facility] hospital, academic office, or other nearby [facility] building. 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      Widespread pain - Etiology multifactorial, hx of SCI. Symptoms consistent with neuropathic process, underlying degenerative/postsurgical changes of spine, suspect SI mediated component, overlying myofascial component.      ***Today, ***Annamarie reports concerns for burning pain around vagina and rectum, unable to identify urinary symptoms due to suprapubic catheter. She endorses addressing concerns with PCP and was advised she does not have UTI (LOV with PCP on 3/4/25). Of note, she has home health services and currently following with wound care RN for treatment of sacral wound. We discussed possibility that burning pain symptoms are r/t sacral wound, infection versus reaction from supplies/topicals used for wound care versus other factors. She is agreeable for me to send a message to wound care RN (Lul Pena), scheduled with them on 4/23/25. Also, I reviewed her case with my colleague to explore potential options to optimize IT pump " therapy, which we discussed today. She identifies chronic widespread pain is poorly controlled and would likely not benefit from catheter revisions (nor is she interested), and there are already 3 drugs compounded into pump medication at fairly substantial doses.      Assigned to [facility] nursing team.     Visit Diagnoses:  No diagnosis found.      Diagnosis reviewed, treatment option addressed, and risk/benifits discussed.  Self-care instructions given.  I am recommending a multidisciplinary treatment plan to help this patient better manage their pain.    ***  Physical Therapy:  Discussed at prior visits, declined interest.      Diagnostic Studies:  No new orders today.      Medication Management:   IT pump - hydromorphone, bupivacaine, ziconotide, 1:2:1. Established with [facility] for home management. Notable benefit with 8.5% increase on 11/21/24. No dose adjustments today.   Baclofen - current dose 10-15 mg three times daily. Appreciates some degree of benefit, no reported side effects.   Acetaminophen - 1000 mg three times daily. Appreciates some degree of benefit, no side effects.   Gabapentin 1200 mg three times daily. Appreciates benefit, no side effects reported.   Recommended Lyrica trial at prior visits, though she decided against pursuing new medication.   Could consider QID frequency in future, discussed today and not interested at this point.      Procedures:   IT pump 20 ml implanted 1/19/22 (Dr. Orourke), right abdomen. Catheter tip at upper T10   Visualized at mid T11 on thoracic xray 7/20/22.      Other Orders/Referrals:   [facility] - RNCC will coordinate next refill.   Message sent to wound care RN (Lul Pena) regarding burning symptoms around vagina and rectum, next wound care visit on 4/23/25.      Follow up with EMELI Jerez CNP within 2-3 months     Additional visit details: ***    Review of Electronic Chart: Today I have also reviewed available medical information in the patient's medical  record at [facility] and Care Everywhere (if available), including relevant provider notes, laboratory work, and imaging.     Santa Wallace, KIRAN, APRN, AGNP-C  [facility]     -------------------------------------------------    Subjective:    Chief complaint: No chief complaint on file.      Interval history:  Anayeli Gagnon is a 74 year old female last seen on 4/15/25.      Recommendations/plan at the last visit included:  Physical Therapy:  Discussed at prior visits, declined interest.      Diagnostic Studies:  No new orders today.      Medication Management:   IT pump - hydromorphone, bupivacaine, ziconotide, 1:2:1. Established with [facility] for home management. Notable benefit with 8.5% increase on 11/21/24. No dose adjustments today.   Baclofen - current dose 10-15 mg three times daily. Appreciates some degree of benefit, no reported side effects.   Acetaminophen - 1000 mg three times daily. Appreciates some degree of benefit, no side effects.   Gabapentin 1200 mg three times daily. Appreciates benefit, no side effects reported.   Recommended Lyrica trial at prior visits, though she decided against pursuing new medication.   Could consider QID frequency in future, discussed today and not interested at this point.      Procedures:   IT pump 20 ml implanted 1/19/22 (Dr. Orourke), right abdomen. Catheter tip at upper T10   Visualized at mid T11 on thoracic xray 7/20/22.      Other Orders/Referrals:   [facility] - RNCC will coordinate next refill.   Message sent to wound care RN (Lul Pena) regarding burning symptoms around vagina and rectum, next wound care visit on 4/23/25.      Follow up with EMELI Jerez CNP within 2-3 months     Since her last visit, Anayeli Gagnon reports:  -her pain is *** as it was at last visit.       Pain Information:   Pain rating: averages {PAIN SCALE:039800} on a 0-10 scale.      HPI/Interval history from last visit:  -She reports pain  is more manageable today.   -The pain was very bad for 3 days prior to this.   -She wonders if she has a UTI, burning and stinging in vaginal area.   -She reports pain pump PTM dose helps for a little while.   -She has not had follow up for UTI.   -She has suprapubic catheter, so hard to assess for urinary symptoms.   -She notes that pain is in vaginal and rectal area.  -She has been following with wound care for sacral pressure ulcer. She has an appt on 4/23/25.   -She reports uncontrolled pain symptoms more widespread. She is aware I spoke with Dr. Wharton in between visits about ways to optimize IT therapy. She is not interested in surgical procedures at this time.   -Pump last filled about 2 weeks ago with [facility].   -We had discussed Lyrica alternatively to gabapentin.         Current Pain Treatments:    ***  Gabapentin 1200 mg TID  Baclofen 15 mg 2-3 x daily PRN  Acetaminophen 1000 mg TID                   Intrathecal Pump- 20 mL syringe     Concentrations:   Dilaudid 14 mg/ml                                                          Bupivacaine 28 mg/ml   Ziconotide 14 mcg/ml      Total Volume in Refill: 20 mL     Basal:   Dilaudid  7.6 mg/day                                                       Bupivacaine 15.2 mg/day   Ziconotide 7.6 mcg/day      PTM 0.2 hydromorphone, 2 hour lockout, maximum 6 in a day.         Current MME: N/A     Review of [place] Prescription Monitoring Program (): YES ***     Annual Controlled Substance Agreement/UDS due date: N/A     Past pain treatments:  Lyrica 150 mg TID  ***    Medications:  Current Outpatient Medications   Medication Sig Dispense Refill    acetaminophen (TYLENOL) 500 MG tablet Take 1,000 mg by mouth every 6 hours as needed for mild pain.      arginine (ARGINAID) PACK Take 1 packet by mouth daily.      aspirin (ASA) 325 MG EC tablet Take 1 tablet (325 mg) by mouth daily 30 tablet 11    atorvastatin (LIPITOR) 40 MG tablet Take 1 tablet (40 mg) by mouth every  evening 30 tablet 3    baclofen (LIORESAL) 10 MG tablet Take 1 tablet (10 mg) by mouth 3 times daily as needed for muscle spasms.      busPIRone (BUSPAR) 15 MG tablet Take 1 tablet (15 mg) by mouth 3 times daily. 90 tablet 0    gabapentin (NEURONTIN) 600 MG tablet Take 1,200 mg by mouth 3 times daily  1 tablet 0    lidocaine (LIDODERM) 5 % patch Apply 1 patch topically daily as needed       lidocaine (LMX4) 4 % external cream Apply topically once as needed for mild pain. 60 g 0    LORazepam (ATIVAN) 0.5 MG tablet Take 1 tablet (0.5 mg) by mouth daily as needed for anxiety. 30 tablet 0    medication given by implanted intrathecal pump continuously. Concentrations:   Dilaudid 14 mg/ml                                                          Bupivacaine 28 mg/ml   Ziconitide 14 mcg/ml      Total Volume in Refill: 20 mL     Basal:   Dilaudid  7.6 mg/day                                                       Bupivacaine 15.2 mg/day   Ziconitide 7.6 mcg/day      PTM:  0.2 hydromorphone, 2 hour lockout, maximum 6 in a day.     Pump Reservoir Volume: 20 mL  Low Reservoir Alarm Date: unknown  Pump : [employer]. Model: SynchroMed II  [facility] Responsible for pump medications: [employer] [phone]      melatonin 3 MG tablet Take 3 mg by mouth nightly as needed for sleep      metoprolol succinate ER (TOPROL XL) 25 MG 24 hr tablet Take 1 tablet (25 mg) by mouth every evening for 4 days. 4 tablet 0    Multiple Vitamins-Minerals (WOMENS MULTI PO) Take 1 tablet by mouth daily      naloxone (NARCAN) 4 MG/0.1ML nasal spray Spray 1 spray (4 mg) into one nostril alternating nostrils as needed for opioid reversal. every 2-3 minutes until assistance arrives 2 each 1    ondansetron (ZOFRAN) 4 MG tablet Take 1 tablet (4 mg) by mouth every 6 hours as needed for nausea 20 tablet 1    polyethylene glycol (MIRALAX) 17 GM/Dose powder Take 17 g by mouth daily as needed for constipation       PROCTO-MED HC 2.5 % cream Place 1  "Application rectally daily as needed for hemorrhoids or other (itch).      sennosides (SENOKOT) 8.6 MG tablet Take 1 tablet by mouth daily.      traMADol (ULTRAM) 50 MG tablet Take 0.5 tablets (25 mg) by mouth every 4 hours as needed for severe pain. 20 tablet 0       Medical History: any changes in medical history since they were last seen? { :030768::\"No\"}      Objective:    Physical Exam:  {video visit exam brief selected:374209}     Diagnostic Tests/Imaging/Labs:  ***    BILLING TIME DOCUMENTATION:   The total TIME spent on this patient on the date of the encounter/appointment was *** minutes.      TOTAL TIME includes:   Time spent preparing to see the patient (reviewing records and tests)   Time spent face to face (or over the phone) with the patient   Time spent ordering tests, medications, procedures and referrals   Time spent Referring and communicating with other healthcare professionals   Time spent documenting clinical information in Epic     "

## 2025-05-27 NOTE — NURSING NOTE
Patient presents with:  Pain: Pump follow up, confirmed with patient      Data Unavailable    Pain Medications       Analgesics Other Refills Start End     acetaminophen (TYLENOL) 500 MG tablet --  --    Sig - Route: Take 1,000 mg by mouth every 6 hours as needed for mild pain. - Oral    Class: Historical       Salicylates Refills Start End     aspirin (ASA) 325 MG EC tablet 11 1/8/2024 --    Sig - Route: Take 1 tablet (325 mg) by mouth daily - Oral    Class: E-Prescribe       Opioid Agonists Refills Start End     traMADol (ULTRAM) 50 MG tablet 0 5/21/2025 --    Sig - Route: Take 0.5 tablets (25 mg) by mouth every 4 hours as needed for severe pain. - Oral    Class: E-Prescribe            What medications are you taking for pain? Tylenol and pain pump      (Return Patients only) What refills are you needing today? none     Expectations: none     Patient is in the state of [place]

## 2025-05-28 ENCOUNTER — TELEPHONE (OUTPATIENT)
Dept: ANESTHESIOLOGY | Facility: CLINIC | Age: 75
End: 2025-05-28
Payer: MEDICARE

## 2025-05-28 NOTE — TELEPHONE ENCOUNTER
Patient confirmed rescheduled appointment:     Date: 7/22/25  Time: 11:00 AM  Appointment type: Return pain  Provider(s): Santa Wallace  Visit mode: Virtual Visit    Additonal Notes: Provider unavailable

## 2025-06-12 ENCOUNTER — TELEPHONE (OUTPATIENT)
Dept: GERIATRICS | Facility: CLINIC | Age: 75
End: 2025-06-12
Payer: MEDICARE

## 2025-06-12 RX ORDER — SENNOSIDES 8.6 MG
2 TABLET ORAL DAILY
COMMUNITY
Start: 2025-06-12

## 2025-06-12 NOTE — TELEPHONE ENCOUNTER
[facility] Triage Nurse Telephone Encounter    Provider: EMELI Matthew CNP  Facility: [facility] Facility Type:  AL    Caller: Kay  Call Back Number: [phone]    Allergies:    Allergies   Allergen Reactions    Latex     Contrast Dye Rash     Painful rash after x-ray contrast    Nitrofurantoin Nausea and Vomiting        Reason for call: The nurse is calling to update that patient has a colostomy and the stool has been thicker than she'd like it to be lately, and therefore has been changing her colostomy bag too frequently as she thinks that would be more helpful. Of note, patient has been taking Tramadol pretty consistently. She's currently on Senna 8.6mg---1 tab daily. She also has PRN Miralax available, which she was given today. The nurse is requesting to increase the scheduled Senna to 2 tabs daily.     Verbal Order/Direction given by Provider: Increase Senna 8.6mg to 2 tabs daily.  Update provider if stool is not soft enough.  Educate patient on the risks of skin breakdown from the colostomy adhesive if the bag is changed too frequently.      Provider giving Order:  EMELI Rose CNP     Verbal Order given to: Kay Sanz RN

## 2025-06-15 DIAGNOSIS — S24.151A: ICD-10-CM

## 2025-06-15 DIAGNOSIS — S24.103A SPINAL CORD INJURY AT T7-T12 LEVEL (H): ICD-10-CM

## 2025-06-15 DIAGNOSIS — M79.2 NEUROPATHIC PAIN: ICD-10-CM

## 2025-06-16 RX ORDER — TRAMADOL HYDROCHLORIDE 50 MG/1
25 TABLET ORAL EVERY 4 HOURS PRN
Qty: 30 TABLET | Refills: 4 | Status: SHIPPED | OUTPATIENT
Start: 2025-06-16 | End: 2025-06-19

## 2025-06-19 ENCOUNTER — ASSISTED LIVING VISIT (OUTPATIENT)
Dept: GERIATRICS | Facility: CLINIC | Age: 75
End: 2025-06-19
Payer: MEDICARE

## 2025-06-19 VITALS
TEMPERATURE: 98.3 F | OXYGEN SATURATION: 96 % | WEIGHT: 130 LBS | RESPIRATION RATE: 16 BRPM | SYSTOLIC BLOOD PRESSURE: 136 MMHG | HEART RATE: 74 BPM | BODY MASS INDEX: 22.31 KG/M2 | DIASTOLIC BLOOD PRESSURE: 72 MMHG

## 2025-06-19 DIAGNOSIS — M62.838 MUSCLE SPASTICITY: ICD-10-CM

## 2025-06-19 DIAGNOSIS — S24.151A: ICD-10-CM

## 2025-06-19 DIAGNOSIS — S24.103A SPINAL CORD INJURY AT T7-T12 LEVEL (H): ICD-10-CM

## 2025-06-19 DIAGNOSIS — M79.2 NEUROPATHIC PAIN: ICD-10-CM

## 2025-06-19 PROCEDURE — 99349 HOME/RES VST EST MOD MDM 40: CPT | Performed by: NURSE PRACTITIONER

## 2025-06-19 RX ORDER — TRAMADOL HYDROCHLORIDE 50 MG/1
TABLET ORAL
Qty: 45 TABLET | Refills: 4 | Status: SHIPPED | OUTPATIENT
Start: 2025-06-19

## 2025-06-19 RX ORDER — LORAZEPAM 0.5 MG/1
TABLET ORAL
Qty: 60 TABLET | Refills: 5 | Status: SHIPPED | OUTPATIENT
Start: 2025-06-19

## 2025-06-19 NOTE — LETTER
6/19/2025      Anayeli Gagnon  [facility]  [address]  [place] [address]        [facility] GERIATRICS  ACUTE/EPISODIC VISIT    [facility] Medical Record Number:  [id number]  Place of Service where encounter took place:  [facility] [[id number]]    Chief Complaint   Patient presents with     RECHECK       HPI:    Anayeli Gagnon is a 74 year old  (1950), who is being seen today for an episodic care visit.  HPI information obtained from: facility chart records, facility staff, patient report, and [facility] chart review.    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    Found Annamarie outside on her deck today and so sat and talk with her for a while as she is somewhat of a nervous person and feel that it is about gaining her trust.  She has a big knowledge of flowers and so talked about what was on her deck among other things.    Annamarie does have a pain pump that just recently got filled out here at the facility from an outside clinic.  It will be 3 months now until her next refill.  Annamarie does have chronic health issues that includes spinal cord injury, paraplegia, neuropathic pain, and muscle spasticity.  She is confined to be using an electric wheelchair in which it allows her to be able to move around the facility to get to meals as well as any activities she chooses.  Reviewed medications today and will honor her request    ALLERGIES:     Allergies   Allergen Reactions     Latex      Contrast Dye Rash     Painful rash after x-ray contrast     Nitrofurantoin Nausea and Vomiting        MEDICATIONS:  Post Discharge Medication Reconciliation Status: patient was not discharged from an inpatient facility or TCU.       Current Outpatient Medications   Medication Sig Dispense Refill     LORazepam (ATIVAN) 0.5 MG tablet Take 1 tablet (0.5 mg) by mouth 2 times daily. May also take 1 tablet (0.5 mg) daily as needed for anxiety. 60 tablet 5     traMADol (ULTRAM) 50 MG tablet Take 0.5 tablets (25 mg) by mouth 3 times daily. May also take 0.5 tablets (25 mg) 3 times daily as needed for severe pain. 45 tablet 4     acetaminophen (TYLENOL) 500 MG tablet Take 1,000 mg by mouth every 6 hours as needed for mild pain.       arginine (ARGINAID) PACK Take 1 packet by mouth daily.       aspirin (ASA) 325 MG EC tablet Take 1 tablet (325 mg) by mouth daily 30 tablet 11     atorvastatin (LIPITOR) 40 MG tablet Take 1 tablet (40 mg) by mouth every evening 30 tablet 3     baclofen (LIORESAL) 10 MG tablet Take 1 tablet (10 mg) by mouth 3 times daily as needed for muscle spasms.       busPIRone (BUSPAR) 15 MG tablet Take 1 tablet (15 mg) by mouth 3 times daily. 90 tablet 0     gabapentin (NEURONTIN) 600 MG tablet Take 1,200 mg by mouth 3 times daily  1 tablet 0     lidocaine (LIDODERM) 5 % patch Apply 1 patch topically daily as needed        lidocaine (LMX4) 4 % external cream Apply topically once as needed for mild pain. 60 g 0     medication given by implanted intrathecal pump continuously. Concentrations:   Dilaudid 14 mg/ml                                                          Bupivacaine 28 mg/ml   Ziconitide 14 mcg/ml      Total Volume in Refill: 20 mL     Basal:   Dilaudid  7.6 mg/day                                                       Bupivacaine 15.2 mg/day   Ziconitide 7.6 mcg/day      PTM:  0.2 hydromorphone, 2 hour lockout, maximum 6 in a day.     Pump [place]  Volume: 20 mL  Low Reservoir Alarm Date: unknown  Pump : [employer]. Model: SynchroMed II  Clinic Responsible for pump medications: [employer] [phone]       melatonin 3 MG tablet Take 3 mg by mouth nightly as needed for sleep       metoprolol succinate ER (TOPROL XL) 25 MG 24 hr tablet Take 1 tablet (25 mg) by mouth every evening for 4 days. 4 tablet 0     Multiple Vitamins-Minerals (WOMENS MULTI PO) Take 1 tablet by mouth daily       naloxone (NARCAN) 4 MG/0.1ML nasal spray Spray 1 spray (4 mg) into one nostril alternating nostrils as needed for opioid reversal. every 2-3 minutes until assistance arrives 2 each 1     ondansetron (ZOFRAN) 4 MG tablet Take 1 tablet (4 mg) by mouth every 6 hours as needed for nausea 20 tablet 1     polyethylene glycol (MIRALAX) 17 GM/Dose powder Take 17 g by mouth daily as needed for constipation        PROCTO-MED HC 2.5 % cream Place 1 Application rectally daily as needed for hemorrhoids or other (itch).       sennosides (SENOKOT) 8.6 MG tablet Take 2 tablets by mouth daily.         REVIEW OF SYSTEMS:  4 point ROS neg other than the symptoms noted above in the HPI.  Denied any chest pain or shortness of breath      PHYSICAL EXAM:  /72   Pulse 74   Temp 98.3  F (36.8  C)   Resp 16   Wt 59 kg (130 lb)   SpO2 96%   BMI 22.31 kg/m    Petite female in an electric wheelchair.  Alert and oriented x3.  Skin is pink, dry and warm to touch.  Lungs are clear.  Heart rate regular and strong.    +1 edema in lower legs.  She has to adjust her legs onto the foot pedals as can not move her legs enough if at all to reposition.    Removed her sock to the right foot to look at the lateral side of foot due to a pressure sore.  Home care Laureen comes to check her skin and catheter care as well.    Abdomen is soft, round, and non-tender.      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  Currently the Tramadol is 25mg every 4 hours prn.  She is asking for the medication to be given 3x a day scheduled.    Will schedule Tramadol 50mg - give 1/2 tab po TID and 25mg TID prn.    Usually can tell when she looks miserable with pain and can not think straight.  Today was a good day.  She was appreciative of the visit and change in medications.  Plan: traMADol (ULTRAM) 50 MG tablet    (M62.838) Muscle spasticity  Comment: Currently He receives twice a day as needed lorazepam at 0.5 mg.  She would like it scheduled twice a day as she takes it in the morning and in the evening.  Will change her orders to reflect lorazepam 0.5 mg by mouth twice a day and then daily 0.5 mg as needed for any breakthrough muscle spasticity  Plan: LORazepam (ATIVAN) 0.5 MG tablet     Orders:  1.  Lorazepam 0.5 mg twice a day and 0.5 mg every day as needed for anxiety and muscle spasticity  2.  DC previous lorazepam orders  3.  DC tramadol orders  4.  Start tramadol 50 mg -take half a tablet (25mg) p.o. 3 times a day and 25 mg 3 times a day as needed for spinal cord injury    Electronically signed by  EMELI Garcia CNP            Sincerely,        EMELI Garcia CNP    Electronically signed

## 2025-06-19 NOTE — LETTER
6/19/2025      Anayeli Gagnon  [facility]  [address]  [place] [address]        No notes on file      Sincerely,        EMELI Garcia CNP    Electronically signed

## 2025-06-19 NOTE — PROGRESS NOTES
[facility] GERIATRICS  ACUTE/EPISODIC VISIT    [facility] Medical Record Number:  [id number]  Place of Service where encounter took place:  [facility] [[id number]]    Chief Complaint   Patient presents with    RECHECK       HPI:    Anayeli Gagnon is a 74 year old  (1950), who is being seen today for an episodic care visit.  HPI information obtained from: {FGS HPI:348492}.    Today's concern is:      ALLERGIES:    Allergies   Allergen Reactions    Latex     Contrast Dye Rash     Painful rash after x-ray contrast    Nitrofurantoin Nausea and Vomiting        MEDICATIONS:  Post Discharge Medication Reconciliation Status: {ACO Med Rec (Provider):767749}. ***    Current Outpatient Medications   Medication Sig Dispense Refill    acetaminophen (TYLENOL) 500 MG tablet Take 1,000 mg by mouth every 6 hours as needed for mild pain.      arginine (ARGINAID) PACK Take 1 packet by mouth daily.      aspirin (ASA) 325 MG EC tablet Take 1 tablet (325 mg) by mouth daily 30 tablet 11    atorvastatin (LIPITOR) 40 MG tablet Take 1 tablet (40 mg) by mouth every evening 30 tablet 3    baclofen (LIORESAL) 10 MG tablet Take 1 tablet (10 mg) by mouth 3 times daily as needed for muscle spasms.      busPIRone (BUSPAR) 15 MG tablet Take 1 tablet (15 mg) by mouth 3 times daily. 90 tablet 0    gabapentin (NEURONTIN) 600 MG tablet Take 1,200 mg by mouth 3 times daily  1 tablet 0    lidocaine (LIDODERM) 5 % patch Apply 1 patch topically daily as needed       lidocaine (LMX4) 4 % external cream Apply topically once as needed for mild pain. 60 g 0    LORazepam (ATIVAN) 0.5 MG tablet Take 1 tablet (0.5 mg) by mouth daily as needed for anxiety. 30 tablet 0    medication given by implanted intrathecal pump continuously. Concentrations:   Dilaudid 14 mg/ml                                                          Bupivacaine 28 mg/ml   Ziconitide 14 mcg/ml      Total Volume in Refill: 20 mL      Basal:   Dilaudid  7.6 mg/day                                                       Bupivacaine 15.2 mg/day   Ziconitide 7.6 mcg/day      PTM:  0.2 hydromorphone, 2 hour lockout, maximum 6 in a day.     Pump Reservoir Volume: 20 mL  Low Reservoir Alarm Date: unknown  Pump : [employer] Model: [employer]  Clinic Responsible for pump medications: [employer] [phone]      melatonin 3 MG tablet Take 3 mg by mouth nightly as needed for sleep      metoprolol succinate ER (TOPROL XL) 25 MG 24 hr tablet Take 1 tablet (25 mg) by mouth every evening for 4 days. 4 tablet 0    Multiple Vitamins-Minerals (WOMENS MULTI PO) Take 1 tablet by mouth daily      naloxone (NARCAN) 4 MG/0.1ML nasal spray Spray 1 spray (4 mg) into one nostril alternating nostrils as needed for opioid reversal. every 2-3 minutes until assistance arrives 2 each 1    ondansetron (ZOFRAN) 4 MG tablet Take 1 tablet (4 mg) by mouth every 6 hours as needed for nausea 20 tablet 1    polyethylene glycol (MIRALAX) 17 GM/Dose powder Take 17 g by mouth daily as needed for constipation       PROCTO-MED HC 2.5 % cream Place 1 Application rectally daily as needed for hemorrhoids or other (itch).      sennosides (SENOKOT) 8.6 MG tablet Take 2 tablets by mouth daily.      traMADol (ULTRAM) 50 MG tablet Take 0.5 tablets (25 mg) by mouth every 4 hours as needed for severe pain. 30 tablet 4     Medications reviewed:  Medications reconciled to facility chart and changes were made to reflect current medications as identified as above med list. Below are the changes that were made:   Medications stopped since last EPIC medication reconciliation:   There are no discontinued medications.    Medications started since last [facility] medication reconciliation:  No orders of the defined types were placed in this encounter.    ***    REVIEW OF SYSTEMS:  4 point ROS neg other than the symptoms noted above in the HPI.***  Unable to be obtained due to cognitive  impairment or aphasia.   ROS    PHYSICAL EXAM:  /72   Pulse 74   Temp 98.3  F (36.8  C)   Resp 16   Wt 59 kg (130 lb)   SpO2 96%   BMI 22.31 kg/m    Physical Exam      ASSESSMENT / PLAN:  {FGS DX:538319}    Orders:  ***    Electronically signed by  Freida Gutierrez         (H)  (M79.2) Neuropathic pain  Comment: besides the pain pump, Annamarie receives Gabapentin 600mg TID.  Currently the Tramadol is 25mg every 4 hours prn.  She is asking for the medication to be given 3x a day scheduled.    Will schedule Tramadol 50mg - give 1/2 tab po TID and 25mg TID prn.    Usually can tell when she looks miserable with pain and can not think straight.  Today was a good day.  She was appreciative of the visit and change in medications.  Plan: traMADol (ULTRAM) 50 MG tablet    (M62.838) Muscle spasticity  Comment: Currently He receives twice a day as needed lorazepam at 0.5 mg.  She would like it scheduled twice a day as she takes it in the morning and in the evening.  Will change her orders to reflect lorazepam 0.5 mg by mouth twice a day and then daily 0.5 mg as needed for any breakthrough muscle spasticity  Plan: LORazepam (ATIVAN) 0.5 MG tablet     Orders:  1.  Lorazepam 0.5 mg twice a day and 0.5 mg every day as needed for anxiety and muscle spasticity  2.  DC previous lorazepam orders  3.  DC tramadol orders  4.  Start tramadol 50 mg -take half a tablet (25mg) p.o. 3 times a day and 25 mg 3 times a day as needed for spinal cord injury    Electronically signed by  EMELI Garcia CNP

## 2025-07-15 ENCOUNTER — MEDICAL CORRESPONDENCE (OUTPATIENT)
Dept: HEALTH INFORMATION MANAGEMENT | Facility: CLINIC | Age: 75
End: 2025-07-15
Payer: MEDICARE

## 2025-08-04 DIAGNOSIS — Z53.9 DIAGNOSIS NOT YET DEFINED: Primary | ICD-10-CM

## 2025-08-05 ENCOUNTER — MEDICAL CORRESPONDENCE (OUTPATIENT)
Dept: HEALTH INFORMATION MANAGEMENT | Facility: CLINIC | Age: 75
End: 2025-08-05
Payer: MEDICARE

## 2025-08-05 DIAGNOSIS — S24.151A: ICD-10-CM

## 2025-08-05 DIAGNOSIS — M79.2 NEUROPATHIC PAIN: ICD-10-CM

## 2025-08-05 DIAGNOSIS — S24.103A SPINAL CORD INJURY AT T7-T12 LEVEL (H): ICD-10-CM

## 2025-08-05 RX ORDER — TRAMADOL HYDROCHLORIDE 50 MG/1
TABLET ORAL
Qty: 90 TABLET | Refills: 4 | Status: SHIPPED | OUTPATIENT
Start: 2025-08-05

## 2025-08-07 ENCOUNTER — TELEPHONE (OUTPATIENT)
Dept: GERIATRICS | Facility: CLINIC | Age: 75
End: 2025-08-07
Payer: MEDICARE

## 2025-08-07 PROCEDURE — 81001 URINALYSIS AUTO W/SCOPE: CPT | Mod: ORL | Performed by: NURSE PRACTITIONER

## 2025-08-07 PROCEDURE — 87186 SC STD MICRODIL/AGAR DIL: CPT | Mod: ORL | Performed by: NURSE PRACTITIONER

## 2025-08-08 ENCOUNTER — LAB REQUISITION (OUTPATIENT)
Dept: LAB | Facility: CLINIC | Age: 75
End: 2025-08-08
Payer: MEDICARE

## 2025-08-08 DIAGNOSIS — R30.0 DYSURIA: ICD-10-CM

## 2025-08-08 LAB
ALBUMIN UR-MCNC: 30 MG/DL
APPEARANCE UR: ABNORMAL
BILIRUB UR QL STRIP: NEGATIVE
COLOR UR AUTO: ABNORMAL
GLUCOSE UR STRIP-MCNC: NEGATIVE MG/DL
HGB UR QL STRIP: ABNORMAL
KETONES UR STRIP-MCNC: NEGATIVE MG/DL
LEUKOCYTE ESTERASE UR QL STRIP: ABNORMAL
MUCOUS THREADS #/AREA URNS LPF: PRESENT /LPF
NITRATE UR QL: NEGATIVE
PH UR STRIP: 7.5 [PH] (ref 5–7)
RBC URINE: 30 /HPF
SP GR UR STRIP: 1.03 (ref 1–1.03)
UROBILINOGEN UR STRIP-MCNC: NORMAL MG/DL
WBC URINE: >182 /HPF

## 2025-08-09 ENCOUNTER — TELEPHONE (OUTPATIENT)
Dept: GERIATRICS | Facility: CLINIC | Age: 75
End: 2025-08-09
Payer: MEDICARE

## 2025-08-09 DIAGNOSIS — S24.103A SPINAL CORD INJURY AT T7-T12 LEVEL (H): ICD-10-CM

## 2025-08-09 DIAGNOSIS — S24.151A: ICD-10-CM

## 2025-08-09 DIAGNOSIS — N39.0 URINARY TRACT INFECTION WITHOUT HEMATURIA, SITE UNSPECIFIED: Primary | ICD-10-CM

## 2025-08-09 DIAGNOSIS — M79.2 NEUROPATHIC PAIN: ICD-10-CM

## 2025-08-09 RX ORDER — TRAMADOL HYDROCHLORIDE 50 MG/1
TABLET ORAL
Qty: 30 TABLET | Refills: 0 | Status: SHIPPED | OUTPATIENT
Start: 2025-08-09 | End: 2025-08-10

## 2025-08-09 RX ORDER — SULFAMETHOXAZOLE AND TRIMETHOPRIM 800; 160 MG/1; MG/1
1 TABLET ORAL 2 TIMES DAILY
COMMUNITY
Start: 2025-08-09

## 2025-08-10 LAB
BACTERIA UR CULT: ABNORMAL
BACTERIA UR CULT: ABNORMAL

## 2025-08-10 RX ORDER — TRAMADOL HYDROCHLORIDE 50 MG/1
TABLET ORAL
Qty: 30 TABLET | Refills: 0 | Status: SHIPPED | OUTPATIENT
Start: 2025-08-10

## 2025-08-17 ENCOUNTER — APPOINTMENT (OUTPATIENT)
Dept: CT IMAGING | Facility: CLINIC | Age: 75
End: 2025-08-17
Attending: EMERGENCY MEDICINE
Payer: MEDICARE

## 2025-08-17 ENCOUNTER — HOSPITAL ENCOUNTER (EMERGENCY)
Facility: CLINIC | Age: 75
Discharge: HOME | End: 2025-08-17
Attending: EMERGENCY MEDICINE | Admitting: EMERGENCY MEDICINE
Payer: MEDICARE

## 2025-08-17 VITALS
TEMPERATURE: 98.3 F | HEART RATE: 80 BPM | OXYGEN SATURATION: 94 % | DIASTOLIC BLOOD PRESSURE: 64 MMHG | WEIGHT: 128 LBS | RESPIRATION RATE: 20 BRPM | SYSTOLIC BLOOD PRESSURE: 136 MMHG | HEIGHT: 64 IN | BODY MASS INDEX: 21.85 KG/M2

## 2025-08-17 DIAGNOSIS — E87.1 HYPONATREMIA: ICD-10-CM

## 2025-08-17 DIAGNOSIS — R00.2 PALPITATIONS: ICD-10-CM

## 2025-08-17 DIAGNOSIS — R82.90 ABNORMAL FINDING ON URINALYSIS: ICD-10-CM

## 2025-08-17 DIAGNOSIS — R63.0 DECREASED APPETITE: ICD-10-CM

## 2025-08-17 DIAGNOSIS — N85.9 FLUID IN ENDOMETRIAL CAVITY: ICD-10-CM

## 2025-08-17 DIAGNOSIS — K62.89 RECTAL PAIN: Primary | ICD-10-CM

## 2025-08-17 LAB
ALBUMIN SERPL BCG-MCNC: 4 G/DL (ref 3.5–5.2)
ALBUMIN UR-MCNC: 20 MG/DL
ALP SERPL-CCNC: 64 U/L (ref 40–150)
ALT SERPL W P-5'-P-CCNC: 16 U/L (ref 0–50)
AMORPH CRY #/AREA URNS HPF: ABNORMAL /HPF
ANION GAP SERPL CALCULATED.3IONS-SCNC: 15 MMOL/L (ref 7–15)
APPEARANCE UR: ABNORMAL
AST SERPL W P-5'-P-CCNC: 23 U/L (ref 0–45)
ATRIAL RATE - MUSE: 63 BPM
BACTERIA #/AREA URNS HPF: ABNORMAL /HPF
BASOPHILS # BLD AUTO: 0.03 10E3/UL (ref 0–0.2)
BASOPHILS NFR BLD AUTO: 0.4 %
BILIRUB SERPL-MCNC: 0.3 MG/DL
BILIRUB UR QL STRIP: NEGATIVE
BUN SERPL-MCNC: 12.5 MG/DL (ref 8–23)
CALCIUM SERPL-MCNC: 8.6 MG/DL (ref 8.8–10.4)
CHLORIDE SERPL-SCNC: 92 MMOL/L (ref 98–107)
COLOR UR AUTO: YELLOW
CREAT SERPL-MCNC: 0.64 MG/DL (ref 0.51–0.95)
DIASTOLIC BLOOD PRESSURE - MUSE: NORMAL MMHG
EGFRCR SERPLBLD CKD-EPI 2021: >90 ML/MIN/1.73M2
EOSINOPHIL # BLD AUTO: 0.05 10E3/UL (ref 0–0.7)
EOSINOPHIL NFR BLD AUTO: 0.6 %
ERYTHROCYTE [DISTWIDTH] IN BLOOD BY AUTOMATED COUNT: 12.9 % (ref 10–15)
GLUCOSE SERPL-MCNC: 81 MG/DL (ref 70–99)
GLUCOSE UR STRIP-MCNC: NEGATIVE MG/DL
HCO3 SERPL-SCNC: 21 MMOL/L (ref 22–29)
HCT VFR BLD AUTO: 35.4 % (ref 35–47)
HGB BLD-MCNC: 12.1 G/DL (ref 11.7–15.7)
HGB UR QL STRIP: NEGATIVE
IMM GRANULOCYTES # BLD: <0.03 10E3/UL
IMM GRANULOCYTES NFR BLD: 0.3 %
INTERPRETATION ECG - MUSE: NORMAL
KETONES UR STRIP-MCNC: 40 MG/DL
LEUKOCYTE ESTERASE UR QL STRIP: ABNORMAL
LIPASE SERPL-CCNC: 10 U/L (ref 13–60)
LYMPHOCYTES # BLD AUTO: 0.98 10E3/UL (ref 0.8–5.3)
LYMPHOCYTES NFR BLD AUTO: 12.6 %
MAGNESIUM SERPL-MCNC: 1.8 MG/DL (ref 1.7–2.3)
MCH RBC QN AUTO: 30.4 PG (ref 26.5–33)
MCHC RBC AUTO-ENTMCNC: 34.2 G/DL (ref 31.5–36.5)
MCV RBC AUTO: 88.9 FL (ref 78–100)
MONOCYTES # BLD AUTO: 0.38 10E3/UL (ref 0–1.3)
MONOCYTES NFR BLD AUTO: 4.9 %
MUCOUS THREADS #/AREA URNS LPF: PRESENT /LPF
NEUTROPHILS # BLD AUTO: 6.29 10E3/UL (ref 1.6–8.3)
NEUTROPHILS NFR BLD AUTO: 81.2 %
NITRATE UR QL: NEGATIVE
NRBC # BLD AUTO: <0.03 10E3/UL
NRBC BLD AUTO-RTO: 0 /100
P AXIS - MUSE: 73 DEGREES
PH UR STRIP: 6 [PH] (ref 5–7)
PLATELET # BLD AUTO: 142 10E3/UL (ref 150–450)
POTASSIUM SERPL-SCNC: 4.6 MMOL/L (ref 3.4–5.3)
PR INTERVAL - MUSE: 158 MS
PROCALCITONIN SERPL IA-MCNC: 0.09 NG/ML
PROT SERPL-MCNC: 6 G/DL (ref 6.4–8.3)
QRS DURATION - MUSE: 92 MS
QT - MUSE: 394 MS
QTC - MUSE: 403 MS
R AXIS - MUSE: 17 DEGREES
RBC # BLD AUTO: 3.98 10E6/UL (ref 3.8–5.2)
RBC URINE: 9 /HPF
SODIUM SERPL-SCNC: 128 MMOL/L (ref 135–145)
SP GR UR STRIP: 1.02 (ref 1–1.03)
SQUAMOUS EPITHELIAL: 1 /HPF
SYSTOLIC BLOOD PRESSURE - MUSE: NORMAL MMHG
T AXIS - MUSE: 81 DEGREES
UROBILINOGEN UR STRIP-MCNC: NORMAL MG/DL
VENTRICULAR RATE- MUSE: 63 BPM
WBC # BLD AUTO: 7.75 10E3/UL (ref 4–11)
WBC CLUMPS #/AREA URNS HPF: PRESENT /HPF
WBC URINE: 37 /HPF

## 2025-08-17 PROCEDURE — 99284 EMERGENCY DEPT VISIT MOD MDM: CPT | Performed by: EMERGENCY MEDICINE

## 2025-08-17 PROCEDURE — 81001 URINALYSIS AUTO W/SCOPE: CPT | Performed by: EMERGENCY MEDICINE

## 2025-08-17 PROCEDURE — 99285 EMERGENCY DEPT VISIT HI MDM: CPT | Mod: 25 | Performed by: EMERGENCY MEDICINE

## 2025-08-17 PROCEDURE — 96360 HYDRATION IV INFUSION INIT: CPT | Mod: 59

## 2025-08-17 PROCEDURE — 85025 COMPLETE CBC W/AUTO DIFF WBC: CPT | Performed by: EMERGENCY MEDICINE

## 2025-08-17 PROCEDURE — 250N000013 HC RX MED GY IP 250 OP 250 PS 637: Performed by: EMERGENCY MEDICINE

## 2025-08-17 PROCEDURE — 250N000011 HC RX IP 250 OP 636: Performed by: EMERGENCY MEDICINE

## 2025-08-17 PROCEDURE — 258N000003 HC RX IP 258 OP 636: Performed by: EMERGENCY MEDICINE

## 2025-08-17 PROCEDURE — 250N000009 HC RX 250: Performed by: EMERGENCY MEDICINE

## 2025-08-17 PROCEDURE — 93005 ELECTROCARDIOGRAM TRACING: CPT

## 2025-08-17 PROCEDURE — 82310 ASSAY OF CALCIUM: CPT | Performed by: EMERGENCY MEDICINE

## 2025-08-17 PROCEDURE — 83690 ASSAY OF LIPASE: CPT | Performed by: EMERGENCY MEDICINE

## 2025-08-17 PROCEDURE — 87186 SC STD MICRODIL/AGAR DIL: CPT | Performed by: EMERGENCY MEDICINE

## 2025-08-17 PROCEDURE — 83735 ASSAY OF MAGNESIUM: CPT | Performed by: EMERGENCY MEDICINE

## 2025-08-17 PROCEDURE — 250N000013 HC RX MED GY IP 250 OP 250 PS 637: Performed by: NURSE PRACTITIONER

## 2025-08-17 PROCEDURE — 36415 COLL VENOUS BLD VENIPUNCTURE: CPT | Performed by: EMERGENCY MEDICINE

## 2025-08-17 PROCEDURE — 74177 CT ABD & PELVIS W/CONTRAST: CPT

## 2025-08-17 PROCEDURE — 84145 PROCALCITONIN (PCT): CPT | Performed by: EMERGENCY MEDICINE

## 2025-08-17 RX ORDER — HYDROCORTISONE ACETATE 25 MG/1
25 SUPPOSITORY RECTAL ONCE
Status: COMPLETED | OUTPATIENT
Start: 2025-08-17 | End: 2025-08-17

## 2025-08-17 RX ORDER — ACETAMINOPHEN 500 MG
1000 TABLET ORAL ONCE
Status: COMPLETED | OUTPATIENT
Start: 2025-08-17 | End: 2025-08-17

## 2025-08-17 RX ORDER — IOPAMIDOL 755 MG/ML
500 INJECTION, SOLUTION INTRAVASCULAR ONCE
Status: COMPLETED | OUTPATIENT
Start: 2025-08-17 | End: 2025-08-17

## 2025-08-17 RX ORDER — LORAZEPAM 0.5 MG/1
0.5 TABLET ORAL ONCE
Status: COMPLETED | OUTPATIENT
Start: 2025-08-17 | End: 2025-08-17

## 2025-08-17 RX ORDER — LIDOCAINE HYDROCHLORIDE 20 MG/ML
JELLY TOPICAL ONCE
Status: COMPLETED | OUTPATIENT
Start: 2025-08-17 | End: 2025-08-17

## 2025-08-17 RX ADMIN — TRAMADOL HYDROCHLORIDE 25 MG: 50 TABLET, COATED ORAL at 19:42

## 2025-08-17 RX ADMIN — ACETAMINOPHEN 1000 MG: 500 TABLET, FILM COATED ORAL at 14:53

## 2025-08-17 RX ADMIN — LIDOCAINE HYDROCHLORIDE: 20 JELLY TOPICAL at 16:06

## 2025-08-17 RX ADMIN — SODIUM CHLORIDE 1000 ML: 0.9 INJECTION, SOLUTION INTRAVENOUS at 16:05

## 2025-08-17 RX ADMIN — IOPAMIDOL 50 ML: 755 INJECTION, SOLUTION INTRAVENOUS at 17:54

## 2025-08-17 RX ADMIN — HYDROCORTISONE ACETATE 25 MG: 25 SUPPOSITORY RECTAL at 18:04

## 2025-08-17 RX ADMIN — SODIUM CHLORIDE 60 ML: 9 INJECTION, SOLUTION INTRAVENOUS at 17:43

## 2025-08-17 RX ADMIN — LORAZEPAM 0.5 MG: 0.5 TABLET ORAL at 18:33

## 2025-08-17 RX ADMIN — ACETAMINOPHEN 1000 MG: 500 TABLET, FILM COATED ORAL at 19:41

## 2025-08-17 ASSESSMENT — ACTIVITIES OF DAILY LIVING (ADL)
ADLS_ACUITY_SCORE: 61

## 2025-08-17 ASSESSMENT — COLUMBIA-SUICIDE SEVERITY RATING SCALE - C-SSRS
2. HAVE YOU ACTUALLY HAD ANY THOUGHTS OF KILLING YOURSELF IN THE PAST MONTH?: NO
6. HAVE YOU EVER DONE ANYTHING, STARTED TO DO ANYTHING, OR PREPARED TO DO ANYTHING TO END YOUR LIFE?: NO
1. IN THE PAST MONTH, HAVE YOU WISHED YOU WERE DEAD OR WISHED YOU COULD GO TO SLEEP AND NOT WAKE UP?: NO

## 2025-08-18 ENCOUNTER — HOSPITAL ENCOUNTER (EMERGENCY)
Facility: CLINIC | Age: 75
Discharge: HOME | End: 2025-08-18
Attending: FAMILY MEDICINE | Admitting: FAMILY MEDICINE
Payer: MEDICARE

## 2025-08-18 ENCOUNTER — PATIENT OUTREACH (OUTPATIENT)
Dept: CARE COORDINATION | Facility: CLINIC | Age: 75
End: 2025-08-18

## 2025-08-18 VITALS
WEIGHT: 125 LBS | RESPIRATION RATE: 20 BRPM | BODY MASS INDEX: 21.46 KG/M2 | TEMPERATURE: 97.8 F | DIASTOLIC BLOOD PRESSURE: 47 MMHG | HEART RATE: 70 BPM | OXYGEN SATURATION: 95 % | SYSTOLIC BLOOD PRESSURE: 114 MMHG

## 2025-08-18 DIAGNOSIS — Z93.59 SUPRAPUBIC CATHETER (H): ICD-10-CM

## 2025-08-18 DIAGNOSIS — F41.1 ANXIETY REACTION: ICD-10-CM

## 2025-08-18 DIAGNOSIS — G89.4 CHRONIC PAIN DISORDER: Primary | ICD-10-CM

## 2025-08-18 DIAGNOSIS — Z93.3 COLOSTOMY PRESENT (H): ICD-10-CM

## 2025-08-18 DIAGNOSIS — G82.20 PARAPLEGIA (H): ICD-10-CM

## 2025-08-18 LAB
ALBUMIN SERPL BCG-MCNC: 4.2 G/DL (ref 3.5–5.2)
ALP SERPL-CCNC: 68 U/L (ref 40–150)
ALT SERPL W P-5'-P-CCNC: 19 U/L (ref 0–50)
ANION GAP SERPL CALCULATED.3IONS-SCNC: 19 MMOL/L (ref 7–15)
AST SERPL W P-5'-P-CCNC: 24 U/L (ref 0–45)
BASOPHILS # BLD AUTO: 0.05 10E3/UL (ref 0–0.2)
BASOPHILS NFR BLD AUTO: 0.6 %
BILIRUB SERPL-MCNC: 0.4 MG/DL
BUN SERPL-MCNC: 10.6 MG/DL (ref 8–23)
CALCIUM SERPL-MCNC: 9 MG/DL (ref 8.8–10.4)
CHLORIDE SERPL-SCNC: 94 MMOL/L (ref 98–107)
CREAT SERPL-MCNC: 0.58 MG/DL (ref 0.51–0.95)
EGFRCR SERPLBLD CKD-EPI 2021: >90 ML/MIN/1.73M2
EOSINOPHIL # BLD AUTO: 0.07 10E3/UL (ref 0–0.7)
EOSINOPHIL NFR BLD AUTO: 0.9 %
ERYTHROCYTE [DISTWIDTH] IN BLOOD BY AUTOMATED COUNT: 13.2 % (ref 10–15)
GLUCOSE SERPL-MCNC: 62 MG/DL (ref 70–99)
HCO3 SERPL-SCNC: 18 MMOL/L (ref 22–29)
HCT VFR BLD AUTO: 37.9 % (ref 35–47)
HGB BLD-MCNC: 13 G/DL (ref 11.7–15.7)
IMM GRANULOCYTES # BLD: 0.03 10E3/UL
IMM GRANULOCYTES NFR BLD: 0.4 %
LACTATE SERPL-SCNC: 0.9 MMOL/L (ref 0.7–2)
LYMPHOCYTES # BLD AUTO: 1.43 10E3/UL (ref 0.8–5.3)
LYMPHOCYTES NFR BLD AUTO: 17.6 %
MCH RBC QN AUTO: 30.7 PG (ref 26.5–33)
MCHC RBC AUTO-ENTMCNC: 34.3 G/DL (ref 31.5–36.5)
MCV RBC AUTO: 89.6 FL (ref 78–100)
MONOCYTES # BLD AUTO: 0.54 10E3/UL (ref 0–1.3)
MONOCYTES NFR BLD AUTO: 6.7 %
NEUTROPHILS # BLD AUTO: 6 10E3/UL (ref 1.6–8.3)
NEUTROPHILS NFR BLD AUTO: 73.8 %
NRBC # BLD AUTO: <0.03 10E3/UL
NRBC BLD AUTO-RTO: 0 /100
PLATELET # BLD AUTO: 170 10E3/UL (ref 150–450)
POTASSIUM SERPL-SCNC: 4.3 MMOL/L (ref 3.4–5.3)
PROT SERPL-MCNC: 6.6 G/DL (ref 6.4–8.3)
RBC # BLD AUTO: 4.23 10E6/UL (ref 3.8–5.2)
SODIUM SERPL-SCNC: 131 MMOL/L (ref 135–145)
WBC # BLD AUTO: 8.12 10E3/UL (ref 4–11)

## 2025-08-18 PROCEDURE — 84155 ASSAY OF PROTEIN SERUM: CPT | Performed by: FAMILY MEDICINE

## 2025-08-18 PROCEDURE — 36415 COLL VENOUS BLD VENIPUNCTURE: CPT | Performed by: FAMILY MEDICINE

## 2025-08-18 PROCEDURE — 83605 ASSAY OF LACTIC ACID: CPT | Performed by: FAMILY MEDICINE

## 2025-08-18 PROCEDURE — 85004 AUTOMATED DIFF WBC COUNT: CPT | Performed by: FAMILY MEDICINE

## 2025-08-18 PROCEDURE — 250N000009 HC RX 250: Performed by: FAMILY MEDICINE

## 2025-08-18 PROCEDURE — 99284 EMERGENCY DEPT VISIT MOD MDM: CPT | Performed by: EMERGENCY MEDICINE

## 2025-08-18 PROCEDURE — 96376 TX/PRO/DX INJ SAME DRUG ADON: CPT | Performed by: FAMILY MEDICINE

## 2025-08-18 PROCEDURE — 96375 TX/PRO/DX INJ NEW DRUG ADDON: CPT | Performed by: FAMILY MEDICINE

## 2025-08-18 PROCEDURE — 250N000011 HC RX IP 250 OP 636: Performed by: FAMILY MEDICINE

## 2025-08-18 PROCEDURE — 99284 EMERGENCY DEPT VISIT MOD MDM: CPT | Mod: 25 | Performed by: FAMILY MEDICINE

## 2025-08-18 PROCEDURE — 250N000013 HC RX MED GY IP 250 OP 250 PS 637: Performed by: FAMILY MEDICINE

## 2025-08-18 PROCEDURE — 96374 THER/PROPH/DIAG INJ IV PUSH: CPT | Performed by: FAMILY MEDICINE

## 2025-08-18 PROCEDURE — 250N000013 HC RX MED GY IP 250 OP 250 PS 637: Performed by: EMERGENCY MEDICINE

## 2025-08-18 RX ORDER — ONDANSETRON 2 MG/ML
4 INJECTION INTRAMUSCULAR; INTRAVENOUS EVERY 30 MIN PRN
Status: DISCONTINUED | OUTPATIENT
Start: 2025-08-18 | End: 2025-08-18 | Stop reason: HOSPADM

## 2025-08-18 RX ORDER — ACETAMINOPHEN 500 MG
1000 TABLET ORAL ONCE
Status: COMPLETED | OUTPATIENT
Start: 2025-08-18 | End: 2025-08-18

## 2025-08-18 RX ORDER — OLANZAPINE 2.5 MG/1
5 TABLET, FILM COATED ORAL ONCE
Status: COMPLETED | OUTPATIENT
Start: 2025-08-18 | End: 2025-08-18

## 2025-08-18 RX ORDER — OLANZAPINE 5 MG/1
5 TABLET, FILM COATED ORAL AT BEDTIME
Qty: 10 TABLET | Refills: 0 | Status: SHIPPED | OUTPATIENT
Start: 2025-08-18 | End: 2025-08-28

## 2025-08-18 RX ORDER — HYDROCORTISONE ACETATE 25 MG/1
25 SUPPOSITORY RECTAL ONCE
Status: COMPLETED | OUTPATIENT
Start: 2025-08-18 | End: 2025-08-18

## 2025-08-18 RX ORDER — LIDOCAINE HYDROCHLORIDE 20 MG/ML
JELLY TOPICAL ONCE
Status: COMPLETED | OUTPATIENT
Start: 2025-08-18 | End: 2025-08-18

## 2025-08-18 RX ADMIN — ONDANSETRON 4 MG: 2 INJECTION INTRAMUSCULAR; INTRAVENOUS at 07:57

## 2025-08-18 RX ADMIN — HYDROCORTISONE ACETATE 25 MG: 25 SUPPOSITORY RECTAL at 06:42

## 2025-08-18 RX ADMIN — LIDOCAINE HYDROCHLORIDE: 20 JELLY TOPICAL at 06:42

## 2025-08-18 RX ADMIN — ACETAMINOPHEN 1000 MG: 500 TABLET, FILM COATED ORAL at 06:44

## 2025-08-18 RX ADMIN — MIDAZOLAM 1 MG: 1 INJECTION INTRAMUSCULAR; INTRAVENOUS at 06:46

## 2025-08-18 RX ADMIN — ONDANSETRON 4 MG: 2 INJECTION INTRAMUSCULAR; INTRAVENOUS at 06:44

## 2025-08-18 RX ADMIN — OLANZAPINE 5 MG: 2.5 TABLET, FILM COATED ORAL at 08:00

## 2025-08-18 ASSESSMENT — COLUMBIA-SUICIDE SEVERITY RATING SCALE - C-SSRS
6. HAVE YOU EVER DONE ANYTHING, STARTED TO DO ANYTHING, OR PREPARED TO DO ANYTHING TO END YOUR LIFE?: NO
1. IN THE PAST MONTH, HAVE YOU WISHED YOU WERE DEAD OR WISHED YOU COULD GO TO SLEEP AND NOT WAKE UP?: NO
2. HAVE YOU ACTUALLY HAD ANY THOUGHTS OF KILLING YOURSELF IN THE PAST MONTH?: NO

## 2025-08-18 ASSESSMENT — ACTIVITIES OF DAILY LIVING (ADL)
ADLS_ACUITY_SCORE: 61
ADLS_ACUITY_SCORE: 61

## 2025-08-20 ENCOUNTER — RESULTS FOLLOW-UP (OUTPATIENT)
Dept: NURSING | Facility: CLINIC | Age: 75
End: 2025-08-20
Payer: MEDICARE

## 2025-08-20 ENCOUNTER — PATIENT OUTREACH (OUTPATIENT)
Dept: CARE COORDINATION | Facility: CLINIC | Age: 75
End: 2025-08-20
Payer: MEDICARE

## 2025-08-21 ENCOUNTER — TELEPHONE (OUTPATIENT)
Dept: NURSING | Facility: CLINIC | Age: 75
End: 2025-08-21

## 2025-08-21 ENCOUNTER — ASSISTED LIVING VISIT (OUTPATIENT)
Dept: GERIATRICS | Facility: CLINIC | Age: 75
End: 2025-08-21
Payer: MEDICARE

## 2025-08-21 LAB
BACTERIA UR CULT: ABNORMAL

## 2025-09-02 DIAGNOSIS — S24.151A: ICD-10-CM

## 2025-09-02 DIAGNOSIS — M79.2 NEUROPATHIC PAIN: ICD-10-CM

## 2025-09-02 DIAGNOSIS — S24.103A SPINAL CORD INJURY AT T7-T12 LEVEL (H): ICD-10-CM

## 2025-09-02 RX ORDER — TRAMADOL HYDROCHLORIDE 50 MG/1
TABLET ORAL
Qty: 60 TABLET | Refills: 0 | Status: SHIPPED | OUTPATIENT
Start: 2025-09-02

## 2025-09-04 ENCOUNTER — TELEPHONE (OUTPATIENT)
Dept: ANESTHESIOLOGY | Facility: CLINIC | Age: 75
End: 2025-09-04

## 2025-09-04 ENCOUNTER — ASSISTED LIVING VISIT (OUTPATIENT)
Dept: GERIATRICS | Facility: CLINIC | Age: 75
End: 2025-09-04
Payer: MEDICARE

## 2025-09-04 VITALS
WEIGHT: 186 LBS | OXYGEN SATURATION: 97 % | TEMPERATURE: 97.1 F | DIASTOLIC BLOOD PRESSURE: 62 MMHG | HEART RATE: 74 BPM | RESPIRATION RATE: 18 BRPM | BODY MASS INDEX: 31.93 KG/M2 | SYSTOLIC BLOOD PRESSURE: 140 MMHG

## 2025-09-04 DIAGNOSIS — N85.9 ENDOMETRIAL DISORDER: Primary | ICD-10-CM

## 2025-09-04 DIAGNOSIS — K59.03 DRUG-INDUCED CONSTIPATION: ICD-10-CM

## 2025-09-04 RX ORDER — AMOXICILLIN 250 MG
2 CAPSULE ORAL EVERY OTHER DAY
Status: SHIPPED
Start: 2025-09-04

## (undated) DEVICE — DRAPE STERI TOWEL LG 1010

## (undated) DEVICE — BLADE CLIPPER SGL USE 9680

## (undated) DEVICE — Device

## (undated) DEVICE — PREP POVIDONE-IODINE 7.5% SCRUB 4OZ BOTTLE MDS093945

## (undated) DEVICE — PREP DURAPREP REMOVER 4OZ 8611

## (undated) DEVICE — BLADE KNIFE SURG 10 371110

## (undated) DEVICE — ESU GROUND PAD ADULT W/CORD E7507

## (undated) DEVICE — DRAPE U SPLIT 74X120" 29440

## (undated) DEVICE — GLOVE PROTEXIS BLUE W/NEU-THERA 8.0  2D73EB80

## (undated) DEVICE — DRAPE SHEET REV FOLD 3/4 9349

## (undated) DEVICE — SU MONOCRYL 3-0 PS-1 27" Y936H

## (undated) DEVICE — PACK NEURO MINOR UMMC SNE32MNMU4

## (undated) DEVICE — LINEN TOWEL PACK X5 5464

## (undated) DEVICE — RX SURGIFLO HEMOSTATIC MATRIX W/THROMBIN 8ML 2994

## (undated) DEVICE — GLOVE PROTEXIS MICRO 6.5  2D73PM65

## (undated) DEVICE — SU NUROLON 4-0 TF CR 8X18" C584D

## (undated) DEVICE — SU VICRYL 2-0 CT-2 CR 8X18" J726D

## (undated) DEVICE — SPONGE COTTONOID 1X3" 20-10S

## (undated) DEVICE — SU SILK 2-0 SH 30" K833H

## (undated) DEVICE — NDL SPINAL 18GA 3.5" 405184

## (undated) DEVICE — PAD CHUX UNDERPAD 23X24" 7136

## (undated) DEVICE — ADPT 5 IN 1 360

## (undated) DEVICE — SPONGE SURGIFOAM 01GM POWDER 1978

## (undated) DEVICE — BUR STRK DIAMOND NEURO MATCH HEAD 3.0MM 5820-107-130

## (undated) DEVICE — WIPES FOLEY CARE SURESTEP PROVON DFC100

## (undated) DEVICE — SYR 30ML LL W/O NDL 302832

## (undated) DEVICE — SU ETHIBOND 2-0 SHDA 30" X563H

## (undated) DEVICE — PACK GOWN 3/PK DISP XL SBA32GPFCB

## (undated) DEVICE — HEADREST FOAM 7" BLUE NEONATE

## (undated) DEVICE — DECANTER VIAL 2006S

## (undated) DEVICE — PREP CHLORAPREP CLEAR 3ML 260400

## (undated) DEVICE — SU VICRYL 0 CT-1 CR 8X18" J740D

## (undated) DEVICE — PREP CHLORAPREP CLEAR 3ML 930400

## (undated) DEVICE — BNDG ABDOMINAL BINDER 9X45-62" 79-89071

## (undated) DEVICE — SOL NACL 0.9% IRRIG 1000ML BOTTLE 2F7124

## (undated) DEVICE — GLOVE PROTEXIS MICRO 8.0  2D73PM80

## (undated) DEVICE — TAPE CLOTH ADHESIVE 3" LATEX 3554C

## (undated) DEVICE — ESU HOLSTER PLASTIC DISP E2400

## (undated) DEVICE — SYR 07ML EPIDURAL LOSS OF RESISTANCE PULSATOR 4905

## (undated) DEVICE — DRSG TELFA 3X8" 1238

## (undated) DEVICE — DRAPE C-ARM W/STRAPS 42X72" 07-CA104

## (undated) DEVICE — SYR 03ML LL W/O NDL 309657

## (undated) DEVICE — SUCTION MANIFOLD NEPTUNE 2 SYS 4 PORT 0702-020-000

## (undated) DEVICE — PACK CYSTO CUSTOM ASC

## (undated) DEVICE — IOM SUPPLIES/CASE FEE

## (undated) DEVICE — PREP DURAPREP 26ML APL 8630

## (undated) DEVICE — SOL ADH LIQUID BENZOIN SWAB 0.6ML C1544

## (undated) DEVICE — SUTURE BOOTIES YELLOW 053003PBX

## (undated) DEVICE — NDL BLUNT 17GA 1.5" 8881202330

## (undated) DEVICE — SOL WATER IRRIG 1000ML BOTTLE 2F7114

## (undated) DEVICE — DRSG PRIMAPORE 03 1/8X6" 66000318

## (undated) DEVICE — SPONGE COTTONOID 1/4X1/4" 20-01S

## (undated) DEVICE — SU VICRYL 3-0 SH 8X18" UND J864D

## (undated) DEVICE — DRAPE MAYO STAND 23X54 8337

## (undated) DEVICE — SUCTION MANIFOLD DORNOCH ULTRA CART UL-CL500

## (undated) DEVICE — GLOVE PROTEXIS POWDER FREE 8.5 ORTHOPEDIC 2D73ET85

## (undated) DEVICE — PAD CHUX UNDERPAD 30X36" P3036C

## (undated) DEVICE — SOL NACL 0.9% IRRIG 1000ML BOTTLE 07138-09

## (undated) DEVICE — ESU CORD BIPOLAR GREEN 10-4000

## (undated) DEVICE — SU ETHILON 3-0 PS-1 18" 1663H

## (undated) DEVICE — STRAP UNIVERSAL POSITIONING 2-PIECE 4X47X76" 91-287

## (undated) DEVICE — ADH SKIN CLOSURE PREMIERPRO EXOFIN 1.0ML 3470

## (undated) DEVICE — CATH INTRODUCER SUPRAPUBIC 16FR SF-S16-851

## (undated) DEVICE — PREP POVIDONE IODINE SOLUTION 10% 4OZ BOTTLE 29906-004

## (undated) DEVICE — SYR 10ML FINGER CONTROL W/O NDL 309695

## (undated) DEVICE — LABEL MEDICATION SYSTEM 3303-P

## (undated) DEVICE — HEADREST FOAM 9" PINK

## (undated) DEVICE — SU VICRYL 3-0 SH 27" UND J416H

## (undated) DEVICE — SYR EAR BULB 3OZ 0035830

## (undated) DEVICE — DRSG TEGADERM 4X10" 1627

## (undated) DEVICE — SPONGE COTTONOID 1/2X1/2" 20-04S

## (undated) DEVICE — BLADE KNIFE SURG 11 371111

## (undated) DEVICE — LINEN TOWEL PACK X6 WHITE 5487

## (undated) DEVICE — BLADE KNIFE SURG 15 371115

## (undated) DEVICE — BUR STRK CARBIDE MATCH HEAD 3.0MM 5820-107-530C

## (undated) DEVICE — ESU ELEC BLADE 2.75" COATED/INSULATED E1455

## (undated) DEVICE — DRSG TEGADERM 4X4 3/4" 1626W

## (undated) DEVICE — SU SILK 0 SH 30" K834H

## (undated) DEVICE — GLOVE PROTEXIS MICRO 7.5  2D73PM75

## (undated) DEVICE — SPONGE COTTONOID 1/2X3" 20-07S

## (undated) DEVICE — SU SILK 4-0 RB-1 CR 8X18" C054D

## (undated) DEVICE — TUNNELING TOOL AND OBTURATOR

## (undated) DEVICE — SU VICRYL 2-0 SH 8-27" J785G

## (undated) DEVICE — NDL ANGIOCATH 24GA 0.75" 4053

## (undated) DEVICE — SUTURE BOOTS 051003PBX

## (undated) DEVICE — SHUNT TUNNELER SHEATH 61CM NT901124

## (undated) DEVICE — SU SILK 0 TIE 6X30" A306H

## (undated) DEVICE — SPONGE SURGIFOAM 100 1974

## (undated) DEVICE — DRAPE IOBAN INCISE 23X17" 6650EZ

## (undated) DEVICE — DRSG GAUZE 4X4" TRAY

## (undated) DEVICE — GLOVE BIOGEL PI MICRO SZ 6.5 48565

## (undated) DEVICE — SU SILK 4-0 TF CR 8X18" C084D

## (undated) DEVICE — DRAPE IOBAN INCISE 36X23" 6651EZ

## (undated) DEVICE — SYR 01ML 27GA 0.5" NDL TBC 309623

## (undated) DEVICE — SPONGE KITTNER 30-101

## (undated) DEVICE — DEVICE CATH STABILIZATION STATLOCK FOLEY 2-WAY FOL0102

## (undated) DEVICE — NDL COUNTER 20CT 31142493

## (undated) DEVICE — ESU PENCIL SMOKE EVAC W/ROCKER SWITCH 0703-047-000

## (undated) DEVICE — GLOVE PROTEXIS BLUE W/NEU-THERA 6.5  2D73EB65

## (undated) DEVICE — GLOVE PROTEXIS POWDER FREE 8.0 ORTHOPEDIC 2D73ET80

## (undated) DEVICE — ADH FLOSEAL W/HUMAN THROMBIN 5ML W/APPLICATOR TIP ADS201844

## (undated) DEVICE — DRAPE MICROSCOPE LEICA 54X150" AR8033650

## (undated) DEVICE — SPONGE COTTONOID 1/2X1 1/2" 20-06S

## (undated) DEVICE — SPONGE LAP 18X18" X8435

## (undated) DEVICE — SU PROLENE 6-0 RB-2DA 18" 8714H

## (undated) DEVICE — SOL RINGERS LACTATED 500ML BAG 2B2323QA

## (undated) RX ORDER — DEXAMETHASONE SODIUM PHOSPHATE 4 MG/ML
INJECTION, SOLUTION INTRA-ARTICULAR; INTRALESIONAL; INTRAMUSCULAR; INTRAVENOUS; SOFT TISSUE
Status: DISPENSED
Start: 2021-04-07

## (undated) RX ORDER — EPHEDRINE SULFATE 50 MG/ML
INJECTION, SOLUTION INTRAMUSCULAR; INTRAVENOUS; SUBCUTANEOUS
Status: DISPENSED
Start: 2019-10-16

## (undated) RX ORDER — FENTANYL CITRATE 50 UG/ML
INJECTION, SOLUTION INTRAMUSCULAR; INTRAVENOUS
Status: DISPENSED
Start: 2019-12-27

## (undated) RX ORDER — EPHEDRINE SULFATE 50 MG/ML
INJECTION, SOLUTION INTRAMUSCULAR; INTRAVENOUS; SUBCUTANEOUS
Status: DISPENSED
Start: 2022-01-19

## (undated) RX ORDER — ONDANSETRON 2 MG/ML
INJECTION INTRAMUSCULAR; INTRAVENOUS
Status: DISPENSED
Start: 2021-04-07

## (undated) RX ORDER — HEPARIN SODIUM 1000 [USP'U]/ML
INJECTION, SOLUTION INTRAVENOUS; SUBCUTANEOUS
Status: DISPENSED
Start: 2019-12-20

## (undated) RX ORDER — CEFAZOLIN SODIUM 1 G/3ML
INJECTION, POWDER, FOR SOLUTION INTRAMUSCULAR; INTRAVENOUS
Status: DISPENSED
Start: 2023-11-13

## (undated) RX ORDER — DEXAMETHASONE SODIUM PHOSPHATE 4 MG/ML
INJECTION, SOLUTION INTRA-ARTICULAR; INTRALESIONAL; INTRAMUSCULAR; INTRAVENOUS; SOFT TISSUE
Status: DISPENSED
Start: 2022-01-19

## (undated) RX ORDER — BUPIVACAINE HYDROCHLORIDE 2.5 MG/ML
INJECTION, SOLUTION EPIDURAL; INFILTRATION; INTRACAUDAL
Status: DISPENSED
Start: 2019-12-27

## (undated) RX ORDER — FENTANYL CITRATE 50 UG/ML
INJECTION, SOLUTION INTRAMUSCULAR; INTRAVENOUS
Status: DISPENSED
Start: 2019-12-20

## (undated) RX ORDER — GABAPENTIN 300 MG/1
CAPSULE ORAL
Status: DISPENSED
Start: 2022-01-21

## (undated) RX ORDER — PROPOFOL 10 MG/ML
INJECTION, EMULSION INTRAVENOUS
Status: DISPENSED
Start: 2021-04-07

## (undated) RX ORDER — CEFAZOLIN SODIUM 2 G/100ML
INJECTION, SOLUTION INTRAVENOUS
Status: DISPENSED
Start: 2022-01-19

## (undated) RX ORDER — PROPOFOL 10 MG/ML
INJECTION, EMULSION INTRAVENOUS
Status: DISPENSED
Start: 2022-01-19

## (undated) RX ORDER — FENTANYL CITRATE 50 UG/ML
INJECTION, SOLUTION INTRAMUSCULAR; INTRAVENOUS
Status: DISPENSED
Start: 2022-01-19

## (undated) RX ORDER — HYDROMORPHONE HYDROCHLORIDE 1 MG/ML
INJECTION, SOLUTION INTRAMUSCULAR; INTRAVENOUS; SUBCUTANEOUS
Status: DISPENSED
Start: 2019-12-27

## (undated) RX ORDER — BUPIVACAINE HYDROCHLORIDE 2.5 MG/ML
INJECTION, SOLUTION EPIDURAL; INFILTRATION; INTRACAUDAL
Status: DISPENSED
Start: 2022-01-21

## (undated) RX ORDER — LIDOCAINE HYDROCHLORIDE 20 MG/ML
INJECTION, SOLUTION EPIDURAL; INFILTRATION; INTRACAUDAL; PERINEURAL
Status: DISPENSED
Start: 2019-12-20

## (undated) RX ORDER — HYDRALAZINE HYDROCHLORIDE 20 MG/ML
INJECTION INTRAMUSCULAR; INTRAVENOUS
Status: DISPENSED
Start: 2019-12-27

## (undated) RX ORDER — PHENYLEPHRINE HCL IN 0.9% NACL 1 MG/10 ML
SYRINGE (ML) INTRAVENOUS
Status: DISPENSED
Start: 2019-10-16

## (undated) RX ORDER — ONDANSETRON 2 MG/ML
INJECTION INTRAMUSCULAR; INTRAVENOUS
Status: DISPENSED
Start: 2022-01-19

## (undated) RX ORDER — ONDANSETRON 2 MG/ML
INJECTION INTRAMUSCULAR; INTRAVENOUS
Status: DISPENSED
Start: 2019-10-16

## (undated) RX ORDER — FENTANYL CITRATE 50 UG/ML
INJECTION, SOLUTION INTRAMUSCULAR; INTRAVENOUS
Status: DISPENSED
Start: 2022-01-21

## (undated) RX ORDER — LIDOCAINE HYDROCHLORIDE AND EPINEPHRINE 10; 10 MG/ML; UG/ML
INJECTION, SOLUTION INFILTRATION; PERINEURAL
Status: DISPENSED
Start: 2022-01-19

## (undated) RX ORDER — LIDOCAINE HYDROCHLORIDE AND EPINEPHRINE 10; 10 MG/ML; UG/ML
INJECTION, SOLUTION INFILTRATION; PERINEURAL
Status: DISPENSED
Start: 2019-12-27

## (undated) RX ORDER — GLYCOPYRROLATE 0.2 MG/ML
INJECTION, SOLUTION INTRAMUSCULAR; INTRAVENOUS
Status: DISPENSED
Start: 2019-10-16

## (undated) RX ORDER — FENTANYL CITRATE 50 UG/ML
INJECTION, SOLUTION INTRAMUSCULAR; INTRAVENOUS
Status: DISPENSED
Start: 2023-11-13

## (undated) RX ORDER — LIDOCAINE HYDROCHLORIDE 10 MG/ML
INJECTION, SOLUTION EPIDURAL; INFILTRATION; INTRACAUDAL; PERINEURAL
Status: DISPENSED
Start: 2022-01-21

## (undated) RX ORDER — DIPHENHYDRAMINE HCL 25 MG
CAPSULE ORAL
Status: DISPENSED
Start: 2019-12-20

## (undated) RX ORDER — OXYCODONE HYDROCHLORIDE 5 MG/1
TABLET ORAL
Status: DISPENSED
Start: 2019-12-27

## (undated) RX ORDER — ONDANSETRON 2 MG/ML
INJECTION INTRAMUSCULAR; INTRAVENOUS
Status: DISPENSED
Start: 2023-11-13

## (undated) RX ORDER — IOPAMIDOL 408 MG/ML
INJECTION, SOLUTION INTRATHECAL
Status: DISPENSED
Start: 2022-01-19

## (undated) RX ORDER — HEPARIN SOD,PORCINE/0.9 % NACL 5K/1000 ML
INTRAVENOUS SOLUTION INTRAVENOUS
Status: DISPENSED
Start: 2019-10-16

## (undated) RX ORDER — SODIUM CHLORIDE 9 MG/ML
INJECTION, SOLUTION INTRAVENOUS
Status: DISPENSED
Start: 2022-01-19

## (undated) RX ORDER — HYDROMORPHONE HYDROCHLORIDE 1 MG/ML
INJECTION, SOLUTION INTRAMUSCULAR; INTRAVENOUS; SUBCUTANEOUS
Status: DISPENSED
Start: 2022-01-21

## (undated) RX ORDER — GABAPENTIN 300 MG/1
CAPSULE ORAL
Status: DISPENSED
Start: 2021-04-07

## (undated) RX ORDER — PHENYLEPHRINE HCL IN 0.9% NACL 1 MG/10 ML
SYRINGE (ML) INTRAVENOUS
Status: DISPENSED
Start: 2019-12-20

## (undated) RX ORDER — FENTANYL CITRATE-0.9 % NACL/PF 10 MCG/ML
PLASTIC BAG, INJECTION (ML) INTRAVENOUS
Status: DISPENSED
Start: 2022-01-19

## (undated) RX ORDER — ONDANSETRON 2 MG/ML
INJECTION INTRAMUSCULAR; INTRAVENOUS
Status: DISPENSED
Start: 2019-12-27

## (undated) RX ORDER — OXYCODONE HCL 5 MG/5 ML
SOLUTION, ORAL ORAL
Status: DISPENSED
Start: 2019-12-27

## (undated) RX ORDER — KETAMINE HCL IN 0.9 % NACL 50 MG/5 ML
SYRINGE (ML) INTRAVENOUS
Status: DISPENSED
Start: 2019-12-27

## (undated) RX ORDER — LIDOCAINE HYDROCHLORIDE 20 MG/ML
INJECTION, SOLUTION EPIDURAL; INFILTRATION; INTRACAUDAL; PERINEURAL
Status: DISPENSED
Start: 2022-01-19

## (undated) RX ORDER — EPHEDRINE SULFATE 50 MG/ML
INJECTION, SOLUTION INTRAMUSCULAR; INTRAVENOUS; SUBCUTANEOUS
Status: DISPENSED
Start: 2019-12-20

## (undated) RX ORDER — DEXAMETHASONE SODIUM PHOSPHATE 4 MG/ML
INJECTION, SOLUTION INTRA-ARTICULAR; INTRALESIONAL; INTRAMUSCULAR; INTRAVENOUS; SOFT TISSUE
Status: DISPENSED
Start: 2022-01-21

## (undated) RX ORDER — ACETAMINOPHEN 325 MG/1
TABLET ORAL
Status: DISPENSED
Start: 2022-01-21

## (undated) RX ORDER — BACITRACIN 50000 [IU]/1
INJECTION, POWDER, FOR SOLUTION INTRAMUSCULAR
Status: DISPENSED
Start: 2019-12-27

## (undated) RX ORDER — FENTANYL CITRATE 50 UG/ML
INJECTION, SOLUTION INTRAMUSCULAR; INTRAVENOUS
Status: DISPENSED
Start: 2021-04-07

## (undated) RX ORDER — ACETAMINOPHEN 325 MG/1
TABLET ORAL
Status: DISPENSED
Start: 2019-12-27

## (undated) RX ORDER — MORPHINE SULFATE 15 MG/1
TABLET, FILM COATED, EXTENDED RELEASE ORAL
Status: DISPENSED
Start: 2022-01-21

## (undated) RX ORDER — CEFAZOLIN SODIUM 2 G/100ML
INJECTION, SOLUTION INTRAVENOUS
Status: DISPENSED
Start: 2019-12-27

## (undated) RX ORDER — CEFAZOLIN SODIUM 2 G/100ML
INJECTION, SOLUTION INTRAVENOUS
Status: DISPENSED
Start: 2021-04-07

## (undated) RX ORDER — ACETAMINOPHEN 325 MG/1
TABLET ORAL
Status: DISPENSED
Start: 2023-11-13

## (undated) RX ORDER — GLYCOPYRROLATE 0.2 MG/ML
INJECTION, SOLUTION INTRAMUSCULAR; INTRAVENOUS
Status: DISPENSED
Start: 2019-12-20

## (undated) RX ORDER — DEXAMETHASONE SODIUM PHOSPHATE 4 MG/ML
INJECTION, SOLUTION INTRA-ARTICULAR; INTRALESIONAL; INTRAMUSCULAR; INTRAVENOUS; SOFT TISSUE
Status: DISPENSED
Start: 2019-10-16

## (undated) RX ORDER — SODIUM CHLORIDE 9 MG/ML
INJECTION, SOLUTION INTRAVENOUS
Status: DISPENSED
Start: 2019-12-27

## (undated) RX ORDER — LIDOCAINE HYDROCHLORIDE 20 MG/ML
INJECTION, SOLUTION EPIDURAL; INFILTRATION; INTRACAUDAL; PERINEURAL
Status: DISPENSED
Start: 2021-04-07

## (undated) RX ORDER — LIDOCAINE HYDROCHLORIDE 10 MG/ML
INJECTION, SOLUTION EPIDURAL; INFILTRATION; INTRACAUDAL; PERINEURAL
Status: DISPENSED
Start: 2019-12-20

## (undated) RX ORDER — LIDOCAINE HYDROCHLORIDE 20 MG/ML
INJECTION, SOLUTION EPIDURAL; INFILTRATION; INTRACAUDAL; PERINEURAL
Status: DISPENSED
Start: 2019-10-16

## (undated) RX ORDER — LIDOCAINE HYDROCHLORIDE AND EPINEPHRINE 10; 10 MG/ML; UG/ML
INJECTION, SOLUTION INFILTRATION; PERINEURAL
Status: DISPENSED
Start: 2022-01-21

## (undated) RX ORDER — ONDANSETRON 2 MG/ML
INJECTION INTRAMUSCULAR; INTRAVENOUS
Status: DISPENSED
Start: 2022-01-21

## (undated) RX ORDER — PROPOFOL 10 MG/ML
INJECTION, EMULSION INTRAVENOUS
Status: DISPENSED
Start: 2022-01-21

## (undated) RX ORDER — LIDOCAINE HYDROCHLORIDE 20 MG/ML
JELLY TOPICAL
Status: DISPENSED
Start: 2023-07-20

## (undated) RX ORDER — LABETALOL 20 MG/4 ML (5 MG/ML) INTRAVENOUS SYRINGE
Status: DISPENSED
Start: 2019-12-27

## (undated) RX ORDER — PROPOFOL 10 MG/ML
INJECTION, EMULSION INTRAVENOUS
Status: DISPENSED
Start: 2019-12-20

## (undated) RX ORDER — ACETAMINOPHEN 325 MG/1
TABLET ORAL
Status: DISPENSED
Start: 2021-04-07

## (undated) RX ORDER — BUPIVACAINE HYDROCHLORIDE 2.5 MG/ML
INJECTION, SOLUTION EPIDURAL; INFILTRATION; INTRACAUDAL
Status: DISPENSED
Start: 2022-01-19

## (undated) RX ORDER — LIDOCAINE HYDROCHLORIDE 20 MG/ML
INJECTION, SOLUTION EPIDURAL; INFILTRATION; INTRACAUDAL; PERINEURAL
Status: DISPENSED
Start: 2022-01-21

## (undated) RX ORDER — ACETAMINOPHEN 325 MG/1
TABLET ORAL
Status: DISPENSED
Start: 2019-10-16

## (undated) RX ORDER — EPHEDRINE SULFATE 50 MG/ML
INJECTION, SOLUTION INTRAMUSCULAR; INTRAVENOUS; SUBCUTANEOUS
Status: DISPENSED
Start: 2022-01-21

## (undated) RX ORDER — OXYCODONE HYDROCHLORIDE 5 MG/1
TABLET ORAL
Status: DISPENSED
Start: 2023-11-13

## (undated) RX ORDER — CIPROFLOXACIN 500 MG/1
TABLET, FILM COATED ORAL
Status: DISPENSED
Start: 2023-12-21

## (undated) RX ORDER — CEFAZOLIN SODIUM 1 G/3ML
INJECTION, POWDER, FOR SOLUTION INTRAMUSCULAR; INTRAVENOUS
Status: DISPENSED
Start: 2019-12-27

## (undated) RX ORDER — LIDOCAINE HYDROCHLORIDE 10 MG/ML
INJECTION, SOLUTION EPIDURAL; INFILTRATION; INTRACAUDAL; PERINEURAL
Status: DISPENSED
Start: 2019-10-16

## (undated) RX ORDER — CEFAZOLIN SODIUM 1 G/50ML
SOLUTION INTRAVENOUS
Status: DISPENSED
Start: 2022-01-21

## (undated) RX ORDER — LIDOCAINE HYDROCHLORIDE AND EPINEPHRINE 10; 10 MG/ML; UG/ML
INJECTION, SOLUTION INFILTRATION; PERINEURAL
Status: DISPENSED
Start: 2021-04-07

## (undated) RX ORDER — CEFAZOLIN SODIUM 2 G/50ML
SOLUTION INTRAVENOUS
Status: DISPENSED
Start: 2023-11-13

## (undated) RX ORDER — FENTANYL CITRATE-0.9 % NACL/PF 10 MCG/ML
PLASTIC BAG, INJECTION (ML) INTRAVENOUS
Status: DISPENSED
Start: 2021-04-07

## (undated) RX ORDER — FENTANYL CITRATE-0.9 % NACL/PF 10 MCG/ML
PLASTIC BAG, INJECTION (ML) INTRAVENOUS
Status: DISPENSED
Start: 2022-01-21

## (undated) RX ORDER — ACETAMINOPHEN 325 MG/1
TABLET ORAL
Status: DISPENSED
Start: 2019-12-20

## (undated) RX ORDER — FENTANYL CITRATE 50 UG/ML
INJECTION, SOLUTION INTRAMUSCULAR; INTRAVENOUS
Status: DISPENSED
Start: 2019-10-16